# Patient Record
Sex: MALE | Race: WHITE | NOT HISPANIC OR LATINO | Employment: UNEMPLOYED | URBAN - METROPOLITAN AREA
[De-identification: names, ages, dates, MRNs, and addresses within clinical notes are randomized per-mention and may not be internally consistent; named-entity substitution may affect disease eponyms.]

---

## 2017-01-01 ENCOUNTER — APPOINTMENT (OUTPATIENT)
Dept: GENERAL RADIOLOGY | Facility: HOSPITAL | Age: 62
End: 2017-01-01

## 2017-01-01 ENCOUNTER — OFFICE VISIT (OUTPATIENT)
Dept: UROLOGY | Facility: CLINIC | Age: 62
End: 2017-01-01

## 2017-01-01 ENCOUNTER — HOSPITAL ENCOUNTER (OUTPATIENT)
Dept: GENERAL RADIOLOGY | Facility: HOSPITAL | Age: 62
Discharge: HOME | End: 2017-01-04
Attending: INTERNAL MEDICINE | Admitting: INTERNAL MEDICINE

## 2017-01-01 ENCOUNTER — APPOINTMENT (OUTPATIENT)
Dept: NUCLEAR MEDICINE | Facility: HOSPITAL | Age: 62
End: 2017-01-01
Attending: INTERNAL MEDICINE

## 2017-01-01 ENCOUNTER — APPOINTMENT (OUTPATIENT)
Dept: CARDIOLOGY | Facility: HOSPITAL | Age: 62
End: 2017-01-01
Attending: INTERNAL MEDICINE

## 2017-01-01 ENCOUNTER — HOSPITAL ENCOUNTER (EMERGENCY)
Facility: HOSPITAL | Age: 62
Discharge: HOME | End: 2017-02-25
Attending: EMERGENCY MEDICINE | Admitting: EMERGENCY MEDICINE

## 2017-01-01 ENCOUNTER — HOSPITAL ENCOUNTER (INPATIENT)
Facility: HOSPITAL | Age: 62
LOS: 5 days | Discharge: ANOTHER HOSPITAL | End: 2017-04-04
Attending: EMERGENCY MEDICINE | Admitting: INTERNAL MEDICINE

## 2017-01-01 ENCOUNTER — HOSPITAL ENCOUNTER (INPATIENT)
Facility: HOSPITAL | Age: 62
LOS: 2 days | End: 2017-04-29
Attending: FAMILY MEDICINE | Admitting: FAMILY MEDICINE

## 2017-01-01 ENCOUNTER — TELEPHONE (OUTPATIENT)
Dept: CARDIOLOGY | Facility: CLINIC | Age: 62
End: 2017-01-01

## 2017-01-01 ENCOUNTER — HOSPITAL ENCOUNTER (OUTPATIENT)
Dept: ULTRASOUND IMAGING | Facility: HOSPITAL | Age: 62
Discharge: HOME | End: 2017-03-30
Admitting: NURSE PRACTITIONER

## 2017-01-01 ENCOUNTER — HOSPITAL ENCOUNTER (OUTPATIENT)
Dept: CARDIOLOGY | Facility: HOSPITAL | Age: 62
Discharge: HOME | End: 2017-03-08
Attending: INTERNAL MEDICINE | Admitting: INTERNAL MEDICINE

## 2017-01-01 ENCOUNTER — OFFICE VISIT (OUTPATIENT)
Dept: CARDIOLOGY | Facility: CLINIC | Age: 62
End: 2017-01-01

## 2017-01-01 ENCOUNTER — ANESTHESIA (OUTPATIENT)
Dept: PERIOP | Facility: HOSPITAL | Age: 62
End: 2017-01-01

## 2017-01-01 ENCOUNTER — ANESTHESIA EVENT (OUTPATIENT)
Dept: PERIOP | Facility: HOSPITAL | Age: 62
End: 2017-01-01

## 2017-01-01 ENCOUNTER — APPOINTMENT (OUTPATIENT)
Dept: CT IMAGING | Facility: HOSPITAL | Age: 62
End: 2017-01-01

## 2017-01-01 ENCOUNTER — APPOINTMENT (OUTPATIENT)
Dept: CARDIOLOGY | Facility: HOSPITAL | Age: 62
End: 2017-01-01

## 2017-01-01 ENCOUNTER — LAB (OUTPATIENT)
Dept: LAB | Facility: HOSPITAL | Age: 62
End: 2017-01-01
Attending: INTERNAL MEDICINE

## 2017-01-01 ENCOUNTER — HOSPITAL ENCOUNTER (INPATIENT)
Facility: HOSPITAL | Age: 62
LOS: 23 days | End: 2017-04-27
Attending: RADIOLOGY | Admitting: INTERNAL MEDICINE

## 2017-01-01 VITALS
HEIGHT: 69 IN | SYSTOLIC BLOOD PRESSURE: 116 MMHG | BODY MASS INDEX: 38.8 KG/M2 | DIASTOLIC BLOOD PRESSURE: 78 MMHG | OXYGEN SATURATION: 96 % | WEIGHT: 262 LBS | TEMPERATURE: 97.8 F | RESPIRATION RATE: 22 BRPM | HEART RATE: 87 BPM

## 2017-01-01 VITALS
HEIGHT: 69 IN | DIASTOLIC BLOOD PRESSURE: 78 MMHG | BODY MASS INDEX: 35.22 KG/M2 | RESPIRATION RATE: 20 BRPM | OXYGEN SATURATION: 99 % | SYSTOLIC BLOOD PRESSURE: 145 MMHG | HEART RATE: 103 BPM | TEMPERATURE: 98.4 F | WEIGHT: 237.81 LBS

## 2017-01-01 VITALS — HEART RATE: 97 BPM | DIASTOLIC BLOOD PRESSURE: 72 MMHG | SYSTOLIC BLOOD PRESSURE: 101 MMHG | TEMPERATURE: 96.7 F

## 2017-01-01 VITALS
HEIGHT: 69 IN | SYSTOLIC BLOOD PRESSURE: 131 MMHG | RESPIRATION RATE: 18 BRPM | TEMPERATURE: 98 F | WEIGHT: 230 LBS | DIASTOLIC BLOOD PRESSURE: 83 MMHG | OXYGEN SATURATION: 96 % | BODY MASS INDEX: 34.07 KG/M2 | HEART RATE: 98 BPM

## 2017-01-01 VITALS
DIASTOLIC BLOOD PRESSURE: 73 MMHG | HEIGHT: 69 IN | WEIGHT: 216 LBS | SYSTOLIC BLOOD PRESSURE: 111 MMHG | RESPIRATION RATE: 16 BRPM | BODY MASS INDEX: 31.99 KG/M2 | HEART RATE: 105 BPM

## 2017-01-01 VITALS
WEIGHT: 237.4 LBS | DIASTOLIC BLOOD PRESSURE: 82 MMHG | HEIGHT: 69 IN | OXYGEN SATURATION: 98 % | SYSTOLIC BLOOD PRESSURE: 129 MMHG | HEART RATE: 90 BPM | BODY MASS INDEX: 35.16 KG/M2

## 2017-01-01 VITALS
OXYGEN SATURATION: 83 % | TEMPERATURE: 97.9 F | SYSTOLIC BLOOD PRESSURE: 124 MMHG | HEART RATE: 101 BPM | DIASTOLIC BLOOD PRESSURE: 79 MMHG

## 2017-01-01 DIAGNOSIS — I73.9 PERIPHERAL VASCULAR DISEASE (HCC): ICD-10-CM

## 2017-01-01 DIAGNOSIS — I50.22 CHRONIC SYSTOLIC CONGESTIVE HEART FAILURE (HCC): ICD-10-CM

## 2017-01-01 DIAGNOSIS — R07.2 PRECORDIAL CHEST PAIN: ICD-10-CM

## 2017-01-01 DIAGNOSIS — E11.65 TYPE 2 DIABETES MELLITUS WITH HYPERGLYCEMIA, WITH LONG-TERM CURRENT USE OF INSULIN (HCC): ICD-10-CM

## 2017-01-01 DIAGNOSIS — A41.9 SEPSIS, DUE TO UNSPECIFIED ORGANISM: ICD-10-CM

## 2017-01-01 DIAGNOSIS — N39.0 URINARY TRACT INFECTION WITHOUT HEMATURIA, SITE UNSPECIFIED: ICD-10-CM

## 2017-01-01 DIAGNOSIS — E11.51 TYPE 2 DIABETES MELLITUS WITH DIABETIC PERIPHERAL ANGIOPATHY WITHOUT GANGRENE, WITH LONG-TERM CURRENT USE OF INSULIN (HCC): ICD-10-CM

## 2017-01-01 DIAGNOSIS — Z95.810 ICD (IMPLANTABLE CARDIOVERTER-DEFIBRILLATOR) IN PLACE: ICD-10-CM

## 2017-01-01 DIAGNOSIS — R41.841 COGNITIVE COMMUNICATION DEFICIT: ICD-10-CM

## 2017-01-01 DIAGNOSIS — N50.9 SCROTAL SKIN LESION: ICD-10-CM

## 2017-01-01 DIAGNOSIS — R06.02 SOB (SHORTNESS OF BREATH): ICD-10-CM

## 2017-01-01 DIAGNOSIS — Z74.09 IMPAIRED FUNCTIONAL MOBILITY, BALANCE, GAIT, AND ENDURANCE: ICD-10-CM

## 2017-01-01 DIAGNOSIS — Z96.0 INDWELLING CATHETER PRESENT ON ADMISSION: ICD-10-CM

## 2017-01-01 DIAGNOSIS — Z78.9 IMPAIRED MOBILITY AND ADLS: ICD-10-CM

## 2017-01-01 DIAGNOSIS — I25.5 ISCHEMIC CARDIOMYOPATHY: ICD-10-CM

## 2017-01-01 DIAGNOSIS — N39.0 UTI (URINARY TRACT INFECTION), BACTERIAL: Primary | ICD-10-CM

## 2017-01-01 DIAGNOSIS — I50.9 CONGESTIVE HEART FAILURE, UNSPECIFIED CONGESTIVE HEART FAILURE CHRONICITY, UNSPECIFIED CONGESTIVE HEART FAILURE TYPE: Primary | ICD-10-CM

## 2017-01-01 DIAGNOSIS — E78.5 DYSLIPIDEMIA: ICD-10-CM

## 2017-01-01 DIAGNOSIS — N50.89 SCROTAL ULCER: Primary | ICD-10-CM

## 2017-01-01 DIAGNOSIS — Z79.899 DRUG THERAPY: ICD-10-CM

## 2017-01-01 DIAGNOSIS — I25.10 ARTERIOSCLEROTIC CARDIOVASCULAR DISEASE (ASCVD): Primary | ICD-10-CM

## 2017-01-01 DIAGNOSIS — R13.10 DYSPHAGIA, UNSPECIFIED TYPE: Primary | ICD-10-CM

## 2017-01-01 DIAGNOSIS — I10 ESSENTIAL HYPERTENSION: ICD-10-CM

## 2017-01-01 DIAGNOSIS — Z79.4 TYPE 2 DIABETES MELLITUS WITH HYPERGLYCEMIA, WITH LONG-TERM CURRENT USE OF INSULIN (HCC): ICD-10-CM

## 2017-01-01 DIAGNOSIS — N50.89 TESTICULAR SWELLING: ICD-10-CM

## 2017-01-01 DIAGNOSIS — I51.9 HEART DISEASE: ICD-10-CM

## 2017-01-01 DIAGNOSIS — N45.3 EPIDIDYMOORCHITIS: ICD-10-CM

## 2017-01-01 DIAGNOSIS — Z01.810 PRE-OPERATIVE CARDIOVASCULAR EXAMINATION: Primary | ICD-10-CM

## 2017-01-01 DIAGNOSIS — I25.10 ARTERIOSCLEROTIC CARDIOVASCULAR DISEASE (ASCVD): ICD-10-CM

## 2017-01-01 DIAGNOSIS — Z79.899 DRUG THERAPY: Primary | ICD-10-CM

## 2017-01-01 DIAGNOSIS — IMO0002 INSULIN DEPENDENT TYPE 2 DIABETES MELLITUS, UNCONTROLLED: ICD-10-CM

## 2017-01-01 DIAGNOSIS — Z74.09 IMPAIRED MOBILITY AND ADLS: ICD-10-CM

## 2017-01-01 DIAGNOSIS — Z79.4 TYPE 2 DIABETES MELLITUS WITH DIABETIC PERIPHERAL ANGIOPATHY WITHOUT GANGRENE, WITH LONG-TERM CURRENT USE OF INSULIN (HCC): ICD-10-CM

## 2017-01-01 DIAGNOSIS — N39.0 URINARY TRACT INFECTION WITHOUT HEMATURIA, SITE UNSPECIFIED: Primary | ICD-10-CM

## 2017-01-01 DIAGNOSIS — A49.9 UTI (URINARY TRACT INFECTION), BACTERIAL: Primary | ICD-10-CM

## 2017-01-01 LAB
A-A DO2: 41.2 MMHG (ref 0–300)
ABO GROUP BLD: NORMAL
ALBUMIN SERPL-MCNC: 2.5 G/DL (ref 3.4–4.8)
ALBUMIN SERPL-MCNC: 2.5 G/DL (ref 3.4–4.8)
ALBUMIN SERPL-MCNC: 2.9 G/DL (ref 3.2–4.8)
ALBUMIN SERPL-MCNC: 2.9 G/DL (ref 3.4–4.8)
ALBUMIN SERPL-MCNC: 3 G/DL (ref 3.2–4.8)
ALBUMIN SERPL-MCNC: 3 G/DL (ref 3.2–4.8)
ALBUMIN SERPL-MCNC: 3 G/DL (ref 3.4–4.8)
ALBUMIN SERPL-MCNC: 3.1 G/DL (ref 3.2–4.8)
ALBUMIN SERPL-MCNC: 3.2 G/DL (ref 3.2–4.8)
ALBUMIN SERPL-MCNC: 3.2 G/DL (ref 3.4–4.8)
ALBUMIN SERPL-MCNC: 3.3 G/DL (ref 3.2–4.8)
ALBUMIN SERPL-MCNC: 3.4 G/DL (ref 3.2–4.8)
ALBUMIN SERPL-MCNC: 3.4 G/DL (ref 3.2–4.8)
ALBUMIN SERPL-MCNC: 3.5 G/DL (ref 3.4–4.8)
ALBUMIN SERPL-MCNC: 3.8 G/DL (ref 3.4–4.8)
ALBUMIN/GLOB SERPL: 0.6 G/DL (ref 1.5–2.5)
ALBUMIN/GLOB SERPL: 0.7 G/DL (ref 1.5–2.5)
ALBUMIN/GLOB SERPL: 0.7 G/DL (ref 1.5–2.5)
ALBUMIN/GLOB SERPL: 0.8 G/DL (ref 1.5–2.5)
ALBUMIN/GLOB SERPL: 0.9 G/DL (ref 1.5–2.5)
ALP SERPL-CCNC: 102 U/L (ref 25–100)
ALP SERPL-CCNC: 124 U/L (ref 25–100)
ALP SERPL-CCNC: 134 U/L (ref 25–100)
ALP SERPL-CCNC: 135 U/L (ref 25–100)
ALP SERPL-CCNC: 137 U/L (ref 25–100)
ALP SERPL-CCNC: 56 U/L (ref 40–129)
ALP SERPL-CCNC: 57 U/L (ref 40–129)
ALP SERPL-CCNC: 58 U/L (ref 40–129)
ALP SERPL-CCNC: 61 U/L (ref 25–100)
ALP SERPL-CCNC: 65 U/L (ref 25–100)
ALP SERPL-CCNC: 66 U/L (ref 25–100)
ALP SERPL-CCNC: 69 U/L (ref 40–129)
ALP SERPL-CCNC: 70 U/L (ref 40–129)
ALP SERPL-CCNC: 71 U/L (ref 25–100)
ALP SERPL-CCNC: 74 U/L (ref 25–100)
ALP SERPL-CCNC: 75 U/L (ref 40–129)
ALP SERPL-CCNC: 77 U/L (ref 25–100)
ALP SERPL-CCNC: 77 U/L (ref 40–129)
ALP SERPL-CCNC: 91 U/L (ref 25–100)
ALT SERPL W P-5'-P-CCNC: 11 U/L (ref 7–40)
ALT SERPL W P-5'-P-CCNC: 12 U/L (ref 10–44)
ALT SERPL W P-5'-P-CCNC: 12 U/L (ref 7–40)
ALT SERPL W P-5'-P-CCNC: 12 U/L (ref 7–40)
ALT SERPL W P-5'-P-CCNC: 13 U/L (ref 10–44)
ALT SERPL W P-5'-P-CCNC: 13 U/L (ref 7–40)
ALT SERPL W P-5'-P-CCNC: 14 U/L (ref 10–44)
ALT SERPL W P-5'-P-CCNC: 14 U/L (ref 7–40)
ALT SERPL W P-5'-P-CCNC: 16 U/L (ref 7–40)
ALT SERPL W P-5'-P-CCNC: 18 U/L (ref 7–40)
ALT SERPL W P-5'-P-CCNC: 25 U/L (ref 7–40)
ALT SERPL W P-5'-P-CCNC: 4 U/L (ref 7–40)
ALT SERPL W P-5'-P-CCNC: 5 U/L (ref 7–40)
ALT SERPL W P-5'-P-CCNC: 5 U/L (ref 7–40)
ALT SERPL W P-5'-P-CCNC: 6 U/L (ref 7–40)
ALT SERPL W P-5'-P-CCNC: 8 U/L (ref 10–44)
ALT SERPL W P-5'-P-CCNC: 9 U/L (ref 10–44)
AMMONIA BLD-SCNC: 27 UMOL/L (ref 16–60)
ANION GAP SERPL CALCULATED.3IONS-SCNC: 10.9 MMOL/L (ref 3.6–11.2)
ANION GAP SERPL CALCULATED.3IONS-SCNC: 12.9 MMOL/L (ref 3.6–11.2)
ANION GAP SERPL CALCULATED.3IONS-SCNC: 13 MMOL/L (ref 3–11)
ANION GAP SERPL CALCULATED.3IONS-SCNC: 13.1 MMOL/L (ref 3.6–11.2)
ANION GAP SERPL CALCULATED.3IONS-SCNC: 2 MMOL/L (ref 3–11)
ANION GAP SERPL CALCULATED.3IONS-SCNC: 2 MMOL/L (ref 3–11)
ANION GAP SERPL CALCULATED.3IONS-SCNC: 3 MMOL/L (ref 3–11)
ANION GAP SERPL CALCULATED.3IONS-SCNC: 4 MMOL/L (ref 3–11)
ANION GAP SERPL CALCULATED.3IONS-SCNC: 4.6 MMOL/L (ref 3.6–11.2)
ANION GAP SERPL CALCULATED.3IONS-SCNC: 5 MMOL/L (ref 3–11)
ANION GAP SERPL CALCULATED.3IONS-SCNC: 6 MMOL/L (ref 3–11)
ANION GAP SERPL CALCULATED.3IONS-SCNC: 6.2 MMOL/L (ref 3.6–11.2)
ANION GAP SERPL CALCULATED.3IONS-SCNC: 6.6 MMOL/L (ref 3.6–11.2)
ANION GAP SERPL CALCULATED.3IONS-SCNC: 6.6 MMOL/L (ref 3.6–11.2)
ANION GAP SERPL CALCULATED.3IONS-SCNC: 7 MMOL/L (ref 3–11)
ANION GAP SERPL CALCULATED.3IONS-SCNC: 7.7 MMOL/L (ref 3.6–11.2)
ANION GAP SERPL CALCULATED.3IONS-SCNC: 8.9 MMOL/L (ref 3.6–11.2)
ANION GAP SERPL CALCULATED.3IONS-SCNC: 9 MMOL/L (ref 3–11)
ANION GAP SERPL CALCULATED.3IONS-SCNC: 9 MMOL/L (ref 3–11)
APTT PPP: 24.6 SECONDS (ref 24–31)
APTT PPP: 29.9 SECONDS (ref 24–31)
APTT PPP: 31.5 SECONDS (ref 24–31)
APTT PPP: 50.4 SECONDS (ref 24–31)
APTT PPP: 55.8 SECONDS (ref 24–31)
APTT PPP: 60.2 SECONDS (ref 24–31)
ARTERIAL PATENCY WRIST A: ABNORMAL
ARTERIAL PATENCY WRIST A: POSITIVE
ARTICHOKE IGE QN: 62 MG/DL (ref 0–130)
AST SERPL-CCNC: 16 U/L (ref 10–34)
AST SERPL-CCNC: 17 U/L (ref 10–34)
AST SERPL-CCNC: 19 U/L (ref 0–33)
AST SERPL-CCNC: 19 U/L (ref 10–34)
AST SERPL-CCNC: 19 U/L (ref 10–34)
AST SERPL-CCNC: 22 U/L (ref 0–33)
AST SERPL-CCNC: 22 U/L (ref 0–33)
AST SERPL-CCNC: 24 U/L (ref 10–34)
AST SERPL-CCNC: 25 U/L (ref 0–33)
AST SERPL-CCNC: 27 U/L (ref 0–33)
AST SERPL-CCNC: 28 U/L (ref 0–33)
AST SERPL-CCNC: 28 U/L (ref 0–33)
AST SERPL-CCNC: 30 U/L (ref 0–33)
AST SERPL-CCNC: 30 U/L (ref 10–34)
AST SERPL-CCNC: 33 U/L (ref 0–33)
AST SERPL-CCNC: 34 U/L (ref 0–33)
AST SERPL-CCNC: 43 U/L (ref 10–34)
AST SERPL-CCNC: 67 U/L (ref 0–33)
AST SERPL-CCNC: 70 U/L (ref 0–33)
ATMOSPHERIC PRESS: 720 MMHG
ATMOSPHERIC PRESS: ABNORMAL MMHG
BACTERIA SPEC AEROBE CULT: ABNORMAL
BACTERIA SPEC AEROBE CULT: NO GROWTH
BACTERIA SPEC AEROBE CULT: NORMAL
BACTERIA UR QL AUTO: ABNORMAL /HPF
BASE EXCESS BLDA CALC-SCNC: -0.3 MMOL/L (ref 0–2)
BASE EXCESS BLDA CALC-SCNC: 10.2 MMOL/L (ref 0–2)
BASE EXCESS BLDA CALC-SCNC: 2.2 MMOL/L
BASE EXCESS BLDA CALC-SCNC: 9.9 MMOL/L (ref 0–2)
BASOPHILS # BLD AUTO: 0.01 10*3/MM3 (ref 0–0.2)
BASOPHILS # BLD AUTO: 0.01 10*3/MM3 (ref 0–0.3)
BASOPHILS # BLD AUTO: 0.02 10*3/MM3 (ref 0–0.2)
BASOPHILS # BLD AUTO: 0.02 10*3/MM3 (ref 0–0.3)
BASOPHILS # BLD AUTO: 0.02 10*3/MM3 (ref 0–0.3)
BASOPHILS # BLD AUTO: 0.03 10*3/MM3 (ref 0–0.2)
BASOPHILS # BLD AUTO: 0.03 10*3/MM3 (ref 0–0.2)
BASOPHILS # BLD AUTO: 0.03 10*3/MM3 (ref 0–0.3)
BASOPHILS # BLD AUTO: 0.04 10*3/MM3 (ref 0–0.2)
BASOPHILS # BLD AUTO: 0.04 10*3/MM3 (ref 0–0.2)
BASOPHILS # BLD AUTO: 0.05 10*3/MM3 (ref 0–0.3)
BASOPHILS # BLD AUTO: 0.06 10*3/MM3 (ref 0–0.2)
BASOPHILS # BLD MANUAL: 0 10*3/MM3 (ref 0–0.2)
BASOPHILS NFR BLD AUTO: 0 % (ref 0–1)
BASOPHILS NFR BLD AUTO: 0.1 % (ref 0–1)
BASOPHILS NFR BLD AUTO: 0.1 % (ref 0–2)
BASOPHILS NFR BLD AUTO: 0.1 % (ref 0–2)
BASOPHILS NFR BLD AUTO: 0.2 % (ref 0–1)
BASOPHILS NFR BLD AUTO: 0.2 % (ref 0–2)
BASOPHILS NFR BLD AUTO: 0.3 % (ref 0–1)
BASOPHILS NFR BLD AUTO: 0.3 % (ref 0–1)
BASOPHILS NFR BLD AUTO: 0.4 % (ref 0–1)
BASOPHILS NFR BLD AUTO: 0.4 % (ref 0–1)
BASOPHILS NFR BLD AUTO: 0.5 % (ref 0–1)
BASOPHILS NFR BLD AUTO: 0.5 % (ref 0–2)
BDY SITE: ABNORMAL
BH CV ECHO MEAS - ACS: 2.1 CM
BH CV ECHO MEAS - AO MAX PG (FULL): 2.5 MMHG
BH CV ECHO MEAS - AO MAX PG (FULL): 2.8 MMHG
BH CV ECHO MEAS - AO MAX PG: 4.4 MMHG
BH CV ECHO MEAS - AO MAX PG: 6 MMHG
BH CV ECHO MEAS - AO MEAN PG (FULL): 1 MMHG
BH CV ECHO MEAS - AO MEAN PG (FULL): 1.1 MMHG
BH CV ECHO MEAS - AO MEAN PG: 2 MMHG
BH CV ECHO MEAS - AO MEAN PG: 2.8 MMHG
BH CV ECHO MEAS - AO ROOT AREA (BSA CORRECTED): 1.4
BH CV ECHO MEAS - AO ROOT AREA (BSA CORRECTED): 1.4
BH CV ECHO MEAS - AO ROOT AREA: 7.1 CM^2
BH CV ECHO MEAS - AO ROOT AREA: 7.7 CM^2
BH CV ECHO MEAS - AO ROOT DIAM: 3 CM
BH CV ECHO MEAS - AO ROOT DIAM: 3.1 CM
BH CV ECHO MEAS - AO V2 MAX: 105 CM/SEC
BH CV ECHO MEAS - AO V2 MAX: 122.2 CM/SEC
BH CV ECHO MEAS - AO V2 MEAN: 53 CM/SEC
BH CV ECHO MEAS - AO V2 MEAN: 75.4 CM/SEC
BH CV ECHO MEAS - AO V2 VTI: 14.1 CM
BH CV ECHO MEAS - AO V2 VTI: 17 CM
BH CV ECHO MEAS - AVA(I,A): 1.6 CM^2
BH CV ECHO MEAS - AVA(I,A): 3.6 CM^2
BH CV ECHO MEAS - AVA(I,D): 1.6 CM^2
BH CV ECHO MEAS - AVA(I,D): 3.6 CM^2
BH CV ECHO MEAS - AVA(V,A): 1.7 CM^2
BH CV ECHO MEAS - AVA(V,A): 3.4 CM^2
BH CV ECHO MEAS - AVA(V,D): 1.7 CM^2
BH CV ECHO MEAS - AVA(V,D): 3.4 CM^2
BH CV ECHO MEAS - BSA(HAYCOCK): 2.3 M^2
BH CV ECHO MEAS - BSA(HAYCOCK): 2.3 M^2
BH CV ECHO MEAS - BSA: 2.2 M^2
BH CV ECHO MEAS - BSA: 2.2 M^2
BH CV ECHO MEAS - BZI_BMI: 34 KILOGRAMS/M^2
BH CV ECHO MEAS - BZI_BMI: 35 KILOGRAMS/M^2
BH CV ECHO MEAS - BZI_METRIC_HEIGHT: 175.3 CM
BH CV ECHO MEAS - BZI_METRIC_HEIGHT: 175.3 CM
BH CV ECHO MEAS - BZI_METRIC_WEIGHT: 104.3 KG
BH CV ECHO MEAS - BZI_METRIC_WEIGHT: 107.5 KG
BH CV ECHO MEAS - EDV(CUBED): 408.5 ML
BH CV ECHO MEAS - EDV(TEICH): 291.2 ML
BH CV ECHO MEAS - EF(CUBED): 31.2 %
BH CV ECHO MEAS - EF(TEICH): 24.5 %
BH CV ECHO MEAS - ESV(CUBED): 281 ML
BH CV ECHO MEAS - ESV(TEICH): 219.8 ML
BH CV ECHO MEAS - FS: 11.7 %
BH CV ECHO MEAS - IVS/LVPW: 1.1
BH CV ECHO MEAS - IVSD: 1.1 CM
BH CV ECHO MEAS - LA DIMENSION: 3.9 CM
BH CV ECHO MEAS - LA DIMENSION: 4.7 CM
BH CV ECHO MEAS - LA/AO: 1.3
BH CV ECHO MEAS - LA/AO: 1.6
BH CV ECHO MEAS - LAT PEAK E' VEL: 6.1 CM/SEC
BH CV ECHO MEAS - LV MASS(C)D: 380.6 GRAMS
BH CV ECHO MEAS - LV MASS(C)DI: 171.4 GRAMS/M^2
BH CV ECHO MEAS - LV MAX PG: 1.6 MMHG
BH CV ECHO MEAS - LV MAX PG: 3.5 MMHG
BH CV ECHO MEAS - LV MEAN PG: 1 MMHG
BH CV ECHO MEAS - LV MEAN PG: 1.7 MMHG
BH CV ECHO MEAS - LV V1 MAX: 64 CM/SEC
BH CV ECHO MEAS - LV V1 MAX: 93.3 CM/SEC
BH CV ECHO MEAS - LV V1 MEAN: 32.2 CM/SEC
BH CV ECHO MEAS - LV V1 MEAN: 58 CM/SEC
BH CV ECHO MEAS - LV V1 VTI: 13.7 CM
BH CV ECHO MEAS - LV V1 VTI: 7.8 CM
BH CV ECHO MEAS - LVIDD: 7.4 CM
BH CV ECHO MEAS - LVIDS: 6.6 CM
BH CV ECHO MEAS - LVOT AREA (M): 2.8 CM^2
BH CV ECHO MEAS - LVOT AREA (M): 4.5 CM^2
BH CV ECHO MEAS - LVOT AREA: 2.8 CM^2
BH CV ECHO MEAS - LVOT AREA: 4.5 CM^2
BH CV ECHO MEAS - LVOT DIAM: 1.9 CM
BH CV ECHO MEAS - LVOT DIAM: 2.4 CM
BH CV ECHO MEAS - LVPWD: 1 CM
BH CV ECHO MEAS - MED PEAK E' VEL: 7.37 CM/SEC
BH CV ECHO MEAS - MV DEC SLOPE: 346.5 CM/SEC^2
BH CV ECHO MEAS - MV DEC TIME: 0.17 SEC
BH CV ECHO MEAS - MV E MAX VEL: 85.9 CM/SEC
BH CV ECHO MEAS - MV E MAX VEL: 91.3 CM/SEC
BH CV ECHO MEAS - MV P1/2T MAX VEL: 85.4 CM/SEC
BH CV ECHO MEAS - MV P1/2T: 72.2 MSEC
BH CV ECHO MEAS - MVA P1/2T LCG: 2.6 CM^2
BH CV ECHO MEAS - MVA(P1/2T): 3 CM^2
BH CV ECHO MEAS - PA ACC SLOPE: 939 CM/SEC^2
BH CV ECHO MEAS - PA ACC TIME: 0.07 SEC
BH CV ECHO MEAS - PA MAX PG: 2.6 MMHG
BH CV ECHO MEAS - PA PR(ACCEL): 47.5 MMHG
BH CV ECHO MEAS - PA V2 MAX: 80.5 CM/SEC
BH CV ECHO MEAS - RVDD: 2 CM
BH CV ECHO MEAS - SI(AO): 44.9 ML/M^2
BH CV ECHO MEAS - SI(AO): 59.9 ML/M^2
BH CV ECHO MEAS - SI(CUBED): 57.4 ML/M^2
BH CV ECHO MEAS - SI(LVOT): 27.9 ML/M^2
BH CV ECHO MEAS - SI(LVOT): 9.9 ML/M^2
BH CV ECHO MEAS - SI(TEICH): 32.2 ML/M^2
BH CV ECHO MEAS - SV(AO): 131.3 ML
BH CV ECHO MEAS - SV(AO): 99.7 ML
BH CV ECHO MEAS - SV(CUBED): 127.5 ML
BH CV ECHO MEAS - SV(LVOT): 22.1 ML
BH CV ECHO MEAS - SV(LVOT): 61.1 ML
BH CV ECHO MEAS - SV(TEICH): 71.4 ML
BH CV ECHO MEAS - TAPSE (>1.6): 1.3 CM2
BH CV XLRA - RV BASE: 3.6 CM
BH CV XLRA - RV LENGTH: 6.3 CM
BH CV XLRA - RV MID: 2.4 CM
BH CV XLRA MEAS LEFT DIST CCA EDV: -16.4 CM/SEC
BH CV XLRA MEAS LEFT DIST CCA EDV: 16.4 CM/SEC
BH CV XLRA MEAS LEFT DIST CCA PSV: -44 CM/SEC
BH CV XLRA MEAS LEFT DIST CCA PSV: 44 CM/SEC
BH CV XLRA MEAS LEFT PROX CCA EDV: 13.5 CM/SEC
BH CV XLRA MEAS LEFT PROX CCA EDV: 13.5 CM/SEC
BH CV XLRA MEAS LEFT PROX CCA PSV: 51 CM/SEC
BH CV XLRA MEAS LEFT PROX CCA PSV: 51 CM/SEC
BH CV XLRA MEAS LEFT PROX SCLA PSV: 77.4 CM/SEC
BH CV XLRA MEAS LEFT PROX SCLA PSV: 77.4 CM/SEC
BH CV XLRA MEAS RIGHT DIST CCA EDV: -8.8 CM/SEC
BH CV XLRA MEAS RIGHT DIST CCA EDV: 8.8 CM/SEC
BH CV XLRA MEAS RIGHT DIST CCA PSV: -38.7 CM/SEC
BH CV XLRA MEAS RIGHT DIST CCA PSV: 38.7 CM/SEC
BH CV XLRA MEAS RIGHT DIST ICA EDV: -12.3 CM/SEC
BH CV XLRA MEAS RIGHT DIST ICA EDV: 12.3 CM/SEC
BH CV XLRA MEAS RIGHT DIST ICA PSV: -35.2 CM/SEC
BH CV XLRA MEAS RIGHT DIST ICA PSV: 35.2 CM/SEC
BH CV XLRA MEAS RIGHT ICA/CCA RATIO: 1.15
BH CV XLRA MEAS RIGHT MID ICA EDV: -19.3 CM/SEC
BH CV XLRA MEAS RIGHT MID ICA EDV: 19.3 CM/SEC
BH CV XLRA MEAS RIGHT MID ICA PSV: -44.6 CM/SEC
BH CV XLRA MEAS RIGHT MID ICA PSV: 44.6 CM/SEC
BH CV XLRA MEAS RIGHT PROX CCA EDV: 14.7 CM/SEC
BH CV XLRA MEAS RIGHT PROX CCA EDV: 14.7 CM/SEC
BH CV XLRA MEAS RIGHT PROX CCA PSV: 55.1 CM/SEC
BH CV XLRA MEAS RIGHT PROX CCA PSV: 55.1 CM/SEC
BH CV XLRA MEAS RIGHT PROX ECA PSV: -60.4 CM/SEC
BH CV XLRA MEAS RIGHT PROX ECA PSV: 60.4 CM/SEC
BH CV XLRA MEAS RIGHT PROX ICA EDV: -15.8 CM/SEC
BH CV XLRA MEAS RIGHT PROX ICA EDV: 15.8 CM/SEC
BH CV XLRA MEAS RIGHT PROX ICA PSV: -40.5 CM/SEC
BH CV XLRA MEAS RIGHT PROX ICA PSV: 40.5 CM/SEC
BH CV XLRA MEAS RIGHT PROX SCLA PSV: 43.4 CM/SEC
BH CV XLRA MEAS RIGHT PROX SCLA PSV: 43.4 CM/SEC
BILIRUB SERPL-MCNC: 0.1 MG/DL (ref 0.2–1.8)
BILIRUB SERPL-MCNC: 0.2 MG/DL (ref 0.2–1.8)
BILIRUB SERPL-MCNC: 0.2 MG/DL (ref 0.2–1.8)
BILIRUB SERPL-MCNC: 0.2 MG/DL (ref 0.3–1.2)
BILIRUB SERPL-MCNC: 0.3 MG/DL (ref 0.2–1.8)
BILIRUB SERPL-MCNC: 0.3 MG/DL (ref 0.3–1.2)
BILIRUB SERPL-MCNC: 0.4 MG/DL (ref 0.2–1.8)
BILIRUB SERPL-MCNC: 0.4 MG/DL (ref 0.3–1.2)
BILIRUB SERPL-MCNC: 0.5 MG/DL (ref 0.2–1.8)
BILIRUB SERPL-MCNC: 0.6 MG/DL (ref 0.2–1.8)
BILIRUB UR QL STRIP: ABNORMAL
BILIRUB UR QL STRIP: NEGATIVE
BLD GP AB SCN SERPL QL: NEGATIVE
BNP SERPL-MCNC: 1188 PG/ML (ref 0–100)
BNP SERPL-MCNC: 1246 PG/ML (ref 0–100)
BNP SERPL-MCNC: 1288 PG/ML (ref 0–100)
BNP SERPL-MCNC: 301 PG/ML (ref 0–100)
BNP SERPL-MCNC: 3279 PG/ML (ref 0–100)
BNP SERPL-MCNC: 492 PG/ML (ref 0–100)
BNP SERPL-MCNC: 509 PG/ML (ref 0–100)
BNP SERPL-MCNC: 754 PG/ML (ref 0–100)
BNP SERPL-MCNC: 945 PG/ML (ref 0–100)
BODY TEMPERATURE: 98.6 C
BUN BLD-MCNC: 12 MG/DL (ref 9–23)
BUN BLD-MCNC: 14 MG/DL (ref 7–21)
BUN BLD-MCNC: 16 MG/DL (ref 9–23)
BUN BLD-MCNC: 17 MG/DL (ref 9–23)
BUN BLD-MCNC: 17 MG/DL (ref 9–23)
BUN BLD-MCNC: 18 MG/DL (ref 9–23)
BUN BLD-MCNC: 18 MG/DL (ref 9–23)
BUN BLD-MCNC: 19 MG/DL (ref 9–23)
BUN BLD-MCNC: 19 MG/DL (ref 9–23)
BUN BLD-MCNC: 20 MG/DL (ref 9–23)
BUN BLD-MCNC: 21 MG/DL (ref 9–23)
BUN BLD-MCNC: 28 MG/DL (ref 9–23)
BUN BLD-MCNC: 32 MG/DL (ref 9–23)
BUN BLD-MCNC: 36 MG/DL (ref 9–23)
BUN BLD-MCNC: 40 MG/DL (ref 9–23)
BUN BLD-MCNC: 41 MG/DL (ref 9–23)
BUN BLD-MCNC: 45 MG/DL (ref 9–23)
BUN BLD-MCNC: 47 MG/DL (ref 9–23)
BUN BLD-MCNC: 48 MG/DL (ref 9–23)
BUN BLD-MCNC: 51 MG/DL (ref 9–23)
BUN BLD-MCNC: 52 MG/DL (ref 9–23)
BUN BLD-MCNC: 53 MG/DL (ref 9–23)
BUN BLD-MCNC: 54 MG/DL (ref 9–23)
BUN BLD-MCNC: 6 MG/DL (ref 7–21)
BUN BLD-MCNC: 6 MG/DL (ref 9–23)
BUN BLD-MCNC: 7 MG/DL (ref 7–21)
BUN BLD-MCNC: 8 MG/DL (ref 7–21)
BUN BLD-MCNC: 9 MG/DL (ref 7–21)
BUN BLD-MCNC: <5 MG/DL (ref 7–21)
BUN/CREAT SERPL: 10 (ref 7–25)
BUN/CREAT SERPL: 10 (ref 7–25)
BUN/CREAT SERPL: 10.4 (ref 7–25)
BUN/CREAT SERPL: 11.1 (ref 7–25)
BUN/CREAT SERPL: 11.3 (ref 7–25)
BUN/CREAT SERPL: 112.5 (ref 7–25)
BUN/CREAT SERPL: 14.3 (ref 7–25)
BUN/CREAT SERPL: 14.4 (ref 7–25)
BUN/CREAT SERPL: 15 (ref 7–25)
BUN/CREAT SERPL: 24.3 (ref 7–25)
BUN/CREAT SERPL: 24.3 (ref 7–25)
BUN/CREAT SERPL: 25 (ref 7–25)
BUN/CREAT SERPL: 26.7 (ref 7–25)
BUN/CREAT SERPL: 30 (ref 7–25)
BUN/CREAT SERPL: 31.7 (ref 7–25)
BUN/CREAT SERPL: 31.7 (ref 7–25)
BUN/CREAT SERPL: 35 (ref 7–25)
BUN/CREAT SERPL: 45.7 (ref 7–25)
BUN/CREAT SERPL: 64.3 (ref 7–25)
BUN/CREAT SERPL: 68.3 (ref 7–25)
BUN/CREAT SERPL: 72 (ref 7–25)
BUN/CREAT SERPL: 72.9 (ref 7–25)
BUN/CREAT SERPL: 80 (ref 7–25)
BUN/CREAT SERPL: 86.7 (ref 7–25)
BUN/CREAT SERPL: 88.3 (ref 7–25)
BUN/CREAT SERPL: 9.7 (ref 7–25)
BUN/CREAT SERPL: 9.7 (ref 7–25)
BUN/CREAT SERPL: 90 (ref 7–25)
BUN/CREAT SERPL: 90 (ref 7–25)
BUN/CREAT SERPL: 94 (ref 7–25)
BUN/CREAT SERPL: ABNORMAL (ref 7–25)
CA-I SERPL ISE-MCNC: 1.09 MMOL/L (ref 1.12–1.32)
CA-I SERPL ISE-MCNC: 1.12 MMOL/L (ref 1.12–1.32)
CA-I SERPL ISE-MCNC: 1.13 MMOL/L (ref 1.12–1.32)
CA-I SERPL ISE-MCNC: 1.15 MMOL/L (ref 1.12–1.32)
CA-I SERPL ISE-MCNC: 1.18 MMOL/L (ref 1.12–1.32)
CALCIUM SPEC-SCNC: 10 MG/DL (ref 7.7–10)
CALCIUM SPEC-SCNC: 10 MG/DL (ref 8.7–10.4)
CALCIUM SPEC-SCNC: 7.8 MG/DL (ref 7.7–10)
CALCIUM SPEC-SCNC: 8.3 MG/DL (ref 7.7–10)
CALCIUM SPEC-SCNC: 8.3 MG/DL (ref 7.7–10)
CALCIUM SPEC-SCNC: 8.4 MG/DL (ref 7.7–10)
CALCIUM SPEC-SCNC: 8.4 MG/DL (ref 7.7–10)
CALCIUM SPEC-SCNC: 8.8 MG/DL (ref 8.7–10.4)
CALCIUM SPEC-SCNC: 8.8 MG/DL (ref 8.7–10.4)
CALCIUM SPEC-SCNC: 8.9 MG/DL (ref 8.7–10.4)
CALCIUM SPEC-SCNC: 9 MG/DL (ref 7.7–10)
CALCIUM SPEC-SCNC: 9 MG/DL (ref 8.7–10.4)
CALCIUM SPEC-SCNC: 9.1 MG/DL (ref 8.7–10.4)
CALCIUM SPEC-SCNC: 9.2 MG/DL (ref 7.7–10)
CALCIUM SPEC-SCNC: 9.3 MG/DL (ref 8.7–10.4)
CALCIUM SPEC-SCNC: 9.4 MG/DL (ref 7.7–10)
CALCIUM SPEC-SCNC: 9.4 MG/DL (ref 8.7–10.4)
CALCIUM SPEC-SCNC: 9.4 MG/DL (ref 8.7–10.4)
CALCIUM SPEC-SCNC: 9.5 MG/DL (ref 8.7–10.4)
CHLORIDE SERPL-SCNC: 100 MMOL/L (ref 99–109)
CHLORIDE SERPL-SCNC: 101 MMOL/L (ref 99–109)
CHLORIDE SERPL-SCNC: 101 MMOL/L (ref 99–109)
CHLORIDE SERPL-SCNC: 102 MMOL/L (ref 99–109)
CHLORIDE SERPL-SCNC: 104 MMOL/L (ref 99–109)
CHLORIDE SERPL-SCNC: 104 MMOL/L (ref 99–112)
CHLORIDE SERPL-SCNC: 105 MMOL/L (ref 99–109)
CHLORIDE SERPL-SCNC: 105 MMOL/L (ref 99–112)
CHLORIDE SERPL-SCNC: 106 MMOL/L (ref 99–109)
CHLORIDE SERPL-SCNC: 106 MMOL/L (ref 99–109)
CHLORIDE SERPL-SCNC: 106 MMOL/L (ref 99–112)
CHLORIDE SERPL-SCNC: 108 MMOL/L (ref 99–109)
CHLORIDE SERPL-SCNC: 94 MMOL/L (ref 99–112)
CHLORIDE SERPL-SCNC: 94 MMOL/L (ref 99–112)
CHLORIDE SERPL-SCNC: 96 MMOL/L (ref 99–109)
CHLORIDE SERPL-SCNC: 97 MMOL/L (ref 99–112)
CHLORIDE SERPL-SCNC: 99 MMOL/L (ref 99–112)
CHOLEST SERPL-MCNC: 108 MG/DL (ref 0–200)
CHOLEST SERPL-MCNC: 66 MG/DL (ref 0–200)
CHOLEST SERPL-MCNC: 96 MG/DL (ref 0–200)
CK MB SERPL-CCNC: 1.22 NG/ML (ref 0–5)
CK MB SERPL-CCNC: 2.48 NG/ML (ref 0–5)
CK MB SERPL-RTO: 2.7 % (ref 0–3)
CK MB SERPL-RTO: 3.8 % (ref 0–3)
CK SERPL-CCNC: 32 U/L (ref 24–204)
CK SERPL-CCNC: 54 U/L (ref 24–204)
CK SERPL-CCNC: 92 U/L (ref 24–204)
CLARITY UR: ABNORMAL
CLARITY UR: CLEAR
CO2 BLDA-SCNC: 25.5 MMOL/L (ref 22–33)
CO2 BLDA-SCNC: 35.7 MMOL/L (ref 22–33)
CO2 BLDA-SCNC: 36.6 MMOL/L (ref 22–33)
CO2 SERPL-SCNC: 18 MMOL/L (ref 20–31)
CO2 SERPL-SCNC: 21.3 MMOL/L (ref 24.3–31.9)
CO2 SERPL-SCNC: 21.4 MMOL/L (ref 24.3–31.9)
CO2 SERPL-SCNC: 23.9 MMOL/L (ref 24.3–31.9)
CO2 SERPL-SCNC: 24.1 MMOL/L (ref 24.3–31.9)
CO2 SERPL-SCNC: 24.8 MMOL/L (ref 24.3–31.9)
CO2 SERPL-SCNC: 25 MMOL/L (ref 20–31)
CO2 SERPL-SCNC: 26 MMOL/L (ref 20–31)
CO2 SERPL-SCNC: 26.4 MMOL/L (ref 24.3–31.9)
CO2 SERPL-SCNC: 27 MMOL/L (ref 20–31)
CO2 SERPL-SCNC: 27.1 MMOL/L (ref 24.3–31.9)
CO2 SERPL-SCNC: 27.4 MMOL/L (ref 24.3–31.9)
CO2 SERPL-SCNC: 28 MMOL/L (ref 20–31)
CO2 SERPL-SCNC: 28 MMOL/L (ref 20–31)
CO2 SERPL-SCNC: 28.1 MMOL/L (ref 24.3–31.9)
CO2 SERPL-SCNC: 29 MMOL/L (ref 20–31)
CO2 SERPL-SCNC: 30 MMOL/L (ref 20–31)
CO2 SERPL-SCNC: 31 MMOL/L (ref 20–31)
CO2 SERPL-SCNC: 32 MMOL/L (ref 20–31)
CO2 SERPL-SCNC: 32 MMOL/L (ref 20–31)
CO2 SERPL-SCNC: 33 MMOL/L (ref 20–31)
CO2 SERPL-SCNC: 35 MMOL/L (ref 20–31)
CO2 SERPL-SCNC: 36 MMOL/L (ref 20–31)
CO2 SERPL-SCNC: 36 MMOL/L (ref 20–31)
CO2 SERPL-SCNC: 37 MMOL/L (ref 20–31)
CO2 SERPL-SCNC: 37 MMOL/L (ref 20–31)
CO2 SERPL-SCNC: 38 MMOL/L (ref 20–31)
CO2 SERPL-SCNC: 39 MMOL/L (ref 20–31)
COHGB MFR BLD: 0.4 % (ref 0–5)
COHGB MFR BLD: 1.5 % (ref 0–2)
COHGB MFR BLD: 1.5 % (ref 0–2)
COHGB MFR BLD: 1.9 % (ref 0–2)
COLOR UR: ABNORMAL
COLOR UR: ABNORMAL
COLOR UR: YELLOW
CREAT BLD-MCNC: 0.4 MG/DL (ref 0.6–1.3)
CREAT BLD-MCNC: 0.5 MG/DL (ref 0.6–1.3)
CREAT BLD-MCNC: 0.52 MG/DL (ref 0.43–1.29)
CREAT BLD-MCNC: 0.53 MG/DL (ref 0.43–1.29)
CREAT BLD-MCNC: 0.54 MG/DL (ref 0.43–1.29)
CREAT BLD-MCNC: 0.56 MG/DL (ref 0.43–1.29)
CREAT BLD-MCNC: 0.6 MG/DL (ref 0.43–1.29)
CREAT BLD-MCNC: 0.6 MG/DL (ref 0.6–1.3)
CREAT BLD-MCNC: 0.62 MG/DL (ref 0.43–1.29)
CREAT BLD-MCNC: 0.67 MG/DL (ref 0.43–1.29)
CREAT BLD-MCNC: 0.7 MG/DL (ref 0.6–1.3)
CREAT BLD-MCNC: 0.8 MG/DL (ref 0.6–1.3)
CREAT BLD-MCNC: 0.93 MG/DL (ref 0.43–1.29)
CREAT BLD-MCNC: 0.97 MG/DL (ref 0.43–1.29)
CRP SERPL-MCNC: 2.82 MG/DL (ref 0–1)
CRP SERPL-MCNC: 2.98 MG/DL (ref 0–1)
CRP SERPL-MCNC: 3.42 MG/DL (ref 0–0.99)
CRP SERPL-MCNC: 4.33 MG/DL (ref 0–1)
CRP SERPL-MCNC: 5.18 MG/DL (ref 0–0.99)
CRP SERPL-MCNC: 7.31 MG/DL (ref 0–0.99)
CRP SERPL-MCNC: 7.38 MG/DL (ref 0–1)
CRP SERPL-MCNC: 7.72 MG/DL (ref 0–0.99)
CRP SERPL-MCNC: 7.96 MG/DL (ref 0–0.99)
CRP SERPL-MCNC: 9.54 MG/DL (ref 0–0.99)
D-LACTATE SERPL-SCNC: 0.8 MMOL/L (ref 0.5–2)
D-LACTATE SERPL-SCNC: 1.6 MMOL/L (ref 0.5–2)
DEPRECATED RDW RBC AUTO: 41.3 FL (ref 37–54)
DEPRECATED RDW RBC AUTO: 41.9 FL (ref 37–54)
DEPRECATED RDW RBC AUTO: 42.5 FL (ref 37–54)
DEPRECATED RDW RBC AUTO: 43.1 FL (ref 37–54)
DEPRECATED RDW RBC AUTO: 43.4 FL (ref 37–54)
DEPRECATED RDW RBC AUTO: 43.8 FL (ref 37–54)
DEPRECATED RDW RBC AUTO: 44.1 FL (ref 37–54)
DEPRECATED RDW RBC AUTO: 44.3 FL (ref 37–54)
DEPRECATED RDW RBC AUTO: 45.9 FL (ref 37–54)
DEPRECATED RDW RBC AUTO: 46.2 FL (ref 37–54)
DEPRECATED RDW RBC AUTO: 46.4 FL (ref 37–54)
DEPRECATED RDW RBC AUTO: 47.8 FL (ref 37–54)
DEPRECATED RDW RBC AUTO: 48 FL (ref 37–54)
DEPRECATED RDW RBC AUTO: 48 FL (ref 37–54)
DEPRECATED RDW RBC AUTO: 48.5 FL (ref 37–54)
DEPRECATED RDW RBC AUTO: 51.5 FL (ref 37–54)
DEPRECATED RDW RBC AUTO: 54.9 FL (ref 37–54)
DEPRECATED RDW RBC AUTO: 56.8 FL (ref 37–54)
DEPRECATED RDW RBC AUTO: 57.9 FL (ref 37–54)
DEPRECATED RDW RBC AUTO: 59.3 FL (ref 37–54)
DEPRECATED RDW RBC AUTO: 64.3 FL (ref 37–54)
DEPRECATED RDW RBC AUTO: 65.2 FL (ref 37–54)
DEPRECATED RDW RBC AUTO: 66.1 FL (ref 37–54)
DEPRECATED RDW RBC AUTO: 68.1 FL (ref 37–54)
DEPRECATED RDW RBC AUTO: 69 FL (ref 37–54)
DEPRECATED RDW RBC AUTO: 69.8 FL (ref 37–54)
EOSINOPHIL # BLD AUTO: 0.01 10*3/MM3 (ref 0.1–0.3)
EOSINOPHIL # BLD AUTO: 0.01 10*3/MM3 (ref 0.1–0.3)
EOSINOPHIL # BLD AUTO: 0.06 10*3/MM3 (ref 0.1–0.3)
EOSINOPHIL # BLD AUTO: 0.07 10*3/MM3 (ref 0.1–0.3)
EOSINOPHIL # BLD AUTO: 0.07 10*3/MM3 (ref 0–0.7)
EOSINOPHIL # BLD AUTO: 0.14 10*3/MM3 (ref 0.1–0.3)
EOSINOPHIL # BLD AUTO: 0.19 10*3/MM3 (ref 0.1–0.3)
EOSINOPHIL # BLD AUTO: 0.3 10*3/MM3 (ref 0.1–0.3)
EOSINOPHIL # BLD AUTO: 0.33 10*3/MM3 (ref 0.1–0.3)
EOSINOPHIL # BLD AUTO: 0.34 10*3/MM3 (ref 0.1–0.3)
EOSINOPHIL # BLD AUTO: 0.34 10*3/MM3 (ref 0–0.7)
EOSINOPHIL # BLD AUTO: 0.35 10*3/MM3 (ref 0.1–0.3)
EOSINOPHIL # BLD AUTO: 0.37 10*3/MM3 (ref 0–0.7)
EOSINOPHIL # BLD AUTO: 0.37 10*3/MM3 (ref 0–0.7)
EOSINOPHIL # BLD AUTO: 0.39 10*3/MM3 (ref 0.1–0.3)
EOSINOPHIL # BLD AUTO: 0.47 10*3/MM3 (ref 0–0.7)
EOSINOPHIL # BLD AUTO: 0.53 10*3/MM3 (ref 0–0.7)
EOSINOPHIL # BLD AUTO: 0.57 10*3/MM3 (ref 0.1–0.3)
EOSINOPHIL # BLD AUTO: 0.62 10*3/MM3 (ref 0–0.7)
EOSINOPHIL # BLD AUTO: 0.63 10*3/MM3 (ref 0.1–0.3)
EOSINOPHIL # BLD MANUAL: 0.81 10*3/MM3 (ref 0.1–0.3)
EOSINOPHIL NFR BLD AUTO: 0.1 % (ref 0–3)
EOSINOPHIL NFR BLD AUTO: 0.1 % (ref 0–3)
EOSINOPHIL NFR BLD AUTO: 0.4 % (ref 0–5)
EOSINOPHIL NFR BLD AUTO: 0.5 % (ref 0–3)
EOSINOPHIL NFR BLD AUTO: 0.6 % (ref 0–3)
EOSINOPHIL NFR BLD AUTO: 1.6 % (ref 0–3)
EOSINOPHIL NFR BLD AUTO: 1.7 % (ref 0–3)
EOSINOPHIL NFR BLD AUTO: 2 % (ref 0–3)
EOSINOPHIL NFR BLD AUTO: 2.2 % (ref 0–5)
EOSINOPHIL NFR BLD AUTO: 2.7 % (ref 0–5)
EOSINOPHIL NFR BLD AUTO: 2.8 % (ref 0–5)
EOSINOPHIL NFR BLD AUTO: 3.2 % (ref 0–3)
EOSINOPHIL NFR BLD AUTO: 3.4 % (ref 0–3)
EOSINOPHIL NFR BLD AUTO: 3.5 % (ref 0–3)
EOSINOPHIL NFR BLD AUTO: 4.7 % (ref 0–5)
EOSINOPHIL NFR BLD AUTO: 4.8 % (ref 0–3)
EOSINOPHIL NFR BLD AUTO: 5.3 % (ref 0–5)
EOSINOPHIL NFR BLD AUTO: 5.6 % (ref 0–3)
EOSINOPHIL NFR BLD AUTO: 6.3 % (ref 0–3)
EOSINOPHIL NFR BLD AUTO: 6.4 % (ref 0–5)
EOSINOPHIL NFR BLD MANUAL: 7 % (ref 0–3)
ERYTHROCYTE [DISTWIDTH] IN BLOOD BY AUTOMATED COUNT: 13.3 % (ref 11.5–14.5)
ERYTHROCYTE [DISTWIDTH] IN BLOOD BY AUTOMATED COUNT: 13.3 % (ref 11.5–14.5)
ERYTHROCYTE [DISTWIDTH] IN BLOOD BY AUTOMATED COUNT: 13.4 % (ref 11.5–14.5)
ERYTHROCYTE [DISTWIDTH] IN BLOOD BY AUTOMATED COUNT: 13.4 % (ref 11.5–14.5)
ERYTHROCYTE [DISTWIDTH] IN BLOOD BY AUTOMATED COUNT: 13.5 % (ref 11.5–14.5)
ERYTHROCYTE [DISTWIDTH] IN BLOOD BY AUTOMATED COUNT: 13.6 % (ref 11.5–14.5)
ERYTHROCYTE [DISTWIDTH] IN BLOOD BY AUTOMATED COUNT: 13.7 % (ref 11.5–14.5)
ERYTHROCYTE [DISTWIDTH] IN BLOOD BY AUTOMATED COUNT: 13.7 % (ref 11.5–14.5)
ERYTHROCYTE [DISTWIDTH] IN BLOOD BY AUTOMATED COUNT: 13.9 % (ref 11.3–14.5)
ERYTHROCYTE [DISTWIDTH] IN BLOOD BY AUTOMATED COUNT: 14.1 % (ref 11.3–14.5)
ERYTHROCYTE [DISTWIDTH] IN BLOOD BY AUTOMATED COUNT: 14.1 % (ref 11.3–14.5)
ERYTHROCYTE [DISTWIDTH] IN BLOOD BY AUTOMATED COUNT: 14.2 % (ref 11.3–14.5)
ERYTHROCYTE [DISTWIDTH] IN BLOOD BY AUTOMATED COUNT: 14.4 % (ref 11.3–14.5)
ERYTHROCYTE [DISTWIDTH] IN BLOOD BY AUTOMATED COUNT: 14.9 % (ref 11.3–14.5)
ERYTHROCYTE [DISTWIDTH] IN BLOOD BY AUTOMATED COUNT: 15.1 % (ref 11.3–14.5)
ERYTHROCYTE [DISTWIDTH] IN BLOOD BY AUTOMATED COUNT: 15.6 % (ref 11.3–14.5)
ERYTHROCYTE [DISTWIDTH] IN BLOOD BY AUTOMATED COUNT: 16.7 % (ref 11.3–14.5)
ERYTHROCYTE [DISTWIDTH] IN BLOOD BY AUTOMATED COUNT: 17.8 % (ref 11.3–14.5)
ERYTHROCYTE [DISTWIDTH] IN BLOOD BY AUTOMATED COUNT: 17.8 % (ref 11.3–14.5)
ERYTHROCYTE [DISTWIDTH] IN BLOOD BY AUTOMATED COUNT: 18.2 % (ref 11.3–14.5)
ERYTHROCYTE [DISTWIDTH] IN BLOOD BY AUTOMATED COUNT: 18.9 % (ref 11.3–14.5)
ERYTHROCYTE [DISTWIDTH] IN BLOOD BY AUTOMATED COUNT: 19 % (ref 11.3–14.5)
ERYTHROCYTE [DISTWIDTH] IN BLOOD BY AUTOMATED COUNT: 19.2 % (ref 11.3–14.5)
ERYTHROCYTE [DISTWIDTH] IN BLOOD BY AUTOMATED COUNT: 19.3 % (ref 11.3–14.5)
ERYTHROCYTE [DISTWIDTH] IN BLOOD BY AUTOMATED COUNT: 19.4 % (ref 11.3–14.5)
ERYTHROCYTE [DISTWIDTH] IN BLOOD BY AUTOMATED COUNT: 19.5 % (ref 11.3–14.5)
ERYTHROCYTE [SEDIMENTATION RATE] IN BLOOD: 92 MM/HR (ref 0–20)
ERYTHROCYTE [SEDIMENTATION RATE] IN BLOOD: 95 MM/HR (ref 0–20)
GFR SERPL CREATININE-BSD FRML MDRD: 114 ML/MIN/1.73
GFR SERPL CREATININE-BSD FRML MDRD: 120 ML/MIN/1.73
GFR SERPL CREATININE-BSD FRML MDRD: 131 ML/MIN/1.73
GFR SERPL CREATININE-BSD FRML MDRD: 137 ML/MIN/1.73
GFR SERPL CREATININE-BSD FRML MDRD: 148 ML/MIN/1.73
GFR SERPL CREATININE-BSD FRML MDRD: 78 ML/MIN/1.73
GFR SERPL CREATININE-BSD FRML MDRD: 82 ML/MIN/1.73
GFR SERPL CREATININE-BSD FRML MDRD: 98 ML/MIN/1.73
GFR SERPL CREATININE-BSD FRML MDRD: >150 ML/MIN/1.73
GLOBULIN UR ELPH-MCNC: 3.4 GM/DL
GLOBULIN UR ELPH-MCNC: 3.4 GM/DL
GLOBULIN UR ELPH-MCNC: 3.5 GM/DL
GLOBULIN UR ELPH-MCNC: 3.6 GM/DL
GLOBULIN UR ELPH-MCNC: 3.6 GM/DL
GLOBULIN UR ELPH-MCNC: 3.7 GM/DL
GLOBULIN UR ELPH-MCNC: 3.9 GM/DL
GLOBULIN UR ELPH-MCNC: 3.9 GM/DL
GLOBULIN UR ELPH-MCNC: 4 GM/DL
GLOBULIN UR ELPH-MCNC: 4.1 GM/DL
GLOBULIN UR ELPH-MCNC: 4.1 GM/DL
GLOBULIN UR ELPH-MCNC: 4.2 GM/DL
GLOBULIN UR ELPH-MCNC: 4.2 GM/DL
GLOBULIN UR ELPH-MCNC: 4.3 GM/DL
GLUCOSE BLD-MCNC: 116 MG/DL (ref 70–110)
GLUCOSE BLD-MCNC: 118 MG/DL (ref 70–110)
GLUCOSE BLD-MCNC: 123 MG/DL (ref 70–100)
GLUCOSE BLD-MCNC: 130 MG/DL (ref 70–110)
GLUCOSE BLD-MCNC: 130 MG/DL (ref 70–110)
GLUCOSE BLD-MCNC: 140 MG/DL (ref 70–100)
GLUCOSE BLD-MCNC: 149 MG/DL (ref 70–100)
GLUCOSE BLD-MCNC: 164 MG/DL (ref 70–100)
GLUCOSE BLD-MCNC: 170 MG/DL (ref 70–100)
GLUCOSE BLD-MCNC: 171 MG/DL (ref 70–100)
GLUCOSE BLD-MCNC: 175 MG/DL (ref 70–100)
GLUCOSE BLD-MCNC: 176 MG/DL (ref 70–100)
GLUCOSE BLD-MCNC: 178 MG/DL (ref 70–110)
GLUCOSE BLD-MCNC: 179 MG/DL (ref 70–100)
GLUCOSE BLD-MCNC: 186 MG/DL (ref 70–100)
GLUCOSE BLD-MCNC: 192 MG/DL (ref 70–100)
GLUCOSE BLD-MCNC: 194 MG/DL (ref 70–100)
GLUCOSE BLD-MCNC: 196 MG/DL (ref 70–100)
GLUCOSE BLD-MCNC: 200 MG/DL (ref 70–100)
GLUCOSE BLD-MCNC: 211 MG/DL (ref 70–100)
GLUCOSE BLD-MCNC: 231 MG/DL (ref 70–100)
GLUCOSE BLD-MCNC: 231 MG/DL (ref 70–110)
GLUCOSE BLD-MCNC: 234 MG/DL (ref 70–100)
GLUCOSE BLD-MCNC: 238 MG/DL (ref 70–100)
GLUCOSE BLD-MCNC: 246 MG/DL (ref 70–110)
GLUCOSE BLD-MCNC: 346 MG/DL (ref 70–100)
GLUCOSE BLD-MCNC: 366 MG/DL (ref 70–110)
GLUCOSE BLD-MCNC: 414 MG/DL (ref 70–110)
GLUCOSE BLD-MCNC: 420 MG/DL (ref 70–100)
GLUCOSE BLD-MCNC: 51 MG/DL (ref 70–100)
GLUCOSE BLD-MCNC: 60 MG/DL (ref 70–100)
GLUCOSE BLD-MCNC: 90 MG/DL (ref 70–100)
GLUCOSE BLD-MCNC: 91 MG/DL (ref 70–100)
GLUCOSE BLD-MCNC: 97 MG/DL (ref 70–100)
GLUCOSE BLDC GLUCOMTR-MCNC: 100 MG/DL (ref 70–130)
GLUCOSE BLDC GLUCOMTR-MCNC: 103 MG/DL (ref 70–130)
GLUCOSE BLDC GLUCOMTR-MCNC: 105 MG/DL (ref 70–130)
GLUCOSE BLDC GLUCOMTR-MCNC: 106 MG/DL (ref 70–130)
GLUCOSE BLDC GLUCOMTR-MCNC: 109 MG/DL (ref 70–130)
GLUCOSE BLDC GLUCOMTR-MCNC: 111 MG/DL (ref 70–130)
GLUCOSE BLDC GLUCOMTR-MCNC: 112 MG/DL (ref 70–130)
GLUCOSE BLDC GLUCOMTR-MCNC: 118 MG/DL (ref 70–130)
GLUCOSE BLDC GLUCOMTR-MCNC: 121 MG/DL (ref 70–130)
GLUCOSE BLDC GLUCOMTR-MCNC: 123 MG/DL (ref 70–130)
GLUCOSE BLDC GLUCOMTR-MCNC: 124 MG/DL (ref 70–130)
GLUCOSE BLDC GLUCOMTR-MCNC: 125 MG/DL (ref 70–130)
GLUCOSE BLDC GLUCOMTR-MCNC: 127 MG/DL (ref 70–130)
GLUCOSE BLDC GLUCOMTR-MCNC: 128 MG/DL (ref 70–130)
GLUCOSE BLDC GLUCOMTR-MCNC: 132 MG/DL (ref 70–130)
GLUCOSE BLDC GLUCOMTR-MCNC: 134 MG/DL (ref 70–130)
GLUCOSE BLDC GLUCOMTR-MCNC: 136 MG/DL (ref 70–130)
GLUCOSE BLDC GLUCOMTR-MCNC: 137 MG/DL (ref 70–130)
GLUCOSE BLDC GLUCOMTR-MCNC: 144 MG/DL (ref 70–130)
GLUCOSE BLDC GLUCOMTR-MCNC: 144 MG/DL (ref 70–130)
GLUCOSE BLDC GLUCOMTR-MCNC: 147 MG/DL (ref 70–130)
GLUCOSE BLDC GLUCOMTR-MCNC: 148 MG/DL (ref 70–130)
GLUCOSE BLDC GLUCOMTR-MCNC: 150 MG/DL (ref 70–130)
GLUCOSE BLDC GLUCOMTR-MCNC: 150 MG/DL (ref 70–130)
GLUCOSE BLDC GLUCOMTR-MCNC: 160 MG/DL (ref 70–130)
GLUCOSE BLDC GLUCOMTR-MCNC: 161 MG/DL (ref 70–130)
GLUCOSE BLDC GLUCOMTR-MCNC: 166 MG/DL (ref 70–130)
GLUCOSE BLDC GLUCOMTR-MCNC: 167 MG/DL (ref 70–130)
GLUCOSE BLDC GLUCOMTR-MCNC: 168 MG/DL (ref 70–130)
GLUCOSE BLDC GLUCOMTR-MCNC: 171 MG/DL (ref 70–130)
GLUCOSE BLDC GLUCOMTR-MCNC: 173 MG/DL (ref 70–130)
GLUCOSE BLDC GLUCOMTR-MCNC: 178 MG/DL (ref 70–130)
GLUCOSE BLDC GLUCOMTR-MCNC: 180 MG/DL (ref 70–130)
GLUCOSE BLDC GLUCOMTR-MCNC: 182 MG/DL (ref 70–130)
GLUCOSE BLDC GLUCOMTR-MCNC: 182 MG/DL (ref 70–130)
GLUCOSE BLDC GLUCOMTR-MCNC: 185 MG/DL (ref 70–130)
GLUCOSE BLDC GLUCOMTR-MCNC: 186 MG/DL (ref 70–130)
GLUCOSE BLDC GLUCOMTR-MCNC: 188 MG/DL (ref 70–130)
GLUCOSE BLDC GLUCOMTR-MCNC: 189 MG/DL (ref 70–130)
GLUCOSE BLDC GLUCOMTR-MCNC: 189 MG/DL (ref 70–130)
GLUCOSE BLDC GLUCOMTR-MCNC: 191 MG/DL (ref 70–130)
GLUCOSE BLDC GLUCOMTR-MCNC: 193 MG/DL (ref 70–130)
GLUCOSE BLDC GLUCOMTR-MCNC: 194 MG/DL (ref 70–130)
GLUCOSE BLDC GLUCOMTR-MCNC: 199 MG/DL (ref 70–130)
GLUCOSE BLDC GLUCOMTR-MCNC: 200 MG/DL (ref 70–130)
GLUCOSE BLDC GLUCOMTR-MCNC: 206 MG/DL (ref 70–130)
GLUCOSE BLDC GLUCOMTR-MCNC: 207 MG/DL (ref 70–130)
GLUCOSE BLDC GLUCOMTR-MCNC: 208 MG/DL (ref 70–130)
GLUCOSE BLDC GLUCOMTR-MCNC: 209 MG/DL (ref 70–130)
GLUCOSE BLDC GLUCOMTR-MCNC: 21 MG/DL (ref 70–130)
GLUCOSE BLDC GLUCOMTR-MCNC: 210 MG/DL (ref 70–130)
GLUCOSE BLDC GLUCOMTR-MCNC: 211 MG/DL (ref 70–130)
GLUCOSE BLDC GLUCOMTR-MCNC: 212 MG/DL (ref 70–130)
GLUCOSE BLDC GLUCOMTR-MCNC: 214 MG/DL (ref 70–130)
GLUCOSE BLDC GLUCOMTR-MCNC: 216 MG/DL (ref 70–130)
GLUCOSE BLDC GLUCOMTR-MCNC: 216 MG/DL (ref 70–130)
GLUCOSE BLDC GLUCOMTR-MCNC: 220 MG/DL (ref 70–130)
GLUCOSE BLDC GLUCOMTR-MCNC: 220 MG/DL (ref 70–130)
GLUCOSE BLDC GLUCOMTR-MCNC: 221 MG/DL (ref 70–130)
GLUCOSE BLDC GLUCOMTR-MCNC: 222 MG/DL (ref 70–130)
GLUCOSE BLDC GLUCOMTR-MCNC: 223 MG/DL (ref 70–130)
GLUCOSE BLDC GLUCOMTR-MCNC: 224 MG/DL (ref 70–130)
GLUCOSE BLDC GLUCOMTR-MCNC: 225 MG/DL (ref 70–130)
GLUCOSE BLDC GLUCOMTR-MCNC: 226 MG/DL (ref 70–130)
GLUCOSE BLDC GLUCOMTR-MCNC: 229 MG/DL (ref 70–130)
GLUCOSE BLDC GLUCOMTR-MCNC: 23 MG/DL (ref 70–130)
GLUCOSE BLDC GLUCOMTR-MCNC: 233 MG/DL (ref 70–130)
GLUCOSE BLDC GLUCOMTR-MCNC: 233 MG/DL (ref 70–130)
GLUCOSE BLDC GLUCOMTR-MCNC: 237 MG/DL (ref 70–130)
GLUCOSE BLDC GLUCOMTR-MCNC: 238 MG/DL (ref 70–130)
GLUCOSE BLDC GLUCOMTR-MCNC: 239 MG/DL (ref 70–130)
GLUCOSE BLDC GLUCOMTR-MCNC: 244 MG/DL (ref 70–130)
GLUCOSE BLDC GLUCOMTR-MCNC: 244 MG/DL (ref 70–130)
GLUCOSE BLDC GLUCOMTR-MCNC: 247 MG/DL (ref 70–130)
GLUCOSE BLDC GLUCOMTR-MCNC: 249 MG/DL (ref 70–130)
GLUCOSE BLDC GLUCOMTR-MCNC: 250 MG/DL (ref 70–130)
GLUCOSE BLDC GLUCOMTR-MCNC: 254 MG/DL (ref 70–130)
GLUCOSE BLDC GLUCOMTR-MCNC: 255 MG/DL (ref 70–130)
GLUCOSE BLDC GLUCOMTR-MCNC: 257 MG/DL (ref 70–130)
GLUCOSE BLDC GLUCOMTR-MCNC: 261 MG/DL (ref 70–130)
GLUCOSE BLDC GLUCOMTR-MCNC: 265 MG/DL (ref 70–130)
GLUCOSE BLDC GLUCOMTR-MCNC: 269 MG/DL (ref 70–130)
GLUCOSE BLDC GLUCOMTR-MCNC: 272 MG/DL (ref 70–130)
GLUCOSE BLDC GLUCOMTR-MCNC: 273 MG/DL (ref 70–130)
GLUCOSE BLDC GLUCOMTR-MCNC: 284 MG/DL (ref 70–130)
GLUCOSE BLDC GLUCOMTR-MCNC: 287 MG/DL (ref 70–130)
GLUCOSE BLDC GLUCOMTR-MCNC: 293 MG/DL (ref 70–130)
GLUCOSE BLDC GLUCOMTR-MCNC: 303 MG/DL (ref 70–130)
GLUCOSE BLDC GLUCOMTR-MCNC: 308 MG/DL (ref 70–130)
GLUCOSE BLDC GLUCOMTR-MCNC: 308 MG/DL (ref 70–130)
GLUCOSE BLDC GLUCOMTR-MCNC: 331 MG/DL (ref 70–130)
GLUCOSE BLDC GLUCOMTR-MCNC: 362 MG/DL (ref 70–130)
GLUCOSE BLDC GLUCOMTR-MCNC: 366 MG/DL (ref 70–130)
GLUCOSE BLDC GLUCOMTR-MCNC: 370 MG/DL (ref 70–130)
GLUCOSE BLDC GLUCOMTR-MCNC: 377 MG/DL (ref 70–130)
GLUCOSE BLDC GLUCOMTR-MCNC: 384 MG/DL (ref 70–130)
GLUCOSE BLDC GLUCOMTR-MCNC: 386 MG/DL (ref 70–130)
GLUCOSE BLDC GLUCOMTR-MCNC: 395 MG/DL (ref 70–130)
GLUCOSE BLDC GLUCOMTR-MCNC: 436 MG/DL (ref 70–130)
GLUCOSE BLDC GLUCOMTR-MCNC: 462 MG/DL (ref 70–130)
GLUCOSE BLDC GLUCOMTR-MCNC: 54 MG/DL (ref 70–130)
GLUCOSE BLDC GLUCOMTR-MCNC: 55 MG/DL (ref 70–130)
GLUCOSE BLDC GLUCOMTR-MCNC: 61 MG/DL (ref 70–130)
GLUCOSE BLDC GLUCOMTR-MCNC: 69 MG/DL (ref 70–130)
GLUCOSE BLDC GLUCOMTR-MCNC: 76 MG/DL (ref 70–130)
GLUCOSE BLDC GLUCOMTR-MCNC: 76 MG/DL (ref 70–130)
GLUCOSE BLDC GLUCOMTR-MCNC: 82 MG/DL (ref 70–130)
GLUCOSE BLDC GLUCOMTR-MCNC: 85 MG/DL (ref 70–130)
GLUCOSE BLDC GLUCOMTR-MCNC: 89 MG/DL (ref 70–130)
GLUCOSE BLDC GLUCOMTR-MCNC: 89 MG/DL (ref 70–130)
GLUCOSE BLDC GLUCOMTR-MCNC: 91 MG/DL (ref 70–130)
GLUCOSE BLDC GLUCOMTR-MCNC: 97 MG/DL (ref 70–130)
GLUCOSE BLDC GLUCOMTR-MCNC: 98 MG/DL (ref 70–130)
GLUCOSE BLDC GLUCOMTR-MCNC: 99 MG/DL (ref 70–130)
GLUCOSE UR STRIP-MCNC: ABNORMAL MG/DL
GLUCOSE UR STRIP-MCNC: NEGATIVE MG/DL
GLUCOSE UR STRIP-MCNC: NEGATIVE MG/DL
GRAM STN SPEC: ABNORMAL
HBA1C MFR BLD: 10.4 % (ref 4.5–5.7)
HCO3 BLDA-SCNC: 24.3 MMOL/L (ref 20–26)
HCO3 BLDA-SCNC: 24.9 MMOL/L (ref 22–26)
HCO3 BLDA-SCNC: 34.4 MMOL/L (ref 20–26)
HCO3 BLDA-SCNC: 35 MMOL/L (ref 20–26)
HCT VFR BLD AUTO: 31.6 % (ref 38.9–50.9)
HCT VFR BLD AUTO: 32 % (ref 38.9–50.9)
HCT VFR BLD AUTO: 32.3 % (ref 38.9–50.9)
HCT VFR BLD AUTO: 32.4 % (ref 38.9–50.9)
HCT VFR BLD AUTO: 32.6 % (ref 38.9–50.9)
HCT VFR BLD AUTO: 32.7 % (ref 38.9–50.9)
HCT VFR BLD AUTO: 33.5 % (ref 42–52)
HCT VFR BLD AUTO: 33.9 % (ref 38.9–50.9)
HCT VFR BLD AUTO: 33.9 % (ref 42–52)
HCT VFR BLD AUTO: 34.1 % (ref 38.9–50.9)
HCT VFR BLD AUTO: 34.2 % (ref 38.9–50.9)
HCT VFR BLD AUTO: 34.7 % (ref 42–52)
HCT VFR BLD AUTO: 35.4 % (ref 42–52)
HCT VFR BLD AUTO: 35.9 % (ref 38.9–50.9)
HCT VFR BLD AUTO: 36 % (ref 38.9–50.9)
HCT VFR BLD AUTO: 36.5 % (ref 38.9–50.9)
HCT VFR BLD AUTO: 36.6 % (ref 38.9–50.9)
HCT VFR BLD AUTO: 36.8 % (ref 38.9–50.9)
HCT VFR BLD AUTO: 37.1 % (ref 38.9–50.9)
HCT VFR BLD AUTO: 37.5 % (ref 42–52)
HCT VFR BLD AUTO: 38 % (ref 38.9–50.9)
HCT VFR BLD AUTO: 39.8 % (ref 42–52)
HCT VFR BLD AUTO: 40.8 % (ref 42–52)
HCT VFR BLD AUTO: 41.4 % (ref 42–52)
HCT VFR BLD CALC: 30 % (ref 42–52)
HCT VFR BLD CALC: 30.4 %
HCT VFR BLD CALC: 31.1 %
HCT VFR BLD CALC: 32.6 %
HDLC SERPL-MCNC: 30 MG/DL (ref 40–60)
HGB BLD-MCNC: 10 G/DL (ref 13.1–17.5)
HGB BLD-MCNC: 10 G/DL (ref 13.1–17.5)
HGB BLD-MCNC: 10.1 G/DL (ref 13.1–17.5)
HGB BLD-MCNC: 10.1 G/DL (ref 13.1–17.5)
HGB BLD-MCNC: 10.3 G/DL (ref 13.1–17.5)
HGB BLD-MCNC: 10.4 G/DL (ref 13.1–17.5)
HGB BLD-MCNC: 10.4 G/DL (ref 13.1–17.5)
HGB BLD-MCNC: 10.6 G/DL (ref 13.1–17.5)
HGB BLD-MCNC: 10.6 G/DL (ref 13.1–17.5)
HGB BLD-MCNC: 10.6 G/DL (ref 14–18)
HGB BLD-MCNC: 10.6 G/DL (ref 14–18)
HGB BLD-MCNC: 10.7 G/DL (ref 14–18)
HGB BLD-MCNC: 10.8 G/DL (ref 13.1–17.5)
HGB BLD-MCNC: 11 G/DL (ref 13.1–17.5)
HGB BLD-MCNC: 11 G/DL (ref 13.1–17.5)
HGB BLD-MCNC: 11 G/DL (ref 14–18)
HGB BLD-MCNC: 11.4 G/DL (ref 13.1–17.5)
HGB BLD-MCNC: 11.5 G/DL (ref 13.1–17.5)
HGB BLD-MCNC: 11.7 G/DL (ref 14–18)
HGB BLD-MCNC: 12.2 G/DL (ref 13.1–17.5)
HGB BLD-MCNC: 13.2 G/DL (ref 14–18)
HGB BLD-MCNC: 13.3 G/DL (ref 14–18)
HGB BLD-MCNC: 13.5 G/DL (ref 14–18)
HGB BLD-MCNC: 9.7 G/DL (ref 13.1–17.5)
HGB BLD-MCNC: 9.8 G/DL (ref 13.1–17.5)
HGB BLD-MCNC: 9.9 G/DL (ref 13.1–17.5)
HGB BLDA-MCNC: 10.2 G/DL (ref 13.5–17.5)
HGB BLDA-MCNC: 10.3 G/DL (ref 12–16)
HGB BLDA-MCNC: 10.6 G/DL (ref 13.5–17.5)
HGB BLDA-MCNC: 9.9 G/DL (ref 13.5–17.5)
HGB UR QL STRIP.AUTO: ABNORMAL
HOROWITZ INDEX BLD+IHG-RTO: 28 %
HOROWITZ INDEX BLD+IHG-RTO: 30 %
HYALINE CASTS UR QL AUTO: ABNORMAL /LPF
IMM GRANULOCYTES # BLD: 0.01 10*3/MM3 (ref 0–0.03)
IMM GRANULOCYTES # BLD: 0.02 10*3/MM3 (ref 0–0.03)
IMM GRANULOCYTES # BLD: 0.03 10*3/MM3 (ref 0–0.03)
IMM GRANULOCYTES # BLD: 0.04 10*3/MM3 (ref 0–0.03)
IMM GRANULOCYTES # BLD: 0.05 10*3/MM3 (ref 0–0.03)
IMM GRANULOCYTES # BLD: 0.1 10*3/MM3 (ref 0–0.03)
IMM GRANULOCYTES NFR BLD: 0.1 % (ref 0–0.6)
IMM GRANULOCYTES NFR BLD: 0.2 % (ref 0–0.5)
IMM GRANULOCYTES NFR BLD: 0.2 % (ref 0–0.5)
IMM GRANULOCYTES NFR BLD: 0.2 % (ref 0–0.6)
IMM GRANULOCYTES NFR BLD: 0.3 % (ref 0–0.5)
IMM GRANULOCYTES NFR BLD: 0.3 % (ref 0–0.5)
IMM GRANULOCYTES NFR BLD: 0.3 % (ref 0–0.6)
IMM GRANULOCYTES NFR BLD: 0.4 % (ref 0–0.5)
IMM GRANULOCYTES NFR BLD: 0.4 % (ref 0–0.5)
IMM GRANULOCYTES NFR BLD: 0.4 % (ref 0–0.6)
IMM GRANULOCYTES NFR BLD: 0.6 % (ref 0–0.5)
INR PPP: 1.34
INR PPP: 1.42
KETONES UR QL STRIP: ABNORMAL
KETONES UR QL STRIP: ABNORMAL
KETONES UR QL STRIP: NEGATIVE
LAB AP CASE REPORT: NORMAL
LDH SERPL-CCNC: 381 U/L (ref 120–246)
LEFT ATRIUM VOLUME INDEX: 29 ML/M2
LEUKOCYTE ESTERASE UR QL STRIP.AUTO: ABNORMAL
LYMPHOCYTES # BLD AUTO: 1.02 10*3/MM3 (ref 0.6–4.8)
LYMPHOCYTES # BLD AUTO: 1.28 10*3/MM3 (ref 1–3)
LYMPHOCYTES # BLD AUTO: 1.32 10*3/MM3 (ref 0.6–4.8)
LYMPHOCYTES # BLD AUTO: 1.4 10*3/MM3 (ref 0.6–4.8)
LYMPHOCYTES # BLD AUTO: 1.44 10*3/MM3 (ref 0.6–4.8)
LYMPHOCYTES # BLD AUTO: 1.48 10*3/MM3 (ref 1–3)
LYMPHOCYTES # BLD AUTO: 1.49 10*3/MM3 (ref 1–3)
LYMPHOCYTES # BLD AUTO: 1.6 10*3/MM3 (ref 0.6–4.8)
LYMPHOCYTES # BLD AUTO: 1.62 10*3/MM3 (ref 0.6–4.8)
LYMPHOCYTES # BLD AUTO: 1.62 10*3/MM3 (ref 0.6–4.8)
LYMPHOCYTES # BLD AUTO: 1.71 10*3/MM3 (ref 1–3)
LYMPHOCYTES # BLD AUTO: 1.74 10*3/MM3 (ref 0.6–4.8)
LYMPHOCYTES # BLD AUTO: 1.75 10*3/MM3 (ref 0.6–4.8)
LYMPHOCYTES # BLD AUTO: 1.85 10*3/MM3 (ref 0.6–4.8)
LYMPHOCYTES # BLD AUTO: 1.86 10*3/MM3 (ref 1–3)
LYMPHOCYTES # BLD AUTO: 1.91 10*3/MM3 (ref 0.6–4.8)
LYMPHOCYTES # BLD AUTO: 1.98 10*3/MM3 (ref 1–3)
LYMPHOCYTES # BLD AUTO: 2.01 10*3/MM3 (ref 1–3)
LYMPHOCYTES # BLD AUTO: 2.04 10*3/MM3 (ref 0.6–4.8)
LYMPHOCYTES # BLD AUTO: 2.93 10*3/MM3 (ref 0.6–4.8)
LYMPHOCYTES # BLD MANUAL: 1.74 10*3/MM3 (ref 0.6–4.8)
LYMPHOCYTES NFR BLD AUTO: 10.1 % (ref 24–44)
LYMPHOCYTES NFR BLD AUTO: 10.7 % (ref 21–51)
LYMPHOCYTES NFR BLD AUTO: 11.1 % (ref 21–51)
LYMPHOCYTES NFR BLD AUTO: 11.2 % (ref 21–51)
LYMPHOCYTES NFR BLD AUTO: 11.9 % (ref 24–44)
LYMPHOCYTES NFR BLD AUTO: 12.5 % (ref 24–44)
LYMPHOCYTES NFR BLD AUTO: 14.2 % (ref 24–44)
LYMPHOCYTES NFR BLD AUTO: 14.5 % (ref 24–44)
LYMPHOCYTES NFR BLD AUTO: 14.5 % (ref 24–44)
LYMPHOCYTES NFR BLD AUTO: 15.1 % (ref 24–44)
LYMPHOCYTES NFR BLD AUTO: 15.7 % (ref 24–44)
LYMPHOCYTES NFR BLD AUTO: 17.3 % (ref 24–44)
LYMPHOCYTES NFR BLD AUTO: 17.9 % (ref 24–44)
LYMPHOCYTES NFR BLD AUTO: 18.8 % (ref 21–51)
LYMPHOCYTES NFR BLD AUTO: 20.2 % (ref 21–51)
LYMPHOCYTES NFR BLD AUTO: 20.5 % (ref 21–51)
LYMPHOCYTES NFR BLD AUTO: 20.5 % (ref 24–44)
LYMPHOCYTES NFR BLD AUTO: 20.6 % (ref 24–44)
LYMPHOCYTES NFR BLD AUTO: 21.3 % (ref 24–44)
LYMPHOCYTES NFR BLD AUTO: 7.1 % (ref 21–51)
LYMPHOCYTES NFR BLD MANUAL: 15 % (ref 24–44)
LYMPHOCYTES NFR BLD MANUAL: 8 % (ref 0–12)
Lab: NORMAL
MAGNESIUM SERPL-MCNC: 1.6 MG/DL (ref 1.7–2.6)
MAGNESIUM SERPL-MCNC: 1.7 MG/DL (ref 1.7–2.6)
MAGNESIUM SERPL-MCNC: 1.8 MG/DL (ref 1.7–2.6)
MAGNESIUM SERPL-MCNC: 1.9 MG/DL (ref 1.3–2.7)
MAGNESIUM SERPL-MCNC: 1.9 MG/DL (ref 1.7–2.6)
MAGNESIUM SERPL-MCNC: 2 MG/DL (ref 1.3–2.7)
MAGNESIUM SERPL-MCNC: 2.1 MG/DL (ref 1.3–2.7)
MAGNESIUM SERPL-MCNC: 2.2 MG/DL (ref 1.3–2.7)
MAGNESIUM SERPL-MCNC: 2.2 MG/DL (ref 1.3–2.7)
MAGNESIUM SERPL-MCNC: 2.3 MG/DL (ref 1.3–2.7)
MCH RBC QN AUTO: 27.3 PG (ref 27–33)
MCH RBC QN AUTO: 27.4 PG (ref 27–33)
MCH RBC QN AUTO: 27.7 PG (ref 27–33)
MCH RBC QN AUTO: 28.1 PG (ref 27–31)
MCH RBC QN AUTO: 28.5 PG (ref 27–31)
MCH RBC QN AUTO: 28.5 PG (ref 27–33)
MCH RBC QN AUTO: 28.6 PG (ref 27–31)
MCH RBC QN AUTO: 28.6 PG (ref 27–33)
MCH RBC QN AUTO: 28.6 PG (ref 27–33)
MCH RBC QN AUTO: 28.7 PG (ref 27–31)
MCH RBC QN AUTO: 28.7 PG (ref 27–31)
MCH RBC QN AUTO: 28.8 PG (ref 27–31)
MCH RBC QN AUTO: 28.8 PG (ref 27–33)
MCH RBC QN AUTO: 28.9 PG (ref 27–31)
MCH RBC QN AUTO: 29 PG (ref 27–31)
MCH RBC QN AUTO: 29 PG (ref 27–31)
MCH RBC QN AUTO: 29 PG (ref 27–33)
MCH RBC QN AUTO: 29.3 PG (ref 27–31)
MCH RBC QN AUTO: 29.3 PG (ref 27–31)
MCH RBC QN AUTO: 29.4 PG (ref 27–31)
MCH RBC QN AUTO: 29.5 PG (ref 27–31)
MCH RBC QN AUTO: 29.6 PG (ref 27–31)
MCH RBC QN AUTO: 29.9 PG (ref 27–31)
MCH RBC QN AUTO: 30 PG (ref 27–31)
MCHC RBC AUTO-ENTMCNC: 29.4 G/DL (ref 32–36)
MCHC RBC AUTO-ENTMCNC: 29.6 G/DL (ref 32–36)
MCHC RBC AUTO-ENTMCNC: 29.6 G/DL (ref 32–36)
MCHC RBC AUTO-ENTMCNC: 29.9 G/DL (ref 33–37)
MCHC RBC AUTO-ENTMCNC: 30.1 G/DL (ref 32–36)
MCHC RBC AUTO-ENTMCNC: 30.1 G/DL (ref 32–36)
MCHC RBC AUTO-ENTMCNC: 30.2 G/DL (ref 32–36)
MCHC RBC AUTO-ENTMCNC: 30.2 G/DL (ref 32–36)
MCHC RBC AUTO-ENTMCNC: 30.3 G/DL (ref 32–36)
MCHC RBC AUTO-ENTMCNC: 30.5 G/DL (ref 32–36)
MCHC RBC AUTO-ENTMCNC: 30.6 G/DL (ref 32–36)
MCHC RBC AUTO-ENTMCNC: 30.7 G/DL (ref 32–36)
MCHC RBC AUTO-ENTMCNC: 31 G/DL (ref 32–36)
MCHC RBC AUTO-ENTMCNC: 31.1 G/DL (ref 32–36)
MCHC RBC AUTO-ENTMCNC: 31.2 G/DL (ref 33–37)
MCHC RBC AUTO-ENTMCNC: 31.3 G/DL (ref 32–36)
MCHC RBC AUTO-ENTMCNC: 31.3 G/DL (ref 32–36)
MCHC RBC AUTO-ENTMCNC: 31.6 G/DL (ref 33–37)
MCHC RBC AUTO-ENTMCNC: 31.6 G/DL (ref 33–37)
MCHC RBC AUTO-ENTMCNC: 31.7 G/DL (ref 33–37)
MCHC RBC AUTO-ENTMCNC: 31.9 G/DL (ref 33–37)
MCHC RBC AUTO-ENTMCNC: 32.1 G/DL (ref 32–36)
MCHC RBC AUTO-ENTMCNC: 33.1 G/DL (ref 33–37)
MCHC RBC AUTO-ENTMCNC: 33.4 G/DL (ref 33–37)
MCV RBC AUTO: 101.2 FL (ref 80–99)
MCV RBC AUTO: 86.4 FL (ref 80–94)
MCV RBC AUTO: 86.9 FL (ref 80–94)
MCV RBC AUTO: 87.7 FL (ref 80–94)
MCV RBC AUTO: 87.8 FL (ref 80–94)
MCV RBC AUTO: 89.8 FL (ref 80–99)
MCV RBC AUTO: 89.8 FL (ref 80–99)
MCV RBC AUTO: 89.9 FL (ref 80–94)
MCV RBC AUTO: 90.2 FL (ref 80–94)
MCV RBC AUTO: 90.6 FL (ref 80–94)
MCV RBC AUTO: 91.2 FL (ref 80–94)
MCV RBC AUTO: 92 FL (ref 80–99)
MCV RBC AUTO: 92.9 FL (ref 80–99)
MCV RBC AUTO: 92.9 FL (ref 80–99)
MCV RBC AUTO: 93.1 FL (ref 80–99)
MCV RBC AUTO: 93.2 FL (ref 80–99)
MCV RBC AUTO: 93.9 FL (ref 80–99)
MCV RBC AUTO: 94.5 FL (ref 80–99)
MCV RBC AUTO: 95 FL (ref 80–99)
MCV RBC AUTO: 95.3 FL (ref 80–99)
MCV RBC AUTO: 97 FL (ref 80–99)
MCV RBC AUTO: 97.3 FL (ref 80–99)
MCV RBC AUTO: 97.6 FL (ref 80–99)
MCV RBC AUTO: 98.1 FL (ref 80–99)
MCV RBC AUTO: 98.6 FL (ref 80–99)
MCV RBC AUTO: 99.5 FL (ref 80–99)
METHGB BLD QL: 0.2 % (ref 0–3)
METHGB BLD QL: 0.6 % (ref 0–1.5)
METHGB BLD QL: 0.7 % (ref 0–1.5)
METHGB BLD QL: 0.8 % (ref 0–1.5)
MODALITY: ABNORMAL
MONOCYTES # BLD AUTO: 0.63 10*3/MM3 (ref 0–1)
MONOCYTES # BLD AUTO: 0.68 10*3/MM3 (ref 0–1)
MONOCYTES # BLD AUTO: 0.78 10*3/MM3 (ref 0–1)
MONOCYTES # BLD AUTO: 0.89 10*3/MM3 (ref 0–1)
MONOCYTES # BLD AUTO: 0.92 10*3/MM3 (ref 0–1)
MONOCYTES # BLD AUTO: 0.93 10*3/MM3 (ref 0–1)
MONOCYTES # BLD AUTO: 0.96 10*3/MM3 (ref 0–1)
MONOCYTES # BLD AUTO: 1.07 10*3/MM3 (ref 0–1)
MONOCYTES # BLD AUTO: 1.08 10*3/MM3 (ref 0–1)
MONOCYTES # BLD AUTO: 1.11 10*3/MM3 (ref 0.1–0.9)
MONOCYTES # BLD AUTO: 1.14 10*3/MM3 (ref 0.1–0.9)
MONOCYTES # BLD AUTO: 1.14 10*3/MM3 (ref 0.1–0.9)
MONOCYTES # BLD AUTO: 1.15 10*3/MM3 (ref 0–1)
MONOCYTES # BLD AUTO: 1.17 10*3/MM3 (ref 0–1)
MONOCYTES # BLD AUTO: 1.18 10*3/MM3 (ref 0–1)
MONOCYTES # BLD AUTO: 1.19 10*3/MM3 (ref 0.1–0.9)
MONOCYTES # BLD AUTO: 1.19 10*3/MM3 (ref 0–1)
MONOCYTES # BLD AUTO: 1.28 10*3/MM3 (ref 0.1–0.9)
MONOCYTES # BLD AUTO: 1.41 10*3/MM3 (ref 0.1–0.9)
MONOCYTES # BLD AUTO: 1.57 10*3/MM3 (ref 0.1–0.9)
MONOCYTES # BLD AUTO: 2.82 10*3/MM3 (ref 0–1)
MONOCYTES NFR BLD AUTO: 10.5 % (ref 0–12)
MONOCYTES NFR BLD AUTO: 10.5 % (ref 0–12)
MONOCYTES NFR BLD AUTO: 10.7 % (ref 0–10)
MONOCYTES NFR BLD AUTO: 10.7 % (ref 0–12)
MONOCYTES NFR BLD AUTO: 11.2 % (ref 0–10)
MONOCYTES NFR BLD AUTO: 11.3 % (ref 0–12)
MONOCYTES NFR BLD AUTO: 11.4 % (ref 0–10)
MONOCYTES NFR BLD AUTO: 11.5 % (ref 0–12)
MONOCYTES NFR BLD AUTO: 11.8 % (ref 0–10)
MONOCYTES NFR BLD AUTO: 17.2 % (ref 0–12)
MONOCYTES NFR BLD AUTO: 6.2 % (ref 0–12)
MONOCYTES NFR BLD AUTO: 6.8 % (ref 0–12)
MONOCYTES NFR BLD AUTO: 7.7 % (ref 0–10)
MONOCYTES NFR BLD AUTO: 7.7 % (ref 0–12)
MONOCYTES NFR BLD AUTO: 8.7 % (ref 0–10)
MONOCYTES NFR BLD AUTO: 8.8 % (ref 0–12)
MONOCYTES NFR BLD AUTO: 9.1 % (ref 0–12)
MONOCYTES NFR BLD AUTO: 9.2 % (ref 0–10)
MONOCYTES NFR BLD AUTO: 9.6 % (ref 0–12)
MONOCYTES NFR BLD AUTO: 9.9 % (ref 0–12)
MYOGLOBIN SERPL-MCNC: 104 NG/ML (ref 0–109)
MYOGLOBIN SERPL-MCNC: 46 NG/ML (ref 0–109)
NEUTROPHILS # BLD AUTO: 10.18 10*3/MM3 (ref 1.5–8.3)
NEUTROPHILS # BLD AUTO: 10.72 10*3/MM3 (ref 1.4–6.5)
NEUTROPHILS # BLD AUTO: 12.13 10*3/MM3 (ref 1.4–6.5)
NEUTROPHILS # BLD AUTO: 14.92 10*3/MM3 (ref 1.4–6.5)
NEUTROPHILS # BLD AUTO: 5.07 10*3/MM3 (ref 1.5–8.3)
NEUTROPHILS # BLD AUTO: 5.87 10*3/MM3 (ref 1.4–6.5)
NEUTROPHILS # BLD AUTO: 6.05 10*3/MM3 (ref 1.5–8.3)
NEUTROPHILS # BLD AUTO: 6.22 10*3/MM3 (ref 1.4–6.5)
NEUTROPHILS # BLD AUTO: 6.32 10*3/MM3 (ref 1.5–8.3)
NEUTROPHILS # BLD AUTO: 6.43 10*3/MM3 (ref 1.4–6.5)
NEUTROPHILS # BLD AUTO: 6.57 10*3/MM3 (ref 1.5–8.3)
NEUTROPHILS # BLD AUTO: 7.16 10*3/MM3 (ref 1.5–8.3)
NEUTROPHILS # BLD AUTO: 7.17 10*3/MM3 (ref 1.5–8.3)
NEUTROPHILS # BLD AUTO: 8.05 10*3/MM3 (ref 1.5–8.3)
NEUTROPHILS # BLD AUTO: 8.11 10*3/MM3 (ref 1.5–8.3)
NEUTROPHILS # BLD AUTO: 8.12 10*3/MM3 (ref 1.5–8.3)
NEUTROPHILS # BLD AUTO: 8.55 10*3/MM3 (ref 1.5–8.3)
NEUTROPHILS # BLD AUTO: 8.56 10*3/MM3 (ref 1.5–8.3)
NEUTROPHILS # BLD AUTO: 9.35 10*3/MM3 (ref 1.5–8.3)
NEUTROPHILS # BLD AUTO: 9.44 10*3/MM3 (ref 1.5–8.3)
NEUTROPHILS # BLD AUTO: 9.87 10*3/MM3 (ref 1.4–6.5)
NEUTROPHILS NFR BLD AUTO: 60.6 % (ref 30–70)
NEUTROPHILS NFR BLD AUTO: 62 % (ref 41–71)
NEUTROPHILS NFR BLD AUTO: 62.2 % (ref 41–71)
NEUTROPHILS NFR BLD AUTO: 62.5 % (ref 30–70)
NEUTROPHILS NFR BLD AUTO: 64.9 % (ref 30–70)
NEUTROPHILS NFR BLD AUTO: 65.9 % (ref 41–71)
NEUTROPHILS NFR BLD AUTO: 67.5 % (ref 41–71)
NEUTROPHILS NFR BLD AUTO: 67.7 % (ref 41–71)
NEUTROPHILS NFR BLD AUTO: 70.4 % (ref 41–71)
NEUTROPHILS NFR BLD AUTO: 72.1 % (ref 41–71)
NEUTROPHILS NFR BLD AUTO: 72.5 % (ref 41–71)
NEUTROPHILS NFR BLD AUTO: 74.7 % (ref 30–70)
NEUTROPHILS NFR BLD AUTO: 74.7 % (ref 41–71)
NEUTROPHILS NFR BLD AUTO: 76 % (ref 41–71)
NEUTROPHILS NFR BLD AUTO: 76.6 % (ref 41–71)
NEUTROPHILS NFR BLD AUTO: 76.9 % (ref 30–70)
NEUTROPHILS NFR BLD AUTO: 77.1 % (ref 41–71)
NEUTROPHILS NFR BLD AUTO: 78.6 % (ref 30–70)
NEUTROPHILS NFR BLD AUTO: 79.8 % (ref 41–71)
NEUTROPHILS NFR BLD AUTO: 83.1 % (ref 30–70)
NEUTROPHILS NFR BLD MANUAL: 70 % (ref 41–71)
NITRITE UR QL STRIP: NEGATIVE
NITRITE UR QL STRIP: POSITIVE
NRBC BLD MANUAL-RTO: 0 /100 WBC (ref 0–0)
NRBC BLD MANUAL-RTO: 0 /100 WBC (ref 0–0)
OSMOLALITY SERPL CALC.SUM OF ELEC: 269.5 MOSM/KG (ref 273–305)
OSMOLALITY SERPL CALC.SUM OF ELEC: 269.5 MOSM/KG (ref 273–305)
OSMOLALITY SERPL CALC.SUM OF ELEC: 270.3 MOSM/KG (ref 273–305)
OSMOLALITY SERPL CALC.SUM OF ELEC: 271.3 MOSM/KG (ref 273–305)
OSMOLALITY SERPL CALC.SUM OF ELEC: 271.4 MOSM/KG (ref 273–305)
OSMOLALITY SERPL CALC.SUM OF ELEC: 274.3 MOSM/KG (ref 273–305)
OSMOLALITY SERPL CALC.SUM OF ELEC: 284.8 MOSM/KG (ref 273–305)
OSMOLALITY SERPL CALC.SUM OF ELEC: 286.2 MOSM/KG (ref 273–305)
OSMOLALITY SERPL CALC.SUM OF ELEC: NORMAL MOSM/KG (ref 273–305)
OXYHGB MFR BLDV: 93.7 % (ref 94–99)
OXYHGB MFR BLDV: 95.7 % (ref 94–99)
OXYHGB MFR BLDV: 96.5 % (ref 94–99)
OXYHGB MFR BLDV: 97.7 % (ref 85–100)
PATH REPORT.FINAL DX SPEC: NORMAL
PCO2 BLDA: 31.9 MM HG (ref 35–45)
PCO2 BLDA: 38.5 MM HG (ref 35–48)
PCO2 BLDA: 44.5 MM HG (ref 35–48)
PCO2 BLDA: 51.7 MM HG (ref 35–48)
PH BLDA: 7.41 PH UNITS (ref 7.35–7.45)
PH BLDA: 7.44 PH UNITS (ref 7.35–7.45)
PH BLDA: 7.5 PH UNITS (ref 7.35–7.45)
PH BLDA: 7.51 PH UNITS (ref 7.35–7.45)
PH UR STRIP.AUTO: 5.5 [PH] (ref 5–8)
PH UR STRIP.AUTO: 5.5 [PH] (ref 5–8)
PH UR STRIP.AUTO: 7 [PH] (ref 5–8)
PH UR STRIP.AUTO: <=5 [PH] (ref 5–8)
PH UR STRIP.AUTO: <=5 [PH] (ref 5–8)
PHOSPHATE SERPL-MCNC: 1.6 MG/DL (ref 2.7–4.5)
PHOSPHATE SERPL-MCNC: 2 MG/DL (ref 2.4–5.1)
PHOSPHATE SERPL-MCNC: 2.2 MG/DL (ref 2.7–4.5)
PHOSPHATE SERPL-MCNC: 2.3 MG/DL (ref 2.4–5.1)
PHOSPHATE SERPL-MCNC: 2.4 MG/DL (ref 2.4–5.1)
PHOSPHATE SERPL-MCNC: 2.4 MG/DL (ref 2.4–5.1)
PHOSPHATE SERPL-MCNC: 2.5 MG/DL (ref 2.4–5.1)
PHOSPHATE SERPL-MCNC: 2.6 MG/DL (ref 2.4–5.1)
PHOSPHATE SERPL-MCNC: 2.6 MG/DL (ref 2.4–5.1)
PHOSPHATE SERPL-MCNC: 2.7 MG/DL (ref 2.4–5.1)
PHOSPHATE SERPL-MCNC: 3 MG/DL (ref 2.4–5.1)
PHOSPHATE SERPL-MCNC: 3.1 MG/DL (ref 2.4–5.1)
PHOSPHATE SERPL-MCNC: 3.1 MG/DL (ref 2.7–4.5)
PHOSPHATE SERPL-MCNC: 3.2 MG/DL (ref 2.4–5.1)
PHOSPHATE SERPL-MCNC: 3.2 MG/DL (ref 2.4–5.1)
PHOSPHATE SERPL-MCNC: 3.4 MG/DL (ref 2.4–5.1)
PHOSPHATE SERPL-MCNC: 3.8 MG/DL (ref 2.7–4.5)
PHOSPHATE SERPL-MCNC: 4.1 MG/DL (ref 2.4–5.1)
PHOSPHATE SERPL-MCNC: 4.2 MG/DL (ref 2.4–5.1)
PLAT MORPH BLD: NORMAL
PLATELET # BLD AUTO: 269 10*3/MM3 (ref 150–450)
PLATELET # BLD AUTO: 288 10*3/MM3 (ref 150–450)
PLATELET # BLD AUTO: 291 10*3/MM3 (ref 150–450)
PLATELET # BLD AUTO: 300 10*3/MM3 (ref 150–450)
PLATELET # BLD AUTO: 301 10*3/MM3 (ref 150–450)
PLATELET # BLD AUTO: 305 10*3/MM3 (ref 150–450)
PLATELET # BLD AUTO: 306 10*3/MM3 (ref 150–450)
PLATELET # BLD AUTO: 315 10*3/MM3 (ref 130–400)
PLATELET # BLD AUTO: 328 10*3/MM3 (ref 150–450)
PLATELET # BLD AUTO: 329 10*3/MM3 (ref 150–450)
PLATELET # BLD AUTO: 332 10*3/MM3 (ref 130–400)
PLATELET # BLD AUTO: 332 10*3/MM3 (ref 150–450)
PLATELET # BLD AUTO: 337 10*3/MM3 (ref 150–450)
PLATELET # BLD AUTO: 340 10*3/MM3 (ref 130–400)
PLATELET # BLD AUTO: 343 10*3/MM3 (ref 130–400)
PLATELET # BLD AUTO: 346 10*3/MM3 (ref 150–450)
PLATELET # BLD AUTO: 352 10*3/MM3 (ref 150–450)
PLATELET # BLD AUTO: 357 10*3/MM3 (ref 130–400)
PLATELET # BLD AUTO: 359 10*3/MM3 (ref 150–450)
PLATELET # BLD AUTO: 372 10*3/MM3 (ref 150–450)
PLATELET # BLD AUTO: 377 10*3/MM3 (ref 150–450)
PLATELET # BLD AUTO: 380 10*3/MM3 (ref 130–400)
PLATELET # BLD AUTO: 386 10*3/MM3 (ref 150–450)
PLATELET # BLD AUTO: 405 10*3/MM3 (ref 150–450)
PLATELET # BLD AUTO: 463 10*3/MM3 (ref 130–400)
PLATELET # BLD AUTO: 464 10*3/MM3 (ref 130–400)
PMV BLD AUTO: 10.1 FL (ref 6–12)
PMV BLD AUTO: 10.4 FL (ref 6–10)
PMV BLD AUTO: 10.5 FL (ref 6–10)
PMV BLD AUTO: 10.5 FL (ref 6–10)
PMV BLD AUTO: 10.6 FL (ref 6–12)
PMV BLD AUTO: 10.7 FL (ref 6–10)
PMV BLD AUTO: 10.7 FL (ref 6–10)
PMV BLD AUTO: 10.7 FL (ref 6–12)
PMV BLD AUTO: 10.8 FL (ref 6–12)
PMV BLD AUTO: 10.9 FL (ref 6–10)
PMV BLD AUTO: 10.9 FL (ref 6–10)
PMV BLD AUTO: 11 FL (ref 6–12)
PMV BLD AUTO: 11.1 FL (ref 6–10)
PMV BLD AUTO: 11.2 FL (ref 6–12)
PMV BLD AUTO: 11.2 FL (ref 6–12)
PMV BLD AUTO: 11.3 FL (ref 6–12)
PMV BLD AUTO: 11.4 FL (ref 6–12)
PMV BLD AUTO: 11.5 FL (ref 6–12)
PMV BLD AUTO: 11.8 FL (ref 6–12)
PMV BLD AUTO: 11.9 FL (ref 6–12)
PMV BLD AUTO: 12.1 FL (ref 6–12)
PMV BLD AUTO: 12.3 FL (ref 6–12)
PMV BLD AUTO: 12.3 FL (ref 6–12)
PMV BLD AUTO: 12.4 FL (ref 6–12)
PO2 BLDA: 102 MM HG (ref 83–108)
PO2 BLDA: 109.6 MM HG (ref 80–100)
PO2 BLDA: 78 MM HG (ref 83–108)
PO2 BLDA: 96.7 MM HG (ref 83–108)
POTASSIUM BLD-SCNC: 2.9 MMOL/L (ref 3.5–5.3)
POTASSIUM BLD-SCNC: 3.3 MMOL/L (ref 3.5–5.3)
POTASSIUM BLD-SCNC: 3.5 MMOL/L (ref 3.5–5.5)
POTASSIUM BLD-SCNC: 3.6 MMOL/L (ref 3.5–5.3)
POTASSIUM BLD-SCNC: 3.6 MMOL/L (ref 3.5–5.5)
POTASSIUM BLD-SCNC: 3.6 MMOL/L (ref 3.5–5.5)
POTASSIUM BLD-SCNC: 3.7 MMOL/L (ref 3.5–5.5)
POTASSIUM BLD-SCNC: 3.8 MMOL/L (ref 3.5–5.3)
POTASSIUM BLD-SCNC: 3.8 MMOL/L (ref 3.5–5.5)
POTASSIUM BLD-SCNC: 3.9 MMOL/L (ref 3.5–5.3)
POTASSIUM BLD-SCNC: 3.9 MMOL/L (ref 3.5–5.5)
POTASSIUM BLD-SCNC: 4 MMOL/L (ref 3.5–5.3)
POTASSIUM BLD-SCNC: 4 MMOL/L (ref 3.5–5.5)
POTASSIUM BLD-SCNC: 4.1 MMOL/L (ref 3.5–5.5)
POTASSIUM BLD-SCNC: 4.1 MMOL/L (ref 3.5–5.5)
POTASSIUM BLD-SCNC: 4.2 MMOL/L (ref 3.5–5.3)
POTASSIUM BLD-SCNC: 4.2 MMOL/L (ref 3.5–5.5)
POTASSIUM BLD-SCNC: 4.2 MMOL/L (ref 3.5–5.5)
POTASSIUM BLD-SCNC: 4.3 MMOL/L (ref 3.5–5.5)
POTASSIUM BLD-SCNC: 4.5 MMOL/L (ref 3.5–5.3)
POTASSIUM BLD-SCNC: 4.7 MMOL/L (ref 3.5–5.5)
PREALB SERPL-MCNC: 5.1 MG/DL (ref 10–40)
PREALB SERPL-MCNC: 6.4 MG/DL (ref 10–40)
PREALB SERPL-MCNC: 6.9 MG/DL (ref 10–40)
PREALB SERPL-MCNC: 8.3 MG/DL (ref 10–40)
PROCALCITONIN SERPL-MCNC: 0.14 NG/ML
PROT SERPL-MCNC: 6.2 G/DL (ref 6–8)
PROT SERPL-MCNC: 6.4 G/DL (ref 6–8)
PROT SERPL-MCNC: 6.5 G/DL (ref 5.7–8.2)
PROT SERPL-MCNC: 6.5 G/DL (ref 6–8)
PROT SERPL-MCNC: 6.6 G/DL (ref 5.7–8.2)
PROT SERPL-MCNC: 6.6 G/DL (ref 6–8)
PROT SERPL-MCNC: 6.6 G/DL (ref 6–8)
PROT SERPL-MCNC: 6.9 G/DL (ref 5.7–8.2)
PROT SERPL-MCNC: 6.9 G/DL (ref 5.7–8.2)
PROT SERPL-MCNC: 7 G/DL (ref 5.7–8.2)
PROT SERPL-MCNC: 7 G/DL (ref 5.7–8.2)
PROT SERPL-MCNC: 7.2 G/DL (ref 5.7–8.2)
PROT SERPL-MCNC: 7.3 G/DL (ref 5.7–8.2)
PROT SERPL-MCNC: 7.4 G/DL (ref 5.7–8.2)
PROT SERPL-MCNC: 7.7 G/DL (ref 5.7–8.2)
PROT SERPL-MCNC: 7.7 G/DL (ref 6–8)
PROT SERPL-MCNC: 8.1 G/DL (ref 6–8)
PROT UR QL STRIP: ABNORMAL
PROT UR QL STRIP: NEGATIVE
PROT UR QL STRIP: NEGATIVE
PROTHROMBIN TIME: 14.7 SECONDS (ref 9.6–11.5)
PROTHROMBIN TIME: 15.6 SECONDS (ref 9.6–11.5)
PSA SERPL-MCNC: 0.71 NG/ML (ref 0–4)
RBC # BLD AUTO: 3.3 10*6/MM3 (ref 4.2–5.76)
RBC # BLD AUTO: 3.35 10*6/MM3 (ref 4.2–5.76)
RBC # BLD AUTO: 3.37 10*6/MM3 (ref 4.2–5.76)
RBC # BLD AUTO: 3.4 10*6/MM3 (ref 4.2–5.76)
RBC # BLD AUTO: 3.45 10*6/MM3 (ref 4.2–5.76)
RBC # BLD AUTO: 3.47 10*6/MM3 (ref 4.2–5.76)
RBC # BLD AUTO: 3.52 10*6/MM3 (ref 4.2–5.76)
RBC # BLD AUTO: 3.59 10*6/MM3 (ref 4.2–5.76)
RBC # BLD AUTO: 3.63 10*6/MM3 (ref 4.2–5.76)
RBC # BLD AUTO: 3.65 10*6/MM3 (ref 4.2–5.76)
RBC # BLD AUTO: 3.65 10*6/MM3 (ref 4.2–5.76)
RBC # BLD AUTO: 3.67 10*6/MM3 (ref 4.2–5.76)
RBC # BLD AUTO: 3.69 10*6/MM3 (ref 4.2–5.76)
RBC # BLD AUTO: 3.72 10*6/MM3 (ref 4.2–5.76)
RBC # BLD AUTO: 3.73 10*6/MM3 (ref 4.2–5.76)
RBC # BLD AUTO: 3.74 10*6/MM3 (ref 4.7–6.1)
RBC # BLD AUTO: 3.82 10*6/MM3 (ref 4.7–6.1)
RBC # BLD AUTO: 3.86 10*6/MM3 (ref 4.7–6.1)
RBC # BLD AUTO: 3.88 10*6/MM3 (ref 4.7–6.1)
RBC # BLD AUTO: 3.96 10*6/MM3 (ref 4.2–5.76)
RBC # BLD AUTO: 3.98 10*6/MM3 (ref 4.2–5.76)
RBC # BLD AUTO: 4.23 10*6/MM3 (ref 4.2–5.76)
RBC # BLD AUTO: 4.27 10*6/MM3 (ref 4.7–6.1)
RBC # BLD AUTO: 4.58 10*6/MM3 (ref 4.7–6.1)
RBC # BLD AUTO: 4.59 10*6/MM3 (ref 4.7–6.1)
RBC # BLD AUTO: 4.72 10*6/MM3 (ref 4.7–6.1)
RBC # UR: ABNORMAL /HPF
RBC MORPH BLD: NORMAL
REF LAB TEST METHOD: ABNORMAL
RH BLD: NEGATIVE
SAO2 % BLDCOA: 96.5 %
SAO2 % BLDCOA: 98.3 % (ref 90–100)
SCAN SLIDE: NORMAL
SODIUM BLD-SCNC: 131 MMOL/L (ref 135–153)
SODIUM BLD-SCNC: 132 MMOL/L (ref 135–153)
SODIUM BLD-SCNC: 133 MMOL/L (ref 132–146)
SODIUM BLD-SCNC: 134 MMOL/L (ref 135–153)
SODIUM BLD-SCNC: 136 MMOL/L (ref 132–146)
SODIUM BLD-SCNC: 136 MMOL/L (ref 135–153)
SODIUM BLD-SCNC: 137 MMOL/L (ref 132–146)
SODIUM BLD-SCNC: 137 MMOL/L (ref 132–146)
SODIUM BLD-SCNC: 138 MMOL/L (ref 132–146)
SODIUM BLD-SCNC: 138 MMOL/L (ref 135–153)
SODIUM BLD-SCNC: 139 MMOL/L (ref 132–146)
SODIUM BLD-SCNC: 139 MMOL/L (ref 132–146)
SODIUM BLD-SCNC: 140 MMOL/L (ref 132–146)
SODIUM BLD-SCNC: 142 MMOL/L (ref 132–146)
SODIUM BLD-SCNC: 143 MMOL/L (ref 132–146)
SODIUM BLD-SCNC: 143 MMOL/L (ref 132–146)
SODIUM BLD-SCNC: 144 MMOL/L (ref 132–146)
SODIUM BLD-SCNC: 145 MMOL/L (ref 132–146)
SODIUM BLD-SCNC: 146 MMOL/L (ref 132–146)
SODIUM BLD-SCNC: 146 MMOL/L (ref 132–146)
SODIUM BLD-SCNC: 148 MMOL/L (ref 132–146)
SP GR UR STRIP: 1.01 (ref 1–1.03)
SP GR UR STRIP: 1.01 (ref 1–1.03)
SP GR UR STRIP: 1.02 (ref 1–1.03)
SP GR UR STRIP: 1.04 (ref 1–1.03)
SP GR UR STRIP: 1.05 (ref 1–1.03)
SQUAMOUS #/AREA URNS HPF: ABNORMAL /HPF
TRIGL SERPL-MCNC: 42 MG/DL (ref 0–150)
TRIGL SERPL-MCNC: 80 MG/DL (ref 0–150)
TRIGL SERPL-MCNC: 84 MG/DL (ref 0–150)
TROPONIN I SERPL-MCNC: 0.02 NG/ML
TROPONIN I SERPL-MCNC: 0.02 NG/ML
TROPONIN I SERPL-MCNC: <0.006 NG/ML
TROPONIN I SERPL-MCNC: <0.006 NG/ML
TSH SERPL DL<=0.05 MIU/L-ACNC: 0.66 MIU/ML (ref 0.35–5.35)
UROBILINOGEN UR QL STRIP: ABNORMAL
VANCOMYCIN SERPL-MCNC: 17.6 MCG/ML
WBC MORPH BLD: NORMAL
WBC NRBC COR # BLD: 10.09 10*3/MM3 (ref 3.5–10.8)
WBC NRBC COR # BLD: 10.18 10*3/MM3 (ref 3.5–10.8)
WBC NRBC COR # BLD: 10.91 10*3/MM3 (ref 3.5–10.8)
WBC NRBC COR # BLD: 11.1 10*3/MM3 (ref 3.5–10.8)
WBC NRBC COR # BLD: 11.16 10*3/MM3 (ref 3.5–10.8)
WBC NRBC COR # BLD: 11.19 10*3/MM3 (ref 3.5–10.8)
WBC NRBC COR # BLD: 11.6 10*3/MM3 (ref 3.5–10.8)
WBC NRBC COR # BLD: 12.3 10*3/MM3 (ref 3.5–10.8)
WBC NRBC COR # BLD: 12.63 10*3/MM3 (ref 3.5–10.8)
WBC NRBC COR # BLD: 12.63 10*3/MM3 (ref 3.5–10.8)
WBC NRBC COR # BLD: 13.21 10*3/MM3 (ref 4.5–12.5)
WBC NRBC COR # BLD: 13.42 10*3/MM3 (ref 3.5–10.8)
WBC NRBC COR # BLD: 13.93 10*3/MM3 (ref 4.5–12.5)
WBC NRBC COR # BLD: 15.43 10*3/MM3 (ref 4.5–12.5)
WBC NRBC COR # BLD: 15.63 10*3/MM3 (ref 4.5–12.5)
WBC NRBC COR # BLD: 16.37 10*3/MM3 (ref 3.5–10.8)
WBC NRBC COR # BLD: 17.96 10*3/MM3 (ref 4.5–12.5)
WBC NRBC COR # BLD: 7.81 10*3/MM3 (ref 3.5–10.8)
WBC NRBC COR # BLD: 8.17 10*3/MM3 (ref 3.5–10.8)
WBC NRBC COR # BLD: 8.97 10*3/MM3 (ref 3.5–10.8)
WBC NRBC COR # BLD: 9.34 10*3/MM3 (ref 3.5–10.8)
WBC NRBC COR # BLD: 9.68 10*3/MM3 (ref 4.5–12.5)
WBC NRBC COR # BLD: 9.91 10*3/MM3 (ref 4.5–12.5)
WBC NRBC COR # BLD: 9.94 10*3/MM3 (ref 3.5–10.8)
WBC NRBC COR # BLD: 9.96 10*3/MM3 (ref 4.5–12.5)
WBC NRBC COR # BLD: 9.97 10*3/MM3 (ref 3.5–10.8)
WBC UR QL AUTO: ABNORMAL /HPF
YEAST URNS QL MICRO: ABNORMAL /HPF
YEAST URNS QL MICRO: ABNORMAL /HPF

## 2017-01-01 PROCEDURE — 25010000002 HEPARIN FLUSH (PORCINE) 100 UNIT/ML SOLUTION: Performed by: FAMILY MEDICINE

## 2017-01-01 PROCEDURE — 87077 CULTURE AEROBIC IDENTIFY: CPT | Performed by: EMERGENCY MEDICINE

## 2017-01-01 PROCEDURE — 83605 ASSAY OF LACTIC ACID: CPT | Performed by: INTERNAL MEDICINE

## 2017-01-01 PROCEDURE — 80053 COMPREHEN METABOLIC PANEL: CPT | Performed by: NURSE PRACTITIONER

## 2017-01-01 PROCEDURE — 25010000002 AMPHOTERICIN PER 50 MG: Performed by: INTERNAL MEDICINE

## 2017-01-01 PROCEDURE — 63710000001 INSULIN DETEMIR PER 5 UNITS: Performed by: INTERNAL MEDICINE

## 2017-01-01 PROCEDURE — 71010 HC CHEST PA OR AP: CPT

## 2017-01-01 PROCEDURE — 25010000002 HEPARIN FLUSH (PORCINE) 100 UNIT/ML SOLUTION: Performed by: INTERNAL MEDICINE

## 2017-01-01 PROCEDURE — 25010000002 VANCOMYCIN PER 500 MG: Performed by: INTERNAL MEDICINE

## 2017-01-01 PROCEDURE — 80053 COMPREHEN METABOLIC PANEL: CPT | Performed by: INTERNAL MEDICINE

## 2017-01-01 PROCEDURE — 93880 EXTRACRANIAL BILAT STUDY: CPT

## 2017-01-01 PROCEDURE — 94799 UNLISTED PULMONARY SVC/PX: CPT

## 2017-01-01 PROCEDURE — 97602 WOUND(S) CARE NON-SELECTIVE: CPT | Performed by: PHYSICAL THERAPIST

## 2017-01-01 PROCEDURE — 97597 DBRDMT OPN WND 1ST 20 CM/<: CPT

## 2017-01-01 PROCEDURE — 25010000002 HALOPERIDOL LACTATE PER 5 MG: Performed by: FAMILY MEDICINE

## 2017-01-01 PROCEDURE — 99232 SBSQ HOSP IP/OBS MODERATE 35: CPT | Performed by: INTERNAL MEDICINE

## 2017-01-01 PROCEDURE — 99214 OFFICE O/P EST MOD 30 MIN: CPT | Performed by: INTERNAL MEDICINE

## 2017-01-01 PROCEDURE — 94760 N-INVAS EAR/PLS OXIMETRY 1: CPT

## 2017-01-01 PROCEDURE — 85730 THROMBOPLASTIN TIME PARTIAL: CPT | Performed by: SURGERY

## 2017-01-01 PROCEDURE — 99254 IP/OBS CNSLTJ NEW/EST MOD 60: CPT | Performed by: INTERNAL MEDICINE

## 2017-01-01 PROCEDURE — 83880 ASSAY OF NATRIURETIC PEPTIDE: CPT | Performed by: INTERNAL MEDICINE

## 2017-01-01 PROCEDURE — 99232 SBSQ HOSP IP/OBS MODERATE 35: CPT | Performed by: PSYCHIATRY & NEUROLOGY

## 2017-01-01 PROCEDURE — 85025 COMPLETE CBC W/AUTO DIFF WBC: CPT | Performed by: INTERNAL MEDICINE

## 2017-01-01 PROCEDURE — 82375 ASSAY CARBOXYHB QUANT: CPT | Performed by: INTERNAL MEDICINE

## 2017-01-01 PROCEDURE — 80048 BASIC METABOLIC PNL TOTAL CA: CPT | Performed by: INTERNAL MEDICINE

## 2017-01-01 PROCEDURE — 36600 WITHDRAWAL OF ARTERIAL BLOOD: CPT | Performed by: INTERNAL MEDICINE

## 2017-01-01 PROCEDURE — G0108 DIAB MANAGE TRN  PER INDIV: HCPCS

## 2017-01-01 PROCEDURE — 94660 CPAP INITIATION&MGMT: CPT

## 2017-01-01 PROCEDURE — 25010000003 CEFAZOLIN IN DEXTROSE 2-4 GM/100ML-% SOLUTION: Performed by: NURSE PRACTITIONER

## 2017-01-01 PROCEDURE — 25010000002 AMIODARONE IN DEXTROSE 5% 360 MG/200ML SOLUTION: Performed by: INTERNAL MEDICINE

## 2017-01-01 PROCEDURE — 82550 ASSAY OF CK (CPK): CPT | Performed by: INTERNAL MEDICINE

## 2017-01-01 PROCEDURE — 94640 AIRWAY INHALATION TREATMENT: CPT

## 2017-01-01 PROCEDURE — 82962 GLUCOSE BLOOD TEST: CPT

## 2017-01-01 PROCEDURE — 71010 XR CHEST 1 VW: CPT | Performed by: RADIOLOGY

## 2017-01-01 PROCEDURE — 80202 ASSAY OF VANCOMYCIN: CPT | Performed by: INTERNAL MEDICINE

## 2017-01-01 PROCEDURE — 87186 SC STD MICRODIL/AGAR DIL: CPT | Performed by: UROLOGY

## 2017-01-01 PROCEDURE — 80061 LIPID PANEL: CPT | Performed by: RADIOLOGY

## 2017-01-01 PROCEDURE — 82553 CREATINE MB FRACTION: CPT | Performed by: INTERNAL MEDICINE

## 2017-01-01 PROCEDURE — 25010000002 FENTANYL CITRATE (PF) 100 MCG/2ML SOLUTION

## 2017-01-01 PROCEDURE — 80069 RENAL FUNCTION PANEL: CPT | Performed by: INTERNAL MEDICINE

## 2017-01-01 PROCEDURE — 87088 URINE BACTERIA CULTURE: CPT | Performed by: EMERGENCY MEDICINE

## 2017-01-01 PROCEDURE — 25010000002 MAGNESIUM SULFATE PER 500 MG OF MAGNESIUM: Performed by: ANESTHESIOLOGY

## 2017-01-01 PROCEDURE — 25010000002 FUROSEMIDE PER 20 MG: Performed by: INTERNAL MEDICINE

## 2017-01-01 PROCEDURE — C1894 INTRO/SHEATH, NON-LASER: HCPCS

## 2017-01-01 PROCEDURE — 92526 ORAL FUNCTION THERAPY: CPT

## 2017-01-01 PROCEDURE — 84100 ASSAY OF PHOSPHORUS: CPT | Performed by: INTERNAL MEDICINE

## 2017-01-01 PROCEDURE — 99213 OFFICE O/P EST LOW 20 MIN: CPT | Performed by: INTERNAL MEDICINE

## 2017-01-01 PROCEDURE — 99291 CRITICAL CARE FIRST HOUR: CPT | Performed by: INTERNAL MEDICINE

## 2017-01-01 PROCEDURE — 25010000002 MORPHINE PER 10 MG: Performed by: INTERNAL MEDICINE

## 2017-01-01 PROCEDURE — 84484 ASSAY OF TROPONIN QUANT: CPT | Performed by: INTERNAL MEDICINE

## 2017-01-01 PROCEDURE — 25010000003 CEFAZOLIN IN DEXTROSE 2-4 GM/100ML-% SOLUTION: Performed by: SURGERY

## 2017-01-01 PROCEDURE — 25010000002 PROMETHAZINE PER 50 MG: Performed by: INTERNAL MEDICINE

## 2017-01-01 PROCEDURE — 92507 TX SP LANG VOICE COMM INDIV: CPT

## 2017-01-01 PROCEDURE — 92612 ENDOSCOPY SWALLOW (FEES) VID: CPT

## 2017-01-01 PROCEDURE — 63710000001 INSULIN LISPRO (HUMAN) PER 5 UNITS: Performed by: INTERNAL MEDICINE

## 2017-01-01 PROCEDURE — 99231 SBSQ HOSP IP/OBS SF/LOW 25: CPT | Performed by: PSYCHIATRY & NEUROLOGY

## 2017-01-01 PROCEDURE — 94668 MNPJ CHEST WALL SBSQ: CPT

## 2017-01-01 PROCEDURE — 97110 THERAPEUTIC EXERCISES: CPT

## 2017-01-01 PROCEDURE — 85027 COMPLETE CBC AUTOMATED: CPT | Performed by: SURGERY

## 2017-01-01 PROCEDURE — 99233 SBSQ HOSP IP/OBS HIGH 50: CPT | Performed by: INTERNAL MEDICINE

## 2017-01-01 PROCEDURE — 0DJ08ZZ INSPECTION OF UPPER INTESTINAL TRACT, VIA NATURAL OR ARTIFICIAL OPENING ENDOSCOPIC: ICD-10-PCS | Performed by: SURGERY

## 2017-01-01 PROCEDURE — 99283 EMERGENCY DEPT VISIT LOW MDM: CPT

## 2017-01-01 PROCEDURE — 84100 ASSAY OF PHOSPHORUS: CPT

## 2017-01-01 PROCEDURE — 82805 BLOOD GASES W/O2 SATURATION: CPT | Performed by: INTERNAL MEDICINE

## 2017-01-01 PROCEDURE — 25010000002 MAGNESIUM SULFATE PER 500 MG OF MAGNESIUM

## 2017-01-01 PROCEDURE — 25010000002 ACETAZOLAMIDE PER 500 MG: Performed by: INTERNAL MEDICINE

## 2017-01-01 PROCEDURE — 97163 PT EVAL HIGH COMPLEX 45 MIN: CPT

## 2017-01-01 PROCEDURE — 25010000002 PIPERACILLIN-TAZOBACTAM: Performed by: EMERGENCY MEDICINE

## 2017-01-01 PROCEDURE — 92610 EVALUATE SWALLOWING FUNCTION: CPT

## 2017-01-01 PROCEDURE — 83735 ASSAY OF MAGNESIUM: CPT | Performed by: INTERNAL MEDICINE

## 2017-01-01 PROCEDURE — 83036 HEMOGLOBIN GLYCOSYLATED A1C: CPT | Performed by: INTERNAL MEDICINE

## 2017-01-01 PROCEDURE — 85027 COMPLETE CBC AUTOMATED: CPT | Performed by: INTERNAL MEDICINE

## 2017-01-01 PROCEDURE — 83735 ASSAY OF MAGNESIUM: CPT | Performed by: NURSE PRACTITIONER

## 2017-01-01 PROCEDURE — 25010000002 HEPARIN (PORCINE) PER 1000 UNITS: Performed by: INTERNAL MEDICINE

## 2017-01-01 PROCEDURE — 80053 COMPREHEN METABOLIC PANEL: CPT

## 2017-01-01 PROCEDURE — 82465 ASSAY BLD/SERUM CHOLESTEROL: CPT

## 2017-01-01 PROCEDURE — 25010000002 CHLOROTHIAZIDE PER 500 MG: Performed by: INTERNAL MEDICINE

## 2017-01-01 PROCEDURE — 51702 INSERT TEMP BLADDER CATH: CPT

## 2017-01-01 PROCEDURE — 86901 BLOOD TYPING SEROLOGIC RH(D): CPT | Performed by: ANESTHESIOLOGY

## 2017-01-01 PROCEDURE — 11004 DBRDMT SKIN XTRNL GENT&PER: CPT | Performed by: UROLOGY

## 2017-01-01 PROCEDURE — 70450 CT HEAD/BRAIN W/O DYE: CPT

## 2017-01-01 PROCEDURE — 0DH63UZ INSERTION OF FEEDING DEVICE INTO STOMACH, PERCUTANEOUS APPROACH: ICD-10-PCS | Performed by: SURGERY

## 2017-01-01 PROCEDURE — 25010000002 LORAZEPAM PER 2 MG: Performed by: INTERNAL MEDICINE

## 2017-01-01 PROCEDURE — 93306 TTE W/DOPPLER COMPLETE: CPT

## 2017-01-01 PROCEDURE — 83735 ASSAY OF MAGNESIUM: CPT

## 2017-01-01 PROCEDURE — 93010 ELECTROCARDIOGRAM REPORT: CPT | Performed by: INTERNAL MEDICINE

## 2017-01-01 PROCEDURE — 25010000002 AMIODARONE IN DEXTROSE 5% 150 MG/100ML SOLUTION: Performed by: NURSE PRACTITIONER

## 2017-01-01 PROCEDURE — 63710000001 INSULIN REGULAR HUMAN PER 5 UNITS: Performed by: INTERNAL MEDICINE

## 2017-01-01 PROCEDURE — 25010000002 MIDAZOLAM PER 1 MG

## 2017-01-01 PROCEDURE — 81001 URINALYSIS AUTO W/SCOPE: CPT | Performed by: EMERGENCY MEDICINE

## 2017-01-01 PROCEDURE — 83605 ASSAY OF LACTIC ACID: CPT | Performed by: EMERGENCY MEDICINE

## 2017-01-01 PROCEDURE — 84484 ASSAY OF TROPONIN QUANT: CPT | Performed by: NURSE PRACTITIONER

## 2017-01-01 PROCEDURE — 25010000002 DOPAMINE PER 40 MG: Performed by: NURSE PRACTITIONER

## 2017-01-01 PROCEDURE — 97598 DBRDMT OPN WND ADDL 20CM/<: CPT

## 2017-01-01 PROCEDURE — 86140 C-REACTIVE PROTEIN: CPT

## 2017-01-01 PROCEDURE — 84132 ASSAY OF SERUM POTASSIUM: CPT | Performed by: INTERNAL MEDICINE

## 2017-01-01 PROCEDURE — 84145 PROCALCITONIN (PCT): CPT | Performed by: INTERNAL MEDICINE

## 2017-01-01 PROCEDURE — 80048 BASIC METABOLIC PNL TOTAL CA: CPT | Performed by: EMERGENCY MEDICINE

## 2017-01-01 PROCEDURE — 85007 BL SMEAR W/DIFF WBC COUNT: CPT | Performed by: INTERNAL MEDICINE

## 2017-01-01 PROCEDURE — 86140 C-REACTIVE PROTEIN: CPT | Performed by: NURSE PRACTITIONER

## 2017-01-01 PROCEDURE — 25010000002 CALCIUM GLUCONATE PER 10 ML: Performed by: INTERNAL MEDICINE

## 2017-01-01 PROCEDURE — 87086 URINE CULTURE/COLONY COUNT: CPT | Performed by: RADIOLOGY

## 2017-01-01 PROCEDURE — 76870 US EXAM SCROTUM: CPT | Performed by: RADIOLOGY

## 2017-01-01 PROCEDURE — 99203 OFFICE O/P NEW LOW 30 MIN: CPT | Performed by: NURSE PRACTITIONER

## 2017-01-01 PROCEDURE — 84100 ASSAY OF PHOSPHORUS: CPT | Performed by: NURSE PRACTITIONER

## 2017-01-01 PROCEDURE — 25010000002 PHENYLEPHRINE PER 1 ML: Performed by: NURSE ANESTHETIST, CERTIFIED REGISTERED

## 2017-01-01 PROCEDURE — 74000 HC ABDOMEN KUB: CPT

## 2017-01-01 PROCEDURE — 82330 ASSAY OF CALCIUM: CPT

## 2017-01-01 PROCEDURE — 87077 CULTURE AEROBIC IDENTIFY: CPT | Performed by: UROLOGY

## 2017-01-01 PROCEDURE — 92523 SPEECH SOUND LANG COMPREHEN: CPT

## 2017-01-01 PROCEDURE — 70496 CT ANGIOGRAPHY HEAD: CPT

## 2017-01-01 PROCEDURE — 25010000002 HEPARIN (PORCINE) PER 1000 UNITS: Performed by: PSYCHIATRY & NEUROLOGY

## 2017-01-01 PROCEDURE — 81001 URINALYSIS AUTO W/SCOPE: CPT | Performed by: RADIOLOGY

## 2017-01-01 PROCEDURE — 99253 IP/OBS CNSLTJ NEW/EST LOW 45: CPT | Performed by: UROLOGY

## 2017-01-01 PROCEDURE — 80048 BASIC METABOLIC PNL TOTAL CA: CPT | Performed by: SURGERY

## 2017-01-01 PROCEDURE — 25010000003 POTASSIUM CHLORIDE PER 2 MEQ: Performed by: NURSE PRACTITIONER

## 2017-01-01 PROCEDURE — 97164 PT RE-EVAL EST PLAN CARE: CPT

## 2017-01-01 PROCEDURE — 25010000002 MEROPENEM PER 100 MG: Performed by: INTERNAL MEDICINE

## 2017-01-01 PROCEDURE — 63710000001 INSULIN ASPART PER 5 UNITS: Performed by: INTERNAL MEDICINE

## 2017-01-01 PROCEDURE — 25010000003 POTASSIUM CHLORIDE PER 2 MEQ: Performed by: ANESTHESIOLOGY

## 2017-01-01 PROCEDURE — 87040 BLOOD CULTURE FOR BACTERIA: CPT | Performed by: EMERGENCY MEDICINE

## 2017-01-01 PROCEDURE — 96372 THER/PROPH/DIAG INJ SC/IM: CPT

## 2017-01-01 PROCEDURE — 25010000002 FENTANYL CITRATE (PF) 100 MCG/2ML SOLUTION: Performed by: NURSE ANESTHETIST, CERTIFIED REGISTERED

## 2017-01-01 PROCEDURE — 87070 CULTURE OTHR SPECIMN AEROBIC: CPT | Performed by: UROLOGY

## 2017-01-01 PROCEDURE — 93306 TTE W/DOPPLER COMPLETE: CPT | Performed by: INTERNAL MEDICINE

## 2017-01-01 PROCEDURE — 99231 SBSQ HOSP IP/OBS SF/LOW 25: CPT | Performed by: UROLOGY

## 2017-01-01 PROCEDURE — 63710000001 INSULIN REGULAR HUMAN PER 5 UNITS: Performed by: NURSE PRACTITIONER

## 2017-01-01 PROCEDURE — 99239 HOSP IP/OBS DSCHRG MGMT >30: CPT | Performed by: INTERNAL MEDICINE

## 2017-01-01 PROCEDURE — 36415 COLL VENOUS BLD VENIPUNCTURE: CPT

## 2017-01-01 PROCEDURE — 25010000002 HEPARIN (PORCINE) PER 1000 UNITS

## 2017-01-01 PROCEDURE — 71020 XR CHEST PA AND LATERAL: CPT | Performed by: RADIOLOGY

## 2017-01-01 PROCEDURE — 02HV33Z INSERTION OF INFUSION DEVICE INTO SUPERIOR VENA CAVA, PERCUTANEOUS APPROACH: ICD-10-PCS | Performed by: INTERNAL MEDICINE

## 2017-01-01 PROCEDURE — 87086 URINE CULTURE/COLONY COUNT: CPT | Performed by: UROLOGY

## 2017-01-01 PROCEDURE — 87106 FUNGI IDENTIFICATION YEAST: CPT | Performed by: NURSE PRACTITIONER

## 2017-01-01 PROCEDURE — 84443 ASSAY THYROID STIM HORMONE: CPT | Performed by: NURSE PRACTITIONER

## 2017-01-01 PROCEDURE — C8929 TTE W OR WO FOL WCON,DOPPLER: HCPCS

## 2017-01-01 PROCEDURE — 93005 ELECTROCARDIOGRAM TRACING: CPT | Performed by: NURSE PRACTITIONER

## 2017-01-01 PROCEDURE — 87205 SMEAR GRAM STAIN: CPT | Performed by: UROLOGY

## 2017-01-01 PROCEDURE — 97530 THERAPEUTIC ACTIVITIES: CPT

## 2017-01-01 PROCEDURE — 81003 URINALYSIS AUTO W/O SCOPE: CPT | Performed by: INTERNAL MEDICINE

## 2017-01-01 PROCEDURE — 93005 ELECTROCARDIOGRAM TRACING: CPT | Performed by: INTERNAL MEDICINE

## 2017-01-01 PROCEDURE — 25010000002 CEFTRIAXONE PER 250 MG: Performed by: EMERGENCY MEDICINE

## 2017-01-01 PROCEDURE — 82330 ASSAY OF CALCIUM: CPT | Performed by: INTERNAL MEDICINE

## 2017-01-01 PROCEDURE — 97168 OT RE-EVAL EST PLAN CARE: CPT

## 2017-01-01 PROCEDURE — 80053 COMPREHEN METABOLIC PANEL: CPT | Performed by: EMERGENCY MEDICINE

## 2017-01-01 PROCEDURE — 85652 RBC SED RATE AUTOMATED: CPT | Performed by: INTERNAL MEDICINE

## 2017-01-01 PROCEDURE — 25010000002 HYDROMORPHONE 1 MG/ML SOLUTION

## 2017-01-01 PROCEDURE — 86140 C-REACTIVE PROTEIN: CPT | Performed by: INTERNAL MEDICINE

## 2017-01-01 PROCEDURE — P9046 ALBUMIN (HUMAN), 25%, 20 ML: HCPCS | Performed by: INTERNAL MEDICINE

## 2017-01-01 PROCEDURE — 87186 SC STD MICRODIL/AGAR DIL: CPT | Performed by: EMERGENCY MEDICINE

## 2017-01-01 PROCEDURE — 25010000002 MAGNESIUM SULFATE PER 500 MG OF MAGNESIUM: Performed by: INTERNAL MEDICINE

## 2017-01-01 PROCEDURE — 85025 COMPLETE CBC W/AUTO DIFF WBC: CPT | Performed by: PSYCHIATRY & NEUROLOGY

## 2017-01-01 PROCEDURE — 85730 THROMBOPLASTIN TIME PARTIAL: CPT

## 2017-01-01 PROCEDURE — 70450 CT HEAD/BRAIN W/O DYE: CPT | Performed by: RADIOLOGY

## 2017-01-01 PROCEDURE — 86900 BLOOD TYPING SEROLOGIC ABO: CPT | Performed by: ANESTHESIOLOGY

## 2017-01-01 PROCEDURE — 85027 COMPLETE CBC AUTOMATED: CPT | Performed by: NURSE PRACTITIONER

## 2017-01-01 PROCEDURE — 0JBB0ZZ EXCISION OF PERINEUM SUBCUTANEOUS TISSUE AND FASCIA, OPEN APPROACH: ICD-10-PCS | Performed by: UROLOGY

## 2017-01-01 PROCEDURE — 83874 ASSAY OF MYOGLOBIN: CPT | Performed by: INTERNAL MEDICINE

## 2017-01-01 PROCEDURE — 85730 THROMBOPLASTIN TIME PARTIAL: CPT | Performed by: PSYCHIATRY & NEUROLOGY

## 2017-01-01 PROCEDURE — 63710000001 INSULIN LISPRO (HUMAN) PER 5 UNITS: Performed by: NURSE PRACTITIONER

## 2017-01-01 PROCEDURE — 81001 URINALYSIS AUTO W/SCOPE: CPT | Performed by: NURSE PRACTITIONER

## 2017-01-01 PROCEDURE — 0 IOPAMIDOL PER 1 ML: Performed by: RADIOLOGY

## 2017-01-01 PROCEDURE — 36415 COLL VENOUS BLD VENIPUNCTURE: CPT | Performed by: NURSE PRACTITIONER

## 2017-01-01 PROCEDURE — 25010000002 HYDROMORPHONE PER 4 MG: Performed by: NURSE PRACTITIONER

## 2017-01-01 PROCEDURE — 83050 HGB METHEMOGLOBIN QUAN: CPT | Performed by: INTERNAL MEDICINE

## 2017-01-01 PROCEDURE — C1751 CATH, INF, PER/CENT/MIDLINE: HCPCS

## 2017-01-01 PROCEDURE — 25010000002 AMIODARONE IN DEXTROSE 5% 360 MG/200ML SOLUTION: Performed by: NURSE PRACTITIONER

## 2017-01-01 PROCEDURE — 25010000002 LORAZEPAM PER 2 MG: Performed by: FAMILY MEDICINE

## 2017-01-01 PROCEDURE — 25010000002 SULFUR HEXAFLUORIDE MICROSPH 60.7-25 MG RECONSTITUTED SUSPENSION: Performed by: INTERNAL MEDICINE

## 2017-01-01 PROCEDURE — 76870 US EXAM SCROTUM: CPT

## 2017-01-01 PROCEDURE — 85610 PROTHROMBIN TIME: CPT | Performed by: SURGERY

## 2017-01-01 PROCEDURE — 94669 MECHANICAL CHEST WALL OSCILL: CPT

## 2017-01-01 PROCEDURE — 84478 ASSAY OF TRIGLYCERIDES: CPT

## 2017-01-01 PROCEDURE — 87086 URINE CULTURE/COLONY COUNT: CPT | Performed by: EMERGENCY MEDICINE

## 2017-01-01 PROCEDURE — 25010000002 PROPOFOL 10 MG/ML EMULSION: Performed by: NURSE ANESTHETIST, CERTIFIED REGISTERED

## 2017-01-01 PROCEDURE — 25010000002 MIDAZOLAM PER 1 MG: Performed by: NURSE ANESTHETIST, CERTIFIED REGISTERED

## 2017-01-01 PROCEDURE — 25010000002 POTASSIUM CHLORIDE PER 2 MEQ OF POTASSIUM

## 2017-01-01 PROCEDURE — 25010000003 POTASSIUM CHLORIDE PER 2 MEQ: Performed by: INTERNAL MEDICINE

## 2017-01-01 PROCEDURE — 63710000001 INSULIN DETEMIR PER 5 UNITS: Performed by: EMERGENCY MEDICINE

## 2017-01-01 PROCEDURE — 99231 SBSQ HOSP IP/OBS SF/LOW 25: CPT | Performed by: INTERNAL MEDICINE

## 2017-01-01 PROCEDURE — 99232 SBSQ HOSP IP/OBS MODERATE 35: CPT | Performed by: NURSE PRACTITIONER

## 2017-01-01 PROCEDURE — 85025 COMPLETE CBC W/AUTO DIFF WBC: CPT | Performed by: EMERGENCY MEDICINE

## 2017-01-01 PROCEDURE — 63710000001 INSULIN REGULAR HUMAN PER 5 UNITS

## 2017-01-01 PROCEDURE — 84134 ASSAY OF PREALBUMIN: CPT | Performed by: INTERNAL MEDICINE

## 2017-01-01 PROCEDURE — 86850 RBC ANTIBODY SCREEN: CPT | Performed by: ANESTHESIOLOGY

## 2017-01-01 PROCEDURE — 25010000002 HYDROMORPHONE PER 4 MG: Performed by: INTERNAL MEDICINE

## 2017-01-01 PROCEDURE — 87106 FUNGI IDENTIFICATION YEAST: CPT | Performed by: INTERNAL MEDICINE

## 2017-01-01 PROCEDURE — 0 DIATRIZOATE MEGLUMINE & SODIUM PER 1 ML: Performed by: INTERNAL MEDICINE

## 2017-01-01 PROCEDURE — 25010000002 METOCLOPRAMIDE PER 10 MG: Performed by: INTERNAL MEDICINE

## 2017-01-01 PROCEDURE — 25010000002 ALBUMIN HUMAN 25% PER 50 ML: Performed by: INTERNAL MEDICINE

## 2017-01-01 PROCEDURE — 25010000002 CALCIUM GLUCONATE PER 10 ML

## 2017-01-01 PROCEDURE — 87086 URINE CULTURE/COLONY COUNT: CPT | Performed by: INTERNAL MEDICINE

## 2017-01-01 PROCEDURE — 84134 ASSAY OF PREALBUMIN: CPT

## 2017-01-01 PROCEDURE — 25010000002 VANCOMYCIN PER 500 MG: Performed by: EMERGENCY MEDICINE

## 2017-01-01 PROCEDURE — 87086 URINE CULTURE/COLONY COUNT: CPT | Performed by: NURSE PRACTITIONER

## 2017-01-01 PROCEDURE — 99284 EMERGENCY DEPT VISIT MOD MDM: CPT

## 2017-01-01 PROCEDURE — 99255 IP/OBS CONSLTJ NEW/EST HI 80: CPT | Performed by: PSYCHIATRY & NEUROLOGY

## 2017-01-01 PROCEDURE — 99223 1ST HOSP IP/OBS HIGH 75: CPT | Performed by: INTERNAL MEDICINE

## 2017-01-01 PROCEDURE — 97166 OT EVAL MOD COMPLEX 45 MIN: CPT

## 2017-01-01 PROCEDURE — 4A02X4A MEASUREMENT OF CARDIAC ELECTRICAL ACTIVITY, GUIDANCE, EXTERNAL APPROACH: ICD-10-PCS | Performed by: INTERNAL MEDICINE

## 2017-01-01 PROCEDURE — 99024 POSTOP FOLLOW-UP VISIT: CPT | Performed by: UROLOGY

## 2017-01-01 PROCEDURE — 94667 MNPJ CHEST WALL 1ST: CPT

## 2017-01-01 PROCEDURE — 63710000001 INSULIN ASPART PER 5 UNITS: Performed by: EMERGENCY MEDICINE

## 2017-01-01 PROCEDURE — 93000 ELECTROCARDIOGRAM COMPLETE: CPT | Performed by: INTERNAL MEDICINE

## 2017-01-01 PROCEDURE — 25010000002 POTASSIUM CHLORIDE PER 2 MEQ OF POTASSIUM: Performed by: INTERNAL MEDICINE

## 2017-01-01 PROCEDURE — 25010000002 HYDROMORPHONE PER 4 MG: Performed by: FAMILY MEDICINE

## 2017-01-01 PROCEDURE — 84153 ASSAY OF PSA TOTAL: CPT | Performed by: EMERGENCY MEDICINE

## 2017-01-01 PROCEDURE — 97116 GAIT TRAINING THERAPY: CPT

## 2017-01-01 PROCEDURE — 71020 HC CHEST PA AND LATERAL: CPT

## 2017-01-01 PROCEDURE — 85610 PROTHROMBIN TIME: CPT | Performed by: PSYCHIATRY & NEUROLOGY

## 2017-01-01 PROCEDURE — 83615 LACTATE (LD) (LDH) ENZYME: CPT | Performed by: INTERNAL MEDICINE

## 2017-01-01 PROCEDURE — 93880 EXTRACRANIAL BILAT STUDY: CPT | Performed by: INTERNAL MEDICINE

## 2017-01-01 PROCEDURE — 3E0G76Z INTRODUCTION OF NUTRITIONAL SUBSTANCE INTO UPPER GI, VIA NATURAL OR ARTIFICIAL OPENING: ICD-10-PCS | Performed by: SURGERY

## 2017-01-01 PROCEDURE — 82140 ASSAY OF AMMONIA: CPT | Performed by: INTERNAL MEDICINE

## 2017-01-01 RX ORDER — NICOTINE POLACRILEX 4 MG
15 LOZENGE BUCCAL
Status: DISCONTINUED | OUTPATIENT
Start: 2017-01-01 | End: 2017-01-01

## 2017-01-01 RX ORDER — OXYCODONE AND ACETAMINOPHEN 10; 325 MG/1; MG/1
1 TABLET ORAL EVERY 8 HOURS SCHEDULED
Status: DISCONTINUED | OUTPATIENT
Start: 2017-01-01 | End: 2017-01-01 | Stop reason: HOSPADM

## 2017-01-01 RX ORDER — POTASSIUM CHLORIDE 750 MG/1
40 CAPSULE, EXTENDED RELEASE ORAL 2 TIMES DAILY
Status: COMPLETED | OUTPATIENT
Start: 2017-01-01 | End: 2017-01-01

## 2017-01-01 RX ORDER — BUMETANIDE 0.25 MG/ML
1 INJECTION INTRAMUSCULAR; INTRAVENOUS EVERY 8 HOURS
Status: CANCELLED | OUTPATIENT
Start: 2017-01-01 | End: 2017-01-01

## 2017-01-01 RX ORDER — CARVEDILOL 6.25 MG/1
6.25 TABLET ORAL 2 TIMES DAILY WITH MEALS
COMMUNITY

## 2017-01-01 RX ORDER — AMIODARONE HYDROCHLORIDE 200 MG/1
200 TABLET ORAL EVERY 12 HOURS
Status: DISCONTINUED | OUTPATIENT
Start: 2017-01-01 | End: 2017-01-01

## 2017-01-01 RX ORDER — POTASSIUM CHLORIDE 1.5 G/1.77G
20 POWDER, FOR SOLUTION ORAL ONCE
Status: DISCONTINUED | OUTPATIENT
Start: 2017-01-01 | End: 2017-01-01

## 2017-01-01 RX ORDER — METOCLOPRAMIDE HYDROCHLORIDE 5 MG/ML
10 INJECTION INTRAMUSCULAR; INTRAVENOUS ONCE
Status: COMPLETED | OUTPATIENT
Start: 2017-01-01 | End: 2017-01-01

## 2017-01-01 RX ORDER — LIDOCAINE HYDROCHLORIDE 20 MG/ML
INJECTION, SOLUTION INFILTRATION; PERINEURAL AS NEEDED
Status: DISCONTINUED | OUTPATIENT
Start: 2017-01-01 | End: 2017-01-01 | Stop reason: SURG

## 2017-01-01 RX ORDER — ASPIRIN 81 MG/1
81 TABLET, CHEWABLE ORAL DAILY
Status: CANCELLED | OUTPATIENT
Start: 2017-01-01

## 2017-01-01 RX ORDER — MAGNESIUM SULFATE HEPTAHYDRATE 40 MG/ML
4 INJECTION, SOLUTION INTRAVENOUS AS NEEDED
Status: DISCONTINUED | OUTPATIENT
Start: 2017-01-01 | End: 2017-01-01 | Stop reason: HOSPADM

## 2017-01-01 RX ORDER — POTASSIUM CHLORIDE 1.5 G/1.77G
40 POWDER, FOR SOLUTION ORAL AS NEEDED
Status: DISCONTINUED | OUTPATIENT
Start: 2017-01-01 | End: 2017-01-01

## 2017-01-01 RX ORDER — IPRATROPIUM BROMIDE AND ALBUTEROL SULFATE 2.5; .5 MG/3ML; MG/3ML
3 SOLUTION RESPIRATORY (INHALATION)
Status: CANCELLED | OUTPATIENT
Start: 2017-01-01

## 2017-01-01 RX ORDER — ALBUMIN (HUMAN) 12.5 G/50ML
25 SOLUTION INTRAVENOUS ONCE
Status: COMPLETED | OUTPATIENT
Start: 2017-01-01 | End: 2017-01-01

## 2017-01-01 RX ORDER — SODIUM CHLORIDE 0.9 % (FLUSH) 0.9 %
1-10 SYRINGE (ML) INJECTION AS NEEDED
Status: DISCONTINUED | OUTPATIENT
Start: 2017-01-01 | End: 2017-01-01 | Stop reason: HOSPADM

## 2017-01-01 RX ORDER — HALOPERIDOL 5 MG/ML
1 INJECTION INTRAMUSCULAR EVERY 4 HOURS PRN
Status: DISCONTINUED | OUTPATIENT
Start: 2017-01-01 | End: 2017-01-01

## 2017-01-01 RX ORDER — POTASSIUM CHLORIDE 20 MEQ/1
40 TABLET, EXTENDED RELEASE ORAL AS NEEDED
Status: DISCONTINUED | OUTPATIENT
Start: 2017-01-01 | End: 2017-01-01 | Stop reason: HOSPADM

## 2017-01-01 RX ORDER — ASPIRIN 81 MG/1
81 TABLET ORAL DAILY
Status: DISCONTINUED | OUTPATIENT
Start: 2017-01-01 | End: 2017-01-01 | Stop reason: HOSPADM

## 2017-01-01 RX ORDER — POTASSIUM CHLORIDE 750 MG/1
40 CAPSULE, EXTENDED RELEASE ORAL AS NEEDED
Status: DISCONTINUED | OUTPATIENT
Start: 2017-01-01 | End: 2017-01-01

## 2017-01-01 RX ORDER — NYSTATIN 100000 U/G
OINTMENT TOPICAL 2 TIMES DAILY PRN
Refills: 0
Start: 2017-01-01

## 2017-01-01 RX ORDER — L.ACID,CASEI/B.ANIMAL/S.THERMO 16B CELL
1 CAPSULE ORAL DAILY
Status: DISCONTINUED | OUTPATIENT
Start: 2017-01-01 | End: 2017-01-01 | Stop reason: HOSPADM

## 2017-01-01 RX ORDER — HYDROMORPHONE HYDROCHLORIDE 1 MG/ML
0.5 INJECTION, SOLUTION INTRAMUSCULAR; INTRAVENOUS; SUBCUTANEOUS ONCE
Status: COMPLETED | OUTPATIENT
Start: 2017-01-01 | End: 2017-01-01

## 2017-01-01 RX ORDER — HALOPERIDOL 5 MG/ML
1 INJECTION INTRAMUSCULAR EVERY 4 HOURS PRN
Status: DISCONTINUED | OUTPATIENT
Start: 2017-01-01 | End: 2017-01-01 | Stop reason: HOSPADM

## 2017-01-01 RX ORDER — POTASSIUM CHLORIDE 20 MEQ/1
40 TABLET, EXTENDED RELEASE ORAL EVERY 4 HOURS
Status: DISCONTINUED | OUTPATIENT
Start: 2017-01-01 | End: 2017-01-01

## 2017-01-01 RX ORDER — FUROSEMIDE 10 MG/ML
40 INJECTION INTRAMUSCULAR; INTRAVENOUS ONCE
Status: COMPLETED | OUTPATIENT
Start: 2017-01-01 | End: 2017-01-01

## 2017-01-01 RX ORDER — HYDRALAZINE HYDROCHLORIDE 25 MG/1
25 TABLET, FILM COATED ORAL EVERY 8 HOURS SCHEDULED
Status: DISCONTINUED | OUTPATIENT
Start: 2017-01-01 | End: 2017-01-01 | Stop reason: HOSPADM

## 2017-01-01 RX ORDER — HEPARIN SODIUM 5000 [USP'U]/ML
5000 INJECTION, SOLUTION INTRAVENOUS; SUBCUTANEOUS EVERY 12 HOURS SCHEDULED
Status: ON HOLD
Start: 2017-01-01 | End: 2017-01-01

## 2017-01-01 RX ORDER — CLINDAMYCIN PHOSPHATE 600 MG/50ML
600 INJECTION INTRAVENOUS EVERY 8 HOURS
Refills: 0 | Status: ON HOLD
Start: 2017-01-01 | End: 2017-01-01

## 2017-01-01 RX ORDER — ATORVASTATIN CALCIUM 40 MG/1
80 TABLET, FILM COATED ORAL NIGHTLY
Status: DISCONTINUED | OUTPATIENT
Start: 2017-01-01 | End: 2017-01-01

## 2017-01-01 RX ORDER — CASTOR OIL AND BALSAM, PERU 788; 87 MG/G; MG/G
OINTMENT TOPICAL EVERY 12 HOURS SCHEDULED
Status: DISCONTINUED | OUTPATIENT
Start: 2017-01-01 | End: 2017-01-01 | Stop reason: HOSPADM

## 2017-01-01 RX ORDER — ETOMIDATE 2 MG/ML
INJECTION INTRAVENOUS AS NEEDED
Status: DISCONTINUED | OUTPATIENT
Start: 2017-01-01 | End: 2017-01-01 | Stop reason: SURG

## 2017-01-01 RX ORDER — CEFAZOLIN SODIUM 2 G/100ML
2 INJECTION, SOLUTION INTRAVENOUS EVERY 8 HOURS
Status: DISCONTINUED | OUTPATIENT
Start: 2017-01-01 | End: 2017-01-01

## 2017-01-01 RX ORDER — LORAZEPAM 0.5 MG/1
0.5 TABLET ORAL ONCE
Status: DISCONTINUED | OUTPATIENT
Start: 2017-01-01 | End: 2017-01-01

## 2017-01-01 RX ORDER — ATORVASTATIN CALCIUM 80 MG/1
80 TABLET, FILM COATED ORAL DAILY
Qty: 30 TABLET | Refills: 3 | Status: ON HOLD | OUTPATIENT
Start: 2017-01-01 | End: 2017-01-01

## 2017-01-01 RX ORDER — FENTANYL CITRATE 50 UG/ML
INJECTION, SOLUTION INTRAMUSCULAR; INTRAVENOUS AS NEEDED
Status: DISCONTINUED | OUTPATIENT
Start: 2017-01-01 | End: 2017-01-01 | Stop reason: SURG

## 2017-01-01 RX ORDER — ACETAMINOPHEN 325 MG/1
650 TABLET ORAL EVERY 6 HOURS PRN
Status: DISCONTINUED | OUTPATIENT
Start: 2017-01-01 | End: 2017-01-01 | Stop reason: HOSPADM

## 2017-01-01 RX ORDER — QUETIAPINE FUMARATE 25 MG/1
50 TABLET, FILM COATED ORAL NIGHTLY
Status: DISCONTINUED | OUTPATIENT
Start: 2017-01-01 | End: 2017-01-01 | Stop reason: HOSPADM

## 2017-01-01 RX ORDER — FUROSEMIDE 10 MG/ML
60 INJECTION INTRAMUSCULAR; INTRAVENOUS EVERY 12 HOURS
Status: DISCONTINUED | OUTPATIENT
Start: 2017-01-01 | End: 2017-01-01

## 2017-01-01 RX ORDER — LORAZEPAM 2 MG/ML
1 INJECTION INTRAMUSCULAR NIGHTLY
Status: DISCONTINUED | OUTPATIENT
Start: 2017-01-01 | End: 2017-01-01 | Stop reason: SDUPTHER

## 2017-01-01 RX ORDER — DIGOXIN 250 MCG
250 TABLET ORAL
Status: DISCONTINUED | OUTPATIENT
Start: 2017-01-01 | End: 2017-01-01 | Stop reason: HOSPADM

## 2017-01-01 RX ORDER — FUROSEMIDE 10 MG/ML
80 INJECTION INTRAMUSCULAR; INTRAVENOUS ONCE
Status: COMPLETED | OUTPATIENT
Start: 2017-01-01 | End: 2017-01-01

## 2017-01-01 RX ORDER — IPRATROPIUM BROMIDE AND ALBUTEROL SULFATE 2.5; .5 MG/3ML; MG/3ML
3 SOLUTION RESPIRATORY (INHALATION)
Status: DISCONTINUED | OUTPATIENT
Start: 2017-01-01 | End: 2017-01-01

## 2017-01-01 RX ORDER — CLOPIDOGREL BISULFATE 75 MG/1
75 TABLET ORAL DAILY
Qty: 30 TABLET | Refills: 3 | Status: ON HOLD | OUTPATIENT
Start: 2017-01-01 | End: 2017-01-01

## 2017-01-01 RX ORDER — POTASSIUM CHLORIDE 1.5 G/1.77G
40 POWDER, FOR SOLUTION ORAL EVERY 12 HOURS SCHEDULED
Status: DISPENSED | OUTPATIENT
Start: 2017-01-01 | End: 2017-01-01

## 2017-01-01 RX ORDER — CEFUROXIME AXETIL 500 MG/1
500 TABLET ORAL 2 TIMES DAILY
Qty: 20 TABLET | Refills: 0 | Status: ON HOLD | OUTPATIENT
Start: 2017-01-01 | End: 2017-01-01

## 2017-01-01 RX ORDER — ISOSORBIDE DINITRATE 20 MG/1
10 TABLET ORAL 3 TIMES DAILY
Status: CANCELLED | OUTPATIENT
Start: 2017-01-01

## 2017-01-01 RX ORDER — SACCHAROMYCES BOULARDII 250 MG
250 CAPSULE ORAL 2 TIMES DAILY
Status: CANCELLED | OUTPATIENT
Start: 2017-01-01

## 2017-01-01 RX ORDER — QUETIAPINE FUMARATE 25 MG/1
25 TABLET, FILM COATED ORAL EVERY 12 HOURS SCHEDULED
Status: DISCONTINUED | OUTPATIENT
Start: 2017-01-01 | End: 2017-01-01

## 2017-01-01 RX ORDER — HYDRALAZINE HYDROCHLORIDE 50 MG/1
50 TABLET, FILM COATED ORAL EVERY 8 HOURS SCHEDULED
Status: DISCONTINUED | OUTPATIENT
Start: 2017-01-01 | End: 2017-01-01

## 2017-01-01 RX ORDER — MORPHINE SULFATE 2 MG/ML
2 INJECTION, SOLUTION INTRAMUSCULAR; INTRAVENOUS EVERY 4 HOURS PRN
Status: DISCONTINUED | OUTPATIENT
Start: 2017-01-01 | End: 2017-01-01 | Stop reason: HOSPADM

## 2017-01-01 RX ORDER — DIGOXIN 250 MCG
250 TABLET ORAL
Status: CANCELLED | OUTPATIENT
Start: 2017-01-01

## 2017-01-01 RX ORDER — POTASSIUM CHLORIDE 1.5 G/1.77G
40 POWDER, FOR SOLUTION ORAL AS NEEDED
Status: DISCONTINUED | OUTPATIENT
Start: 2017-01-01 | End: 2017-01-01 | Stop reason: HOSPADM

## 2017-01-01 RX ORDER — MIDAZOLAM HYDROCHLORIDE 1 MG/ML
INJECTION INTRAMUSCULAR; INTRAVENOUS
Status: COMPLETED
Start: 2017-01-01 | End: 2017-01-01

## 2017-01-01 RX ORDER — POTASSIUM CHLORIDE 29.8 MG/ML
20 INJECTION INTRAVENOUS
Status: DISCONTINUED | OUTPATIENT
Start: 2017-01-01 | End: 2017-01-01

## 2017-01-01 RX ORDER — BUMETANIDE 1 MG/1
1 TABLET ORAL DAILY
Qty: 30 TABLET | Refills: 3 | Status: SHIPPED | OUTPATIENT
Start: 2017-01-01 | End: 2017-01-01 | Stop reason: HOSPADM

## 2017-01-01 RX ORDER — BUPIVACAINE HYDROCHLORIDE 5 MG/ML
INJECTION, SOLUTION PERINEURAL AS NEEDED
Status: DISCONTINUED | OUTPATIENT
Start: 2017-01-01 | End: 2017-01-01 | Stop reason: HOSPADM

## 2017-01-01 RX ORDER — POTASSIUM CHLORIDE 7.45 MG/ML
10 INJECTION INTRAVENOUS
Status: DISCONTINUED | OUTPATIENT
Start: 2017-01-01 | End: 2017-01-01

## 2017-01-01 RX ORDER — HYDROCODONE BITARTRATE AND ACETAMINOPHEN 7.5; 325 MG/1; MG/1
1 TABLET ORAL EVERY 4 HOURS PRN
Status: DISCONTINUED | OUTPATIENT
Start: 2017-01-01 | End: 2017-01-01

## 2017-01-01 RX ORDER — ISOSORBIDE DINITRATE 20 MG/1
10 TABLET ORAL 3 TIMES DAILY
Status: DISCONTINUED | OUTPATIENT
Start: 2017-01-01 | End: 2017-01-01 | Stop reason: HOSPADM

## 2017-01-01 RX ORDER — SODIUM CHLORIDE 0.9 % (FLUSH) 0.9 %
10 SYRINGE (ML) INJECTION AS NEEDED
Status: DISCONTINUED | OUTPATIENT
Start: 2017-01-01 | End: 2017-01-01 | Stop reason: HOSPADM

## 2017-01-01 RX ORDER — MAGNESIUM SULFATE HEPTAHYDRATE 40 MG/ML
2 INJECTION, SOLUTION INTRAVENOUS AS NEEDED
Status: DISCONTINUED | OUTPATIENT
Start: 2017-01-01 | End: 2017-01-01 | Stop reason: HOSPADM

## 2017-01-01 RX ORDER — DOPAMINE HYDROCHLORIDE 160 MG/100ML
2-20 INJECTION, SOLUTION INTRAVENOUS
Status: DISCONTINUED | OUTPATIENT
Start: 2017-01-01 | End: 2017-01-01

## 2017-01-01 RX ORDER — NALOXONE HCL 0.4 MG/ML
0.1 VIAL (ML) INJECTION
Status: DISCONTINUED | OUTPATIENT
Start: 2017-01-01 | End: 2017-01-01

## 2017-01-01 RX ORDER — PROMETHAZINE HYDROCHLORIDE 25 MG/ML
12.5 INJECTION, SOLUTION INTRAMUSCULAR; INTRAVENOUS EVERY 6 HOURS PRN
Status: DISCONTINUED | OUTPATIENT
Start: 2017-01-01 | End: 2017-01-01

## 2017-01-01 RX ORDER — NITROGLYCERIN 400 UG/1
1 SPRAY ORAL
Status: CANCELLED | OUTPATIENT
Start: 2017-01-01

## 2017-01-01 RX ORDER — IPRATROPIUM BROMIDE AND ALBUTEROL SULFATE 2.5; .5 MG/3ML; MG/3ML
3 SOLUTION RESPIRATORY (INHALATION)
Status: DISCONTINUED | OUTPATIENT
Start: 2017-01-01 | End: 2017-01-01 | Stop reason: HOSPADM

## 2017-01-01 RX ORDER — ATROPINE SULFATE 10 MG/ML
2 SOLUTION/ DROPS OPHTHALMIC
Status: DISCONTINUED | OUTPATIENT
Start: 2017-01-01 | End: 2017-01-01 | Stop reason: HOSPADM

## 2017-01-01 RX ORDER — CHLOROTHIAZIDE SODIUM 500 MG/1
500 INJECTION INTRAVENOUS EVERY 12 HOURS SCHEDULED
Status: COMPLETED | OUTPATIENT
Start: 2017-01-01 | End: 2017-01-01

## 2017-01-01 RX ORDER — DEXTROSE MONOHYDRATE 25 G/50ML
INJECTION, SOLUTION INTRAVENOUS
Status: COMPLETED
Start: 2017-01-01 | End: 2017-01-01

## 2017-01-01 RX ORDER — AMIODARONE HYDROCHLORIDE 200 MG/1
200 TABLET ORAL
Status: DISCONTINUED | OUTPATIENT
Start: 2017-01-01 | End: 2017-01-01

## 2017-01-01 RX ORDER — MAGNESIUM SULFATE HEPTAHYDRATE 500 MG/ML
INJECTION, SOLUTION INTRAMUSCULAR; INTRAVENOUS AS NEEDED
Status: DISCONTINUED | OUTPATIENT
Start: 2017-01-01 | End: 2017-01-01 | Stop reason: SURG

## 2017-01-01 RX ORDER — LORAZEPAM 2 MG/ML
0.5 INJECTION INTRAMUSCULAR EVERY 6 HOURS
Status: DISCONTINUED | OUTPATIENT
Start: 2017-01-01 | End: 2017-01-01

## 2017-01-01 RX ORDER — CLOPIDOGREL BISULFATE 75 MG/1
75 TABLET ORAL DAILY
Status: DISCONTINUED | OUTPATIENT
Start: 2017-01-01 | End: 2017-01-01

## 2017-01-01 RX ORDER — CLOPIDOGREL BISULFATE 75 MG/1
75 TABLET ORAL DAILY
Status: CANCELLED | OUTPATIENT
Start: 2017-01-01

## 2017-01-01 RX ORDER — ACETAZOLAMIDE SODIUM 500 MG/5ML
500 INJECTION, POWDER, LYOPHILIZED, FOR SOLUTION INTRAVENOUS EVERY 12 HOURS SCHEDULED
Status: COMPLETED | OUTPATIENT
Start: 2017-01-01 | End: 2017-01-01

## 2017-01-01 RX ORDER — INSULIN GLARGINE 100 [IU]/ML
20 INJECTION, SOLUTION SUBCUTANEOUS DAILY
COMMUNITY
Start: 2017-01-01

## 2017-01-01 RX ORDER — CARVEDILOL 6.25 MG/1
6.25 TABLET ORAL EVERY 12 HOURS SCHEDULED
Status: DISCONTINUED | OUTPATIENT
Start: 2017-01-01 | End: 2017-01-01

## 2017-01-01 RX ORDER — SODIUM CHLORIDE 0.9 % (FLUSH) 0.9 %
10 SYRINGE (ML) INJECTION EVERY 12 HOURS SCHEDULED
Status: DISCONTINUED | OUTPATIENT
Start: 2017-01-01 | End: 2017-01-01 | Stop reason: HOSPADM

## 2017-01-01 RX ORDER — ASPIRIN 300 MG/1
300 SUPPOSITORY RECTAL DAILY
Status: DISCONTINUED | OUTPATIENT
Start: 2017-01-01 | End: 2017-01-01 | Stop reason: DRUGHIGH

## 2017-01-01 RX ORDER — HALOPERIDOL 5 MG/ML
1 INJECTION INTRAMUSCULAR EVERY 4 HOURS PRN
Status: CANCELLED | OUTPATIENT
Start: 2017-01-01

## 2017-01-01 RX ORDER — LORAZEPAM 2 MG/ML
0.5 INJECTION INTRAMUSCULAR EVERY 8 HOURS PRN
Status: DISCONTINUED | OUTPATIENT
Start: 2017-01-01 | End: 2017-01-01

## 2017-01-01 RX ORDER — SODIUM CHLORIDE 9 MG/ML
9 INJECTION, SOLUTION INTRAVENOUS CONTINUOUS PRN
Status: DISCONTINUED | OUTPATIENT
Start: 2017-01-01 | End: 2017-01-01 | Stop reason: HOSPADM

## 2017-01-01 RX ORDER — FUROSEMIDE 10 MG/ML
40 INJECTION INTRAMUSCULAR; INTRAVENOUS DAILY
Status: DISCONTINUED | OUTPATIENT
Start: 2017-01-01 | End: 2017-01-01

## 2017-01-01 RX ORDER — IBUPROFEN 800 MG/1
800 TABLET ORAL EVERY 6 HOURS PRN
Status: CANCELLED | OUTPATIENT
Start: 2017-01-01

## 2017-01-01 RX ORDER — SPIRONOLACTONE 25 MG/1
25 TABLET ORAL DAILY
COMMUNITY

## 2017-01-01 RX ORDER — ASPIRIN 300 MG/1
300 SUPPOSITORY RECTAL DAILY
Status: DISCONTINUED | OUTPATIENT
Start: 2017-01-01 | End: 2017-01-01 | Stop reason: SDUPTHER

## 2017-01-01 RX ORDER — PROPOFOL 10 MG/ML
VIAL (ML) INTRAVENOUS AS NEEDED
Status: DISCONTINUED | OUTPATIENT
Start: 2017-01-01 | End: 2017-01-01 | Stop reason: SURG

## 2017-01-01 RX ORDER — ATORVASTATIN CALCIUM 40 MG/1
80 TABLET, FILM COATED ORAL NIGHTLY
Status: CANCELLED | OUTPATIENT
Start: 2017-01-01

## 2017-01-01 RX ORDER — POTASSIUM CHLORIDE 29.8 MG/ML
20 INJECTION INTRAVENOUS
Status: CANCELLED | OUTPATIENT
Start: 2017-01-01

## 2017-01-01 RX ORDER — LIDOCAINE HYDROCHLORIDE 20 MG/ML
INJECTION, SOLUTION INFILTRATION; PERINEURAL AS NEEDED
Status: DISCONTINUED | OUTPATIENT
Start: 2017-01-01 | End: 2017-01-01 | Stop reason: HOSPADM

## 2017-01-01 RX ORDER — BISACODYL 10 MG
10 SUPPOSITORY, RECTAL RECTAL DAILY PRN
Status: CANCELLED | OUTPATIENT
Start: 2017-01-01

## 2017-01-01 RX ORDER — POTASSIUM CHLORIDE 1.5 G/1.77G
40 POWDER, FOR SOLUTION ORAL EVERY 12 HOURS SCHEDULED
Status: COMPLETED | OUTPATIENT
Start: 2017-01-01 | End: 2017-01-01

## 2017-01-01 RX ORDER — IPRATROPIUM BROMIDE AND ALBUTEROL SULFATE 2.5; .5 MG/3ML; MG/3ML
3 SOLUTION RESPIRATORY (INHALATION) EVERY 4 HOURS PRN
Status: DISCONTINUED | OUTPATIENT
Start: 2017-01-01 | End: 2017-01-01 | Stop reason: HOSPADM

## 2017-01-01 RX ORDER — POTASSIUM CHLORIDE 29.8 MG/ML
20 INJECTION INTRAVENOUS
Status: DISCONTINUED | OUTPATIENT
Start: 2017-01-01 | End: 2017-01-01 | Stop reason: HOSPADM

## 2017-01-01 RX ORDER — NITROGLYCERIN 0.4 MG/1
0.4 TABLET SUBLINGUAL
Status: DISCONTINUED | OUTPATIENT
Start: 2017-01-01 | End: 2017-01-01 | Stop reason: HOSPADM

## 2017-01-01 RX ORDER — ROPINIROLE 0.25 MG/1
0.25 TABLET, FILM COATED ORAL NIGHTLY
Status: DISCONTINUED | OUTPATIENT
Start: 2017-01-01 | End: 2017-01-01 | Stop reason: HOSPADM

## 2017-01-01 RX ORDER — MIDAZOLAM HYDROCHLORIDE 1 MG/ML
INJECTION INTRAMUSCULAR; INTRAVENOUS AS NEEDED
Status: DISCONTINUED | OUTPATIENT
Start: 2017-01-01 | End: 2017-01-01 | Stop reason: SURG

## 2017-01-01 RX ORDER — BUMETANIDE 1 MG/1
1 TABLET ORAL DAILY
COMMUNITY

## 2017-01-01 RX ORDER — SPIRONOLACTONE 25 MG/1
25 TABLET ORAL DAILY
Status: CANCELLED | OUTPATIENT
Start: 2017-01-01

## 2017-01-01 RX ORDER — ASPIRIN 81 MG/1
81 TABLET, CHEWABLE ORAL DAILY
Status: DISCONTINUED | OUTPATIENT
Start: 2017-01-01 | End: 2017-01-01

## 2017-01-01 RX ORDER — CASTOR OIL AND BALSAM, PERU 788; 87 MG/G; MG/G
OINTMENT TOPICAL EVERY 12 HOURS SCHEDULED
Status: CANCELLED | OUTPATIENT
Start: 2017-01-01

## 2017-01-01 RX ORDER — AMIODARONE HYDROCHLORIDE 200 MG/1
200 TABLET ORAL DAILY
Status: DISCONTINUED | OUTPATIENT
Start: 2017-01-01 | End: 2017-01-01

## 2017-01-01 RX ORDER — MAGNESIUM SULFATE HEPTAHYDRATE 40 MG/ML
2 INJECTION, SOLUTION INTRAVENOUS AS NEEDED
Status: CANCELLED | OUTPATIENT
Start: 2017-01-01

## 2017-01-01 RX ORDER — HYDRALAZINE HYDROCHLORIDE 25 MG/1
25 TABLET, FILM COATED ORAL EVERY 8 HOURS SCHEDULED
Status: DISCONTINUED | OUTPATIENT
Start: 2017-01-01 | End: 2017-01-01

## 2017-01-01 RX ORDER — PANTOPRAZOLE SODIUM 40 MG/1
40 TABLET, DELAYED RELEASE ORAL DAILY PRN
Status: DISCONTINUED | OUTPATIENT
Start: 2017-01-01 | End: 2017-01-01 | Stop reason: HOSPADM

## 2017-01-01 RX ORDER — POTASSIUM CHLORIDE 1.5 G/1.77G
40 POWDER, FOR SOLUTION ORAL AS NEEDED
Status: CANCELLED | OUTPATIENT
Start: 2017-01-01

## 2017-01-01 RX ORDER — LORAZEPAM 2 MG/ML
1 INJECTION INTRAMUSCULAR ONCE
Status: COMPLETED | OUTPATIENT
Start: 2017-01-01 | End: 2017-01-01

## 2017-01-01 RX ORDER — ATORVASTATIN CALCIUM 40 MG/1
80 TABLET, FILM COATED ORAL DAILY
Status: DISCONTINUED | OUTPATIENT
Start: 2017-01-01 | End: 2017-01-01 | Stop reason: HOSPADM

## 2017-01-01 RX ORDER — POTASSIUM CHLORIDE 1.5 G/1.77G
40 POWDER, FOR SOLUTION ORAL EVERY 8 HOURS
Status: DISCONTINUED | OUTPATIENT
Start: 2017-01-01 | End: 2017-01-01 | Stop reason: HOSPADM

## 2017-01-01 RX ORDER — POTASSIUM CHLORIDE 750 MG/1
40 CAPSULE, EXTENDED RELEASE ORAL AS NEEDED
Status: DISCONTINUED | OUTPATIENT
Start: 2017-01-01 | End: 2017-01-01 | Stop reason: HOSPADM

## 2017-01-01 RX ORDER — AMIODARONE HYDROCHLORIDE 200 MG/1
200 TABLET ORAL EVERY 8 HOURS
Status: DISCONTINUED | OUTPATIENT
Start: 2017-01-01 | End: 2017-01-01

## 2017-01-01 RX ORDER — LORAZEPAM 2 MG/ML
0.5 INJECTION INTRAMUSCULAR EVERY 4 HOURS PRN
Status: DISCONTINUED | OUTPATIENT
Start: 2017-01-01 | End: 2017-01-01

## 2017-01-01 RX ORDER — POTASSIUM CHLORIDE 7.45 MG/ML
10 INJECTION INTRAVENOUS
Status: DISCONTINUED | OUTPATIENT
Start: 2017-01-01 | End: 2017-01-01 | Stop reason: SDUPTHER

## 2017-01-01 RX ORDER — SODIUM CHLORIDE 0.9 % (FLUSH) 0.9 %
1-10 SYRINGE (ML) INJECTION AS NEEDED
Status: CANCELLED | OUTPATIENT
Start: 2017-01-01

## 2017-01-01 RX ORDER — METOCLOPRAMIDE 5 MG/1
5 TABLET ORAL ONCE
Status: COMPLETED | OUTPATIENT
Start: 2017-01-01 | End: 2017-01-01

## 2017-01-01 RX ORDER — LORAZEPAM 2 MG/ML
0.5 INJECTION INTRAMUSCULAR EVERY 4 HOURS
Status: DISCONTINUED | OUTPATIENT
Start: 2017-01-01 | End: 2017-01-01 | Stop reason: HOSPADM

## 2017-01-01 RX ORDER — ATORVASTATIN CALCIUM 80 MG/1
80 TABLET, FILM COATED ORAL DAILY
COMMUNITY

## 2017-01-01 RX ORDER — POTASSIUM CHLORIDE 7.45 MG/ML
10 INJECTION INTRAVENOUS
Status: DISCONTINUED | OUTPATIENT
Start: 2017-01-01 | End: 2017-01-01 | Stop reason: HOSPADM

## 2017-01-01 RX ORDER — ROPINIROLE 0.25 MG/1
0.25 TABLET, FILM COATED ORAL NIGHTLY
COMMUNITY

## 2017-01-01 RX ORDER — QUETIAPINE FUMARATE 25 MG/1
25 TABLET, FILM COATED ORAL EVERY 8 HOURS PRN
Status: DISCONTINUED | OUTPATIENT
Start: 2017-01-01 | End: 2017-01-01

## 2017-01-01 RX ORDER — SACCHAROMYCES BOULARDII 250 MG
250 CAPSULE ORAL 2 TIMES DAILY
Status: DISCONTINUED | OUTPATIENT
Start: 2017-01-01 | End: 2017-01-01 | Stop reason: HOSPADM

## 2017-01-01 RX ORDER — BUMETANIDE 0.25 MG/ML
1 INJECTION INTRAMUSCULAR; INTRAVENOUS EVERY 8 HOURS
Status: DISCONTINUED | OUTPATIENT
Start: 2017-01-01 | End: 2017-01-01 | Stop reason: HOSPADM

## 2017-01-01 RX ORDER — QUETIAPINE FUMARATE 25 MG/1
25 TABLET, FILM COATED ORAL NIGHTLY
Status: DISCONTINUED | OUTPATIENT
Start: 2017-01-01 | End: 2017-01-01

## 2017-01-01 RX ORDER — LIDOCAINE HYDROCHLORIDE 10 MG/ML
2.1 INJECTION, SOLUTION EPIDURAL; INFILTRATION; INTRACAUDAL; PERINEURAL ONCE
Status: COMPLETED | OUTPATIENT
Start: 2017-01-01 | End: 2017-01-01

## 2017-01-01 RX ORDER — ASPIRIN 81 MG/1
81 TABLET, CHEWABLE ORAL DAILY
Status: DISCONTINUED | OUTPATIENT
Start: 2017-01-01 | End: 2017-01-01 | Stop reason: HOSPADM

## 2017-01-01 RX ORDER — IBUPROFEN 800 MG/1
800 TABLET ORAL EVERY 6 HOURS PRN
COMMUNITY
End: 2017-01-01 | Stop reason: HOSPADM

## 2017-01-01 RX ORDER — DEXMEDETOMIDINE HYDROCHLORIDE 4 UG/ML
.2-1.5 INJECTION, SOLUTION INTRAVENOUS
Status: DISCONTINUED | OUTPATIENT
Start: 2017-01-01 | End: 2017-01-01

## 2017-01-01 RX ORDER — HALOPERIDOL 5 MG/ML
2 INJECTION INTRAMUSCULAR EVERY 4 HOURS PRN
Status: DISCONTINUED | OUTPATIENT
Start: 2017-01-01 | End: 2017-01-01

## 2017-01-01 RX ORDER — BUMETANIDE 0.25 MG/ML
1 INJECTION INTRAMUSCULAR; INTRAVENOUS 2 TIMES DAILY
Status: COMPLETED | OUTPATIENT
Start: 2017-01-01 | End: 2017-01-01

## 2017-01-01 RX ORDER — ISOSORBIDE DINITRATE 20 MG/1
10 TABLET ORAL EVERY 8 HOURS SCHEDULED
Status: DISCONTINUED | OUTPATIENT
Start: 2017-01-01 | End: 2017-01-01

## 2017-01-01 RX ORDER — ACETYLCYSTEINE 200 MG/ML
4 SOLUTION ORAL; RESPIRATORY (INHALATION)
Status: DISCONTINUED | OUTPATIENT
Start: 2017-01-01 | End: 2017-01-01

## 2017-01-01 RX ORDER — GLYCOPYRROLATE 0.2 MG/ML
0.4 INJECTION INTRAMUSCULAR; INTRAVENOUS
Status: DISCONTINUED | OUTPATIENT
Start: 2017-01-01 | End: 2017-01-01 | Stop reason: HOSPADM

## 2017-01-01 RX ORDER — ISOSORBIDE DINITRATE 20 MG/1
20 TABLET ORAL 3 TIMES DAILY
Status: DISCONTINUED | OUTPATIENT
Start: 2017-01-01 | End: 2017-01-01

## 2017-01-01 RX ORDER — CLOPIDOGREL BISULFATE 75 MG/1
75 TABLET ORAL DAILY
Status: DISCONTINUED | OUTPATIENT
Start: 2017-01-01 | End: 2017-01-01 | Stop reason: HOSPADM

## 2017-01-01 RX ORDER — HYDROMORPHONE HYDROCHLORIDE 1 MG/ML
0.5 INJECTION, SOLUTION INTRAMUSCULAR; INTRAVENOUS; SUBCUTANEOUS
Status: DISCONTINUED | OUTPATIENT
Start: 2017-01-01 | End: 2017-01-01

## 2017-01-01 RX ORDER — PANTOPRAZOLE SODIUM 40 MG/10ML
40 INJECTION, POWDER, LYOPHILIZED, FOR SOLUTION INTRAVENOUS
Status: DISCONTINUED | OUTPATIENT
Start: 2017-01-01 | End: 2017-01-01

## 2017-01-01 RX ORDER — BUMETANIDE 1 MG/1
1 TABLET ORAL NIGHTLY
Status: CANCELLED | OUTPATIENT
Start: 2017-01-01

## 2017-01-01 RX ORDER — GLYCOPYRROLATE 0.2 MG/ML
0.4 INJECTION INTRAMUSCULAR; INTRAVENOUS EVERY 6 HOURS
Status: DISCONTINUED | OUTPATIENT
Start: 2017-01-01 | End: 2017-01-01

## 2017-01-01 RX ORDER — CARVEDILOL 6.25 MG/1
6.25 TABLET ORAL 2 TIMES DAILY WITH MEALS
Status: DISCONTINUED | OUTPATIENT
Start: 2017-01-01 | End: 2017-01-01 | Stop reason: HOSPADM

## 2017-01-01 RX ORDER — LANSOPRAZOLE
30 KIT EVERY MORNING
Status: CANCELLED | OUTPATIENT
Start: 2017-01-01

## 2017-01-01 RX ORDER — SPIRONOLACTONE 25 MG/1
25 TABLET ORAL DAILY
Qty: 30 TABLET | Refills: 3 | Status: SHIPPED | OUTPATIENT
Start: 2017-01-01 | End: 2017-01-01 | Stop reason: HOSPADM

## 2017-01-01 RX ORDER — FUROSEMIDE 10 MG/ML
40 INJECTION INTRAMUSCULAR; INTRAVENOUS EVERY 12 HOURS
Status: COMPLETED | OUTPATIENT
Start: 2017-01-01 | End: 2017-01-01

## 2017-01-01 RX ORDER — INSULIN GLARGINE 100 [IU]/ML
45 INJECTION, SOLUTION SUBCUTANEOUS NIGHTLY
Qty: 500 UNITS | Refills: 0 | Status: SHIPPED | OUTPATIENT
Start: 2017-01-01 | End: 2017-01-01 | Stop reason: HOSPADM

## 2017-01-01 RX ORDER — CARVEDILOL 12.5 MG/1
12.5 TABLET ORAL EVERY 12 HOURS SCHEDULED
Status: DISCONTINUED | OUTPATIENT
Start: 2017-01-01 | End: 2017-01-01

## 2017-01-01 RX ORDER — LANSOPRAZOLE
30 KIT EVERY MORNING
Status: DISCONTINUED | OUTPATIENT
Start: 2017-01-01 | End: 2017-01-01 | Stop reason: HOSPADM

## 2017-01-01 RX ORDER — HALOPERIDOL 5 MG/ML
4 INJECTION INTRAMUSCULAR EVERY 4 HOURS PRN
Status: DISCONTINUED | OUTPATIENT
Start: 2017-01-01 | End: 2017-01-01 | Stop reason: HOSPADM

## 2017-01-01 RX ORDER — PANTOPRAZOLE SODIUM 40 MG/1
40 TABLET, DELAYED RELEASE ORAL
Status: DISCONTINUED | OUTPATIENT
Start: 2017-01-01 | End: 2017-01-01

## 2017-01-01 RX ORDER — HALOPERIDOL 5 MG/ML
2 INJECTION INTRAMUSCULAR EVERY 6 HOURS
Status: DISCONTINUED | OUTPATIENT
Start: 2017-01-01 | End: 2017-01-01

## 2017-01-01 RX ORDER — FENTANYL CITRATE 50 UG/ML
INJECTION, SOLUTION INTRAMUSCULAR; INTRAVENOUS
Status: COMPLETED
Start: 2017-01-01 | End: 2017-01-01

## 2017-01-01 RX ORDER — POTASSIUM CHLORIDE 29.8 MG/ML
INJECTION INTRAVENOUS CONTINUOUS PRN
Status: DISCONTINUED | OUTPATIENT
Start: 2017-01-01 | End: 2017-01-01 | Stop reason: SURG

## 2017-01-01 RX ORDER — POTASSIUM CHLORIDE 750 MG/1
40 CAPSULE, EXTENDED RELEASE ORAL EVERY 8 HOURS
Status: DISCONTINUED | OUTPATIENT
Start: 2017-01-01 | End: 2017-01-01

## 2017-01-01 RX ORDER — SODIUM CHLORIDE 9 MG/ML
50 INJECTION, SOLUTION INTRAVENOUS CONTINUOUS
Status: DISCONTINUED | OUTPATIENT
Start: 2017-01-01 | End: 2017-01-01

## 2017-01-01 RX ORDER — HYDRALAZINE HYDROCHLORIDE 25 MG/1
25 TABLET, FILM COATED ORAL EVERY 8 HOURS SCHEDULED
Status: CANCELLED | OUTPATIENT
Start: 2017-01-01

## 2017-01-01 RX ORDER — HALOPERIDOL 5 MG/ML
2 INJECTION INTRAMUSCULAR EVERY 4 HOURS
Status: DISCONTINUED | OUTPATIENT
Start: 2017-01-01 | End: 2017-01-01 | Stop reason: HOSPADM

## 2017-01-01 RX ORDER — DEXTROSE MONOHYDRATE 25 G/50ML
25 INJECTION, SOLUTION INTRAVENOUS
Status: DISCONTINUED | OUTPATIENT
Start: 2017-01-01 | End: 2017-01-01 | Stop reason: HOSPADM

## 2017-01-01 RX ORDER — NYSTATIN 100000 [USP'U]/G
POWDER TOPICAL 2 TIMES DAILY PRN
Status: DISCONTINUED | OUTPATIENT
Start: 2017-01-01 | End: 2017-01-01 | Stop reason: HOSPADM

## 2017-01-01 RX ORDER — ASPIRIN 81 MG/1
324 TABLET, CHEWABLE ORAL DAILY
Status: DISCONTINUED | OUTPATIENT
Start: 2017-01-01 | End: 2017-01-01

## 2017-01-01 RX ORDER — MORPHINE SULFATE 2 MG/ML
2 INJECTION, SOLUTION INTRAMUSCULAR; INTRAVENOUS EVERY 4 HOURS PRN
Refills: 0 | Status: ON HOLD
Start: 2017-01-01 | End: 2017-01-01

## 2017-01-01 RX ORDER — BISACODYL 10 MG
10 SUPPOSITORY, RECTAL RECTAL DAILY PRN
Status: DISCONTINUED | OUTPATIENT
Start: 2017-01-01 | End: 2017-01-01 | Stop reason: HOSPADM

## 2017-01-01 RX ORDER — ISOSORBIDE MONONITRATE 60 MG/1
60 TABLET, EXTENDED RELEASE ORAL DAILY
COMMUNITY

## 2017-01-01 RX ORDER — POTASSIUM CHLORIDE 1.5 G/1.77G
40 POWDER, FOR SOLUTION ORAL EVERY 8 HOURS
Status: CANCELLED | OUTPATIENT
Start: 2017-01-01 | End: 2017-01-01

## 2017-01-01 RX ORDER — CLOPIDOGREL BISULFATE 75 MG/1
75 TABLET ORAL DAILY
COMMUNITY

## 2017-01-01 RX ORDER — GLYCOPYRROLATE 0.2 MG/ML
0.4 INJECTION INTRAMUSCULAR; INTRAVENOUS EVERY 4 HOURS PRN
Status: DISCONTINUED | OUTPATIENT
Start: 2017-01-01 | End: 2017-01-01

## 2017-01-01 RX ORDER — ATORVASTATIN CALCIUM 40 MG/1
80 TABLET, FILM COATED ORAL NIGHTLY
Status: DISCONTINUED | OUTPATIENT
Start: 2017-01-01 | End: 2017-01-01 | Stop reason: HOSPADM

## 2017-01-01 RX ORDER — FENTANYL 25 UG/H
1 PATCH TRANSDERMAL
Status: COMPLETED | OUTPATIENT
Start: 2017-01-01 | End: 2017-01-01

## 2017-01-01 RX ORDER — LORAZEPAM 2 MG/ML
1 INJECTION INTRAMUSCULAR EVERY 4 HOURS PRN
Status: DISCONTINUED | OUTPATIENT
Start: 2017-01-01 | End: 2017-01-01 | Stop reason: HOSPADM

## 2017-01-01 RX ORDER — QUETIAPINE FUMARATE 25 MG/1
50 TABLET, FILM COATED ORAL NIGHTLY
Status: CANCELLED | OUTPATIENT
Start: 2017-01-01

## 2017-01-01 RX ORDER — IPRATROPIUM BROMIDE AND ALBUTEROL SULFATE 2.5; .5 MG/3ML; MG/3ML
3 SOLUTION RESPIRATORY (INHALATION) EVERY 4 HOURS PRN
Status: CANCELLED | OUTPATIENT
Start: 2017-01-01

## 2017-01-01 RX ORDER — SODIUM CHLORIDE, SODIUM LACTATE, POTASSIUM CHLORIDE, CALCIUM CHLORIDE 600; 310; 30; 20 MG/100ML; MG/100ML; MG/100ML; MG/100ML
125 INJECTION, SOLUTION INTRAVENOUS CONTINUOUS
Status: DISCONTINUED | OUTPATIENT
Start: 2017-01-01 | End: 2017-01-01

## 2017-01-01 RX ORDER — PHENYLEPHRINE HCL IN 0.9% NACL 0.5 MG/5ML
.5-3 SYRINGE (ML) INTRAVENOUS
Status: DISCONTINUED | OUTPATIENT
Start: 2017-01-01 | End: 2017-01-01

## 2017-01-01 RX ORDER — HEPARIN SODIUM 5000 [USP'U]/ML
5000 INJECTION, SOLUTION INTRAVENOUS; SUBCUTANEOUS EVERY 12 HOURS SCHEDULED
Status: DISCONTINUED | OUTPATIENT
Start: 2017-01-01 | End: 2017-01-01 | Stop reason: HOSPADM

## 2017-01-01 RX ORDER — ASPIRIN 325 MG
325 TABLET ORAL DAILY
Status: DISCONTINUED | OUTPATIENT
Start: 2017-01-01 | End: 2017-01-01

## 2017-01-01 RX ORDER — MAGNESIUM HYDROXIDE 1200 MG/15ML
LIQUID ORAL AS NEEDED
Status: DISCONTINUED | OUTPATIENT
Start: 2017-01-01 | End: 2017-01-01 | Stop reason: HOSPADM

## 2017-01-01 RX ORDER — DOPAMINE HYDROCHLORIDE 160 MG/100ML
INJECTION, SOLUTION INTRAVENOUS
Status: DISPENSED
Start: 2017-01-01 | End: 2017-01-01

## 2017-01-01 RX ORDER — SODIUM CHLORIDE 9 MG/ML
INJECTION, SOLUTION INTRAVENOUS CONTINUOUS PRN
Status: DISCONTINUED | OUTPATIENT
Start: 2017-01-01 | End: 2017-01-01

## 2017-01-01 RX ORDER — CEFAZOLIN SODIUM 2 G/100ML
2 INJECTION, SOLUTION INTRAVENOUS ONCE
Status: COMPLETED | OUTPATIENT
Start: 2017-01-01 | End: 2017-01-01

## 2017-01-01 RX ORDER — NITROGLYCERIN 0.4 MG/1
0.4 TABLET SUBLINGUAL
Status: CANCELLED | OUTPATIENT
Start: 2017-01-01

## 2017-01-01 RX ORDER — HALOPERIDOL 5 MG/ML
0.5 INJECTION INTRAMUSCULAR EVERY 4 HOURS PRN
Status: DISCONTINUED | OUTPATIENT
Start: 2017-01-01 | End: 2017-01-01

## 2017-01-01 RX ORDER — CEFTRIAXONE 1 G/1
1000 INJECTION, POWDER, FOR SOLUTION INTRAMUSCULAR; INTRAVENOUS ONCE
Status: COMPLETED | OUTPATIENT
Start: 2017-01-01 | End: 2017-01-01

## 2017-01-01 RX ORDER — NYSTATIN 100000 U/G
OINTMENT TOPICAL 2 TIMES DAILY PRN
Status: DISCONTINUED | OUTPATIENT
Start: 2017-01-01 | End: 2017-01-01 | Stop reason: HOSPADM

## 2017-01-01 RX ORDER — MAGNESIUM SULFATE HEPTAHYDRATE 40 MG/ML
4 INJECTION, SOLUTION INTRAVENOUS AS NEEDED
Status: CANCELLED | OUTPATIENT
Start: 2017-01-01

## 2017-01-01 RX ORDER — CLINDAMYCIN PHOSPHATE 600 MG/50ML
600 INJECTION INTRAVENOUS EVERY 8 HOURS
Status: DISCONTINUED | OUTPATIENT
Start: 2017-01-01 | End: 2017-01-01 | Stop reason: HOSPADM

## 2017-01-01 RX ORDER — HEPARIN SODIUM 1000 [USP'U]/ML
3000 INJECTION, SOLUTION INTRAVENOUS; SUBCUTANEOUS ONCE
Status: COMPLETED | OUTPATIENT
Start: 2017-01-01 | End: 2017-01-01

## 2017-01-01 RX ORDER — GABAPENTIN 300 MG/1
300 CAPSULE ORAL 2 TIMES DAILY
Status: DISCONTINUED | OUTPATIENT
Start: 2017-01-01 | End: 2017-01-01 | Stop reason: HOSPADM

## 2017-01-01 RX ORDER — DEXTROSE MONOHYDRATE 25 G/50ML
25 INJECTION, SOLUTION INTRAVENOUS
Status: CANCELLED | OUTPATIENT
Start: 2017-01-01

## 2017-01-01 RX ORDER — DOCUSATE SODIUM 100 MG/1
100 CAPSULE, LIQUID FILLED ORAL 2 TIMES DAILY
Status: DISCONTINUED | OUTPATIENT
Start: 2017-01-01 | End: 2017-01-01

## 2017-01-01 RX ORDER — POTASSIUM CHLORIDE 1.5 G/1.77G
40 POWDER, FOR SOLUTION ORAL EVERY 4 HOURS
Status: DISCONTINUED | OUTPATIENT
Start: 2017-01-01 | End: 2017-01-01

## 2017-01-01 RX ORDER — NICOTINE POLACRILEX 4 MG
15 LOZENGE BUCCAL
Status: DISCONTINUED | OUTPATIENT
Start: 2017-01-01 | End: 2017-01-01 | Stop reason: HOSPADM

## 2017-01-01 RX ORDER — CARVEDILOL 6.25 MG/1
6.25 TABLET ORAL 2 TIMES DAILY WITH MEALS
Qty: 60 TABLET | Refills: 3 | Status: ON HOLD | OUTPATIENT
Start: 2017-01-01 | End: 2017-01-01

## 2017-01-01 RX ORDER — BUMETANIDE 0.25 MG/ML
1 INJECTION INTRAMUSCULAR; INTRAVENOUS EVERY 8 HOURS
Status: ACTIVE | OUTPATIENT
Start: 2017-01-01 | End: 2017-01-01

## 2017-01-01 RX ORDER — BUMETANIDE 0.25 MG/ML
1 INJECTION INTRAMUSCULAR; INTRAVENOUS ONCE
Status: COMPLETED | OUTPATIENT
Start: 2017-01-01 | End: 2017-01-01

## 2017-01-01 RX ORDER — MAGNESIUM HYDROXIDE 1200 MG/15ML
LIQUID ORAL
Status: COMPLETED
Start: 2017-01-01 | End: 2017-01-01

## 2017-01-01 RX ORDER — FUROSEMIDE 20 MG/1
20 TABLET ORAL DAILY
Qty: 30 TABLET | Refills: 3 | Status: CANCELLED | OUTPATIENT
Start: 2017-01-01

## 2017-01-01 RX ADMIN — ATORVASTATIN CALCIUM 80 MG: 40 TABLET, FILM COATED ORAL at 20:45

## 2017-01-01 RX ADMIN — METRONIDAZOLE 500 MG: 500 INJECTION, SOLUTION INTRAVENOUS at 11:09

## 2017-01-01 RX ADMIN — ASPIRIN 324 MG: 81 TABLET, CHEWABLE ORAL at 08:08

## 2017-01-01 RX ADMIN — TAZOBACTAM SODIUM AND PIPERACILLIN SODIUM 4.5 G: .5; 4 INJECTION, POWDER, LYOPHILIZED, FOR SOLUTION INTRAVENOUS at 15:28

## 2017-01-01 RX ADMIN — WATER 42 ML/HR: 1 INJECTION INTRAMUSCULAR; INTRAVENOUS; SUBCUTANEOUS at 14:37

## 2017-01-01 RX ADMIN — SODIUM CHLORIDE 2940 ML: 9 INJECTION, SOLUTION INTRAVENOUS at 15:27

## 2017-01-01 RX ADMIN — IPRATROPIUM BROMIDE AND ALBUTEROL SULFATE 3 ML: .5; 3 SOLUTION RESPIRATORY (INHALATION) at 20:17

## 2017-01-01 RX ADMIN — METRONIDAZOLE 500 MG: 500 INJECTION, SOLUTION INTRAVENOUS at 06:21

## 2017-01-01 RX ADMIN — PANTOPRAZOLE SODIUM 40 MG: 40 INJECTION, POWDER, FOR SOLUTION INTRAVENOUS at 05:56

## 2017-01-01 RX ADMIN — SODIUM CHLORIDE: 9 INJECTION, SOLUTION INTRAVENOUS at 09:43

## 2017-01-01 RX ADMIN — CASTOR OIL AND BALSAM, PERU: 788; 87 OINTMENT TOPICAL at 10:32

## 2017-01-01 RX ADMIN — Medication 250 MG: at 09:09

## 2017-01-01 RX ADMIN — HYDROMORPHONE HYDROCHLORIDE 0.5 MG: 1 INJECTION, SOLUTION INTRAMUSCULAR; INTRAVENOUS; SUBCUTANEOUS at 18:41

## 2017-01-01 RX ADMIN — ASPIRIN 324 MG: 81 TABLET, CHEWABLE ORAL at 08:16

## 2017-01-01 RX ADMIN — INSULIN ASPART 2 UNITS: 100 INJECTION, SOLUTION INTRAVENOUS; SUBCUTANEOUS at 12:14

## 2017-01-01 RX ADMIN — POTASSIUM CHLORIDE 40 MEQ: 1.5 POWDER, FOR SOLUTION ORAL at 12:13

## 2017-01-01 RX ADMIN — Medication 0.5 MCG/KG/MIN: at 12:44

## 2017-01-01 RX ADMIN — IPRATROPIUM BROMIDE AND ALBUTEROL SULFATE 3 ML: .5; 3 SOLUTION RESPIRATORY (INHALATION) at 12:25

## 2017-01-01 RX ADMIN — DEXMEDETOMIDINE HYDROCHLORIDE 0.9 MCG/KG/HR: 4 INJECTION, SOLUTION INTRAVENOUS at 10:24

## 2017-01-01 RX ADMIN — ASPIRIN 324 MG: 81 TABLET, CHEWABLE ORAL at 10:20

## 2017-01-01 RX ADMIN — IPRATROPIUM BROMIDE AND ALBUTEROL SULFATE 3 ML: .5; 3 SOLUTION RESPIRATORY (INHALATION) at 13:57

## 2017-01-01 RX ADMIN — INSULIN LISPRO 8 UNITS: 100 INJECTION, SOLUTION INTRAVENOUS; SUBCUTANEOUS at 20:13

## 2017-01-01 RX ADMIN — APIXABAN 5 MG: 5 TABLET, FILM COATED ORAL at 20:20

## 2017-01-01 RX ADMIN — IOPAMIDOL 75 ML: 755 INJECTION, SOLUTION INTRAVENOUS at 14:00

## 2017-01-01 RX ADMIN — Medication 250 MG: at 17:14

## 2017-01-01 RX ADMIN — DOCUSATE SODIUM 100 MG: 100 CAPSULE, LIQUID FILLED ORAL at 08:08

## 2017-01-01 RX ADMIN — CEFAZOLIN SODIUM 2 G: 2 INJECTION, SOLUTION INTRAVENOUS at 16:01

## 2017-01-01 RX ADMIN — METRONIDAZOLE 500 MG: 500 INJECTION, SOLUTION INTRAVENOUS at 17:53

## 2017-01-01 RX ADMIN — METOCLOPRAMIDE 5 MG: 5 TABLET ORAL at 12:44

## 2017-01-01 RX ADMIN — PANTOPRAZOLE SODIUM 40 MG: 40 TABLET, DELAYED RELEASE ORAL at 05:25

## 2017-01-01 RX ADMIN — CEFAZOLIN SODIUM 2 G: 2 INJECTION, SOLUTION INTRAVENOUS at 16:15

## 2017-01-01 RX ADMIN — FENTANYL CITRATE 50 MCG: 50 INJECTION INTRAMUSCULAR; INTRAVENOUS at 10:13

## 2017-01-01 RX ADMIN — DEXMEDETOMIDINE HYDROCHLORIDE 1.4 MCG/KG/HR: 4 INJECTION, SOLUTION INTRAVENOUS at 05:16

## 2017-01-01 RX ADMIN — ACETYLCYSTEINE 4 ML: 200 SOLUTION ORAL; RESPIRATORY (INHALATION) at 20:12

## 2017-01-01 RX ADMIN — Medication 250 MG: at 17:50

## 2017-01-01 RX ADMIN — INSULIN LISPRO 5 UNITS: 100 INJECTION, SOLUTION INTRAVENOUS; SUBCUTANEOUS at 08:08

## 2017-01-01 RX ADMIN — Medication 1 CAPSULE: at 08:03

## 2017-01-01 RX ADMIN — POTASSIUM CHLORIDE 20 MEQ: 400 INJECTION, SOLUTION INTRAVENOUS at 08:10

## 2017-01-01 RX ADMIN — CEFAZOLIN SODIUM 2 G: 2 INJECTION, SOLUTION INTRAVENOUS at 07:45

## 2017-01-01 RX ADMIN — SACUBITRIL AND VALSARTAN 1 TABLET: 24; 26 TABLET, FILM COATED ORAL at 09:19

## 2017-01-01 RX ADMIN — DEXMEDETOMIDINE HYDROCHLORIDE 0.6 MCG/KG/HR: 4 INJECTION, SOLUTION INTRAVENOUS at 05:53

## 2017-01-01 RX ADMIN — ISOSORBIDE DINITRATE 10 MG: 20 TABLET ORAL at 14:23

## 2017-01-01 RX ADMIN — INSULIN HUMAN 12 UNITS: 100 INJECTION, SOLUTION PARENTERAL at 23:57

## 2017-01-01 RX ADMIN — ACETYLCYSTEINE 4 ML: 200 SOLUTION ORAL; RESPIRATORY (INHALATION) at 15:37

## 2017-01-01 RX ADMIN — CEFAZOLIN SODIUM 2 G: 2 INJECTION, SOLUTION INTRAVENOUS at 00:05

## 2017-01-01 RX ADMIN — SODIUM CHLORIDE, PRESERVATIVE FREE 500 UNITS: 5 INJECTION INTRAVENOUS at 08:16

## 2017-01-01 RX ADMIN — CARVEDILOL 6.25 MG: 6.25 TABLET, FILM COATED ORAL at 17:17

## 2017-01-01 RX ADMIN — METRONIDAZOLE 500 MG: 500 INJECTION, SOLUTION INTRAVENOUS at 12:51

## 2017-01-01 RX ADMIN — APIXABAN 5 MG: 5 TABLET, FILM COATED ORAL at 20:24

## 2017-01-01 RX ADMIN — INSULIN LISPRO 2 UNITS: 100 INJECTION, SOLUTION INTRAVENOUS; SUBCUTANEOUS at 23:56

## 2017-01-01 RX ADMIN — FUROSEMIDE 40 MG: 10 INJECTION, SOLUTION INTRAMUSCULAR; INTRAVENOUS at 08:43

## 2017-01-01 RX ADMIN — FUROSEMIDE 40 MG: 10 INJECTION, SOLUTION INTRAMUSCULAR; INTRAVENOUS at 15:55

## 2017-01-01 RX ADMIN — HYDROCODONE BITARTRATE AND ACETAMINOPHEN 1 TABLET: 7.5; 325 TABLET ORAL at 11:06

## 2017-01-01 RX ADMIN — DEXTROSE MONOHYDRATE 25 G: 25 INJECTION, SOLUTION INTRAVENOUS at 18:12

## 2017-01-01 RX ADMIN — DOCUSATE SODIUM 100 MG: 100 CAPSULE, LIQUID FILLED ORAL at 17:06

## 2017-01-01 RX ADMIN — ATORVASTATIN CALCIUM 80 MG: 40 TABLET, FILM COATED ORAL at 08:28

## 2017-01-01 RX ADMIN — ACETAMINOPHEN 650 MG: 325 TABLET, FILM COATED ORAL at 05:59

## 2017-01-01 RX ADMIN — CASTOR OIL AND BALSAM, PERU: 788; 87 OINTMENT TOPICAL at 08:48

## 2017-01-01 RX ADMIN — POTASSIUM CHLORIDE 40 MEQ: 1.5 POWDER, FOR SOLUTION ORAL at 12:14

## 2017-01-01 RX ADMIN — INSULIN ASPART 6 UNITS: 100 INJECTION, SOLUTION INTRAVENOUS; SUBCUTANEOUS at 01:41

## 2017-01-01 RX ADMIN — POTASSIUM CHLORIDE: 2 INJECTION, SOLUTION, CONCENTRATE INTRAVENOUS at 19:42

## 2017-01-01 RX ADMIN — HYDROCODONE BITARTRATE AND ACETAMINOPHEN 1 TABLET: 7.5; 325 TABLET ORAL at 15:22

## 2017-01-01 RX ADMIN — ASPIRIN 324 MG: 81 TABLET, CHEWABLE ORAL at 08:54

## 2017-01-01 RX ADMIN — ACETAZOLAMIDE 500 MG: 500 INJECTION, POWDER, LYOPHILIZED, FOR SOLUTION INTRAVENOUS at 11:08

## 2017-01-01 RX ADMIN — ROPINIROLE 0.25 MG: 0.25 TABLET ORAL at 21:13

## 2017-01-01 RX ADMIN — CARVEDILOL 12.5 MG: 12.5 TABLET, FILM COATED ORAL at 09:09

## 2017-01-01 RX ADMIN — CEFAZOLIN SODIUM 2 G: 2 INJECTION, SOLUTION INTRAVENOUS at 08:54

## 2017-01-01 RX ADMIN — Medication 10 ML: at 21:38

## 2017-01-01 RX ADMIN — DOPAMINE HYDROCHLORIDE 2 MCG/KG/MIN: 160 INJECTION, SOLUTION INTRAVENOUS at 04:03

## 2017-01-01 RX ADMIN — CEFAZOLIN SODIUM 2 G: 2 INJECTION, SOLUTION INTRAVENOUS at 16:54

## 2017-01-01 RX ADMIN — METRONIDAZOLE 500 MG: 500 INJECTION, SOLUTION INTRAVENOUS at 00:08

## 2017-01-01 RX ADMIN — SODIUM CHLORIDE, PRESERVATIVE FREE 500 UNITS: 5 INJECTION INTRAVENOUS at 21:39

## 2017-01-01 RX ADMIN — PANTOPRAZOLE SODIUM 40 MG: 40 INJECTION, POWDER, FOR SOLUTION INTRAVENOUS at 06:02

## 2017-01-01 RX ADMIN — HYDROCODONE BITARTRATE AND ACETAMINOPHEN 1 TABLET: 7.5; 325 TABLET ORAL at 14:11

## 2017-01-01 RX ADMIN — CEFAZOLIN SODIUM 2 G: 2 INJECTION, SOLUTION INTRAVENOUS at 15:40

## 2017-01-01 RX ADMIN — CEFAZOLIN SODIUM 2 G: 2 INJECTION, SOLUTION INTRAVENOUS at 23:42

## 2017-01-01 RX ADMIN — Medication 10 ML: at 21:35

## 2017-01-01 RX ADMIN — CARVEDILOL 6.25 MG: 6.25 TABLET, FILM COATED ORAL at 21:50

## 2017-01-01 RX ADMIN — ROPINIROLE 0.25 MG: 0.25 TABLET ORAL at 21:36

## 2017-01-01 RX ADMIN — MEROPENEM 2 G: 1 INJECTION, POWDER, FOR SOLUTION INTRAVENOUS at 05:41

## 2017-01-01 RX ADMIN — HYDROCODONE BITARTRATE AND ACETAMINOPHEN 1 TABLET: 7.5; 325 TABLET ORAL at 20:12

## 2017-01-01 RX ADMIN — INSULIN DETEMIR 25 UNITS: 100 INJECTION, SOLUTION SUBCUTANEOUS at 21:59

## 2017-01-01 RX ADMIN — Medication 10 ML: at 08:30

## 2017-01-01 RX ADMIN — SODIUM CHLORIDE, PRESERVATIVE FREE 500 UNITS: 5 INJECTION INTRAVENOUS at 20:20

## 2017-01-01 RX ADMIN — APIXABAN 5 MG: 5 TABLET, FILM COATED ORAL at 08:56

## 2017-01-01 RX ADMIN — SODIUM CHLORIDE, PRESERVATIVE FREE 500 UNITS: 5 INJECTION INTRAVENOUS at 08:54

## 2017-01-01 RX ADMIN — CASTOR OIL AND BALSAM, PERU: 788; 87 OINTMENT TOPICAL at 08:56

## 2017-01-01 RX ADMIN — FUROSEMIDE 60 MG: 10 INJECTION, SOLUTION INTRAMUSCULAR; INTRAVENOUS at 19:27

## 2017-01-01 RX ADMIN — CEFAZOLIN SODIUM 2 G: 2 INJECTION, SOLUTION INTRAVENOUS at 09:19

## 2017-01-01 RX ADMIN — PANTOPRAZOLE SODIUM 40 MG: 40 INJECTION, POWDER, FOR SOLUTION INTRAVENOUS at 05:53

## 2017-01-01 RX ADMIN — IPRATROPIUM BROMIDE AND ALBUTEROL SULFATE 3 ML: .5; 3 SOLUTION RESPIRATORY (INHALATION) at 12:21

## 2017-01-01 RX ADMIN — POTASSIUM CHLORIDE 40 MEQ: 1.5 POWDER, FOR SOLUTION ORAL at 08:57

## 2017-01-01 RX ADMIN — GLYCOPYRROLATE 0.4 MG: 0.2 INJECTION, SOLUTION INTRAMUSCULAR; INTRAVENOUS at 14:54

## 2017-01-01 RX ADMIN — HYDROMORPHONE HYDROCHLORIDE 0.5 MG: 1 INJECTION, SOLUTION INTRAMUSCULAR; INTRAVENOUS; SUBCUTANEOUS at 06:08

## 2017-01-01 RX ADMIN — ACETYLCYSTEINE 4 ML: 200 SOLUTION ORAL; RESPIRATORY (INHALATION) at 08:09

## 2017-01-01 RX ADMIN — FENTANYL 1 PATCH: 25 PATCH, EXTENDED RELEASE TRANSDERMAL at 10:46

## 2017-01-01 RX ADMIN — LORAZEPAM 1 MG: 2 INJECTION, SOLUTION INTRAMUSCULAR; INTRAVENOUS at 08:41

## 2017-01-01 RX ADMIN — CASTOR OIL AND BALSAM, PERU: 788; 87 OINTMENT TOPICAL at 08:21

## 2017-01-01 RX ADMIN — IPRATROPIUM BROMIDE AND ALBUTEROL SULFATE 3 ML: .5; 3 SOLUTION RESPIRATORY (INHALATION) at 19:33

## 2017-01-01 RX ADMIN — CARVEDILOL 6.25 MG: 6.25 TABLET, FILM COATED ORAL at 09:19

## 2017-01-01 RX ADMIN — METRONIDAZOLE 500 MG: 500 INJECTION, SOLUTION INTRAVENOUS at 23:04

## 2017-01-01 RX ADMIN — METRONIDAZOLE 500 MG: 500 INJECTION, SOLUTION INTRAVENOUS at 11:49

## 2017-01-01 RX ADMIN — INSULIN ASPART 2 UNITS: 100 INJECTION, SOLUTION INTRAVENOUS; SUBCUTANEOUS at 17:48

## 2017-01-01 RX ADMIN — ISOSORBIDE DINITRATE 10 MG: 20 TABLET ORAL at 05:34

## 2017-01-01 RX ADMIN — HYDROMORPHONE HYDROCHLORIDE 0.5 MG: 1 INJECTION, SOLUTION INTRAMUSCULAR; INTRAVENOUS; SUBCUTANEOUS at 03:04

## 2017-01-01 RX ADMIN — ETOMIDATE 4 MG: 2 INJECTION, SOLUTION INTRAVENOUS at 10:43

## 2017-01-01 RX ADMIN — ACETAZOLAMIDE 500 MG: 500 INJECTION, POWDER, LYOPHILIZED, FOR SOLUTION INTRAVENOUS at 14:23

## 2017-01-01 RX ADMIN — ASPIRIN 81 MG: 81 TABLET, CHEWABLE ORAL at 08:54

## 2017-01-01 RX ADMIN — FUROSEMIDE 40 MG: 10 INJECTION, SOLUTION INTRAMUSCULAR; INTRAVENOUS at 09:16

## 2017-01-01 RX ADMIN — QUETIAPINE FUMARATE 25 MG: 25 TABLET, FILM COATED ORAL at 08:08

## 2017-01-01 RX ADMIN — METRONIDAZOLE 500 MG: 500 INJECTION, SOLUTION INTRAVENOUS at 18:46

## 2017-01-01 RX ADMIN — SODIUM CHLORIDE, PRESERVATIVE FREE 500 UNITS: 5 INJECTION INTRAVENOUS at 20:46

## 2017-01-01 RX ADMIN — Medication 250 MG: at 08:54

## 2017-01-01 RX ADMIN — HYDROMORPHONE HYDROCHLORIDE 0.5 MG: 1 INJECTION, SOLUTION INTRAMUSCULAR; INTRAVENOUS; SUBCUTANEOUS at 20:18

## 2017-01-01 RX ADMIN — METRONIDAZOLE 500 MG: 500 INJECTION, SOLUTION INTRAVENOUS at 17:28

## 2017-01-01 RX ADMIN — METRONIDAZOLE 500 MG: 500 INJECTION, SOLUTION INTRAVENOUS at 17:45

## 2017-01-01 RX ADMIN — VANCOMYCIN HYDROCHLORIDE 1500 MG: 5 INJECTION, POWDER, LYOPHILIZED, FOR SOLUTION INTRAVENOUS at 01:58

## 2017-01-01 RX ADMIN — APIXABAN 5 MG: 5 TABLET, FILM COATED ORAL at 08:54

## 2017-01-01 RX ADMIN — INSULIN LISPRO 8 UNITS: 100 INJECTION, SOLUTION INTRAVENOUS; SUBCUTANEOUS at 11:59

## 2017-01-01 RX ADMIN — WATER 42 ML/HR: 1 INJECTION INTRAMUSCULAR; INTRAVENOUS; SUBCUTANEOUS at 14:30

## 2017-01-01 RX ADMIN — Medication 250 MG: at 08:50

## 2017-01-01 RX ADMIN — VANCOMYCIN HYDROCHLORIDE 1500 MG: 5 INJECTION, POWDER, LYOPHILIZED, FOR SOLUTION INTRAVENOUS at 02:22

## 2017-01-01 RX ADMIN — Medication 250 MG: at 08:16

## 2017-01-01 RX ADMIN — ATORVASTATIN CALCIUM 80 MG: 40 TABLET, FILM COATED ORAL at 20:07

## 2017-01-01 RX ADMIN — LANSOPRAZOLE 30 MG: KIT at 06:15

## 2017-01-01 RX ADMIN — LORAZEPAM 0.5 MG: 2 INJECTION INTRAMUSCULAR; INTRAVENOUS at 17:09

## 2017-01-01 RX ADMIN — WATER 42 ML/HR: 1 INJECTION INTRAMUSCULAR; INTRAVENOUS; SUBCUTANEOUS at 11:46

## 2017-01-01 RX ADMIN — CEFAZOLIN SODIUM 2 G: 2 INJECTION, SOLUTION INTRAVENOUS at 00:28

## 2017-01-01 RX ADMIN — CEFAZOLIN SODIUM 2 G: 2 INJECTION, SOLUTION INTRAVENOUS at 00:07

## 2017-01-01 RX ADMIN — MIDAZOLAM HYDROCHLORIDE 3 MG: 1 INJECTION, SOLUTION INTRAMUSCULAR; INTRAVENOUS at 10:13

## 2017-01-01 RX ADMIN — ASPIRIN 81 MG: 81 TABLET ORAL at 12:45

## 2017-01-01 RX ADMIN — Medication 250 MG: at 18:05

## 2017-01-01 RX ADMIN — CHLOROTHIAZIDE SODIUM 500 MG: 500 INJECTION, POWDER, LYOPHILIZED, FOR SOLUTION INTRAVENOUS at 20:20

## 2017-01-01 RX ADMIN — INSULIN LISPRO 8 UNITS: 100 INJECTION, SOLUTION INTRAVENOUS; SUBCUTANEOUS at 00:16

## 2017-01-01 RX ADMIN — INSULIN HUMAN 5 UNITS: 100 INJECTION, SOLUTION PARENTERAL at 23:11

## 2017-01-01 RX ADMIN — IPRATROPIUM BROMIDE AND ALBUTEROL SULFATE 3 ML: .5; 3 SOLUTION RESPIRATORY (INHALATION) at 15:37

## 2017-01-01 RX ADMIN — CARVEDILOL 12.5 MG: 12.5 TABLET, FILM COATED ORAL at 08:17

## 2017-01-01 RX ADMIN — INSULIN HUMAN 8 UNITS: 100 INJECTION, SOLUTION PARENTERAL at 00:08

## 2017-01-01 RX ADMIN — INSULIN LISPRO 8 UNITS: 100 INJECTION, SOLUTION INTRAVENOUS; SUBCUTANEOUS at 12:05

## 2017-01-01 RX ADMIN — DEXTROSE MONOHYDRATE 25 G: 25 INJECTION, SOLUTION INTRAVENOUS at 13:08

## 2017-01-01 RX ADMIN — CLINDAMYCIN PHOSPHATE 600 MG: 12 INJECTION, SOLUTION INTRAVENOUS at 04:53

## 2017-01-01 RX ADMIN — CARVEDILOL 6.25 MG: 6.25 TABLET, FILM COATED ORAL at 08:28

## 2017-01-01 RX ADMIN — CEFAZOLIN SODIUM 2 G: 2 INJECTION, SOLUTION INTRAVENOUS at 15:01

## 2017-01-01 RX ADMIN — INSULIN DETEMIR 40 UNITS: 100 INJECTION, SOLUTION SUBCUTANEOUS at 06:15

## 2017-01-01 RX ADMIN — FENTANYL 1 PATCH: 25 PATCH, EXTENDED RELEASE TRANSDERMAL at 11:19

## 2017-01-01 RX ADMIN — CLINDAMYCIN PHOSPHATE 600 MG: 12 INJECTION, SOLUTION INTRAVENOUS at 21:49

## 2017-01-01 RX ADMIN — INSULIN HUMAN 3 UNITS: 100 INJECTION, SOLUTION PARENTERAL at 17:32

## 2017-01-01 RX ADMIN — INSULIN HUMAN 5 UNITS: 100 INJECTION, SOLUTION PARENTERAL at 12:06

## 2017-01-01 RX ADMIN — METRONIDAZOLE 500 MG: 500 INJECTION, SOLUTION INTRAVENOUS at 06:03

## 2017-01-01 RX ADMIN — ASPIRIN 300 MG: 300 SUPPOSITORY RECTAL at 09:17

## 2017-01-01 RX ADMIN — SENNOSIDES A AND B 10 ML: 415.36 LIQUID ORAL at 08:54

## 2017-01-01 RX ADMIN — ATORVASTATIN CALCIUM 80 MG: 40 TABLET, FILM COATED ORAL at 20:52

## 2017-01-01 RX ADMIN — SODIUM CHLORIDE, PRESERVATIVE FREE 500 UNITS: 5 INJECTION INTRAVENOUS at 20:21

## 2017-01-01 RX ADMIN — ACETYLCYSTEINE 4 ML: 200 SOLUTION ORAL; RESPIRATORY (INHALATION) at 07:22

## 2017-01-01 RX ADMIN — CARVEDILOL 6.25 MG: 6.25 TABLET, FILM COATED ORAL at 20:12

## 2017-01-01 RX ADMIN — INSULIN LISPRO 8 UNITS: 100 INJECTION, SOLUTION INTRAVENOUS; SUBCUTANEOUS at 11:54

## 2017-01-01 RX ADMIN — VANCOMYCIN HYDROCHLORIDE 1500 MG: 5 INJECTION, POWDER, LYOPHILIZED, FOR SOLUTION INTRAVENOUS at 15:03

## 2017-01-01 RX ADMIN — IPRATROPIUM BROMIDE AND ALBUTEROL SULFATE 3 ML: .5; 3 SOLUTION RESPIRATORY (INHALATION) at 15:59

## 2017-01-01 RX ADMIN — INSULIN HUMAN 7 UNITS: 100 INJECTION, SOLUTION PARENTERAL at 12:08

## 2017-01-01 RX ADMIN — LORAZEPAM 1 MG: 2 INJECTION INTRAMUSCULAR; INTRAVENOUS at 20:57

## 2017-01-01 RX ADMIN — CASTOR OIL AND BALSAM, PERU: 788; 87 OINTMENT TOPICAL at 11:23

## 2017-01-01 RX ADMIN — FUROSEMIDE 40 MG: 10 INJECTION, SOLUTION INTRAMUSCULAR; INTRAVENOUS at 08:14

## 2017-01-01 RX ADMIN — QUETIAPINE FUMARATE 25 MG: 25 TABLET, FILM COATED ORAL at 08:18

## 2017-01-01 RX ADMIN — IPRATROPIUM BROMIDE AND ALBUTEROL SULFATE 3 ML: .5; 3 SOLUTION RESPIRATORY (INHALATION) at 07:24

## 2017-01-01 RX ADMIN — POTASSIUM & SODIUM PHOSPHATES POWDER PACK 280-160-250 MG 2 PACKET: 280-160-250 PACK at 11:22

## 2017-01-01 RX ADMIN — SENNOSIDES A AND B 10 ML: 415.36 LIQUID ORAL at 17:20

## 2017-01-01 RX ADMIN — FUROSEMIDE 40 MG: 10 INJECTION, SOLUTION INTRAMUSCULAR; INTRAVENOUS at 09:19

## 2017-01-01 RX ADMIN — ASPIRIN 81 MG: 81 TABLET, CHEWABLE ORAL at 08:48

## 2017-01-01 RX ADMIN — CARVEDILOL 6.25 MG: 6.25 TABLET, FILM COATED ORAL at 20:18

## 2017-01-01 RX ADMIN — HYDROMORPHONE HYDROCHLORIDE 0.5 MG: 1 INJECTION, SOLUTION INTRAMUSCULAR; INTRAVENOUS; SUBCUTANEOUS at 13:50

## 2017-01-01 RX ADMIN — INSULIN LISPRO 8 UNITS: 100 INJECTION, SOLUTION INTRAVENOUS; SUBCUTANEOUS at 18:05

## 2017-01-01 RX ADMIN — APIXABAN 5 MG: 5 TABLET, FILM COATED ORAL at 08:17

## 2017-01-01 RX ADMIN — INSULIN LISPRO 2 UNITS: 100 INJECTION, SOLUTION INTRAVENOUS; SUBCUTANEOUS at 05:58

## 2017-01-01 RX ADMIN — LIDOCAINE HYDROCHLORIDE 60 ML: 20 INJECTION, SOLUTION INFILTRATION; PERINEURAL at 10:13

## 2017-01-01 RX ADMIN — METRONIDAZOLE 500 MG: 500 INJECTION, SOLUTION INTRAVENOUS at 05:53

## 2017-01-01 RX ADMIN — IPRATROPIUM BROMIDE AND ALBUTEROL SULFATE 3 ML: .5; 3 SOLUTION RESPIRATORY (INHALATION) at 19:25

## 2017-01-01 RX ADMIN — INSULIN DETEMIR 10 UNITS: 100 INJECTION, SOLUTION SUBCUTANEOUS at 12:02

## 2017-01-01 RX ADMIN — CASTOR OIL AND BALSAM, PERU: 788; 87 OINTMENT TOPICAL at 08:31

## 2017-01-01 RX ADMIN — HEPARIN SODIUM 5000 UNITS: 5000 INJECTION, SOLUTION INTRAVENOUS; SUBCUTANEOUS at 08:23

## 2017-01-01 RX ADMIN — IPRATROPIUM BROMIDE AND ALBUTEROL SULFATE 3 ML: .5; 3 SOLUTION RESPIRATORY (INHALATION) at 08:09

## 2017-01-01 RX ADMIN — HEPARIN SODIUM 5000 UNITS: 5000 INJECTION, SOLUTION INTRAVENOUS; SUBCUTANEOUS at 21:13

## 2017-01-01 RX ADMIN — LORAZEPAM 0.5 MG: 2 INJECTION INTRAMUSCULAR; INTRAVENOUS at 22:04

## 2017-01-01 RX ADMIN — ASPIRIN 300 MG: 300 SUPPOSITORY RECTAL at 08:33

## 2017-01-01 RX ADMIN — CASTOR OIL AND BALSAM, PERU: 788; 87 OINTMENT TOPICAL at 20:11

## 2017-01-01 RX ADMIN — ASPIRIN 81 MG: 81 TABLET, CHEWABLE ORAL at 08:56

## 2017-01-01 RX ADMIN — CLINDAMYCIN PHOSPHATE 600 MG: 12 INJECTION, SOLUTION INTRAVENOUS at 04:45

## 2017-01-01 RX ADMIN — IPRATROPIUM BROMIDE AND ALBUTEROL SULFATE 3 ML: .5; 3 SOLUTION RESPIRATORY (INHALATION) at 19:19

## 2017-01-01 RX ADMIN — FUROSEMIDE 60 MG: 10 INJECTION, SOLUTION INTRAMUSCULAR; INTRAVENOUS at 08:16

## 2017-01-01 RX ADMIN — PROMETHAZINE HYDROCHLORIDE 12.5 MG: 25 INJECTION, SOLUTION INTRAMUSCULAR; INTRAVENOUS at 22:06

## 2017-01-01 RX ADMIN — IPRATROPIUM BROMIDE AND ALBUTEROL SULFATE 3 ML: .5; 3 SOLUTION RESPIRATORY (INHALATION) at 20:44

## 2017-01-01 RX ADMIN — METRONIDAZOLE 500 MG: 500 INJECTION, SOLUTION INTRAVENOUS at 05:45

## 2017-01-01 RX ADMIN — AMIODARONE HYDROCHLORIDE 200 MG: 200 TABLET ORAL at 11:06

## 2017-01-01 RX ADMIN — ISOSORBIDE DINITRATE 10 MG: 20 TABLET ORAL at 14:47

## 2017-01-01 RX ADMIN — MEROPENEM 2 G: 1 INJECTION, POWDER, FOR SOLUTION INTRAVENOUS at 05:59

## 2017-01-01 RX ADMIN — INSULIN DETEMIR 10 UNITS: 100 INJECTION, SOLUTION SUBCUTANEOUS at 21:52

## 2017-01-01 RX ADMIN — ATORVASTATIN CALCIUM 80 MG: 40 TABLET, FILM COATED ORAL at 20:12

## 2017-01-01 RX ADMIN — APIXABAN 5 MG: 5 TABLET, FILM COATED ORAL at 20:45

## 2017-01-01 RX ADMIN — Medication 250 MG: at 20:13

## 2017-01-01 RX ADMIN — CASTOR OIL AND BALSAM, PERU: 788; 87 OINTMENT TOPICAL at 20:05

## 2017-01-01 RX ADMIN — HYDROCODONE BITARTRATE AND ACETAMINOPHEN 1 TABLET: 7.5; 325 TABLET ORAL at 08:12

## 2017-01-01 RX ADMIN — INSULIN LISPRO 4 UNITS: 100 INJECTION, SOLUTION INTRAVENOUS; SUBCUTANEOUS at 23:59

## 2017-01-01 RX ADMIN — CLOPIDOGREL BISULFATE 75 MG: 75 TABLET, FILM COATED ORAL at 12:45

## 2017-01-01 RX ADMIN — IPRATROPIUM BROMIDE AND ALBUTEROL SULFATE 3 ML: .5; 3 SOLUTION RESPIRATORY (INHALATION) at 15:41

## 2017-01-01 RX ADMIN — ACETYLCYSTEINE 4 ML: 200 SOLUTION ORAL; RESPIRATORY (INHALATION) at 19:34

## 2017-01-01 RX ADMIN — ATORVASTATIN CALCIUM 80 MG: 40 TABLET, FILM COATED ORAL at 21:47

## 2017-01-01 RX ADMIN — CARVEDILOL 12.5 MG: 12.5 TABLET, FILM COATED ORAL at 20:07

## 2017-01-01 RX ADMIN — Medication 250 MG: at 10:43

## 2017-01-01 RX ADMIN — GLYCOPYRROLATE 0.4 MG: 0.2 INJECTION, SOLUTION INTRAMUSCULAR; INTRAVENOUS at 20:18

## 2017-01-01 RX ADMIN — HEPARIN SODIUM 5000 UNITS: 5000 INJECTION, SOLUTION INTRAVENOUS; SUBCUTANEOUS at 21:49

## 2017-01-01 RX ADMIN — FUROSEMIDE 40 MG: 10 INJECTION, SOLUTION INTRAMUSCULAR; INTRAVENOUS at 09:09

## 2017-01-01 RX ADMIN — CEFAZOLIN SODIUM 2 G: 2 INJECTION, SOLUTION INTRAVENOUS at 08:00

## 2017-01-01 RX ADMIN — IPRATROPIUM BROMIDE AND ALBUTEROL SULFATE 3 ML: .5; 3 SOLUTION RESPIRATORY (INHALATION) at 08:22

## 2017-01-01 RX ADMIN — QUETIAPINE FUMARATE 25 MG: 25 TABLET, FILM COATED ORAL at 20:03

## 2017-01-01 RX ADMIN — APIXABAN 5 MG: 5 TABLET, FILM COATED ORAL at 08:50

## 2017-01-01 RX ADMIN — ATORVASTATIN CALCIUM 80 MG: 40 TABLET, FILM COATED ORAL at 08:02

## 2017-01-01 RX ADMIN — INSULIN HUMAN 2 UNITS: 100 INJECTION, SOLUTION PARENTERAL at 18:08

## 2017-01-01 RX ADMIN — DEXMEDETOMIDINE HYDROCHLORIDE 0.8 MCG/KG/HR: 4 INJECTION, SOLUTION INTRAVENOUS at 16:57

## 2017-01-01 RX ADMIN — HYDRALAZINE HYDROCHLORIDE 25 MG: 25 TABLET ORAL at 14:27

## 2017-01-01 RX ADMIN — MORPHINE SULFATE 4 MG: 4 INJECTION, SOLUTION INTRAMUSCULAR; INTRAVENOUS at 10:36

## 2017-01-01 RX ADMIN — IPRATROPIUM BROMIDE AND ALBUTEROL SULFATE 3 ML: .5; 3 SOLUTION RESPIRATORY (INHALATION) at 19:47

## 2017-01-01 RX ADMIN — SODIUM CHLORIDE 9 ML/HR: 9 INJECTION, SOLUTION INTRAVENOUS at 09:49

## 2017-01-01 RX ADMIN — ISOSORBIDE DINITRATE 20 MG: 20 TABLET ORAL at 12:13

## 2017-01-01 RX ADMIN — CEFAZOLIN SODIUM 2 G: 2 INJECTION, SOLUTION INTRAVENOUS at 23:47

## 2017-01-01 RX ADMIN — ACETYLCYSTEINE 4 ML: 200 SOLUTION ORAL; RESPIRATORY (INHALATION) at 12:51

## 2017-01-01 RX ADMIN — GABAPENTIN 300 MG: 300 CAPSULE ORAL at 18:19

## 2017-01-01 RX ADMIN — Medication 25 ML: at 14:30

## 2017-01-01 RX ADMIN — SODIUM CHLORIDE, PRESERVATIVE FREE 500 UNITS: 5 INJECTION INTRAVENOUS at 08:18

## 2017-01-01 RX ADMIN — METRONIDAZOLE 500 MG: 500 INJECTION, SOLUTION INTRAVENOUS at 18:53

## 2017-01-01 RX ADMIN — HEPARIN 500 UNITS: 100 SYRINGE at 20:24

## 2017-01-01 RX ADMIN — METRONIDAZOLE 500 MG: 500 INJECTION, SOLUTION INTRAVENOUS at 06:05

## 2017-01-01 RX ADMIN — METRONIDAZOLE 500 MG: 500 INJECTION, SOLUTION INTRAVENOUS at 12:04

## 2017-01-01 RX ADMIN — Medication 10 ML: at 01:29

## 2017-01-01 RX ADMIN — IPRATROPIUM BROMIDE AND ALBUTEROL SULFATE 3 ML: .5; 3 SOLUTION RESPIRATORY (INHALATION) at 07:57

## 2017-01-01 RX ADMIN — GABAPENTIN 300 MG: 300 CAPSULE ORAL at 08:02

## 2017-01-01 RX ADMIN — CARVEDILOL 6.25 MG: 6.25 TABLET, FILM COATED ORAL at 08:03

## 2017-01-01 RX ADMIN — CARVEDILOL 12.5 MG: 12.5 TABLET, FILM COATED ORAL at 08:31

## 2017-01-01 RX ADMIN — INSULIN LISPRO 8 UNITS: 100 INJECTION, SOLUTION INTRAVENOUS; SUBCUTANEOUS at 06:23

## 2017-01-01 RX ADMIN — HALOPERIDOL LACTATE 2 MG: 5 INJECTION, SOLUTION INTRAMUSCULAR at 09:44

## 2017-01-01 RX ADMIN — Medication 0.5 MCG/KG/MIN: at 20:43

## 2017-01-01 RX ADMIN — Medication 1 CAPSULE: at 08:25

## 2017-01-01 RX ADMIN — INSULIN LISPRO 4 UNITS: 100 INJECTION, SOLUTION INTRAVENOUS; SUBCUTANEOUS at 12:51

## 2017-01-01 RX ADMIN — IPRATROPIUM BROMIDE AND ALBUTEROL SULFATE 3 ML: .5; 3 SOLUTION RESPIRATORY (INHALATION) at 07:18

## 2017-01-01 RX ADMIN — INSULIN LISPRO 12 UNITS: 100 INJECTION, SOLUTION INTRAVENOUS; SUBCUTANEOUS at 18:46

## 2017-01-01 RX ADMIN — LORAZEPAM 0.5 MG: 2 INJECTION INTRAMUSCULAR; INTRAVENOUS at 22:16

## 2017-01-01 RX ADMIN — Medication 250 MG: at 09:16

## 2017-01-01 RX ADMIN — METRONIDAZOLE 500 MG: 500 INJECTION, SOLUTION INTRAVENOUS at 18:22

## 2017-01-01 RX ADMIN — IPRATROPIUM BROMIDE AND ALBUTEROL SULFATE 3 ML: .5; 3 SOLUTION RESPIRATORY (INHALATION) at 22:54

## 2017-01-01 RX ADMIN — DOCUSATE SODIUM 100 MG: 100 CAPSULE, LIQUID FILLED ORAL at 12:44

## 2017-01-01 RX ADMIN — PROPOFOL 10 MG: 10 INJECTION, EMULSION INTRAVENOUS at 11:02

## 2017-01-01 RX ADMIN — METOCLOPRAMIDE 10 MG: 5 INJECTION, SOLUTION INTRAMUSCULAR; INTRAVENOUS at 10:47

## 2017-01-01 RX ADMIN — INSULIN LISPRO 8 UNITS: 100 INJECTION, SOLUTION INTRAVENOUS; SUBCUTANEOUS at 12:50

## 2017-01-01 RX ADMIN — VANCOMYCIN HYDROCHLORIDE 1500 MG: 5 INJECTION, POWDER, LYOPHILIZED, FOR SOLUTION INTRAVENOUS at 13:58

## 2017-01-01 RX ADMIN — ROPINIROLE 0.25 MG: 0.25 TABLET ORAL at 21:51

## 2017-01-01 RX ADMIN — BUMETANIDE 1 MG: 0.25 INJECTION INTRAMUSCULAR; INTRAVENOUS at 11:11

## 2017-01-01 RX ADMIN — METRONIDAZOLE 500 MG: 500 INJECTION, SOLUTION INTRAVENOUS at 12:21

## 2017-01-01 RX ADMIN — LORAZEPAM 1 MG: 2 INJECTION, SOLUTION INTRAMUSCULAR; INTRAVENOUS at 13:09

## 2017-01-01 RX ADMIN — METRONIDAZOLE 500 MG: 500 INJECTION, SOLUTION INTRAVENOUS at 11:54

## 2017-01-01 RX ADMIN — ATORVASTATIN CALCIUM 80 MG: 40 TABLET, FILM COATED ORAL at 20:19

## 2017-01-01 RX ADMIN — ASPIRIN 325 MG ORAL TABLET 325 MG: 325 PILL ORAL at 13:15

## 2017-01-01 RX ADMIN — METRONIDAZOLE 500 MG: 500 INJECTION, SOLUTION INTRAVENOUS at 11:25

## 2017-01-01 RX ADMIN — FENTANYL CITRATE 50 MCG: 50 INJECTION INTRAMUSCULAR; INTRAVENOUS at 10:45

## 2017-01-01 RX ADMIN — INSULIN LISPRO 5 UNITS: 100 INJECTION, SOLUTION INTRAVENOUS; SUBCUTANEOUS at 12:27

## 2017-01-01 RX ADMIN — FUROSEMIDE 80 MG: 10 INJECTION, SOLUTION INTRAMUSCULAR; INTRAVENOUS at 15:35

## 2017-01-01 RX ADMIN — SODIUM CHLORIDE, PRESERVATIVE FREE 500 UNITS: 5 INJECTION INTRAVENOUS at 22:09

## 2017-01-01 RX ADMIN — CEFAZOLIN SODIUM 2 G: 2 INJECTION, SOLUTION INTRAVENOUS at 08:14

## 2017-01-01 RX ADMIN — HYDROCODONE BITARTRATE AND ACETAMINOPHEN 1 TABLET: 7.5; 325 TABLET ORAL at 17:42

## 2017-01-01 RX ADMIN — Medication 250 MG: at 17:06

## 2017-01-01 RX ADMIN — SACUBITRIL AND VALSARTAN 1 TABLET: 24; 26 TABLET, FILM COATED ORAL at 20:13

## 2017-01-01 RX ADMIN — INSULIN LISPRO 8 UNITS: 100 INJECTION, SOLUTION INTRAVENOUS; SUBCUTANEOUS at 05:24

## 2017-01-01 RX ADMIN — DEXMEDETOMIDINE HYDROCHLORIDE 0.6 MCG/KG/HR: 4 INJECTION, SOLUTION INTRAVENOUS at 22:11

## 2017-01-01 RX ADMIN — APIXABAN 5 MG: 5 TABLET, FILM COATED ORAL at 15:35

## 2017-01-01 RX ADMIN — APIXABAN 5 MG: 5 TABLET, FILM COATED ORAL at 20:19

## 2017-01-01 RX ADMIN — IPRATROPIUM BROMIDE AND ALBUTEROL SULFATE 3 ML: .5; 3 SOLUTION RESPIRATORY (INHALATION) at 12:46

## 2017-01-01 RX ADMIN — INSULIN DETEMIR 25 UNITS: 100 INJECTION, SOLUTION SUBCUTANEOUS at 22:04

## 2017-01-01 RX ADMIN — METRONIDAZOLE 500 MG: 500 INJECTION, SOLUTION INTRAVENOUS at 18:32

## 2017-01-01 RX ADMIN — CEFAZOLIN SODIUM 2 G: 2 INJECTION, SOLUTION INTRAVENOUS at 08:13

## 2017-01-01 RX ADMIN — AMIODARONE HYDROCHLORIDE 200 MG: 200 TABLET ORAL at 17:42

## 2017-01-01 RX ADMIN — PANTOPRAZOLE SODIUM 40 MG: 40 INJECTION, POWDER, FOR SOLUTION INTRAVENOUS at 05:24

## 2017-01-01 RX ADMIN — AMIODARONE HYDROCHLORIDE 0.5 MG/MIN: 1.8 INJECTION, SOLUTION INTRAVENOUS at 23:21

## 2017-01-01 RX ADMIN — INSULIN ASPART 4 UNITS: 100 INJECTION, SOLUTION INTRAVENOUS; SUBCUTANEOUS at 11:54

## 2017-01-01 RX ADMIN — POTASSIUM CHLORIDE 40 MEQ: 1.5 POWDER, FOR SOLUTION ORAL at 17:21

## 2017-01-01 RX ADMIN — IPRATROPIUM BROMIDE AND ALBUTEROL SULFATE 3 ML: .5; 3 SOLUTION RESPIRATORY (INHALATION) at 07:28

## 2017-01-01 RX ADMIN — HYDROMORPHONE HYDROCHLORIDE 0.5 MG: 1 INJECTION, SOLUTION INTRAMUSCULAR; INTRAVENOUS; SUBCUTANEOUS at 15:27

## 2017-01-01 RX ADMIN — CEFAZOLIN SODIUM 2 G: 2 INJECTION, SOLUTION INTRAVENOUS at 23:04

## 2017-01-01 RX ADMIN — INSULIN ASPART 2 UNITS: 100 INJECTION, SOLUTION INTRAVENOUS; SUBCUTANEOUS at 08:03

## 2017-01-01 RX ADMIN — MEROPENEM 2 G: 1 INJECTION, POWDER, FOR SOLUTION INTRAVENOUS at 13:58

## 2017-01-01 RX ADMIN — POTASSIUM CHLORIDE 40 MEQ: 750 CAPSULE, EXTENDED RELEASE ORAL at 17:22

## 2017-01-01 RX ADMIN — METRONIDAZOLE 500 MG: 500 INJECTION, SOLUTION INTRAVENOUS at 00:01

## 2017-01-01 RX ADMIN — IPRATROPIUM BROMIDE AND ALBUTEROL SULFATE 3 ML: .5; 3 SOLUTION RESPIRATORY (INHALATION) at 08:06

## 2017-01-01 RX ADMIN — CEFAZOLIN SODIUM 2 G: 2 INJECTION, SOLUTION INTRAVENOUS at 08:15

## 2017-01-01 RX ADMIN — ASPIRIN 300 MG: 300 SUPPOSITORY RECTAL at 09:32

## 2017-01-01 RX ADMIN — Medication 250 MG: at 10:20

## 2017-01-01 RX ADMIN — SENNOSIDES A AND B 10 ML: 415.36 LIQUID ORAL at 17:43

## 2017-01-01 RX ADMIN — IPRATROPIUM BROMIDE AND ALBUTEROL SULFATE 3 ML: .5; 3 SOLUTION RESPIRATORY (INHALATION) at 07:53

## 2017-01-01 RX ADMIN — PROPOFOL 20 MG: 10 INJECTION, EMULSION INTRAVENOUS at 10:55

## 2017-01-01 RX ADMIN — ACETAMINOPHEN 650 MG: 325 TABLET, FILM COATED ORAL at 23:11

## 2017-01-01 RX ADMIN — APIXABAN 5 MG: 5 TABLET, FILM COATED ORAL at 20:18

## 2017-01-01 RX ADMIN — INSULIN DETEMIR 25 UNITS: 100 INJECTION, SOLUTION SUBCUTANEOUS at 08:11

## 2017-01-01 RX ADMIN — APIXABAN 5 MG: 5 TABLET, FILM COATED ORAL at 09:19

## 2017-01-01 RX ADMIN — HYDROMORPHONE HYDROCHLORIDE 0.5 MG: 1 INJECTION, SOLUTION INTRAMUSCULAR; INTRAVENOUS; SUBCUTANEOUS at 22:15

## 2017-01-01 RX ADMIN — DEXMEDETOMIDINE HYDROCHLORIDE 1.4 MCG/KG/HR: 4 INJECTION, SOLUTION INTRAVENOUS at 22:54

## 2017-01-01 RX ADMIN — METRONIDAZOLE 500 MG: 500 INJECTION, SOLUTION INTRAVENOUS at 00:28

## 2017-01-01 RX ADMIN — CEFAZOLIN SODIUM 2 G: 2 INJECTION, SOLUTION INTRAVENOUS at 23:56

## 2017-01-01 RX ADMIN — Medication 10 ML: at 08:04

## 2017-01-01 RX ADMIN — IPRATROPIUM BROMIDE AND ALBUTEROL SULFATE 3 ML: .5; 3 SOLUTION RESPIRATORY (INHALATION) at 08:33

## 2017-01-01 RX ADMIN — PANTOPRAZOLE SODIUM 40 MG: 40 TABLET, DELAYED RELEASE ORAL at 06:21

## 2017-01-01 RX ADMIN — MEROPENEM 2 G: 1 INJECTION, POWDER, FOR SOLUTION INTRAVENOUS at 15:11

## 2017-01-01 RX ADMIN — Medication 1 CAPSULE: at 08:28

## 2017-01-01 RX ADMIN — ASPIRIN 300 MG: 300 SUPPOSITORY RECTAL at 08:50

## 2017-01-01 RX ADMIN — CLOPIDOGREL BISULFATE 75 MG: 75 TABLET, FILM COATED ORAL at 08:25

## 2017-01-01 RX ADMIN — DEXMEDETOMIDINE HYDROCHLORIDE 1.4 MCG/KG/HR: 100 INJECTION, SOLUTION INTRAVENOUS at 15:33

## 2017-01-01 RX ADMIN — APIXABAN 5 MG: 5 TABLET, FILM COATED ORAL at 08:16

## 2017-01-01 RX ADMIN — IPRATROPIUM BROMIDE AND ALBUTEROL SULFATE 3 ML: .5; 3 SOLUTION RESPIRATORY (INHALATION) at 18:42

## 2017-01-01 RX ADMIN — HYDROCODONE BITARTRATE AND ACETAMINOPHEN 10 ML: 7.5; 325 SOLUTION ORAL at 11:36

## 2017-01-01 RX ADMIN — SACUBITRIL AND VALSARTAN 1 TABLET: 24; 26 TABLET, FILM COATED ORAL at 10:43

## 2017-01-01 RX ADMIN — CEFAZOLIN SODIUM 2 G: 2 INJECTION, SOLUTION INTRAVENOUS at 23:51

## 2017-01-01 RX ADMIN — DEXMEDETOMIDINE HYDROCHLORIDE 1.4 MCG/KG/HR: 100 INJECTION, SOLUTION INTRAVENOUS at 23:00

## 2017-01-01 RX ADMIN — CLINDAMYCIN PHOSPHATE 600 MG: 12 INJECTION, SOLUTION INTRAVENOUS at 12:38

## 2017-01-01 RX ADMIN — METRONIDAZOLE 500 MG: 500 INJECTION, SOLUTION INTRAVENOUS at 11:15

## 2017-01-01 RX ADMIN — CARVEDILOL 6.25 MG: 6.25 TABLET, FILM COATED ORAL at 08:14

## 2017-01-01 RX ADMIN — Medication 250 MG: at 08:15

## 2017-01-01 RX ADMIN — METRONIDAZOLE 500 MG: 500 INJECTION, SOLUTION INTRAVENOUS at 17:29

## 2017-01-01 RX ADMIN — LORAZEPAM 0.5 MG: 2 INJECTION INTRAMUSCULAR; INTRAVENOUS at 18:37

## 2017-01-01 RX ADMIN — INSULIN DETEMIR 25 UNITS: 100 INJECTION, SOLUTION SUBCUTANEOUS at 20:24

## 2017-01-01 RX ADMIN — BUMETANIDE 1 MG: 0.25 INJECTION INTRAMUSCULAR; INTRAVENOUS at 18:22

## 2017-01-01 RX ADMIN — ATORVASTATIN CALCIUM 80 MG: 40 TABLET, FILM COATED ORAL at 08:03

## 2017-01-01 RX ADMIN — IPRATROPIUM BROMIDE AND ALBUTEROL SULFATE 3 ML: .5; 3 SOLUTION RESPIRATORY (INHALATION) at 07:54

## 2017-01-01 RX ADMIN — SODIUM CHLORIDE, PRESERVATIVE FREE 500 UNITS: 5 INJECTION INTRAVENOUS at 20:18

## 2017-01-01 RX ADMIN — SODIUM CHLORIDE, PRESERVATIVE FREE 500 UNITS: 5 INJECTION INTRAVENOUS at 20:08

## 2017-01-01 RX ADMIN — DEXMEDETOMIDINE HYDROCHLORIDE 1.2 MCG/KG/HR: 4 INJECTION, SOLUTION INTRAVENOUS at 02:29

## 2017-01-01 RX ADMIN — LORAZEPAM 0.5 MG: 2 INJECTION INTRAMUSCULAR; INTRAVENOUS at 04:24

## 2017-01-01 RX ADMIN — METRONIDAZOLE 500 MG: 500 INJECTION, SOLUTION INTRAVENOUS at 12:08

## 2017-01-01 RX ADMIN — QUETIAPINE FUMARATE 25 MG: 25 TABLET, FILM COATED ORAL at 08:54

## 2017-01-01 RX ADMIN — INSULIN DETEMIR 25 UNITS: 100 INJECTION, SOLUTION SUBCUTANEOUS at 21:54

## 2017-01-01 RX ADMIN — METRONIDAZOLE 500 MG: 500 INJECTION, SOLUTION INTRAVENOUS at 00:04

## 2017-01-01 RX ADMIN — HYDROMORPHONE HYDROCHLORIDE 0.5 MG: 1 INJECTION, SOLUTION INTRAMUSCULAR; INTRAVENOUS; SUBCUTANEOUS at 09:29

## 2017-01-01 RX ADMIN — HEPARIN 500 UNITS: 100 SYRINGE at 20:17

## 2017-01-01 RX ADMIN — QUETIAPINE FUMARATE 25 MG: 25 TABLET, FILM COATED ORAL at 20:46

## 2017-01-01 RX ADMIN — SACUBITRIL AND VALSARTAN 1 TABLET: 24; 26 TABLET, FILM COATED ORAL at 20:19

## 2017-01-01 RX ADMIN — GABAPENTIN 300 MG: 300 CAPSULE ORAL at 21:36

## 2017-01-01 RX ADMIN — CHLOROTHIAZIDE SODIUM 500 MG: 500 INJECTION, POWDER, LYOPHILIZED, FOR SOLUTION INTRAVENOUS at 20:04

## 2017-01-01 RX ADMIN — HYDROCODONE BITARTRATE AND ACETAMINOPHEN 1 TABLET: 7.5; 325 TABLET ORAL at 14:57

## 2017-01-01 RX ADMIN — INSULIN HUMAN 5 UNITS: 100 INJECTION, SOLUTION PARENTERAL at 06:16

## 2017-01-01 RX ADMIN — LIDOCAINE HYDROCHLORIDE 2.1 ML: 10 INJECTION, SOLUTION EPIDURAL; INFILTRATION; INTRACAUDAL; PERINEURAL at 01:41

## 2017-01-01 RX ADMIN — SACUBITRIL AND VALSARTAN 1 TABLET: 24; 26 TABLET, FILM COATED ORAL at 08:02

## 2017-01-01 RX ADMIN — POTASSIUM CHLORIDE 40 MEQ: 750 CAPSULE, EXTENDED RELEASE ORAL at 11:37

## 2017-01-01 RX ADMIN — DEXMEDETOMIDINE HYDROCHLORIDE 1.4 MCG/KG/HR: 4 INJECTION, SOLUTION INTRAVENOUS at 07:40

## 2017-01-01 RX ADMIN — ACETYLCYSTEINE 4 ML: 200 SOLUTION ORAL; RESPIRATORY (INHALATION) at 07:40

## 2017-01-01 RX ADMIN — Medication 250 MG: at 09:31

## 2017-01-01 RX ADMIN — DEXTROSE MONOHYDRATE 25 G: 25 INJECTION, SOLUTION INTRAVENOUS at 06:12

## 2017-01-01 RX ADMIN — METRONIDAZOLE 500 MG: 500 INJECTION, SOLUTION INTRAVENOUS at 05:58

## 2017-01-01 RX ADMIN — CEFAZOLIN SODIUM 2 G: 2 INJECTION, SOLUTION INTRAVENOUS at 15:35

## 2017-01-01 RX ADMIN — SODIUM CHLORIDE, PRESERVATIVE FREE 500 UNITS: 5 INJECTION INTRAVENOUS at 09:10

## 2017-01-01 RX ADMIN — CEFAZOLIN SODIUM 2 G: 2 INJECTION, SOLUTION INTRAVENOUS at 16:55

## 2017-01-01 RX ADMIN — SODIUM CHLORIDE, PRESERVATIVE FREE 500 UNITS: 5 INJECTION INTRAVENOUS at 20:25

## 2017-01-01 RX ADMIN — Medication 10 ML: at 21:23

## 2017-01-01 RX ADMIN — CEFAZOLIN SODIUM 2 G: 2 INJECTION, SOLUTION INTRAVENOUS at 08:09

## 2017-01-01 RX ADMIN — HEPARIN SODIUM 5000 UNITS: 5000 INJECTION, SOLUTION INTRAVENOUS; SUBCUTANEOUS at 21:52

## 2017-01-01 RX ADMIN — HYDROCODONE BITARTRATE AND ACETAMINOPHEN 1 TABLET: 7.5; 325 TABLET ORAL at 19:13

## 2017-01-01 RX ADMIN — METRONIDAZOLE 500 MG: 500 INJECTION, SOLUTION INTRAVENOUS at 17:56

## 2017-01-01 RX ADMIN — CARVEDILOL 6.25 MG: 6.25 TABLET, FILM COATED ORAL at 08:24

## 2017-01-01 RX ADMIN — ISOSORBIDE DINITRATE 20 MG: 20 TABLET ORAL at 08:56

## 2017-01-01 RX ADMIN — APIXABAN 5 MG: 5 TABLET, FILM COATED ORAL at 20:07

## 2017-01-01 RX ADMIN — SODIUM CHLORIDE, PRESERVATIVE FREE 500 UNITS: 5 INJECTION INTRAVENOUS at 08:13

## 2017-01-01 RX ADMIN — INSULIN DETEMIR 25 UNITS: 100 INJECTION, SOLUTION SUBCUTANEOUS at 20:18

## 2017-01-01 RX ADMIN — Medication 250 MG: at 09:19

## 2017-01-01 RX ADMIN — HYDROCODONE BITARTRATE AND ACETAMINOPHEN 10 ML: 7.5; 325 SOLUTION ORAL at 12:06

## 2017-01-01 RX ADMIN — INSULIN LISPRO 4 UNITS: 100 INJECTION, SOLUTION INTRAVENOUS; SUBCUTANEOUS at 00:05

## 2017-01-01 RX ADMIN — IPRATROPIUM BROMIDE AND ALBUTEROL SULFATE 3 ML: .5; 3 SOLUTION RESPIRATORY (INHALATION) at 13:51

## 2017-01-01 RX ADMIN — Medication 250 MG: at 17:28

## 2017-01-01 RX ADMIN — Medication 10 ML: at 08:27

## 2017-01-01 RX ADMIN — CARVEDILOL 6.25 MG: 6.25 TABLET, FILM COATED ORAL at 21:36

## 2017-01-01 RX ADMIN — POTASSIUM CHLORIDE 20 MEQ: 29.8 INJECTION, SOLUTION INTRAVENOUS at 10:23

## 2017-01-01 RX ADMIN — ROPINIROLE 0.25 MG: 0.25 TABLET ORAL at 21:34

## 2017-01-01 RX ADMIN — INSULIN ASPART 4 UNITS: 100 INJECTION, SOLUTION INTRAVENOUS; SUBCUTANEOUS at 17:16

## 2017-01-01 RX ADMIN — LORAZEPAM 0.5 MG: 2 INJECTION INTRAMUSCULAR; INTRAVENOUS at 01:03

## 2017-01-01 RX ADMIN — DEXMEDETOMIDINE HYDROCHLORIDE 1.2 MCG/KG/HR: 100 INJECTION, SOLUTION INTRAVENOUS at 06:47

## 2017-01-01 RX ADMIN — HEPARIN SODIUM 5000 UNITS: 5000 INJECTION, SOLUTION INTRAVENOUS; SUBCUTANEOUS at 21:34

## 2017-01-01 RX ADMIN — PROMETHAZINE HYDROCHLORIDE 12.5 MG: 25 INJECTION, SOLUTION INTRAMUSCULAR; INTRAVENOUS at 22:46

## 2017-01-01 RX ADMIN — BUMETANIDE 1 MG: 0.25 INJECTION, SOLUTION INTRAMUSCULAR; INTRAVENOUS at 01:53

## 2017-01-01 RX ADMIN — APIXABAN 5 MG: 5 TABLET, FILM COATED ORAL at 10:34

## 2017-01-01 RX ADMIN — AMIODARONE HYDROCHLORIDE 200 MG: 200 TABLET ORAL at 19:51

## 2017-01-01 RX ADMIN — AMIODARONE HYDROCHLORIDE 0.5 MG/MIN: 1.8 INJECTION, SOLUTION INTRAVENOUS at 09:29

## 2017-01-01 RX ADMIN — IPRATROPIUM BROMIDE AND ALBUTEROL SULFATE 3 ML: .5; 3 SOLUTION RESPIRATORY (INHALATION) at 12:41

## 2017-01-01 RX ADMIN — IPRATROPIUM BROMIDE AND ALBUTEROL SULFATE 3 ML: .5; 3 SOLUTION RESPIRATORY (INHALATION) at 16:26

## 2017-01-01 RX ADMIN — INSULIN DETEMIR 25 UNITS: 100 INJECTION, SOLUTION SUBCUTANEOUS at 09:08

## 2017-01-01 RX ADMIN — INSULIN LISPRO 4 UNITS: 100 INJECTION, SOLUTION INTRAVENOUS; SUBCUTANEOUS at 00:28

## 2017-01-01 RX ADMIN — IPRATROPIUM BROMIDE AND ALBUTEROL SULFATE 3 ML: .5; 3 SOLUTION RESPIRATORY (INHALATION) at 08:28

## 2017-01-01 RX ADMIN — METRONIDAZOLE 500 MG: 500 INJECTION, SOLUTION INTRAVENOUS at 17:39

## 2017-01-01 RX ADMIN — CEFTRIAXONE SODIUM 1000 MG: 1 INJECTION, POWDER, FOR SOLUTION INTRAMUSCULAR; INTRAVENOUS at 01:41

## 2017-01-01 RX ADMIN — ASPIRIN 324 MG: 81 TABLET, CHEWABLE ORAL at 10:13

## 2017-01-01 RX ADMIN — CEFAZOLIN SODIUM 2 G: 2 INJECTION, SOLUTION INTRAVENOUS at 00:01

## 2017-01-01 RX ADMIN — IPRATROPIUM BROMIDE AND ALBUTEROL SULFATE 3 ML: .5; 3 SOLUTION RESPIRATORY (INHALATION) at 08:19

## 2017-01-01 RX ADMIN — NITROGLYCERIN 0.4 MG: 0.4 TABLET SUBLINGUAL at 18:47

## 2017-01-01 RX ADMIN — BISACODYL 10 MG: 10 SUPPOSITORY RECTAL at 12:43

## 2017-01-01 RX ADMIN — CARVEDILOL 12.5 MG: 12.5 TABLET, FILM COATED ORAL at 20:20

## 2017-01-01 RX ADMIN — Medication 250 MG: at 17:43

## 2017-01-01 RX ADMIN — ATORVASTATIN CALCIUM 80 MG: 40 TABLET, FILM COATED ORAL at 08:24

## 2017-01-01 RX ADMIN — INSULIN HUMAN 5 UNITS: 100 INJECTION, SOLUTION PARENTERAL at 00:01

## 2017-01-01 RX ADMIN — METRONIDAZOLE 500 MG: 500 INJECTION, SOLUTION INTRAVENOUS at 18:15

## 2017-01-01 RX ADMIN — ACETYLCYSTEINE 4 ML: 200 SOLUTION ORAL; RESPIRATORY (INHALATION) at 18:43

## 2017-01-01 RX ADMIN — POTASSIUM CHLORIDE: 400 INJECTION, SOLUTION INTRAVENOUS at 09:45

## 2017-01-01 RX ADMIN — GLYCOPYRROLATE 0.4 MG: 0.2 INJECTION, SOLUTION INTRAMUSCULAR; INTRAVENOUS at 16:45

## 2017-01-01 RX ADMIN — IPRATROPIUM BROMIDE AND ALBUTEROL SULFATE 3 ML: .5; 3 SOLUTION RESPIRATORY (INHALATION) at 11:07

## 2017-01-01 RX ADMIN — CARVEDILOL 6.25 MG: 6.25 TABLET, FILM COATED ORAL at 08:02

## 2017-01-01 RX ADMIN — WATER 42 ML/HR: 1 INJECTION INTRAMUSCULAR; INTRAVENOUS; SUBCUTANEOUS at 15:17

## 2017-01-01 RX ADMIN — CARVEDILOL 12.5 MG: 12.5 TABLET, FILM COATED ORAL at 20:46

## 2017-01-01 RX ADMIN — HEPARIN SODIUM 5000 UNITS: 5000 INJECTION, SOLUTION INTRAVENOUS; SUBCUTANEOUS at 08:02

## 2017-01-01 RX ADMIN — DEXMEDETOMIDINE HYDROCHLORIDE 1.4 MCG/KG/HR: 4 INJECTION, SOLUTION INTRAVENOUS at 12:12

## 2017-01-01 RX ADMIN — AMIODARONE HYDROCHLORIDE 1 MG/MIN: 1.8 INJECTION, SOLUTION INTRAVENOUS at 09:35

## 2017-01-01 RX ADMIN — OXYCODONE HYDROCHLORIDE AND ACETAMINOPHEN 1 TABLET: 10; 325 TABLET ORAL at 05:44

## 2017-01-01 RX ADMIN — MEROPENEM 2 G: 1 INJECTION, POWDER, FOR SOLUTION INTRAVENOUS at 14:07

## 2017-01-01 RX ADMIN — METRONIDAZOLE 500 MG: 500 INJECTION, SOLUTION INTRAVENOUS at 01:05

## 2017-01-01 RX ADMIN — APIXABAN 5 MG: 5 TABLET, FILM COATED ORAL at 09:16

## 2017-01-01 RX ADMIN — PANTOPRAZOLE SODIUM 40 MG: 40 INJECTION, POWDER, FOR SOLUTION INTRAVENOUS at 06:07

## 2017-01-01 RX ADMIN — APIXABAN 5 MG: 5 TABLET, FILM COATED ORAL at 21:39

## 2017-01-01 RX ADMIN — SODIUM CHLORIDE 1000 ML: 900 IRRIGANT IRRIGATION at 23:59

## 2017-01-01 RX ADMIN — HYDROCODONE BITARTRATE AND ACETAMINOPHEN 10 ML: 7.5; 325 SOLUTION ORAL at 06:32

## 2017-01-01 RX ADMIN — IPRATROPIUM BROMIDE AND ALBUTEROL SULFATE 3 ML: .5; 3 SOLUTION RESPIRATORY (INHALATION) at 11:19

## 2017-01-01 RX ADMIN — CHLOROTHIAZIDE SODIUM 500 MG: 500 INJECTION, POWDER, LYOPHILIZED, FOR SOLUTION INTRAVENOUS at 16:37

## 2017-01-01 RX ADMIN — CLINDAMYCIN PHOSPHATE 600 MG: 12 INJECTION, SOLUTION INTRAVENOUS at 04:57

## 2017-01-01 RX ADMIN — MIDAZOLAM HYDROCHLORIDE 4 MG: 1 INJECTION, SOLUTION INTRAMUSCULAR; INTRAVENOUS at 07:58

## 2017-01-01 RX ADMIN — PANTOPRAZOLE SODIUM 40 MG: 40 INJECTION, POWDER, FOR SOLUTION INTRAVENOUS at 06:05

## 2017-01-01 RX ADMIN — PANTOPRAZOLE SODIUM 40 MG: 40 INJECTION, POWDER, FOR SOLUTION INTRAVENOUS at 05:58

## 2017-01-01 RX ADMIN — CEFAZOLIN SODIUM 2 G: 2 INJECTION, SOLUTION INTRAVENOUS at 15:33

## 2017-01-01 RX ADMIN — FENTANYL CITRATE 100 MCG/ML: 50 INJECTION, SOLUTION INTRAMUSCULAR; INTRAVENOUS at 07:57

## 2017-01-01 RX ADMIN — CARVEDILOL 6.25 MG: 6.25 TABLET, FILM COATED ORAL at 17:16

## 2017-01-01 RX ADMIN — INSULIN HUMAN 8 UNITS: 100 INJECTION, SOLUTION PARENTERAL at 05:33

## 2017-01-01 RX ADMIN — Medication 250 MG: at 18:15

## 2017-01-01 RX ADMIN — HYDROCODONE BITARTRATE AND ACETAMINOPHEN 1 TABLET: 7.5; 325 TABLET ORAL at 15:40

## 2017-01-01 RX ADMIN — PANTOPRAZOLE SODIUM 40 MG: 40 INJECTION, POWDER, FOR SOLUTION INTRAVENOUS at 06:03

## 2017-01-01 RX ADMIN — INSULIN DETEMIR 25 UNITS: 100 INJECTION, SOLUTION SUBCUTANEOUS at 10:22

## 2017-01-01 RX ADMIN — ISOSORBIDE DINITRATE 20 MG: 20 TABLET ORAL at 17:42

## 2017-01-01 RX ADMIN — IPRATROPIUM BROMIDE AND ALBUTEROL SULFATE 3 ML: .5; 3 SOLUTION RESPIRATORY (INHALATION) at 15:38

## 2017-01-01 RX ADMIN — CEFAZOLIN SODIUM 2 G: 2 INJECTION, SOLUTION INTRAVENOUS at 10:40

## 2017-01-01 RX ADMIN — HYDROCODONE BITARTRATE AND ACETAMINOPHEN 1 TABLET: 7.5; 325 TABLET ORAL at 09:37

## 2017-01-01 RX ADMIN — Medication 250 MG: at 10:34

## 2017-01-01 RX ADMIN — CARVEDILOL 6.25 MG: 6.25 TABLET, FILM COATED ORAL at 08:10

## 2017-01-01 RX ADMIN — HALOPERIDOL LACTATE 2 MG: 5 INJECTION, SOLUTION INTRAMUSCULAR at 11:23

## 2017-01-01 RX ADMIN — DOPAMINE HYDROCHLORIDE 4 MCG/KG/MIN: 160 INJECTION, SOLUTION INTRAVENOUS at 10:37

## 2017-01-01 RX ADMIN — IPRATROPIUM BROMIDE AND ALBUTEROL SULFATE 3 ML: .5; 3 SOLUTION RESPIRATORY (INHALATION) at 07:32

## 2017-01-01 RX ADMIN — CEFAZOLIN SODIUM 2 G: 2 INJECTION, SOLUTION INTRAVENOUS at 23:20

## 2017-01-01 RX ADMIN — IPRATROPIUM BROMIDE AND ALBUTEROL SULFATE 3 ML: .5; 3 SOLUTION RESPIRATORY (INHALATION) at 16:42

## 2017-01-01 RX ADMIN — HYDROMORPHONE HYDROCHLORIDE 0.5 MG: 1 INJECTION, SOLUTION INTRAMUSCULAR; INTRAVENOUS; SUBCUTANEOUS at 16:45

## 2017-01-01 RX ADMIN — HYDROCODONE BITARTRATE AND ACETAMINOPHEN 1 TABLET: 7.5; 325 TABLET ORAL at 10:33

## 2017-01-01 RX ADMIN — Medication 250 MG: at 08:55

## 2017-01-01 RX ADMIN — IPRATROPIUM BROMIDE AND ALBUTEROL SULFATE 3 ML: .5; 3 SOLUTION RESPIRATORY (INHALATION) at 07:40

## 2017-01-01 RX ADMIN — METRONIDAZOLE 500 MG: 500 INJECTION, SOLUTION INTRAVENOUS at 23:42

## 2017-01-01 RX ADMIN — CEFAZOLIN SODIUM 2 G: 2 INJECTION, SOLUTION INTRAVENOUS at 16:08

## 2017-01-01 RX ADMIN — AMIODARONE HYDROCHLORIDE 150 MG: 1.5 INJECTION, SOLUTION INTRAVENOUS at 09:32

## 2017-01-01 RX ADMIN — APIXABAN 5 MG: 5 TABLET, FILM COATED ORAL at 21:52

## 2017-01-01 RX ADMIN — SODIUM CHLORIDE, PRESERVATIVE FREE 500 UNITS: 5 INJECTION INTRAVENOUS at 08:34

## 2017-01-01 RX ADMIN — QUETIAPINE FUMARATE 25 MG: 25 TABLET, FILM COATED ORAL at 20:07

## 2017-01-01 RX ADMIN — APIXABAN 5 MG: 5 TABLET, FILM COATED ORAL at 09:31

## 2017-01-01 RX ADMIN — MEROPENEM 2 G: 1 INJECTION, POWDER, FOR SOLUTION INTRAVENOUS at 13:34

## 2017-01-01 RX ADMIN — Medication 1 CAPSULE: at 08:02

## 2017-01-01 RX ADMIN — CARVEDILOL 6.25 MG: 6.25 TABLET, FILM COATED ORAL at 20:19

## 2017-01-01 RX ADMIN — IPRATROPIUM BROMIDE AND ALBUTEROL SULFATE 3 ML: .5; 3 SOLUTION RESPIRATORY (INHALATION) at 21:04

## 2017-01-01 RX ADMIN — INSULIN DETEMIR 25 UNITS: 100 INJECTION, SOLUTION SUBCUTANEOUS at 20:20

## 2017-01-01 RX ADMIN — MEROPENEM 2 G: 1 INJECTION, POWDER, FOR SOLUTION INTRAVENOUS at 22:44

## 2017-01-01 RX ADMIN — INSULIN LISPRO 20 UNITS: 100 INJECTION, SOLUTION INTRAVENOUS; SUBCUTANEOUS at 11:49

## 2017-01-01 RX ADMIN — APIXABAN 5 MG: 5 TABLET, FILM COATED ORAL at 20:25

## 2017-01-01 RX ADMIN — APIXABAN 5 MG: 5 TABLET, FILM COATED ORAL at 08:02

## 2017-01-01 RX ADMIN — ASPIRIN 300 MG: 300 SUPPOSITORY RECTAL at 17:53

## 2017-01-01 RX ADMIN — SODIUM CHLORIDE, PRESERVATIVE FREE 500 UNITS: 5 INJECTION INTRAVENOUS at 23:04

## 2017-01-01 RX ADMIN — FENTANYL CITRATE 100 MCG: 50 INJECTION INTRAMUSCULAR; INTRAVENOUS at 10:33

## 2017-01-01 RX ADMIN — INSULIN LISPRO 4 UNITS: 100 INJECTION, SOLUTION INTRAVENOUS; SUBCUTANEOUS at 18:30

## 2017-01-01 RX ADMIN — INSULIN LISPRO 8 UNITS: 100 INJECTION, SOLUTION INTRAVENOUS; SUBCUTANEOUS at 00:01

## 2017-01-01 RX ADMIN — CLINDAMYCIN PHOSPHATE 600 MG: 12 INJECTION, SOLUTION INTRAVENOUS at 21:38

## 2017-01-01 RX ADMIN — LANSOPRAZOLE 30 MG: KIT at 06:03

## 2017-01-01 RX ADMIN — SODIUM CHLORIDE, PRESERVATIVE FREE 500 UNITS: 5 INJECTION INTRAVENOUS at 21:47

## 2017-01-01 RX ADMIN — FUROSEMIDE 80 MG: 10 INJECTION, SOLUTION INTRAMUSCULAR; INTRAVENOUS at 18:50

## 2017-01-01 RX ADMIN — INSULIN DETEMIR 25 UNITS: 100 INJECTION, SOLUTION SUBCUTANEOUS at 20:58

## 2017-01-01 RX ADMIN — SENNOSIDES A AND B 10 ML: 415.36 LIQUID ORAL at 08:59

## 2017-01-01 RX ADMIN — SACUBITRIL AND VALSARTAN 1 TABLET: 24; 26 TABLET, FILM COATED ORAL at 08:17

## 2017-01-01 RX ADMIN — MEROPENEM 2 G: 1 INJECTION, POWDER, FOR SOLUTION INTRAVENOUS at 05:42

## 2017-01-01 RX ADMIN — ISOSORBIDE DINITRATE 10 MG: 20 TABLET ORAL at 17:14

## 2017-01-01 RX ADMIN — QUETIAPINE FUMARATE 25 MG: 25 TABLET, FILM COATED ORAL at 09:09

## 2017-01-01 RX ADMIN — INSULIN HUMAN 2 UNITS: 100 INJECTION, SOLUTION PARENTERAL at 23:47

## 2017-01-01 RX ADMIN — GLYCOPYRROLATE 0.4 MG: 0.2 INJECTION, SOLUTION INTRAMUSCULAR; INTRAVENOUS at 22:14

## 2017-01-01 RX ADMIN — INSULIN LISPRO 8 UNITS: 100 INJECTION, SOLUTION INTRAVENOUS; SUBCUTANEOUS at 11:43

## 2017-01-01 RX ADMIN — HALOPERIDOL LACTATE 2 MG: 5 INJECTION, SOLUTION INTRAMUSCULAR at 04:23

## 2017-01-01 RX ADMIN — IPRATROPIUM BROMIDE AND ALBUTEROL SULFATE 3 ML: .5; 3 SOLUTION RESPIRATORY (INHALATION) at 15:17

## 2017-01-01 RX ADMIN — CARVEDILOL 6.25 MG: 6.25 TABLET, FILM COATED ORAL at 17:22

## 2017-01-01 RX ADMIN — CLOPIDOGREL BISULFATE 75 MG: 75 TABLET, FILM COATED ORAL at 08:28

## 2017-01-01 RX ADMIN — ATORVASTATIN CALCIUM 80 MG: 40 TABLET, FILM COATED ORAL at 21:58

## 2017-01-01 RX ADMIN — CEFAZOLIN SODIUM 2 G: 2 INJECTION, SOLUTION INTRAVENOUS at 08:04

## 2017-01-01 RX ADMIN — METRONIDAZOLE 500 MG: 500 INJECTION, SOLUTION INTRAVENOUS at 05:36

## 2017-01-01 RX ADMIN — ASPIRIN 81 MG: 81 TABLET ORAL at 08:03

## 2017-01-01 RX ADMIN — SODIUM CHLORIDE, PRESERVATIVE FREE 500 UNITS: 5 INJECTION INTRAVENOUS at 21:59

## 2017-01-01 RX ADMIN — IPRATROPIUM BROMIDE AND ALBUTEROL SULFATE 3 ML: .5; 3 SOLUTION RESPIRATORY (INHALATION) at 08:30

## 2017-01-01 RX ADMIN — LORAZEPAM 0.5 MG: 2 INJECTION INTRAMUSCULAR; INTRAVENOUS at 16:44

## 2017-01-01 RX ADMIN — Medication 0.4 MCG/KG/MIN: at 21:54

## 2017-01-01 RX ADMIN — CLINDAMYCIN PHOSPHATE 600 MG: 12 INJECTION, SOLUTION INTRAVENOUS at 05:10

## 2017-01-01 RX ADMIN — FUROSEMIDE 80 MG: 10 INJECTION, SOLUTION INTRAMUSCULAR; INTRAVENOUS at 18:30

## 2017-01-01 RX ADMIN — MEROPENEM 2 G: 1 INJECTION, POWDER, FOR SOLUTION INTRAVENOUS at 23:59

## 2017-01-01 RX ADMIN — DEXTROSE MONOHYDRATE 50 ML: 25 INJECTION, SOLUTION INTRAVENOUS at 05:05

## 2017-01-01 RX ADMIN — CASTOR OIL AND BALSAM, PERU: 788; 87 OINTMENT TOPICAL at 20:21

## 2017-01-01 RX ADMIN — APIXABAN 5 MG: 5 TABLET, FILM COATED ORAL at 21:13

## 2017-01-01 RX ADMIN — Medication 10 ML: at 21:56

## 2017-01-01 RX ADMIN — IPRATROPIUM BROMIDE AND ALBUTEROL SULFATE 3 ML: .5; 3 SOLUTION RESPIRATORY (INHALATION) at 12:10

## 2017-01-01 RX ADMIN — SODIUM CHLORIDE, PRESERVATIVE FREE 500 UNITS: 5 INJECTION INTRAVENOUS at 08:48

## 2017-01-01 RX ADMIN — HYDROCODONE BITARTRATE AND ACETAMINOPHEN 1 TABLET: 7.5; 325 TABLET ORAL at 09:31

## 2017-01-01 RX ADMIN — SODIUM CHLORIDE, PRESERVATIVE FREE 500 UNITS: 5 INJECTION INTRAVENOUS at 08:55

## 2017-01-01 RX ADMIN — BISACODYL 10 MG: 10 SUPPOSITORY RECTAL at 22:26

## 2017-01-01 RX ADMIN — Medication 250 MG: at 17:45

## 2017-01-01 RX ADMIN — AMIODARONE HYDROCHLORIDE 200 MG: 200 TABLET ORAL at 03:00

## 2017-01-01 RX ADMIN — WATER 42 ML/HR: 1 INJECTION INTRAMUSCULAR; INTRAVENOUS; SUBCUTANEOUS at 14:24

## 2017-01-01 RX ADMIN — HYDROMORPHONE HYDROCHLORIDE 0.5 MG: 1 INJECTION, SOLUTION INTRAMUSCULAR; INTRAVENOUS; SUBCUTANEOUS at 16:30

## 2017-01-01 RX ADMIN — SODIUM CHLORIDE, PRESERVATIVE FREE 500 UNITS: 5 INJECTION INTRAVENOUS at 20:03

## 2017-01-01 RX ADMIN — INSULIN ASPART 4 UNITS: 100 INJECTION, SOLUTION INTRAVENOUS; SUBCUTANEOUS at 21:49

## 2017-01-01 RX ADMIN — CLINDAMYCIN PHOSPHATE 600 MG: 12 INJECTION, SOLUTION INTRAVENOUS at 21:51

## 2017-01-01 RX ADMIN — HEPARIN SODIUM 5000 UNITS: 5000 INJECTION, SOLUTION INTRAVENOUS; SUBCUTANEOUS at 08:03

## 2017-01-01 RX ADMIN — CLINDAMYCIN PHOSPHATE 600 MG: 12 INJECTION, SOLUTION INTRAVENOUS at 05:11

## 2017-01-01 RX ADMIN — GABAPENTIN 300 MG: 300 CAPSULE ORAL at 17:22

## 2017-01-01 RX ADMIN — INSULIN DETEMIR 10 UNITS: 100 INJECTION, SOLUTION SUBCUTANEOUS at 21:39

## 2017-01-01 RX ADMIN — DEXMEDETOMIDINE HYDROCHLORIDE 1.2 MCG/KG/HR: 4 INJECTION, SOLUTION INTRAVENOUS at 17:19

## 2017-01-01 RX ADMIN — MEROPENEM 2 G: 1 INJECTION, POWDER, FOR SOLUTION INTRAVENOUS at 06:17

## 2017-01-01 RX ADMIN — CEFAZOLIN SODIUM 2 G: 2 INJECTION, SOLUTION INTRAVENOUS at 16:38

## 2017-01-01 RX ADMIN — METRONIDAZOLE 500 MG: 500 INJECTION, SOLUTION INTRAVENOUS at 00:05

## 2017-01-01 RX ADMIN — IPRATROPIUM BROMIDE AND ALBUTEROL SULFATE 3 ML: .5; 3 SOLUTION RESPIRATORY (INHALATION) at 15:13

## 2017-01-01 RX ADMIN — ACETYLCYSTEINE 4 ML: 200 SOLUTION ORAL; RESPIRATORY (INHALATION) at 16:11

## 2017-01-01 RX ADMIN — INSULIN HUMAN 14 UNITS: 100 INJECTION, SOLUTION PARENTERAL at 12:06

## 2017-01-01 RX ADMIN — METRONIDAZOLE 500 MG: 500 INJECTION, SOLUTION INTRAVENOUS at 06:07

## 2017-01-01 RX ADMIN — INSULIN LISPRO 4 UNITS: 100 INJECTION, SOLUTION INTRAVENOUS; SUBCUTANEOUS at 17:56

## 2017-01-01 RX ADMIN — IPRATROPIUM BROMIDE AND ALBUTEROL SULFATE 3 ML: .5; 3 SOLUTION RESPIRATORY (INHALATION) at 21:02

## 2017-01-01 RX ADMIN — BISACODYL 10 MG: 10 SUPPOSITORY RECTAL at 21:13

## 2017-01-01 RX ADMIN — ASPIRIN 324 MG: 81 TABLET, CHEWABLE ORAL at 08:18

## 2017-01-01 RX ADMIN — INSULIN ASPART 2 UNITS: 100 INJECTION, SOLUTION INTRAVENOUS; SUBCUTANEOUS at 21:51

## 2017-01-01 RX ADMIN — APIXABAN 5 MG: 5 TABLET, FILM COATED ORAL at 08:31

## 2017-01-01 RX ADMIN — Medication 250 MG: at 18:08

## 2017-01-01 RX ADMIN — SODIUM CHLORIDE, PRESERVATIVE FREE 500 UNITS: 5 INJECTION INTRAVENOUS at 20:19

## 2017-01-01 RX ADMIN — CEFAZOLIN SODIUM 2 G: 2 INJECTION, SOLUTION INTRAVENOUS at 16:11

## 2017-01-01 RX ADMIN — CHLOROTHIAZIDE SODIUM 500 MG: 500 INJECTION, POWDER, LYOPHILIZED, FOR SOLUTION INTRAVENOUS at 11:08

## 2017-01-01 RX ADMIN — GABAPENTIN 300 MG: 300 CAPSULE ORAL at 08:03

## 2017-01-01 RX ADMIN — ACETAZOLAMIDE 500 MG: 500 INJECTION, POWDER, LYOPHILIZED, FOR SOLUTION INTRAVENOUS at 20:00

## 2017-01-01 RX ADMIN — LORAZEPAM 0.5 MG: 2 INJECTION INTRAMUSCULAR; INTRAVENOUS at 17:16

## 2017-01-01 RX ADMIN — DOPAMINE HYDROCHLORIDE 4 MCG/KG/MIN: 160 INJECTION, SOLUTION INTRAVENOUS at 08:12

## 2017-01-01 RX ADMIN — ISOSORBIDE DINITRATE 10 MG: 20 TABLET ORAL at 12:37

## 2017-01-01 RX ADMIN — IPRATROPIUM BROMIDE AND ALBUTEROL SULFATE 3 ML: .5; 3 SOLUTION RESPIRATORY (INHALATION) at 15:36

## 2017-01-01 RX ADMIN — CEFAZOLIN SODIUM 2 G: 2 INJECTION, SOLUTION INTRAVENOUS at 08:02

## 2017-01-01 RX ADMIN — ASPIRIN 324 MG: 81 TABLET, CHEWABLE ORAL at 09:09

## 2017-01-01 RX ADMIN — CEFAZOLIN SODIUM 2 G: 2 INJECTION, SOLUTION INTRAVENOUS at 15:54

## 2017-01-01 RX ADMIN — HEPARIN SODIUM 5000 UNITS: 5000 INJECTION, SOLUTION INTRAVENOUS; SUBCUTANEOUS at 08:29

## 2017-01-01 RX ADMIN — HEPARIN SODIUM 13 UNITS/KG/HR: 10000 INJECTION, SOLUTION INTRAVENOUS at 09:29

## 2017-01-01 RX ADMIN — ATORVASTATIN CALCIUM 80 MG: 40 TABLET, FILM COATED ORAL at 20:20

## 2017-01-01 RX ADMIN — Medication 250 MG: at 17:21

## 2017-01-01 RX ADMIN — APIXABAN 5 MG: 5 TABLET, FILM COATED ORAL at 20:12

## 2017-01-01 RX ADMIN — IPRATROPIUM BROMIDE AND ALBUTEROL SULFATE 3 ML: .5; 3 SOLUTION RESPIRATORY (INHALATION) at 12:24

## 2017-01-01 RX ADMIN — CASTOR OIL AND BALSAM, PERU: 788; 87 OINTMENT TOPICAL at 08:27

## 2017-01-01 RX ADMIN — SULFUR HEXAFLUORIDE 2 ML: KIT at 13:16

## 2017-01-01 RX ADMIN — DIGOXIN 250 MCG: 250 TABLET ORAL at 12:12

## 2017-01-01 RX ADMIN — INSULIN DETEMIR 25 UNITS: 100 INJECTION, SOLUTION SUBCUTANEOUS at 21:37

## 2017-01-01 RX ADMIN — IPRATROPIUM BROMIDE AND ALBUTEROL SULFATE 3 ML: .5; 3 SOLUTION RESPIRATORY (INHALATION) at 19:37

## 2017-01-01 RX ADMIN — LORAZEPAM 0.5 MG: 2 INJECTION INTRAMUSCULAR; INTRAVENOUS at 22:29

## 2017-01-01 RX ADMIN — HALOPERIDOL LACTATE 2 MG: 5 INJECTION, SOLUTION INTRAMUSCULAR at 17:11

## 2017-01-01 RX ADMIN — Medication 10 ML: at 08:03

## 2017-01-01 RX ADMIN — PANTOPRAZOLE SODIUM 40 MG: 40 INJECTION, POWDER, FOR SOLUTION INTRAVENOUS at 05:46

## 2017-01-01 RX ADMIN — INSULIN DETEMIR 25 UNITS: 100 INJECTION, SOLUTION SUBCUTANEOUS at 08:54

## 2017-01-01 RX ADMIN — PANTOPRAZOLE SODIUM 40 MG: 40 TABLET, DELAYED RELEASE ORAL at 05:58

## 2017-01-01 RX ADMIN — FUROSEMIDE 40 MG: 10 INJECTION, SOLUTION INTRAMUSCULAR; INTRAVENOUS at 20:10

## 2017-01-01 RX ADMIN — PHENYLEPHRINE HYDROCHLORIDE 100 MCG: 10 INJECTION, SOLUTION INTRAMUSCULAR; INTRAVENOUS; SUBCUTANEOUS at 10:18

## 2017-01-01 RX ADMIN — IPRATROPIUM BROMIDE AND ALBUTEROL SULFATE 3 ML: .5; 3 SOLUTION RESPIRATORY (INHALATION) at 13:04

## 2017-01-01 RX ADMIN — FUROSEMIDE 40 MG: 10 INJECTION, SOLUTION INTRAMUSCULAR; INTRAVENOUS at 10:20

## 2017-01-01 RX ADMIN — SODIUM CHLORIDE, PRESERVATIVE FREE 500 UNITS: 5 INJECTION INTRAVENOUS at 08:09

## 2017-01-01 RX ADMIN — ETOMIDATE 2 MG: 2 INJECTION, SOLUTION INTRAVENOUS at 10:32

## 2017-01-01 RX ADMIN — FENTANYL 1 PATCH: 25 PATCH, EXTENDED RELEASE TRANSDERMAL at 10:36

## 2017-01-01 RX ADMIN — Medication 250 MG: at 18:47

## 2017-01-01 RX ADMIN — GABAPENTIN 300 MG: 300 CAPSULE ORAL at 17:17

## 2017-01-01 RX ADMIN — INSULIN LISPRO 6 UNITS: 100 INJECTION, SOLUTION INTRAVENOUS; SUBCUTANEOUS at 13:16

## 2017-01-01 RX ADMIN — METRONIDAZOLE 500 MG: 500 INJECTION, SOLUTION INTRAVENOUS at 23:51

## 2017-01-01 RX ADMIN — CARVEDILOL 6.25 MG: 6.25 TABLET, FILM COATED ORAL at 08:52

## 2017-01-01 RX ADMIN — INSULIN LISPRO 5 UNITS: 100 INJECTION, SOLUTION INTRAVENOUS; SUBCUTANEOUS at 18:17

## 2017-01-01 RX ADMIN — QUETIAPINE FUMARATE 25 MG: 25 TABLET, FILM COATED ORAL at 20:52

## 2017-01-01 RX ADMIN — Medication 250 MG: at 08:19

## 2017-01-01 RX ADMIN — CLINDAMYCIN PHOSPHATE 600 MG: 12 INJECTION, SOLUTION INTRAVENOUS at 12:14

## 2017-01-01 RX ADMIN — POTASSIUM CHLORIDE 40 MEQ: 1.5 POWDER, FOR SOLUTION ORAL at 20:24

## 2017-01-01 RX ADMIN — PANTOPRAZOLE SODIUM 40 MG: 40 INJECTION, POWDER, FOR SOLUTION INTRAVENOUS at 05:42

## 2017-01-01 RX ADMIN — DEXMEDETOMIDINE HYDROCHLORIDE 1.4 MCG/KG/HR: 4 INJECTION, SOLUTION INTRAVENOUS at 01:56

## 2017-01-01 RX ADMIN — INSULIN DETEMIR 20 UNITS: 100 INJECTION, SOLUTION SUBCUTANEOUS at 06:28

## 2017-01-01 RX ADMIN — INSULIN LISPRO 12 UNITS: 100 INJECTION, SOLUTION INTRAVENOUS; SUBCUTANEOUS at 05:22

## 2017-01-01 RX ADMIN — FUROSEMIDE 40 MG: 10 INJECTION, SOLUTION INTRAMUSCULAR; INTRAVENOUS at 08:08

## 2017-01-01 RX ADMIN — METRONIDAZOLE 500 MG: 500 INJECTION, SOLUTION INTRAVENOUS at 23:20

## 2017-01-01 RX ADMIN — ASPIRIN 81 MG: 81 TABLET, CHEWABLE ORAL at 08:14

## 2017-01-01 RX ADMIN — GABAPENTIN 300 MG: 300 CAPSULE ORAL at 17:16

## 2017-01-01 RX ADMIN — CLOPIDOGREL BISULFATE 75 MG: 75 TABLET, FILM COATED ORAL at 08:03

## 2017-01-01 RX ADMIN — PANTOPRAZOLE SODIUM 40 MG: 40 TABLET, DELAYED RELEASE ORAL at 10:43

## 2017-01-01 RX ADMIN — WATER 42 ML/HR: 1 INJECTION INTRAMUSCULAR; INTRAVENOUS; SUBCUTANEOUS at 13:45

## 2017-01-01 RX ADMIN — CLINDAMYCIN PHOSPHATE 600 MG: 12 INJECTION, SOLUTION INTRAVENOUS at 11:55

## 2017-01-01 RX ADMIN — HEPARIN SODIUM 9.2 UNITS/KG/HR: 10000 INJECTION, SOLUTION INTRAVENOUS at 13:50

## 2017-01-01 RX ADMIN — METRONIDAZOLE 500 MG: 500 INJECTION, SOLUTION INTRAVENOUS at 11:50

## 2017-01-01 RX ADMIN — MEROPENEM 2 G: 1 INJECTION, POWDER, FOR SOLUTION INTRAVENOUS at 05:44

## 2017-01-01 RX ADMIN — PHENYLEPHRINE HYDROCHLORIDE 1 SPRAY: 1 SPRAY NASAL at 10:32

## 2017-01-01 RX ADMIN — IPRATROPIUM BROMIDE AND ALBUTEROL SULFATE 3 ML: .5; 3 SOLUTION RESPIRATORY (INHALATION) at 16:54

## 2017-01-01 RX ADMIN — FENTANYL 1 PATCH: 25 PATCH, EXTENDED RELEASE TRANSDERMAL at 10:10

## 2017-01-01 RX ADMIN — IPRATROPIUM BROMIDE AND ALBUTEROL SULFATE 3 ML: .5; 3 SOLUTION RESPIRATORY (INHALATION) at 20:00

## 2017-01-01 RX ADMIN — HEPARIN SODIUM 3000 UNITS: 1000 INJECTION, SOLUTION INTRAVENOUS; SUBCUTANEOUS at 20:20

## 2017-01-01 RX ADMIN — DEXMEDETOMIDINE HYDROCHLORIDE 0.4 MCG/KG/HR: 4 INJECTION, SOLUTION INTRAVENOUS at 15:09

## 2017-01-01 RX ADMIN — POTASSIUM CHLORIDE: 2 INJECTION, SOLUTION, CONCENTRATE INTRAVENOUS at 17:33

## 2017-01-01 RX ADMIN — Medication 250 MG: at 17:56

## 2017-01-01 RX ADMIN — SODIUM CHLORIDE, PRESERVATIVE FREE 500 UNITS: 5 INJECTION INTRAVENOUS at 09:32

## 2017-01-01 RX ADMIN — INSULIN HUMAN 4 UNITS: 100 INJECTION, SOLUTION PARENTERAL at 06:03

## 2017-01-01 RX ADMIN — HALOPERIDOL LACTATE 2 MG: 5 INJECTION, SOLUTION INTRAMUSCULAR at 20:23

## 2017-01-01 RX ADMIN — IPRATROPIUM BROMIDE AND ALBUTEROL SULFATE 3 ML: .5; 3 SOLUTION RESPIRATORY (INHALATION) at 12:05

## 2017-01-01 RX ADMIN — BUMETANIDE 1 MG: 0.25 INJECTION INTRAMUSCULAR; INTRAVENOUS at 11:37

## 2017-01-01 RX ADMIN — GLYCOPYRROLATE 0.4 MG: 0.2 INJECTION, SOLUTION INTRAMUSCULAR; INTRAVENOUS at 10:45

## 2017-01-01 RX ADMIN — MAGNESIUM SULFATE HEPTAHYDRATE 1 G: 500 INJECTION, SOLUTION INTRAMUSCULAR; INTRAVENOUS at 09:43

## 2017-01-01 RX ADMIN — INSULIN LISPRO 8 UNITS: 100 INJECTION, SOLUTION INTRAVENOUS; SUBCUTANEOUS at 17:28

## 2017-01-01 RX ADMIN — INSULIN ASPART 4 UNITS: 100 INJECTION, SOLUTION INTRAVENOUS; SUBCUTANEOUS at 21:14

## 2017-01-01 RX ADMIN — DEXTROSE MONOHYDRATE 25 G: 25 INJECTION, SOLUTION INTRAVENOUS at 00:10

## 2017-01-01 RX ADMIN — FENTANYL CITRATE 50 MCG: 50 INJECTION INTRAMUSCULAR; INTRAVENOUS at 10:44

## 2017-01-01 RX ADMIN — CARVEDILOL 12.5 MG: 12.5 TABLET, FILM COATED ORAL at 08:19

## 2017-01-01 RX ADMIN — METRONIDAZOLE 500 MG: 500 INJECTION, SOLUTION INTRAVENOUS at 00:10

## 2017-01-01 RX ADMIN — CASTOR OIL AND BALSAM, PERU: 788; 87 OINTMENT TOPICAL at 21:06

## 2017-01-01 RX ADMIN — Medication 250 MG: at 08:31

## 2017-01-01 RX ADMIN — POTASSIUM CHLORIDE 40 MEQ: 1.5 POWDER, FOR SOLUTION ORAL at 11:16

## 2017-01-01 RX ADMIN — ISOSORBIDE DINITRATE 10 MG: 20 TABLET ORAL at 22:11

## 2017-01-01 RX ADMIN — FUROSEMIDE 40 MG: 10 INJECTION, SOLUTION INTRAMUSCULAR; INTRAVENOUS at 20:12

## 2017-01-01 RX ADMIN — SODIUM CHLORIDE, PRESERVATIVE FREE 500 UNITS: 5 INJECTION INTRAVENOUS at 08:31

## 2017-01-01 RX ADMIN — INSULIN DETEMIR 20 UNITS: 100 INJECTION, SOLUTION SUBCUTANEOUS at 11:20

## 2017-01-01 RX ADMIN — DIGOXIN 250 MCG: 250 TABLET ORAL at 12:38

## 2017-01-01 RX ADMIN — WATER 42 ML/HR: 1 INJECTION INTRAMUSCULAR; INTRAVENOUS; SUBCUTANEOUS at 13:30

## 2017-01-01 RX ADMIN — APIXABAN 5 MG: 5 TABLET, FILM COATED ORAL at 21:47

## 2017-01-01 RX ADMIN — INSULIN LISPRO 4 UNITS: 100 INJECTION, SOLUTION INTRAVENOUS; SUBCUTANEOUS at 05:58

## 2017-01-01 RX ADMIN — Medication 250 MG: at 17:38

## 2017-01-01 RX ADMIN — METRONIDAZOLE 500 MG: 500 INJECTION, SOLUTION INTRAVENOUS at 12:14

## 2017-01-01 RX ADMIN — Medication 250 MG: at 18:53

## 2017-01-01 RX ADMIN — METRONIDAZOLE 500 MG: 500 INJECTION, SOLUTION INTRAVENOUS at 18:30

## 2017-01-01 RX ADMIN — CEFAZOLIN SODIUM 2 G: 2 INJECTION, SOLUTION INTRAVENOUS at 00:11

## 2017-01-01 RX ADMIN — DEXMEDETOMIDINE HYDROCHLORIDE 1.4 MCG/KG/HR: 4 INJECTION, SOLUTION INTRAVENOUS at 20:28

## 2017-01-01 RX ADMIN — INSULIN HUMAN 8 UNITS: 100 INJECTION, SOLUTION PARENTERAL at 18:17

## 2017-01-01 RX ADMIN — CLINDAMYCIN PHOSPHATE 600 MG: 12 INJECTION, SOLUTION INTRAVENOUS at 21:13

## 2017-01-01 RX ADMIN — APIXABAN 5 MG: 5 TABLET, FILM COATED ORAL at 20:03

## 2017-01-01 RX ADMIN — IPRATROPIUM BROMIDE AND ALBUTEROL SULFATE 3 ML: .5; 3 SOLUTION RESPIRATORY (INHALATION) at 07:41

## 2017-01-01 RX ADMIN — AMIODARONE HYDROCHLORIDE 200 MG: 200 TABLET ORAL at 18:14

## 2017-01-01 RX ADMIN — APIXABAN 5 MG: 5 TABLET, FILM COATED ORAL at 08:48

## 2017-01-01 RX ADMIN — HYDROCODONE BITARTRATE AND ACETAMINOPHEN 15 ML: 7.5; 325 SOLUTION ORAL at 15:07

## 2017-01-01 RX ADMIN — METRONIDAZOLE 500 MG: 500 INJECTION, SOLUTION INTRAVENOUS at 23:56

## 2017-01-01 RX ADMIN — APIXABAN 5 MG: 5 TABLET, FILM COATED ORAL at 20:57

## 2017-01-01 RX ADMIN — AMIODARONE HYDROCHLORIDE 200 MG: 200 TABLET ORAL at 12:20

## 2017-01-01 RX ADMIN — POTASSIUM CHLORIDE 40 MEQ: 1.5 POWDER, FOR SOLUTION ORAL at 20:02

## 2017-01-01 RX ADMIN — OXYCODONE HYDROCHLORIDE AND ACETAMINOPHEN 1 TABLET: 10; 325 TABLET ORAL at 13:57

## 2017-01-01 RX ADMIN — POTASSIUM CHLORIDE 20 MEQ: 29.8 INJECTION, SOLUTION INTRAVENOUS at 09:21

## 2017-01-01 RX ADMIN — APIXABAN 5 MG: 5 TABLET, FILM COATED ORAL at 09:09

## 2017-01-01 RX ADMIN — WATER 42 ML/HR: 1 INJECTION INTRAMUSCULAR; INTRAVENOUS; SUBCUTANEOUS at 12:02

## 2017-01-01 RX ADMIN — SENNOSIDES A AND B 10 ML: 415.36 LIQUID ORAL at 08:48

## 2017-01-01 RX ADMIN — Medication 250 MG: at 18:09

## 2017-01-01 RX ADMIN — PANTOPRAZOLE SODIUM 40 MG: 40 INJECTION, POWDER, FOR SOLUTION INTRAVENOUS at 06:23

## 2017-01-01 RX ADMIN — HYDROCODONE BITARTRATE AND ACETAMINOPHEN 10 ML: 7.5; 325 SOLUTION ORAL at 22:12

## 2017-01-01 RX ADMIN — MEROPENEM 2 G: 1 INJECTION, POWDER, FOR SOLUTION INTRAVENOUS at 21:53

## 2017-01-01 RX ADMIN — AMIODARONE HYDROCHLORIDE 200 MG: 200 TABLET ORAL at 11:14

## 2017-01-01 RX ADMIN — CASTOR OIL AND BALSAM, PERU: 788; 87 OINTMENT TOPICAL at 20:55

## 2017-01-01 RX ADMIN — Medication 250 MG: at 20:18

## 2017-01-01 RX ADMIN — ATORVASTATIN CALCIUM 80 MG: 40 TABLET, FILM COATED ORAL at 21:05

## 2017-01-01 RX ADMIN — PANTOPRAZOLE SODIUM 40 MG: 40 INJECTION, POWDER, FOR SOLUTION INTRAVENOUS at 05:43

## 2017-01-01 RX ADMIN — GABAPENTIN 300 MG: 300 CAPSULE ORAL at 08:24

## 2017-01-01 RX ADMIN — QUETIAPINE FUMARATE 25 MG: 25 TABLET, FILM COATED ORAL at 20:20

## 2017-01-01 RX ADMIN — INSULIN HUMAN 5 UNITS: 100 INJECTION, SOLUTION PARENTERAL at 17:54

## 2017-01-01 RX ADMIN — HYDROCODONE BITARTRATE AND ACETAMINOPHEN 1 TABLET: 7.5; 325 TABLET ORAL at 09:45

## 2017-01-01 RX ADMIN — IPRATROPIUM BROMIDE AND ALBUTEROL SULFATE 3 ML: .5; 3 SOLUTION RESPIRATORY (INHALATION) at 19:40

## 2017-01-01 RX ADMIN — FUROSEMIDE 40 MG: 10 INJECTION, SOLUTION INTRAMUSCULAR; INTRAVENOUS at 10:33

## 2017-01-01 RX ADMIN — GLYCOPYRROLATE 0.4 MG: 0.2 INJECTION, SOLUTION INTRAMUSCULAR; INTRAVENOUS at 04:24

## 2017-01-01 RX ADMIN — FUROSEMIDE 60 MG: 10 INJECTION, SOLUTION INTRAMUSCULAR; INTRAVENOUS at 08:30

## 2017-01-01 RX ADMIN — APIXABAN 5 MG: 5 TABLET, FILM COATED ORAL at 08:10

## 2017-01-01 RX ADMIN — INSULIN ASPART 2 UNITS: 100 INJECTION, SOLUTION INTRAVENOUS; SUBCUTANEOUS at 21:52

## 2017-01-01 RX ADMIN — IPRATROPIUM BROMIDE AND ALBUTEROL SULFATE 3 ML: .5; 3 SOLUTION RESPIRATORY (INHALATION) at 20:56

## 2017-01-01 RX ADMIN — HYDROMORPHONE HYDROCHLORIDE 0.5 MG: 1 INJECTION, SOLUTION INTRAMUSCULAR; INTRAVENOUS; SUBCUTANEOUS at 00:01

## 2017-01-01 RX ADMIN — FENTANYL 1 PATCH: 25 PATCH, EXTENDED RELEASE TRANSDERMAL at 10:40

## 2017-01-01 RX ADMIN — MEROPENEM 2 G: 1 INJECTION, POWDER, FOR SOLUTION INTRAVENOUS at 21:56

## 2017-01-01 RX ADMIN — ATORVASTATIN CALCIUM 80 MG: 40 TABLET, FILM COATED ORAL at 20:03

## 2017-01-01 RX ADMIN — FENTANYL CITRATE 50 MCG: 50 INJECTION INTRAMUSCULAR; INTRAVENOUS at 10:24

## 2017-01-01 RX ADMIN — IPRATROPIUM BROMIDE AND ALBUTEROL SULFATE 3 ML: .5; 3 SOLUTION RESPIRATORY (INHALATION) at 07:39

## 2017-01-01 RX ADMIN — MEROPENEM 2 G: 1 INJECTION, POWDER, FOR SOLUTION INTRAVENOUS at 23:56

## 2017-01-01 RX ADMIN — CEFAZOLIN SODIUM 2 G: 2 INJECTION, SOLUTION INTRAVENOUS at 18:45

## 2017-01-01 RX ADMIN — FUROSEMIDE 40 MG: 10 INJECTION, SOLUTION INTRAMUSCULAR; INTRAVENOUS at 08:54

## 2017-01-01 RX ADMIN — QUETIAPINE FUMARATE 25 MG: 25 TABLET, FILM COATED ORAL at 08:31

## 2017-01-01 RX ADMIN — SODIUM CHLORIDE, PRESERVATIVE FREE 500 UNITS: 5 INJECTION INTRAVENOUS at 09:24

## 2017-01-01 RX ADMIN — QUETIAPINE FUMARATE 25 MG: 25 TABLET, FILM COATED ORAL at 21:59

## 2017-01-01 RX ADMIN — HYDRALAZINE HYDROCHLORIDE 25 MG: 25 TABLET ORAL at 05:34

## 2017-01-01 RX ADMIN — CEFAZOLIN SODIUM 2 G: 2 INJECTION, SOLUTION INTRAVENOUS at 16:05

## 2017-01-01 RX ADMIN — QUETIAPINE FUMARATE 25 MG: 25 TABLET, FILM COATED ORAL at 11:18

## 2017-01-01 RX ADMIN — SENNOSIDES A AND B 10 ML: 415.36 LIQUID ORAL at 17:59

## 2017-01-01 RX ADMIN — VANCOMYCIN HYDROCHLORIDE 1500 MG: 5 INJECTION, POWDER, LYOPHILIZED, FOR SOLUTION INTRAVENOUS at 02:24

## 2017-01-01 RX ADMIN — AMIODARONE HYDROCHLORIDE 200 MG: 200 TABLET ORAL at 18:08

## 2017-01-01 RX ADMIN — HALOPERIDOL LACTATE 1 MG: 5 INJECTION, SOLUTION INTRAMUSCULAR at 14:32

## 2017-01-01 RX ADMIN — CLINDAMYCIN PHOSPHATE 600 MG: 12 INJECTION, SOLUTION INTRAVENOUS at 21:34

## 2017-01-01 RX ADMIN — INSULIN LISPRO 4 UNITS: 100 INJECTION, SOLUTION INTRAVENOUS; SUBCUTANEOUS at 23:19

## 2017-01-01 RX ADMIN — CARVEDILOL 12.5 MG: 12.5 TABLET, FILM COATED ORAL at 21:05

## 2017-01-01 RX ADMIN — INSULIN HUMAN 5 UNITS: 100 INJECTION, SOLUTION PARENTERAL at 11:27

## 2017-01-01 RX ADMIN — METRONIDAZOLE 500 MG: 500 INJECTION, SOLUTION INTRAVENOUS at 05:25

## 2017-01-01 RX ADMIN — FUROSEMIDE 40 MG: 10 INJECTION, SOLUTION INTRAMUSCULAR; INTRAVENOUS at 08:16

## 2017-01-01 RX ADMIN — AMIODARONE HYDROCHLORIDE 1 MG/MIN: 1.8 INJECTION, SOLUTION INTRAVENOUS at 15:01

## 2017-01-01 RX ADMIN — MIDAZOLAM HYDROCHLORIDE 1 MG: 1 INJECTION, SOLUTION INTRAMUSCULAR; INTRAVENOUS at 10:25

## 2017-01-01 RX ADMIN — DEXMEDETOMIDINE HYDROCHLORIDE 0.8 MCG/KG/HR: 4 INJECTION, SOLUTION INTRAVENOUS at 13:46

## 2017-01-01 RX ADMIN — CARVEDILOL 12.5 MG: 12.5 TABLET, FILM COATED ORAL at 09:08

## 2017-01-01 RX ADMIN — LANSOPRAZOLE 30 MG: KIT at 06:28

## 2017-01-01 RX ADMIN — INSULIN LISPRO 5 UNITS: 100 INJECTION, SOLUTION INTRAVENOUS; SUBCUTANEOUS at 20:48

## 2017-01-01 RX ADMIN — IPRATROPIUM BROMIDE AND ALBUTEROL SULFATE 3 ML: .5; 3 SOLUTION RESPIRATORY (INHALATION) at 16:11

## 2017-01-01 RX ADMIN — PROMETHAZINE HYDROCHLORIDE 12.5 MG: 25 INJECTION, SOLUTION INTRAMUSCULAR; INTRAVENOUS at 16:07

## 2017-01-01 RX ADMIN — HYDROMORPHONE HYDROCHLORIDE 0.5 MG: 1 INJECTION, SOLUTION INTRAMUSCULAR; INTRAVENOUS; SUBCUTANEOUS at 09:41

## 2017-01-01 RX ADMIN — METRONIDAZOLE 500 MG: 500 INJECTION, SOLUTION INTRAVENOUS at 12:22

## 2017-01-01 RX ADMIN — CEFAZOLIN SODIUM 2 G: 2 INJECTION, SOLUTION INTRAVENOUS at 23:57

## 2017-01-01 RX ADMIN — DOPAMINE HYDROCHLORIDE 6 MCG/KG/MIN: 160 INJECTION, SOLUTION INTRAVENOUS at 00:00

## 2017-01-01 RX ADMIN — GLYCOPYRROLATE 0.4 MG: 0.2 INJECTION, SOLUTION INTRAMUSCULAR; INTRAVENOUS at 00:02

## 2017-01-01 RX ADMIN — ACETYLCYSTEINE 4 ML: 200 SOLUTION ORAL; RESPIRATORY (INHALATION) at 12:25

## 2017-01-01 RX ADMIN — Medication: at 09:41

## 2017-01-01 RX ADMIN — INSULIN LISPRO 8 UNITS: 100 INJECTION, SOLUTION INTRAVENOUS; SUBCUTANEOUS at 00:11

## 2017-01-01 RX ADMIN — ASPIRIN 325 MG ORAL TABLET 325 MG: 325 PILL ORAL at 08:03

## 2017-01-01 RX ADMIN — HALOPERIDOL LACTATE 2 MG: 5 INJECTION, SOLUTION INTRAMUSCULAR at 00:22

## 2017-01-01 RX ADMIN — OXYCODONE HYDROCHLORIDE AND ACETAMINOPHEN 1 TABLET: 10; 325 TABLET ORAL at 05:42

## 2017-01-01 RX ADMIN — CARVEDILOL 12.5 MG: 12.5 TABLET, FILM COATED ORAL at 20:25

## 2017-01-01 RX ADMIN — ISOSORBIDE DINITRATE 10 MG: 20 TABLET ORAL at 09:01

## 2017-01-01 RX ADMIN — SODIUM CHLORIDE, PRESERVATIVE FREE 500 UNITS: 5 INJECTION INTRAVENOUS at 09:19

## 2017-01-01 RX ADMIN — HALOPERIDOL LACTATE 2 MG: 5 INJECTION, SOLUTION INTRAMUSCULAR at 03:04

## 2017-01-01 RX ADMIN — Medication 250 MG: at 17:59

## 2017-01-01 RX ADMIN — ASPIRIN 81 MG: 81 TABLET ORAL at 08:28

## 2017-01-01 RX ADMIN — ASPIRIN 324 MG: 81 TABLET, CHEWABLE ORAL at 08:31

## 2017-01-01 RX ADMIN — HYDRALAZINE HYDROCHLORIDE 25 MG: 25 TABLET ORAL at 22:11

## 2017-01-01 RX ADMIN — DEXMEDETOMIDINE HYDROCHLORIDE 0.6 MCG/KG/HR: 4 INJECTION, SOLUTION INTRAVENOUS at 06:38

## 2017-01-01 RX ADMIN — INSULIN LISPRO 4 UNITS: 100 INJECTION, SOLUTION INTRAVENOUS; SUBCUTANEOUS at 05:53

## 2017-01-01 RX ADMIN — CEFAZOLIN SODIUM 2 G: 2 INJECTION, SOLUTION INTRAVENOUS at 23:59

## 2017-01-01 RX ADMIN — ALBUMIN HUMAN 25 G: 0.25 SOLUTION INTRAVENOUS at 20:09

## 2017-01-01 RX ADMIN — BUMETANIDE 1 MG: 0.25 INJECTION, SOLUTION INTRAMUSCULAR; INTRAVENOUS at 13:25

## 2017-01-01 RX ADMIN — IPRATROPIUM BROMIDE AND ALBUTEROL SULFATE 3 ML: .5; 3 SOLUTION RESPIRATORY (INHALATION) at 16:48

## 2017-01-01 RX ADMIN — IPRATROPIUM BROMIDE AND ALBUTEROL SULFATE 3 ML: .5; 3 SOLUTION RESPIRATORY (INHALATION) at 11:51

## 2017-01-01 RX ADMIN — MAGNESIUM SULFATE HEPTAHYDRATE 4 G: 40 INJECTION, SOLUTION INTRAVENOUS at 07:42

## 2017-01-01 RX ADMIN — POTASSIUM CHLORIDE 20 MEQ: 400 INJECTION, SOLUTION INTRAVENOUS at 05:56

## 2017-01-01 RX ADMIN — Medication 250 MG: at 08:14

## 2017-01-01 RX ADMIN — HYDROMORPHONE HYDROCHLORIDE 0.5 MG: 1 INJECTION, SOLUTION INTRAMUSCULAR; INTRAVENOUS; SUBCUTANEOUS at 04:23

## 2017-01-01 RX ADMIN — QUETIAPINE FUMARATE 25 MG: 25 TABLET, FILM COATED ORAL at 20:04

## 2017-01-01 RX ADMIN — VANCOMYCIN HYDROCHLORIDE 1500 MG: 5 INJECTION, POWDER, LYOPHILIZED, FOR SOLUTION INTRAVENOUS at 13:55

## 2017-01-01 RX ADMIN — INSULIN DETEMIR 40 UNITS: 100 INJECTION, SOLUTION SUBCUTANEOUS at 01:41

## 2017-01-01 RX ADMIN — Medication 250 MG: at 08:02

## 2017-01-01 RX ADMIN — DOPAMINE HYDROCHLORIDE 4 MCG/KG/MIN: 160 INJECTION, SOLUTION INTRAVENOUS at 15:49

## 2017-01-01 RX ADMIN — IPRATROPIUM BROMIDE AND ALBUTEROL SULFATE 3 ML: .5; 3 SOLUTION RESPIRATORY (INHALATION) at 12:52

## 2017-01-01 RX ADMIN — CASTOR OIL AND BALSAM, PERU: 788; 87 OINTMENT TOPICAL at 21:59

## 2017-01-01 RX ADMIN — PANTOPRAZOLE SODIUM 40 MG: 40 INJECTION, POWDER, FOR SOLUTION INTRAVENOUS at 06:00

## 2017-01-01 RX ADMIN — HALOPERIDOL LACTATE 2 MG: 5 INJECTION, SOLUTION INTRAMUSCULAR at 16:32

## 2017-01-01 RX ADMIN — NITROGLYCERIN 0.4 MG: 0.4 TABLET SUBLINGUAL at 18:39

## 2017-01-01 RX ADMIN — METRONIDAZOLE 500 MG: 500 INJECTION, SOLUTION INTRAVENOUS at 06:38

## 2017-01-01 RX ADMIN — FUROSEMIDE 40 MG: 10 INJECTION, SOLUTION INTRAMUSCULAR; INTRAVENOUS at 20:20

## 2017-01-01 RX ADMIN — POTASSIUM CHLORIDE: 2 INJECTION, SOLUTION, CONCENTRATE INTRAVENOUS at 18:43

## 2017-01-01 RX ADMIN — INSULIN HUMAN 3 UNITS: 100 INJECTION, SOLUTION PARENTERAL at 06:02

## 2017-01-01 RX ADMIN — AMIODARONE HYDROCHLORIDE 200 MG: 200 TABLET ORAL at 03:54

## 2017-01-01 RX ADMIN — ASPIRIN 81 MG: 81 TABLET, CHEWABLE ORAL at 09:19

## 2017-01-01 RX ADMIN — DEXMEDETOMIDINE HYDROCHLORIDE 0.6 MCG/KG/HR: 4 INJECTION, SOLUTION INTRAVENOUS at 21:52

## 2017-01-01 RX ADMIN — INSULIN DETEMIR 15 UNITS: 100 INJECTION, SOLUTION SUBCUTANEOUS at 21:33

## 2017-01-01 RX ADMIN — ACETYLCYSTEINE 4 ML: 200 SOLUTION ORAL; RESPIRATORY (INHALATION) at 16:31

## 2017-01-01 RX ADMIN — QUETIAPINE FUMARATE 25 MG: 25 TABLET, FILM COATED ORAL at 10:32

## 2017-01-01 RX ADMIN — INSULIN LISPRO 8 UNITS: 100 INJECTION, SOLUTION INTRAVENOUS; SUBCUTANEOUS at 06:09

## 2017-01-01 RX ADMIN — APIXABAN 5 MG: 5 TABLET, FILM COATED ORAL at 20:04

## 2017-01-01 RX ADMIN — ATORVASTATIN CALCIUM 80 MG: 40 TABLET, FILM COATED ORAL at 20:41

## 2017-01-01 RX ADMIN — AMIODARONE HYDROCHLORIDE 200 MG: 200 TABLET ORAL at 03:55

## 2017-01-01 RX ADMIN — ACETAZOLAMIDE 500 MG: 500 INJECTION, POWDER, LYOPHILIZED, FOR SOLUTION INTRAVENOUS at 20:19

## 2017-01-01 RX ADMIN — LORAZEPAM 0.5 MG: 2 INJECTION INTRAMUSCULAR; INTRAVENOUS at 14:55

## 2017-01-01 RX ADMIN — CASTOR OIL AND BALSAM, PERU: 788; 87 OINTMENT TOPICAL at 08:54

## 2017-01-01 RX ADMIN — FUROSEMIDE 40 MG: 10 INJECTION, SOLUTION INTRAMUSCULAR; INTRAVENOUS at 08:19

## 2017-01-01 RX ADMIN — IPRATROPIUM BROMIDE AND ALBUTEROL SULFATE 3 ML: .5; 3 SOLUTION RESPIRATORY (INHALATION) at 20:21

## 2017-01-01 RX ADMIN — IPRATROPIUM BROMIDE AND ALBUTEROL SULFATE 3 ML: .5; 3 SOLUTION RESPIRATORY (INHALATION) at 16:30

## 2017-01-01 RX ADMIN — IPRATROPIUM BROMIDE AND ALBUTEROL SULFATE 3 ML: .5; 3 SOLUTION RESPIRATORY (INHALATION) at 07:22

## 2017-01-01 RX ADMIN — INSULIN HUMAN 8 UNITS: 100 INJECTION, SOLUTION PARENTERAL at 12:02

## 2017-01-01 RX ADMIN — ETOMIDATE 2 MG: 2 INJECTION, SOLUTION INTRAVENOUS at 10:20

## 2017-01-01 RX ADMIN — FENTANYL 1 PATCH: 25 PATCH, EXTENDED RELEASE TRANSDERMAL at 11:15

## 2017-01-01 RX ADMIN — SODIUM CHLORIDE, PRESERVATIVE FREE 500 UNITS: 5 INJECTION INTRAVENOUS at 08:02

## 2017-01-01 RX ADMIN — METRONIDAZOLE 500 MG: 500 INJECTION, SOLUTION INTRAVENOUS at 17:21

## 2017-01-01 RX ADMIN — MAGNESIUM SULFATE HEPTAHYDRATE 4 G: 40 INJECTION, SOLUTION INTRAVENOUS at 10:44

## 2017-01-01 RX ADMIN — DEXMEDETOMIDINE HYDROCHLORIDE 1.4 MCG/KG/HR: 4 INJECTION, SOLUTION INTRAVENOUS at 13:55

## 2017-01-01 RX ADMIN — LORAZEPAM 1 MG: 2 INJECTION INTRAMUSCULAR; INTRAVENOUS at 20:25

## 2017-01-01 RX ADMIN — POTASSIUM PHOSPHATE, MONOBASIC AND POTASSIUM PHOSPHATE, DIBASIC 15 MMOL: 224; 236 INJECTION, SOLUTION, CONCENTRATE INTRAVENOUS at 11:58

## 2017-01-01 RX ADMIN — LORAZEPAM 1 MG: 2 INJECTION INTRAMUSCULAR; INTRAVENOUS at 20:24

## 2017-01-01 RX ADMIN — IPRATROPIUM BROMIDE AND ALBUTEROL SULFATE 3 ML: .5; 3 SOLUTION RESPIRATORY (INHALATION) at 16:07

## 2017-01-01 RX ADMIN — CARVEDILOL 12.5 MG: 12.5 TABLET, FILM COATED ORAL at 20:24

## 2017-01-01 RX ADMIN — CARVEDILOL 6.25 MG: 6.25 TABLET, FILM COATED ORAL at 09:16

## 2017-01-01 RX ADMIN — GLYCOPYRROLATE 0.4 MG: 0.2 INJECTION, SOLUTION INTRAMUSCULAR; INTRAVENOUS at 18:37

## 2017-01-01 RX ADMIN — CEFAZOLIN SODIUM 2 G: 2 INJECTION, SOLUTION INTRAVENOUS at 07:47

## 2017-01-01 RX ADMIN — INSULIN HUMAN 5 UNITS: 100 INJECTION, SOLUTION PARENTERAL at 12:37

## 2017-01-01 RX ADMIN — QUETIAPINE FUMARATE 25 MG: 25 TABLET, FILM COATED ORAL at 20:19

## 2017-01-01 RX ADMIN — IPRATROPIUM BROMIDE AND ALBUTEROL SULFATE 3 ML: .5; 3 SOLUTION RESPIRATORY (INHALATION) at 12:31

## 2017-01-01 RX ADMIN — SODIUM CHLORIDE, PRESERVATIVE FREE 500 UNITS: 5 INJECTION INTRAVENOUS at 08:50

## 2017-01-01 RX ADMIN — SODIUM CHLORIDE 50 ML/HR: 9 INJECTION, SOLUTION INTRAVENOUS at 11:45

## 2017-01-01 RX ADMIN — DIGOXIN 250 MCG: 250 TABLET ORAL at 12:09

## 2017-01-01 RX ADMIN — INSULIN HUMAN 10 UNITS: 100 INJECTION, SOLUTION PARENTERAL at 06:25

## 2017-01-01 RX ADMIN — CEFAZOLIN SODIUM 2 G: 2 INJECTION, SOLUTION INTRAVENOUS at 00:04

## 2017-01-01 RX ADMIN — ATORVASTATIN CALCIUM 80 MG: 40 TABLET, FILM COATED ORAL at 20:18

## 2017-01-01 RX ADMIN — METRONIDAZOLE 500 MG: 500 INJECTION, SOLUTION INTRAVENOUS at 11:14

## 2017-01-01 RX ADMIN — ASPIRIN 81 MG: 81 TABLET ORAL at 08:25

## 2017-01-01 RX ADMIN — SODIUM CHLORIDE, PRESERVATIVE FREE 500 UNITS: 5 INJECTION INTRAVENOUS at 20:52

## 2017-01-01 RX ADMIN — METRONIDAZOLE 500 MG: 500 INJECTION, SOLUTION INTRAVENOUS at 11:52

## 2017-01-01 RX ADMIN — CARVEDILOL 6.25 MG: 6.25 TABLET, FILM COATED ORAL at 20:20

## 2017-01-01 RX ADMIN — AMIODARONE HYDROCHLORIDE 200 MG: 200 TABLET ORAL at 03:53

## 2017-01-01 RX ADMIN — QUETIAPINE FUMARATE 25 MG: 25 TABLET, FILM COATED ORAL at 08:56

## 2017-01-01 RX ADMIN — IPRATROPIUM BROMIDE AND ALBUTEROL SULFATE 3 ML: .5; 3 SOLUTION RESPIRATORY (INHALATION) at 12:39

## 2017-01-01 RX ADMIN — Medication 250 MG: at 08:08

## 2017-01-01 RX ADMIN — IPRATROPIUM BROMIDE AND ALBUTEROL SULFATE 3 ML: .5; 3 SOLUTION RESPIRATORY (INHALATION) at 20:01

## 2017-01-01 RX ADMIN — IPRATROPIUM BROMIDE AND ALBUTEROL SULFATE 3 ML: .5; 3 SOLUTION RESPIRATORY (INHALATION) at 15:30

## 2017-01-01 RX ADMIN — HEPARIN SODIUM 5000 UNITS: 5000 INJECTION, SOLUTION INTRAVENOUS; SUBCUTANEOUS at 21:51

## 2017-01-01 RX ADMIN — DEXMEDETOMIDINE HYDROCHLORIDE 0.2 MCG/KG/HR: 4 INJECTION, SOLUTION INTRAVENOUS at 20:55

## 2017-01-01 RX ADMIN — ASPIRIN 81 MG: 81 TABLET, CHEWABLE ORAL at 08:10

## 2017-01-01 RX ADMIN — HYDROCODONE BITARTRATE AND ACETAMINOPHEN 15 ML: 7.5; 325 SOLUTION ORAL at 21:04

## 2017-01-01 RX ADMIN — INSULIN LISPRO 4 UNITS: 100 INJECTION, SOLUTION INTRAVENOUS; SUBCUTANEOUS at 06:00

## 2017-01-01 RX ADMIN — VANCOMYCIN HYDROCHLORIDE 1500 MG: 5 INJECTION, POWDER, LYOPHILIZED, FOR SOLUTION INTRAVENOUS at 01:59

## 2017-01-01 RX ADMIN — HYDROCODONE BITARTRATE AND ACETAMINOPHEN 1 TABLET: 7.5; 325 TABLET ORAL at 20:24

## 2017-01-01 RX ADMIN — QUETIAPINE FUMARATE 25 MG: 25 TABLET, FILM COATED ORAL at 21:05

## 2017-01-01 RX ADMIN — LORAZEPAM 0.5 MG: 2 INJECTION INTRAMUSCULAR; INTRAVENOUS at 04:18

## 2017-01-01 RX ADMIN — METRONIDAZOLE 500 MG: 500 INJECTION, SOLUTION INTRAVENOUS at 23:57

## 2017-01-01 RX ADMIN — CEFAZOLIN SODIUM 2 G: 2 INJECTION, SOLUTION INTRAVENOUS at 09:16

## 2017-01-01 RX ADMIN — VANCOMYCIN HYDROCHLORIDE 1500 MG: 5 INJECTION, POWDER, LYOPHILIZED, FOR SOLUTION INTRAVENOUS at 01:50

## 2017-01-01 RX ADMIN — POTASSIUM CHLORIDE 40 MEQ: 1.5 POWDER, FOR SOLUTION ORAL at 14:23

## 2017-01-01 RX ADMIN — ETOMIDATE 4 MG: 2 INJECTION, SOLUTION INTRAVENOUS at 10:27

## 2017-01-01 RX ADMIN — CLOPIDOGREL BISULFATE 75 MG: 75 TABLET ORAL at 08:03

## 2017-01-01 RX ADMIN — INSULIN HUMAN 9 UNITS: 100 INJECTION, SOLUTION PARENTERAL at 06:24

## 2017-01-01 RX ADMIN — SACUBITRIL AND VALSARTAN 1 TABLET: 24; 26 TABLET, FILM COATED ORAL at 21:35

## 2017-01-01 RX ADMIN — SODIUM CHLORIDE, PRESERVATIVE FREE 500 UNITS: 5 INJECTION INTRAVENOUS at 20:57

## 2017-01-01 RX ADMIN — METRONIDAZOLE 500 MG: 500 INJECTION, SOLUTION INTRAVENOUS at 23:47

## 2017-01-01 RX ADMIN — METRONIDAZOLE 500 MG: 500 INJECTION, SOLUTION INTRAVENOUS at 20:16

## 2017-01-01 RX ADMIN — METRONIDAZOLE 500 MG: 500 INJECTION, SOLUTION INTRAVENOUS at 18:37

## 2017-01-01 RX ADMIN — INSULIN HUMAN 8 UNITS: 100 INJECTION, SOLUTION PARENTERAL at 18:00

## 2017-01-01 RX ADMIN — CEFAZOLIN SODIUM 2 G: 2 INJECTION, SOLUTION INTRAVENOUS at 07:42

## 2017-01-01 RX ADMIN — ETOMIDATE 4 MG: 2 INJECTION, SOLUTION INTRAVENOUS at 10:36

## 2017-01-01 RX ADMIN — ASPIRIN 81 MG: 81 TABLET, CHEWABLE ORAL at 08:02

## 2017-01-01 RX ADMIN — GLYCOPYRROLATE 0.4 MG: 0.2 INJECTION, SOLUTION INTRAMUSCULAR; INTRAVENOUS at 22:28

## 2017-01-01 RX ADMIN — INSULIN DETEMIR 25 UNITS: 100 INJECTION, SOLUTION SUBCUTANEOUS at 08:28

## 2017-01-01 RX ADMIN — IPRATROPIUM BROMIDE AND ALBUTEROL SULFATE 3 ML: .5; 3 SOLUTION RESPIRATORY (INHALATION) at 19:57

## 2017-01-01 RX ADMIN — CASTOR OIL AND BALSAM, PERU: 788; 87 OINTMENT TOPICAL at 20:02

## 2017-01-01 RX ADMIN — INSULIN HUMAN 12 UNITS: 100 INJECTION, SOLUTION PARENTERAL at 05:45

## 2017-01-01 RX ADMIN — QUETIAPINE FUMARATE 25 MG: 25 TABLET, FILM COATED ORAL at 08:17

## 2017-01-01 RX ADMIN — INSULIN HUMAN 12 UNITS: 100 INJECTION, SOLUTION PARENTERAL at 18:48

## 2017-01-01 RX ADMIN — IPRATROPIUM BROMIDE AND ALBUTEROL SULFATE 3 ML: .5; 3 SOLUTION RESPIRATORY (INHALATION) at 20:39

## 2017-01-01 RX ADMIN — ASPIRIN 81 MG: 81 TABLET, CHEWABLE ORAL at 10:35

## 2017-01-01 RX ADMIN — IPRATROPIUM BROMIDE AND ALBUTEROL SULFATE 3 ML: .5; 3 SOLUTION RESPIRATORY (INHALATION) at 20:13

## 2017-01-01 RX ADMIN — FUROSEMIDE 60 MG: 10 INJECTION, SOLUTION INTRAMUSCULAR; INTRAVENOUS at 20:12

## 2017-01-01 RX ADMIN — CLINDAMYCIN PHOSPHATE 600 MG: 12 INJECTION, SOLUTION INTRAVENOUS at 13:33

## 2017-01-01 RX ADMIN — LORAZEPAM 0.5 MG: 2 INJECTION INTRAMUSCULAR; INTRAVENOUS at 09:44

## 2017-01-01 RX ADMIN — VANCOMYCIN HYDROCHLORIDE 1500 MG: 5 INJECTION, POWDER, LYOPHILIZED, FOR SOLUTION INTRAVENOUS at 13:34

## 2017-01-01 RX ADMIN — ROPINIROLE 0.25 MG: 0.25 TABLET ORAL at 21:49

## 2017-01-01 RX ADMIN — QUETIAPINE FUMARATE 25 MG: 25 TABLET, FILM COATED ORAL at 10:43

## 2017-01-01 RX ADMIN — VANCOMYCIN HYDROCHLORIDE 2000 MG: 5 INJECTION, POWDER, LYOPHILIZED, FOR SOLUTION INTRAVENOUS at 14:44

## 2017-01-01 RX ADMIN — SODIUM CHLORIDE, PRESERVATIVE FREE 500 UNITS: 5 INJECTION INTRAVENOUS at 10:30

## 2017-01-01 RX ADMIN — IPRATROPIUM BROMIDE AND ALBUTEROL SULFATE 3 ML: .5; 3 SOLUTION RESPIRATORY (INHALATION) at 12:36

## 2017-01-01 RX ADMIN — INSULIN DETEMIR 10 UNITS: 100 INJECTION, SOLUTION SUBCUTANEOUS at 21:13

## 2017-01-01 RX ADMIN — Medication 0.5 MCG/KG/MIN: at 04:03

## 2017-01-01 RX ADMIN — WATER 42 ML/HR: 1 INJECTION INTRAMUSCULAR; INTRAVENOUS; SUBCUTANEOUS at 12:40

## 2017-01-01 RX ADMIN — HYDROMORPHONE HYDROCHLORIDE: 1 INJECTION, SOLUTION INTRAMUSCULAR; INTRAVENOUS; SUBCUTANEOUS at 13:08

## 2017-01-01 RX ADMIN — CARVEDILOL 6.25 MG: 6.25 TABLET, FILM COATED ORAL at 15:01

## 2017-01-01 RX ADMIN — GABAPENTIN 300 MG: 300 CAPSULE ORAL at 08:28

## 2017-01-01 RX ADMIN — CLOPIDOGREL BISULFATE 75 MG: 75 TABLET ORAL at 13:15

## 2017-01-01 RX ADMIN — Medication 250 MG: at 08:48

## 2017-01-01 RX ADMIN — CEFAZOLIN SODIUM 2 G: 2 INJECTION, SOLUTION INTRAVENOUS at 17:11

## 2017-01-01 RX ADMIN — INSULIN HUMAN 3 UNITS: 100 INJECTION, SOLUTION PARENTERAL at 23:50

## 2017-01-01 RX ADMIN — INSULIN DETEMIR 30 UNITS: 100 INJECTION, SOLUTION SUBCUTANEOUS at 06:04

## 2017-01-01 RX ADMIN — DIGOXIN 250 MCG: 250 TABLET ORAL at 14:27

## 2017-01-01 RX ADMIN — INSULIN DETEMIR 15 UNITS: 100 INJECTION, SOLUTION SUBCUTANEOUS at 09:00

## 2017-01-01 RX ADMIN — HALOPERIDOL LACTATE 2 MG: 5 INJECTION, SOLUTION INTRAMUSCULAR at 22:15

## 2017-01-01 RX ADMIN — POTASSIUM PHOSPHATE, MONOBASIC AND POTASSIUM PHOSPHATE, DIBASIC 15 MMOL: 224; 236 INJECTION, SOLUTION, CONCENTRATE INTRAVENOUS at 15:01

## 2017-01-01 RX ADMIN — METRONIDAZOLE 500 MG: 500 INJECTION, SOLUTION INTRAVENOUS at 05:56

## 2017-01-01 RX ADMIN — HEPARIN SODIUM 5000 UNITS: 5000 INJECTION, SOLUTION INTRAVENOUS; SUBCUTANEOUS at 13:33

## 2017-01-01 RX ADMIN — SODIUM CHLORIDE, PRESERVATIVE FREE 500 UNITS: 5 INJECTION INTRAVENOUS at 09:16

## 2017-01-01 RX ADMIN — HYDROCODONE BITARTRATE AND ACETAMINOPHEN 1 TABLET: 7.5; 325 TABLET ORAL at 17:10

## 2017-01-01 RX ADMIN — INSULIN DETEMIR 15 UNITS: 100 INJECTION, SOLUTION SUBCUTANEOUS at 20:21

## 2017-01-04 PROBLEM — R07.2 PRECORDIAL CHEST PAIN: Status: ACTIVE | Noted: 2017-01-01

## 2017-01-04 NOTE — MR AVS SNAPSHOT
Tomas Salvador   1/4/2017 12:20 PM   Office Visit    Dept Phone:  [phone]   Encounter #:  [id number]    Provider:  Juan Pablo Aguirre MD   Department:  [facility] CARDIOLOGY                Your Full Care Plan              Today's Medication Changes          These changes are accurate as of: 1/4/17  1:41 PM.  If you have any questions, ask your nurse or doctor.               New Medication(s)Ordered:     bumetanide 1 MG tablet   Commonly known as:  BUMEX   Take 1 tablet by mouth Daily. Take 2 mg po x5 days, then 1 mg po qd thereafter.   Started by:  Juan Pablo Aguirre MD         Stop taking medication(s)listed here:     furosemide 20 MG tablet   Commonly known as:  LASIX   Stopped by:  Juan Pablo Aguirre MD           losartan 25 MG tablet   Commonly known as:  COZAAR   Stopped by:  Juan Pablo Aguirre MD                Where to Get Your Medications      These medications were sent to [address] [place] [address] - [phone] [place] [id number] [facility]  [address] [address]     Phone:  [phone]     atorvastatin 80 MG tablet    bumetanide 1 MG tablet    carvedilol 6.25 MG tablet    clopidogrel 75 MG tablet    spironolactone 25 MG tablet                  Your Updated Medication List          This list is accurate as of: 1/4/17  1:41 PM.  Always use your most recent med list.                aspirin 81 MG EC tablet       atorvastatin 80 MG tablet   Commonly known as:  LIPITOR   Take 1 tablet by mouth Daily.       bumetanide 1 MG tablet   Commonly known as:  BUMEX   Take 1 tablet by mouth Daily. Take 2 mg po x5 days, then 1 mg po qd thereafter.       carvedilol 6.25 MG tablet   Commonly known as:  COREG   Take 1 tablet by mouth 2 (Two) Times a Day With Meals.       clopidogrel 75 MG tablet   Commonly known as:  PLAVIX   Take 1 tablet by mouth Daily.       gabapentin 300 MG capsule   Commonly known as:  NEURONTIN   Take 1 capsule by mouth 2 (two) times a  day.       ibuprofen 800 MG tablet   Commonly known as:  ADVIL,MOTRIN       insulin glargine 100 UNIT/ML injection   Commonly known as:  LANTUS       insulin lispro 100 UNIT/ML injection   Commonly known as:  humaLOG       metFORMIN 500 MG tablet   Commonly known as:  GLUCOPHAGE       nitroglycerin 0.4 MG/SPRAY spray   Commonly known as:  NITROLINGUAL       omeprazole 20 MG capsule   Commonly known as:  priLOSEC       PROAIR  (90 BASE) MCG/ACT inhaler   Generic drug:  albuterol       rOPINIRole 0.25 MG tablet   Commonly known as:  REQUIP       spironolactone 25 MG tablet   Commonly known as:  ALDACTONE   Take 1 tablet by mouth Daily.               We Performed the Following     ECG 12 Lead       You Were Diagnosed With        Codes Comments    Arteriosclerotic cardiovascular disease (ASCVD)    -  Primary ICD-10-CM: I25.10  ICD-9-CM: 429.2, 440.9     Essential hypertension     ICD-10-CM: I10  ICD-9-CM: 401.9     Dyslipidemia     ICD-10-CM: E78.5  ICD-9-CM: 272.4     ICD (implantable cardioverter-defibrillator) in place     ICD-10-CM: Z95.810  ICD-9-CM: V45.02     Peripheral vascular disease     ICD-10-CM: I73.9  ICD-9-CM: 443.9     Chronic systolic congestive heart failure     ICD-10-CM: I50.22  ICD-9-CM: 428.22, 428.0     Ischemic cardiomyopathy     ICD-10-CM: I25.5  ICD-9-CM: 414.8     Heart disease     ICD-10-CM: I51.9  ICD-9-CM: 429.9     Precordial chest pain     ICD-10-CM: R07.2  ICD-9-CM: 786.51     SOB (shortness of breath)     ICD-10-CM: R06.02  ICD-9-CM: 786.05     Drug therapy     ICD-10-CM: Z79.899  ICD-9-CM: V58.69       Instructions     None    Patient Instructions History      Upcoming Appointments     Visit Type Date Time Department    FOLLOW UP 1/4/2017 12:20 PM [facility]    FOLLOW UP 7/7/2017  1:15 PM [facility] allows you to send messages to your doctor, view your test results, renew your prescriptions, schedule  "appointments, and more. To sign up, go to [employer] and click on the Sign Up Now link in the New User? box. Enter your [employer] Activation Code exactly as it appears below along with the last four digits of your Social Security Number and your Date of Birth () to complete the sign-up process. If you do not sign up before the expiration date, you must request a new code.    [employer] Activation Code: [email or web address]  Expires: 2017  1:41 PM    If you have questions, you can email [email or web address] or call [phone] to talk to our [employer] staff. Remember, [employer] is NOT to be used for urgent needs. For medical emergencies, dial 911.               Other Info from Your Visit           Your Appointments     2017  1:15 PM EDT   Follow Up with Jayson Quezada MD   [facility] MEDICAL [facility] CARDIOLOGY (--)    [address] [place] [address] [place] [id number] [phone]           Arrive 15 minutes prior to appointment.              Allergies     No Known Allergies      Reason for Visit     Follow-up 4 months follow up.     Chest Pain     Shortness of Breath     Edema           Vital Signs     Blood Pressure Pulse Height Weight Oxygen Saturation Body Mass Index    129/82 (BP Location: Left arm, Patient Position: Sitting, Cuff Size: Adult) 90 69\" (175.3 cm) 237 lb 6.4 oz (108 kg) 98% 35.06 kg/m2    Smoking Status                   Former Smoker           Problems and Diagnoses Noted     Arteriosclerotic cardiovascular disease (ASCVD)    Heart failure    Dyslipidemia    Heart disease    High blood pressure    ICD (implantable cardioverter-defibrillator) in place    Ischemic cardiomyopathy    Peripheral vascular disease    Precordial chest pain    SOB (shortness of breath)    Pharmacologic therapy            "

## 2017-01-04 NOTE — PROGRESS NOTES
No Known Provider  Tomas Salvador  1955  01/04/2017    Patient Active Problem List   Diagnosis   • Coronary disease   • Ischemic cardiomyopathy   • Hypertension   • Peripheral vascular disease   • Diabetes   • ICD (implantable cardioverter-defibrillator) in place, implantation 2015.   • Chronic systolic congestive heart failure   • Heart disease   • SOB (shortness of breath)   • Arteriosclerotic cardiovascular disease (ASCVD), s/p coronary artery bypass grafting in 2005.    • Dyslipidemia, on atorvastain.    • Precordial chest pain       Dear No Known Provider:    Chief Complaint   Patient presents with   • Follow-up     4 months follow up.    • Chest Pain   • Shortness of Breath   • Edema         Subjective     Tomas Salvador is a 61 y.o. male with the problems as listed above, presents for follow up of hx of CHF.      History of Present Illness: Mr. Salvador has a history of ischemic cardiomyopathy, CHF, ASCVD.  He has complaints of DUNCAN with mild exertion, such as walking around his house.  Sharp, occasional  chest pains are noted. 2 pillow orthopnea and coughing at home are noted.  He mentions cutting back on sodium in his diet.        No Known Allergies:      Current Outpatient Prescriptions:   •  albuterol (PROAIR HFA) 108 (90 BASE) MCG/ACT inhaler, Inhale 2 puffs every 4 (four) hours as needed for wheezing., Disp: , Rfl:   •  aspirin 81 MG EC tablet, Take 81 mg by mouth daily., Disp: , Rfl:   •  atorvastatin (LIPITOR) 80 MG tablet, Take 1 tablet by mouth Daily., Disp: 30 tablet, Rfl: 3  •  carvedilol (COREG) 6.25 MG tablet, Take 1 tablet by mouth 2 (Two) Times a Day With Meals., Disp: 60 tablet, Rfl: 3  •  clopidogrel (PLAVIX) 75 MG tablet, Take 1 tablet by mouth Daily., Disp: 30 tablet, Rfl: 3  •  ibuprofen (ADVIL,MOTRIN) 800 MG tablet, Take 800 mg by mouth Every 6 (Six) Hours As Needed for mild pain (1-3)., Disp: , Rfl:   •  insulin glargine (LANTUS) 100 UNIT/ML injection, Inject 45 Units under the  skin every night., Disp: , Rfl:   •  insulin lispro (HumaLOG) 100 UNIT/ML injection, Inject 5 Units under the skin 3 (three) times a day before meals., Disp: , Rfl:   •  metFORMIN (GLUCOPHAGE) 500 MG tablet, Take 500 mg by mouth 2 (two) times a day with meals., Disp: , Rfl:   •  nitroglycerin (NITROLINGUAL) 0.4 MG/SPRAY spray, Place 1 spray under the tongue every 5 (five) minutes as needed for chest pain., Disp: , Rfl:   •  omeprazole (PriLOSEC) 20 MG capsule, Take 20 mg by mouth daily., Disp: , Rfl:   •  rOPINIRole (REQUIP) 0.25 MG tablet, Take 0.25 mg by mouth Every Night. Take 1 hour before bedtime., Disp: , Rfl:   •  spironolactone (ALDACTONE) 25 MG tablet, Take 1 tablet by mouth Daily., Disp: 30 tablet, Rfl: 3  •  bumetanide (BUMEX) 1 MG tablet, Take 1 tablet by mouth Daily. Take 2 mg po x5 days, then 1 mg po qd thereafter., Disp: 30 tablet, Rfl: 3  •  gabapentin (NEURONTIN) 300 MG capsule, Take 1 capsule by mouth 2 (two) times a day., Disp: 60 capsule, Rfl: 3      The following portions of the patient's history were reviewed and updated as appropriate: allergies, current medications, past family history, past medical history, past social history, past surgical history and problem list.    Social History   Substance Use Topics   • Smoking status: Former Smoker     Quit date: 2002   • Smokeless tobacco: Not on file   • Alcohol use No       Review of Systems   Constitution: Negative for chills and fever.   HENT: Negative for headaches, nosebleeds and sore throat.    Cardiovascular: Positive for chest pain and leg swelling. Negative for palpitations and syncope.   Respiratory: Positive for shortness of breath. Negative for cough, hemoptysis and wheezing.    Gastrointestinal: Negative for abdominal pain, hematemesis, hematochezia, melena, nausea and vomiting.   Genitourinary: Negative for dysuria and hematuria.   Neurological: Positive for dizziness. Negative for light-headedness.       Objective   Vitals:     "01/04/17 1244   BP: 129/82   BP Location: Left arm   Patient Position: Sitting   Cuff Size: Adult   Pulse: 90   SpO2: 98%   Weight: 237 lb 6.4 oz (108 kg)   Height: 69\" (175.3 cm)     Body mass index is 35.06 kg/(m^2).        Physical Exam   Constitutional: He is oriented to person, place, and time. He appears well-developed and well-nourished.   HENT:   Mouth/Throat: Oropharynx is clear and moist.   Eyes: EOM are normal. Pupils are equal, round, and reactive to light.   Neck: Neck supple. No JVD present. No tracheal deviation present. No thyromegaly present.   Cardiovascular: Normal rate, regular rhythm, S1 normal and S2 normal.  Exam reveals no gallop and no friction rub.    No murmur heard.  Pulmonary/Chest: Effort normal. He has rales (at bilateral lung bases).   Abdominal: Soft. Bowel sounds are normal. He exhibits no mass. There is no tenderness.   Musculoskeletal: Normal range of motion. He exhibits edema (1+ edema present LE).   Lymphadenopathy:     He has no cervical adenopathy.   Neurological: He is alert and oriented to person, place, and time.   Skin: Skin is warm and dry. No rash noted.   Psychiatric: He has a normal mood and affect.       Lab Results   Component Value Date     12/16/2015    K 3.8 12/16/2015     12/16/2015    CO2 25 12/16/2015    BUN 17 12/16/2015    CREATININE 0.8 12/16/2015    GLUCOSE 207 (H) 12/16/2015    CALCIUM 9.0 12/16/2015    AST 17 08/20/2015    ALT 18 08/20/2015    ALKPHOS 47 08/20/2015    LABIL2 1.3 (L) 08/20/2015     Lab Results   Component Value Date    CKTOTAL 173 05/07/2015     Lab Results   Component Value Date    WBC 9.22 12/16/2015    HGB 13.2 12/16/2015    HCT 39.0 12/16/2015     12/16/2015     Lab Results   Component Value Date    INR 1.01 12/16/2015    INR 0.96 05/06/2015     No results found for: MG  Lab Results   Component Value Date    CHLPL 142 05/08/2015    TRIG 127 05/08/2015    HDL 28 (L) 05/08/2015    LDL 89 05/08/2015      Lab Results "   Component Value Date     (H) 06/15/2015     Echo   No results found for: ECHOEFEST    ECG 12 Lead  Date/Time: 1/4/2017 12:51 PM  Performed by: JUAN PABLO JAMES  Authorized by: JUAN PABLO JAMES   Rhythm: sinus rhythm  BPM: 84  Conduction: LAFB            Assessment/Plan    Diagnosis Plan   1. Arteriosclerotic cardiovascular disease (ASCVD), s/p coronary artery bypass grafting in 2005.   ECG 12 Lead   2. Essential hypertension  carvedilol (COREG) 6.25 MG tablet   3. Dyslipidemia, on atorvastain.   atorvastatin (LIPITOR) 80 MG tablet   4. ICD (implantable cardioverter-defibrillator) in place, implantation 2015.     5. Peripheral vascular disease     6. Chronic systolic congestive heart failure  carvedilol (COREG) 6.25 MG tablet   7. Ischemic cardiomyopathy     8. Heart disease     9. Precordial chest pain  Adult Transthoracic Echo Complete    Stress Test With Myocardial Perfusion   10. SOB (shortness of breath)  XR chest pa and lateral    bumetanide (BUMEX) 1 MG tablet    BNP   11. Drug therapy  Basic Metabolic Panel    Basic Metabolic Panel            Recommendations:     Change Lasix to Bumex 2mg x5 days, then 1mg po qd there after.  Check BMP in 1 week.  Check chest xray, BNP and BMP now.  Will evaluate with echo doppler study and nuclear stress test, as patient is c/o chest pain.  Stop Losartan. Wait 36 hours then begin Entresto 24/26mg po bid.  Patient instructed about this and expressed understanding.  Samples were given.  Monitor weight at home.    Return in about 4 weeks (around 2/1/2017).    As always, I appreciate very much the opportunity to participate in the cardiovascular care of your patients.      With Best Regards,    Juan Pablo James MD, FACC

## 2017-01-19 NOTE — TELEPHONE ENCOUNTER
Called pharmacy and let them know pt is supposed to be taking entresto 24/26mg BID. And gave him 2 refills.

## 2017-03-02 PROBLEM — Z01.810 PRE-OPERATIVE CARDIOVASCULAR EXAMINATION: Status: ACTIVE | Noted: 2017-01-01

## 2017-03-02 NOTE — PROGRESS NOTES
Juan Pablo Aguirre MD  Tomas Salvador  1955  01/04/2017    Patient Active Problem List   Diagnosis   • Arteriosclerotic cardiovascular disease (ASCVD), s/p coronary artery bypass grafting in 2005.    • Ischemic cardiomyopathy,s/p  ICD (implantable cardioverter-defibrillator) in place, implantation 2015.   • Hypertension   • Peripheral vascular disease   • Type II Diabetes   • Chronic systolic congestive heart failure   • Dyslipidemia, on atorvastain.    • Pre-operative cardiovascular examination         Chief Complaint: Follow-up of ASCVD and ischemic cardiomyopathy and clearance for hemorrhoid surgery.         Subjective     Tomas Salvador is a 61 y.o. male with the problems as listed above, presents for follow up of ASCVD and hx of CHF.      History of Present Illness: Mr. Salvador is a 59-year-old  male with history of myocardial infarction, status post CABG in 2005 with underlying advanced ischemic cardiomyopathy, with history of congestive heart failure is here for regular cardiology follow-up.  He denies chest pain.  He has DUNCAN with mild exertion.  He is seeking cardiac clearance for hemorrhoid surgery.         No Known Allergies:      Current Outpatient Prescriptions:   •  albuterol (PROAIR HFA) 108 (90 BASE) MCG/ACT inhaler, Inhale 2 puffs every 4 (four) hours as needed for wheezing., Disp: , Rfl:   •  aspirin 81 MG EC tablet, Take 81 mg by mouth daily., Disp: , Rfl:   •  atorvastatin (LIPITOR) 80 MG tablet, Take 1 tablet by mouth Daily., Disp: 30 tablet, Rfl: 3  •  bumetanide (BUMEX) 1 MG tablet, Take 1 tablet by mouth Daily. Take 2 mg po x5 days, then 1 mg po qd thereafter., Disp: 30 tablet, Rfl: 3  •  carvedilol (COREG) 6.25 MG tablet, Take 1 tablet by mouth 2 (Two) Times a Day With Meals., Disp: 60 tablet, Rfl: 3  •  cefuroxime (CEFTIN) 500 MG tablet, Take 1 tablet by mouth 2 (Two) Times a Day., Disp: 20 tablet, Rfl: 0  •  clopidogrel (PLAVIX) 75 MG tablet, Take 1 tablet by mouth Daily.,  Disp: 30 tablet, Rfl: 3  •  gabapentin (NEURONTIN) 300 MG capsule, Take 1 capsule by mouth 2 (two) times a day., Disp: 60 capsule, Rfl: 3  •  ibuprofen (ADVIL,MOTRIN) 800 MG tablet, Take 800 mg by mouth Every 6 (Six) Hours As Needed for mild pain (1-3)., Disp: , Rfl:   •  insulin glargine (LANTUS) 100 UNIT/ML injection, Inject 45 Units under the skin Every Night., Disp: 500 Units, Rfl: 0  •  insulin lispro (humaLOG) 100 UNIT/ML injection, Inject 5 Units under the skin 3 (Three) Times a Day Before Meals., Disp: 200 Units, Rfl: 0  •  lidocaine (XYLOCAINE) 2 % jelly, Apply  topically 2 (Two) Times a Day., Disp: 30 mL, Rfl: 0  •  metFORMIN (GLUCOPHAGE) 500 MG tablet, Take 500 mg by mouth 2 (two) times a day with meals., Disp: , Rfl:   •  nitroglycerin (NITROLINGUAL) 0.4 MG/SPRAY spray, Place 1 spray under the tongue every 5 (five) minutes as needed for chest pain., Disp: , Rfl:   •  omeprazole (PriLOSEC) 20 MG capsule, Take 20 mg by mouth daily., Disp: , Rfl:   •  rOPINIRole (REQUIP) 0.25 MG tablet, Take 0.25 mg by mouth Every Night. Take 1 hour before bedtime., Disp: , Rfl:   •  spironolactone (ALDACTONE) 25 MG tablet, Take 1 tablet by mouth Daily., Disp: 30 tablet, Rfl: 3      The following portions of the patient's history were reviewed and updated as appropriate: allergies, current medications, past family history, past medical history, past social history, past surgical history and problem list.    Social History   Substance Use Topics   • Smoking status: Former Smoker     Quit date: 2002   • Smokeless tobacco: Not on file   • Alcohol use No       Review of Systems   Constitution: Negative for chills and fever.   HENT: Negative for headaches, nosebleeds and sore throat.    Cardiovascular: Positive for chest pain and leg swelling. Negative for palpitations and syncope.   Respiratory: Positive for shortness of breath. Negative for cough, hemoptysis and wheezing.    Gastrointestinal: Negative for abdominal pain,  "hematemesis, hematochezia, melena, nausea and vomiting.   Genitourinary: Negative for dysuria and hematuria.   Neurological: Positive for dizziness. Negative for light-headedness.       Objective   Vitals:    03/02/17 1215   BP: 111/73   BP Location: Left arm   Pulse: 105   Resp: 16   Weight: 216 lb (98 kg)   Height: 69\" (175.3 cm)     Body mass index is 31.9 kg/(m^2).        Physical Exam   Constitutional: He is oriented to person, place, and time. He appears well-developed and well-nourished.   HENT:   Mouth/Throat: Oropharynx is clear and moist.   Eyes: EOM are normal. Pupils are equal, round, and reactive to light.   Neck: Neck supple. No JVD present. No tracheal deviation present. No thyromegaly present.   Cardiovascular: Normal rate, regular rhythm, S1 normal and S2 normal.  Exam reveals no gallop and no friction rub.    No murmur heard.  Pulmonary/Chest: Effort normal.   Abdominal: Soft. Bowel sounds are normal. He exhibits no mass. There is no tenderness.   Musculoskeletal: Normal range of motion. He exhibits edema (1+ edema present LE).   Lymphadenopathy:     He has no cervical adenopathy.   Neurological: He is alert and oriented to person, place, and time.   Skin: Skin is warm and dry. No rash noted.   Psychiatric: He has a normal mood and affect.       Lab Results   Component Value Date     (L) 02/24/2017    K 3.8 02/24/2017    CL 94 (L) 02/24/2017    CO2 27.1 02/24/2017    BUN 14 02/24/2017    CREATININE 0.97 02/24/2017    GLUCOSE 414 (H) 02/24/2017    CALCIUM 9.4 02/24/2017    AST 17 08/20/2015    ALT 18 08/20/2015    ALKPHOS 47 08/20/2015    LABIL2 1.3 (L) 08/20/2015     Lab Results   Component Value Date    CKTOTAL 173 05/07/2015     Lab Results   Component Value Date    WBC 15.43 (H) 02/24/2017    HGB 13.2 (L) 02/24/2017    HCT 41.4 (L) 02/24/2017     02/24/2017     Lab Results   Component Value Date    INR 1.01 12/16/2015    INR 0.96 05/06/2015     No results found for: MG  Lab Results "   Component Value Date    PSA 0.710 02/25/2017    CHLPL 142 05/08/2015    TRIG 127 05/08/2015    HDL 28 (L) 05/08/2015    LDL 89 05/08/2015      Lab Results   Component Value Date    .0 (H) 01/04/2017     Echo   No results found for: ECHOEFEST  Procedures    Assessment/Plan      1. Pre-operative cardiovascular examination     2. Arteriosclerotic cardiovascular disease (ASCVD), s/p coronary artery bypass grafting in 2005.      3. Chronic systolic congestive heart failure     4. ICD (implantable cardioverter-defibrillator) in place, implantation 2015.     5. Peripheral vascular disease     6. Essential hypertension     7. Ischemic cardiomyopathy     8. Type 2 diabetes mellitus with diabetic peripheral angiopathy without gangrene, with long-term current use of insulin     9. Dyslipidemia, on atorvastatn          Recommendations:     Will reorder stress test and echo doppler study as pt is requesting cardiac clearance prior to hemorrhoid surgery.  Continue current medications.  If his nuclear stress tests reveals no significant myocardial ischemia, he'll be cleared for the surgery.    Return in about 4 weeks (around 3/30/2017).    As always, I appreciate very much the opportunity to participate in the cardiovascular care of your patients.      With Best Regards,    Juan Pablo Aguirre MD, [facility]

## 2017-03-30 PROBLEM — A41.9 SEPSIS (HCC): Status: ACTIVE | Noted: 2017-01-01

## 2017-03-30 NOTE — PROGRESS NOTES
Chief Complaint:          Chief Complaint   Patient presents with   • Urinary Retention       HPI:   61 y.o. male being seen in the office for history of urinary retention with catheter placement at [facility] ED on 2/24/2017.  Patient was also diagnosed with UTI with urine culture revealing Escherichia coli for which he was started on Ceftin x 10 days with no improvement of symptoms.  Wife is present.  Patient still has the catheter and is requesting it be removed. His wife states he is having some swelling and firmness in his scrotum and discoloration at the bottom of the scrotum that is black in color.  This area measure 3 -4 inches in length and 2 inches in width with a necrotic edges and whitish discoloration within the middle of the lesion.  Cool to touch. Has foul odor and mild weeping. Scrotum above the lesion is firm to touch given impression of epididymo-orchitis. Denies any pain in scrotum except with movement/elevation of the scrotum.  Wife states the patient is a Type 2 diabetic with insulin use.  Reports he has lost close to 100 pounds over the past several months.  HPI        Past Medical History:        Past Medical History:   Diagnosis Date   • Chest pain    • Heart disease    • Hyperlipidemia    • SOB (shortness of breath)          Current Meds:     Current Outpatient Prescriptions   Medication Sig Dispense Refill   • albuterol (PROAIR HFA) 108 (90 BASE) MCG/ACT inhaler Inhale 2 puffs every 4 (four) hours as needed for wheezing.     • aspirin 81 MG EC tablet Take 81 mg by mouth daily.     • atorvastatin (LIPITOR) 80 MG tablet Take 1 tablet by mouth Daily. 30 tablet 3   • bumetanide (BUMEX) 1 MG tablet Take 1 tablet by mouth Daily. Take 2 mg po x5 days, then 1 mg po qd thereafter. 30 tablet 3   • carvedilol (COREG) 6.25 MG tablet Take 1 tablet by mouth 2 (Two) Times a Day With Meals. 60 tablet 3   • cefuroxime (CEFTIN) 500 MG tablet Take 1 tablet by mouth 2 (Two) Times a Day. 20 tablet 0   •  clopidogrel (PLAVIX) 75 MG tablet Take 1 tablet by mouth Daily. 30 tablet 3   • gabapentin (NEURONTIN) 300 MG capsule Take 1 capsule by mouth 2 (two) times a day. 60 capsule 3   • ibuprofen (ADVIL,MOTRIN) 800 MG tablet Take 800 mg by mouth Every 6 (Six) Hours As Needed for mild pain (1-3).     • insulin glargine (LANTUS) 100 UNIT/ML injection Inject 45 Units under the skin Every Night. 500 Units 0   • insulin lispro (humaLOG) 100 UNIT/ML injection Inject 5 Units under the skin 3 (Three) Times a Day Before Meals. 200 Units 0   • lidocaine (XYLOCAINE) 2 % jelly Apply  topically 2 (Two) Times a Day. 30 mL 0   • metFORMIN (GLUCOPHAGE) 500 MG tablet Take 500 mg by mouth 2 (two) times a day with meals.     • nitroglycerin (NITROLINGUAL) 0.4 MG/SPRAY spray Place 1 spray under the tongue every 5 (five) minutes as needed for chest pain.     • omeprazole (PriLOSEC) 20 MG capsule Take 20 mg by mouth daily.     • rOPINIRole (REQUIP) 0.25 MG tablet Take 0.25 mg by mouth Every Night. Take 1 hour before bedtime.     • spironolactone (ALDACTONE) 25 MG tablet Take 1 tablet by mouth Daily. 30 tablet 3     No current facility-administered medications for this visit.         Allergies:      No Known Allergies     Past Surgical History:     Past Surgical History:   Procedure Laterality Date   • CARDIAC DEFIBRILLATOR PLACEMENT     • CORONARY ARTERY BYPASS GRAFT           Social History:     Social History     Social History   • Marital status:      Spouse name: N/A   • Number of children: N/A   • Years of education: N/A     Occupational History   • Not on file.     Social History Main Topics   • Smoking status: Former Smoker     Quit date: 2002   • Smokeless tobacco: Not on file   • Alcohol use No   • Drug use: No   • Sexual activity: Defer     Other Topics Concern   • Not on file     Social History Narrative       Family History:     Family History   Problem Relation Age of Onset   • Coronary artery disease Other    •  Hypertension Other    • Diabetes Other        Review of Systems:     Review of Systems    Physical Exam:     Physical Exam   Constitutional: He is oriented to person, place, and time. He appears well-developed and well-nourished. No distress.   Abdominal: Soft.   Genitourinary: Penis normal.   Genitourinary Comments: Indwelling catheter.  Scrotal edema and firmness   Scrotal lesion   Musculoskeletal: Normal range of motion.   Neurological: He is alert and oriented to person, place, and time.   Skin: Skin is warm and dry. He is not diaphoretic.   Psychiatric: His behavior is normal. Judgment and thought content normal.   Flat affect   Nursing note and vitals reviewed.      Procedure:     No notes on file      Assessment:     Encounter Diagnoses   Name Primary?   • Urinary tract infection without hematuria, site unspecified Yes   • Testicular swelling    • Scrotal skin lesion    • Indwelling catheter present on admission      Orders Placed This Encounter   Procedures   • US Scrotum & Testicles     Standing Status:   Future     Number of Occurrences:   1     Standing Expiration Date:   3/30/2018     Order Specific Question:   Reason for Exam:     Answer:   epididymitis, Delisa's gangrene   • CBC (No Diff)   • C-reactive Protein   • Comprehensive Metabolic Panel   • Osmolality, Calculated     Standing Status:   Future     Number of Occurrences:   1     Standing Expiration Date:   3/30/2018       Plan:   Discussed findings with Dr. Johnson.  Per his recommendation will obtain a STAT CBC, CMP, and CRP.  Patient will be sent for a scrotal ultrasound.  He is instructed to return to the office today at 1:00 PM to be evaluated by Dr. Johnson.  Patient and wife are agreeable.    Counseling was given to patient and family for the following topics patient and family education and impressions. and the interim medical history and current results were reviewed.  A treatment plan with follow-up was made. Total time of the  encounter was 30 minutes and 30 minutes were spent discussing Urinary tract infection without hematuria, site unspecified [N39.0] face-to-face.       This document has been electronically signed by TRESA Boateng March 30, 2017 11:46 AM

## 2017-03-30 NOTE — PROGRESS NOTES
Chief Complaint:          Chief Complaint   Patient presents with   • Urinary Tract Infection       HPI:   61 y.o. male.    HPI  One month hx pf catheter.  Pt seen by Sharon Rivera who asked for us to examine him.  Pt's crp is elevated at nine and his wbc is 15,000      Past Medical History:        Past Medical History:   Diagnosis Date   • Chest pain    • Heart disease    • Hyperlipidemia    • SOB (shortness of breath)          Current Meds:     Current Outpatient Prescriptions   Medication Sig Dispense Refill   • albuterol (PROAIR HFA) 108 (90 BASE) MCG/ACT inhaler Inhale 2 puffs every 4 (four) hours as needed for wheezing.     • aspirin 81 MG EC tablet Take 81 mg by mouth daily.     • atorvastatin (LIPITOR) 80 MG tablet Take 1 tablet by mouth Daily. 30 tablet 3   • bumetanide (BUMEX) 1 MG tablet Take 1 tablet by mouth Daily. Take 2 mg po x5 days, then 1 mg po qd thereafter. 30 tablet 3   • carvedilol (COREG) 6.25 MG tablet Take 1 tablet by mouth 2 (Two) Times a Day With Meals. 60 tablet 3   • cefuroxime (CEFTIN) 500 MG tablet Take 1 tablet by mouth 2 (Two) Times a Day. 20 tablet 0   • clopidogrel (PLAVIX) 75 MG tablet Take 1 tablet by mouth Daily. 30 tablet 3   • gabapentin (NEURONTIN) 300 MG capsule Take 1 capsule by mouth 2 (two) times a day. 60 capsule 3   • ibuprofen (ADVIL,MOTRIN) 800 MG tablet Take 800 mg by mouth Every 6 (Six) Hours As Needed for mild pain (1-3).     • insulin glargine (LANTUS) 100 UNIT/ML injection Inject 45 Units under the skin Every Night. 500 Units 0   • insulin lispro (humaLOG) 100 UNIT/ML injection Inject 5 Units under the skin 3 (Three) Times a Day Before Meals. 200 Units 0   • lidocaine (XYLOCAINE) 2 % jelly Apply  topically 2 (Two) Times a Day. 30 mL 0   • metFORMIN (GLUCOPHAGE) 500 MG tablet Take 500 mg by mouth 2 (two) times a day with meals.     • nitroglycerin (NITROLINGUAL) 0.4 MG/SPRAY spray Place 1 spray under the tongue every 5 (five) minutes as needed for chest pain.      • omeprazole (PriLOSEC) 20 MG capsule Take 20 mg by mouth daily.     • rOPINIRole (REQUIP) 0.25 MG tablet Take 0.25 mg by mouth Every Night. Take 1 hour before bedtime.     • spironolactone (ALDACTONE) 25 MG tablet Take 1 tablet by mouth Daily. 30 tablet 3     No current facility-administered medications for this visit.         Allergies:      No Known Allergies     Past Surgical History:     Past Surgical History:   Procedure Laterality Date   • CARDIAC DEFIBRILLATOR PLACEMENT     • CORONARY ARTERY BYPASS GRAFT           Social History:     Social History     Social History   • Marital status:      Spouse name: N/A   • Number of children: N/A   • Years of education: N/A     Occupational History   • Not on file.     Social History Main Topics   • Smoking status: Former Smoker     Quit date: 2002   • Smokeless tobacco: Not on file   • Alcohol use No   • Drug use: No   • Sexual activity: Defer     Other Topics Concern   • Not on file     Social History Narrative       Family History:     Family History   Problem Relation Age of Onset   • Coronary artery disease Other    • Hypertension Other    • Diabetes Other        Review of Systems:     Review of Systems   Constitutional: Positive for fatigue. Negative for chills and fever.   Respiratory: Negative for cough, shortness of breath and wheezing.    Cardiovascular: Negative for leg swelling.   Gastrointestinal: Negative for abdominal pain, nausea and vomiting.   Genitourinary: Positive for scrotal swelling.   Musculoskeletal: Negative for back pain and joint swelling.   Neurological: Negative for dizziness and headaches.   Psychiatric/Behavioral: Negative for confusion.       Physical Exam:     Physical Exam  Pt has right epididymoorchitis and 4 degree scrotal pressure sore with a rim of cellulitis 1 cm.  Procedure:     No notes on file      Assessment:     Encounter Diagnoses   Name Primary?   • Scrotal ulcer Yes   • Epididymoorchitis      No orders of the  defined types were placed in this encounter.      Plan:   Pt needs debridement and antibiotics but he has multiple medical problem will send him to ER for evaluation and admission.    Counseling was given to patient and family for the following topics instructions for management. and the interim medical history and current results were reviewed.  A treatment plan with follow-up was made. Total time of the encounter was 27 minutes and 27 minutes were spent discussing Scrotal ulcer [N50.89] face-to-face.       This document has been electronically signed by Mele Johnson MD March 30, 2017 1:38 PM

## 2017-03-31 PROBLEM — N49.3 FOURNIER'S GANGRENE OF SCROTUM: Status: ACTIVE | Noted: 2017-01-01

## 2017-03-31 NOTE — ANESTHESIA POSTPROCEDURE EVALUATION
Patient: Tomas Salvador    Procedure Summary     Date Anesthesia Start Anesthesia Stop Room / Location    03/31/17 1005 1137 [facility] OR 08 / [facility]       Procedure Diagnosis Surgeon Provider    CYSTOSCOPY and scrotal exploration with debridement of necrotizing fasciitis (N/A Urethra); SCROTAL EXPLORATION (N/A Scrotum) Scrotal ulcer; Sepsis, due to unspecified organism  (Scrotal ulcer [N50.89]; Sepsis, due to unspecified organism [A41.9]) MD Tayo Arthur MD          Anesthesia Type: general  Last vitals  /84 (03/31/17 1150)    Temp 97.3 °F (36.3 °C) (03/31/17 1155)    Pulse 91 (03/31/17 1155)   Resp 16 (03/31/17 1155)    SpO2 99 % (03/31/17 1155)      Post Anesthesia Care and Evaluation    Patient location during evaluation: PACU  Patient participation: complete - patient participated  Level of consciousness: awake and alert  Pain score: 1  Pain management: adequate  Airway patency: patent  Anesthetic complications: No anesthetic complications  PONV Status: controlled  Cardiovascular status: acceptable  Respiratory status: acceptable  Hydration status: acceptable

## 2017-03-31 NOTE — ANESTHESIA PREPROCEDURE EVALUATION
Anesthesia Evaluation     NPO Status: > 8 hours   Airway   Mallampati: II  TM distance: <3 FB  Neck ROM: full  possible difficult intubation  Dental    (+) poor dentition    Pulmonary    (+) a smoker Former, shortness of breath, decreased breath sounds,   (-) asthma  Cardiovascular - normal exam  Exercise tolerance: poor (<4 METS)    NYHA Classification: II  PT is on anticoagulation therapy  Patient on routine beta blocker and Beta blocker given within 24 hours of surgery    (+) hypertension, past MI (2015)  >12 months, CAD, cardiac stents (x3 14 yrs ago) more than 12 months ago dysrhythmias, angina with exertion, CHF, PVD, hyperlipidemia  CABG: x3   2005.      Neuro/Psych  (+) weakness,    (-) seizures, CVA  GI/Hepatic/Renal/Endo    (+) obesity,  GERD, diabetes mellitus using insulin,   (-) morbid obesity, hypothyroidism    Musculoskeletal     (-) back pain  Abdominal  - normal exam    Bowel sounds: normal.   Substance History      OB/GYN          Other   (+) arthritis                                 Anesthesia Plan    ASA 4     general     intravenous induction   Anesthetic plan and risks discussed with patient.  Use of blood products discussed with patient  Consented to blood products.

## 2017-04-01 NOTE — PLAN OF CARE
Problem: Infection, Risk/Actual (Adult)  Goal: Identify Related Risk Factors and Signs and Symptoms  Outcome: Ongoing (interventions implemented as appropriate)  Goal: Infection Prevention/Resolution  Outcome: Ongoing (interventions implemented as appropriate)    Problem: Patient Care Overview (Adult)  Goal: Plan of Care Review  Outcome: Ongoing (interventions implemented as appropriate)  Goal: Adult Individualization and Mutuality  Outcome: Ongoing (interventions implemented as appropriate)  Goal: Discharge Needs Assessment  Outcome: Ongoing (interventions implemented as appropriate)    Problem: Fall Risk (Adult)  Goal: Identify Related Risk Factors and Signs and Symptoms  Outcome: Ongoing (interventions implemented as appropriate)  Goal: Absence of Falls  Outcome: Ongoing (interventions implemented as appropriate)    Problem: Respiratory Insufficiency (Adult)  Goal: Identify Related Risk Factors and Signs and Symptoms  Outcome: Ongoing (interventions implemented as appropriate)  Goal: Acid/Base Balance  Outcome: Ongoing (interventions implemented as appropriate)  Goal: Effective Ventilation  Outcome: Ongoing (interventions implemented as appropriate)    Problem: Skin Integrity Impairment, Risk/Actual (Adult)  Goal: Identify Related Risk Factors and Signs and Symptoms  Outcome: Ongoing (interventions implemented as appropriate)  Goal: Skin Integrity/Wound Healing  Outcome: Ongoing (interventions implemented as appropriate)    Problem: Perioperative Period (Adult)  Goal: Signs and Symptoms of Listed Potential Problems Will be Absent or Manageable (Perioperative Period)  Outcome: Ongoing (interventions implemented as appropriate)

## 2017-04-01 NOTE — PROGRESS NOTES
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  Wound edges are looking vascularized and some drainage but not bad and he is not bleeding from wound much.  Tmax 98.3 VSS. hgb 11.7 and creat 0.54.  If Dr. Cason agrees I would start BID sitz bathes prior to dressing changes.

## 2017-04-01 NOTE — PLAN OF CARE
Problem: Infection, Risk/Actual (Adult)  Goal: Identify Related Risk Factors and Signs and Symptoms  Outcome: Ongoing (interventions implemented as appropriate)  Goal: Infection Prevention/Resolution  Outcome: Ongoing (interventions implemented as appropriate)    Problem: Patient Care Overview (Adult)  Goal: Plan of Care Review  Outcome: Ongoing (interventions implemented as appropriate)  Goal: Adult Individualization and Mutuality  Outcome: Ongoing (interventions implemented as appropriate)  Goal: Discharge Needs Assessment  Outcome: Ongoing (interventions implemented as appropriate)    Problem: Fall Risk (Adult)  Goal: Identify Related Risk Factors and Signs and Symptoms  Outcome: Ongoing (interventions implemented as appropriate)  Goal: Absence of Falls  Outcome: Ongoing (interventions implemented as appropriate)    Problem: Skin Integrity Impairment, Risk/Actual (Adult)  Goal: Identify Related Risk Factors and Signs and Symptoms  Outcome: Ongoing (interventions implemented as appropriate)  Goal: Skin Integrity/Wound Healing  Outcome: Ongoing (interventions implemented as appropriate)

## 2017-04-01 NOTE — PROGRESS NOTES
Psychiatric HOSPITALIST PROGRESS NOTE     Patient Identification:  Name:  Tomas Salvador  Age:  61 y.o.  Sex:  male  :  1955  MRN:  [id number]  Visit Number:  [id number]  Primary Care Provider:  TRESA Bailon    Length of stay:  2    Chief complaint:  Pain in the scrotal area since 3/26/2017    Subjective:  62 yo WM who was admitted on 3/30/2017 with a scrotal ulceration; he was suspected of having a necrotic ulcer. He had debridement 3/31/2017 and it is determined by the urologist that he has neck fasciitis.  The patient was not intubated in the OR and has done well with nasal cannula oxygen.  Today, he is in pain and requesting pain meds.  He denies chest pain, denies trouble breathing, denies coughing, denies wheezing, denies edema.  He is eating okay with no nausea, no emesis, no diarrhea.  ----------------------------------------------------------------------------------------------------------------------  Current Hospital Meds:  aspirin 81 mg Oral Daily   atorvastatin 80 mg Oral Daily   carvedilol 6.25 mg Oral BID With Meals   clindamycin 600 mg Intravenous Q8H   clopidogrel 75 mg Oral Daily   gabapentin 300 mg Oral BID   heparin (porcine) 5,000 Units Subcutaneous Q12H   insulin aspart 0-9 Units Subcutaneous 4x Daily AC & at Bedtime   meropenem 2 g Intravenous Q8H   Risaquad-2 1 capsule Oral Daily   rOPINIRole 0.25 mg Oral Nightly   sodium chloride 10 mL Intracatheter Q12H   vancomycin 1,500 mg Intravenous Q12H   ----------------------------------------------------------------------------------------------------------------------  Vital Signs:  Temp:  [98.3 °F (36.8 °C)-98.5 °F (36.9 °C)] 98.4 °F (36.9 °C)  Heart Rate:  [79-97] 84  Resp:  [12-20] 16  BP: ()/(52-93) 109/54  Last 3 weights    17  1351 17  1840   Weight: 216 lb (98 kg) 219 lb (99.3 kg)     Body mass index is 32.34 kg/(m^2).        Diet  Regular  ----------------------------------------------------------------------------------------------------------------------  Physical exam:  Constitutional: Well-developed and well-nourished. No respiratory distress. He is overweight.    HENT:  Head: Normocephalic and atraumatic.  Mouth: Moist mucous membranes.    Eyes: Conjunctivae and EOM are normal. Pupils are equal, round, and reactive to light. No scleral icterus.  Neck: Neck supple. No JVD present.    Cardiovascular: Normal rate, regular rhythm and normal heart sounds with no murmur.  Pulmonary/Chest: No respiratory distress, no wheezes, no crackles, with normal breath sounds and good air movement.  Abdominal: Soft. Bowel sounds are normal. No distension and no tenderness.   Musculoskeletal: No edema, no tenderness, and no deformity. No red or swollen joints anywhere.   Neurological: Alert and oriented to person, place, and time. No cranial nerve deficit. No tongue deviation. No facial droop. No slurred speech The previous exam is prior to surgery; after surgery he was asleep but arousable.   Skin: Skin is warm and dry. No rash noted. No pallor.   Psychiatric: Normal mood and affect. Behavior is normal. Judgment and thought content normal.   Peripheral vascular: No edema and strong pulses on all 4 extremities.  Genitourinary: Cueto catheter in place; Dr. Johnson did the skin exam today and states that there is no cellulitis or much drainage today and no necrotic tissue.  ----------------------------------------------------------------------------------------------------------------------  Tele:  NS in the 80's; I have personally reviewed/looked at the telemetry strips.  ----------------------------------------------------------------------------------------------------------------------  Results from last 7 days  Lab Units 03/31/17  0542 03/30/17  1515   CK TOTAL U/L 32 92   CKMB ng/mL 1.22 2.48   CK MB INDEX % 3.8* 2.7   TROPONIN I ng/mL 0.018 0.024    MYOGLOBIN ng/mL 46.0 104.0     Results from last 7 days  Lab Units 04/01/17  0058 04/01/17  0057 03/31/17  0542 03/30/17  1515 03/30/17  1050   CRP mg/dL  --  7.72* 7.96*  --  9.54*   LACTATE mmol/L 0.8  --  0.8 1.6  --    WBC 10*3/mm3 13.93*  --  13.21* 17.96* 15.63*   HEMOGLOBIN g/dL 10.7*  --  11.7* 13.3* 13.5*   HEMATOCRIT % 33.9*  --  37.5* 39.8* 40.8*   MCV fL 90.6  --  87.8 86.9 86.4   MCHC g/dL 31.6*  --  31.2* 33.4 33.1   PLATELETS 10*3/mm3 315  --  357 463* 464*     Results from last 7 days  Lab Units 04/01/17  0337 04/01/17 0057 03/31/17  1523 03/31/17  0542 03/30/17  1515   SODIUM mmol/L 134*  --   --  138 131*   POTASSIUM mmol/L 4.2  --  3.8 2.9* 4.5   MAGNESIUM mg/dL  --  1.9  --  1.6*  1.7  --    CHLORIDE mmol/L 106  --   --  105 94*   TOTAL CO2 mmol/L 21.4*  --   --  26.4 28.1   BUN mg/dL <5*  --   --  6* 9   CREATININE mg/dL 0.52  --   --  0.54 0.93   EGFR IF NONAFRICN AM mL/min/1.73 >150  --   --  >150 82   CALCIUM mg/dL 7.8  --   --  8.4 9.2   GLUCOSE mg/dL 130*  --   --  116* 246*   ALBUMIN g/dL 2.50*  --   --  3.20* 3.80   BILIRUBIN mg/dL 0.2  --   --  0.4 0.6   ALK PHOS U/L 56  --   --  58 75   AST (SGOT) U/L 30  --   --  19 43*   ALT (SGPT) U/L 13  --   --  12 14   Estimated Creatinine Clearance: 171 mL/min (by C-G formula based on Cr of 0.52).      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  -E coli UTI  -Ischemic cardiomyopathy/systolic congestive heart failure with known EF  less than 20% with no acute exacerbation  -Acute hypokalemia  -Hypotension as result of the sepsis that has improved  -Acute hyponatremia  -Past tobacco smoking addiction with possible undiagnosed COPD  -Type 2 diabetes mellitus with hyperglycemia at this time  -Hyponatremia of unknown etiology  -History of coronary artery disease status post three-vessel CABG with no current chest pain  -History of essential hypertension  -History of peripheral vascular disease  -Hyperlipidemia    The urine culture showing Escherichia coli but the tissue cultures from the OR shows some gram-positive cocci as well as gram-negative bacilli; as result, I will continue the vancomycin, clindamycin, and meropenem until the tissue cultures are back.  If the tissue cultures in the urine culture all show Escherichia coli then I will de-escalate the antibiotics to Rocephin.  Dr. Johnson has written for sitz baths twice a day.  Since the patient is not on vasopressors, not intubated, and is doing better, I will move him to the medical surgical floor with telemetry for further treatment.  I will repeat his blood work in the morning.  He does not appear fluid overloaded at this time; because of his ischemic cardiomyopathy we will monitor his fluid status closely.    The patient is high risk due to the following diagnoses/reasons:  septic shock and Delisa's gangrene    Christoph Cason MD  04/01/17  4:17 PM

## 2017-04-01 NOTE — PROGRESS NOTES
Kinetics :  Vancomycin  Day 3    The pre-dose vancomycin level was reported as therapeutic at 17.6 mcg/ml.  Plan to continue the same for now and follow with you.

## 2017-04-02 NOTE — PLAN OF CARE
Problem: Infection, Risk/Actual (Adult)  Goal: Infection Prevention/Resolution  Outcome: Ongoing (interventions implemented as appropriate)    Problem: Patient Care Overview (Adult)  Goal: Plan of Care Review  Outcome: Ongoing (interventions implemented as appropriate)    Problem: Fall Risk (Adult)  Goal: Absence of Falls  Outcome: Ongoing (interventions implemented as appropriate)    Problem: Respiratory Insufficiency (Adult)  Goal: Acid/Base Balance  Outcome: Ongoing (interventions implemented as appropriate)  Goal: Effective Ventilation  Outcome: Ongoing (interventions implemented as appropriate)    Problem: Skin Integrity Impairment, Risk/Actual (Adult)  Goal: Skin Integrity/Wound Healing  Outcome: Ongoing (interventions implemented as appropriate)    Problem: Perioperative Period (Adult)  Goal: Signs and Symptoms of Listed Potential Problems Will be Absent or Manageable (Perioperative Period)  Outcome: Ongoing (interventions implemented as appropriate)

## 2017-04-02 NOTE — PLAN OF CARE
Problem: Infection, Risk/Actual (Adult)  Goal: Identify Related Risk Factors and Signs and Symptoms  Outcome: Outcome(s) achieved Date Met:  04/02/17 04/01/17 0136 04/01/17 0745   Infection, Risk/Actual   Infection, Risk/Actual: Related Risk Factors --  surgery/procedure;skin integrity impairment   Signs and Symptoms (Infection, Risk/Actual) edema --        Goal: Infection Prevention/Resolution  Outcome: Ongoing (interventions implemented as appropriate)    Problem: Patient Care Overview (Adult)  Goal: Plan of Care Review  Outcome: Ongoing (interventions implemented as appropriate)  Goal: Adult Individualization and Mutuality  Outcome: Ongoing (interventions implemented as appropriate)    Problem: Fall Risk (Adult)  Goal: Identify Related Risk Factors and Signs and Symptoms  Outcome: Outcome(s) achieved Date Met:  04/02/17  Goal: Absence of Falls  Outcome: Ongoing (interventions implemented as appropriate)    Problem: Respiratory Insufficiency (Adult)  Goal: Identify Related Risk Factors and Signs and Symptoms  Outcome: Outcome(s) achieved Date Met:  04/02/17  Goal: Acid/Base Balance  Outcome: Ongoing (interventions implemented as appropriate)  Goal: Effective Ventilation  Outcome: Ongoing (interventions implemented as appropriate)    Problem: Skin Integrity Impairment, Risk/Actual (Adult)  Goal: Identify Related Risk Factors and Signs and Symptoms  Outcome: Outcome(s) achieved Date Met:  04/02/17  Goal: Skin Integrity/Wound Healing  Outcome: Ongoing (interventions implemented as appropriate)

## 2017-04-02 NOTE — PROGRESS NOTES
[facility] HOSPITALIST PROGRESS NOTE     Patient Identification:  Name:  Tomas Salvador  Age:  61 y.o.  Sex:  male  :  1955  MRN:  [id number]  Visit Number:  [id number]  Primary Care Provider:  TRESA Bailon    Length of stay:  3    Chief complaint:  Pain in the scrotal area since 3/26/2017    Subjective:  60 yo WM who was admitted on 3/30/2017 with a scrotal ulceration; he was suspected of having a necrotic ulcer. He had debridement 3/31/2017 and it is determined by the urologist that he has neck fasciitis. The patient was not intubated in the OR and has done well with nasal cannula oxygen.  He was moved to the med-surg floor on 2017.  Today, he is doing well with his pain controlled with the morphine.  He denies chest pain and denies trouble breathing.  He denies nausea, denies emesis, denies diarrhea, denies abdominal pain.  He states that his surgical area has a dull pain with sharp exacerbation at times that does not radiate.  ----------------------------------------------------------------------------------------------------------------------  Current Hospital Meds:  aspirin 81 mg Oral Daily   atorvastatin 80 mg Oral Daily   carvedilol 6.25 mg Oral BID With Meals   clindamycin 600 mg Intravenous Q8H   clopidogrel 75 mg Oral Daily   gabapentin 300 mg Oral BID   heparin (porcine) 5,000 Units Subcutaneous Q12H   insulin aspart 0-9 Units Subcutaneous 4x Daily AC & at Bedtime   meropenem 2 g Intravenous Q8H   Risaquad-2 1 capsule Oral Daily   rOPINIRole 0.25 mg Oral Nightly   sodium chloride 10 mL Intracatheter Q12H   vancomycin 1,500 mg Intravenous Q12H   ----------------------------------------------------------------------------------------------------------------------  Vital Signs:  Temp:  [97.8 °F (36.6 °C)-98.8 °F (37.1 °C)] 98.3 °F (36.8 °C)  Heart Rate:  [64-90] 64  Resp:  [15-20] 18  BP: ()/(54-63) 97/61  Last 3 weights    17  1351 17  6243  04/02/17  0632   Weight: 216 lb (98 kg) 219 lb (99.3 kg) 230 lb 3.2 oz (104 kg)     Body mass index is 33.99 kg/(m^2).    Intake/Output Summary (Last 24 hours) at 04/02/17 1031  Last data filed at 04/02/17 0504   Gross per 24 hour   Intake              770 ml   Output              950 ml   Net             -180 ml     Diet Regular  ----------------------------------------------------------------------------------------------------------------------  Physical exam:  Constitutional: Well-developed and well-nourished. No respiratory distress. He is overweight.    HENT:  Head: Normocephalic and atraumatic.  Mouth: Moist mucous membranes.    Eyes: Conjunctivae and EOM are normal. Pupils are equal, round, and reactive to light. No scleral icterus.  Neck: Neck supple. No JVD present.    Cardiovascular: Normal rate, regular rhythm and normal heart sounds with no murmur.  Pulmonary/Chest: No respiratory distress, no wheezes, no crackles, with normal breath sounds and good air movement.  Abdominal: Soft. Bowel sounds are normal. No distension and no tenderness.   Musculoskeletal: No edema, no tenderness, and no deformity. No red or swollen joints anywhere.   Neurological: Alert and oriented to person, place, and time. No cranial nerve deficit. No tongue deviation. No facial droop. No slurred speech The previous exam is prior to surgery; after surgery he was asleep but arousable.   Skin: Skin is warm and dry. No rash noted. No pallor.   Psychiatric: Normal mood and affect. Behavior is normal. Judgment and thought content normal.   Peripheral vascular: No edema and strong pulses on all 4 extremities.  Genitourinary: Cueto catheter in place; Dr. Johnson to do the skin exam today of his scrotum.  ----------------------------------------------------------------------------------------------------------------------  Tele:  NS with heart rates 80-90; I have personally reviewed/looked at the telemetry  strips.  ----------------------------------------------------------------------------------------------------------------------  Results from last 7 days  Lab Units 03/31/17  0542 03/30/17  1515   CK TOTAL U/L 32 92   CKMB ng/mL 1.22 2.48   CK MB INDEX % 3.8* 2.7   TROPONIN I ng/mL 0.018 0.024   MYOGLOBIN ng/mL 46.0 104.0     Results from last 7 days  Lab Units 04/02/17 0409 04/01/17  0058 04/01/17 0057 03/31/17  0542   CRP mg/dL 7.31*  --  7.72* 7.96*   LACTATE mmol/L 0.8 0.8  --  0.8   WBC 10*3/mm3 9.91 13.93*  --  13.21*   HEMOGLOBIN g/dL 11.0* 10.7*  --  11.7*   HEMATOCRIT % 34.7* 33.9*  --  37.5*   MCV fL 89.9 90.6  --  87.8   MCHC g/dL 31.7* 31.6*  --  31.2*   PLATELETS 10*3/mm3 340 315  --  357     Results from last 7 days  Lab Units 04/02/17  0409 04/01/17  0337 04/01/17 0057 03/31/17  1523 03/31/17  0542   SODIUM mmol/L 136 134*  --   --  138   POTASSIUM mmol/L 3.6 4.2  --  3.8 2.9*   MAGNESIUM mg/dL 1.8  --  1.9  --  1.6*  1.7   CHLORIDE mmol/L 104 106  --   --  105   TOTAL CO2 mmol/L 27.4 21.4*  --   --  26.4   BUN mg/dL 8 <5*  --   --  6*   CREATININE mg/dL 0.56 0.52  --   --  0.54   EGFR IF NONAFRICN AM mL/min/1.73 148 >150  --   --  >150   CALCIUM mg/dL 8.3 7.8  --   --  8.4   GLUCOSE mg/dL 118* 130*  --   --  116*   ALBUMIN g/dL 3.00* 2.50*  --   --  3.20*   BILIRUBIN mg/dL 0.3 0.2  --   --  0.4   ALK PHOS U/L 57 56  --   --  58   AST (SGOT) U/L 16 30  --   --  19   ALT (SGPT) U/L 8* 13  --   --  12   Estimated Creatinine Clearance: 162.5 mL/min (by C-G formula based on Cr of 0.56).           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  -E coli UTI  -Morbid obesity  -Ischemic cardiomyopathy/systolic congestive heart failure with known EF less than 20% with no acute exacerbation  -Acute hypokalemia that has improved with supplementation  -Acute hypophosphatemia  -Hypotension as result of the sepsis that has improved  -Acute hyponatremia that has improved  -Past tobacco smoking addiction with possible undiagnosed COPD  -Type 2 diabetes mellitus with hyperglycemia at this time  -Hyponatremia of unknown etiology  -History of coronary artery disease status post three-vessel CABG with no current chest pain  -History of essential hypertension  -History of peripheral vascular disease  -Hyperlipidemia    Will continue the vancomycin, clindamycin, and meropenem as the cultures from the samples collected in the operating room are still pending.  To improve his pain control, we will decrease morphine to 2 mg IV every 4 hours as needed and start him on scheduled Percocet 10/325 one pill mouth every 8 hours.  I will repeat his blood work in the morning.  I will place him on the phosphorus replacement protocol.    The patient is high risk due to the following diagnoses/reasons:  septic shock and Delias's gangrene    Christoph Cason MD  04/02/17  10:31 AM

## 2017-04-03 NOTE — PLAN OF CARE
Problem: Infection, Risk/Actual (Adult)  Goal: Infection Prevention/Resolution  Outcome: Ongoing (interventions implemented as appropriate)    Problem: Patient Care Overview (Adult)  Goal: Plan of Care Review  Outcome: Ongoing (interventions implemented as appropriate)    04/02/17 1709 04/03/17 0805   Coping/Psychosocial Response Interventions   Plan Of Care Reviewed With --  patient   Patient Care Overview   Progress progress toward functional goals as expected --          Problem: Fall Risk (Adult)  Goal: Absence of Falls  Outcome: Ongoing (interventions implemented as appropriate)    Problem: Respiratory Insufficiency (Adult)  Goal: Acid/Base Balance  Outcome: Ongoing (interventions implemented as appropriate)    Problem: Skin Integrity Impairment, Risk/Actual (Adult)  Goal: Skin Integrity/Wound Healing  Outcome: Ongoing (interventions implemented as appropriate)    Problem: Perioperative Period (Adult)  Goal: Signs and Symptoms of Listed Potential Problems Will be Absent or Manageable (Perioperative Period)  Outcome: Ongoing (interventions implemented as appropriate)

## 2017-04-03 NOTE — PLAN OF CARE
Problem: Infection, Risk/Actual (Adult)  Goal: Infection Prevention/Resolution  Outcome: Ongoing (interventions implemented as appropriate)    04/02/17 1709   Infection, Risk/Actual (Adult)   Infection Prevention/Resolution making progress toward outcome         Problem: Patient Care Overview (Adult)  Goal: Plan of Care Review  Outcome: Ongoing (interventions implemented as appropriate)  Goal: Adult Individualization and Mutuality  Outcome: Ongoing (interventions implemented as appropriate)    Problem: Fall Risk (Adult)  Goal: Absence of Falls  Outcome: Ongoing (interventions implemented as appropriate)    Problem: Respiratory Insufficiency (Adult)  Goal: Acid/Base Balance  Outcome: Ongoing (interventions implemented as appropriate)  Goal: Effective Ventilation  Outcome: Ongoing (interventions implemented as appropriate)    Problem: Skin Integrity Impairment, Risk/Actual (Adult)  Goal: Skin Integrity/Wound Healing  Outcome: Ongoing (interventions implemented as appropriate)

## 2017-04-03 NOTE — PROGRESS NOTES
Subjective     History:   Tomas Salvador is a 61 y.o. male admitted on 3/30/2017 secondary to Delisa's gangrene of scrotum     Procedures:   3/31/17: Cystoscopy and scrotal exploration with debridement of necrotizing fasciitis     Patient seen and examined with [place]. Awake and alert. Currently resting comfortably. Reports scrotal pain but states it is improving and is controlled with Rx. Denies CP, dyspnea or palpitations. Denies nausea or vomiting. No acute events overnight per RN.     History taken from: patient, chart, and RN.      Objective     Vital Signs  Temp:  [98.1 °F (36.7 °C)-98.7 °F (37.1 °C)] 98.4 °F (36.9 °C)  Heart Rate:  [80-88] 84  Resp:  [18-20] 20  BP: ()/(60-70) 121/68    Intake/Output Summary (Last 24 hours) at 04/03/17 1512  Last data filed at 04/03/17 1358   Gross per 24 hour   Intake             1280 ml   Output             1150 ml   Net              130 ml         Physical Exam:  General:    Awake, alert, in no acute distress   Heart:      Normal S1 and S2. Regular rate and rhythm. No significant murmur, rubs or gallops appreciated.   Lungs:     Respirations regular, even and unlabored. Lungs clear to auscultation B/L. No wheezes, rales or rhonchi.   Abdomen:   Soft and nontender. No guarding, rebound tenderness or  organomegaly noted. Bowel sounds present x 4.   Extremities:  No clubbing, cyanosis or edema noted. Moves UE and LE equally B/L.     Results Review:      Results from last 7 days  Lab Units 04/03/17  0235 04/02/17  0409 04/01/17  0058 03/31/17  0542 03/30/17  1515 03/30/17  1050   WBC 10*3/mm3 9.96 9.91 13.93* 13.21* 17.96* 15.63*   HEMOGLOBIN g/dL 10.6* 11.0* 10.7* 11.7* 13.3* 13.5*   PLATELETS 10*3/mm3 343 340 315 357 463* 464*       Results from last 7 days  Lab Units 04/03/17  0235 04/02/17  0409 04/01/17  0337 03/31/17  1523 03/31/17  0542 03/30/17  1515 03/30/17  1050   SODIUM mmol/L 134* 136 134*  --  138 131* 132*   POTASSIUM mmol/L 3.8 3.6 4.2 3.8 2.9*  4.5 3.3*   CHLORIDE mmol/L 105 104 106  --  105 94* 97*   TOTAL CO2 mmol/L 21.3* 27.4 21.4*  --  26.4 28.1 24.1*   BUN mg/dL 6* 8 <5*  --  6* 9 6*   CREATININE mg/dL 0.60 0.56 0.52  --  0.54 0.93 0.62   CALCIUM mg/dL 8.4 8.3 7.8  --  8.4 9.2 9.0   GLUCOSE mg/dL 178* 118* 130*  --  116* 246* 231*       Results from last 7 days  Lab Units 04/03/17  0235 04/02/17  0409 04/01/17  0337 03/31/17  0542 03/30/17  1515 03/30/17  1050   BILIRUBIN mg/dL 0.1* 0.3 0.2 0.4 0.6 0.5   ALK PHOS U/L 70 57 56 58 75 77   AST (SGOT) U/L 19 16 30 19 43* 17   ALT (SGPT) U/L 9* 8* 13 12 14 12       Results from last 7 days  Lab Units 04/02/17  0409 04/01/17  0057 03/31/17  0542   MAGNESIUM mg/dL 1.8 1.9 1.6*  1.7           Results from last 7 days  Lab Units 03/31/17  0542 03/30/17  1515   CK TOTAL U/L 32 92   TROPONIN I ng/mL 0.018 0.024   CK MB INDEX % 3.8* 2.7   MYOGLOBIN ng/mL 46.0 104.0       Imaging Results (last 24 hours)     ** No results found for the last 24 hours. **            Medications:    aspirin 81 mg Oral Daily   atorvastatin 80 mg Oral Daily   carvedilol 6.25 mg Oral BID With Meals   clindamycin 600 mg Intravenous Q8H   clopidogrel 75 mg Oral Daily   gabapentin 300 mg Oral BID   heparin (porcine) 5,000 Units Subcutaneous Q12H   insulin aspart 0-9 Units Subcutaneous 4x Daily AC & at Bedtime   insulin detemir 10 Units Subcutaneous Nightly   meropenem 2 g Intravenous Q8H   oxyCODONE-acetaminophen 1 tablet Oral Q8H   Risaquad-2 1 capsule Oral Daily   rOPINIRole 0.25 mg Oral Nightly   sodium chloride 10 mL Intracatheter Q12H   vancomycin 1,500 mg Intravenous Q12H              Assessment/Plan   Sepsis: Likely 2/2 Delisa's gangrene and E coli UTI. Currently hemodynamically stable. WBC has normalized with improving CRP. Currently on broad spectrum IV abx per ID. Cultures currently revealing E coli. Will defer deescalation of antibiotic regimen to ID. Repeat labs in the AM. ID input appreciated.     Delisa's gangrene: Cont  broad spectrum IV abx per ID and wound care per urology. ID and urology input appreciated.     Electrolyte abnormalities: K+ improved this AM. Recheck PO4 in the AM. Cont electrolyte replacement protocols.     DM II, insulin-dependent with hyperglycemia: Restart basal insulin at a reduced dose. Cont SSI with Accuchecks.     Essential HTN: Hypotension has improved following surgery and is currently stable. Cont current management and monitor.     Ischemic cardiomyopathy: Appears compensated at present. Cont to monitor I/O's.     CAD: Appears stable at present. Cont current medical management.     DVT PPX: SQ heparin         Tony Trevino DO  04/03/17  3:12 PM

## 2017-04-03 NOTE — PROGRESS NOTES
"  I have personally seen and examined the patient today and discussed overnight interval progress and pertinent issues with nursing staff.    Review of Systems    Constitutional: no fever, chills and night sweats. No appetite change or unexpected weight change. No fatigue.  Eyes: no eye drainage, itching or redness.  HEENT: no mouth sores, dysphagia or nose bleed.  Respiratory: no for shortness of breath, cough or production of sputum.  Cardiovascular: no chest pain, no palpitations, no orthopnea.  Gastrointestinal: no nausea, vomiting or diarrhea. No abdominal pain, hematemesis or rectal bleeding.  Genitourinary: no dysuria or polyuria.  Hematologic/lymphatic: no lymph node abnormalities, no easy bruising or easy bleeding.  Musculoskeletal: no muscle or joint pain.  Skin: Status post debridement  Neurological: no loss of consciousness, no seizure, no headache.  Behavioral/Psych: no depression or suicidal ideation.  Endocrine: no hot flashes.  Immunologic: negative.      History taken from: patient chart family RN      Vital Signs    /70 (BP Location: Left arm, Patient Position: Lying)  Pulse 80  Temp 98.7 °F (37.1 °C) (Oral)   Resp 18  Ht 69\" (175.3 cm)  Wt 236 lb 14.4 oz (107 kg)  SpO2 95%  BMI 34.98 kg/m2    Temp:  [98.1 °F (36.7 °C)-98.7 °F (37.1 °C)] 98.7 °F (37.1 °C)      Intake/Output Summary (Last 24 hours) at 04/03/17 1010  Last data filed at 04/03/17 0500   Gross per 24 hour   Intake              680 ml   Output             1150 ml   Net             -470 ml     Intake & Output (last 3 days)       03/31 0701 - 04/01 0700 04/01 0701 - 04/02 0700 04/02 0701 - 04/03 0700 04/03 0701 - 04/04 0700    P.O. 120 120 680     IV Piggyback 1450 650      Total Intake(mL/kg) 1570 (15.8) 770 (7.4) 680 (6.3)     Urine (mL/kg/hr) 725 (0.3) 950 (0.4) 1150 (0.4)     Stool 0 (0)       Total Output       Net +845 -180 -470              Unmeasured Stool Occurrence 1 x             Physical " Exam:      General Appearance:   fairly sedated, cooperative, in no acute distress, wife at bedside    Head:  Normocephalic, without obvious abnormality, atraumatic   Eyes:      Lids and lashes normal, conjunctivae and sclerae normal, no icterus, no pallor, corneas clear, PERRLA   Ears:  Ears appear intact with no abnormalities noted   Throat: No oral lesions, no thrush, oral mucosa moist   Neck: No adenopathy, supple, trachea midline, no thyromegaly, no carotid bruit, no JVD   Back:  No tenderness to percussion or palpation, range of motion normal   Lungs:  Clear to auscultation,respirations regular, even and unlabored. No wheezing, no ronchi and no crackles.   Heart:  Regular rhythm and normal rate, normal S1 and S2, no murmur, no gallop, no rub, no click   Chest Wall:  No abnormalities observed   Abdomen:  Normal bowel sounds, no masses, no organomegaly, soft non-tender, non-distended, no guarding, no rebound Tenderness  : Status post debridement of the scrotum    Rectal:  Deferred   Extremities: Moves all extremities well, no edema, no cyanosis, no redness   Pulses: Pulses palpable and equal bilaterally   Skin: No bleeding, bruising or rash   Lymph nodes: No palpable adenopathy   Neurologic: Awake, alert and oriented x 3. Following commands.         Results:      Results from last 7 days  Lab Units 04/03/17  0235 04/02/17  0409 04/01/17  0058 03/31/17  0542 03/30/17  1515 03/30/17  1050   WBC 10*3/mm3 9.96 9.91 13.93* 13.21* 17.96* 15.63*     Lab Results   Component Value Date    NEUTROABS 6.22 04/03/2017         Results from last 7 days  Lab Units 04/03/17  0235   CREATININE mg/dL 0.60         Results from last 7 days  Lab Units 04/03/17  0235 04/02/17  0409 04/01/17  0057   CRP mg/dL 5.18* 7.31* 7.72*       Results Review:    I have personally reviewed laboratory data, culture results, radiology studies and antimicrobial therapy.    Hospital Medications (active)       Dose Frequency Start End     acetaminophen (TYLENOL) tablet 650 mg 650 mg Every 6 Hours PRN 4/1/2017     Sig - Route: Take 2 tablets by mouth Every 6 (Six) Hours As Needed for Mild Pain (1-3). - Oral    aspirin EC tablet 81 mg 81 mg Daily 4/1/2017     Sig - Route: Take 1 tablet by mouth Daily. - Oral    atorvastatin (LIPITOR) tablet 80 mg 80 mg Daily 3/31/2017     Sig - Route: Take 2 tablets by mouth Daily. - Oral    carvedilol (COREG) tablet 6.25 mg 6.25 mg 2 Times Daily With Meals 3/30/2017     Sig - Route: Take 1 tablet by mouth 2 (Two) Times a Day With Meals. - Oral    clindamycin (CLEOCIN) 600 mg in dextrose 5% 50 mL IVPB (premix) 600 mg Every 8 Hours 3/30/2017     Sig - Route: Infuse 50 mL into a venous catheter Every 8 (Eight) Hours. - Intravenous    clopidogrel (PLAVIX) tablet 75 mg 75 mg Daily 4/1/2017     Sig - Route: Take 1 tablet by mouth Daily. - Oral    dextrose (D50W) solution 25 g 25 g Every 15 Minutes PRN 3/30/2017     Sig - Route: Infuse 50 mL into a venous catheter Every 15 (Fifteen) Minutes As Needed for Low Blood Sugar (Blood Sugar Less Than 70, Patient Has IV Access - Unresponsive, NPO or Unable To Safely Swallow). - Intravenous    dextrose (GLUTOSE) oral gel 15 g 15 g Every 15 Minutes PRN 3/30/2017     Sig - Route: Take 15 g by mouth Every 15 (Fifteen) Minutes As Needed for Low Blood Sugar (Blood Sugar Less Than 70, Patient Alert, Is Not NPO & Can Safely Swallow). - Oral    gabapentin (NEURONTIN) capsule 300 mg 300 mg 2 Times Daily 3/30/2017     Sig - Route: Take 1 capsule by mouth 2 (Two) Times a Day. - Oral    glucagon (human recombinant) (GLUCAGEN DIAGNOSTIC) injection 1 mg 1 mg Every 15 Minutes PRN 3/30/2017     Sig - Route: Inject 1 mg under the skin Every 15 (Fifteen) Minutes As Needed (Blood Glucose Less Than 70 - Patient Without IV Access - Unresponsive, NPO or Unable To Safely Swallow). - Subcutaneous    heparin (porcine) 5000 UNIT/ML injection 5,000 Units 5,000 Units Every 12 Hours Scheduled 3/30/2017     Sig -  "Route: Inject 1 mL under the skin Every 12 (Twelve) Hours. - Subcutaneous    insulin aspart (novoLOG) injection 0-9 Units 0-9 Units 4 Times Daily Before Meals & Nightly 3/30/2017     Sig - Route: Inject 0-9 Units under the skin 4 (Four) Times a Day Before Meals & at Bedtime. - Subcutaneous    meropenem (MERREM) 2 g in sodium chloride 0.9 % 100 mL IVPB 2 g Every 8 Hours 3/30/2017     Sig - Route: Infuse 2 g into a venous catheter Every 8 (Eight) Hours. - Intravenous    morphine injection 2 mg 2 mg Every 4 Hours PRN 4/2/2017     Sig - Route: Infuse 1 mL into a venous catheter Every 4 (Four) Hours As Needed for Severe Pain (7-10). - Intravenous    nystatin (MYCOSTATIN) ointment  2 Times Daily PRN 3/31/2017     Sig - Route: Apply  topically 2 (Two) Times a Day As Needed (yeast on skin). - Topical    nystatin (MYCOSTATIN) powder  2 Times Daily PRN 3/31/2017     Sig - Route: Apply  topically 2 (Two) Times a Day As Needed (yeast on skin). - Topical    oxyCODONE-acetaminophen (PERCOCET)  MG per tablet 1 tablet 1 tablet Every 8 Hours Scheduled 4/2/2017 4/12/2017    Sig - Route: Take 1 tablet by mouth Every 8 (Eight) Hours. - Oral    pantoprazole (PROTONIX) EC tablet 40 mg 40 mg Daily PRN 3/30/2017     Sig - Route: Take 1 tablet by mouth Daily As Needed (acid reflux). - Oral    Pharmacy Consult  Continuous PRN 3/30/2017     Sig - Route: Continuous As Needed for Consult. - Does not apply    Pharmacy to dose vancomycin  Continuous PRN 3/30/2017     Sig - Route: Continuous As Needed for Consult. - Does not apply    potassium & sodium phosphates (PHOS-NAK) 280-160-250 MG packet 2 packet 2 packet Every 6 Hours PRN 4/2/2017     Sig - Route: Take 2 packets by mouth Every 6 (Six) Hours As Needed (Phosphorus less than 1.25 mg/dL). - Oral    Linked Group 1:  \"Or\" Linked Group Details        potassium & sodium phosphates (PHOS-NAK) 280-160-250 MG packet 2 packet 2 packet Once As Needed 4/2/2017     Sig - Route: Take 2 packets by " "mouth 1 (One) Time As Needed (Phosphorus 1.25 - 2.1 mg/dL). - Oral    Linked Group 1:  \"Or\" Linked Group Details        potassium chloride (K-DUR,KLOR-CON) CR tablet 40 mEq 40 mEq As Needed 3/31/2017     Sig - Route: Take 2 tablets by mouth As Needed (potassium replacement.  see admin instructions). - Oral    Cosign for Ordering: Accepted by Christoph Cason MD on 3/31/2017  8:31 PM    Linked Group 2:  \"Or\" Linked Group Details        potassium chloride (KLOR-CON) packet 40 mEq 40 mEq As Needed 3/31/2017     Sig - Route: Take 40 mEq by mouth As Needed (potassium replacement, see admin instructions). - Oral    Linked Group 3:  \"Or\" Linked Group Details        potassium chloride (KLOR-CON) packet 40 mEq 40 mEq As Needed 3/31/2017     Sig - Route: Take 40 mEq by mouth As Needed (potassium replacement, see admin instructions). - Oral    Cosign for Ordering: Accepted by Christoph Cason MD on 3/31/2017  8:31 PM    Linked Group 2:  \"Or\" Linked Group Details        potassium chloride (MICRO-K) CR capsule 40 mEq 40 mEq As Needed 3/31/2017     Sig - Route: Take 4 capsules by mouth As Needed (potassium replacement.  see admin instructions). - Oral    Linked Group 3:  \"Or\" Linked Group Details        potassium chloride 10 mEq in 100 mL IVPB 10 mEq Every 1 Hour PRN 3/31/2017     Sig - Route: Infuse 100 mL into a venous catheter Every 1 (One) Hour As Needed (potassium protocol PERIPHERAL - see admin instructions). - Intravenous    Linked Group 3:  \"Or\" Linked Group Details        potassium chloride 10 mEq in 100 mL IVPB 10 mEq Every 1 Hour PRN 3/31/2017     Sig - Route: Infuse 100 mL into a venous catheter Every 1 (One) Hour As Needed (potassium protocol PERIPHERAL - see admin instructions). - Intravenous    Cosign for Ordering: Accepted by Christoph Cason MD on 3/31/2017  8:31 PM    Linked Group 2:  \"Or\" Linked Group Details        Risaquad-2 capsule 1 capsule 1 capsule Daily 3/31/2017     Sig - Route: Take 1 capsule by " "mouth Daily. - Oral    rOPINIRole (REQUIP) tablet 0.25 mg 0.25 mg Nightly 3/30/2017     Sig - Route: Take 1 tablet by mouth Every Night. - Oral    sodium chloride 0.9 % flush 1-10 mL 1-10 mL As Needed 3/30/2017     Sig - Route: Infuse 1-10 mL into a venous catheter As Needed for Line Care. - Intravenous    sodium chloride 0.9 % flush 10 mL 10 mL As Needed 3/30/2017     Sig - Route: Infuse 10 mL into a venous catheter As Needed for Line Care. - Intravenous    Linked Group 4:  \"And\" Linked Group Details        sodium chloride 0.9 % flush 10 mL 10 mL Every 12 Hours Scheduled 3/30/2017     Sig - Route: 10 mL by Intracatheter route Every 12 (Twelve) Hours. - Intracatheter    Cosign for Ordering: Accepted by Lui Priest MD on 3/30/2017  7:40 PM    sodium chloride 0.9 % flush 10 mL 10 mL As Needed 3/30/2017     Sig - Route: 10 mL by Intracatheter route As Needed for Line Care (After Medication Administration). - Intracatheter    Cosign for Ordering: Accepted by Lui Priest MD on 3/30/2017  7:40 PM    vancomycin (VANCOCIN) 1,500 mg in sodium chloride 0.9 % 500 mL IVPB 1,500 mg Every 12 Hours 3/31/2017     Sig - Route: Infuse 1,500 mg into a venous catheter Every 12 (Twelve) Hours. - Intravenous    morphine injection 4 mg (Discontinued) 4 mg Every 2 Hours PRN 4/1/2017 4/2/2017    Sig - Route: Infuse 1 mL into a venous catheter Every 2 (Two) Hours As Needed for Severe Pain (7-10). - Intravenous            Cultures:    Blood Culture   Date Value Ref Range Status   03/30/2017 No growth at 3 days  Preliminary   03/30/2017 No growth at 3 days  Preliminary     Urine Culture   Date Value Ref Range Status   03/31/2017 No growth  Final   03/30/2017 >100,000 CFU/mL Escherichia coli (A)  Final       PROBLEM LIST:    Patient Active Problem List   Diagnosis   • Coronary disease   • Ischemic cardiomyopathy   • Hypertension   • Peripheral vascular disease   • Diabetes   • ICD (implantable cardioverter-defibrillator) in place, " implantation 2015.   • Chronic systolic congestive heart failure   • Heart disease   • SOB (shortness of breath)   • Arteriosclerotic cardiovascular disease (ASCVD), s/p coronary artery bypass grafting in 2005.    • Dyslipidemia, on atorvastain.    • Precordial chest pain   • Pre-operative cardiovascular examination   • Sepsis   • Delisa's gangrene of scrotum       Assessment/Plan     ASSESSMENT:    1. Sepsis  2. Delisa's gangrene     PLAN:    In the setting of necrotizing fasciitis status post debridement would recommend to continue with his current broad-spectrum antibiotic coverage with vancomycin, meropenem, and clindamycin.  Culture showing growth of Escherichia coli and adequately covered with meropenem.  Hope to de-escalate soon.  We'll recheck a CBC and CRP in a.m.     The patient's Delisa's gangrene is most likely related to indwelling chronic Cueto catheter.     Vancomycin, meropenem 1 g IV every 8 hours, and clindamycin 900 mg IV every 8 hours as well as a probiotic that was added with Risaquad 1 capsule daily as the patient will be on clindamycin for several days with hope to de-escalate when cultures are available both urine and intraoperative. Unfortunately Delisa's gangrene carries a very high risk for morbidity and mortality and the patient is going to require multiple surgeries.      Patients findings and recommendations were discussed with patient, family and nursing staff    Code Status: Full Code    Arlette Worthington PA-C  04/03/17  10:10 AM

## 2017-04-03 NOTE — PROGRESS NOTES
Discharge Planning Assessment   Les     Patient Name: Tomas Salvador  MRN: [id number]  Today's Date: 4/3/2017    Admit Date: 3/30/2017             Discharge Plan       04/03/17 1409    Case Management/Social Work Plan    Plan Pt lives at home with his spouse, Lisa and plans to return home at dicharge. Pt does not have home health but may needed at discharge. Pt's spouse states the pt needs at hospital bed at discharge.  SS will continue to follow.     Patient/Family In Agreement With Plan yes        Lidia Acevedo

## 2017-04-04 PROBLEM — I63.9 CVA (CEREBRAL VASCULAR ACCIDENT) (HCC): Status: ACTIVE | Noted: 2017-01-01

## 2017-04-04 PROBLEM — E78.5 HYPERLIPIDEMIA: Status: ACTIVE | Noted: 2017-01-01

## 2017-04-04 PROBLEM — I73.9 PVD (PERIPHERAL VASCULAR DISEASE) (HCC): Status: ACTIVE | Noted: 2017-01-01

## 2017-04-04 PROBLEM — N49.3 FOURNIER'S GANGRENE: Status: ACTIVE | Noted: 2017-01-01

## 2017-04-04 PROBLEM — A41.9 SEPSIS (HCC): Status: RESOLVED | Noted: 2017-01-01 | Resolved: 2017-01-01

## 2017-04-04 PROBLEM — J96.11 CHRONIC RESPIRATORY FAILURE WITH HYPOXIA (HCC): Status: ACTIVE | Noted: 2017-01-01

## 2017-04-04 PROBLEM — I25.5 ISCHEMIC CARDIOMYOPATHY: Status: ACTIVE | Noted: 2017-01-01

## 2017-04-04 PROBLEM — E11.9 DIABETES MELLITUS, TYPE 2 (HCC): Status: ACTIVE | Noted: 2017-01-01

## 2017-04-04 PROBLEM — N49.3 FOURNIER'S GANGRENE OF SCROTUM: Status: RESOLVED | Noted: 2017-01-01 | Resolved: 2017-01-01

## 2017-04-04 PROBLEM — Z01.810 PRE-OPERATIVE CARDIOVASCULAR EXAMINATION: Status: RESOLVED | Noted: 2017-01-01 | Resolved: 2017-01-01

## 2017-04-04 PROBLEM — R33.9 URINARY RETENTION: Status: ACTIVE | Noted: 2017-01-01

## 2017-04-04 PROBLEM — R07.2 PRECORDIAL CHEST PAIN: Status: RESOLVED | Noted: 2017-01-01 | Resolved: 2017-01-01

## 2017-04-04 PROBLEM — A41.9 SEPSIS (HCC): Status: ACTIVE | Noted: 2017-01-01

## 2017-04-04 PROBLEM — I73.9 PVD (PERIPHERAL VASCULAR DISEASE) (HCC): Status: RESOLVED | Noted: 2017-01-01 | Resolved: 2017-01-01

## 2017-04-04 PROBLEM — N45.3 EPIDIDYMO-ORCHITIS: Status: ACTIVE | Noted: 2017-01-01

## 2017-04-04 PROBLEM — B96.20 E. COLI UTI (URINARY TRACT INFECTION): Status: ACTIVE | Noted: 2017-01-01

## 2017-04-04 PROBLEM — I25.10 CAD (CORONARY ARTERY DISEASE): Status: RESOLVED | Noted: 2017-01-01 | Resolved: 2017-01-01

## 2017-04-04 PROBLEM — N39.0 E. COLI UTI (URINARY TRACT INFECTION): Status: ACTIVE | Noted: 2017-01-01

## 2017-04-04 PROBLEM — I25.5 ISCHEMIC CARDIOMYOPATHY: Status: RESOLVED | Noted: 2017-01-01 | Resolved: 2017-01-01

## 2017-04-04 PROBLEM — I10 ESSENTIAL HYPERTENSION: Status: ACTIVE | Noted: 2017-01-01

## 2017-04-04 PROBLEM — I25.10 CAD (CORONARY ARTERY DISEASE): Status: ACTIVE | Noted: 2017-01-01

## 2017-04-04 NOTE — PROGRESS NOTES
Acute Care - Speech Language Pathology   Swallow Initial Evaluation [facility]   Clinical Swallow Evaluation       Patient Name: Tomas Salvador  : 1955  MRN: [id number]  Today's Date: 2017        Referring Physician: Neuro per CVA pathway      Admit Date: 2017    Visit Dx:     ICD-10-CM ICD-9-CM   1. Dysphagia, unspecified type R13.10 787.20     Patient Active Problem List   Diagnosis   • Coronary disease   • Ischemic cardiomyopathy   • Hypertension   • Peripheral vascular disease   • Diabetes   • ICD (implantable cardioverter-defibrillator) in place, implantation .   • Chronic systolic congestive heart failure   • Heart disease   • SOB (shortness of breath)   • Arteriosclerotic cardiovascular disease (ASCVD), s/p coronary artery bypass grafting in .    • Dyslipidemia, on atorvastain.    • Precordial chest pain   • Pre-operative cardiovascular examination   • Sepsis   • Delisa's gangrene of scrotum   • CVA (cerebral vascular accident)   • Diabetes mellitus, type 2   • Chronic respiratory failure with hypoxia   • CAD (coronary artery disease)   • Urinary retention   • Hyperlipidemia   • Essential hypertension   • PVD (peripheral vascular disease)   • Delisa's gangrene   • E. coli UTI (urinary tract infection)   • s/p Sepsis   • Epididymo-orchitis     Past Medical History:   Diagnosis Date   • CAD (coronary artery disease)    • Chronic back pain    • Chronic respiratory failure with hypoxia     Night time only at 2 liters/min   • Diabetes mellitus, type 2    • Essential hypertension    • Hyperlipidemia    • NSTEMI (non-ST elevated myocardial infarction)    • Obesity    • PVD (peripheral vascular disease)    • Systolic CHF     EF <20%  3/8/17   • Urinary retention      Past Surgical History:   Procedure Laterality Date   • CARDIAC CATHETERIZATION  2015    Severe multivessel coronary artery disease involving a totally occluded LAD, 99% proximal left circumflex artery; totally  occluded saphenous vein graft to the diagonal; patent LIMA to the LAD, and a patent saphenous vein graft to the first obtuse marginal.      • CARDIAC DEFIBRILLATOR PLACEMENT  2015   • CORONARY ARTERY BYPASS GRAFT  2005    3 vessel   • CYSTOSCOPY N/A 3/31/2017    Procedure: CYSTOSCOPY and scrotal exploration with debridement of necrotizing fasciitis;  Surgeon: Mele Johnson MD;  Location: [facility] OR;  Service:    • SCROTAL EXPLORATION N/A 3/31/2017    Procedure: SCROTAL EXPLORATION;  Surgeon: Mele Johnson MD;  Location: [facility] OR;  Service:           SWALLOW EVALUATION (last 72 hours)      Swallow Evaluation       04/04/17 1500                Rehab Evaluation    Document Type evaluation  -SG        Subjective Information no complaints;agree to therapy  -SG        Patient Effort, Rehab Treatment adequate  -SG        Symptoms Noted During/After Treatment none  -SG        General Information    Patient Profile Review yes  -SG        Onset of Illness/Injury 04/03/17  -SG        Referring Physician Neuro per CVA pathway  -SG        Subjective Patient Observations alert and aphasic  -SG        Pertinent History Of Current Problem Sepsit 2' necrotic scrotal tissue, CVA symptoms adm under pathway to ICU, full work-up pending  -SG        Current Diet Limitations NPO  -SG        Precautions/Limitations, Vision other (see comments)   CNA  -SG        Precautions/Limitations, Hearing WFL   for eval  -SG        Prior Level of Function- Communication other (comment)   unknown baseline  -SG        Prior Level of Function- Swallowing other (comment)   unknown baseline  -SG        Plans/Goals Discussed With patient  -SG        Barriers to Rehab medically complex  -SG        Clinical Impression    Patient's Goals For Discharge patient could not state  -SG        SLP Swallowing Diagnosis pharyngeal dysfunction  -SG        Rehab Potential/Prognosis, Swallowing good, to achieve stated therapy goals  -SG        Criteria  for Skilled Therapeutic Interventions Met skilled criteria for dysphagia intervention met  -SG        FCM, Swallowing 1-->Level 1  -SG        Therapy Frequency PRN  -SG        SLP Diet Recommendation NPO: unsafe for food/liquid intake  -SG        Recommended Diagnostics FEES   tomorrow am  -SG        SLP Rec. for Method of Medication Administration meds via alternate route  -SG        Anticipated Discharge Disposition other (see comments)   unknown at this time  -SG        Pain Assessment    Pain Assessment No/denies pain  -SG        Oral Motor Structure and Function    Oral Motor Anatomy and Physiology patient demonstrates anatomy and physiology that is WNL  -SG        Dentition Assessment poor oral hygiene  -SG        Secretion Management wet vocal quality  -SG        Mucosal Quality dry  -SG        Volitional Swallow mild to moderate difficulties initiating volitional swallow  -SG        Volitional Cough weak volitional cough  -SG        Oral Musculature General Assessment other (see comments)   grossly functional but weak  -SG        General Feeding/Swallowing Observations    Current Feeding Method NPO  -SG        Respiratory Support Currently in Use nasal cannula in use  -SG        Observations of Self Feeding Skills self feeding was very difficult  -SG        Observations of Posture During Feeding upright at 90 in bed for evaluation  -SG        Clinical Swallow Exam    Mode of Presentation fed by clinician;spoon;straw  -SG        Respiratory Concerns increased respiratory rate during eating  -SG        Oral Phase Results intact oral phase without signs of dysfunction   DNT past pureed texture 2' severity of s/s of dys  -SG        Pharyngeal Phase Results sign/symptoms of pharyngeal impairment  -SG        Summary of Clinical Exam Clinical Dys Evaluation completed. Pt presented with generally weak but grossly functional OMEs. DNT past pureed texture 2' severity of s/s. Initially very wet vocal quality, but  cleared when pt raised upright, cued to swallow and given PT trials. Heavy coughing with thins but no s/s w/ NT or puree other than delayed onset of swallow initiation. Rec: NPO until FEES in the am. S/L eval will be completed per pathway. Pt is aphasic.  -          User Key  (r) = Recorded By, (t) = Taken By, (c) = Cosigned By    Initials Name Effective Dates    SG Lyndsey Farmer-Jose David, MS [facility]-SLP 06/22/15 -         EDUCATION  The patient has been educated in the following areas:   Dysphagia (Swallowing Impairment) Oral Care/Hydration NPO rationale.    SLP Recommendation and Plan  SLP Swallowing Diagnosis: pharyngeal dysfunction  SLP Diet Recommendation: NPO: unsafe for food/liquid intake     SLP Rec. for Method of Medication Administration: meds via alternate route     Recommended Diagnostics: FEES (tomorrow am)  Criteria for Skilled Therapeutic Interventions Met: skilled criteria for dysphagia intervention met  Anticipated Discharge Disposition: other (see comments) (unknown at this time)  Rehab Potential/Prognosis, Swallowing: good, to achieve stated therapy goals  Therapy Frequency: PRN             Plan of Care Review  Plan Of Care Reviewed With: patient  Progress:  (Evaluation)  Outcome Summary/Follow up Plan: Clinical Dys Evaluation completed. Pt presented with generally weak but grossly functional OMEs. DNT past pureed texture 2' severity of s/s. Initially very wet vocal quality, but cleared when pt raised upright, cued to swallow and given PT trials. Heavy coughing with thins but no s/s w/ NT or puree other than delayed onset of swallow initiation. Rec: NPO until FEES in the am. S/L eval will be completed per pathway. Pt is aphasic.             Time Calculation:         Time Calculation- SLP       04/04/17 1547          Time Calculation- SLP    SLP Start Time 1500  -      SLP Received On 04/04/17  -        User Key  (r) = Recorded By, (t) = Taken By, (c) = Cosigned By    Initials Name Provider  Type    SG Lyndsey Sultana, MS CCC-SLP Speech and Language Pathologist          Therapy Charges for Today     Code Description Service Date Service Provider Modifiers Qty    [id number] HC ST EVAL ORAL PHARYNG SWALLOW 3 4/4/2017 Lyndsey Sultana, MS CCC-SLP GN 1               Lyndsey Sultana MS CCC-SLP  4/4/2017

## 2017-04-04 NOTE — H&P
"                                                         Critical Care History & Physical    Patient name: Tomas Salvador  CSN: [id number]  MRN: [id number]  : 1955  Today's date: 2017    Primary Care Physician: Jose Navarro MD    Chief complaint: Left sided facial droop, left sided paralysis and aphasia    HPI: Mr. Salvador is a 62 y/o WM who was transferred to [facility] from [facility] for a stroke.  He is confused and the history is from his chart. There is no family at bedside.     He was admitted to [facility] 3/30/17 from the Urologist's office with a scrotal lesion.  Apparently, he went to the ED one month PTA (17) with urinary retention.  He had a colbert placed and discharged home.  He also had an E. Coli UTI and treated with Ceftin x 10 days.  He saw his PCP who referred him to Urology.  He saw Dr. Johnson on 3/30/17.  His wife stated he had some scrotal swelling and firmness with black discoloration at the bottom of the scrotum.  Per note, it measured 3-4\" x 2\" with necrotic edges and whitish discoloration within the lesion. It was malodorous with mild weeping. The scrotum above the lesion was felt to have Epididymo-orchitis.  Reportedly, he has had a 100 lb weight loss in a few months. However wt 11/15 was 240lbs, and 3/30/17 was 237lbs.    In the ED, the Urologist debrided the necrotic scrotal tissue and drained an abscess.  The wound culture + E. Coli 3/31/17.  He had a + E. Coli urine 3/30/17. He was being treated with Merrem, Clindamycin and Vancomycin. Yesterday, he had an episode of CP, but EKG and cardiac enzymes were negative.  Today, he became aphasic with left sided facial droop and left sided paralysis.  His NIH score was 22. CT head revealed subacute left sided frontal infarct. He was not a tpa candidate because of recent surgery. .  He was transferred to [facility] for possible intervention.  On arrival to [facility], he was alert but confused and occasionally follows commands.  He moves all extremities " spontaneously. His CTA of the head revealed no large vessel occlusion.  He was deemed not a candidate for intervention.   He will be admitted into the ICU per Intensivist service.     PMH:   Past Medical History:   Diagnosis Date   • CAD (coronary artery disease)    • Chronic back pain    • Chronic respiratory failure with hypoxia     Night time only at 2 liters/min   • Diabetes mellitus, type 2    • Essential hypertension    • Hyperlipidemia    • Ischemic cardiomyopathy    • NSTEMI (non-ST elevated myocardial infarction) 2015   • Obesity    • PVD (peripheral vascular disease)    • Systolic CHF     EF <20%  3/8/17   • Urinary retention      PSH:   Past Surgical History:   Procedure Laterality Date   • CARDIAC CATHETERIZATION  05/2015    Severe multivessel coronary artery disease involving a totally occluded LAD, 99% proximal left circumflex artery; totally occluded saphenous vein graft to the diagonal; patent LIMA to the LAD, and a patent saphenous vein graft to the first obtuse marginal.      • CARDIAC DEFIBRILLATOR PLACEMENT  2015   • CORONARY ARTERY BYPASS GRAFT  2005    3 vessel   • CYSTOSCOPY N/A 3/31/2017    Procedure: CYSTOSCOPY and scrotal exploration with debridement of necrotizing fasciitis;  Surgeon: Mele Johnson MD;  Location: [facility];  Service:    • LEG SURGERY Right     unknown, pos vascular bypass   • SCROTAL EXPLORATION N/A 3/31/2017    Procedure: SCROTAL EXPLORATION;  Surgeon: eMle Johnson MD;  Location: [facility];  Service:      PFH:   Family History   Problem Relation Age of Onset   • Diabetes Mother    • Hypertension Mother    • Coronary artery disease Mother      MI in her 40's   • Cancer Father      Throat   • Hypertension Father    • Hypertension Sister    • Hypertension Brother    • Hypertension Maternal Grandmother    • Hypertension Maternal Grandfather      SH:   Social History     Social History   • Marital status:      Spouse name: N/A   • Number of children:  N/A   • Years of education: N/A     Social History Main Topics   • Smoking status: Former Smoker     Packs/day: 1.50     Years: 43.00     Quit date: 2002   • Smokeless tobacco: Current User   • Alcohol use No   • Drug use: No   • Sexual activity: Defer     Other Topics Concern   • None     Social History Narrative     Allergies:   No Known Allergies    Code Status:  Full Code    Home Medications:  Prescriptions Prior to Admission   Medication Sig Dispense Refill Last Dose   • aspirin 81 MG EC tablet Take 81 mg by mouth Daily. On hold for surgery   Unknown at Unknown time   • atorvastatin (LIPITOR) 80 MG tablet Take 1 tablet by mouth Daily. 30 tablet 3 3/30/2017 at 1000   • carvedilol (COREG) 6.25 MG tablet Take 1 tablet by mouth 2 (Two) Times a Day With Meals. 60 tablet 3 3/30/2017 at 1000   • clindamycin (CLEOCIN) 600 MG/50ML IVPB Infuse 50 mL into a venous catheter Every 8 (Eight) Hours. Indications: Infection of Blood or Tissues affecting the Whole Body  0    • clopidogrel (PLAVIX) 75 MG tablet Take 1 tablet by mouth Daily. 30 tablet 3 3/30/2017 at 1000   • gabapentin (NEURONTIN) 300 MG capsule Take 1 capsule by mouth 2 (two) times a day. 60 capsule 3 3/30/2017 at 1000   • Heparin Sodium, Porcine, (HEPARIN, PORCINE,) 5000 UNIT/ML injection Inject 1 mL under the skin Every 12 (Twelve) Hours.      • insulin aspart (novoLOG) 100 UNIT/ML injection Inject 0-9 Units under the skin 4 (Four) Times a Day Before Meals & at Bedtime.      • insulin detemir (LEVEMIR) 100 UNIT/ML injection Inject 10 Units under the skin Every Night.      • meropenem 2 g in sodium chloride 0.9 % 100 mL IVPB Infuse 2 g into a venous catheter Every 8 (Eight) Hours. Indications: Infection of Blood or Tissues affecting the Whole Body  0    • morphine 2 MG/ML injection Infuse 1 mL into a venous catheter Every 4 (Four) Hours As Needed for Severe Pain (7-10).  0    • nitroglycerin (NITROLINGUAL) 0.4 MG/SPRAY spray Place 1 spray under the tongue every  5 (five) minutes as needed for chest pain.   Unknown at Unknown time   • nystatin (MYCOSTATIN) 171846 UNIT/GM ointment Apply  topically 2 (Two) Times a Day As Needed (yeast on skin).  0    • omeprazole (PriLOSEC) 20 MG capsule Take 20 mg by mouth Daily As Needed (acid reflux).   Unknown at Unknown time   • rOPINIRole (REQUIP) 0.25 MG tablet Take 0.25 mg by mouth Every Night.   3/29/2017 at Unknown time   • vancomycin 1,500 mg in sodium chloride 0.9 % 500 mL IVPB Infuse 1,500 mg into a venous catheter Every 12 (Twelve) Hours. Indications: Infection of Blood or Tissues affecting the Whole Body  0      Review of Systems  Negative systems except for what is noted in HPI     Vital Signs:  Blood pressure 142/87, pulse 108, temperature 98.6 °F (37 °C), temperature source Axillary, resp. rate 20, SpO2 100 %.    Physical Exam:  Constitutional: Old appearing 61-year-old gentleman, obese, no distress  HEENT:  Left facial droop. Pupils are equal and reactive to light. Oral pharynx without exudate.  Neck:   Trachea midline, no JVD, no adenopathy. Left subclavian pacemaker  CV: Distant heart sounds, regular, no gallop, PMI displaced laterally  Pulmonary/Chest:   symmetric chest excursion. Healed sternotomy scar. Upper left sternal rubbery mass.  Breath sounds are bilateral, equal, no rails  Abdominal:  bowel sounds are present, soft, nontender    ST or scrotal and perineal open incision with several surgical drains. Scrotal attacks are indurated, swollen, red and uncircumcised  male genitalia with Cueto catheter in place   Musculoskeletal: generally poor musculature.  Weak pulses left foot, no palpable pulses right foot  Neurological: Oriented to name and hospital but not date or city. Able to follow commands. No motor drift for me. Some dysarthria but speech is understandable. Answers simple questions.  Skin: warm and dry  Extremities:  No edema or clubbing Weak pulses left foot, no palpable pulses right foot  Psychiatric:  confused     Labs:    Results from last 7 days  Lab Units 04/04/17  0115   WBC 10*3/mm3 9.68   HEMOGLOBIN g/dL 10.6*   HEMATOCRIT % 35.4*   PLATELETS 10*3/mm3 380       Results from last 7 days  Lab Units 04/04/17  0115   SODIUM mmol/L 136   POTASSIUM mmol/L 4.0   CHLORIDE mmol/L 105   TOTAL CO2 mmol/L 24.8   BUN mg/dL 6*   CREATININE mg/dL 0.53   CALCIUM mg/dL 8.3   BILIRUBIN mg/dL 0.2   ALK PHOS U/L 69   ALT (SGPT) U/L 12   AST (SGOT) U/L 24   GLUCOSE mg/dL 130*     Magnesium   Date Value Ref Range Status   04/02/2017 1.8 1.7 - 2.6 mg/dL Final     Phosphorus   Date Value Ref Range Status   04/04/2017 1.6 (L) 2.7 - 4.5 mg/dL Final   04/02/2017 2.2 (L) 2.7 - 4.5 mg/dL Final     Interval:  (arrival to ICU)  1a. Level Of Consciousness: 0-->Alert: keenly responsive  1b. LOC Questions: 1-->Answers one question correctly  1c. LOC Commands: 0-->Performs both tasks correctly  2. Best Gaze: 1-->Partial gaze palsy: gaze is abnormal in one or both eyes, but forced deviation or total gaze paresis is not present  3. Visual: 1-->Partial hemianopia  4. Facial Palsy: 2-->Partial paralysis (total or near-total paralysis of lower face)  5a. Motor Arm, Left: 1-->Drift: limb holds 90 (or 45) degrees, but drifts down before full 10 seconds: does not hit bed or other support  5b. Motor Arm, Right: 2-->Some effort against gravity: limb cannot get to or maintain (if cued) 90 (or 45) degrees, drifts down to bed, but has some effort against gravity  6a. Motor Leg, Left: 2-->Some effort against gravity: leg falls to bed by 5 secs, but has some effort against gravity  6b. Motor Leg, Right: 2-->Some effort against gravity: leg falls to bed by 5 secs, but has some effort against gravity  7. Limb Ataxia: 0-->Absent  8. Sensory: 1-->Mild-to-moderate sensory loss: patient feels pinprick is less sharp or is dull on the affected side: or there is a loss of superficial pain with pinprick, but patient is aware of being touched  9. Best Language:  1-->Mild-to-moderate aphasia: some obvious loss of fluency or facility of comprehension, without significant limitation on ideas expressed or form of expression. Reduction of speech and/or comprehension, however, makes conversation. . . (see row details)  10. Dysarthria: 1-->Mild-to-moderate dysarthria: patient slurs at least some words and, at worst, can be understood with some difficulty  11. Extinction and Inattention (formerly Neglect): 1-->Visual, tactile, auditory, spatial, or personal inattention or extinction to bilateral simultaneous stimulation in one of the sensory modalities    Total (NIH Stroke Scale): 16    Imaging:    ABG:     Results from last 7 days  Lab Units 04/04/17  1122   PH, ARTERIAL pH units 7.510*   PO2 ART mm Hg 109.6*   PCO2, ARTERIAL mm Hg 31.9*   HCO3 ART mmol/L 24.9     Assessment:  Hospital Problem List     * (Principal)CVA (cerebral vascular accident)    Delisa's gangrene    Overview Signed 4/4/2017  3:55 PM by TRESA Baca     + E. Coli 3/31/17         Diabetes mellitus, type 2    Overview Signed 4/4/2017  2:43 PM by TRESA Baca     3/30/17 Hgb A1c 10.4         Chronic respiratory failure with hypoxia    Overview Signed 4/4/2017  2:40 PM by TRESA Baca     Night time only at 2 liters/min         Urinary retention    Hyperlipidemia    Essential hypertension    E. coli UTI (urinary tract infection)    Epididymo-orchitis        61-year-old gentleman chronically ill with ischemic cardiomyopathy, AICD, diabetes mellitus, hypertension, peripheral artery disease who was hospitalized in [place] on March 30 with a scrotal infection requiring incision and drainage. He had a preceding urinary tract infection from urinary retention and had an indwelling Cueto. Operative cultures were positive for Escherichia coli.  He was covered broadly with Vanco, Merrem, Clinda.  He is now transferred to [facility] for acute neurologic changes. CT scan  and CT perfusion did not reveal any acute stroke. Clinically his symptoms have resolved except for some confusion. It does not appear clinically that he has had a significant ischemic stroke. He certainly has a significant paranasal and scrotal wound that will need aggressive surgical and antibiotics treatment. He has terrible cardiac function with an EF of less than 20%. Happily he has no overt signs of florid failure. He has a remote history of smoking and used just as needed inhalers and oxygen at night at baseline. We will have urology, cardiology, infectious disease way in. If he needs further surgical debridement I suspect I will have to transfer him to [facility]    Plan:   ICU admission  Will start Ancef (E. Coli was susceptible)  Ischemic CVA pathway   IVF only KVO  Consult Cardiology, ID and Urology  BG control  BP control  neurochecks  ASA, statin, plavix  WOC   PPI  florastor  Restart coreg  Consider entresto  CXR  ECHO  Carotid duplex  May require a PICC line    TRESA Noel, AGACNP-BC, FNP-BC  Pulmonary & Critical Care Medicine  04/04/17 4:47 PM    I reviewed the above note and [place] records, interviewed the patient, did an independent physical examination, reviewed laboratory data, cultures, x-rays and formulated the assessment and plan.   Ella Pope MD      Time: 65min                                                   *Please note that portions of this note were completed with a voice recognition program. Efforts were made to edit the dictations, but occasionally words are mistranscribed.

## 2017-04-04 NOTE — PLAN OF CARE
Problem: Patient Care Overview (Adult)  Goal: Plan of Care Review  Outcome: Ongoing (interventions implemented as appropriate)    04/04/17 7080   Coping/Psychosocial Response Interventions   Plan Of Care Reviewed With patient   Patient Care Overview   Progress (Evaluation)   Outcome Evaluation   Outcome Summary/Follow up Plan Clinical Dys Evaluation completed. Pt presented with generally weak but grossly functional OMEs. DNT past pureed texture 2' severity of s/s. Initially very wet vocal quality, but cleared when pt raised upright, cued to swallow and given PO trials. Heavy coughing with thins but no s/s w/ NT or puree other than delayed onset of swallow initiation. Rec: NPO until FEES in the am. S/L eval will be completed per pathway. Pt is aphasic.

## 2017-04-04 NOTE — PROGRESS NOTES
"  I have personally seen and examined the patient today and discussed overnight interval progress and pertinent issues with nursing staff.    Review of Systems    Constitutional: no fever, chills and night sweats. No appetite change or unexpected weight change. No fatigue.  Eyes: no eye drainage, itching or redness.  HEENT: no mouth sores, dysphagia or nose bleed.  Respiratory: no for shortness of breath, cough or production of sputum.  Cardiovascular: no chest pain, no palpitations, no orthopnea.  Gastrointestinal: no nausea, vomiting or diarrhea. No abdominal pain, hematemesis or rectal bleeding.  Genitourinary: no dysuria or polyuria.  Hematologic/lymphatic: no lymph node abnormalities, no easy bruising or easy bleeding.  Musculoskeletal: no muscle or joint pain.  Skin: Status post debridement  Neurological: no loss of consciousness, no seizure, no headache.  Behavioral/Psych: no depression or suicidal ideation.  Endocrine: no hot flashes.  Immunologic: negative.      History taken from: patient chart RN      Vital Signs    /70  Pulse 98  Temp 98.5 °F (36.9 °C) (Oral)   Resp 18  Ht 69\" (175.3 cm)  Wt 237 lb 13 oz (108 kg)  SpO2 98%  BMI 35.12 kg/m2    Temp:  [97.8 °F (36.6 °C)-98.5 °F (36.9 °C)] 98.5 °F (36.9 °C)      Intake/Output Summary (Last 24 hours) at 04/04/17 1151  Last data filed at 04/04/17 0650   Gross per 24 hour   Intake             1980 ml   Output             4350 ml   Net            -2370 ml     Intake & Output (last 3 days)       04/01 0701 - 04/02 0700 04/02 0701 - 04/03 0700 04/03 0701 - 04/04 0700 04/04 0701 - 04/05 0700    P.O.      IV Piggyback 650  600     Total Intake(mL/kg) 770 (7.4) 680 (6.3) 2100 (19.5)     Urine (mL/kg/hr) 950 (0.4) 1150 (0.4) 4350 (1.7)     Stool        Total Output 950 1150 4350      Net -096 [phone]                    Physical Exam:      General Appearance:   fairly sedated, cooperative, in no acute distress   Head:  Normocephalic, " without obvious abnormality, atraumatic   Eyes:      Lids and lashes normal, conjunctivae and sclerae normal, no icterus, no pallor, corneas clear, PERRLA   Ears:  Ears appear intact with no abnormalities noted   Throat: No oral lesions, no thrush, oral mucosa moist   Neck: No adenopathy, supple, trachea midline, no thyromegaly, no carotid bruit, no JVD   Back:  No tenderness to percussion or palpation, range of motion normal   Lungs:  Clear to auscultation,respirations regular, even and unlabored. No wheezing, no ronchi and no crackles.   Heart:  Regular rhythm and normal rate, normal S1 and S2, no murmur, no gallop, no rub, no click   Chest Wall:  No abnormalities observed   Abdomen:  Normal bowel sounds, no masses, no organomegaly, soft non-tender, non-distended, no guarding, no rebound Tenderness  : Status post debridement of the scrotum    Rectal:  Deferred   Extremities: Moves all extremities well, no edema, no cyanosis, no redness   Pulses: Pulses palpable and equal bilaterally   Skin: No bleeding, bruising or rash   Lymph nodes: No palpable adenopathy   Neurologic: Awake, alert and oriented x 3. Following commands.         Results:      Results from last 7 days  Lab Units 04/04/17  0115 04/03/17  0235 04/02/17  0409 04/01/17  0058 03/31/17  0542 03/30/17  1515 03/30/17  1050   WBC 10*3/mm3 9.68 9.96 9.91 13.93* 13.21* 17.96* 15.63*     Lab Results   Component Value Date    NEUTROABS 5.87 04/04/2017         Results from last 7 days  Lab Units 04/04/17  0115   CREATININE mg/dL 0.53         Results from last 7 days  Lab Units 04/04/17  0115 04/03/17  0235 04/02/17  0409   CRP mg/dL 3.42* 5.18* 7.31*       Results Review:    I have personally reviewed laboratory data, culture results, radiology studies and antimicrobial therapy.    Hospital Medications (active)       Dose Frequency Start End    acetaminophen (TYLENOL) tablet 650 mg 650 mg Every 6 Hours PRN 4/1/2017     Sig - Route: Take 2 tablets by mouth  Every 6 (Six) Hours As Needed for Mild Pain (1-3). - Oral    aspirin EC tablet 81 mg 81 mg Daily 4/1/2017     Sig - Route: Take 1 tablet by mouth Daily. - Oral    atorvastatin (LIPITOR) tablet 80 mg 80 mg Daily 3/31/2017     Sig - Route: Take 2 tablets by mouth Daily. - Oral    carvedilol (COREG) tablet 6.25 mg 6.25 mg 2 Times Daily With Meals 3/30/2017     Sig - Route: Take 1 tablet by mouth 2 (Two) Times a Day With Meals. - Oral    clindamycin (CLEOCIN) 600 mg in dextrose 5% 50 mL IVPB (premix) 600 mg Every 8 Hours 3/30/2017     Sig - Route: Infuse 50 mL into a venous catheter Every 8 (Eight) Hours. - Intravenous    clopidogrel (PLAVIX) tablet 75 mg 75 mg Daily 4/1/2017     Sig - Route: Take 1 tablet by mouth Daily. - Oral    dextrose (D50W) solution 25 g 25 g Every 15 Minutes PRN 3/30/2017     Sig - Route: Infuse 50 mL into a venous catheter Every 15 (Fifteen) Minutes As Needed for Low Blood Sugar (Blood Sugar Less Than 70, Patient Has IV Access - Unresponsive, NPO or Unable To Safely Swallow). - Intravenous    dextrose (GLUTOSE) oral gel 15 g 15 g Every 15 Minutes PRN 3/30/2017     Sig - Route: Take 15 g by mouth Every 15 (Fifteen) Minutes As Needed for Low Blood Sugar (Blood Sugar Less Than 70, Patient Alert, Is Not NPO & Can Safely Swallow). - Oral    gabapentin (NEURONTIN) capsule 300 mg 300 mg 2 Times Daily 3/30/2017     Sig - Route: Take 1 capsule by mouth 2 (Two) Times a Day. - Oral    glucagon (human recombinant) (GLUCAGEN DIAGNOSTIC) injection 1 mg 1 mg Every 15 Minutes PRN 3/30/2017     Sig - Route: Inject 1 mg under the skin Every 15 (Fifteen) Minutes As Needed (Blood Glucose Less Than 70 - Patient Without IV Access - Unresponsive, NPO or Unable To Safely Swallow). - Subcutaneous    heparin (porcine) 5000 UNIT/ML injection 5,000 Units 5,000 Units Every 12 Hours Scheduled 3/30/2017     Sig - Route: Inject 1 mL under the skin Every 12 (Twelve) Hours. - Subcutaneous    insulin aspart (novoLOG) injection  "0-9 Units 0-9 Units 4 Times Daily Before Meals & Nightly 3/30/2017     Sig - Route: Inject 0-9 Units under the skin 4 (Four) Times a Day Before Meals & at Bedtime. - Subcutaneous    meropenem (MERREM) 2 g in sodium chloride 0.9 % 100 mL IVPB 2 g Every 8 Hours 3/30/2017     Sig - Route: Infuse 2 g into a venous catheter Every 8 (Eight) Hours. - Intravenous    morphine injection 2 mg 2 mg Every 4 Hours PRN 4/2/2017     Sig - Route: Infuse 1 mL into a venous catheter Every 4 (Four) Hours As Needed for Severe Pain (7-10). - Intravenous    nystatin (MYCOSTATIN) ointment  2 Times Daily PRN 3/31/2017     Sig - Route: Apply  topically 2 (Two) Times a Day As Needed (yeast on skin). - Topical    nystatin (MYCOSTATIN) powder  2 Times Daily PRN 3/31/2017     Sig - Route: Apply  topically 2 (Two) Times a Day As Needed (yeast on skin). - Topical    oxyCODONE-acetaminophen (PERCOCET)  MG per tablet 1 tablet 1 tablet Every 8 Hours Scheduled 4/2/2017 4/12/2017    Sig - Route: Take 1 tablet by mouth Every 8 (Eight) Hours. - Oral    pantoprazole (PROTONIX) EC tablet 40 mg 40 mg Daily PRN 3/30/2017     Sig - Route: Take 1 tablet by mouth Daily As Needed (acid reflux). - Oral    Pharmacy Consult  Continuous PRN 3/30/2017     Sig - Route: Continuous As Needed for Consult. - Does not apply    Pharmacy to dose vancomycin  Continuous PRN 3/30/2017     Sig - Route: Continuous As Needed for Consult. - Does not apply    potassium & sodium phosphates (PHOS-NAK) 280-160-250 MG packet 2 packet 2 packet Every 6 Hours PRN 4/2/2017     Sig - Route: Take 2 packets by mouth Every 6 (Six) Hours As Needed (Phosphorus less than 1.25 mg/dL). - Oral    Linked Group 1:  \"Or\" Linked Group Details        potassium & sodium phosphates (PHOS-NAK) 280-160-250 MG packet 2 packet 2 packet Once As Needed 4/2/2017     Sig - Route: Take 2 packets by mouth 1 (One) Time As Needed (Phosphorus 1.25 - 2.1 mg/dL). - Oral    Linked Group 1:  \"Or\" Linked Group Details  " "      potassium chloride (K-DUR,KLOR-CON) CR tablet 40 mEq 40 mEq As Needed 3/31/2017     Sig - Route: Take 2 tablets by mouth As Needed (potassium replacement.  see admin instructions). - Oral    Cosign for Ordering: Accepted by Christoph Cason MD on 3/31/2017  8:31 PM    Linked Group 2:  \"Or\" Linked Group Details        potassium chloride (KLOR-CON) packet 40 mEq 40 mEq As Needed 3/31/2017     Sig - Route: Take 40 mEq by mouth As Needed (potassium replacement, see admin instructions). - Oral    Linked Group 3:  \"Or\" Linked Group Details        potassium chloride (KLOR-CON) packet 40 mEq 40 mEq As Needed 3/31/2017     Sig - Route: Take 40 mEq by mouth As Needed (potassium replacement, see admin instructions). - Oral    Cosign for Ordering: Accepted by Christoph Cason MD on 3/31/2017  8:31 PM    Linked Group 2:  \"Or\" Linked Group Details        potassium chloride (MICRO-K) CR capsule 40 mEq 40 mEq As Needed 3/31/2017     Sig - Route: Take 4 capsules by mouth As Needed (potassium replacement.  see admin instructions). - Oral    Linked Group 3:  \"Or\" Linked Group Details        potassium chloride 10 mEq in 100 mL IVPB 10 mEq Every 1 Hour PRN 3/31/2017     Sig - Route: Infuse 100 mL into a venous catheter Every 1 (One) Hour As Needed (potassium protocol PERIPHERAL - see admin instructions). - Intravenous    Linked Group 3:  \"Or\" Linked Group Details        potassium chloride 10 mEq in 100 mL IVPB 10 mEq Every 1 Hour PRN 3/31/2017     Sig - Route: Infuse 100 mL into a venous catheter Every 1 (One) Hour As Needed (potassium protocol PERIPHERAL - see admin instructions). - Intravenous    Cosign for Ordering: Accepted by Christoph Cason MD on 3/31/2017  8:31 PM    Linked Group 2:  \"Or\" Linked Group Details        Risaquad-2 capsule 1 capsule 1 capsule Daily 3/31/2017     Sig - Route: Take 1 capsule by mouth Daily. - Oral    rOPINIRole (REQUIP) tablet 0.25 mg 0.25 mg Nightly 3/30/2017     Sig - Route: Take 1 tablet by " "mouth Every Night. - Oral    sodium chloride 0.9 % flush 1-10 mL 1-10 mL As Needed 3/30/2017     Sig - Route: Infuse 1-10 mL into a venous catheter As Needed for Line Care. - Intravenous    sodium chloride 0.9 % flush 10 mL 10 mL As Needed 3/30/2017     Sig - Route: Infuse 10 mL into a venous catheter As Needed for Line Care. - Intravenous    Linked Group 4:  \"And\" Linked Group Details        sodium chloride 0.9 % flush 10 mL 10 mL Every 12 Hours Scheduled 3/30/2017     Sig - Route: 10 mL by Intracatheter route Every 12 (Twelve) Hours. - Intracatheter    Cosign for Ordering: Accepted by Lui Priest MD on 3/30/2017  7:40 PM    sodium chloride 0.9 % flush 10 mL 10 mL As Needed 3/30/2017     Sig - Route: 10 mL by Intracatheter route As Needed for Line Care (After Medication Administration). - Intracatheter    Cosign for Ordering: Accepted by Lui Priest MD on 3/30/2017  7:40 PM    vancomycin (VANCOCIN) 1,500 mg in sodium chloride 0.9 % 500 mL IVPB 1,500 mg Every 12 Hours 3/31/2017     Sig - Route: Infuse 1,500 mg into a venous catheter Every 12 (Twelve) Hours. - Intravenous    morphine injection 4 mg (Discontinued) 4 mg Every 2 Hours PRN 4/1/2017 4/2/2017    Sig - Route: Infuse 1 mL into a venous catheter Every 2 (Two) Hours As Needed for Severe Pain (7-10). - Intravenous            Cultures:    Blood Culture   Date Value Ref Range Status   03/30/2017 No growth at 3 days  Preliminary   03/30/2017 No growth at 3 days  Preliminary     Urine Culture   Date Value Ref Range Status   03/31/2017 No growth  Final   03/30/2017 >100,000 CFU/mL Escherichia coli (A)  Final       PROBLEM LIST:    Patient Active Problem List   Diagnosis   • Coronary disease   • Ischemic cardiomyopathy   • Hypertension   • Peripheral vascular disease   • Diabetes   • ICD (implantable cardioverter-defibrillator) in place, implantation 2015.   • Chronic systolic congestive heart failure   • Heart disease   • SOB (shortness of breath)   • " Arteriosclerotic cardiovascular disease (ASCVD), s/p coronary artery bypass grafting in 2005.    • Dyslipidemia, on atorvastain.    • Precordial chest pain   • Pre-operative cardiovascular examination   • Sepsis   • Delisa's gangrene of scrotum       Assessment/Plan     ASSESSMENT:    1. Sepsis  2. Delisa's gangrene     PLAN:    The patient has shown significant improvement from surgical and infectious disease standpoint.  Would recommend to continue current coverage and de-escalate to Omnicef 300 mg by mouth twice a day and clindamycin 3 mg by mouth 4 times a day with a probiotic upon discharge through 4/14/2017.     The patient's Delisa's gangrene is most likely related to indwelling chronic Cueto catheter.     Vancomycin, meropenem 1 g IV every 8 hours, and clindamycin 900 mg IV every 8 hours as well as a probiotic that was added with Risaquad 1 capsule daily as the patient will be on clindamycin for several days with hope to de-escalate when cultures are available both urine and intraoperative. Unfortunately Delisa's gangrene carries a very high risk for morbidity and mortality and the patient is going to require multiple surgeries.      Patients findings and recommendations were discussed with patient, family and nursing staff    Code Status: Full Code    Arlette Worthington PA-C  04/04/17  11:51 AM

## 2017-04-04 NOTE — PROGRESS NOTES
Discharge Planning Assessment   [place]     Patient Name: Tomas Salvador  MRN: [id number]  Today's Date: 4/4/2017    Admit Date: 3/30/2017             Discharge Plan       04/04/17 1218    Final Note    Final Note Pt being transferred to  [facility] on this date.  No other needs at this time.         Expected Discharge Date and Time     Expected Discharge Date Expected Discharge Time    Apr 4, 2017                Lidia Acevedo

## 2017-04-04 NOTE — DISCHARGE SUMMARY
Date of Admission: 3/30/2017    Date of Discharge: 4/4/2017     PCP: TRESA Bailon    Admission Diagnosis:   Severe sepsis  Necrotic ulcer on scrotum vs early Delisa's gangrene  Ischemic cardiomyopathy/systolic CHF (EF<20%) with no acute exacerbation  Hypotension  Possible undiagnosed COPD with hx of tobacco abuse  DM II with hyperglycemia  Hyponatremia  Hypokalemia  CAD with hx of 3-vessel CABG  Essential HTN  PVD  HLD    Discharge Diagnosis:   Sepsis  Delisa's gangrene  E coli UTI  Electrolyte abnormalities including hypokalemia, hypomagnesemia and hypophosphatemia  Hyponatremia-resolved  DM II, insulin-dependent with hyperglycemia  Essential HTN-hypotension upon admission has resolved  Ischemic cardiomyopathy (EF<20%)-compensated  CAD with hx of 3-vessel CABG        Procedures Performed:  3/31/17: Cystoscopy and scrotal exploration with debridement of necrotizing fasciitis     Consults:   Consults     Date and Time Order Name Status Description    3/30/2017 2000 Inpatient Consult to Infectious Diseases Completed     3/30/2017 1946 Inpatient Consult to Urology              History of Present Illness:  Tomas Salvador is a 61 y.o. male who presented to [facility] ED as a transfer from the urologist office with a scrotal lesion. He reported dysuria x 1 month prior to presentation and was evaluated in the ED on 2/24/17. A colbert catheter was placed and pt was discharged home. He saw his PCP in the office who then referred him to urology. Physical exam revealed a scrotal lesion and urology contacted the hospitalist service for a direct admission. However, pt met sepsis criteria and was referred to the ED. Please see H&P for complete details.     In the ED, pt was hypotensive upon arrival with SBP in the 80's. He was given IVF's and broad spectrum IV abx were initiated after cultures were obtained.        Hospital Course  Tomas Salvador was admitted to the CCU for further evaluation and treatment. He was  continued on broad spectrum IV abx and aggressive IVF's. Urology and ID were consulted for further input.    US of the scrotum was obtained revealing marked edema throughout the scrotum with findings consistent with possible abscess. Pt was evaluated by urology and taken to the OR on 3/31 where he underwent cystoscopy and scrotal exploration with debridement of necrotizing fasciitis. Intraoperative cultures were obtained and pt tolerated the procedure well.     Pt's sepsis began resolving with resolution of his hypotension. He was then moved out of the CCU to the med/surg floor with telemetry. Cultures revealed E coli and ID continued broad spectrum IV abx in the setting of his clinical findings and sepsis. Wound care was continued per urology recommendations. On the evening of 4/3, pt reported an episode of CP. EKG was obtained with no acute ischemic changes. CE's were negative.     On the morning of 4/4, RN and staff noticed an acute change in pt's clinical status. He became aphasic with left-sided facial asymmetry and left-sided paralysis. I was notified and quickly evaluated the patient and noted similar findings with a NIH score in the high 20's. Pt remained hemodynamically stable with a BG level of 150. EKG was obtained which did not reveal any acute changes. Initial troponin was negative. ABG did not reveal any evidence of hypercapnia or hypoxia. CT head was obtained and per verbal report revealed a subacute left-sided frontal infarct. The stroke team at [facility] was then contacted. I discussed pt's care with neurology, Dr. Phelan who recommended interventional evaluation as the patient is not a tPA candidate in the setting of his recent surgery. Interventional was then contacted (Dr. Hines) who graciously agreed to accept the patient in transfer for further evaluation. Pt's family was then updated and informed of the need for transfer for further neurological evaluation. They agreed to transfer and arrangements  were made for transfer to [facility] via helicopter.       Pertinent Test Results:   CT head without contrast: Official results pending    US scrotum and testicles  Marked edema throughout the scrotum more severe on the left  where there is diffuse abnormal echogenicity and shadowing consistent  with infection possible abscess. The right testicle was unremarkable.  There were cysts in the epididymis above the testicle and a small amount  of fluid.    Urine culture: >100,000 E coli    Wound culture: E coli     Condition on Discharge:  Stable but guarded     Vital Signs  Vitals:    04/04/17 0821   BP:    Pulse:    Resp:    Temp:    SpO2: 98%       Physical Exam:  General:    Awake, aphasic, will squeeze right hand but does not follow any other commands    Heart:      Normal S1 and S2. Regular rate and rhythm. No significant murmur, rubs or gallops appreciated.   Lungs:     Respirations regular, even and unlabored. Lungs clear to auscultation B/L. No wheezes, rales or rhonchi.   Abdomen:   Soft and nontender. No guarding, rebound tenderness or  organomegaly noted. Bowel sounds present x 4.   Extremities:  No clubbing, cyanosis or edema noted.   Neuro: Aphasic, will briefly squeeze right hand but does not follow any other commands. Moves RLE but not to resistance.  Left side appears flaccid with left-sided facial asymmetry      Discharge Disposition:   [facility]       Discharge Medications:   Madeleine Tomas   Home Medication Instructions ROEL:[id number]    Printed on:04/04/17 1751   Medication Information                      aspirin 81 MG EC tablet  Take 81 mg by mouth Daily. On hold for surgery             atorvastatin (LIPITOR) 80 MG tablet  Take 1 tablet by mouth Daily.             carvedilol (COREG) 6.25 MG tablet  Take 1 tablet by mouth 2 (Two) Times a Day With Meals.             clindamycin (CLEOCIN) 600 MG/50ML IVPB  Infuse 50 mL into a venous catheter Every 8 (Eight) Hours. Indications: Infection of  Blood or Tissues affecting the Whole Body             clopidogrel (PLAVIX) 75 MG tablet  Take 1 tablet by mouth Daily.             gabapentin (NEURONTIN) 300 MG capsule  Take 1 capsule by mouth 2 (two) times a day.             Heparin Sodium, Porcine, (HEPARIN, PORCINE,) 5000 UNIT/ML injection  Inject 1 mL under the skin Every 12 (Twelve) Hours.             insulin aspart (novoLOG) 100 UNIT/ML injection  Inject 0-9 Units under the skin 4 (Four) Times a Day Before Meals & at Bedtime.             insulin detemir (LEVEMIR) 100 UNIT/ML injection  Inject 10 Units under the skin Every Night.             meropenem 2 g in sodium chloride 0.9 % 100 mL IVPB  Infuse 2 g into a venous catheter Every 8 (Eight) Hours. Indications: Infection of Blood or Tissues affecting the Whole Body             morphine 2 MG/ML injection  Infuse 1 mL into a venous catheter Every 4 (Four) Hours As Needed for Severe Pain (7-10).             nitroglycerin (NITROLINGUAL) 0.4 MG/SPRAY spray  Place 1 spray under the tongue every 5 (five) minutes as needed for chest pain.             nystatin (MYCOSTATIN) 240548 UNIT/GM ointment  Apply  topically 2 (Two) Times a Day As Needed (yeast on skin).             omeprazole (PriLOSEC) 20 MG capsule  Take 20 mg by mouth Daily As Needed (acid reflux).             rOPINIRole (REQUIP) 0.25 MG tablet  Take 0.25 mg by mouth Every Night.             vancomycin 1,500 mg in sodium chloride 0.9 % 500 mL IVPB  Infuse 1,500 mg into a venous catheter Every 12 (Twelve) Hours. Indications: Infection of Blood or Tissues affecting the Whole Body                   Discharge Diet:        Dietary Orders            Start     Ordered    04/04/17 1156  NPO Diet  Diet Effective Now      04/04/17 1155    04/03/17 1247  DIET MESSAGE Please send guest tray with all meals  Once     Comments:  Please send guest tray with all meals    04/03/17 1247          Activity at Discharge:  bedrest    Follow-up Appointments:    Your Scheduled  Appointments     Apr 07, 2017  7:00 AM EDT    [facility] INJ VT with COR NM ADMIN RM [facility] ([place])    [address]  [place] [address]   [phone]            Apr 07, 2017  8:45 AM EDT    [facility] NM SCAN VT with COR NM [facility] ([place])    [address]  [place] [address]   [phone]            Apr 07, 2017  9:15 AM EDT    [facility] NM SCAN VT with COR STRESS LAB [facility] ([place])    [address]  [place] [address]   [phone]            Apr 07, 2017 11:15 AM EDT    [facility] NM SCAN VT with COR NM [facility] ([place])    [address]  [place] [address]   [phone]            Jul 07, 2017  1:15 PM EDT   Follow Up with Jayson Quezada MD   [facility] [place] [facility] (--)    [address]  [facility] [address] [phone]           Arrive 15 minutes prior to appointment.                    Test Results Pending at Discharge:  None      Tony Trevino DO  04/04/17  11:56 AM      Time: Greater than 30 minutes spent on this discharge.

## 2017-04-04 NOTE — PROGRESS NOTES
Discharge Planning Assessment  [facility]     Patient Name: Tomas Salvador  MRN: [id number]  Today's Date: 4/4/2017    Admit Date: 4/4/2017          Discharge Needs Assessment       04/04/17 1428    Living Environment    Lives With spouse    Living Arrangements mobile home    Provides Primary Care For no one, unable/limited ability to care for self    Quality Of Family Relationships supportive    Able to Return to Prior Living Arrangements yes    Discharge Needs Assessment    Concerns To Be Addressed basic needs concerns;adjustment to diagnosis/illness concerns;discharge planning concerns    Readmission Within The Last 30 Days previous discharge plan unsuccessful    Outpatient/Agency/Support Group Needs --   He states he has never used HH.    Equipment Currently Used at Home oxygen;walker, rolling;cane, straight    Transportation Available car;family or friend will provide            Discharge Plan       04/04/17 1432    Case Management/Social Work Plan    Plan Rehab    Patient/Family In Agreement With Plan yes    Additional Comments Talked with Mr. Salvador @ [facility].  He lives in [facility] his wife in 1 level home.  He uses a RW, cane and home O2.  He does not know name of [facility] that provides this.  He states he has never used HH.  Prior note states his wife requests a hospital bed at discharge.  CM will follow for upcoming d/c needs.        04/04/17 1218    Final Note    Final Note Pt being transferred to [facility] on this date.  No other needs at this time.         Discharge Placement     No information found                Demographic Summary       04/04/17 1426    Referral Information    Admission Type inpatient    Arrived From admitted as an inpatient    Referral Source admission list    Reason For Consult discharge planning    Contact Information    Permission Granted to Share Information With             Functional Status       04/04/17 1427    Functional Status Current    Ambulation  2-->assistive person    Transferring 2-->assistive person    Toileting 2-->assistive person    Bathing 2-->assistive person    Dressing 2-->assistive person    Eating 0-->independent    Communication 0-->understands/communicates without difficulty    Swallowing (if score 2 or more for any item, consult Rehab Services) 0-->swallows foods/liquids without difficulty    Functional Status Prior    Ambulation 1-->assistive equipment    Transferring 1-->assistive equipment    Toileting 1-->assistive equipment    Bathing 2-->assistive person    Dressing 0-->independent    Eating 0-->independent    Communication 0-->understands/communicates without difficulty    IADL    Medications assistive person    Meal Preparation assistive person    Housekeeping assistive person    Laundry assistive person    Shopping assistive person    Oral Care independent            Psychosocial     None            Abuse/Neglect     None            Legal     None            Substance Abuse     None            Patient Forms     None          Bernadette Pina RN

## 2017-04-04 NOTE — CONSULTS
Neurology    Referring provider:   Emeka Morrison MD  [address]  [address]  [place] [address]    Reason for Consultation: Stroke    Chief complaint: None offered    History of present illness:  This 61-year-old man is seen at the request of Dr. Paola Saldana for evaluation of stroke.    Spoke to Dr. Green is his internist in [place] where he was admitted to the the end of March for total gangrene.  This was biopsied and treated with antibiotics and apparently was doing reasonably well.    This morning he awoke and was normal in the about 10:00 and became aphasic and hemiparetic side.  No change in his vital signs.    NIH score was reported as 22.    CT scan at that time in [place] was read as negative for acute stroke or hemorrhage.    Has known coronary artery disease and ejection fraction of 20%.  Central artery bypass surgery and has an implantable defibrillator.    His mental status is not completely clear but according to the records there was no indication that it was anything of normal.  No family is here to verify that.        Review of Systems: Is unable to participate in her review of systems    Home meds:   Prescriptions Prior to Admission   Medication Sig Dispense Refill Last Dose   • aspirin 81 MG EC tablet Take 81 mg by mouth Daily. On hold for surgery   Unknown at Unknown time   • atorvastatin (LIPITOR) 80 MG tablet Take 1 tablet by mouth Daily. 30 tablet 3 3/30/2017 at 1000   • carvedilol (COREG) 6.25 MG tablet Take 1 tablet by mouth 2 (Two) Times a Day With Meals. 60 tablet 3 3/30/2017 at 1000   • clindamycin (CLEOCIN) 600 MG/50ML IVPB Infuse 50 mL into a venous catheter Every 8 (Eight) Hours. Indications: Infection of Blood or Tissues affecting the Whole Body  0    • clopidogrel (PLAVIX) 75 MG tablet Take 1 tablet by mouth Daily. 30 tablet 3 3/30/2017 at 1000   • gabapentin (NEURONTIN) 300 MG capsule Take 1 capsule by mouth 2 (two) times a day. 60 capsule 3 3/30/2017 at 1000   •  Heparin Sodium, Porcine, (HEPARIN, PORCINE,) 5000 UNIT/ML injection Inject 1 mL under the skin Every 12 (Twelve) Hours.      • insulin aspart (novoLOG) 100 UNIT/ML injection Inject 0-9 Units under the skin 4 (Four) Times a Day Before Meals & at Bedtime.      • insulin detemir (LEVEMIR) 100 UNIT/ML injection Inject 10 Units under the skin Every Night.      • meropenem 2 g in sodium chloride 0.9 % 100 mL IVPB Infuse 2 g into a venous catheter Every 8 (Eight) Hours. Indications: Infection of Blood or Tissues affecting the Whole Body  0    • morphine 2 MG/ML injection Infuse 1 mL into a venous catheter Every 4 (Four) Hours As Needed for Severe Pain (7-10).  0    • nitroglycerin (NITROLINGUAL) 0.4 MG/SPRAY spray Place 1 spray under the tongue every 5 (five) minutes as needed for chest pain.   Unknown at Unknown time   • nystatin (MYCOSTATIN) 981130 UNIT/GM ointment Apply  topically 2 (Two) Times a Day As Needed (yeast on skin).  0    • omeprazole (PriLOSEC) 20 MG capsule Take 20 mg by mouth Daily As Needed (acid reflux).   Unknown at Unknown time   • rOPINIRole (REQUIP) 0.25 MG tablet Take 0.25 mg by mouth Every Night.   3/29/2017 at Unknown time   • vancomycin 1,500 mg in sodium chloride 0.9 % 500 mL IVPB Infuse 1,500 mg into a venous catheter Every 12 (Twelve) Hours. Indications: Infection of Blood or Tissues affecting the Whole Body  0        History  Past Medical History:   Diagnosis Date   • CAD (coronary artery disease)    • Chronic back pain    • Chronic respiratory failure with hypoxia     Night time only at 2 liters/min   • Diabetes mellitus, type 2    • Essential hypertension    • Hyperlipidemia    • NSTEMI (non-ST elevated myocardial infarction) 2015   • Obesity    • PVD (peripheral vascular disease)    • Systolic CHF     EF <20%  3/8/17   • Urinary retention    ,   Past Surgical History:   Procedure Laterality Date   • CARDIAC CATHETERIZATION  05/2015    Severe multivessel coronary artery disease involving a  totally occluded LAD, 99% proximal left circumflex artery; totally occluded saphenous vein graft to the diagonal; patent LIMA to the LAD, and a patent saphenous vein graft to the first obtuse marginal.      • CARDIAC DEFIBRILLATOR PLACEMENT  2015   • CORONARY ARTERY BYPASS GRAFT  2005    3 vessel   • CYSTOSCOPY N/A 3/31/2017    Procedure: CYSTOSCOPY and scrotal exploration with debridement of necrotizing fasciitis;  Surgeon: Mele Johnson MD;  Location: [facility] OR;  Service:    • SCROTAL EXPLORATION N/A 3/31/2017    Procedure: SCROTAL EXPLORATION;  Surgeon: Mele Johnson MD;  Location: [facility] OR;  Service:    ,   Family History   Problem Relation Age of Onset   • Diabetes Mother    • Hypertension Mother    • Coronary artery disease Mother      MI in her 40's   • Cancer Father      Throat   • Hypertension Father    • Hypertension Sister    • Hypertension Brother    • Hypertension Maternal Grandmother    • Hypertension Maternal Grandfather    ,   Social History   Substance Use Topics   • Smoking status: Former Smoker     Packs/day: 1.50     Years: 43.00     Quit date: 2002   • Smokeless tobacco: Current User   • Alcohol use No    and Allergies:  Review of patient's allergies indicates no known allergies.,    Vital Signs   There were no vitals taken for this visit.  There is no height or weight on file to calculate BMI.    Physical Exam:   General: Confused white male              Neck: No bruits              Cor: Regular paced rhythm              Extr: No edema              Skin: Dry               Neuro: Patient is confused.  He will follow occasional commands but there is seems to have difficulty comprehending what is said.    His speech output is limited and will tell me that he 61 years old but does not answer other questions.    Cranial nerves show his eyes tended have a gaze preference to the right which he can overcome.  They are conjugate.  Fundi are normal.    Pupils are equal.    Face is  symmetrical.    On six had a sinus is deviated to the right.  Total visits voluntary or involuntary.    Motor testing shows him to move all extremities.  Mild decreased  on the right side but is antigravity with both had at his leg.    Deep tendon reflexes are plus minus throughout.        Results Review: The brain was personally reviewed from this morning.  It shows a low-attenuation lesion in the left frontal area and no bleed is seen.    Perfusion was apparently done and interpreted by Dr. garcia is normal.    CT angiogram reviewed by Dr. garcia was reportedly normal.    Transthoracic echocardiogram from 10 March this year shows moderately dilated left ventricle with a 20% ejection fraction decrease in left ventricular function.  No mention is made of the left atrium    Labs:     Lab Results (last 72 hours)     Procedure Component Value Units Date/Time    Urinalysis With / Microscopic If Indicated [[id number]] Collected:  04/04/17 1413    Specimen:  Urine from Urine, Clean Catch Updated:  04/04/17 1419    Urine Culture [[id number]] Collected:  04/04/17 1413    Specimen:  Urine from Urine, Clean Catch Updated:  04/04/17 1419    Urinalysis, Microscopic Only [[id number]] Collected:  04/04/17 1413    Specimen:  Urine from Urine, Clean Catch Updated:  04/04/17 1423          Rads:  Imaging Results (last 72 hours)     Procedure Component Value Units Date/Time    CT Head Without Contrast [[id number]] Updated:  04/04/17 1415    CT Angiogram Head With & Without Contrast [[id number]] Updated:  04/04/17 1415            Assessment:  Acute stroke with rapid improvement     Plan:    Patient is on aspirin and Plavix and Lipitor prior to the event.    With his the cardiac care function this limited with have to wonder whether this was cardioembolic.    An MRI would be useful but not possible with his defibrillator.    Discussed with Dr. Pope the possibility of anticoagulation pending a cardiology consult regarding whether he  would be a candidate for anticoagulation    Comment:   Complex patient.  His mental status is troubling given that there is no mention made of him having any cognitive problems.    He did receive 10 mg of oxycodone earlier in    Satisfactory explanation.    Royal has been on antibiotics making meningitis highly unlikely    I discussed the patients findings and my recommendations with nursing staff      Luis Alberto Phelan MD  04/04/17  2:45 PM

## 2017-04-04 NOTE — PLAN OF CARE
Problem: Infection, Risk/Actual (Adult)  Goal: Infection Prevention/Resolution  Outcome: Ongoing (interventions implemented as appropriate)    04/04/17 0034   Infection, Risk/Actual (Adult)   Infection Prevention/Resolution making progress toward outcome         Problem: Patient Care Overview (Adult)  Goal: Plan of Care Review  Outcome: Ongoing (interventions implemented as appropriate)  Goal: Adult Individualization and Mutuality  Outcome: Ongoing (interventions implemented as appropriate)  Goal: Discharge Needs Assessment  Outcome: Ongoing (interventions implemented as appropriate)    Problem: Fall Risk (Adult)  Goal: Absence of Falls  Outcome: Ongoing (interventions implemented as appropriate)    Problem: Respiratory Insufficiency (Adult)  Goal: Acid/Base Balance  Outcome: Ongoing (interventions implemented as appropriate)    Problem: Skin Integrity Impairment, Risk/Actual (Adult)  Goal: Skin Integrity/Wound Healing  Outcome: Ongoing (interventions implemented as appropriate)

## 2017-04-04 NOTE — NURSING NOTE
Initial Stroke Assessment (transfer/Code 19/ER)        Tomas Salvador is a 61 y.o. male who was transferred from [facility] this afternoon.  Report was received from [facility] and the flight crew.    The patient was admitted to [facility] on 3/30/17 for debridement of necrotic scrotal tissue and sepsis.  This morning at 1000 the nurse reports that she was assisting him on the bedside commode when he suddenly became lethargic.  He was transferred back to the bed and upon further assessment was noted to have a facial droop, slurred speech, aphasia, and left sided flaccidity.  NIHSS was 22.  He went for a STAT CT head.  He was not a candidate for IV tPA due to recent surgery.  He was transferred to [facility] for higher level of care and further stroke evaluation.  He arrived at our facility via [facility] Flight Crew at approximately 1335.  He was taken straight to the CT scanner for a STAT CTA head (unable to obtain CT perfusion due to patient movement).  CTA head was negative for LVO (large vessel occlusion) and therefore not a candidate for intervention; discussed with Dr. Morrison.  He will be admitted to the ICU under the Intensivist service for further evaluation.  The ischemic stroke order set was initiated.  Dr. Phelan notified.  Cinthia GTZ for the Intensivist Service notified. He received ASA 81mg and Plavix 75mg this morning at [facility].    Last known well: 1000.    Time of symptom discovery: 1000    Stroke Risk Factors: diabetes mellitus, hyperlipidemia, hypertension, smoking and MI.   Modified Linh Scale: 0-1    GCS upon arrival: 14  Pre-arrival NIHSS: 22  Current NIHSS:     1a. Level Of Consciousness: 0-->Alert: keenly responsive  1b. LOC Questions: 1-->Answers one question correctly  1c. LOC Commands: 0-->Performs both tasks correctly  2. Best Gaze: 1-->Partial gaze palsy: gaze is abnormal in one or both eyes, but forced deviation or total gaze paresis is not present  3. Visual: 1-->Partial  hemianopia  4. Facial Palsy: 2-->Partial paralysis (total or near-total paralysis of lower face)  5a. Motor Arm, Left: 2-->Some effort against gravity: limb cannot get to or maintain (if cued) 90 (or 45) degrees, drifts down to bed, but has some effort against gravity  5b. Motor Arm, Right: 2-->Some effort against gravity: limb cannot get to or maintain (if cued) 90 (or 45) degrees, drifts down to bed, but has some effort against gravity  6a. Motor Leg, Left: 2-->Some effort against gravity: leg falls to bed by 5 secs, but has some effort against gravity  6b. Motor Leg, Right: 2-->Some effort against gravity: leg falls to bed by 5 secs, but has some effort against gravity  7. Limb Ataxia: 0-->Absent  8. Sensory: 1-->Mild-to-moderate sensory loss: patient feels pinprick is less sharp or is dull on the affected side: or there is a loss of superficial pain with pinprick, but patient is aware of being touched  9. Best Language: 1-->Mild-to-moderate aphasia: some obvious loss of fluency or facility of comprehension, without significant limitation on ideas expressed or form of expression. Reduction of speech and/or comprehension, however, makes conversation. . . (see row details)  10. Dysarthria: 1-->Mild-to-moderate dysarthria: patient slurs at least some words and, at worst, can be understood with some difficulty  11. Extinction and Inattention (formerly Neglect): 1-->Visual, tactile, auditory, spatial, or personal inattention or extinction to bilateral simultaneous stimulation in one of the sensory modalities    Total (NIH Stroke Scale): 17    Discussed plan of care with RN.  We will continue to follow.    Karin Caldera RN, Stroke Navigator/Nurse Extender

## 2017-04-04 NOTE — PLAN OF CARE
"Mr. Salvador is a 61-year-old male who was transferred from [facility] with acute onset of left-sided neurologic deficits and speech abnormalities.  Upon arrival from [facility], he still had persistent deficits, but these had fairly significantly improved.  I was seen him for evaluation for possible emergent/salvage neuro intervention, as he has had recent surgery and was not a candidate for IV TPA therapy.  He was moving all extremities in the CAT scanner, and unfortunately would not hold still sufficiently to undergo a CT perfusion scan.  However, he was able to undergo a CT angiogram of the head, and while this does have motion artifacts, there is clearly no intracranial internal carotid, M1 segment middle cerebral artery, or vertebrobasilar occlusion (no LVO) that would be amenable to mechanical thrombectomy.  There is slightly diminished \"opacification\" of the inferior right temporal lobe on the CT angiogram, and it certainly possible that he has a distal right MCA thromboembolism, but again this would not be amenable to neuro intervention and I suspect that this would be a \"recoverable\" infarct (if truly present).  We will be able to further delineate this on subsequent follow-up CT of the head.  "

## 2017-04-04 NOTE — CONSULTS
Consult    Referring Provider:Emeka Morrison MD  Reason for Consultation: cellulitis      Patient Care Team:  Jose Navarro MD as PCP - General (Family Medicine)    Chief complaint No chief complaint on file.      Subjective .     History of present illness:   I was asked to evaluate the patient at the behest of Dr. 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 The patient was seen in [place], [place], and operated on by Dr. Mele Johnson, the urologist in regard to a possible perineal or scrotal cellulitis/Fourniers. The patient subsequently was transferred here for further management. I was asked to evaluate. History was obtained from the wife and nursing staff as well as notes. The wife says since his surgery and antibiotics he has improved dramatically, and the swelling and pain have decreased. The patient has had no prior issues with perineal cellulitis. He has a Cueto catheter indwelling presently and she is unclear as to whether he was struggling with voiding prior to admission.         Past Medical History:   Diagnosis Date   • CAD (coronary artery disease)    • Chronic back pain    • Chronic respiratory failure with hypoxia     Night time only at 2 liters/min   • Diabetes mellitus, type 2    • Essential hypertension    • Hyperlipidemia    • Ischemic cardiomyopathy    • NSTEMI (non-ST elevated myocardial infarction) 2015   • Obesity    • PVD (peripheral vascular disease)    • Systolic CHF     EF <20%  3/8/17   • Urinary retention      Past Surgical History:   Procedure Laterality Date   • CARDIAC CATHETERIZATION  05/2015    Severe multivessel coronary artery disease involving a totally occluded LAD, 99% proximal left circumflex artery; totally occluded saphenous vein graft to the diagonal; patent LIMA to the LAD, and a patent saphenous vein graft to the first obtuse marginal.      • CARDIAC DEFIBRILLATOR PLACEMENT  2015   • CORONARY ARTERY BYPASS GRAFT  2005    3 vessel   •  CYSTOSCOPY N/A 3/31/2017    Procedure: CYSTOSCOPY and scrotal exploration with debridement of necrotizing fasciitis;  Surgeon: Mele Johnson MD;  Location:  [facility] OR;  Service:    • LEG SURGERY Right     unknown, pos vascular bypass   • SCROTAL EXPLORATION N/A 3/31/2017    Procedure: SCROTAL EXPLORATION;  Surgeon: Mele Johnson MD;  Location: [facility] OR;  Service:          Current Facility-Administered Medications:   •  [START ON 4/5/2017] aspirin tablet 325 mg, 325 mg, Oral, Daily **OR** [START ON 4/5/2017] aspirin suppository 300 mg, 300 mg, Rectal, Daily, Emeka Morrison MD  •  [START ON 4/5/2017] atorvastatin (LIPITOR) tablet 80 mg, 80 mg, Oral, Nightly, Emeka Morrison MD  •  ceFAZolin in dextrose (ANCEF) IVPB solution 2 g, 2 g, Intravenous, Q8H, TRESA Baca, 2 g at 04/04/17 1611  •  [START ON 4/5/2017] clopidogrel (PLAVIX) tablet 75 mg, 75 mg, Oral, Daily, Emeka Morrison MD  •  dextrose (D50W) solution 25 g, 25 g, Intravenous, Q15 Min PRN, TRESA Baca  •  dextrose (GLUTOSE) oral gel 15 g, 15 g, Oral, Q15 Min PRN, TRESA Baca  •  glucagon (GLUCAGEN) injection 1 mg, 1 mg, Subcutaneous, Q15 Min PRN, TRESA Baca  •  insulin lispro (humaLOG) injection 0-9 Units, 0-9 Units, Subcutaneous, Q6H, TRESA Baca  •  metroNIDAZOLE (FLAGYL) IVPB 500 mg, 500 mg, Intravenous, Q6H, Kike Leon MD  •  niCARdipine (CARDENE) 25 mg/250 mL 0.9% NS VTB (mbp), 5-15 mg/hr, Intravenous, Titrated, Emeka Morrison MD  •  sodium chloride 0.9 % flush 1-10 mL, 1-10 mL, Intravenous, PRN, Emeka Morrison MD  Review of patient's allergies indicates no known allergies.    Review of Systems  Pertinent items are noted in HPI    Objective     Vital Signs   /90  Pulse 109  Temp 98.6 °F (37 °C) (Axillary)   Resp 20  SpO2 97%    Physical Exam:    Lungs: Respirations regular, even and  unlabored    Chest Wall: No abnormalities  observed    Genital FC present   Normal phallus . Testes/ scrotum non tender.Moderate diffuse cellulitis inferiorly. No necrosis seen.  Loop penrose drains present.   Extremities: Moves all extremities well, no edemam no cyanosis, no redness  Pulses: Pulses palpable  Skin: No bleeding, bruising or rash    Neurologic: Cranial Nerves 1-12 grossly intact    Results Review:  Lab Results (last 24 hours)     Procedure Component Value Units Date/Time    Urine Culture [[id number]] Collected:  04/04/17 1413    Specimen:  Urine from Urine, Clean Catch Updated:  04/04/17 1419    Urinalysis With / Microscopic If Indicated [[id number]]  (Abnormal) Collected:  04/04/17 1413    Specimen:  Urine from Urine, Clean Catch Updated:  04/04/17 1530     Color, UA Yellow     Appearance, UA Cloudy (A)     pH, UA 7.0     Specific Gravity, UA 1.049 (H)     Glucose,  mg/dL (Trace) (A)     Ketones, UA 15 mg/dL (1+) (A)     Bilirubin, UA Negative     Blood, UA Trace (A)     Protein, UA Negative     Leuk Esterase, UA Moderate (2+) (A)     Nitrite, UA Negative     Urobilinogen, UA 1.0 E.U./dL    Urinalysis, Microscopic Only [[id number]]  (Abnormal) Collected:  04/04/17 1413    Specimen:  Urine from Urine, Clean Catch Updated:  04/04/17 1530     RBC, UA 0-2 /HPF      WBC, UA 21-30 (A) /HPF      Bacteria, UA None Seen /HPF      Squamous Epithelial Cells, UA 0-2 /HPF      Yeast, UA Large/3+ Budding Yeast /HPF      Hyaline Casts, UA 0-6 /LPF      Methodology Manual Light Microscopy    POC Glucose Fingerstick [[id number]]  (Abnormal) Collected:  04/04/17 1757    Specimen:  Blood Updated:  04/04/17 1759     Glucose 191 (H) mg/dL     Narrative:       Meter: [id number] : [id number] Everardo SAMUEL        Imaging Results (last 72 hours)     Procedure Component Value Units Date/Time    CT Head Without Contrast [[id number]] Updated:  04/04/17 1415    CT Angiogram Head With & Without Contrast [[id number]] Updated:  04/04/17 1415           I reviewed the  patient's new clinical results.      Assessment/Plan     Principal Problem:    CVA (cerebral vascular accident)  Active Problems:    Diabetes mellitus, type 2    Chronic respiratory failure with hypoxia    Urinary retention    Hyperlipidemia    Essential hypertension    Delisa's gangrene    E. coli UTI (urinary tract infection)    Epididymo-orchitis      I discussed the patients findings and my recommendations with patient and family   The patient, on exam, clearly does not have evidence of Fourniers gangrene, but rather moderate cellulitis. He has clinically clearly improved dramatically. He has numerous other ongoing medical issues that make his care more complicated. At this point I recommended to the wife and patient they continue with wound care and IV antibiotics. No indications for further surgical debridement. The loop Penrose drains can be removed at the discretion of Dr. Kike Leon who helped managing the patients care from an infectious disease standpoint. I will convey the patients present status to my associate Dr. Lázaro Wan. I will be leaving. The patient at discharge can follow up with Dr. Mele Johnson in [place], [place], who did his surgery. Please call if you require further information or assistance.       Kike Small MD  04/04/17  6:17 PM    Time:

## 2017-04-04 NOTE — NURSING NOTE
1000 Pt on bsc, while assisting back to bed pt became drowsy and difficult to arouse.  Placed  back in bed.  Pt difficult to arouse, facial drooping noted, left side flaccid.  Dr Trevino notifed and at bedside.   1035 pt transported to ct for stat ct scan.   1040  Stat labs drawn.  1115  ABGs drawn.   1145 Dr Trevino has accepting MD at [facility].  1150 Contacted Shweta at [facility] states it will take 45 minutes to 1 hour for transport.  Dr Trevino states the wait is too long attempting to contact another flight crew.   1157  Call received that [facility] flight crew can transport pt.   1155 Report called to [facility] to Denise Bautista RN.  Pt continues to remain flaccid on left side.  Has movement in right arm and leg but nonpurposeful.  No response to pain noted.  Family members at bedside, Dr Trevino has explained pt status and plan of care in length, family verbalized understanding and is in agreement.  Nursing staff has remained at bedside.  Vital signs remain stable.   1240 [facility] flight crew present. Bedside report given to flight crew, assisted in transferring pt to [facility].  Dr Trevino present.  Wife at bedside.   1245 Pt transferred via helicopter to [facility].

## 2017-04-04 NOTE — PROGRESS NOTES
Pt is doing better.  WBC is normal at 9000.   His culture is growing E. Coli from tissue and urine.  I think we should be moving toward po antibiotics.   Will start removing penrose drains Thursday.  He will need home health for wound care.  BID bathtub or sitz baths then fluff's.  Pt seen for necrotizing fascitis.

## 2017-04-04 NOTE — CONSULTS
"INFECTIOUS DISEASE CONSULT/INITIAL HOSPITAL VISIT    Tomas Salvador  1955  [id number]    Date of Consult: 4/4/2017    Admission Date: 4/4/2017      Requesting Provider: Emeka Morrison MD  Evaluating Physician: Kike Leon MD    Reason for Consultation: Delisa's gangrene     History of present illness:    Patient is a 61 y.o. 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  He has a history of underlying diabetes mellitus and severe systolic congestive heart failure.  He presented to [facility] on 3/30 with scrotal cellulitis/gangrene.  He underwent debridement by urology at [facility] on 3/31 and was treated with Merrem/clindamycin/vancomycin.  He developed left facial droop and left sided paresis, prompting a transfer to [facility] for evaluation and therapy of acute stroke.  His head CT angiogram was degraded by motion artifact but per Dr. Morrison there was suggestion of right inferior temporal lobe\" opacification\" consistent with a possible stroke.  His left facial droop and left-sided paresis has improved today.  He is encephalopathic and is unable to provide any reliable history.  His wife thinks that he may have had some fevers prior to his admission on 3/30.    Past Medical History:   Diagnosis Date   • CAD (coronary artery disease)    • Chronic back pain    • Chronic respiratory failure with hypoxia     Night time only at 2 liters/min   • Diabetes mellitus, type 2    • Essential hypertension    • Hyperlipidemia    • Ischemic cardiomyopathy    • NSTEMI (non-ST elevated myocardial infarction) 2015   • Obesity    • PVD (peripheral vascular disease)    • Systolic CHF     EF <20%  3/8/17   • Urinary retention        Past Surgical History:   Procedure Laterality Date   • CARDIAC CATHETERIZATION  05/2015    Severe multivessel coronary artery disease involving a totally occluded LAD, 99% proximal left circumflex artery; totally occluded saphenous " vein graft to the diagonal; patent LIMA to the LAD, and a patent saphenous vein graft to the first obtuse marginal.      • CARDIAC DEFIBRILLATOR PLACEMENT  2015   • CORONARY ARTERY BYPASS GRAFT  2005    3 vessel   • CYSTOSCOPY N/A 3/31/2017    Procedure: CYSTOSCOPY and scrotal exploration with debridement of necrotizing fasciitis;  Surgeon: Mele Johnson MD;  Location: [facility] OR;  Service:    • LEG SURGERY Right     unknown, pos vascular bypass   • SCROTAL EXPLORATION N/A 3/31/2017    Procedure: SCROTAL EXPLORATION;  Surgeon: Mele Johnson MD;  Location: [facility] OR;  Service:        Family History   Problem Relation Age of Onset   • Diabetes Mother    • Hypertension Mother    • Coronary artery disease Mother      MI in her 40's   • Cancer Father      Throat   • Hypertension Father    • Hypertension Sister    • Hypertension Brother    • Hypertension Maternal Grandmother    • Hypertension Maternal Grandfather        Social History     Social History   • Marital status:      Spouse name: N/A   • Number of children: N/A   • Years of education: N/A     Occupational History   • Not on file.     Social History Main Topics   • Smoking status: Former Smoker     Packs/day: 1.50     Years: 43.00     Quit date: 2002   • Smokeless tobacco: Current User   • Alcohol use No   • Drug use: No   • Sexual activity: Defer     Other Topics Concern   • Not on file     Social History Narrative       No Known Allergies      Medication:    Current Facility-Administered Medications:   •  [START ON 4/5/2017] aspirin tablet 325 mg, 325 mg, Oral, Daily **OR** [START ON 4/5/2017] aspirin suppository 300 mg, 300 mg, Rectal, Daily, Emeka Morrison MD  •  [START ON 4/5/2017] atorvastatin (LIPITOR) tablet 80 mg, 80 mg, Oral, Nightly, Emeka Morrison MD  •  ceFAZolin in dextrose (ANCEF) IVPB solution 2 g, 2 g, Intravenous, Q8H, Cinthia Weaver, APRN, 2 g at 04/04/17 1611  •  [START ON 4/5/2017] clopidogrel (PLAVIX) tablet  75 mg, 75 mg, Oral, Daily, Emeka Morrison MD  •  dextrose (D50W) solution 25 g, 25 g, Intravenous, Q15 Min PRN, TRESA Baca  •  dextrose (GLUTOSE) oral gel 15 g, 15 g, Oral, Q15 Min PRN, TRESA Baca  •  glucagon (GLUCAGEN) injection 1 mg, 1 mg, Subcutaneous, Q15 Min PRN, TRESA Baca  •  insulin lispro (humaLOG) injection 0-9 Units, 0-9 Units, Subcutaneous, Q6H, TRESA Baca  •  metroNIDAZOLE (FLAGYL) IVPB 500 mg, 500 mg, Intravenous, Q6H, Kike Leon MD  •  niCARdipine (CARDENE) 25 mg/250 mL 0.9% NS VTB (mbp), 5-15 mg/hr, Intravenous, Titrated, Emeka Morrison MD  •  sodium chloride 0.9 % flush 1-10 mL, 1-10 mL, Intravenous, PRN, Emeka Morrison MD    Antibiotics:  IV Anti-Infectives     Ordered     Dose/Rate Route Frequency Start Stop    04/04/17 1723  metroNIDAZOLE (FLAGYL) IVPB 500 mg     Ordering Provider:  Kike Leon MD    500 mg  100 mL/hr over 60 Minutes Intravenous Every 6 Hours 04/04/17 1800      04/04/17 1552  ceFAZolin in dextrose (ANCEF) IVPB solution 2 g     Ordering Provider:  TRESA Baca    2 g  over 30 Minutes Intravenous Every 8 Hours 04/04/17 1600              Review of Systems: Is not reliably obtainable from the patient due to his confusion.  Constitutional-- he reportedly had unquantified fevers prior to his admission on 3/30.  HEENT-- he complains of a diffuse headache for the last several days.  CV-- he has a history of coronary artery disease and severe systolic congestive heart failure.  Resp-- he denies increased dyspnea.  GI- he denies abdominal pain, diarrhea, and vomiting.  --he complains of moderately severe scrotal pain and has a history of Escherichia coli UTI and prior urinary retention.  He currently has a Cueto catheter in place..  .  Neuro-- he has recent left facial paresis and left sided weakness.  Skin--No rashes or lesions  The remainder of his review of systems is not obtainable due  to his severe confusion and speech abnormalities.      Physical Exam:   Vital Signs  Temp (24hrs), Av.4 °F (36.9 °C), Min:98.2 °F (36.8 °C), Max:98.6 °F (37 °C)    Temp  Min: 98.2 °F (36.8 °C)  Max: 98.6 °F (37 °C)  BP  Min: 113/69  Max: 152/85  Pulse  Min: 87  Max: 109  Resp  Min: 18  Max: 20  SpO2  Min: 86 %  Max: 100 %    GENERAL: He is mildly drowsy and confused.  He is in no acute distress.  HEENT: Normocephalic, atraumatic.  PERRL. EOMI. No conjunctival injection. No icterus. Oropharynx clear without evidence of thrush or exudate.    NECK: Supple without nuchal rigidity. No mass.  LYMPH: No cervical, axillary or inguinal lymphadenopathy.  HEART: RRR; No murmur, rubs, gallops.   LUNGS: Clear to auscultation bilaterally without wheezing, rales, rhonchi. Normal respiratory effort. Nonlabored. No dullness.  ABDOMEN: Soft, nontender, nondistended. Positive bowel sounds. No rebound or guarding. NO mass or HSM.  EXT:  No cyanosis, clubbing or edema. No cord.  : A Cueto catheter is in place.  His scrotum has multiple Penrose drains present bilaterally.  There is no crepitance or overt gangrene present.  He does have a lower midline scrotal incision with some yellowish residual slough and purulent drainage.  There is some residual lower, posterior scrotal induration..  MSK: FROM without joint effusions noted arms/legs.    SKIN: Warm and dry without cutaneous eruptions on Inspection/palpation.    NEURO: He is confused but can follow some simple commands.  He has mild left facial paresis.  He has some dysphasia with word finding difficulties.  PSYCHIATRIC: He is encephalopathic and cannot provide an accurate history.    Laboratory Data      Results from last 7 days  Lab Units 17  0115 17  0235 17  0409   WBC 10*3/mm3 9.68 9.96 9.91   HEMOGLOBIN g/dL 10.6* 10.6* 11.0*   HEMATOCRIT % 35.4* 33.5* 34.7*   PLATELETS 10*3/mm3 380 343 340       Results from last 7 days  Lab Units 17  0115   SODIUM  mmol/L 136   POTASSIUM mmol/L 4.0   CHLORIDE mmol/L 105   TOTAL CO2 mmol/L 24.8   BUN mg/dL 6*   CREATININE mg/dL 0.53   GLUCOSE mg/dL 130*   CALCIUM mg/dL 8.3       Results from last 7 days  Lab Units 04/04/17  0115   ALK PHOS U/L 69   BILIRUBIN mg/dL 0.2   ALT (SGPT) U/L 12   AST (SGOT) U/L 24           Results from last 7 days  Lab Units 04/04/17  0115   CRP mg/dL 3.42*       Results from last 7 days  Lab Units 04/02/17  0409   LACTATE mmol/L 0.8       Results from last 7 days  Lab Units 04/04/17  1056 03/31/17  0542 03/30/17  1515   CK TOTAL U/L 54 32 92       Results from last 7 days  Lab Units 04/01/17  1311   VANCOMYCIN RM mcg/mL 17.60     Estimated Creatinine Clearance: 175 mL/min (by C-G formula based on Cr of 0.53).      Microbiology:  Blood Culture   Date Value Ref Range Status   03/30/2017 No growth at 5 days  Final                    Urine Culture   Date Value Ref Range Status   03/31/2017 No growth  Final     Culture   Date Value Ref Range Status   03/31/2017 Scant growth (1+) Escherichia coli (A)  Final              Radiology:  Imaging Results (last 72 hours)     Procedure Component Value Units Date/Time    CT Head Without Contrast [[id number]] Updated:  04/04/17 1415    CT Angiogram Head With & Without Contrast [[id number]] Updated:  04/04/17 1415            Impression:   1.  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  He has been receiving aggressive intravenous antibiotic therapy with intravenous Merrem/clindamycin/vancomycin.  His cultures have grown a relatively sensitive Escherichia coli.  This is clearly a polymicrobial infection and certainly involves anaerobes.  I will switch his antibiotic therapy to cefazolin/Flagyl.  He appears to of undergone adequate surgical debridement at this point but will need ongoing wound care.  2.  Acute right hemispheric CVA-with left sided facial and  left-sided.  His head CT angiogram here reveals subtle right temporal abnormalities per Dr. Morrison.  He has clinically improved.  3.  Acute toxic metabolic encephalopathy-secondary to sepsis and CVA  4.  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  Severe systolic congestive heart failure-with an ejection fraction of less than 20% on echocardiogram performed on 3/8/17  6.  Escherichia coli UTI  7.  Sepsis-secondary to Delisa's gangrene  8.  Leukocytosis/neutrophilia-secondary to scrotal gangrene/cellulitis, now improving    PLAN/RECOMMENDATIONS:   Thank you for asking us to see Tomas Salvador, I recommend the followin.  Cefazolin 2 g IV every 8 hours  2.  Flagyl 500 mg IV every 8 hours  3.  PT wound care with debridement and pulse lavage     I discussed his complex situation in detail with Dr. Pope.  I have also contacted Dr. Kike Small in urology.  I discussed his situation in detail with his family this evening.  I spent over 65 minutes on his care today    Kike Leon MD  2017  5:24 PM

## 2017-04-05 PROBLEM — I63.9 CVA (CEREBRAL VASCULAR ACCIDENT) (HCC): Status: RESOLVED | Noted: 2017-01-01 | Resolved: 2017-01-01

## 2017-04-05 PROBLEM — L03.90 CELLULITIS: Status: ACTIVE | Noted: 2017-01-01

## 2017-04-05 PROBLEM — G93.40 ENCEPHALOPATHY ACUTE: Status: ACTIVE | Noted: 2017-01-01

## 2017-04-05 PROBLEM — N49.3 FOURNIER'S GANGRENE: Status: RESOLVED | Noted: 2017-01-01 | Resolved: 2017-01-01

## 2017-04-05 NOTE — PROGRESS NOTES
"  [facility] at [facility]   Inpatient Progress Note       LOS: 1 day   Patient Care Team:  Jose Navarro MD as PCP - General (Family Medicine)    Chief Complaint:  Follow-up for CVA and atrial fibrillation    Subjective     Interval History:   Patient in bed. Just had PICC line placed. No purposeful movement or conversation.  Discussed care with wife.  Patient is noncommunicative    Review of Systems:   Pertinent positives noted in history, exam, and assessment. Otherwise reviewed and negative.      Objective     Vitals:  Blood pressure 115/78, pulse 103, temperature 97.8 °F (36.6 °C), temperature source Axillary, resp. rate 22, height 69\" (175.3 cm), weight 237 lb (108 kg), SpO2 100 %.     Intake/Output Summary (Last 24 hours) at 04/05/17 1412  Last data filed at 04/05/17 0800   Gross per 24 hour   Intake            853.7 ml   Output              950 ml   Net            -96.3 ml     Physical Exam   Constitutional: He appears well-developed and well-nourished. No distress.   Neck: No JVD present. No tracheal deviation present.   Cardiovascular: Normal rate and regular rhythm.    Pulmonary/Chest: Effort normal and breath sounds normal.   Abdominal: Soft. Bowel sounds are normal.   Musculoskeletal: He exhibits no edema or deformity.   Neurological:   Patient with aphasia. Moving all extremities   Skin: Skin is warm and dry.     I've examined the patient and agree with the above findings     Results Review:     I reviewed the patient's new clinical results.      Results from last 7 days  Lab Units 04/05/17  0417   WBC 10*3/mm3 13.42*   HEMOGLOBIN g/dL 11.4*   HEMATOCRIT % 36.6*   PLATELETS 10*3/mm3 405       Results from last 7 days  Lab Units 04/05/17  0417   SODIUM mmol/L 136   POTASSIUM mmol/L 3.9   CHLORIDE mmol/L 102   TOTAL CO2 mmol/L 25.0   BUN mg/dL 6*   CREATININE mg/dL 0.60   CALCIUM mg/dL 9.3   BILIRUBIN mg/dL 0.3   ALK PHOS U/L 65   ALT (SGPT) U/L 12   AST (SGOT) U/L 22   GLUCOSE mg/dL 170* "       Results from last 7 days  Lab Units 04/05/17  0417   SODIUM mmol/L 136   POTASSIUM mmol/L 3.9   CHLORIDE mmol/L 102   TOTAL CO2 mmol/L 25.0   BUN mg/dL 6*   CREATININE mg/dL 0.60   GLUCOSE mg/dL 170*   CALCIUM mg/dL 9.3           0  Lab Value Date/Time   TROPONINI <0.006 04/04/2017 1056   TROPONINI <0.006 04/03/2017 1851   TROPONINI 0.018 03/31/2017 0542   TROPONINI 0.024 03/30/2017 1515   TROPONINI 3.295 (C) 05/07/2015 2125   TROPONINI 3.801 (C) 05/07/2015 1549   TROPONINI 3.825 (C) 05/07/2015 1133       Results from last 7 days  Lab Units 04/05/17  0417   TSH mIU/mL 0.656       Results from last 7 days  Lab Units 04/05/17  0417   CHOLESTEROL mg/dL 108   TRIGLYCERIDES mg/dL 80   HDL CHOL mg/dL 30*             Tele:  Atrial fibrillation with RVR.    Assessment/Plan     Principal Problem:    CVA (cerebral vascular accident)  Active Problems:    Diabetes mellitus, type 2    Chronic respiratory failure with hypoxia    Urinary retention    Hyperlipidemia    Essential hypertension    Delisa's gangrene    E. coli UTI (urinary tract infection)    Epididymo-orchitis      1. Acute CVA  -not a TPA candidate and area of possible ischemia not felt to be amenable to intervention. No documented atrial fibrillation from the outside facility.  2. Ischemic cardiomyopathy.                - EF 20% by outside facility echo. Could possibly have a LV thrombus               -ECHO pending  3. Atrial fibrillation  - new diagnosis. CHADSVASC=6   -current RVR. Amiodarone has been initiated.  4. CAD, stable  5. Dyslipidemia  6. Hypertension    Plan: Echocardiogram pending.  Patient has atrial fibrillation and CVA.  Will need anticoagulation started at some point.  Meantime we'll attempt rate control his atrial fibrillation is much as possible.  Plavix may be discontinued from a cardiac standpoint, once anticoagulation is started.    04/05/17  2:12 PM  Jarocho CARVAJAL have reviewed the note in full and agree with all aspects of the  above including physical exam, assessment, labs and plan with changes made accordingly.     Jarocho Martin MD  04/05/17  2:13 PM        Please note that portions of this note may have been completed with a voice recognition program. Efforts were made to edit the dictations, but occasionally words are mistranscribed.

## 2017-04-05 NOTE — PROGRESS NOTES
"Neurology       Patient Care Team:  Jose Navarro MD as PCP - General (Family Medicine)    Chief complaint: Nonverbal    History:  The patient is minimally responsive but will follow simple commands.    He's been found to be in atrial fibrillation is now on an amiodarone drip.      Past Medical History:   Diagnosis Date   • CAD (coronary artery disease)    • Chronic back pain    • Chronic respiratory failure with hypoxia     Night time only at 2 liters/min   • Diabetes mellitus, type 2    • Essential hypertension    • Hyperlipidemia    • Ischemic cardiomyopathy    • NSTEMI (non-ST elevated myocardial infarction) 2015   • Obesity    • PVD (peripheral vascular disease)    • Systolic CHF     EF <20%  3/8/17   • Urinary retention        Vital Signs   Vitals:    04/05/17 0919 04/05/17 0952 04/05/17 1000 04/05/17 1015   BP:  114/60 109/67 (!) 116/101   BP Location:       Patient Position:       Pulse:  112 115 114   Resp:       Temp:       TempSrc:       SpO2:   93% 95%   Weight: 237 lb (108 kg)      Height: 69\" (175.3 cm)          Physical Exam:   General: Lethargic              Neuro: Rouses to stimulation.    He'll follow simple commands.    He moves both extremities on request but his left side is a bit weaker.    Reflexes are plus minus    Results Review:  Reviewed    Results from last 7 days  Lab Units 04/05/17 0417   WBC 10*3/mm3 13.42*   HEMOGLOBIN g/dL 11.4*   HEMATOCRIT % 36.6*   PLATELETS 10*3/mm3 405       Results from last 7 days  Lab Units 04/05/17  0417 04/04/17  0115 04/03/17  0235   SODIUM mmol/L 136 136 134*   POTASSIUM mmol/L 3.9 4.0 3.8   CHLORIDE mmol/L 102 105 105   TOTAL CO2 mmol/L 25.0 24.8 21.3*   BUN mg/dL 6* 6* 6*   CREATININE mg/dL 0.60 0.53 0.60   CALCIUM mg/dL 9.3 8.3 8.4   BILIRUBIN mg/dL 0.3 0.2 0.1*   ALK PHOS U/L 65 69 70   ALT (SGPT) U/L 12 12 9*   AST (SGOT) U/L 22 24 19   GLUCOSE mg/dL 170* 130* 178*     Imaging Results (last 24 hours)     Procedure Component Value Units Date/Time "    XR Chest 1 View [[id number]] Updated:  04/04/17 1833    CT Head Without Contrast [[id number]] Collected:  04/05/17 1241     Updated:  04/05/17 1241    Narrative:       EXAMINATION: CT HEAD WO CONTRAST-, CT ANGIOGRAM HEAD W WO CONTRAST-  04/04/2017     INDICATION: CVA; altered mental status, bilateral upper extremities  weakness.     TECHNIQUE: Contiguous axial 5 mm sections through the brain parenchyma  without intravenous contrast.  Multislice helical CT angiogram of the head before and after intravenous  contrast.     Two dimensional and three dimensional reformatted images were provided  for this exam.  NASCET criteria are utilized for calculation of stenosis.     COMPARISON: NONE     FINDINGS:   Brain:  The midline structures are central. There is no midline shift. Areas of  decreased attenuation are present in the left frontal periventricular  white matter. An old infarct is present in the left frontal lobe. The  structures of the posterior fossa are grossly normal. The images are  somewhat compromised by motion artifact.     The bony calvarium is intact.     Brain CTA:  The brain CTA images are extremely compromised by patient motion.  Right:  Distal portions of the right internal carotid artery are normal in  course and caliber. The right middle cerebral artery is grossly  unremarkable. The anterior cerebral artery cannot be evaluated secondary  to motion artifact.     Left:  Distal portions of the left internal carotid artery are normal in course  and caliber. The left middle cerebral artery is normal. The anterior  cerebral artery cannot be well evaluated secondary to motion artifact.     Posterior circulation:  The distal vertebral arteries, basilar artery, and posterior cerebral  arteries are normal in course and caliber.       Impression:       1. Both exams compromised by motion artifact.  2. Grossly no acute intracranial process.  3. 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  4. Normal middle cerebral arteries and  posterior circulation.  5. The anterior cerebral arteries are not well evaluated.     D:  04/05/2017  E:  04/05/2017          CT Angiogram Head With & Without Contrast [[id number]] Collected:  04/05/17 1241     Updated:  04/05/17 1241    Narrative:       EXAMINATION: CT HEAD WO CONTRAST-, CT ANGIOGRAM HEAD W WO CONTRAST-  04/04/2017     INDICATION: CVA; altered mental status, bilateral upper extremities  weakness.     TECHNIQUE: Contiguous axial 5 mm sections through the brain parenchyma  without intravenous contrast.  Multislice helical CT angiogram of the head before and after intravenous  contrast.     Two dimensional and three dimensional reformatted images were provided  for this exam.  NASCET criteria are utilized for calculation of stenosis.     COMPARISON: NONE     FINDINGS:   Brain:  The midline structures are central. There is no midline shift. Areas of  decreased attenuation are present in the left frontal periventricular  white matter. An old infarct is present in the left frontal lobe. The  structures of the posterior fossa are grossly normal. The images are  somewhat compromised by motion artifact.     The bony calvarium is intact.     Brain CTA:  The brain CTA images are extremely compromised by patient motion.  Right:  Distal portions of the right internal carotid artery are normal in  course and caliber. The right middle cerebral artery is grossly  unremarkable. The anterior cerebral artery cannot be evaluated secondary  to motion artifact.     Left:  Distal portions of the left internal carotid artery are normal in course  and caliber. The left middle cerebral artery is normal. The anterior  cerebral artery cannot be well evaluated secondary to motion artifact.     Posterior circulation:  The distal vertebral arteries, basilar artery, and posterior cerebral  arteries are normal in course and caliber.       Impression:       1. Both exams compromised by motion artifact.  2. Grossly no acute  intracranial process.  3. 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  4. Normal middle cerebral arteries and posterior circulation.  5. The anterior cerebral arteries are not well evaluated.     D:  04/05/2017  E:  04/05/2017                Assessment:  Likely embolic stroke    New onset atrial fibrillation    Plan:  CT of the head without infusion.    Start heparin to prevent further damage.        Comment:   is extremely guarded.  It was discussed with his wife at some length.         I discussed the patients findings and my recommendations with patient, family and nursing staff    Luis Alberto Phelan MD  04/05/17  12:57 PM

## 2017-04-05 NOTE — PAYOR COMM NOTE
"[facility]  NPI:[id number]    Utilization Review  Contact: Arminda Beth RN  Phone: [phone]  Fax:[phone]    DISCHARGE NOTIFICATION  Discharged to [facility] on 4/4/17  Still awaiting inpatient auth patient admitted on 3/30/17 and discharged on 4/4/17  Tomas Gtz (61 y.o. Male)     Date of Birth Social Security Number Address Home Phone MRN    1955  [address]   [facility] [id number] [phone] [id number]    [employer] Marital Status          None        Admission Date Admission Type Admitting Provider Attending Provider Department, Room/Bed    3/30/17 Emergency Christoph Cason MD  [facility] 2 SOUTH, 208/2S    Discharge Date Discharge Disposition Discharge Destination        4/4/2017 Transfer to Another Facility             Attending Provider: (none)    Allergies:  No Known Allergies    Isolation:  None   Infection:  None   Code Status:  Prior    Ht:  69\" (175.3 cm)   Wt:  237 lb 13 oz (108 kg)    Admission Cmt:  None   Principal Problem:  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]                 Active Insurance as of 3/30/2017     Primary Coverage     Payor Plan Insurance Group Employer/Plan Group    Count includes the [facility] [place] Count includes the [facility] [employer] [facility] PPO [id number]     Payor Plan Address Payor Plan Phone Number Effective From Effective To    [address] [phone] 11/1/2015     [place] [address]       Subscriber Name Subscriber Birth Date Member ID       SANJUANA GTZ 1955 [id number]                 Emergency Contacts      (Rel.) Home Phone Work Phone Mobile Phone    Sanjuana Gtz [phone] -- --               Discharge Summary      Tony Trevino DO at 4/4/2017 11:56 AM          Date of Admission: 3/30/2017    Date of Discharge: 4/4/2017     PCP: TRESA Bailon    Admission Diagnosis:   Severe sepsis  Necrotic ulcer on scrotum vs early Delisa's gangrene  Ischemic " cardiomyopathy/systolic CHF (EF<20%) with no acute exacerbation  Hypotension  Possible undiagnosed COPD with hx of tobacco abuse  DM II with hyperglycemia  Hyponatremia  Hypokalemia  CAD with hx of 3-vessel CABG  Essential HTN  PVD  HLD    Discharge Diagnosis:   Sepsis  Delisa's gangrene  E coli UTI  Electrolyte abnormalities including hypokalemia, hypomagnesemia and hypophosphatemia  Hyponatremia-resolved  DM II, insulin-dependent with hyperglycemia  Essential HTN-hypotension upon admission has resolved  Ischemic cardiomyopathy (EF<20%)-compensated  CAD with hx of 3-vessel CABG        Procedures Performed:  3/31/17: Cystoscopy and scrotal exploration with debridement of necrotizing fasciitis     Consults:   Consults     Date and Time Order Name Status Description    3/30/2017 2000 Inpatient Consult to Infectious Diseases Completed     3/30/2017 1946 Inpatient Consult to Urology              History of Present Illness:  Tomas Salvador is a 61 y.o. male who presented to [facility] ED as a transfer from the urologist office with a scrotal lesion. He reported dysuria x 1 month prior to presentation and was evaluated in the ED on 2/24/17. A colbert catheter was placed and pt was discharged home. He saw his PCP in the office who then referred him to urology. Physical exam revealed a scrotal lesion and urology contacted the hospitalist service for a direct admission. However, pt met sepsis criteria and was referred to the ED. Please see H&P for complete details.     In the ED, pt was hypotensive upon arrival with SBP in the 80's. He was given IVF's and broad spectrum IV abx were initiated after cultures were obtained.        Hospital Course  Tomas Salvador was admitted to the CCU for further evaluation and treatment. He was continued on broad spectrum IV abx and aggressive IVF's. Urology and ID were consulted for further input.    US of the scrotum was obtained revealing marked edema throughout the scrotum with findings  consistent with possible abscess. Pt was evaluated by urology and taken to the OR on 3/31 where he underwent cystoscopy and scrotal exploration with debridement of necrotizing fasciitis. Intraoperative cultures were obtained and pt tolerated the procedure well.     Pt's sepsis began resolving with resolution of his hypotension. He was then moved out of the CCU to the med/surg floor with telemetry. Cultures revealed E coli and ID continued broad spectrum IV abx in the setting of his clinical findings and sepsis. Wound care was continued per urology recommendations. On the evening of 4/3, pt reported an episode of CP. EKG was obtained with no acute ischemic changes. CE's were negative.     On the morning of 4/4, RN and staff noticed an acute change in pt's clinical status. He became aphasic with left-sided facial asymmetry and left-sided paralysis. I was notified and quickly evaluated the patient and noted similar findings with a NIH score in the high 20's. Pt remained hemodynamically stable with a BG level of 150. EKG was obtained which did not reveal any acute changes. Initial troponin was negative. ABG did not reveal any evidence of hypercapnia or hypoxia. CT head was obtained and per verbal report revealed a subacute left-sided frontal infarct. The stroke team at [facility] was then contacted. I discussed pt's care with neurology, Dr. Phelan who recommended interventional evaluation as the patient is not a tPA candidate in the setting of his recent surgery. Interventional was then contacted (Dr. Hines) who graciously agreed to accept the patient in transfer for further evaluation. Pt's family was then updated and informed of the need for transfer for further neurological evaluation. They agreed to transfer and arrangements were made for transfer to [facility] via helicopter.       Pertinent Test Results:   CT head without contrast: Official results pending    US scrotum and testicles  Marked edema throughout the scrotum more  severe on the left  where there is diffuse abnormal echogenicity and shadowing consistent  with infection possible abscess. The right testicle was unremarkable.  There were cysts in the epididymis above the testicle and a small amount  of fluid.    Urine culture: >100,000 E coli    Wound culture: E coli     Condition on Discharge:  Stable but guarded     Vital Signs  Vitals:    04/04/17 0821   BP:    Pulse:    Resp:    Temp:    SpO2: 98%       Physical Exam:  General:    Awake, aphasic, will squeeze right hand but does not follow any other commands    Heart:      Normal S1 and S2. Regular rate and rhythm. No significant murmur, rubs or gallops appreciated.   Lungs:     Respirations regular, even and unlabored. Lungs clear to auscultation B/L. No wheezes, rales or rhonchi.   Abdomen:   Soft and nontender. No guarding, rebound tenderness or  organomegaly noted. Bowel sounds present x 4.   Extremities:  No clubbing, cyanosis or edema noted.   Neuro: Aphasic, will briefly squeeze right hand but does not follow any other commands. Moves RLE but not to resistance.  Left side appears flaccid with left-sided facial asymmetry      Discharge Disposition:   [facility]       Discharge Medications:   Tomas Salvador   Home Medication Instructions ROEL:[id number]    Printed on:04/04/17 1155   Medication Information                      aspirin 81 MG EC tablet  Take 81 mg by mouth Daily. On hold for surgery             atorvastatin (LIPITOR) 80 MG tablet  Take 1 tablet by mouth Daily.             carvedilol (COREG) 6.25 MG tablet  Take 1 tablet by mouth 2 (Two) Times a Day With Meals.             clindamycin (CLEOCIN) 600 MG/50ML IVPB  Infuse 50 mL into a venous catheter Every 8 (Eight) Hours. Indications: Infection of Blood or Tissues affecting the Whole Body             clopidogrel (PLAVIX) 75 MG tablet  Take 1 tablet by mouth Daily.             gabapentin (NEURONTIN) 300 MG capsule  Take 1 capsule by mouth 2  (two) times a day.             Heparin Sodium, Porcine, (HEPARIN, PORCINE,) 5000 UNIT/ML injection  Inject 1 mL under the skin Every 12 (Twelve) Hours.             insulin aspart (novoLOG) 100 UNIT/ML injection  Inject 0-9 Units under the skin 4 (Four) Times a Day Before Meals & at Bedtime.             insulin detemir (LEVEMIR) 100 UNIT/ML injection  Inject 10 Units under the skin Every Night.             meropenem 2 g in sodium chloride 0.9 % 100 mL IVPB  Infuse 2 g into a venous catheter Every 8 (Eight) Hours. Indications: Infection of Blood or Tissues affecting the Whole Body             morphine 2 MG/ML injection  Infuse 1 mL into a venous catheter Every 4 (Four) Hours As Needed for Severe Pain (7-10).             nitroglycerin (NITROLINGUAL) 0.4 MG/SPRAY spray  Place 1 spray under the tongue every 5 (five) minutes as needed for chest pain.             nystatin (MYCOSTATIN) 357920 UNIT/GM ointment  Apply  topically 2 (Two) Times a Day As Needed (yeast on skin).             omeprazole (PriLOSEC) 20 MG capsule  Take 20 mg by mouth Daily As Needed (acid reflux).             rOPINIRole (REQUIP) 0.25 MG tablet  Take 0.25 mg by mouth Every Night.             vancomycin 1,500 mg in sodium chloride 0.9 % 500 mL IVPB  Infuse 1,500 mg into a venous catheter Every 12 (Twelve) Hours. Indications: Infection of Blood or Tissues affecting the Whole Body                   Discharge Diet:        Dietary Orders            Start     Ordered    04/04/17 1156  NPO Diet  Diet Effective Now      04/04/17 1155    04/03/17 1247  DIET MESSAGE Please send guest tray with all meals  Once     Comments:  Please send guest tray with all meals    04/03/17 1247          Activity at Discharge:  bedrest    Follow-up Appointments:    Your Scheduled Appointments     Apr 07, 2017  7:00 AM EDT   [facility] CV LES HOSP NM INJ VT with [facility] NM ADMIN RM 1 [facility] NUCLEAR MEDICINE ([place])    [address] [place], [place] [address] [address]    [phone]            Apr 07, 2017  8:45 AM EDT    CV LES HOSP NM SCAN VT with COR NM 2 CARD   [facility] LES NUCLEAR MEDICINE ([place])    [address]  [place] [address]   [phone]            Apr 07, 2017  9:15 AM EDT    CV LES HOSP NM SCAN VT with COR STRESS LAB 2 CARD   [facility] LES STRESS TESTS ([place])    [address]  [place] [address]   [phone]            Apr 07, 2017 11:15 AM EDT    CV LES HOSP NM SCAN VT with COR NM 2 CARD   [facility] LES NUCLEAR MEDICINE ([place])    [address]  [place] [address]   [phone]            Jul 07, 2017  1:15 PM EDT   Follow Up with Jayson Quezada MD   [facility] [place] [facility]IOLOG[facility] (--)    [address]  [facility] [id number] [phone]           Arrive 15 minutes prior to appointment.                    Test Results Pending at Discharge:  None      Tony Trevino DO  04/04/17  11:56 AM      Time: Greater than 30 minutes spent on this discharge.           Electronically signed by Tony Trevino DO at 4/4/2017 12:34 PM

## 2017-04-05 NOTE — PROGRESS NOTES
Acute Care - Occupational Therapy Initial Evaluation  [facility]     Patient Name: Tomas Salvador  : 1955  MRN: [id number]  Today's Date: 2017  Onset of Illness/Injury or Date of Surgery Date: 17  Date of Referral to OT: 17  Referring Physician: Dr. Morrison    Admit Date: 2017       ICD-10-CM ICD-9-CM   1. Dysphagia, unspecified type R13.10 787.20   2. Impaired functional mobility, balance, gait, and endurance Z74.09 V49.89   3. Impaired mobility and ADLs Z74.09 799.89     Patient Active Problem List   Diagnosis   • Coronary disease   • Ischemic cardiomyopathy   • Peripheral vascular disease   • ICD (implantable cardioverter-defibrillator) in place, implantation .   • Dyslipidemia, on atorvastain.    • Sepsis   • CVA (cerebral vascular accident)   • Diabetes mellitus, type 2   • Chronic respiratory failure with hypoxia   • Urinary retention   • Hyperlipidemia   • Essential hypertension   • Delisa's gangrene   • E. coli UTI (urinary tract infection)   • Epididymo-orchitis     Past Medical History:   Diagnosis Date   • CAD (coronary artery disease)    • Chronic back pain    • Chronic respiratory failure with hypoxia     Night time only at 2 liters/min   • Diabetes mellitus, type 2    • Essential hypertension    • Hyperlipidemia    • Ischemic cardiomyopathy    • NSTEMI (non-ST elevated myocardial infarction)    • Obesity    • PVD (peripheral vascular disease)    • Systolic CHF     EF <20%  3/8/17   • Urinary retention      Past Surgical History:   Procedure Laterality Date   • CARDIAC CATHETERIZATION  2015    Severe multivessel coronary artery disease involving a totally occluded LAD, 99% proximal left circumflex artery; totally occluded saphenous vein graft to the diagonal; patent LIMA to the LAD, and a patent saphenous vein graft to the first obtuse marginal.      • CARDIAC DEFIBRILLATOR PLACEMENT     • CORONARY ARTERY BYPASS GRAFT      3 vessel   • CYSTOSCOPY N/A  3/31/2017    Procedure: CYSTOSCOPY and scrotal exploration with debridement of necrotizing fasciitis;  Surgeon: Mele Johnson MD;  Location:  [facility];  Service:    • LEG SURGERY Right     unknown, pos vascular bypass   • SCROTAL EXPLORATION N/A 3/31/2017    Procedure: SCROTAL EXPLORATION;  Surgeon: Mele Johnson MD;  Location:  [facility];  Service:           OT ASSESSMENT FLOWSHEET (last 72 hours)      OT Evaluation       04/05/17 0803 04/05/17 0801 04/05/17 0800 04/05/17 0600 04/05/17 0400    Rehab Evaluation    Document Type evaluation  -JR evaluation  -LS       Subjective Information no complaints;agree to therapy  -JR agree to therapy;no complaints  -LS       Patient Effort, Rehab Treatment adequate  -JR fair   agitation/confusion   -LS       Symptoms Noted Comment Pt very restless, agitated during initial eval.  -JR Pt very restless, agitation throughout evaluation.   -LS       General Information    Patient Profile Review yes  -JR yes  -LS       Onset of Illness/Injury or Date of Surgery Date 04/04/17  -JR 04/04/17  -LS       Referring Physician Dr. Morrison  -JR Prince MD  -LS       Pertinent History Of Current Problem Pt initially to ED 1 month prior with urinary retention. Pt had colbert placed and treated for UTI and d/c'd. Pt then developed scrotal swelling and firmness with black discoloration at the bottom of the scrotum. In ED urologist debrided the necrotic scrotal tissue and drained an abscess. On day of admit pt developed aphasia with L sided facial droop and L sided paralysis. CT revealed L sided frontal infarct. Pt not a candidate for intervention.  -JR To ED at [facility] with scrotal lesion; found to be hypotensive and septic. Demonstrated sudden onset L-sided weakness, facial droop, and aphasia; transferred to [facility] for further neuro evaluation. CT (-) for acute infarct; demonstrated subactue L-sided frontal infarct.   -LS       Precautions/Limitations fall precautions;oxygen therapy device  and L/min  -JR fall precautions;oxygen therapy device and L/min  -LS       Prior Level of Function independent:;gait;transfer;ADL's;bed mobility;driving  -JR independent:;gait;transfer;wound care;bathing;dressing  -LS       Equipment Currently Used at Home cane, straight;walker, rolling;oxygen  -JR oxygen;walker, standard;cane, straight;power chair, (recliner lift)  -LS       Plans/Goals Discussed With patient and family;agreed upon  -JR patient;spouse/S.O.;agreed upon  -LS       Risks Reviewed patient and family:;increased discomfort  -JR patient:;spouse/S.O.:;LOB;dizziness;increased discomfort;change in vital signs  -LS       Benefits Reviewed patient and family:;improve function;increase independence  -JR patient:;spouse/S.O.:;improve function;increase independence;increase strength;increase balance;increase knowledge  -LS       Barriers to Rehab medically complex  -JR cognitive status  -LS       Living Environment    Lives With spouse  -JR spouse  -LS       Living Arrangements house  -JR mobile home  -LS       Home Accessibility stairs to enter home;ramps present at home;tub/shower is not walk in  -JR stairs to enter home;tub/shower is not walk in  -LS       Number of Stairs to Enter Home 5   Reports family currently building a ramp  -JR 5  -LS       Living Environment Comment  Wife reports that ramp is being built.   -LS       Clinical Impression    Date of Referral to OT 04/04/17  -JR        OT Diagnosis Decreased independence with ADL's and mobility  -JR        Patient/Family Goals Statement Return to PLOF  -JR        Criteria for Skilled Therapeutic Interventions Met yes;treatment indicated  -JR        Rehab Potential good, to achieve stated therapy goals  -JR        Therapy Frequency daily  -JR        Anticipated Equipment Needs At Discharge --   tba further  -JR        Anticipated Discharge Disposition inpatient rehabilitation facility  -JR        Vital Signs    Pre Systolic BP Rehab 145  -  -LS        Pre Treatment Diastolic   -  -LS       Post Systolic BP Rehab 141  -  -LS       Post Treatment Diastolic BP 89  -JR 89  -LS       Pretreatment Heart Rate (beats/min) 113  -  -LS       Posttreatment Heart Rate (beats/min) 131  -  -LS       Pre SpO2 (%) 98  -JR 99  -LS       O2 Delivery Pre Treatment supplemental O2   2L  -JR supplemental O2   2L  -LS       O2 Delivery Post Treatment  supplemental O2  -LS       Pre Patient Position Supine  -JR        Intra Patient Position Standing  -JR        Post Patient Position Supine  -JR        Pain Assessment    Pain Assessment 0-10  -JR 0-10  -LS       Pain Score 0  -JR 0  -LS       Post Pain Score 0  -JR 0  -LS       Vision Assessment/Intervention    Vision Comment Unable to assess due to pt's confusion/agitation  -JR        Cognitive Assessment/Intervention    Current Cognitive/Communication Assessment impaired  -JR impaired  -LS       Orientation Status oriented to;person   Oriented to year  -JR oriented to;person;required verbal cueing (specifiy in comments);disoriented to;place   cues for month  -LS       Follows Commands/Answers Questions 25% of the time;able to follow single-step instructions;needs cueing;needs increased time;needs repetition  -JR able to follow single-step instructions;25% of the time;needs cueing;needs increased time;needs repetition  -LS       Personal Safety severe impairment;decreased awareness, need for assist;decreased awareness, need for safety;decreased insight to deficits  -JR severe impairment;at risk behaviors demonstrated;impulsive;decreased awareness, need for assist;decreased awareness, need for safety;decreased insight to deficits  -LS       Personal Safety Interventions  fall prevention program maintained;gait belt;nonskid shoes/slippers when out of bed  -LS       ROM (Range of Motion)    General ROM no range of motion deficits identified  -JR no range of motion deficits identified   BLEs as demonstrated  functionally  -LS       General ROM Detail Pt not following commands for ROM testing  -JR        MMT (Manual Muscle Testing)    General MMT Assessment no strength deficits identified  -JR lower extremity strength deficits identified  -LS       General MMT Assessment Detail Pt not following commands for MMT this date  -JR        Muscle Tone Assessment    Left-Side Extremities Muscle Tone Assessment   mildly increased tone  -MR moderately decreased tone  -JE moderately decreased tone  -JE    Bed Mobility, Assessment/Treatment    Bed Mobility, Assistive Device head of bed elevated;draw sheet  -JR head of bed elevated;draw sheet  -LS       Bed Mob, Supine to Sit, [place] moderate assist (50% patient effort);2 person assist required  -JR moderate assist (50% patient effort);2 person assist required;verbal cues required  -LS       Bed Mob, Sit to Supine, [place] moderate assist (50% patient effort);2 person assist required  -JR moderate assist (50% patient effort);2 person assist required;verbal cues required  -LS       Bed Mobility, Safety Issues cognitive deficits limit understanding;decreased use of arms for pushing/pulling;decreased use of legs for bridging/pushing;impaired trunk control for bed mobility  -JR cognitive deficits limit understanding  -LS       Bed Mobility, Impairments strength decreased;impaired balance  -JR        Transfer Assessment/Treatment    Transfers, Sit-Stand [place] moderate assist (50% patient effort);2 person assist required  -JR moderate assist (50% patient effort);2 person assist required;verbal cues required  -LS       Transfers, Stand-Sit [place] moderate assist (50% patient effort);2 person assist required  -JR moderate assist (50% patient effort);2 person assist required;verbal cues required  -LS       Transfers, Sit-Stand-Sit, Assist Device rolling walker  -JR rolling walker  -LS       Transfer, Safety Issues balance decreased during turns;sequencing ability  decreased;step length decreased;weight-shifting ability decreased  -JR balance decreased during turns;sequencing ability decreased;impulsivity  -LS       Transfer, Impairments strength decreased;impaired balance  -JR strength decreased;impaired balance  -LS       Transfer, Comment Pt transferred from EOB, pt ambulated with PT. Pt transferred to chair and back to bed. Pt stood from EOB and took a couple steps to HOB. Pt required verbal cues for hand placement with transfer. Pt demo decreased safety awareness with transfers. Pt attempting to stand prematurely this date.  -JR Performed STS from EOB and also from recliner; demonstrated significant decreased safety awareness, requiring vc's for hand placement.   -       Functional Mobility    Functional Mobility- Ind. Level moderate assist (50% patient effort);2 person assist required  -JR        Functional Mobility- Device rolling walker  -        Functional Mobility-Distance (Feet) --   in hallway  -        Functional Mobility- Safety Issues balance decreased during turns;sequencing ability decreased;step length decreased;weight-shifting ability decreased;supplemental O2  -JR        Functional Mobility- Comment Pt pushing walker too far out in front. Pt requiring cues for hold onto  of walker. Pt required assist to guide walker. Pt followed with chair. Pt required cues for safety with walker. Pt reported fatigue with functional mobility.  -JR        Lower Body Dressing Assessment/Training    LB Dressing Assess/Train, Clothing Type donning:;slipper socks  -JR        LB Dressing Assess/Train, Position supine  -        LB Dressing Assess/Train, [place] dependent (less than 25% patient effort)  -JR        LB Dressing Assess/Train, Impairments strength decreased;impaired balance  -JR        Motor Skills/Interventions    Additional Documentation Balance Skills Training (Group)  -JR Balance Skills Training (Group)  -       Balance Skills Training     Sitting-Level of Assistance Contact guard  -JR Contact guard  -LS       Sitting-Balance Support Feet supported  -JR Feet supported  -LS       Standing-Level of Assistance Minimum assistance;x2  -JR Minimum assistance;x2  -LS       Static Standing Balance Support assistive device  -JR assistive device  -LS       Gait Balance-Level of Assistance Moderate assistance;x2  -JR Moderate assistance;x2  -LS       Gait Balance Support assistive device  -JR assistive device  -LS       Sensory Assessment/Intervention    Light Touch  --   TBA further when cognition improves  -LS       General Therapy Interventions    Planned Therapy Interventions ADL retraining;balance training;bed mobility training;strengthening;transfer training   cognitive retraining  -JR        Positioning and Restraints    Pre-Treatment Position in bed  -JR in bed  -LS       Post Treatment Position bed  -JR bed  -LS       In Bed notified nsg;supine;call light within reach;encouraged to call for assist;exit alarm on;with family/caregiver  -JR notified nsg;call light within reach;encouraged to call for assist;exit alarm on;with other staff;with family/caregiver  -LS       Lower Extremity    Lower Ext Manual Muscle Testing Detail  Unable to perform formal MMT due to cognitive status; functionally BLEs grossly 4-/5. TBA further.  -LS         04/05/17 0200 04/05/17 0000 04/04/17 2200 04/04/17 2000 04/04/17 1800    Muscle Tone Assessment    Left-Side Extremities Muscle Tone Assessment moderately decreased tone  -JE moderately decreased tone  -JE moderately decreased tone  -JE moderately decreased tone  -JE moderately decreased tone  -MR      04/04/17 1600 04/04/17 1500 04/04/17 1428 04/04/17 1427 04/04/17 1410    Rehab Evaluation    Document Type  evaluation  -SG       Subjective Information  no complaints;agree to therapy  -SG       Patient Effort, Rehab Treatment  adequate  -SG       Symptoms Noted During/After Treatment  none  -SG       General Information     Equipment Currently Used at Home   oxygen;walker, rolling;cane, straight  -HH      Living Environment    Lives With spouse  -MR  spouse  -HH      Living Arrangements mobile home  -MR  mobile home  -      Home Accessibility stairs to enter home  -MR        Number of Stairs to Enter Home 5  -MR        Stair Railings at Home none  -MR        Type of Financial/Environmental Concern none  -MR        Transportation Available car;family or friend will provide  -MR  car;family or friend will provide  -      Functional Level Prior    Ambulation 1-->assistive equipment  -MR   1-->assistive equipment  -HH     Transferring 0-->independent  -MR   1-->assistive equipment  -HH     Toileting 0-->independent  -MR   1-->assistive equipment  -HH     Bathing 0-->independent  -MR   2-->assistive person  -HH     Dressing 0-->independent  -MR   0-->independent  -HH     Eating 0-->independent  -MR   0-->independent  -     Communication 0-->understands/communicates without difficulty  -MR   0-->understands/communicates without difficulty  -HH     Swallowing 0-->swallows foods/liquids without difficulty  -MR        Pain Assessment    Pain Assessment  No/denies pain  -SG       Muscle Tone Assessment    Left-Side Extremities Muscle Tone Assessment moderately decreased tone  -MR    moderately decreased tone  -MR      04/04/17 1000 04/04/17 0800 04/03/17 2000 04/03/17 0805 04/02/17 2010    Muscle Tone Assessment    Muscle Tone Assessment Left-Side Extremities  -CB Bilateral Upper Extremities;Bilateral Lower Extremities  -SD  Bilateral Upper Extremities;Bilateral Lower Extremities  -TR Bilateral Upper Extremities;Bilateral Lower Extremities  -AC    Left-Side Extremities Muscle Tone Assessment flaccid  -CB        Bilateral Upper Extremities Muscle Tone Assessment  mildly decreased tone  -SD mildly decreased tone  -MS mildly decreased tone  -TR mildly decreased tone  -AC    Bilateral Lower Extremities Muscle Tone Assessment  mildly decreased  tone  -SD mildly decreased tone  -MS mildly decreased tone  -TR mildly decreased tone  -AC      04/02/17 1218                Muscle Tone Assessment    Bilateral Upper Extremities Muscle Tone Assessment mildly decreased tone  -EA        Bilateral Lower Extremities Muscle Tone Assessment mildly decreased tone  -EA          User Key  (r) = Recorded By, (t) = Taken By, (c) = Cosigned By    Initials Name Effective Dates    SG Lyndsey Farmer-Jose David, MS CCC-SLP 06/22/15 -     JR Judy Leach, OT 06/22/15 -     LS Lily Juárez, PT 06/19/15 -     CB Sanaz Haas, RN 06/16/16 -     EA Tamara Tobar, RN 06/16/16 -     AC Lina Tran, RN 06/16/16 -     SD Roxi Patel, RN 06/16/16 -     TR Katherine Serna, RN 06/16/16 -     MS Eugenie Alba, RN 06/16/16 -     HH Bernadette Pina, [place] 03/14/16 -     MR Jada Murillo, RN 06/16/16 -     FELY Pineda, RN 06/16/16 -            Occupational Therapy Education     Title: PT OT SLP Therapies (Active)     Topic: Occupational Therapy (Active)     Point: ADL training (Active)    Description: Instruct learner(s) on proper safety adaptation and remediation techniques during self care or transfers.   Instruct in proper use of assistive devices.    Learning Progress Summary    Learner Readiness Method Response Comment Documented by Status   Patient Acceptance E NR Educated pt on safe bed mobility and transfer techniques.  04/05/17 0903 Active                      User Key     Initials Effective Dates Name Provider Type Discipline     06/22/15 -  Judy MCCORMICK Hany, OT Occupational Therapist OT                  OT Recommendation and Plan  Anticipated Equipment Needs At Discharge:  (tba further)  Anticipated Discharge Disposition: inpatient rehabilitation facility  Planned Therapy Interventions: ADL retraining, balance training, bed mobility training, strengthening, transfer training (cognitive retraining)  Therapy Frequency: daily  Plan of Care  Review  Plan Of Care Reviewed With: patient  Outcome Summary/Follow up Plan: Initial eval completed. Pt very restless and agitated during initial eval. Pt would benefit from continued skilled OT services to assist in returning pt to his PLOF. Recommend inpatient rehab at d/c.          OT Goals       04/05/17 0904          Bed Mobility OT LTG    Bed Mobility OT LTG, Date Established 04/05/17  -JR      Bed Mobility OT LTG, Time to Achieve 1 wk  -JR      Bed Mobility OT LTG, Activity Type all bed mobility  -JR      Bed Mobility OT LTG, [place] Level minimum assist (75% patient effort)  -JR      Transfer Training OT LTG    Transfer Training OT LTG, Date Established 04/05/17  -JR      Transfer Training OT LTG, Time to Achieve 1 wk  -JR      Transfer Training OT LTG, Activity Type all transfers  -JR      Transfer Training OT LTG, [place] Level minimum assist (75% patient effort)  -JR      Orientation OT LTG    Orientation OT LTG, Date Established 04/05/17  -JR      Orientation OT LTG, Time to Achieve 1 wk  -JR      Orientation OT LTG, Activity Type person;place;50% treatment session  -JR      Follow Directions OT LTG    Follow Directions OT LTG, Date Established 04/05/17  -JR      Follow Directions OT LTG, Time to Achieve 1 wk  -JR      Follow Directions OT LTG, Activity Type 1-Step;50% treatment session  -JR      Follow Directions OT LTG, [place] Level with verbal cues  -JR        User Key  (r) = Recorded By, (t) = Taken By, (c) = Cosigned By    Initials Name Provider Type    JR Judy Leach, OT Occupational Therapist                Outcome Measures       04/05/17 0803          How much help from another is currently needed...    Putting on and taking off regular lower body clothing? 1  -JR      Bathing (including washing, rinsing, and drying) 2  -JR      Toileting (which includes using toilet bed pan or urinal) 1  -JR      Putting on and taking off regular upper body clothing 2  -JR      Taking care  of personal grooming (such as brushing teeth) 2  -JR      Eating meals 2  -JR      Score 10  -JR      Modified [place] Scale    Modified [place] Scale 4 - Moderately severe disability.  Unable to walk without assistance, and unable to attend to own bodily needs without assistance.  -JR      Functional Assessment    Outcome Measure Options AM-PAC 6 Clicks Daily Activity (OT);Modified [place]  -JR        User Key  (r) = Recorded By, (t) = Taken By, (c) = Cosigned By    Initials Name Provider Type    JR Judy Leach OT Occupational Therapist          Time Calculation:   OT Start Time: 0803    Therapy Charges for Today     Code Description Service Date Service Provider Modifiers Qty    [id number]  OT EVAL MOD COMPLEXITY 4 4/5/2017 Judy Leach OT GO 1               Judy Leach OT  4/5/2017

## 2017-04-05 NOTE — PROGRESS NOTES
"Critical Care Note     LOS: 1 day   Patient Care Team:  Jose Navarro MD as PCP - General (Family Medicine)    Chief Complaint/Reason for visit: Sepsis, severe ICM      Subjective   61-year-old gentleman hospitalized at [facility] for one week with a scrotal cellulitis, status post debridement. He was brought to [facility] for a possible stroke. Stroke workup was negative. He does have encephalopathy.  Interval History:   Restless and agitated this morning. Difficult time getting echocardiogram. Ativan 1 mg given and helped for only 5 or 10 minutes. Dropped his oxygen saturation was some moderate respiratory distress. Nasotracheal suction secretions and now improved. Continues to be restless and agitated. He will not answer questions for me. He does look toward my voice but will not follow any commands. He developed atrial fibrillation with a rapid ventricular response and was placed on amiodarone    Review of Systems:    All systems were reviewed and negative except as noted in subjective.unable, patient not following commands    Medical history, surgical history, social history, family history reviewed    Objective     Intake/Output:    Intake/Output Summary (Last 24 hours) at 04/05/17 1501  Last data filed at 04/05/17 0800   Gross per 24 hour   Intake            853.7 ml   Output              950 ml   Net            -96.3 ml       Nutrition:NPO    Infusions:    amiodarone 1 mg/min Last Rate: 1 mg/min (04/05/17 0935)   Followed by     amiodarone 0.5 mg/min    heparin (porcine) 9.2 Units/kg/hr Last Rate: 9.2 Units/kg/hr (04/05/17 1350)   Pharmacy to Dose Heparin         Respiratory: 2 liters       Telemetry:SR with PACs    Vital Signs  Blood pressure 115/78, pulse 103, temperature 97.8 °F (36.6 °C), temperature source Axillary, resp. rate 22, height 69\" (175.3 cm), weight 237 lb (108 kg), SpO2 100 %.    Physical Exam:  General Appearance:   ill-appearing older gentleman in mild respiratory distress, " increased respiratory rate    Head:  NC/AT   Eyes:          ROMI EOMI no jaundice   Ears:     Throat:  mucous membranes moist. Several lower teeth teeth missing anteriorly.    Neck:  supple, trachea midline    Back:      Lungs:    bilateral expiratory rhonchi anteriorly, mild respiratory distress, mouth breathing with audible secretions     Heart:   distant heart sounds, regular, PMI displaced laterally    Abdomen:    hypoactive bowel sounds, soft, nontender, no organomegaly Cueto catheter is in place. Scrotum with erythema and induration.    Villa and posterior scrotal wound is packed with some drains.    Rectal:   Deferred   Extremities:  no pitting edema or cyanosis    Pulses:  weak pulses left foot, no palpable pulses right foot but extremity is warm to touch    Skin:  cool and dry    Lymph nodes:    Neurologic:  less alert today. Not answering questions or following commands. Moving all extremities spontaneously.       Results Review:     I reviewed the patient's new clinical results.     Results from last 7 days  Lab Units 04/05/17 0417 04/04/17  0115 04/03/17  0235   SODIUM mmol/L 136 136 134*   POTASSIUM mmol/L 3.9 4.0 3.8   CHLORIDE mmol/L 102 105 105   TOTAL CO2 mmol/L 25.0 24.8 21.3*   BUN mg/dL 6* 6* 6*   CREATININE mg/dL 0.60 0.53 0.60   CALCIUM mg/dL 9.3 8.3 8.4   BILIRUBIN mg/dL 0.3 0.2 0.1*   ALK PHOS U/L 65 69 70   ALT (SGPT) U/L 12 12 9*   AST (SGOT) U/L 22 24 19   GLUCOSE mg/dL 170* 130* 178*       Results from last 7 days  Lab Units 04/05/17 0417 04/04/17  0115 04/03/17  0235   WBC 10*3/mm3 13.42* 9.68 9.96   HEMOGLOBIN g/dL 11.4* 10.6* 10.6*   HEMATOCRIT % 36.6* 35.4* 33.5*   PLATELETS 10*3/mm3 405 380 343       Results from last 7 days  Lab Units 04/05/17  1349   PH, ARTERIAL pH units 7.408   PO2 ART mm Hg 78.0*   PCO2, ARTERIAL mm Hg 38.5   HCO3 ART mmol/L 24.3     Lab Results   Component Value Date    BLOODCX No growth at 5 days 03/30/2017     Lab Results   Component Value Date    URINECX No  growth 04/04/2017       I reviewed the patient's new imaging including images and reports.     Echocardiogram Findings      Left Ventricle  Left ventricular function is severely decreased. Normal left ventricular wall thickness noted. Global left ventricular wall motion appears abnormal. The left ventricular cavity is severely dilated.     Right Ventricle  Moderately reduced systolic function noted.     Left Atrium  Normal left atrial size and volume noted.     Right Atrium  Normal right atrial size noted.     Aortic Valve  The aortic valve is abnormal in structure. The valve exhibits sclerosis. There is calcification present within the aortic valve.     Mitral Valve  The mitral valve is abnormal in structure. Mild mitral valve regurgitation is present.     Tricuspid Valve  The tricuspid valve is normal. No tricuspid valve stenosis is present. Mild tricuspid valve regurgitation is present.     Pulmonic Valve  The pulmonic valve is structurally normal. There is no significant pulmonic valve stenosis present. There is no pulmonic valve regurgitation present.     Greater Vessels  No dilation of the aortic root is present. No dilation of the sinuses of Valsalva is present.     Pericardium  The pericardium is normal. There is no evidence of pericardial effusion.         IMPRESSION:CT head  1. Both exams compromised by motion artifact.  2. Grossly no acute intracranial process.  3. 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  4. Normal middle cerebral arteries and posterior circulation.  5. The anterior cerebral arteries are not well evaluated.      D: 04/05/2017      IMPRESSION:  1. Both exams compromised by motion artifact.  2. Grossly no acute intracranial process.  3. 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  4. Normal middle cerebral arteries and posterior circulation.  5. The anterior cerebral arteries are not well evaluated.      D: 04/05/2017  IMPRESSION:  1. Cardiomegaly is noted. There is airspace opacity left base with  consolidation. There is  congestive hilar and mid lung vascular  abnormalities.  2. Compared to previous studies of 12/17/2015, it appears that the  central congestive vascular edema is new. The density in the left lower  lobe, retrocardiac area and the left base effusion are new. Cardiomegaly  is relatively stable.      D: 04/05/2017  E: 04/05/2017  All medications reviewed.     aspirin 325 mg Oral Daily   Or      aspirin 300 mg Rectal Daily   atorvastatin 80 mg Oral Nightly   carvedilol 6.25 mg Oral Q12H   ceFAZolin 2 g Intravenous Q8H   clopidogrel 75 mg Oral Daily   fentaNYL 1 patch Transdermal Q72H   furosemide 80 mg Intravenous Once   heparin flush (porcine) 500 Units Intracatheter Q12H   insulin lispro 0-9 Units Subcutaneous Q6H   ipratropium-albuterol 3 mL Nebulization 4x Daily - RT   metroNIDAZOLE 500 mg Intravenous Q6H         Assessment/Plan     Principal Problem:    Sepsis  Active Problems:    E. coli UTI (urinary tract infection)    Encephalopathy acute    Cellulitis, scrotum, perineum    Diabetes mellitus, type 2    ICM EF 20%       NSTEMI 5/15 occl LAD, 99% circ, occl SVG to Dz, patent SVG toOM1, patent LIMA to LAD.   Single chamber AICD 12/15, CABG 3/2005    Chronic respiratory failure with hypoxia    Urinary retention    Epididymo-orchitis    61-year-old gentleman with a severe ischemic cardiomyopathy, ejection fraction of 20%, diabetes mellitus type II, who developed a scrotal abscess requiring I&D. Cultures were positive for Escherichia coli. He was in the [facility] for approximately one week and developed some left-sided weakness and altered mentation. He was transported to [place] for stroke. No stroke was found on CT of the head or CT perfusion. No intervention was done. Weakness resolved but he remained confused and encephalopathic. He developed atrial fibrillation with a rapid ventricular response and was placed on amiodarone drip.  Echocardiogram confirms a dilated left ventricular cavity that with severe  dysfunction. No comment as to whether thrombus is seen.  Neurology has also started a heparin drip. Chest x-ray has some congestive changes. Blood gas shows pH 7.40, PCO2 38, PO2 78, indicating no acidosis and adequate oxygenation.  ID has adjusted antibiotics and arranged for wound care.  His wife is very concerned of his restlessness. I clinically suspect that he is in pain. He has this large open wound in his posterior scrotal and perineal area. If alertness does not improve and he needs a Corpak, he will likely need a diverting loop ileostomy.  Blood sugars have ranged from 172-80.    PLAN:   Cefazolin, Flagyl  Aspirin, Plavix  Restart Coreg  1 dose of furosemide  Nebulized broncho-dilators  Fentanyl patch 25 mcg/h  Amiodarone drip  Heparin drip  too lethargic unstable for PT, OT, speech  Nasogastric tube for medication   Sliding scale insulin    VTE Prophylaxis:heparin drip    Stress Ulcer Prophylaxis:none    Ella Pope MD  04/05/17  3:01 PM      Time: 50min  I personally provided care to this critically ill patient as documented above.  Critical care time does not include time spent on separately billed procedures.  Non of my critical care time was concurrent with other critical care providers.

## 2017-04-05 NOTE — PLAN OF CARE
Problem: Patient Care Overview (Adult)  Goal: Plan of Care Review  Outcome: Ongoing (interventions implemented as appropriate)    04/05/17 0909   Coping/Psychosocial Response Interventions   Plan Of Care Reviewed With patient;spouse   Outcome Evaluation   Outcome Summary/Follow up Plan PT evaluation completed. Pt demonstrates decreased indep and safety re: functional mobility; limited today by significant restlessness and agitation throughout session. Would benefit from further skilled PT services to edu, strengthening, promoting PLOF. Will plan to perform formal MMT when cognition improves. Recommend IP rehab placement based upon current level of function.          Problem: Stroke (Ischemic) (Adult)  Goal: Signs and Symptoms of Listed Potential Problems Will be Absent or Manageable (Stroke)  Outcome: Ongoing (interventions implemented as appropriate)    04/05/17 0909   Stroke (Ischemic)   Problems Assessed (Stroke (Ischemic)/TIA) motor/sensory impairment   Problems Present (Stroke (Ischemic)/TIA) motor/sensory impairment         Problem: Inpatient Physical Therapy  Goal: Bed Mobility Goal LTG- PT  Outcome: Ongoing (interventions implemented as appropriate)    04/05/17 0909   Bed Mobility PT LTG   Bed Mobility PT LTG, Date Established 04/05/17   Bed Mobility PT LTG, Time to Achieve 2 wks   Bed Mobility PT LTG, Activity Type supine to sit/sit to supine   Bed Mobility PT LTG, [place] Level supervision required       Goal: Transfer Training Goal 1 LTG- PT  Outcome: Ongoing (interventions implemented as appropriate)    04/05/17 0909   Transfer Training PT LTG   Transfer Training PT LTG, Date Established 04/05/17   Transfer Training PT LTG, Time to Achieve 2 wks   Transfer Training PT LTG, Activity Type sit to stand/stand to sit   Transfer Training PT LTG, [place] Level contact guard assist   Transfer Training PT LTG, Assist Device walker, rolling       Goal: Gait Training Goal LTG- PT  Outcome: Ongoing  (interventions implemented as appropriate)    04/05/17 0909   Gait Training PT LTG   Gait Training Goal PT LTG, Date Established 04/05/17   Gait Training Goal PT LTG, Time to Achieve 2 wks   Gait Training Goal PT LTG, [place] Level contact guard assist   Gait Training Goal PT LTG, Assist Device walker, rolling   Gait Training Goal PT LTG, Distance to Achieve 200

## 2017-04-05 NOTE — PROGRESS NOTES
"Adult Nutrition  Assessment/PES    Patient Name:  Tomas Salvador  YOB: 1955  MRN: [id number]  Admit Date:  4/4/2017    Assessment Date:  4/5/2017     Comments: RD initial nutrition assessment complete. Wife reports pt has had recent unintentional wt loss, not able to quantify true weight changes as wife reports fluid loss as well. Wife states pt has had a poor appetite/intake for the past 2 weeks. SLP FEES eval pending at this time. If pt does not pass FEES eval rec initiating nutrition support- Peptamen Intense VHP to goal rate of 85 ml/hr (goal volume= 1700 ml/day) and free water at 10 ml/hr. Will continue to follow.           Reason for Assessment       04/05/17 0915    Reason for Assessment    Reason For Assessment/Visit multidisciplinary rounds;identified at risk by screening criteria    Identified At Risk By Screening Criteria MST SCORE 2+    Time Spent (min) 45    Diagnosis Diagnosis    Cardiac CAD;HTN;Dyslipidemia;Cardiomyopathy;CHF    Endocrine DM Type 2    Infectious Disease Sepsis;UTI   e. 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 coli UTI (urinary tract infection)    Epididymo-orchitis             Nutrition/Diet History       04/05/17 0917    Nutrition/Diet History    Reported/Observed By Family;RN   pt's wife    Appetite Poor    Other wife reports that pt has had a poor appetite for the past 2 weeks, states that he has only been taking 2 bites of each meal. per RN- pt restless            Anthropometrics       04/05/17 0919    Anthropometrics    Height 175.3 cm (69\")    Weight 108 kg (237 lb) " "   Ideal Body Weight (IBW)    Ideal Body Weight (IBW), Male (kg) 73.69    % Ideal Body Weight 146.2    Usual Body Weight (UBW)    Weight Loss Time Frame wife reports that pt weighed 270-280# in Dec 2016. Reports that pt weighed 218# in Jan 2017. When RD questioned further, wife reports pt  lost ~18# from Dec 2016 to Jan 2017- reports fluid loss and some true wt loss. Note that weight from (7/1/16) was 238#.  Wife reports wt changes since Dec 2016 attributed to fluid loss and some true wt loss, not able to quantify amount of true wt lost    Body Mass Index (BMI)    BMI (kg/m2) 35.07            Labs/Tests/Procedures/Meds       04/05/17 0924    Labs/Tests/Procedures/Meds    Labs/Tests Review Reviewed    Procedure Review SLP   per SLP (4/4)- rec npo until FEES in the AM     Swallow eval status Done    Type of SLP Evaluation Bedside    Medication Review Reviewed, pertinent     Results from last 7 days  Lab Units 04/05/17  0417   SODIUM mmol/L 136   POTASSIUM mmol/L 3.9   CHLORIDE mmol/L 102   TOTAL CO2 mmol/L 25.0   BUN mg/dL 6*   CREATININE mg/dL 0.60   CALCIUM mg/dL 9.3   BILIRUBIN mg/dL 0.3   ALK PHOS U/L 65   ALT (SGPT) U/L 12   AST (SGOT) U/L 22   GLUCOSE mg/dL 170*              Estimated/Assessed Needs       04/05/17 0951    Calculation Measurements    Weight Used For Calculations 108 kg (237 lb)    Height Used for Calculations 1.753 m (5' 9\")    Estimated/Assessed Energy Needs    Energy Need Method Kcal/kg;[place]-St Jeor    kcal/kg 14    14 Kcal/Kg (kcal) 1505.03    Age 61    RMR ([place]-St. Jeor Equation) 1865.4    Estimated/Assessed Protein Needs    Weight Used for Protein Calculation 72.6 kg (160 lb)   IBW    Protein (gm/kg) 2.0    2.0 Gm Protein (gm) 145.15            Nutrition Prescription Ordered       04/05/17 0925    Nutrition Prescription PO    Current PO Diet NPO                Problem/Interventions:        Problem 1       04/05/17 0925    Nutrition Diagnoses Problem 1    Problem 1 Inadequate " Intake/Infusion    Etiology (related to) --   clinical condition/poor appetite    Signs/Symptoms (evidenced by) Report of Mnimal PO Intake;Unintended Weight Change                    Intervention Goal       04/05/17 0926    Intervention Goal    General Nutrition support treatment    PO Initiate feeding   if/when pt passes SLP eval    TF/PN Inititiate TF/PN   if pt does not pass SLP eval            Nutrition Intervention       04/05/17 0926    Nutrition Intervention    RD/Tech Action Care plan reviewd;Follow Tx progress            Nutrition Prescription       04/05/17 0926    Other Orders    Other will continue to follow and provide nutrition support recs as appropriate/if pt does not pass FEES eval- Peptamen Intense VHP to goal rate of 85 ml/hr with free water at 10 ml/hr            Education/Evaluation       04/05/17 0926    Monitor/Evaluation    Monitor Per protocol          Electronically signed by:  Celeste Sterling MS RD/JENSEN CNSC  04/05/17 9:54 AM

## 2017-04-05 NOTE — PROGRESS NOTES
Acute Care - Physical Therapy Initial Evaluation  [facility]     Patient Name: Tomas Salvador  : 1955  MRN: [id number]  Today's Date: 2017   Onset of Illness/Injury or Date of Surgery Date: 17  Date of Referral to PT: 17  Referring Physician: Dr. Morrison      Admit Date: 2017     Visit Dx:    ICD-10-CM ICD-9-CM   1. Dysphagia, unspecified type R13.10 787.20   2. Impaired functional mobility, balance, gait, and endurance Z74.09 V49.89   3. Impaired mobility and ADLs Z74.09 799.89     Patient Active Problem List   Diagnosis   • Coronary disease   • Ischemic cardiomyopathy   • Peripheral vascular disease   • ICD (implantable cardioverter-defibrillator) in place, implantation .   • Dyslipidemia, on atorvastain.    • Sepsis   • CVA (cerebral vascular accident)   • Diabetes mellitus, type 2   • Chronic respiratory failure with hypoxia   • Urinary retention   • Hyperlipidemia   • Essential hypertension   • Delisa's gangrene   • E. coli UTI (urinary tract infection)   • Epididymo-orchitis     Past Medical History:   Diagnosis Date   • CAD (coronary artery disease)    • Chronic back pain    • Chronic respiratory failure with hypoxia     Night time only at 2 liters/min   • Diabetes mellitus, type 2    • Essential hypertension    • Hyperlipidemia    • Ischemic cardiomyopathy    • NSTEMI (non-ST elevated myocardial infarction)    • Obesity    • PVD (peripheral vascular disease)    • Systolic CHF     EF <20%  3/8/17   • Urinary retention      Past Surgical History:   Procedure Laterality Date   • CARDIAC CATHETERIZATION  2015    Severe multivessel coronary artery disease involving a totally occluded LAD, 99% proximal left circumflex artery; totally occluded saphenous vein graft to the diagonal; patent LIMA to the LAD, and a patent saphenous vein graft to the first obtuse marginal.      • CARDIAC DEFIBRILLATOR PLACEMENT     • CORONARY ARTERY BYPASS GRAFT      3 vessel   •  CYSTOSCOPY N/A 3/31/2017    Procedure: CYSTOSCOPY and scrotal exploration with debridement of necrotizing fasciitis;  Surgeon: Mele Johnson MD;  [facility];  Service:    • LEG SURGERY Right     unknown, pos vascular bypass   • SCROTAL EXPLORATION N/A 3/31/2017    Procedure: SCROTAL EXPLORATION;  Surgeon: Mele Johnson MD;  [facility];  Service:           PT ASSESSMENT (last 72 hours)      PT Evaluation       04/05/17 0803 04/05/17 0801    Rehab Evaluation    Document Type evaluation  -JR evaluation  -LS    Subjective Information no complaints;agree to therapy  -JR agree to therapy;no complaints  -LS    Patient Effort, Rehab Treatment adequate  -JR fair   agitation/confusion   -LS    Symptoms Noted Comment Pt very restless, agitated during initial eval.  -JR Pt very restless, agitation throughout evaluation.   -LS    General Information    Patient Profile Review yes  -JR yes  -LS    Onset of Illness/Injury or Date of Surgery Date 04/04/17  -JR 04/04/17  -LS    Referring Physician Dr. Morrison  -JR Prince MD  -LS    Pertinent History Of Current Problem Pt initially to ED 1 month prior with urinary retention. Pt had colbert placed and treated for UTI and d/c'd. Pt then developed scrotal swelling and firmness with black discoloration at the bottom of the scrotum. In ED urologist debrided the necrotic scrotal tissue and drained an abscess. On day of admit pt developed aphasia with L sided facial droop and L sided paralysis. CT revealed L sided frontal infarct. Pt not a candidate for intervention.  -JR To ED at [facility] with scrotal lesion; found to be hypotensive and septic. Demonstrated sudden onset L-sided weakness, facial droop, and aphasia; transferred to [facility] for further neuro evaluation. CT (-) for acute infarct; demonstrated subactue L-sided frontal infarct.   -LS    Precautions/Limitations fall precautions;oxygen therapy device and L/min  -JR fall precautions;oxygen therapy device and  L/min  -LS    Prior Level of Function independent:;gait;transfer;ADL's;bed mobility;driving  -JR independent:;gait;transfer;wound care;bathing;dressing  -LS    Equipment Currently Used at Home cane, straight;walker, rolling;oxygen  -JR oxygen;walker, standard;cane, straight;power chair, (recliner lift)  -LS    Plans/Goals Discussed With patient and family;agreed upon  -JR patient;spouse/S.O.;agreed upon  -LS    Risks Reviewed patient and family:;increased discomfort  -JR patient:;spouse/S.O.:;LOB;dizziness;increased discomfort;change in vital signs  -LS    Benefits Reviewed patient and family:;improve function;increase independence  -JR patient:;spouse/S.O.:;improve function;increase independence;increase strength;increase balance;increase knowledge  -LS    Barriers to Rehab medically complex  -JR cognitive status  -LS    Living Environment    Lives With spouse  -JR spouse  -LS    Living Arrangements house  -JR mobile home  -LS    Home Accessibility stairs to enter home;ramps present at home;tub/shower is not walk in  -JR stairs to enter home;tub/shower is not walk in  -LS    Number of Stairs to Enter Home 5   Reports family currently building a ramp  -JR 5  -LS    Living Environment Comment  Wife reports that ramp is being built.   -LS    Clinical Impression    Date of Referral to PT  04/04/17  -LS    PT Diagnosis  impaired functional mobility, balance, gait  -LS    Patient/Family Goals Statement  return to PLOF  -LS    Criteria for Skilled Therapeutic Interventions Met  yes;treatment indicated  -LS    Rehab Potential  good, to achieve stated therapy goals  -LS    Vital Signs    Pre Systolic BP Rehab 145  -  -LS    Pre Treatment Diastolic   -  -LS    Post Systolic BP Rehab 141  -  -LS    Post Treatment Diastolic BP 89  -JR 89  -LS    Pretreatment Heart Rate (beats/min) 113  -  -LS    Posttreatment Heart Rate (beats/min) 131  -  -LS    Pre SpO2 (%) 98  -JR 99  -LS    O2 Delivery  Pre Treatment supplemental O2   2L  -JR supplemental O2   2L  -LS    O2 Delivery Post Treatment  supplemental O2  -LS    Pre Patient Position Supine  -JR     Intra Patient Position Standing  -JR     Post Patient Position Supine  -JR     Pain Assessment    Pain Assessment 0-10  -JR 0-10  -LS    Pain Score 0  -JR 0  -LS    Post Pain Score 0  -JR 0  -LS    Vision Assessment/Intervention    Vision Comment Unable to assess due to pt's confusion/agitation  -JR     Cognitive Assessment/Intervention    Current Cognitive/Communication Assessment impaired  -JR impaired  -LS    Orientation Status oriented to;person   Oriented to year  -JR oriented to;person;required verbal cueing (specifiy in comments);disoriented to;place   cues for month  -LS    Follows Commands/Answers Questions 25% of the time;able to follow single-step instructions;needs cueing;needs increased time;needs repetition  -JR able to follow single-step instructions;25% of the time;needs cueing;needs increased time;needs repetition  -LS    Personal Safety severe impairment;decreased awareness, need for assist;decreased awareness, need for safety;decreased insight to deficits  -JR severe impairment;at risk behaviors demonstrated;impulsive;decreased awareness, need for assist;decreased awareness, need for safety;decreased insight to deficits  -LS    Personal Safety Interventions  fall prevention program maintained;gait belt;nonskid shoes/slippers when out of bed  -LS    ROM (Range of Motion)    General ROM no range of motion deficits identified  -JR no range of motion deficits identified   BLEs as demonstrated functionally  -LS    General ROM Detail Pt not following commands for ROM testing  -JR     MMT (Manual Muscle Testing)    General MMT Assessment no strength deficits identified  -JR lower extremity strength deficits identified  -LS    General MMT Assessment Detail Pt not following commands for MMT this date  -JR     Lower Extremity    Lower Ext Manual Muscle  Testing Detail  Unable to perform formal MMT due to cognitive status; functionally BLEs grossly 4-/5. TBA further.  -LS    Bed Mobility, Assessment/Treatment    Bed Mobility, Assistive Device head of bed elevated;draw sheet  -JR head of bed elevated;draw sheet  -LS    Bed Mob, Supine to Sit, [place] moderate assist (50% patient effort);2 person assist required  -JR moderate assist (50% patient effort);2 person assist required;verbal cues required  -LS    Bed Mob, Sit to Supine, [place] moderate assist (50% patient effort);2 person assist required  -JR moderate assist (50% patient effort);2 person assist required;verbal cues required  -LS    Bed Mobility, Safety Issues cognitive deficits limit understanding;decreased use of arms for pushing/pulling;decreased use of legs for bridging/pushing;impaired trunk control for bed mobility  -JR cognitive deficits limit understanding  -LS    Bed Mobility, Impairments strength decreased;impaired balance  -JR     Transfer Assessment/Treatment    Transfers, Sit-Stand [place] moderate assist (50% patient effort);2 person assist required  -JR moderate assist (50% patient effort);2 person assist required;verbal cues required  -LS    Transfers, Stand-Sit [place] moderate assist (50% patient effort);2 person assist required  -JR moderate assist (50% patient effort);2 person assist required;verbal cues required  -LS    Transfers, Sit-Stand-Sit, Assist Device rolling walker  -JR rolling walker  -LS    Transfer, Safety Issues balance decreased during turns;sequencing ability decreased;step length decreased;weight-shifting ability decreased  -JR balance decreased during turns;sequencing ability decreased;impulsivity  -LS    Transfer, Impairments strength decreased;impaired balance  -JR strength decreased;impaired balance  -LS    Transfer, Comment Pt transferred from EOB, pt ambulated with PT. Pt transferred to chair and back to bed. Pt stood from EOB and took a couple  steps to HOB. Pt required verbal cues for hand placement with transfer. Pt demo decreased safety awareness with transfers. Pt attempting to stand prematurely this date.  -JR Performed STS from EOB and also from recliner; demonstrated significant decreased safety awareness, requiring vc's for hand placement.   -LS    Gait Assessment/Treatment    Gait, [place] Level  moderate assist (50% patient effort);2 person assist required;verbal cues required  -LS    Gait, Assistive Device  rolling walker  -    Gait, Distance (Feet)  50  -LS    Gait, Gait Deviations  valente decreased;forward flexed posture;step length decreased  -LS    Gait, Safety Issues  supplemental O2;sequencing ability decreased  -LS    Gait, Impairments  strength decreased;impaired balance  -    Gait, Comment  Constant vc's for posture, maintaining  on RWx; recliner followed closely for safety. Distance limited by fatigue and decreased safety awareness.   -LS    Motor Skills/Interventions    Additional Documentation Balance Skills Training (Group)  - Balance Skills Training (Group)  -    Balance Skills Training    Sitting-Level of Assistance Contact guard  - Contact guard  -    Sitting-Balance Support Feet supported  -JR Feet supported  -    Standing-Level of Assistance Minimum assistance;x2  -JR Minimum assistance;x2  -LS    Static Standing Balance Support assistive device  - assistive device  -    Gait Balance-Level of Assistance Moderate assistance;x2  -JR Moderate assistance;x2  -LS    Gait Balance Support assistive device  - assistive device  -    Sensory Assessment/Intervention    Light Touch  --   TBA further when cognition improves  -LS    Positioning and Restraints    Pre-Treatment Position in bed  -JR in bed  -    Post Treatment Position bed  - bed  -LS    In Bed notified nsg;supine;call light within reach;encouraged to call for assist;exit alarm on;with family/caregiver  -JR notified nsg;call light within  reach;encouraged to call for assist;exit alarm on;with other staff;with family/caregiver  -LS      04/05/17 0800 04/05/17 0600    Muscle Tone Assessment    Left-Side Extremities Muscle Tone Assessment mildly increased tone  -MR moderately decreased tone  -JE      04/05/17 0400 04/05/17 0200    Muscle Tone Assessment    Left-Side Extremities Muscle Tone Assessment moderately decreased tone  -JE moderately decreased tone  -JE      04/05/17 0000 04/04/17 2200    Muscle Tone Assessment    Left-Side Extremities Muscle Tone Assessment moderately decreased tone  -JE moderately decreased tone  -JE      04/04/17 2000 04/04/17 1800    Muscle Tone Assessment    Left-Side Extremities Muscle Tone Assessment moderately decreased tone  -JE moderately decreased tone  -MR      04/04/17 1600 04/04/17 1500    Rehab Evaluation    Document Type  evaluation  -SG    Subjective Information  no complaints;agree to therapy  -SG    Patient Effort, Rehab Treatment  adequate  -SG    Symptoms Noted During/After Treatment  none  -SG    Living Environment    Lives With spouse  -MR     Living Arrangements mobile home  -     Home Accessibility stairs to enter home  -MR     Number of Stairs to Enter Home 5  -MR     Stair Railings at Home none  -MR     Type of Financial/Environmental Concern none  -MR     Transportation Available car;family or friend will provide  -     Pain Assessment    Pain Assessment  No/denies pain  -    Muscle Tone Assessment    Left-Side Extremities Muscle Tone Assessment moderately decreased tone  -MR       04/04/17 1428 04/04/17 1410    General Information    Equipment Currently Used at Home oxygen;walker, rolling;cane, straight  -HH     Living Environment    Lives With spouse  -     Living Arrangements mobile [place]  -     Transportation Available car;family or friend will provide  -     Muscle Tone Assessment    Left-Side Extremities Muscle Tone Assessment  moderately decreased tone  -MR      04/04/17 1000  04/04/17 0800    Muscle Tone Assessment    Muscle Tone Assessment Left-Side Extremities  -CB Bilateral Upper Extremities;Bilateral Lower Extremities  -SD    Left-Side Extremities Muscle Tone Assessment flaccid  -CB     Bilateral Upper Extremities Muscle Tone Assessment  mildly decreased tone  -SD    Bilateral Lower Extremities Muscle Tone Assessment  mildly decreased tone  -SD      04/03/17 2000 04/03/17 0805    Muscle Tone Assessment    Muscle Tone Assessment  Bilateral Upper Extremities;Bilateral Lower Extremities  -TR    Bilateral Upper Extremities Muscle Tone Assessment mildly decreased tone  -MS mildly decreased tone  -TR    Bilateral Lower Extremities Muscle Tone Assessment mildly decreased tone  -MS mildly decreased tone  -TR      04/02/17 2010 04/02/17 1218    Muscle Tone Assessment    Muscle Tone Assessment Bilateral Upper Extremities;Bilateral Lower Extremities  -AC     Bilateral Upper Extremities Muscle Tone Assessment mildly decreased tone  -AC mildly decreased tone  -EA    Bilateral Lower Extremities Muscle Tone Assessment mildly decreased tone  -AC mildly decreased tone  -EA      User Key  (r) = Recorded By, (t) = Taken By, (c) = Cosigned By    Initials Name Provider Type     Lyndsey Puckettstu Sultana, MS CCC-SLP Speech and Language Pathologist    JR Judy Leach, OT Occupational Therapist    LS Lily Juárez, PT Physical Therapist    CB Sanaz Haas, RN Registered Nurse    EA Tamara Tobar, RN Registered Nurse    AC Lina Tran, RN Registered Nurse    SD Roxi Patel, RN Registered Nurse    TR Katherine Serna, RN Registered Nurse    MS Eugenie Alba, RN Registered Nurse     Bernadette Pina, RN Case Manager    MR Jada Murillo, RN Registered Nurse    FELY Pineda, RN Registered Nurse          Physical Therapy Education     Title: PT OT SLP Therapies (Active)     Topic: Physical Therapy (Active)     Point: Mobility training (Active)    Learning Progress  Summary    Learner Readiness Method Response Comment Documented by Status   Patient Acceptance E,D NR  LS 04/05/17 0908 Active   Significant Other Acceptance E,D NR  LS 04/05/17 0908 Active               Point: Body mechanics (Active)    Learning Progress Summary    Learner Readiness Method Response Comment Documented by Status   Patient Acceptance E,D NR  LS 04/05/17 0908 Active   Significant Other Acceptance E,D NR  LS 04/05/17 0908 Active               Point: Precautions (Active)    Learning Progress Summary    Learner Readiness Method Response Comment Documented by Status   Patient Acceptance E,D NR  LS 04/05/17 0908 Active   Significant Other Acceptance E,D NR  LS 04/05/17 0908 Active                      User Key     Initials Effective Dates Name Provider Type Discipline     06/19/15 -  Lily Juárez, PT Physical Therapist PT                PT Recommendation and Plan  Anticipated Discharge Disposition: inpatient rehabilitation facility  PT Frequency: daily  Plan of Care Review  Plan Of Care Reviewed With: patient, spouse  Outcome Summary/Follow up Plan: PT evaluation completed. Pt demonstrates decreased indep and safety re: functional mobility; limited today by significant restlessness and agitation throughout session. Would benefit from further skilled PT services to edu, strengthening, promoting PLOF. Will plan to perform formal MMT when cognition improves. Recommend IP rehab placement based upon current level of function.           IP PT Goals       04/05/17 0909          Bed Mobility PT LTG    Bed Mobility PT LTG, Date Established 04/05/17  -LS      Bed Mobility PT LTG, Time to Achieve 2 wks  -LS      Bed Mobility PT LTG, Activity Type supine to sit/sit to supine  -LS      Bed Mobility PT LTG, [place] Level supervision required  -LS      Transfer Training PT LTG    Transfer Training PT LTG, Date Established 04/05/17  -LS      Transfer Training PT LTG, Time to Achieve 2 wks  -LS      Transfer  Training PT LTG, Activity Type sit to stand/stand to sit  -LS      Transfer Training PT LTG, [place] Level contact guard assist  -LS      Transfer Training PT LTG, Assist Device walker, rolling  -LS      Gait Training PT LTG    Gait Training Goal PT LTG, Date Established 04/05/17  -LS      Gait Training Goal PT LTG, Time to Achieve 2 wks  -LS      Gait Training Goal PT LTG, [place] Level contact guard assist  -LS      Gait Training Goal PT LTG, Assist Device walker, rolling  -LS      Gait Training Goal PT LTG, Distance to Achieve 200  -LS        User Key  (r) = Recorded By, (t) = Taken By, (c) = Cosigned By    Initials Name Provider Type    LS Lily Juárez, PT Physical Therapist                Outcome Measures       04/05/17 0803 04/05/17 0801       How much help from another person do you currently need...    Turning from your back to your side while in flat bed without using bedrails?  2  -LS     Moving from lying on back to sitting on the side of a flat bed without bedrails?  2  -LS     Moving to and from a bed to a chair (including a wheelchair)?  2  -LS     Standing up from a chair using your arms (e.g., wheelchair, bedside chair)?  2  -LS     Climbing 3-5 steps with a railing?  1  -LS     To walk in hospital room?  2  -LS     AM-PAC 6 Clicks Score  11  -LS     How much help from another is currently needed...    Putting on and taking off regular lower body clothing? 1  -JR      Bathing (including washing, rinsing, and drying) 2  -JR      Toileting (which includes using toilet bed pan or urinal) 1  -JR      Putting on and taking off regular upper body clothing 2  -JR      Taking care of personal grooming (such as brushing teeth) 2  -JR      Eating meals 2  -JR      Score 10  -JR      Modified McNairy Scale    Modified McNairy Scale 4 - Moderately severe disability.  Unable to walk without assistance, and unable to attend to own bodily needs without assistance.  -JR 4 - Moderately severe disability.   Unable to walk without assistance, and unable to attend to own bodily needs without assistance.  -LS     Functional Assessment    Outcome Measure Options AM-PAC 6 Clicks Daily Activity (OT);Modified Linh  - AM-PAC 6 Clicks Basic Mobility (PT);Modified Linh  -ANDREWS       User Key  (r) = Recorded By, (t) = Taken By, (c) = Cosigned By    Initials Name Provider Type    JR Judy Leach, OT Occupational Therapist    LS Lily Juárez, PT Physical Therapist           Time Calculation:         PT Charges       04/05/17 0915          Time Calculation    Start Time 0801  -      PT Received On 04/05/17  -      PT Goal Re-Cert Due Date 04/15/17  -        User Key  (r) = Recorded By, (t) = Taken By, (c) = Cosigned By    Initials Name Provider Type    ANDREWS Juárez, PT Physical Therapist          Therapy Charges for Today     Code Description Service Date Service Provider Modifiers Qty    [id number] HC PT EVAL HIGH COMPLEXITY 4 4/5/2017 Lily Juárez, PT GP 1          PT G-Codes  Outcome Measure Options: AM-PAC 6 Clicks Daily Activity (OT), Zaki Juárez, PT  4/5/2017

## 2017-04-05 NOTE — PAYOR COMM NOTE
"Tomas Gtz (61 y.o. Male)     Date of Birth Social Security Number Address Home Phone MRN    1955  [address]   [place] [address] [phone] [id number]    Hinduism Marital Status          None        Admission Date Admission Type Admitting Provider Attending Provider Department, Room/Bed    17 Elective Ella Pope MD Gerhardstein, Donna C, MD [facility] 2B ICU, N226/1    Discharge Date Discharge Disposition Discharge Destination                      Attending Provider: Ella Pope MD     Allergies:  No Known Allergies    Isolation:  None   Infection:  None   Code Status:  FULL    Ht:  69\" (175.3 cm)   Wt:  237 lb 13 oz (108 kg)    Admission Cmt:  None   Principal Problem:  CVA (cerebral vascular accident) [I63.9]                 Active Insurance as of 2017     Primary Coverage     Payor Plan Insurance Group Employer/Plan Group    [employer] BLUE CROSS [employer]UE [employer] PPO [id number]     Payor Plan Address Payor Plan Phone Number Effective From Effective To    [address] [phone] 2015     [place] [address]       Subscriber Name Subscriber Birth Date Member ID       SANJUANA GTZ [id number]                 Emergency Contacts      (Rel.) Home Phone Work Phone Mobile Phone    Sanjuana Gtz [phone] -- --               History & Physical      Ella Pope MD at 2017  2:45 PM                                                                   Critical Care History & Physical    Patient name: Tomas Gtz  CSN: [id number]  MRN: [id number]  : 1955  Today's date: 2017    Primary Care Physician: Jose Navarro MD    Chief complaint: Left sided facial droop, left sided paralysis and aphasia    HPI: Mr. Gtz is a 60 y/o WM who was transferred to [facility] from [facility] for a stroke.  He is confused and the history is from his chart. There is no family at bedside.     He was " "admitted to [facility] 3/30/17 from the Urologist's office with a scrotal lesion.  Apparently, he went to the ED one month PTA (2/24/17) with urinary retention.  He had a colbert placed and discharged home.  He also had an E. Coli UTI and treated with Ceftin x 10 days.  He saw his PCP who referred him to Urology.  He saw Dr. Johnson on 3/30/17.  His wife stated he had some scrotal swelling and firmness with black discoloration at the bottom of the scrotum.  Per note, it measured 3-4\" x 2\" with necrotic edges and whitish discoloration within the lesion. It was malodorous with mild weeping. The scrotum above the lesion was felt to have Epididymo-orchitis.  Reportedly, he has had a 100 lb weight loss in a few months. However wt 11/15 was 240lbs, and 3/30/17 was 237lbs.    In the ED, the Urologist debrided the necrotic scrotal tissue and drained an abscess.  The wound culture + E. Coli 3/31/17.  He had a + E. Coli urine 3/30/17. He was being treated with Merrem, Clindamycin and Vancomycin. Yesterday, he had an episode of CP, but EKG and cardiac enzymes were negative.  Today, he became aphasic with left sided facial droop and left sided paralysis.  His NIH score was 22. CT head revealed subacute left sided frontal infarct. He was not a tpa candidate because of recent surgery. .  He was transferred to [facility] for possible intervention.  On arrival to [facility], he was alert but confused and occasionally follows commands.  He moves all extremities spontaneously. His CTA of the head revealed no large vessel occlusion.  He was deemed not a candidate for intervention.   He will be admitted into the ICU per Intensivist service.     PMH:   Past Medical History:   Diagnosis Date   • CAD (coronary artery disease)    • Chronic back pain    • Chronic respiratory failure with hypoxia     Night time only at 2 liters/min   • Diabetes mellitus, type 2    • Essential hypertension    • Hyperlipidemia    • Ischemic cardiomyopathy    • NSTEMI (non-ST " elevated myocardial infarction) 2015   • Obesity    • PVD (peripheral vascular disease)    • Systolic CHF     EF <20%  3/8/17   • Urinary retention      PSH:   Past Surgical History:   Procedure Laterality Date   • CARDIAC CATHETERIZATION  05/2015    Severe multivessel coronary artery disease involving a totally occluded LAD, 99% proximal left circumflex artery; totally occluded saphenous vein graft to the diagonal; patent LIMA to the LAD, and a patent saphenous vein graft to the first obtuse marginal.      • CARDIAC DEFIBRILLATOR PLACEMENT  2015   • CORONARY ARTERY BYPASS GRAFT  2005    3 vessel   • CYSTOSCOPY N/A 3/31/2017    Procedure: CYSTOSCOPY and scrotal exploration with debridement of necrotizing fasciitis;  Surgeon: Mele Johnson MD;  Location: [facility] OR;  Service:    • LEG SURGERY Right     unknown, pos vascular bypass   • SCROTAL EXPLORATION N/A 3/31/2017    Procedure: SCROTAL EXPLORATION;  Surgeon: Mele Johnson MD;  Location: [facility] OR;  Service:      PFH:   Family History   Problem Relation Age of Onset   • Diabetes Mother    • Hypertension Mother    • Coronary artery disease Mother      MI in her 40's   • Cancer Father      Throat   • Hypertension Father    • Hypertension Sister    • Hypertension Brother    • Hypertension Maternal Grandmother    • Hypertension Maternal Grandfather      SH:   Social History     Social History   • Marital status:      Spouse name: N/A   • Number of children: N/A   • Years of education: N/A     Social History Main Topics   • Smoking status: Former Smoker     Packs/day: 1.50     Years: 43.00     Quit date: 2002   • Smokeless tobacco: Current User   • Alcohol use No   • Drug use: No   • Sexual activity: Defer     Other Topics Concern   • None     Social History Narrative     Allergies:   No Known Allergies    Code Status:  Full Code    Home Medications:  Prescriptions Prior to Admission   Medication Sig Dispense Refill Last Dose   • aspirin 81 MG  EC tablet Take 81 mg by mouth Daily. On hold for surgery   Unknown at Unknown time   • atorvastatin (LIPITOR) 80 MG tablet Take 1 tablet by mouth Daily. 30 tablet 3 3/30/2017 at 1000   • carvedilol (COREG) 6.25 MG tablet Take 1 tablet by mouth 2 (Two) Times a Day With Meals. 60 tablet 3 3/30/2017 at 1000   • clindamycin (CLEOCIN) 600 MG/50ML IVPB Infuse 50 mL into a venous catheter Every 8 (Eight) Hours. Indications: Infection of Blood or Tissues affecting the Whole Body  0    • clopidogrel (PLAVIX) 75 MG tablet Take 1 tablet by mouth Daily. 30 tablet 3 3/30/2017 at 1000   • gabapentin (NEURONTIN) 300 MG capsule Take 1 capsule by mouth 2 (two) times a day. 60 capsule 3 3/30/2017 at 1000   • Heparin Sodium, Porcine, (HEPARIN, PORCINE,) 5000 UNIT/ML injection Inject 1 mL under the skin Every 12 (Twelve) Hours.      • insulin aspart (novoLOG) 100 UNIT/ML injection Inject 0-9 Units under the skin 4 (Four) Times a Day Before Meals & at Bedtime.      • insulin detemir (LEVEMIR) 100 UNIT/ML injection Inject 10 Units under the skin Every Night.      • meropenem 2 g in sodium chloride 0.9 % 100 mL IVPB Infuse 2 g into a venous catheter Every 8 (Eight) Hours. Indications: Infection of Blood or Tissues affecting the Whole Body  0    • morphine 2 MG/ML injection Infuse 1 mL into a venous catheter Every 4 (Four) Hours As Needed for Severe Pain (7-10).  0    • nitroglycerin (NITROLINGUAL) 0.4 MG/SPRAY spray Place 1 spray under the tongue every 5 (five) minutes as needed for chest pain.   Unknown at Unknown time   • nystatin (MYCOSTATIN) 078085 UNIT/GM ointment Apply  topically 2 (Two) Times a Day As Needed (yeast on skin).  0    • omeprazole (PriLOSEC) 20 MG capsule Take 20 mg by mouth Daily As Needed (acid reflux).   Unknown at Unknown time   • rOPINIRole (REQUIP) 0.25 MG tablet Take 0.25 mg by mouth Every Night.   3/29/2017 at Unknown time   • vancomycin 1,500 mg in sodium chloride 0.9 % 500 mL IVPB Infuse 1,500 mg into a  venous catheter Every 12 (Twelve) Hours. Indications: Infection of Blood or Tissues affecting the Whole Body  0      Review of Systems  Negative systems except for what is noted in HPI     Vital Signs:  Blood pressure 142/87, pulse 108, temperature 98.6 °F (37 °C), temperature source Axillary, resp. rate 20, SpO2 100 %.    Physical Exam:  Constitutional: Old appearing 61-year-old gentleman, obese, no distress  HEENT:  Left facial droop. Pupils are equal and reactive to light. Oral pharynx without exudate.  Neck:   Trachea midline, no JVD, no adenopathy. Left subclavian pacemaker  CV: Distant heart sounds, regular, no gallop, PMI displaced laterally  Pulmonary/Chest:   symmetric chest excursion. Healed sternotomy scar. Upper left sternal rubbery mass.  Breath sounds are bilateral, equal, no rails  Abdominal:  bowel sounds are present, soft, nontender    ST or scrotal and perineal open incision with several surgical drains. Scrotal attacks are indurated, swollen, red and uncircumcised  male genitalia with Cueto catheter in place   Musculoskeletal: generally poor musculature.  Weak pulses left foot, no palpable pulses right foot  Neurological: Oriented to name and hospital but not date or city. Able to follow commands. No motor drift for me. Some dysarthria but speech is understandable. Answers simple questions.  Skin: warm and dry  Extremities:  No edema or clubbing Weak pulses left foot, no palpable pulses right foot  Psychiatric: confused     Labs:    Results from last 7 days  Lab Units 04/04/17  0115   WBC 10*3/mm3 9.68   HEMOGLOBIN g/dL 10.6*   HEMATOCRIT % 35.4*   PLATELETS 10*3/mm3 380       Results from last 7 days  Lab Units 04/04/17  0115   SODIUM mmol/L 136   POTASSIUM mmol/L 4.0   CHLORIDE mmol/L 105   TOTAL CO2 mmol/L 24.8   BUN mg/dL 6*   CREATININE mg/dL 0.53   CALCIUM mg/dL 8.3   BILIRUBIN mg/dL 0.2   ALK PHOS U/L 69   ALT (SGPT) U/L 12   AST (SGOT) U/L 24   GLUCOSE mg/dL 130*     Magnesium   Date  Value Ref Range Status   04/02/2017 1.8 1.7 - 2.6 mg/dL Final     Phosphorus   Date Value Ref Range Status   04/04/2017 1.6 (L) 2.7 - 4.5 mg/dL Final   04/02/2017 2.2 (L) 2.7 - 4.5 mg/dL Final     Interval:  (arrival to ICU)  1a. Level Of Consciousness: 0-->Alert: keenly responsive  1b. LOC Questions: 1-->Answers one question correctly  1c. LOC Commands: 0-->Performs both tasks correctly  2. Best Gaze: 1-->Partial gaze palsy: gaze is abnormal in one or both eyes, but forced deviation or total gaze paresis is not present  3. Visual: 1-->Partial hemianopia  4. Facial Palsy: 2-->Partial paralysis (total or near-total paralysis of lower face)  5a. Motor Arm, Left: 1-->Drift: limb holds 90 (or 45) degrees, but drifts down before full 10 seconds: does not hit bed or other support  5b. Motor Arm, Right: 2-->Some effort against gravity: limb cannot get to or maintain (if cued) 90 (or 45) degrees, drifts down to bed, but has some effort against gravity  6a. Motor Leg, Left: 2-->Some effort against gravity: leg falls to bed by 5 secs, but has some effort against gravity  6b. Motor Leg, Right: 2-->Some effort against gravity: leg falls to bed by 5 secs, but has some effort against gravity  7. Limb Ataxia: 0-->Absent  8. Sensory: 1-->Mild-to-moderate sensory loss: patient feels pinprick is less sharp or is dull on the affected side: or there is a loss of superficial pain with pinprick, but patient is aware of being touched  9. Best Language: 1-->Mild-to-moderate aphasia: some obvious loss of fluency or facility of comprehension, without significant limitation on ideas expressed or form of expression. Reduction of speech and/or comprehension, however, makes conversation. . . (see row details)  10. Dysarthria: 1-->Mild-to-moderate dysarthria: patient slurs at least some words and, at worst, can be understood with some difficulty  11. Extinction and Inattention (formerly Neglect): 1-->Visual, tactile, auditory, spatial, or  personal inattention or extinction to bilateral simultaneous stimulation in one of the sensory modalities    Total (NIH Stroke Scale): 16    Imaging:    ABG:     Results from last 7 days  Lab Units 04/04/17  1122   PH, ARTERIAL pH units 7.510*   PO2 ART mm Hg 109.6*   PCO2, ARTERIAL mm Hg 31.9*   HCO3 ART mmol/L 24.9     Assessment:  Hospital Problem List     * (Principal)CVA (cerebral vascular accident)    Delisa's gangrene    Overview Signed 4/4/2017  3:55 PM by TRESA Baca     + E. Coli 3/31/17         Diabetes mellitus, type 2    Overview Signed 4/4/2017  2:43 PM by TRESA Baca     3/30/17 Hgb A1c 10.4         Chronic respiratory failure with hypoxia    Overview Signed 4/4/2017  2:40 PM by TRESA Baca     Night time only at 2 liters/min         Urinary retention    Hyperlipidemia    Essential hypertension    E. coli UTI (urinary tract infection)    Epididymo-orchitis        61-year-old gentleman chronically ill with ischemic cardiomyopathy, AICD, diabetes mellitus, hypertension, peripheral artery disease who was hospitalized in [place] on March 30 with a scrotal infection requiring incision and drainage. He had a preceding urinary tract infection from urinary retention and had an indwelling Cueto. Operative cultures were positive for Escherichia coli.  He was covered broadly with Vanco, Merrem, Clinda.  He is now transferred to [facility] for acute neurologic changes. CT scan and CT perfusion did not reveal any acute stroke. Clinically his symptoms have resolved except for some confusion. It does not appear clinically that he has had a significant ischemic stroke. He certainly has a significant paranasal and scrotal wound that will need aggressive surgical and antibiotics treatment. He has terrible cardiac function with an EF of less than 20%. Happily he has no overt signs of florid failure. He has a remote history of smoking and used just as needed  inhalers and oxygen at night at baseline. We will have urology, cardiology, infectious disease way in. If he needs further surgical debridement I suspect I will have to transfer him to [facility]    Plan:   ICU admission  Will start Ancef (E. Coli was susceptible)  Ischemic CVA pathway   IVF only KVO  Consult Cardiology, ID and Urology  BG control  BP control  neurochecks  ASA, statin, plavix  WOC   PPI  florastor  Restart coreg  Consider entresto  CXR  ECHO  Carotid duplex  May require a PICC line    TRESA Noel, AGACNP-BC, FNP-BC  Pulmonary & Critical Care Medicine  04/04/17 4:47 PM    I reviewed the above note and [place] records, interviewed the patient, did an independent physical examination, reviewed laboratory data, cultures, x-rays and formulated the assessment and plan.   Ella Pope MD      Time: 65min                                                   *Please note that portions of this note were completed with a voice recognition program. Efforts were made to edit the dictations, but occasionally words are mistranscribed.     Electronically signed by Ella Pope MD at 4/4/2017  5:37 PM           Consult Notes (all)      Luis Alberto Phelan MD at 4/4/2017  2:44 PM  Version 1 of 1     Consult Orders:    1. Inpatient consult to Neurology [[id number]] ordered by Emeka Morrison MD at 04/04/17 1418                Neurology    Referring provider:   Emeka Morrison MD  [address] [facility] [address] [facility] [address]  [place] [address]    Reason for Consultation: Stroke    Chief complaint: None offered    History of present illness:  This 61-year-old man is seen at the request of Dr. Paola Saldana for evaluation of stroke.    Spoke to Dr. Green is his internist in [place] where he was admitted to the the end of March for total gangrene.  This was biopsied and treated with antibiotics and apparently was doing reasonably well.    This morning he awoke and was normal in the about 10:00  and became aphasic and hemiparetic side.  No change in his vital signs.    NIH score was reported as 22.    CT scan at that time in [place] was read as negative for acute stroke or hemorrhage.    Has known coronary artery disease and ejection fraction of 20%.  Central artery bypass surgery and has an implantable defibrillator.    His mental status is not completely clear but according to the records there was no indication that it was anything of normal.  No family is here to verify that.        Review of Systems: Is unable to participate in her review of systems    Home meds:   Prescriptions Prior to Admission   Medication Sig Dispense Refill Last Dose   • aspirin 81 MG EC tablet Take 81 mg by mouth Daily. On hold for surgery   Unknown at Unknown time   • atorvastatin (LIPITOR) 80 MG tablet Take 1 tablet by mouth Daily. 30 tablet 3 3/30/2017 at 1000   • carvedilol (COREG) 6.25 MG tablet Take 1 tablet by mouth 2 (Two) Times a Day With Meals. 60 tablet 3 3/30/2017 at 1000   • clindamycin (CLEOCIN) 600 MG/50ML IVPB Infuse 50 mL into a venous catheter Every 8 (Eight) Hours. Indications: Infection of Blood or Tissues affecting the Whole Body  0    • clopidogrel (PLAVIX) 75 MG tablet Take 1 tablet by mouth Daily. 30 tablet 3 3/30/2017 at 1000   • gabapentin (NEURONTIN) 300 MG capsule Take 1 capsule by mouth 2 (two) times a day. 60 capsule 3 3/30/2017 at 1000   • Heparin Sodium, Porcine, (HEPARIN, PORCINE,) 5000 UNIT/ML injection Inject 1 mL under the skin Every 12 (Twelve) Hours.      • insulin aspart (novoLOG) 100 UNIT/ML injection Inject 0-9 Units under the skin 4 (Four) Times a Day Before Meals & at Bedtime.      • insulin detemir (LEVEMIR) 100 UNIT/ML injection Inject 10 Units under the skin Every Night.      • meropenem 2 g in sodium chloride 0.9 % 100 mL IVPB Infuse 2 g into a venous catheter Every 8 (Eight) Hours. Indications: Infection of Blood or Tissues affecting the Whole Body  0    • morphine 2 MG/ML injection  Infuse 1 mL into a venous catheter Every 4 (Four) Hours As Needed for Severe Pain (7-10).  0    • nitroglycerin (NITROLINGUAL) 0.4 MG/SPRAY spray Place 1 spray under the tongue every 5 (five) minutes as needed for chest pain.   Unknown at Unknown time   • nystatin (MYCOSTATIN) 475584 UNIT/GM ointment Apply  topically 2 (Two) Times a Day As Needed (yeast on skin).  0    • omeprazole (PriLOSEC) 20 MG capsule Take 20 mg by mouth Daily As Needed (acid reflux).   Unknown at Unknown time   • rOPINIRole (REQUIP) 0.25 MG tablet Take 0.25 mg by mouth Every Night.   3/29/2017 at Unknown time   • vancomycin 1,500 mg in sodium chloride 0.9 % 500 mL IVPB Infuse 1,500 mg into a venous catheter Every 12 (Twelve) Hours. Indications: Infection of Blood or Tissues affecting the Whole Body  0        History  Past Medical History:   Diagnosis Date   • CAD (coronary artery disease)    • Chronic back pain    • Chronic respiratory failure with hypoxia     Night time only at 2 liters/min   • Diabetes mellitus, type 2    • Essential hypertension    • Hyperlipidemia    • NSTEMI (non-ST elevated myocardial infarction) 2015   • Obesity    • PVD (peripheral vascular disease)    • Systolic CHF     EF <20%  3/8/17   • Urinary retention    ,   Past Surgical History:   Procedure Laterality Date   • CARDIAC CATHETERIZATION  05/2015    Severe multivessel coronary artery disease involving a totally occluded LAD, 99% proximal left circumflex artery; totally occluded saphenous vein graft to the diagonal; patent LIMA to the LAD, and a patent saphenous vein graft to the first obtuse marginal.      • CARDIAC DEFIBRILLATOR PLACEMENT  2015   • CORONARY ARTERY BYPASS GRAFT  2005    3 vessel   • CYSTOSCOPY N/A 3/31/2017    Procedure: CYSTOSCOPY and scrotal exploration with debridement of necrotizing fasciitis;  Surgeon: Mele Johnson MD;  Location: [facility];  Service:    • SCROTAL EXPLORATION N/A 3/31/2017    Procedure: SCROTAL EXPLORATION;  Surgeon:  Mele Johnson MD;  [facility];  Service:    ,   Family History   Problem Relation Age of Onset   • Diabetes Mother    • Hypertension Mother    • Coronary artery disease Mother      MI in her 40's   • Cancer Father      Throat   • Hypertension Father    • Hypertension Sister    • Hypertension Brother    • Hypertension Maternal Grandmother    • Hypertension Maternal Grandfather    ,   Social History   Substance Use Topics   • Smoking status: Former Smoker     Packs/day: 1.50     Years: 43.00     Quit date: 2002   • Smokeless tobacco: Current User   • Alcohol use No    and Allergies:  Review of patient's allergies indicates no known allergies.,    Vital Signs   There were no vitals taken for this visit.  There is no height or weight on file to calculate BMI.    Physical Exam:   General: Confused white male              Neck: No bruits              Cor: Regular paced rhythm              Extr: No edema              Skin: Dry               Neuro: Patient is confused.  He will follow occasional commands but there is seems to have difficulty comprehending what is said.    His speech output is limited and will tell me that he 61 years old but does not answer other questions.    Cranial nerves show his eyes tended have a gaze preference to the right which he can overcome.  They are conjugate.  Fundi are normal.    Pupils are equal.    Face is symmetrical.    On six had a sinus is deviated to the right.  Total visits voluntary or involuntary.    Motor testing shows him to move all extremities.  Mild decreased  on the right side but is antigravity with both had at his leg.    Deep tendon reflexes are plus minus throughout.        Results Review: The brain was personally reviewed from this morning.  It shows a low-attenuation lesion in the left frontal area and no bleed is seen.    Perfusion was apparently done and interpreted by Dr. garcia is normal.    CT angiogram reviewed by Dr. garcia was reportedly  normal.    Transthoracic echocardiogram from 10 March this year shows moderately dilated left ventricle with a 20% ejection fraction decrease in left ventricular function.  No mention is made of the left atrium    Labs:     Lab Results (last 72 hours)     Procedure Component Value Units Date/Time    Urinalysis With / Microscopic If Indicated [[id number]] Collected:  04/04/17 1413    Specimen:  Urine from Urine, Clean Catch Updated:  04/04/17 1419    Urine Culture [[id number]] Collected:  04/04/17 1413    Specimen:  Urine from Urine, Clean Catch Updated:  04/04/17 1419    Urinalysis, Microscopic Only [[id number]] Collected:  04/04/17 1413    Specimen:  Urine from Urine, Clean Catch Updated:  04/04/17 1423          Rads:  Imaging Results (last 72 hours)     Procedure Component Value Units Date/Time    CT Head Without Contrast [[id number]] Updated:  04/04/17 1415    CT Angiogram Head With & Without Contrast [[id number]] Updated:  04/04/17 1415            Assessment:  Acute stroke with rapid improvement     Plan:    Patient is on aspirin and Plavix and Lipitor prior to the event.    With his the cardiac care function this limited with have to wonder whether this was cardioembolic.    An MRI would be useful but not possible with his defibrillator.    Discussed with Dr. Pope the possibility of anticoagulation pending a cardiology consult regarding whether he would be a candidate for anticoagulation    Comment:   Complex patient.  His mental status is troubling given that there is no mention made of him having any cognitive problems.    He did receive 10 mg of oxycodone earlier in    Satisfactory explanation.    Royal has been on antibiotics making meningitis highly unlikely    I discussed the patients findings and my recommendations with nursing staff      Luis Alberto Phelan MD  04/04/17  2:45 PM         Electronically signed by Luis Alberto Phelan MD at 4/4/2017  2:54 PM      Jarocho Martin MD at 4/4/2017  4:23 PM   "Version 2 of 2     Consult Orders:    1. Inpatient Consult to Cardiology [[id number]] ordered by TRESA Baca at 04/04/17 1526                [facility]   Consult Note    Referring Provider: Dr. Pope    Reason for Consultation: CVA and input regarding possible anticoagulation    Patient Care Team:  Jose Navarro MD as PCP - General (Family Medicine)     PROBLEM LIST:  1. Coronary disease:   a. Multivessel CABG, March 2005.  b. Non-STEMI, 05/06/2015. Left heart catheterization demonstrating severe multivessel disease, totally occluded LAD, 99% left circumflex, totally occluded SVG to diagonal, patent LIMA to LAD, patent SVG to first OM.  2. Ischemic cardiomyopathy/chronic systolic heart failure:  a. May 2015 echocardiogram: Ejection fraction 25%.  b. 07/23/2015, 2D echocardiogram: Severe LV dilation. Ejection fraction 20% to 25%.  c. 10/05/2015, 2D echocardiogram: Severe LV dilation. EF 20% to 25%.  3. Hypertension.  4. Peripheral vascular disease.  5. Diabetes.  6. Acute CVA 4/4/17  a.  Not a candidate for tpa secondary to recent surgery  b. CTA reviewed by Dr. Morrison and no possible areas for revascularization. Noted motion artifact. Noted slightly diminished \"opacification\" of the inferior right temporal lobe.   7. Chronic back pain  8. Dyslipidemia  9. Obesity  10. Chronic systolic congestive heart failure  11. Surgeries:  a. 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      No Known Allergies        Current Facility-Administered Medications:   •  [START ON 4/5/2017] aspirin tablet 325 mg, 325 mg, Oral, Daily **OR** [START ON 4/5/2017] aspirin suppository 300 mg, 300 mg, Rectal, Daily, Emeka Morrison MD  •  [START ON 4/5/2017] atorvastatin (LIPITOR) tablet 80 mg, 80 mg, Oral, Nightly, Emeka Morrison MD  •  ceFAZolin in dextrose (ANCEF) IVPB solution 2 g, 2 g, Intravenous, Q8H, TRESA Baca, 2 g at 04/04/17 1611  •  " [START ON 4/5/2017] clopidogrel (PLAVIX) tablet 75 mg, 75 mg, Oral, Daily, Emeka Morrison MD  •  dextrose (D50W) solution 25 g, 25 g, Intravenous, Q15 Min PRN, TRESA Baca  •  dextrose (GLUTOSE) oral gel 15 g, 15 g, Oral, Q15 Min PRN, TRESA Baca  •  glucagon (GLUCAGEN) injection 1 mg, 1 mg, Subcutaneous, Q15 Min PRN, TRESA Baca  •  insulin lispro (humaLOG) injection 0-9 Units, 0-9 Units, Subcutaneous, Q6H, TRESA Baca  •  niCARdipine (CARDENE) 25 mg/250 mL 0.9% NS VTB (mbp), 5-15 mg/hr, Intravenous, Titrated, Emeka Morrison MD  •  sodium chloride 0.9 % flush 1-10 mL, 1-10 mL, Intravenous, PRN, Emeka Morrison MD      niCARdipine 5-15 mg/hr       Prescriptions Prior to Admission   Medication Sig Dispense Refill Last Dose   • aspirin 81 MG EC tablet Take 81 mg by mouth Daily. On hold for surgery   Unknown at Unknown time   • atorvastatin (LIPITOR) 80 MG tablet Take 1 tablet by mouth Daily. 30 tablet 3 3/30/2017 at 1000   • carvedilol (COREG) 6.25 MG tablet Take 1 tablet by mouth 2 (Two) Times a Day With Meals. 60 tablet 3 3/30/2017 at 1000   • clindamycin (CLEOCIN) 600 MG/50ML IVPB Infuse 50 mL into a venous catheter Every 8 (Eight) Hours. Indications: Infection of Blood or Tissues affecting the Whole Body  0    • clopidogrel (PLAVIX) 75 MG tablet Take 1 tablet by mouth Daily. 30 tablet 3 3/30/2017 at 1000   • gabapentin (NEURONTIN) 300 MG capsule Take 1 capsule by mouth 2 (two) times a day. 60 capsule 3 3/30/2017 at 1000   • Heparin Sodium, Porcine, (HEPARIN, PORCINE,) 5000 UNIT/ML injection Inject 1 mL under the skin Every 12 (Twelve) Hours.      • insulin aspart (novoLOG) 100 UNIT/ML injection Inject 0-9 Units under the skin 4 (Four) Times a Day Before Meals & at Bedtime.      • insulin detemir (LEVEMIR) 100 UNIT/ML injection Inject 10 Units under the skin Every Night.      • meropenem 2 g in sodium chloride 0.9 % 100 mL IVPB Infuse 2 g into a  venous catheter Every 8 (Eight) Hours. Indications: Infection of Blood or Tissues affecting the Whole Body  0    • morphine 2 MG/ML injection Infuse 1 mL into a venous catheter Every 4 (Four) Hours As Needed for Severe Pain (7-10).  0    • nitroglycerin (NITROLINGUAL) 0.4 MG/SPRAY spray Place 1 spray under the tongue every 5 (five) minutes as needed for chest pain.   Unknown at Unknown time   • nystatin (MYCOSTATIN) 858414 UNIT/GM ointment Apply  topically 2 (Two) Times a Day As Needed (yeast on skin).  0    • omeprazole (PriLOSEC) 20 MG capsule Take 20 mg by mouth Daily As Needed (acid reflux).   Unknown at Unknown time   • rOPINIRole (REQUIP) 0.25 MG tablet Take 0.25 mg by mouth Every Night.   3/29/2017 at Unknown time   • vancomycin 1,500 mg in sodium chloride 0.9 % 500 mL IVPB Infuse 1,500 mg into a venous catheter Every 12 (Twelve) Hours. Indications: Infection of Blood or Tissues affecting the Whole Body  0          Subjective .   History of present illness:    Patient was admitted to [facility] [place] on 3/30/17 secondary to scrotal gangrene. He underwent debridement of this. One month prior he had been diagnosed with a UTI and had a colbert placed. It was felt that the gangrene was related to the colbert. After his surgery he had reportedly done well. Today he had sudden onset of aphasia and left sided facial droop with left sided paralysis. He was not a candidate for tpa secondary to recent surgery, so he was referred here for possible emergent catheter based intervention. Patient has been evaluated by Dr. Morrison and there were no areas noted to be amenable to intervention and thus this was deferred. Patient currently is still apahsic but moving all extremities. There is no family at bedside. After review patient was on plavix and aspirin at the time of the event.       Social History     Social History   • Marital status:      Spouse name: N/A   • Number of children: N/A   • Years of education: N/A     Occupational  History   • Not on file.     Social History Main Topics   • Smoking status: Former Smoker     Packs/day: 1.50     Years: 43.00     Quit date: 2002   • Smokeless tobacco: Current User   • Alcohol use No   • Drug use: No   • Sexual activity: Defer     Other Topics Concern   • Not on file     Social History Narrative     Family History   Problem Relation Age of Onset   • Diabetes Mother    • Hypertension Mother    • Coronary artery disease Mother      MI in her 40's   • Cancer Father      Throat   • Hypertension Father    • Hypertension Sister    • Hypertension Brother    • Hypertension Maternal Grandmother    • Hypertension Maternal Grandfather          Review of Systems:  ROS  ROS unobtainable secondary to mental status and aphasia.         Objective   Vitals:  Blood pressure 142/87, pulse 108, temperature 98.6 °F (37 °C), temperature source Axillary, resp. rate 20, SpO2 100 %.       Physical Exam   Constitutional: He appears well-developed and well-nourished.   HENT:   Head: Normocephalic and atraumatic.   Mouth/Throat: Oropharynx is clear and moist.   Eyes: Right eye exhibits no discharge. Left eye exhibits no discharge.   Neck: No JVD present. Carotid bruit is not present. No tracheal deviation present.   Cardiovascular: Regular rhythm and normal heart sounds.  Tachycardia present.    Pulmonary/Chest: Effort normal and breath sounds normal. No respiratory distress.   Abdominal: Soft. Bowel sounds are normal. There is no tenderness.   Musculoskeletal: He exhibits edema (trace ble edema). He exhibits no deformity.   Neurological:   Patient aphasic. Moves all extremities.   Skin: Skin is warm and dry.            Results Review:  I reviewed the patient's new clinical results.    Results from last 7 days  Lab Units 04/04/17  0115   WBC 10*3/mm3 9.68   HEMOGLOBIN g/dL 10.6*   HEMATOCRIT % 35.4*   PLATELETS 10*3/mm3 380       Results from last 7 days  Lab Units 04/04/17  0115   SODIUM mmol/L 136   POTASSIUM mmol/L 4.0    CHLORIDE mmol/L 105   TOTAL CO2 mmol/L 24.8   BUN mg/dL 6*   CREATININE mg/dL 0.53   CALCIUM mg/dL 8.3   BILIRUBIN mg/dL 0.2   ALK PHOS U/L 69   ALT (SGPT) U/L 12   AST (SGOT) U/L 24   GLUCOSE mg/dL 130*       Results from last 7 days  Lab Units 04/04/17  0115   SODIUM mmol/L 136   POTASSIUM mmol/L 4.0   CHLORIDE mmol/L 105   TOTAL CO2 mmol/L 24.8   BUN mg/dL 6*   CREATININE mg/dL 0.53   GLUCOSE mg/dL 130*   CALCIUM mg/dL 8.3           0  Lab Value Date/Time   TROPONINI <0.006 04/04/2017 1056   TROPONINI <0.006 04/03/2017 1851   TROPONINI 0.018 03/31/2017 0542   TROPONINI 0.024 03/30/2017 1515   TROPONINI 3.295 (C) 05/07/2015 2125   TROPONINI 3.801 (C) 05/07/2015 1549   TROPONINI 3.825 (C) 05/07/2015 1133                       Tele:  ST    EKG:       Assessment/Plan     1. Acute CVA, not a TPA candidate and area of possible ischemia not felt to be amenable to intervention.  No documented atrial fibrillation from the outside facility.  2. Ischemic cardiomyopathy. EF 20% by outside facility echo. Could possibly have a LV thrombus  3. CAD, stable  4. Dyslipidemia  5. Hypertension        Plan:    1. Will check an ECHO with definity to evaluate for possible LV thrombus. Will also obtain carotid duplex. Continue current regimen for now. Further recommendations to follow further testing.  Will also have ICD interrogated to rule out any atrial fibrillation.  Recommendations regarding anticoagulation forthcoming after the above.      TRESA Mojica obtained past medical, family history, social history, review of systems and functioned as a scribe for the remainder of the dictation for Dr. Martin.      TRESA Mojica  04/04/17  4:23 PM  I, Jarocho Martin MD, personally performed the services described in this documentation as scribed by the above individual in my presence, and it is both accurate and complete    Please note that portions of this note may have been completed with a voice recognition program.  "Efforts were made to edit the dictations, but occasionally words are mistranscribed.       Electronically signed by Jarocho Martin MD at 4/4/2017  5:24 PM      TRESA Mojica at 4/4/2017  4:23 PM  Version 1 of 2         [facility]   Consult Note    Referring Provider: Dr. Pope    Reason for Consultation: CVA and input regarding possible anticoagulation    Patient Care Team:  Jose Navarro MD as PCP - General (Family Medicine)     PROBLEM LIST:  12. Coronary disease:   a. Multivessel CABG, March 2005.  b. Non-STEMI, 05/06/2015. Left heart catheterization demonstrating severe multivessel disease, totally occluded LAD, 99% left circumflex, totally occluded SVG to diagonal, patent LIMA to LAD, patent SVG to first OM.  13. Ischemic cardiomyopathy/chronic systolic heart failure:  a. May 2015 echocardiogram: Ejection fraction 25%.  b. 07/23/2015, 2D echocardiogram: Severe LV dilation. Ejection fraction 20% to 25%.  c. 10/05/2015, 2D echocardiogram: Severe LV dilation. EF 20% to 25%.  14. Hypertension.  15. Peripheral vascular disease.  16. Diabetes.  17. Acute CVA 4/4/17  a.  Not a candidate for tpa secondary to recent surgery  b. CTA reviewed by Dr. Morrison and no possible areas for revascularization. Noted motion artifact. Noted slightly diminished \"opacification\" of the inferior right temporal lobe.   18. Chronic back pain  19. Dyslipidemia  20. Obesity  21. Chronic systolic congestive heart failure  22. Surgeries:  a. 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      No Known Allergies        Current Facility-Administered Medications:   •  [START ON 4/5/2017] aspirin tablet 325 mg, 325 mg, Oral, Daily **OR** [START ON 4/5/2017] aspirin suppository 300 mg, 300 mg, Rectal, Daily, Emeka Morrison MD  •  [START ON 4/5/2017] atorvastatin (LIPITOR) tablet 80 mg, 80 mg, Oral, Nightly, Emeka Morrison MD  •  ceFAZolin in dextrose (ANCEF) IVPB solution 2 g, " 2 g, Intravenous, Q8H, TRESA Baca, 2 g at 04/04/17 1611  •  [START ON 4/5/2017] clopidogrel (PLAVIX) tablet 75 mg, 75 mg, Oral, Daily, Emeka Morrison MD  •  dextrose (D50W) solution 25 g, 25 g, Intravenous, Q15 Min PRN, TRESA Baca  •  dextrose (GLUTOSE) oral gel 15 g, 15 g, Oral, Q15 Min PRN, TRESA Baca  •  glucagon (GLUCAGEN) injection 1 mg, 1 mg, Subcutaneous, Q15 Min PRN, TRESA Baca  •  insulin lispro (humaLOG) injection 0-9 Units, 0-9 Units, Subcutaneous, Q6H, TRESA Baca  •  niCARdipine (CARDENE) 25 mg/250 mL 0.9% NS VTB (mbp), 5-15 mg/hr, Intravenous, Titrated, Emeka Morrison MD  •  sodium chloride 0.9 % flush 1-10 mL, 1-10 mL, Intravenous, PRN, Emeka Morrison MD      niCARdipine 5-15 mg/hr       Prescriptions Prior to Admission   Medication Sig Dispense Refill Last Dose   • aspirin 81 MG EC tablet Take 81 mg by mouth Daily. On hold for surgery   Unknown at Unknown time   • atorvastatin (LIPITOR) 80 MG tablet Take 1 tablet by mouth Daily. 30 tablet 3 3/30/2017 at 1000   • carvedilol (COREG) 6.25 MG tablet Take 1 tablet by mouth 2 (Two) Times a Day With Meals. 60 tablet 3 3/30/2017 at 1000   • clindamycin (CLEOCIN) 600 MG/50ML IVPB Infuse 50 mL into a venous catheter Every 8 (Eight) Hours. Indications: Infection of Blood or Tissues affecting the Whole Body  0    • clopidogrel (PLAVIX) 75 MG tablet Take 1 tablet by mouth Daily. 30 tablet 3 3/30/2017 at 1000   • gabapentin (NEURONTIN) 300 MG capsule Take 1 capsule by mouth 2 (two) times a day. 60 capsule 3 3/30/2017 at 1000   • Heparin Sodium, Porcine, (HEPARIN, PORCINE,) 5000 UNIT/ML injection Inject 1 mL under the skin Every 12 (Twelve) Hours.      • insulin aspart (novoLOG) 100 UNIT/ML injection Inject 0-9 Units under the skin 4 (Four) Times a Day Before Meals & at Bedtime.      • insulin detemir (LEVEMIR) 100 UNIT/ML injection Inject 10 Units under the skin Every Night.       • meropenem 2 g in sodium chloride 0.9 % 100 mL IVPB Infuse 2 g into a venous catheter Every 8 (Eight) Hours. Indications: Infection of Blood or Tissues affecting the Whole Body  0    • morphine 2 MG/ML injection Infuse 1 mL into a venous catheter Every 4 (Four) Hours As Needed for Severe Pain (7-10).  0    • nitroglycerin (NITROLINGUAL) 0.4 MG/SPRAY spray Place 1 spray under the tongue every 5 (five) minutes as needed for chest pain.   Unknown at Unknown time   • nystatin (MYCOSTATIN) 523714 UNIT/GM ointment Apply  topically 2 (Two) Times a Day As Needed (yeast on skin).  0    • omeprazole (PriLOSEC) 20 MG capsule Take 20 mg by mouth Daily As Needed (acid reflux).   Unknown at Unknown time   • rOPINIRole (REQUIP) 0.25 MG tablet Take 0.25 mg by mouth Every Night.   3/29/2017 at Unknown time   • vancomycin 1,500 mg in sodium chloride 0.9 % 500 mL IVPB Infuse 1,500 mg into a venous catheter Every 12 (Twelve) Hours. Indications: Infection of Blood or Tissues affecting the Whole Body  0          Subjective .   History of present illness:    Patient was admitted to [facility] on 3/30/17 secondary to scrotal gangrene. He underwent debridement of this. One month prior he had been diagnosed with a UTI and had a colbert placed. It was felt that the gangrene was related to the colbert. After his surgery he had reportedly done well. Today he had sudden onset of aphasia and left sided facial droop with left sided paralysis. He was not a candidate for tpa secondary to recent surgery, so he was referred here for possible emergent catheter based intervention. Patient has been evaluated by Dr. Morrison and there were no areas noted to be amenable to intervention and thus this was deferred. Patient currently is still apahsic but moving all extremities. There is no family at bedside. After review patient was on plavix and aspirin at the time of the event.       Social History     Social History   • Marital status:      Spouse name: N/A    • Number of children: N/A   • Years of education: N/A     Occupational History   • Not on file.     Social History Main Topics   • Smoking status: Former Smoker     Packs/day: 1.50     Years: 43.00     Quit date: 2002   • Smokeless tobacco: Current User   • Alcohol use No   • Drug use: No   • Sexual activity: Defer     Other Topics Concern   • Not on file     Social History Narrative     Family History   Problem Relation Age of Onset   • Diabetes Mother    • Hypertension Mother    • Coronary artery disease Mother      MI in her 40's   • Cancer Father      Throat   • Hypertension Father    • Hypertension Sister    • Hypertension Brother    • Hypertension Maternal Grandmother    • Hypertension Maternal Grandfather          Review of Systems:  ROS  ROS unobtainable secondary to mental status and aphasia.         Objective   Vitals:  Blood pressure 142/87, pulse 108, temperature 98.6 °F (37 °C), temperature source Axillary, resp. rate 20, SpO2 100 %.       Physical Exam   Constitutional: He appears well-developed and well-nourished.   HENT:   Head: Normocephalic and atraumatic.   Mouth/Throat: Oropharynx is clear and moist.   Eyes: Right eye exhibits no discharge. Left eye exhibits no discharge.   Neck: No JVD present. Carotid bruit is not present. No tracheal deviation present.   Cardiovascular: Regular rhythm and normal heart sounds.  Tachycardia present.    Pulmonary/Chest: Effort normal and breath sounds normal. No respiratory distress.   Abdominal: Soft. Bowel sounds are normal. There is no tenderness.   Musculoskeletal: He exhibits edema (trace ble edema). He exhibits no deformity.   Neurological:   Patient aphasic. Moves all extremities.   Skin: Skin is warm and dry.            Results Review:  I reviewed the patient's new clinical results.    Results from last 7 days  Lab Units 04/04/17  0115   WBC 10*3/mm3 9.68   HEMOGLOBIN g/dL 10.6*   HEMATOCRIT % 35.4*   PLATELETS 10*3/mm3 380       Results from last 7  days  Lab Units 04/04/17  0115   SODIUM mmol/L 136   POTASSIUM mmol/L 4.0   CHLORIDE mmol/L 105   TOTAL CO2 mmol/L 24.8   BUN mg/dL 6*   CREATININE mg/dL 0.53   CALCIUM mg/dL 8.3   BILIRUBIN mg/dL 0.2   ALK PHOS U/L 69   ALT (SGPT) U/L 12   AST (SGOT) U/L 24   GLUCOSE mg/dL 130*       Results from last 7 days  Lab Units 04/04/17  0115   SODIUM mmol/L 136   POTASSIUM mmol/L 4.0   CHLORIDE mmol/L 105   TOTAL CO2 mmol/L 24.8   BUN mg/dL 6*   CREATININE mg/dL 0.53   GLUCOSE mg/dL 130*   CALCIUM mg/dL 8.3           0  Lab Value Date/Time   TROPONINI <0.006 04/04/2017 1056   TROPONINI <0.006 04/03/2017 1851   TROPONINI 0.018 03/31/2017 0542   TROPONINI 0.024 03/30/2017 1515   TROPONINI 3.295 (C) 05/07/2015 2125   TROPONINI 3.801 (C) 05/07/2015 1549   TROPONINI 3.825 (C) 05/07/2015 1133                       Tele:  ST    EKG:       Assessment/Plan     6. Acute CVA, not a TPA candidate and area of possible ischemia not felt to be amenable to intervention.  No documented atrial fibrillation from the outside facility.  7. Ischemic cardiomyopathy. EF 20% by outside facility echo. Could possibly have a LV thrombus  8. CAD, stable  9. Dyslipidemia  10. Hypertension        Plan:    2. Will check an ECHO with definity to evaluate for possible LV thrombus. Will also obtain carotid duplex. Continue current regimen for now. Further recommendations to follow further testing.      TRESA Mojica obtained past medical, family history, social history, review of systems and functioned as a scribe for the remainder of the dictation for Dr. Martin.      TRESA Mojica  04/04/17  4:23 PM    Please note that portions of this note may have been completed with a voice recognition program. Efforts were made to edit the dictations, but occasionally words are mistranscribed.       Electronically signed by TRESA Mojica at 4/4/2017  4:57 PM      Kike Leon MD at 4/4/2017  5:23 PM  Version 1 of 1     Consult Orders:  "   1. Inpatient Consult to Infectious Diseases [[id number]] ordered by TRESA Baca at 04/04/17 1525                INFECTIOUS DISEASE CONSULT/INITIAL HOSPITAL VISIT    Tomas Salvador  1955  [id number]    Date of Consult: 4/4/2017    Admission Date: 4/4/2017      Requesting Provider: mEeka Morrison MD  Evaluating Physician: Kike Leon MD    Reason for Consultation: Delisa's gangrene     History of present illness:    Patient is a 61 y.o. 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  He has a history of underlying diabetes mellitus and severe systolic congestive heart failure.  He presented to [facility] on 3/30 with scrotal cellulitis/gangrene.  He underwent debridement by urology at [facility] on 3/31 and was treated with Merrem/clindamycin/vancomycin.  He developed left facial droop and left sided paresis, prompting a transfer to [facility] for evaluation and therapy of acute stroke.  His head CT angiogram was degraded by motion artifact but per Dr. Morrison there was suggestion of right inferior temporal lobe\" opacification\" consistent with a possible stroke.  His left facial droop and left-sided paresis has improved today.  He is encephalopathic and is unable to provide any reliable history.  His wife thinks that he may have had some fevers prior to his admission on 3/30.    Past Medical History:   Diagnosis Date   • CAD (coronary artery disease)    • Chronic back pain    • Chronic respiratory failure with hypoxia     Night time only at 2 liters/min   • Diabetes mellitus, type 2    • Essential hypertension    • Hyperlipidemia    • Ischemic cardiomyopathy    • NSTEMI (non-ST elevated myocardial infarction) 2015   • Obesity    • PVD (peripheral vascular disease)    • Systolic CHF     EF <20%  3/8/17   • Urinary retention        Past Surgical History:   Procedure Laterality Date   • CARDIAC CATHETERIZATION  05/2015    Severe " multivessel coronary artery disease involving a totally occluded LAD, 99% proximal left circumflex artery; totally occluded saphenous vein graft to the diagonal; patent LIMA to the LAD, and a patent saphenous vein graft to the first obtuse marginal.      • CARDIAC DEFIBRILLATOR PLACEMENT  2015   • CORONARY ARTERY BYPASS GRAFT  2005    3 vessel   • CYSTOSCOPY N/A 3/31/2017    Procedure: CYSTOSCOPY and scrotal exploration with debridement of necrotizing fasciitis;  Surgeon: Mele Johnson MD;  Location: [facility] OR;  Service:    • LEG SURGERY Right     unknown, pos vascular bypass   • SCROTAL EXPLORATION N/A 3/31/2017    Procedure: SCROTAL EXPLORATION;  Surgeon: Mele Johnson MD;  Location: [facility] OR;  Service:        Family History   Problem Relation Age of Onset   • Diabetes Mother    • Hypertension Mother    • Coronary artery disease Mother      MI in her 40's   • Cancer Father      Throat   • Hypertension Father    • Hypertension Sister    • Hypertension Brother    • Hypertension Maternal Grandmother    • Hypertension Maternal Grandfather        Social History     Social History   • Marital status:      Spouse name: N/A   • Number of children: N/A   • Years of education: N/A     Occupational History   • Not on file.     Social History Main Topics   • Smoking status: Former Smoker     Packs/day: 1.50     Years: 43.00     Quit date: 2002   • Smokeless tobacco: Current User   • Alcohol use No   • Drug use: No   • Sexual activity: Defer     Other Topics Concern   • Not on file     Social History Narrative       No Known Allergies      Medication:    Current Facility-Administered Medications:   •  [START ON 4/5/2017] aspirin tablet 325 mg, 325 mg, Oral, Daily **OR** [START ON 4/5/2017] aspirin suppository 300 mg, 300 mg, Rectal, Daily, Emeka Morrison MD  •  [START ON 4/5/2017] atorvastatin (LIPITOR) tablet 80 mg, 80 mg, Oral, Nightly, Emeka Morrison MD  •  ceFAZolin in dextrose (ANCEF) IVPB  solution 2 g, 2 g, Intravenous, Q8H, TRESA Baca, 2 g at 04/04/17 1611  •  [START ON 4/5/2017] clopidogrel (PLAVIX) tablet 75 mg, 75 mg, Oral, Daily, Emeka Morrison MD  •  dextrose (D50W) solution 25 g, 25 g, Intravenous, Q15 Min PRN, TRESA Baca  •  dextrose (GLUTOSE) oral gel 15 g, 15 g, Oral, Q15 Min PRN, TRESA Baac  •  glucagon (GLUCAGEN) injection 1 mg, 1 mg, Subcutaneous, Q15 Min PRN, TRESA Baca  •  insulin lispro (humaLOG) injection 0-9 Units, 0-9 Units, Subcutaneous, Q6H, TRESA Baca  •  metroNIDAZOLE (FLAGYL) IVPB 500 mg, 500 mg, Intravenous, Q6H, Kike Leon MD  •  niCARdipine (CARDENE) 25 mg/250 mL 0.9% NS VTB (mbp), 5-15 mg/hr, Intravenous, Titrated, Emeka Morrison MD  •  sodium chloride 0.9 % flush 1-10 mL, 1-10 mL, Intravenous, PRN, Emeka Morrison MD    Antibiotics:  IV Anti-Infectives     Ordered     Dose/Rate Route Frequency Start Stop    04/04/17 1723  metroNIDAZOLE (FLAGYL) IVPB 500 mg     Ordering Provider:  Kike Leon MD    500 mg  100 mL/hr over 60 Minutes Intravenous Every 6 Hours 04/04/17 1800      04/04/17 1552  ceFAZolin in dextrose (ANCEF) IVPB solution 2 g     Ordering Provider:  TRESA Baca    2 g  over 30 Minutes Intravenous Every 8 Hours 04/04/17 1600              Review of Systems: Is not reliably obtainable from the patient due to his confusion.  Constitutional-- he reportedly had unquantified fevers prior to his admission on 3/30.  HEENT-- he complains of a diffuse headache for the last several days.  CV-- he has a history of coronary artery disease and severe systolic congestive heart failure.  Resp-- he denies increased dyspnea.  GI- he denies abdominal pain, diarrhea, and vomiting.  --he complains of moderately severe scrotal pain and has a history of Escherichia coli UTI and prior urinary retention.  He currently has a Cueto catheter in place..  .  Neuro-- he has  recent left facial paresis and left sided weakness.  Skin--No rashes or lesions  The remainder of his review of systems is not obtainable due to his severe confusion and speech abnormalities.      Physical Exam:   Vital Signs  Temp (24hrs), Av.4 °F (36.9 °C), Min:98.2 °F (36.8 °C), Max:98.6 °F (37 °C)    Temp  Min: 98.2 °F (36.8 °C)  Max: 98.6 °F (37 °C)  BP  Min: 113/69  Max: 152/85  Pulse  Min: 87  Max: 109  Resp  Min: 18  Max: 20  SpO2  Min: 86 %  Max: 100 %    GENERAL: He is mildly drowsy and confused.  He is in no acute distress.  HEENT: Normocephalic, atraumatic.  PERRL. EOMI. No conjunctival injection. No icterus. Oropharynx clear without evidence of thrush or exudate.    NECK: Supple without nuchal rigidity. No mass.  LYMPH: No cervical, axillary or inguinal lymphadenopathy.  HEART: RRR; No murmur, rubs, gallops.   LUNGS: Clear to auscultation bilaterally without wheezing, rales, rhonchi. Normal respiratory effort. Nonlabored. No dullness.  ABDOMEN: Soft, nontender, nondistended. Positive bowel sounds. No rebound or guarding. NO mass or HSM.  EXT:  No cyanosis, clubbing or edema. No cord.  : A Cueto catheter is in place.  His scrotum has multiple Penrose drains present bilaterally.  There is no crepitance or overt gangrene present.  He does have a lower midline scrotal incision with some yellowish residual slough and purulent drainage.  There is some residual lower, posterior scrotal induration..  MSK: FROM without joint effusions noted arms/legs.    SKIN: Warm and dry without cutaneous eruptions on Inspection/palpation.    NEURO: He is confused but can follow some simple commands.  He has mild left facial paresis.  He has some dysphasia with word finding difficulties.  PSYCHIATRIC: He is encephalopathic and cannot provide an accurate history.    Laboratory Data      Results from last 7 days  Lab Units 17  0115 17  0235 17  0409   WBC 10*3/mm3 9.68 9.96 9.91   HEMOGLOBIN g/dL 10.6*  10.6* 11.0*   HEMATOCRIT % 35.4* 33.5* 34.7*   PLATELETS 10*3/mm3 380 343 340       Results from last 7 days  Lab Units 04/04/17  0115   SODIUM mmol/L 136   POTASSIUM mmol/L 4.0   CHLORIDE mmol/L 105   TOTAL CO2 mmol/L 24.8   BUN mg/dL 6*   CREATININE mg/dL 0.53   GLUCOSE mg/dL 130*   CALCIUM mg/dL 8.3       Results from last 7 days  Lab Units 04/04/17  0115   ALK PHOS U/L 69   BILIRUBIN mg/dL 0.2   ALT (SGPT) U/L 12   AST (SGOT) U/L 24           Results from last 7 days  Lab Units 04/04/17  0115   CRP mg/dL 3.42*       Results from last 7 days  Lab Units 04/02/17  0409   LACTATE mmol/L 0.8       Results from last 7 days  Lab Units 04/04/17  1056 03/31/17  0542 03/30/17  1515   CK TOTAL U/L 54 32 92       Results from last 7 days  Lab Units 04/01/17  1311   VANCOMYCIN RM mcg/mL 17.60     Estimated Creatinine Clearance: 175 mL/min (by C-G formula based on Cr of 0.53).      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  He has been receiving aggressive intravenous antibiotic therapy with intravenous Merrem/clindamycin/vancomycin.  His cultures have grown a relatively sensitive Escherichia coli.  This is clearly a polymicrobial infection and certainly involves anaerobes.  I will switch his antibiotic therapy to cefazolin/Flagyl.  He appears to of undergone  adequate surgical debridement at this point but will need ongoing wound care.  2.  Acute right hemispheric CVA-with left sided facial and left-sided.  His head CT angiogram here reveals subtle right temporal abnormalities per Dr. Morrison.  He has clinically improved.  3.  Acute toxic metabolic encephalopathy-secondary to sepsis and CVA  4.  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  Severe systolic congestive heart failure-with an ejection fraction of less than 20% on echocardiogram performed on 3/8/17  6.  Escherichia coli UTI  7.  Sepsis-secondary to Delisa's gangrene  8.  Leukocytosis/neutrophilia-secondary to scrotal gangrene/cellulitis, now improving    PLAN/RECOMMENDATIONS:   Thank you for asking us to see Tomas Salvador, I recommend the followin.  Cefazolin 2 g IV every 8 hours  2.  Flagyl 500 mg IV every 8 hours  3.  PT wound care with debridement and pulse lavage     I discussed his complex situation in detail with Dr. Pope.  I have also contacted Dr. Cyril Small in urology.  I discussed his situation in detail with his family this evening.  I spent over 65 minutes on his care today    Cyril Leon MD  2017  5:24 PM                   Electronically signed by Cyril Leon MD at 2017  5:44 PM      Cyril Small MD at 2017  6:17 PM  Version 1 of 1     Consult Orders:    1. Inpatient Consult to Urology [[id number]] ordered by TRESA Baca at 17 1525                 Consult    Referring Provider:Emeka Morrison MD  Reason for Consultation: cellulitis      Patient Care Team:  Jose Navarro MD as PCP - General (Family Medicine)    Chief complaint No chief complaint on file.      Subjective .     History of present illness:   Dictation on: 2017  6:19 PM by: CYRIL SMALL [[id number]]         Past Medical History:   Diagnosis Date   • CAD (coronary artery disease)    • Chronic back pain    • Chronic  respiratory failure with hypoxia     Night time only at 2 liters/min   • Diabetes mellitus, type 2    • Essential hypertension    • Hyperlipidemia    • Ischemic cardiomyopathy    • NSTEMI (non-ST elevated myocardial infarction) 2015   • Obesity    • PVD (peripheral vascular disease)    • Systolic CHF     EF <20%  3/8/17   • Urinary retention      Past Surgical History:   Procedure Laterality Date   • CARDIAC CATHETERIZATION  05/2015    Severe multivessel coronary artery disease involving a totally occluded LAD, 99% proximal left circumflex artery; totally occluded saphenous vein graft to the diagonal; patent LIMA to the LAD, and a patent saphenous vein graft to the first obtuse marginal.      • CARDIAC DEFIBRILLATOR PLACEMENT  2015   • CORONARY ARTERY BYPASS GRAFT  2005    3 vessel   • CYSTOSCOPY N/A 3/31/2017    Procedure: CYSTOSCOPY and scrotal exploration with debridement of necrotizing fasciitis;  Surgeon: Mele Johnson MD;  Location: [facility];  Service:    • LEG SURGERY Right     unknown, pos vascular bypass   • SCROTAL EXPLORATION N/A 3/31/2017    Procedure: SCROTAL EXPLORATION;  Surgeon: Mele Johnson MD;  Location: [facility];  Service:          Current Facility-Administered Medications:   •  [START ON 4/5/2017] aspirin tablet 325 mg, 325 mg, Oral, Daily **OR** [START ON 4/5/2017] aspirin suppository 300 mg, 300 mg, Rectal, Daily, Emeka Morrison MD  •  [START ON 4/5/2017] atorvastatin (LIPITOR) tablet 80 mg, 80 mg, Oral, Nightly, Emeka Morrison MD  •  ceFAZolin in dextrose (ANCEF) IVPB solution 2 g, 2 g, Intravenous, Q8H, TRESA Baca, 2 g at 04/04/17 1611  •  [START ON 4/5/2017] clopidogrel (PLAVIX) tablet 75 mg, 75 mg, Oral, Daily, Emeka Morrison MD  •  dextrose (D50W) solution 25 g, 25 g, Intravenous, Q15 Min PRN, TRESA Baca  •  dextrose (GLUTOSE) oral gel 15 g, 15 g, Oral, Q15 Min PRN, TRESA Baca  •  glucagon (GLUCAGEN) injection 1 mg,  1 mg, Subcutaneous, Q15 Min PRN, TRESA Baca  •  insulin lispro (humaLOG) injection 0-9 Units, 0-9 Units, Subcutaneous, Q6H, TRESA Baca  •  metroNIDAZOLE (FLAGYL) IVPB 500 mg, 500 mg, Intravenous, Q6H, Kike Leon MD  •  niCARdipine (CARDENE) 25 mg/250 mL 0.9% NS VTB (mbp), 5-15 mg/hr, Intravenous, Titrated, Emeka Morrison MD  •  sodium chloride 0.9 % flush 1-10 mL, 1-10 mL, Intravenous, PRN, Emeka Morrison MD  Review of patient's allergies indicates no known allergies.    Review of Systems  Pertinent items are noted in HPI    Objective     Vital Signs   /90  Pulse 109  Temp 98.6 °F (37 °C) (Axillary)   Resp 20  SpO2 97%    Physical Exam:    Lungs: Respirations regular, even and  unlabored    Chest Wall: No abnormalities observed    Genital FC present   Normal phallus . Testes/ scrotum non tender.Moderate diffuse cellulitis inferiorly. No necrosis seen.  Loop penrose drains present.   Extremities: Moves all extremities well, no edemam no cyanosis, no redness  Pulses: Pulses palpable  Skin: No bleeding, bruising or rash    Neurologic: Cranial Nerves 1-12 grossly intact    Results Review:  Lab Results (last 24 hours)     Procedure Component Value Units Date/Time    Urine Culture [[id number]] Collected:  04/04/17 1413    Specimen:  Urine from Urine, Clean Catch Updated:  04/04/17 1419    Urinalysis With / Microscopic If Indicated [[id number]]  (Abnormal) Collected:  04/04/17 1413    Specimen:  Urine from Urine, Clean Catch Updated:  04/04/17 1530     Color, UA Yellow     Appearance, UA Cloudy (A)     pH, UA 7.0     Specific Gravity, UA 1.049 (H)     Glucose,  mg/dL (Trace) (A)     Ketones, UA 15 mg/dL (1+) (A)     Bilirubin, UA Negative     Blood, UA Trace (A)     Protein, UA Negative     Leuk Esterase, UA Moderate (2+) (A)     Nitrite, UA Negative     Urobilinogen, UA 1.0 E.U./dL    Urinalysis, Microscopic Only [[id number]]  (Abnormal) Collected:  04/04/17 141     Specimen:  Urine from Urine, Clean Catch Updated:  04/04/17 1530     RBC, UA 0-2 /HPF      WBC, UA 21-30 (A) /HPF      Bacteria, UA None Seen /HPF      Squamous Epithelial Cells, UA 0-2 /HPF      Yeast, UA Large/3+ Budding Yeast /HPF      Hyaline Casts, UA 0-6 /LPF      Methodology Manual Light Microscopy    POC Glucose Fingerstick [[id number]]  (Abnormal) Collected:  04/04/17 1757    Specimen:  Blood Updated:  04/04/17 1759     Glucose 191 (H) mg/dL     Narrative:       Meter: [id number] : [id number] Everardo SAMUEL        Imaging Results (last 72 hours)     Procedure Component Value Units Date/Time    CT Head Without Contrast [[id number]] Updated:  04/04/17 1415    CT Angiogram Head With & Without Contrast [[id number]] Updated:  04/04/17 1415           I reviewed the patient's new clinical results.      Assessment/Plan     Principal Problem:    CVA (cerebral vascular accident)  Active Problems:    Diabetes mellitus, type 2    Chronic respiratory failure with hypoxia    Urinary retention    Hyperlipidemia    Essential hypertension    Delisa's gangrene    E. coli UTI (urinary tract infection)    Epididymo-orchitis      I discussed the patients findings and my recommendations with patient and family   Dictation on: 04/04/2017  6:23 PM by: CYRIL SMALL [[id number]]       Cyril Small MD  04/04/17  6:17 PM    Time:                                                                   Electronically signed by Cyril Small MD at 4/4/2017  6:23 PM

## 2017-04-05 NOTE — PLAN OF CARE
Problem: Patient Care Overview (Adult)  Goal: Plan of Care Review  Outcome: Ongoing (interventions implemented as appropriate)    04/05/17 0904   Coping/Psychosocial Response Interventions   Plan Of Care Reviewed With patient   Outcome Evaluation   Outcome Summary/Follow up Plan Initial eval completed. Pt very restless and agitated during initial eval. Pt would benefit from continued skilled OT services to assist in returning pt to his PLOF. Recommend inpatient rehab at d/c.         Problem: Stroke (Ischemic) (Adult)  Goal: Signs and Symptoms of Listed Potential Problems Will be Absent or Manageable (Stroke)  Outcome: Ongoing (interventions implemented as appropriate)    04/05/17 0904   Stroke (Ischemic)   Problems Assessed (Stroke (Ischemic)/TIA) cognitive impairment;communication impairment;motor/sensory impairment   Problems Present (Stroke (Ischemic)/TIA) cognitive impairment;communication impairment;motor/sensory impairment         Problem: Inpatient Occupational Therapy  Goal: Bed Mobility Goal LTG- OT  Outcome: Ongoing (interventions implemented as appropriate)    04/05/17 0904   Bed Mobility OT LTG   Bed Mobility OT LTG, Date Established 04/05/17   Bed Mobility OT LTG, Time to Achieve 1 wk   Bed Mobility OT LTG, Activity Type all bed mobility   Bed Mobility OT LTG, [place] Level minimum assist (75% patient effort)       Goal: Transfer Training Goal 1 LTG- OT  Outcome: Ongoing (interventions implemented as appropriate)    04/05/17 0904   Transfer Training OT LTG   Transfer Training OT LTG, Date Established 04/05/17   Transfer Training OT LTG, Time to Achieve 1 wk   Transfer Training OT LTG, Activity Type all transfers   Transfer Training OT LTG, [place] Level minimum assist (75% patient effort)       Goal: Orientation Goal LTG- OT  Outcome: Ongoing (interventions implemented as appropriate)    04/05/17 0904   Orientation OT LTG   Orientation OT LTG, Date Established 04/05/17   Orientation OT LTG,  Time to Achieve 1 wk   Orientation OT LTG, Activity Type person;place;50% treatment session       Goal: Follow Directions Goal LTG- OT  Outcome: Ongoing (interventions implemented as appropriate)    04/05/17 0904   Follow Directions OT LTG   Follow Directions OT LTG, Date Established 04/05/17   Follow Directions OT LTG, Time to Achieve 1 wk   Follow Directions OT LTG, Activity Type 1-Step;50% treatment session   Follow Directions OT LTG, [place] Level with verbal cues

## 2017-04-05 NOTE — PLAN OF CARE
Problem: Patient Care Overview (Adult)  Goal: Plan of Care Review  Outcome: Ongoing (interventions implemented as appropriate)    04/05/17 0302   Coping/Psychosocial Response Interventions   Plan Of Care Reviewed With patient;spouse   Patient Care Overview   Progress progress toward functional goals as expected         Problem: Stroke (Ischemic) (Adult)  Goal: Signs and Symptoms of Listed Potential Problems Will be Absent or Manageable (Stroke)  Outcome: Ongoing (interventions implemented as appropriate)    04/05/17 0302   Stroke (Ischemic)   Problems Assessed (Stroke (Ischemic)/TIA) all   Problems Present (Stroke (Ischemic)/TIA) muscle tone abnormal;acute neurologic deterioration;bladder/bowel dysfunction;cognitive impairment;eating/swallowing impairment;motor/sensory impairment         Problem: Fall Risk (Adult)  Goal: Identify Related Risk Factors and Signs and Symptoms  Outcome: Ongoing (interventions implemented as appropriate)    04/05/17 0302   Fall Risk   Fall Risk: Related Risk Factors bladder function altered;confusion/agitation;gait/mobility problems;history of falls;impaired vision;neuro disease/injury;sensory deficits;environment unfamiliar   Fall Risk: Signs and Symptoms presence of risk factors       Goal: Absence of Falls  Outcome: Ongoing (interventions implemented as appropriate)    04/05/17 0302   Fall Risk (Adult)   Absence of Falls making progress toward outcome         Problem: Skin Integrity Impairment, Risk/Actual (Adult)  Goal: Identify Related Risk Factors and Signs and Symptoms  Outcome: Ongoing (interventions implemented as appropriate)    04/05/17 0302   Skin Integrity Impairment, Risk/Actual   Skin Integrity Impairment, Risk/Actual: Related Risk Factors cognitive impairment;immobility;sensory impairment;surgery/procedure   Signs and Symptoms (Skin Integrity Impairment) necrotic tissue       Goal: Skin Integrity/Wound Healing  Outcome: Ongoing (interventions implemented as appropriate)     04/05/17 0302   Skin Integrity Impairment, Risk/Actual (Adult)   Skin Integrity/Wound Healing making progress toward outcome         Problem: Infection, Risk/Actual (Adult)  Goal: Identify Related Risk Factors and Signs and Symptoms  Outcome: Ongoing (interventions implemented as appropriate)    04/05/17 0302   Infection, Risk/Actual   Infection, Risk/Actual: Related Risk Factors exposure to microbes;blood disorder;prolonged hospitalization;skin integrity impairment;sleep disturbance;surgery/procedure   Signs and Symptoms (Infection, Risk/Actual) drainage;heart rate increase;cultures positive       Goal: Infection Prevention/Resolution  Outcome: Ongoing (interventions implemented as appropriate)    04/05/17 0302   Infection, Risk/Actual (Adult)   Infection Prevention/Resolution making progress toward outcome

## 2017-04-05 NOTE — SIGNIFICANT NOTE
04/05/17 1404   SLP Deferred Reason   SLP Deferred Reason Patient unavailable for evaluation  (Pt not alert and able to participate in FEES. Follow up 4/6)

## 2017-04-05 NOTE — NURSING NOTE
Ordered lavage and debridement. PT wound care notified and consulted. WOCN will s/o, but please call / reconsult for any further needs or concerns.

## 2017-04-05 NOTE — PROGRESS NOTES
Acute Care - Wound/Debridement Initial Evaluation  [facility]     Patient Name: Tomas Salvador  : 1955  MRN: [id number]  Today's Date: 2017  Onset of Illness/Injury or Date of Surgery Date: 17  Date of Referral to PT: 17   Referring Physician: Dr. Morrison      Admit Date: 2017    Visit Dx:    ICD-10-CM ICD-9-CM   1. Dysphagia, unspecified type R13.10 787.20   2. Impaired functional mobility, balance, gait, and endurance Z74.09 V49.89   3. Impaired mobility and ADLs Z74.09 799.89               Patient Active Problem List   Diagnosis   • Coronary disease   • Ischemic cardiomyopathy   • Peripheral vascular disease   • ICD (implantable cardioverter-defibrillator) in place, implantation .   • Dyslipidemia, on atorvastain.    • Sepsis   • Diabetes mellitus, type 2   • Chronic respiratory failure with hypoxia   • Urinary retention   • Hyperlipidemia   • Essential hypertension   • E. coli UTI (urinary tract infection)   • Epididymo-orchitis   • Encephalopathy acute   • Cellulitis        Past Medical History:   Diagnosis Date   • CAD (coronary artery disease)    • Chronic back pain    • Chronic respiratory failure with hypoxia     Night time only at 2 liters/min   • Diabetes mellitus, type 2    • Essential hypertension    • Hyperlipidemia    • Ischemic cardiomyopathy    • NSTEMI (non-ST elevated myocardial infarction)    • Obesity    • PVD (peripheral vascular disease)    • Systolic CHF     EF <20%  3/8/17   • Urinary retention         Past Surgical History:   Procedure Laterality Date   • CARDIAC CATHETERIZATION  2015    Severe multivessel coronary artery disease involving a totally occluded LAD, 99% proximal left circumflex artery; totally occluded saphenous vein graft to the diagonal; patent LIMA to the LAD, and a patent saphenous vein graft to the first obtuse marginal.      • CARDIAC DEFIBRILLATOR PLACEMENT     • CORONARY ARTERY BYPASS GRAFT      3 vessel   • CYSTOSCOPY  N/A 3/31/2017    Procedure: CYSTOSCOPY and scrotal exploration with debridement of necrotizing fasciitis;  Surgeon: Mele Johnson MD;  Location:  [facility];  Service:    • LEG SURGERY Right     unknown, pos vascular bypass   • SCROTAL EXPLORATION N/A 3/31/2017    Procedure: SCROTAL EXPLORATION;  Surgeon: Mele Johnson MD;  Location:  [facility] OR;  Service:                LDA Wound       04/05/17 1500          Incision 03/31/17 1056 other (see comments) scrotum    Incision - Properties Group Placement Date: 03/31/17  -JW Placement Time: 1056 -JW Side: other (see comments)  -JW Location: scrotum  -JW    Incision WDL ex  -MF      Dressing Appearance moist drainage  -      Appearance drainage;moist;open areas;redness;swelling;tenderness;warmth;sutures intact  -MF      Incision Length (cm) 10.5  -MF      Incision Width (cm) 1  -MF      Incision Depth (cm) 3  -MF      Drainage Characteristics/Odor serosanguineous;yellow;malodorous  -      Drainage Amount moderate  -      Wound Cleaning irrigated with;sterile normal saline  -      Wound Interventions pulsatile high pressure lavage   500ml nsaline with tunnel tip.   -MF      Dressing --   interdry Ag to sling scrotum  -MF      Packing other (see comments)   sorbact to pack open areas  -        User Key  (r) = Recorded By, (t) = Taken By, (c) = Cosigned By    Initials Name Provider Type    VALERI Roldan, PT Physical Therapist    JAY JAY Silveira RN Registered Nurse              WOUND DEBRIDEMENT  Debridement Site 1  Location- Site 1: perineal wound  Selective Debridement- Site 1: Wound Surface <20cmsq (~10cm2 total area debrided)  Instruments- Site 1: tweezers, scissors  Excised Tissue Description- Site 1: moderate, slough  Bleeding- Site 1: none                  PT ASSESSMENT (last 72 hours)      PT Evaluation       04/05/17 1500 04/05/17 1400    Rehab Evaluation    Document Type therapy note (daily note);evaluation   wound care eval  -MF      Subjective Information decreased LOC   family gave ok for tx.   -     Pain Assessment    Pain Assessment Jay-Baker FACES  -MF     Jay-Baker FACES Pain Rating 0  -     Pain Intervention(s) Medication (See MAR)  -     Cognitive Assessment/Intervention    Current Cognitive/Communication Assessment impaired  -MF     Orientation Status oriented to;person  -MF     Follows Commands/Answers Questions 25% of the time;able to follow single-step instructions;needs cueing  -     Muscle Tone Assessment    Left-Side Extremities Muscle Tone Assessment  mildly increased tone  -MR    Positioning and Restraints    Pre-Treatment Position in bed  -     Post Treatment Position bed  -     In Bed supine;call light within reach;with family/caregiver;notified nsg  -     Restraints released:;reapplied:;soft limb;notified nsg:  -MF       04/05/17 1200 04/05/17 1000    Muscle Tone Assessment    Left-Side Extremities Muscle Tone Assessment mildly increased tone  -MR mildly increased tone  -MR      04/05/17 0803 04/05/17 0801    Rehab Evaluation    Document Type evaluation  -JR evaluation  -    Subjective Information no complaints;agree to therapy  -JR agree to therapy;no complaints  -LS    Patient Effort, Rehab Treatment adequate  -JR fair   agitation/confusion   -LS    Symptoms Noted Comment Pt very restless, agitated during initial eval.  -JR Pt very restless, agitation throughout evaluation.   -    General Information    Patient Profile Review yes  -JR yes  -LS    Onset of Illness/Injury or Date of Surgery Date 04/04/17  -JR 04/04/17  -    Referring Physician Dr. Morrison  -JR Prince MD  -LS    Pertinent History Of Current Problem Pt initially to ED 1 month prior with urinary retention. Pt had colbert placed and treated for UTI and d/c'd. Pt then developed scrotal swelling and firmness with black discoloration at the bottom of the scrotum. In ED urologist debrided the necrotic scrotal tissue and drained an abscess. On day of  admit pt developed aphasia with L sided facial droop and L sided paralysis. CT revealed L sided frontal infarct. Pt not a candidate for intervention.  -JR To ED at [facility] with scrotal lesion; found to be hypotensive and septic. Demonstrated sudden onset L-sided weakness, facial droop, and aphasia; transferred to [facility] for further neuro evaluation. CT (-) for acute infarct; demonstrated subactue L-sided frontal infarct.   -LS    Precautions/Limitations fall precautions;oxygen therapy device and L/min  -JR fall precautions;oxygen therapy device and L/min  -LS    Prior Level of Function independent:;gait;transfer;ADL's;bed mobility;driving  -JR independent:;gait;transfer;wound care;bathing;dressing  -LS    Equipment Currently Used at Home cane, straight;walker, rolling;oxygen  -JR oxygen;walker, standard;cane, straight;power chair, (recliner lift)  -LS    Plans/Goals Discussed With patient and family;agreed upon  -JR patient;spouse/S.O.;agreed upon  -LS    Risks Reviewed patient and family:;increased discomfort  -JR patient:;spouse/S.O.:;LOB;dizziness;increased discomfort;change in vital signs  -LS    Benefits Reviewed patient and family:;improve function;increase independence  -JR patient:;spouse/S.O.:;improve function;increase independence;increase strength;increase balance;increase knowledge  -LS    Barriers to Rehab medically complex  -JR cognitive status  -LS    Living Environment    Lives With spouse  -JR spouse  -LS    Living Arrangements house  -JR mobile home  -LS    Home Accessibility stairs to enter home;ramps present at home;tub/shower is not walk in  -JR stairs to enter home;tub/shower is not walk in  -LS    Number of Stairs to Enter Home 5   Reports family currently building a ramp  -JR 5  -LS    Living Environment Comment  Wife reports that ramp is being built.   -LS    Clinical Impression    Date of Referral to PT  04/04/17  -LS    PT Diagnosis  impaired functional mobility, balance, gait  -LS     Patient/Family Goals Statement  return to PLOF  -LS    Criteria for Skilled Therapeutic Interventions Met  yes;treatment indicated  -LS    Rehab Potential  good, to achieve stated therapy goals  -LS    Vital Signs    Pre Systolic BP Rehab 145  -  -LS    Pre Treatment Diastolic   -  -LS    Post Systolic BP Rehab 141  -  -LS    Post Treatment Diastolic BP 89  -JR 89  -LS    Pretreatment Heart Rate (beats/min) 113  -  -LS    Posttreatment Heart Rate (beats/min) 131  -  -LS    Pre SpO2 (%) 98  -JR 99  -LS    O2 Delivery Pre Treatment supplemental O2   2L  -JR supplemental O2   2L  -LS    O2 Delivery Post Treatment  supplemental O2  -LS    Pre Patient Position Supine  -JR     Intra Patient Position Standing  -JR     Post Patient Position Supine  -JR     Pain Assessment    Pain Assessment 0-10  -JR 0-10  -LS    Pain Score 0  -JR 0  -LS    Post Pain Score 0  -JR 0  -LS    Vision Assessment/Intervention    Vision Comment Unable to assess due to pt's confusion/agitation  -JR     Cognitive Assessment/Intervention    Current Cognitive/Communication Assessment impaired  -JR impaired  -LS    Orientation Status oriented to;person   Oriented to year  -JR oriented to;person;required verbal cueing (specifiy in comments);disoriented to;place   cues for month  -LS    Follows Commands/Answers Questions 25% of the time;able to follow single-step instructions;needs cueing;needs increased time;needs repetition  -JR able to follow single-step instructions;25% of the time;needs cueing;needs increased time;needs repetition  -LS    Personal Safety severe impairment;decreased awareness, need for assist;decreased awareness, need for safety;decreased insight to deficits  -JR severe impairment;at risk behaviors demonstrated;impulsive;decreased awareness, need for assist;decreased awareness, need for safety;decreased insight to deficits  -    Personal Safety Interventions  fall prevention program  maintained;gait belt;nonskid shoes/slippers when out of bed  -LS    ROM (Range of Motion)    General ROM no range of motion deficits identified  -JR no range of motion deficits identified   BLEs as demonstrated functionally  -LS    General ROM Detail Pt not following commands for ROM testing  -JR     MMT (Manual Muscle Testing)    General MMT Assessment no strength deficits identified  -JR lower extremity strength deficits identified  -LS    General MMT Assessment Detail Pt not following commands for MMT this date  -JR     Lower Extremity    Lower Ext Manual Muscle Testing Detail  Unable to perform formal MMT due to cognitive status; functionally BLEs grossly 4-/5. TBA further.  -LS    Bed Mobility, Assessment/Treatment    Bed Mobility, Assistive Device head of bed elevated;draw sheet  -JR head of bed elevated;draw sheet  -LS    Bed Mob, Supine to Sit, [place] moderate assist (50% patient effort);2 person assist required  -JR moderate assist (50% patient effort);2 person assist required;verbal cues required  -LS    Bed Mob, Sit to Supine, [place] moderate assist (50% patient effort);2 person assist required  -JR moderate assist (50% patient effort);2 person assist required;verbal cues required  -LS    Bed Mobility, Safety Issues cognitive deficits limit understanding;decreased use of arms for pushing/pulling;decreased use of legs for bridging/pushing;impaired trunk control for bed mobility  -JR cognitive deficits limit understanding  -LS    Bed Mobility, Impairments strength decreased;impaired balance  -JR     Transfer Assessment/Treatment    Transfers, Sit-Stand [place] moderate assist (50% patient effort);2 person assist required  -JR moderate assist (50% patient effort);2 person assist required;verbal cues required  -    Transfers, Stand-Sit [place] moderate assist (50% patient effort);2 person assist required  -JR moderate assist (50% patient effort);2 person assist required;verbal cues  required  -LS    Transfers, Sit-Stand-Sit, Assist Device rolling walker  -JR rolling walker  -LS    Transfer, Safety Issues balance decreased during turns;sequencing ability decreased;step length decreased;weight-shifting ability decreased  -JR balance decreased during turns;sequencing ability decreased;impulsivity  -LS    Transfer, Impairments strength decreased;impaired balance  -JR strength decreased;impaired balance  -LS    Transfer, Comment Pt transferred from EOB, pt ambulated with PT. Pt transferred to chair and back to bed. Pt stood from EOB and took a couple steps to HOB. Pt required verbal cues for hand placement with transfer. Pt demo decreased safety awareness with transfers. Pt attempting to stand prematurely this date.  -JR Performed STS from EOB and also from recliner; demonstrated significant decreased safety awareness, requiring vc's for hand placement.   -LS    Gait Assessment/Treatment    Gait, [place] Level  moderate assist (50% patient effort);2 person assist required;verbal cues required  -LS    Gait, Assistive Device  rolling walker  -LS    Gait, Distance (Feet)  50  -LS    Gait, Gait Deviations  valente decreased;forward flexed posture;step length decreased  -LS    Gait, Safety Issues  supplemental O2;sequencing ability decreased  -LS    Gait, Impairments  strength decreased;impaired balance  -LS    Gait, Comment  Constant vc's for posture, maintaining  on RWx; recliner followed closely for safety. Distance limited by fatigue and decreased safety awareness.   -LS    Motor Skills/Interventions    Additional Documentation Balance Skills Training (Group)  -JR Balance Skills Training (Group)  -LS    Balance Skills Training    Sitting-Level of Assistance Contact guard  -JR Contact guard  -LS    Sitting-Balance Support Feet supported  -JR Feet supported  -LS    Standing-Level of Assistance Minimum assistance;x2  -JR Minimum assistance;x2  -LS    Static Standing Balance Support assistive  device  -JR assistive device  -LS    Gait Balance-Level of Assistance Moderate assistance;x2  -JR Moderate assistance;x2  -LS    Gait Balance Support assistive device  -JR assistive device  -LS    Sensory Assessment/Intervention    Light Touch  --   TBA further when cognition improves  -LS    Positioning and Restraints    Pre-Treatment Position in bed  -JR in bed  -LS    Post Treatment Position bed  -JR bed  -LS    In Bed notified nsg;supine;call light within reach;encouraged to call for assist;exit alarm on;with family/caregiver  -JR notified nsg;call light within reach;encouraged to call for assist;exit alarm on;with other staff;with family/caregiver  -LS      04/05/17 0800 04/05/17 0600    Muscle Tone Assessment    Left-Side Extremities Muscle Tone Assessment mildly increased tone  -MR moderately decreased tone  -JE      04/05/17 0400 04/05/17 0200    Muscle Tone Assessment    Left-Side Extremities Muscle Tone Assessment moderately decreased tone  -JE moderately decreased tone  -JE      04/05/17 0000 04/04/17 2200    Muscle Tone Assessment    Left-Side Extremities Muscle Tone Assessment moderately decreased tone  -JE moderately decreased tone  -JE      04/04/17 2000 04/04/17 1800    Muscle Tone Assessment    Left-Side Extremities Muscle Tone Assessment moderately decreased tone  -JE moderately decreased tone  -MR      04/04/17 1600 04/04/17 1500    Rehab Evaluation    Document Type  evaluation  -SG    Subjective Information  no complaints;agree to therapy  -SG    Patient Effort, Rehab Treatment  adequate  -SG    Symptoms Noted During/After Treatment  none  -SG    Living Environment    Lives With spouse  -MR     Living Arrangements mobile home  -MR     Home Accessibility stairs to enter home  -MR     Number of Stairs to Enter Home 5  -MR     Stair Railings at Home none  -MR     Type of Financial/Environmental Concern none  -MR     Transportation Available car;family or friend will provide  -MR     Pain Assessment     Pain Assessment  No/denies pain  -SG    Muscle Tone Assessment    Left-Side Extremities Muscle Tone Assessment moderately decreased tone  -MR       04/04/17 1428 04/04/17 1410    General Information    Equipment Currently Used at Home oxygen;walker, rolling;cane, straight  -     Living Environment    Lives With spouse  -     Living Arrangements mobile home  -     Transportation Available car;family or friend will provide  -     Muscle Tone Assessment    Left-Side Extremities Muscle Tone Assessment  moderately decreased tone  -MR      04/04/17 1000 04/04/17 0800    Muscle Tone Assessment    Muscle Tone Assessment Left-Side Extremities  -CB Bilateral Upper Extremities;Bilateral Lower Extremities  -SD    Left-Side Extremities Muscle Tone Assessment flaccid  -CB     Bilateral Upper Extremities Muscle Tone Assessment  mildly decreased tone  -SD    Bilateral Lower Extremities Muscle Tone Assessment  mildly decreased tone  -SD      04/03/17 2000 04/03/17 0805    Muscle Tone Assessment    Muscle Tone Assessment  Bilateral Upper Extremities;Bilateral Lower Extremities  -TR    Bilateral Upper Extremities Muscle Tone Assessment mildly decreased tone  -MS mildly decreased tone  -TR    Bilateral Lower Extremities Muscle Tone Assessment mildly decreased tone  -MS mildly decreased tone  -TR      04/02/17 2010       Muscle Tone Assessment    Muscle Tone Assessment Bilateral Upper Extremities;Bilateral Lower Extremities  -AC     Bilateral Upper Extremities Muscle Tone Assessment mildly decreased tone  -AC     Bilateral Lower Extremities Muscle Tone Assessment mildly decreased tone  -AC       User Key  (r) = Recorded By, (t) = Taken By, (c) = Cosigned By    Initials Name Provider Type    VALERI Roldan, PT Physical Therapist    SG Lyndsey Sultana, MS CCC-SLP Speech and Language Pathologist    JR Judy Leach, OT Occupational Therapist    LS Lily Juárez, PT Physical Therapist    CB Sanaz Haas,  RN Registered Nurse    LARON Tran, RN Registered Nurse    DORITA Patel, RN Registered Nurse    TR Katherine Serna, RN Registered Nurse    MS Eugenie Alba, RN Registered Nurse    LILLY Pina, RN Case Manager    MR Jadanori Murillo, RN Registered Nurse    FELY Pnieda, RN Registered Nurse        Physical Therapy Education     Title: PT OT SLP Therapies (Active)     Topic: Physical Therapy (Active)     Point: Mobility training (Active)    Learning Progress Summary    Learner Readiness Method Response Comment Documented by Status   Patient Acceptance E,D NR  LS 04/05/17 0908 Active   Significant Other Acceptance E,D NR  LS 04/05/17 0908 Active               Point: Body mechanics (Active)    Learning Progress Summary    Learner Readiness Method Response Comment Documented by Status   Patient Acceptance E,D NR  LS 04/05/17 0908 Active   Significant Other Acceptance E,D NR  LS 04/05/17 0908 Active               Point: Precautions (Active)    Learning Progress Summary    Learner Readiness Method Response Comment Documented by Status   Patient Acceptance E,D NR  LS 04/05/17 0908 Active   Significant Other Acceptance E,D NR  LS 04/05/17 0908 Active                      User Key     Initials Effective Dates Name Provider Type Discipline     06/19/15 -  Lily Juárez, PT Physical Therapist PT                    Recommendation and Plan  Anticipated Discharge Disposition: inpatient rehabilitation facility  PT Frequency: daily                          IP PT Goals       04/05/17 0909          Bed Mobility PT LTG    Bed Mobility PT LTG, Date Established 04/05/17  -LS      Bed Mobility PT LTG, Time to Achieve 2 wks  -LS      Bed Mobility PT LTG, Activity Type supine to sit/sit to supine  -LS      Bed Mobility PT LTG, [place] Level supervision required  -LS      Transfer Training PT LTG    Transfer Training PT LTG, Date Established 04/05/17  -LS      Transfer Training PT LTG, Time to  Achieve 2 wks  -LS      Transfer Training PT LTG, Activity Type sit to stand/stand to sit  -LS      Transfer Training PT LTG, [place] Level contact guard assist  -LS      Transfer Training PT LTG, Assist Device walker, rolling  -LS      Gait Training PT LTG    Gait Training Goal PT LTG, Date Established 04/05/17  -LS      Gait Training Goal PT LTG, Time to Achieve 2 wks  -LS      Gait Training Goal PT LTG, [place] Level contact guard assist  -LS      Gait Training Goal PT LTG, Assist Device walker, rolling  -LS      Gait Training Goal PT LTG, Distance to Achieve 200  -LS        User Key  (r) = Recorded By, (t) = Taken By, (c) = Cosigned By    Initials Name Provider Type    LS Lily Juárez, PT Physical Therapist                Outcome Measures       04/05/17 0803 04/05/17 0801       How much help from another person do you currently need...    Turning from your back to your side while in flat bed without using bedrails?  2  -LS     Moving from lying on back to sitting on the side of a flat bed without bedrails?  2  -LS     Moving to and from a bed to a chair (including a wheelchair)?  2  -LS     Standing up from a chair using your arms (e.g., wheelchair, bedside chair)?  2  -LS     Climbing 3-5 steps with a railing?  1  -LS     To walk in hospital room?  2  -LS     AM-PAC 6 Clicks Score  11  -LS     How much help from another is currently needed...    Putting on and taking off regular lower body clothing? 1  -JR      Bathing (including washing, rinsing, and drying) 2  -JR      Toileting (which includes using toilet bed pan or urinal) 1  -JR      Putting on and taking off regular upper body clothing 2  -JR      Taking care of personal grooming (such as brushing teeth) 2  -JR      Eating meals 2  -JR      Score 10  -JR      Modified Starr Scale    Modified Linh Scale 4 - Moderately severe disability.  Unable to walk without assistance, and unable to attend to own bodily needs without assistance.  -JR 4 -  Moderately severe disability.  Unable to walk without assistance, and unable to attend to own bodily needs without assistance.  -LS     Functional Assessment    Outcome Measure Options AM-PAC 6 Clicks Daily Activity (OT);Zaki DE LEÓN AM-PAC 6 Clicks Basic Mobility (PT);Modified Linh  -ANDREWS       User Key  (r) = Recorded By, (t) = Taken By, (c) = Cosigned By    Initials Name Provider Type     Judy Leach, OT Occupational Therapist    LS Lily Juárez, PT Physical Therapist              Time Calculation        PT Charges       04/05/17 1500 04/05/17 0915       Time Calculation    Start Time 1500  - 0801  -     PT Received On  04/05/17  -     PT Goal Re-Cert Due Date 04/15/17  - 04/15/17  -     Time Calculation- PT    Total Timed Code Minutes- PT 45 minute(s)  -        User Key  (r) = Recorded By, (t) = Taken By, (c) = Cosigned By    Initials Name Provider Type     Pee Roldan, PT Physical Therapist    LS Lily Juárez, PT Physical Therapist            Therapy Charges for Today     Code Description Service Date Service Provider Modifiers Qty    [id number] HC ROSEANNA DEBRIDE OPEN WOUND UP TO 20CM 4/5/2017 Pee Roldan, PT GP 1            PT G-Codes  Outcome Measure Options: AM-PAC 6 Clicks Daily Activity (OT), Zaki Roldan, PT  4/5/2017

## 2017-04-05 NOTE — PROGRESS NOTES
"[facility] Progress Note    Admission Date: 2017    Tomas Salvador  1955  [id number]    Date: 2017    Meds:    IV Anti-Infectives     Ordered     Dose/Rate Route Frequency Start Stop    17 1723  metroNIDAZOLE (FLAGYL) IVPB 500 mg     Ordering Provider:  Kike Leon MD    500 mg  100 mL/hr over 60 Minutes Intravenous Every 6 Hours 17 1800      17 1552  ceFAZolin in dextrose (ANCEF) IVPB solution 2 g     Ordering Provider:  TRESA Baca    2 g  over 30 Minutes Intravenous Every 8 Hours 17 1600            CC:  Delisa's gangrene    SUBJECTIVE:  17:Patient is a 61 y.o. 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 He has a history of underlying diabetes mellitus and severe systolic congestive heart failure. He presented to [facility] on 3/30 with scrotal cellulitis/gangrene. He underwent debridement by urology at [facility] on 3/31 and was treated with Merrem/clindamycin/vancomycin. He developed left facial droop and left sided paresis, prompting a transfer to [facility] for evaluation and therapy of acute stroke. His head CT angiogram was degraded by motion artifact but per Dr. Morrison there was suggestion of right inferior temporal lobe\" opacification\" consistent with a possible stroke. His left facial droop and left-sided paresis has improved today. He is encephalopathic and is unable to provide any reliable history. His wife thinks that he may have had some fevers prior to his admission on 3/30.    17: afebrile. No hx per patient secondary to encephalopathy.  Afebrile, restless per nsg staff.  Oliguric UOP.  No n/v/d.  No rashes.    ROS:  Unable to obtain from patient secondary to encephalopathy.    PE:   Vital Signs  Temp (24hrs), Av °F (36.7 °C), Min:97.5 °F (36.4 °C), Max:98.6 °F (37 °C)    Temp  Min: 97.5 °F (36.4 °C)  Max: 98.6 °F (37 °C)  BP  Min: 98/60  Max: 199/77  Pulse  Min: 103  Max: " 160  Resp  Min: 18  Max: 24  SpO2  Min: 76 %  Max: 100 %    GENERAL: He is awake but non-responsive to questions. He is in no acute distress.  HEENT: Normocephalic, atraumatic. PERRL. EOMI. No conjunctival injection. No icterus. Oropharynx clear without evidence of thrush or exudate.   HEART: RRR; No murmur, rubs, gallops.   LUNGS: Clear to auscultation bilaterally without wheezing, rales, rhonchi. Normal respiratory effort. Nonlabored. No dullness.  ABDOMEN: Soft, nontender, nondistended. Positive bowel sounds. No rebound or guarding. NO mass or HSM.  EXT: No cyanosis, clubbing or edema. No cord.  : A Cueto catheter is in place. His scrotum has multiple Penrose drains present bilaterally. There is no crepitance or overt gangrene present. He does have a lower midline scrotal incision with no drainage as the moment. There is some residual lower, posterior scrotal induration..  Wound is packed.  MSK: FROM without joint effusions noted arms/legs.   SKIN: Warm and dry without cutaneous eruptions on Inspection/palpation.   NEURO: He is confused but is not following simple commands.  PSYCHIATRIC: He is encephalopathic and cannot provide an accurate history.    Laboratory Data      Results from last 7 days  Lab Units 04/05/17 0417 04/04/17 0115 04/03/17  0235   WBC 10*3/mm3 13.42* 9.68 9.96   HEMOGLOBIN g/dL 11.4* 10.6* 10.6*   HEMATOCRIT % 36.6* 35.4* 33.5*   PLATELETS 10*3/mm3 405 380 343       Results from last 7 days  Lab Units 04/05/17 0417   SODIUM mmol/L 136   POTASSIUM mmol/L 3.9   CHLORIDE mmol/L 102   TOTAL CO2 mmol/L 25.0   BUN mg/dL 6*   CREATININE mg/dL 0.60   GLUCOSE mg/dL 170*   CALCIUM mg/dL 9.3       Results from last 7 days  Lab Units 04/05/17 0417   ALK PHOS U/L 65   BILIRUBIN mg/dL 0.3   ALT (SGPT) U/L 12   AST (SGOT) U/L 22           Results from last 7 days  Lab Units 04/04/17 0115   CRP mg/dL 3.42*       Results from last 7 days  Lab Units 04/02/17  0409   LACTATE mmol/L 0.8       Results from  last 7 days  Lab Units 04/04/17  1056 03/31/17  0542 03/30/17  1515   CK TOTAL U/L 54 32 92       Results from last 7 days  Lab Units 04/01/17  1311   VANCOMYCIN RM mcg/mL 17.60     Estimated Creatinine Clearance: 154.6 mL/min (by C-G formula based on Cr of 0.6).    Microbiology:  Blood Culture   Date Value Ref Range Status   03/30/2017 No growth at 5 days  Final   03/30/2017 No growth at 5 days  Final           Urine Culture   Date Value Ref Range Status   04/04/2017 No growth  Preliminary   03/31/2017 No growth  Final   03/30/2017 >100,000 CFU/mL Escherichia coli (A)  Final      scrotal cx x 2 cxs (3/31) E. coli  Cefazolin sens.           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 He has been receiving aggressive intravenous antibiotic therapy with intravenous Merrem/clindamycin/vancomycin. His cultures have grown a relatively sensitive Escherichia coli. This is clearly a polymicrobial infection and certainly involves anaerobes. I will switch his antibiotic therapy to cefazolin/Flagyl. He appears to of have undergone adequate surgical debridement at this point but will need ongoing wound care.  2. Acute right hemispheric cerebral vascular accident-with left sided facial and left-sided. His head CT angiogram here reveals subtle right temporal abnormalities per Dr. Morrison. He has clinically improved.  3. Acute toxic metabolic encephalopathy-secondary to sepsis and cerebral vascular accident  4. Type 2 diabetes mellitus-this clearly contributed to his  Delisa's gangrene  5. Severe systolic congestive heart failure-with an ejection fraction of less than 20% on echocardiogram performed on 3/8/17  6. Escherichia coli urinary tract infection  7. Sepsis-secondary to Delisa's gangrene  8. Leukocytosis/neutrophilia-secondary to scrotal gangrene/cellulitis, worse today.     PLAN/RECOMMENDATIONS:   1. Cefazolin 2 g IV every 8 hours  2. Flagyl 500 mg IV every 8 hours  3. PT wound care with debridement and pulse lavage      Kike Leon MD saw and examined patient, verified hx and PE, read all radiographic studies, reviewed labs and micro data, and formulated dx, plan for treatment and all medical decision making.      Rush Patel PA-C for Kike Leon MD    I discussed his situation with Dr. Pope and with Dr. Small.  I also discussed his wound care/debridement with Pee Mast in PT wound care.    Kike Leon MD  4/5/2017  12:04 PM

## 2017-04-06 NOTE — PROGRESS NOTES
"  [facility]   Inpatient Progress Note       LOS: 2 days   Patient Care Team:  Jose Navarro MD as PCP - General (Family Medicine)    Chief Complaint:  Follow-up for CVA and atrial fibrillation    Subjective     Interval History:   Patient in bed. More alert today. Family notes that he has been trying to remove his FC and patient is currently in soft restraints.  More awake, communicative.  Review of Systems:   Pertinent positives noted in history, exam, and assessment. Otherwise reviewed and negative.      Objective     Vitals:  Blood pressure 105/74, pulse 76, temperature 97.7 °F (36.5 °C), temperature source Oral, resp. rate 18, height 69\" (175.3 cm), weight 237 lb (108 kg), SpO2 100 %.     Intake/Output Summary (Last 24 hours) at 04/06/17 1152  Last data filed at 04/06/17 0800   Gross per 24 hour   Intake           1965.9 ml   Output              850 ml   Net           1115.9 ml     Physical Exam   Constitutional: He appears well-developed and well-nourished.   HENT:   Mouth/Throat: Oropharynx is clear and moist.   Neck: No JVD present. Carotid bruit is not present. No thyromegaly present.   Cardiovascular: Regular rhythm, S1 normal, S2 normal, normal heart sounds and intact distal pulses.  Exam reveals no gallop, no S3 and no S4.    No murmur heard.  Pulses:       Carotid pulses are 2+ on the right side, and 2+ on the left side.       Radial pulses are 2+ on the right side, and 2+ on the left side.   Pulmonary/Chest: Breath sounds normal.   Abdominal: Soft. Bowel sounds are normal. He exhibits no mass. There is no tenderness.   Musculoskeletal: He exhibits no edema.   Neurological: He is alert.   Skin: Skin is warm and dry. No rash noted.          Results Review:     I reviewed the patient's new clinical results.      Results from last 7 days  Lab Units 04/06/17  0316   WBC 10*3/mm3 12.30*   HEMOGLOBIN g/dL 10.4*   HEMATOCRIT % 33.9*   PLATELETS 10*3/mm3 359       Results from last " 7 days  Lab Units 04/06/17  0316 04/05/17  0417   SODIUM mmol/L 137 136   POTASSIUM mmol/L 3.8 3.9   CHLORIDE mmol/L 104 102   TOTAL CO2 mmol/L 26.0 25.0   BUN mg/dL 12 6*   CREATININE mg/dL 0.80 0.60   CALCIUM mg/dL 9.0 9.3   BILIRUBIN mg/dL  --  0.3   ALK PHOS U/L  --  65   ALT (SGPT) U/L  --  12   AST (SGOT) U/L  --  22   GLUCOSE mg/dL 211* 170*       Results from last 7 days  Lab Units 04/06/17  0316   SODIUM mmol/L 137   POTASSIUM mmol/L 3.8   CHLORIDE mmol/L 104   TOTAL CO2 mmol/L 26.0   BUN mg/dL 12   CREATININE mg/dL 0.80   GLUCOSE mg/dL 211*   CALCIUM mg/dL 9.0       Results from last 7 days  Lab Units 04/05/17  1349   INR  1.42       0  Lab Value Date/Time   TROPONINI <0.006 04/04/2017 1056   TROPONINI <0.006 04/03/2017 1851   TROPONINI 0.018 03/31/2017 0542   TROPONINI 0.024 03/30/2017 1515   TROPONINI 3.295 (C) 05/07/2015 2125   TROPONINI 3.801 (C) 05/07/2015 1549   TROPONINI 3.825 (C) 05/07/2015 1133       Results from last 7 days  Lab Units 04/05/17  0417   TSH mIU/mL 0.656       Results from last 7 days  Lab Units 04/05/17  0417   CHOLESTEROL mg/dL 108   TRIGLYCERIDES mg/dL 80   HDL CHOL mg/dL 30*         ECHO:  · Left ventricular function is severely decreased. Estimated EF appears to be in the range of 10%.  · The left ventricular cavity is severely dilated.  · Global left ventricular wall motion appears abnormal. There is severe diffuse hypokinesis.  · Left ventricular diastolic dysfunction.  · There is no evidence of a left ventricular thrombus present.  · Moderately reduced right ventricular systolic function noted.  · Left atrial cavity size is mildly dilated.  · Mild mitral valve regurgitation is present    Tele:  SR with intermittent breakthrough atrial fibrillation    Assessment/Plan     Principal Problem:    Sepsis  Active Problems:    Diabetes mellitus, type 2    Chronic respiratory failure with hypoxia    Urinary retention    E. coli UTI (urinary tract infection)    Epididymo-orchitis     Encephalopathy acute    Cellulitis      1. Acute CVA. not a TPA candidate and area of possible ischemia not felt to be amenable to intervention.  2. Ischemic cardiomyopathy.                - EF 10 %, no evidence of LV thrombus.  3. Atrial fibrillation  - new diagnosis. CHADSVASC=6   -converted back to SR, currently on amiodarone   -will need lifelong anticoagulation, start NOAC once ok with neurology  4. CAD, stable  5. Dyslipidemia  6. Hypertension    Plan:   Continue amiodarone for atrial fibrillation, start Eliquis once okay with neurology.  Entresto on hold until blood pressure improves.    Danyell Gunn, TRESA  04/06/17  11:52 AM    I, Jarocho Martin have reviewed the note in full and agree with all aspects of the above including physical exam, assessment, labs and plan with changes made accordingly.     Jarocho Martin MD  04/06/17  12:06 PM          Please note that portions of this note may have been completed with a voice recognition program. Efforts were made to edit the dictations, but occasionally words are mistranscribed.

## 2017-04-06 NOTE — PROGRESS NOTES
Acute Care - Occupational Therapy Treatment Note  [facility]     Patient Name: Tomas Salvador  : 1955  MRN: [id number]  Today's Date: 2017  Onset of Illness/Injury or Date of Surgery Date: 17  Date of Referral to OT: 17  Referring Physician: Dr. Morrison      Admit Date: 2017    Visit Dx:     ICD-10-CM ICD-9-CM   1. Dysphagia, unspecified type R13.10 787.20   2. Impaired functional mobility, balance, gait, and endurance Z74.09 V49.89   3. Impaired mobility and ADLs Z74.09 799.89     Patient Active Problem List   Diagnosis   • Coronary disease   • Ischemic cardiomyopathy   • Peripheral vascular disease   • ICD (implantable cardioverter-defibrillator) in place, implantation .   • Dyslipidemia, on atorvastain.    • Sepsis   • Diabetes mellitus, type 2   • Chronic respiratory failure with hypoxia   • Urinary retention   • Hyperlipidemia   • Essential hypertension   • E. coli UTI (urinary tract infection)   • Epididymo-orchitis   • Encephalopathy acute   • Cellulitis             Adult Rehabilitation Note       17 1444 17 1443 17 1025    Rehab Assessment/Intervention    Discipline physical therapist  -LS occupational therapist  -JR physical therapist  -MC    Document Type therapy note (daily note)  -LS therapy note (daily note)  -JR therapy note (daily note)  -MC    Subjective Information agree to therapy;no complaints  -LS no complaints;agree to therapy  -JR no complaints;agree to therapy   pt somnolent/confused, wife gave OK for tx  -MC    Patient Effort, Rehab Treatment adequate  -LS good  -JR     Symptoms Noted Comment  Pt restless throughout treatment session.  -JR     Precautions/Limitations fall precautions;oxygen therapy device and L/min  -LS fall precautions;oxygen therapy device and L/min  -JR fall precautions;oxygen therapy device and L/min  -MC    Recorded by [LS] Lily Juárez, PT [JR] Judy Leach, OT [MC] Rekha Mcgarry, PT    Vital Signs    Pre  Systolic BP Rehab 107  -  -JR     Pre Treatment Diastolic BP 76  -LS 75  -JR     Post Systolic BP Rehab 108  -  -JR     Post Treatment Diastolic BP 77  -LS 77  -JR     Pretreatment Heart Rate (beats/min) 82  -LS 80  -JR     Posttreatment Heart Rate (beats/min) 83  -LS 83  -JR     Pre SpO2 (%) 100  -LS 99  -JR     O2 Delivery Pre Treatment supplemental O2   2.5L  -LS supplemental O2   3L  -JR     Post SpO2 (%) 100  -  -JR     O2 Delivery Post Treatment supplemental O2  -LS supplemental O2  -JR     Pre Patient Position Supine  -LS Supine  -JR     Intra Patient Position Standing  -LS Standing  -JR     Post Patient Position Sitting  -LS Sitting  -JR     Recorded by [LS] Lily Juárez, PT [JR] Judy Leach, OT     Pain Assessment    Pain Assessment 0-10  -LS 0-10  -JR No/denies pain  -    Jay-[place]ent FACES Pain Rating 0  -LS 0  -JR     Pain Score 0  -LS 0  -JR     Post Pain Score 0  -LS 0  -JR     Recorded by [LS] Lily Juárez, PT [JR] Judy Leach, OT [MC] Rekha Mcgarry PT    Cognitive Assessment/Intervention    Current Cognitive/Communication Assessment impaired  -LS impaired  -JR     Orientation Status oriented to;person;disoriented to;place;time  -LS oriented to;person  -JR     Follows Commands/Answers Questions able to follow single-step instructions;25% of the time;50% of the time;needs cueing;needs increased time;needs repetition  -LS 25% of the time;50% of the time;able to follow single-step instructions  -JR     Personal Safety severe impairment;at risk behaviors demonstrated;impulsive;decreased awareness, need for assist;decreased awareness, need for safety;decreased insight to deficits  -LS severe impairment;decreased awareness, need for assist;decreased awareness, need for safety;decreased insight to deficits  -JR     Personal Safety Interventions fall prevention program maintained;gait belt;nonskid shoes/slippers when out of bed  -LS      Recorded by [LS] Lily Juárez, PT  [JR] Judy Leach, OT     Bed Mobility, Assessment/Treatment    Bed Mobility, Assistive Device bed rails;draw sheet  -LS head of bed elevated;draw sheet  -JR     Bed Mob, Supine to Sit, [place] moderate assist (50% patient effort);2 person assist required;verbal cues required  -LS moderate assist (50% patient effort);2 person assist required  -JR     Bed Mobility, Safety Issues cognitive deficits limit understanding;decreased use of arms for pushing/pulling;decreased use of legs for bridging/pushing  -LS cognitive deficits limit understanding;decreased use of arms for pushing/pulling;decreased use of legs for bridging/pushing;impaired trunk control for bed mobility  -JR     Bed Mobility, Impairments  strength decreased;impaired balance  -JR     Bed Mobility, Comment VC's for sequencing.   -LS Pt required max verbal cues for sequencing through bed mobility  -JR     Recorded by [LS] Lily Juárez, PT [JR] Judy Leach, OT     Transfer Assessment/Treatment    Transfers, Sit-Stand [place] moderate assist (50% patient effort);2 person assist required;verbal cues required  -LS moderate assist (50% patient effort);2 person assist required  -JR     Transfers, Stand-Sit [place] moderate assist (50% patient effort);2 person assist required;verbal cues required  -LS moderate assist (50% patient effort);2 person assist required  -JR     Transfers, Sit-Stand-Sit, Assist Device rolling walker  -LS rolling walker  -JR     Transfer, Safety Issues impulsivity  -LS balance decreased during turns;sequencing ability decreased;step length decreased;weight-shifting ability decreased  -JR     Transfer, Impairments  strength decreased;impaired balance;coordination impaired;motor control impaired;postural control impaired  -JR     Transfer, Comment VC's for hand placement.   -LS Pt required verbal cues for hand placement with transfers. In hallway pt required max verbal cues for stand to sit.  -JR     Recorded by [LS]  Lily Juárez, PT [JR] Judy Leach, OT     Gait Assessment/Treatment    Gait, [place] Level moderate assist (50% patient effort);2 person assist required;verbal cues required  -      Gait, Assistive Device rolling walker  -      Gait, Distance (Feet) 80  -LS      Gait, Gait Deviations valente decreased;step length decreased  -      Gait, Safety Issues supplemental O2  -LS      Gait, Impairments strength decreased;impaired balance  -      Gait, Comment Primarily min x2 for gait; req'd mod assist for maintaining upright posture when pt demonstrated decreased following of commands (initiating sitting prematurely). Followed closely with recliner for safety.   -LS      Recorded by [LS] Lily Juárez, PT      Functional Mobility    Functional Mobility- Ind. Level  moderate assist (50% patient effort);2 person assist required   progressed to min A at times  -     Functional Mobility- Device  rolling walker  -     Functional Mobility- Safety Issues  balance decreased during turns;sequencing ability decreased;step length decreased;weight-shifting ability decreased  -     Functional Mobility- Comment  Pt pushing walker too far out in front of time. Pt required max verbal cues for upright posture. Pt stopped in hallway and difficult to get pt restarted with mobiliity. Pt took seated rest period. Pt followed with chair. Pt requiring max verbal cues to keep grasp on walker.  -JR     Recorded by  [JR] Judy Leach, OT     Motor Skills/Interventions    Additional Documentation Balance Skills Training (Group)  -      Recorded by [LS] Lily Juárez, PT      Balance Skills Training    Sitting-Level of Assistance Contact guard  -LS Contact guard  -JR     Sitting-Balance Support Feet supported  -LS Feet supported  -JR     Standing-Level of Assistance Minimum assistance;x2  -LS Minimum assistance;x2  -JR     Static Standing Balance Support assistive device  -LS assistive device  -JR     Gait Balance-Level  of Assistance Moderate assistance;x2  -LS Moderate assistance;x2  -JR     Gait Balance Support assistive device  -LS assistive device  -JR     Recorded by [LS] Lily Juárez, PT [JR] Judy Leach, OT     Therapy Exercises    Bilateral Lower Extremities AAROM:;10 reps;ankle pumps/circles;hip abduction/adduction;LAQ  -LS      Bilateral Upper Extremity  AAROM:;10 reps;elbow flexion/extension;shoulder extension/flexion  -JR     Recorded by [LS] Lily Juárez, PT [JR] Judy Leach, OT     Positioning and Restraints    Pre-Treatment Position in bed  -LS in bed  -JR in bed  -    Post Treatment Position chair  -LS chair  -JR bed  -    In Bed   supine;call light within reach;encouraged to call for assist;with family/caregiver  -    In Chair notified nsg;reclined;call light within reach;encouraged to call for assist;exit alarm on;with family/caregiver;on mechanical lift sling;legs elevated  - notified nsg;reclined;call light within reach;encouraged to call for assist;exit alarm on;with family/caregiver;RUE elevated;LUE elevated;on mechanical lift sling  -JR     Restraints released:;reapplied:;notified nsg:;soft limb  -LS released:;reapplied:;notified nsg:;soft limb  -JR     Recorded by [LS] Lily Juárez, PT [JR] Judy Leach, OT [MC] Rekha Mcgarry, PT      04/06/17 0900 04/05/17 1500       Rehab Assessment/Intervention    Discipline  physical therapist  -     Document Type  therapy note (daily note);evaluation   wound care eval  -MF     Subjective Information  decreased LOC   family gave ok for tx.   -MF     Recorded by  [] Pee Roldan, PT     Pain Assessment    Pain Assessment  Jay-[place] FACES  -MF     Jay-[place] FACES Pain Rating  0  -MF     Pain Intervention(s)  Medication (See MAR)  -MF     Recorded by  [MF] Pee Roldan, PT     Cognitive Assessment/Intervention    Current Cognitive/Communication Assessment  impaired  -MF     Orientation Status  oriented to;person  -MF     Follows  Commands/Answers Questions  25% of the time;able to follow single-step instructions;needs cueing  -MF     Recorded by  [MF] Pee Roldan, PT     Dysphagia Treatment Objectives and Progress    Dysphagia Treatment Objectives Improve timing of pharyngeal response;Improve closure at entrance to airway;Improve tongue base & pharyngeal wall squeeze;Improve laryngeal closure  -LSA      Recorded by [LSA] Chacha Pelletier MS CCC-SLP      Improve timing of pharyngeal response    To improve timing of pharyngeal response, patient will:  Swallow in timely way using three second prep;Swallow in timely way using super-supraglottic swallow;80%;with consistent cues  -LSA     Status: Improve timing of pharyngeal response  New  -LSA     Timing of Pharyngeal Response Progress  continue to adress  -LSA     Recorded by  [LSA] Chacha Pelletier MS CCC-SLP     Improve laryngeal closure    To improve laryngeal closure, patient will:  Complete supraglottic swallow;80%;with consistent cues  -LSA     Status: Improve laryngeal closure  New  -LSA     Laryngeal Closure Progress  continue to adress  -LSA     Recorded by  [LSA] Chacha Pelletier MS CCC-SLP     Improve closure at entrance to airway    To improve closure at entrance to airway, patient will:  Complete super-supraglottic swallow;80%;with consistent cues  -LSA     Status: Improve closure at entrance to airway  New  -LSA     Closure at Entrance to Airway Progress  continue to adress  -LSA     Recorded by  [LSA] Chacha Pelletier MS CCC-SLP     Improve tongue base & pharyngeal wall squeeze    To improve tongue base & pharyngeal wall squeeze, patient will:  Complete effortful swallow;80%;with consistent cues  -LSA     Status: Improve tongue base & pharyngeal wall squeeze  New  -LSA     Tongue Base/Pharyngeal Wall Squeeze Progress  continue to adress  -LSA     Recorded by  [LSA] Chacha Pelletier MS CCCFifiSLP     Positioning and Restraints    Pre-Treatment Position  in bed  -     Post  Treatment Position  bed  -MF     In Bed  supine;call light within reach;with family/caregiver;notified nsg  -MF     Restraints  released:;reapplied:;soft limb;notified nsg:  -MF     Recorded by  [MF] Pee Roldan, PT       User Key  (r) = Recorded By, (t) = Taken By, (c) = Cosigned By    Initials Name Effective Dates    MF Pee Roldan, PT 06/19/15 -     JR Judy Leach, OT 06/22/15 -     LSA Chacha Pelletier, MS [facility]-SLP 06/22/15 -     LS Lily Juárez, PT 06/19/15 -      Rekha Mcgarry, PT 03/14/16 -                 OT Goals       04/06/17 1522 04/05/17 0904       Bed Mobility OT LTG    Bed Mobility OT LTG, Date Established  04/05/17  -JR     Bed Mobility OT LTG, Time to Achieve  1 wk  -JR     Bed Mobility OT LTG, Activity Type  all bed mobility  -JR     Bed Mobility OT LTG, [place] Level  minimum assist (75% patient effort)  -JR     Bed Mobility OT LTG, Outcome goal ongoing  -JR      Transfer Training OT LTG    Transfer Training OT LTG, Date Established  04/05/17  -JR     Transfer Training OT LTG, Time to Achieve  1 wk  -JR     Transfer Training OT LTG, Activity Type  all transfers  -JR     Transfer Training OT LTG, [place] Level  minimum assist (75% patient effort)  -JR     Transfer Training OT LTG, Outcome goal ongoing  -JR      Orientation OT LTG    Orientation OT LTG, Date Established  04/05/17  -JR     Orientation OT LTG, Time to Achieve  1 wk  -JR     Orientation OT LTG, Activity Type  person;place;50% treatment session  -JR     Orientation OT LTG, Outcome goal ongoing  -JR      Follow Directions OT LTG    Follow Directions OT LTG, Date Established  04/05/17  -JR     Follow Directions OT LTG, Time to Achieve  1 wk  -JR     Follow Directions OT LTG, Activity Type  1-Step;50% treatment session  -JR     Follow Directions OT LTG, [place] Level  with verbal cues  -JR     Follow Directions OT LTG, Outcome goal ongoing  -JR        User Key  (r) = Recorded By, (t) = Taken By, (c) =  Cosigned By    Initials Name Provider Type     Judy Leach OT Occupational Therapist          Occupational Therapy Education     Title: PT OT SLP Therapies (Active)     Topic: Occupational Therapy (Active)     Point: ADL training (Active)    Description: Instruct learner(s) on proper safety adaptation and remediation techniques during self care or transfers.   Instruct in proper use of assistive devices.    Learning Progress Summary    Learner Readiness Method Response Comment Documented by Status   Patient Acceptance E NR Educated pt on safe bed mobility and transfer techniques.  04/05/17 0903 Active               Point: Home exercise program (Active)    Description: Instruct learner(s) on appropriate technique for monitoring, assisting and/or progressing therapeutic exercises/activities.    Learning Progress Summary    Learner Readiness Method Response Comment Documented by Status   Patient Acceptance E NR Educated pt and family regarding UE HEP  04/06/17 1521 Active   Family Acceptance E NR Educated pt and family regarding UE HEP  04/06/17 1521 Active                      User Key     Initials Effective Dates Name Provider Type Discipline     06/22/15 -  Judy Leach OT Occupational Therapist OT                  OT Recommendation and Plan  Anticipated Equipment Needs At Discharge:  (tba further)  Anticipated Discharge Disposition: inpatient rehabilitation facility  Planned Therapy Interventions: ADL retraining, balance training, bed mobility training, strengthening, transfer training (cognitive retraining)  Therapy Frequency: daily  Plan of Care Review  Plan Of Care Reviewed With: patient, family  Progress: progress toward functional goals as expected  Outcome Summary/Follow up Plan: Pt less restless this date, however remains confused and still pulling at IV's etc. Pt would benefit from continued skilled OT services to assistin returning pt to his PLOF. Recommend inpatient rehab at d/c.         Outcome Measures       04/06/17 1443 04/05/17 0803 04/05/17 0801    How much help from another person do you currently need...    Turning from your back to your side while in flat bed without using bedrails?   2  -LS    Moving from lying on back to sitting on the side of a flat bed without bedrails?   2  -LS    Moving to and from a bed to a chair (including a wheelchair)?   2  -LS    Standing up from a chair using your arms (e.g., wheelchair, bedside chair)?   2  -LS    Climbing 3-5 steps with a railing?   1  -LS    To walk in hospital room?   2  -LS    AM-PAC 6 Clicks Score   11  -LS    How much help from another is currently needed...    Putting on and taking off regular lower body clothing? 1  -JR 1  -JR     Bathing (including washing, rinsing, and drying) 2  -JR 2  -JR     Toileting (which includes using toilet bed pan or urinal) 1  -JR 1  -JR     Putting on and taking off regular upper body clothing 2  -JR 2  -JR     Taking care of personal grooming (such as brushing teeth) 2  -JR 2  -JR     Eating meals 2  -JR 2  -JR     Score 10  -JR 10  -JR     Modified [place] Scale    Modified [place] Scale 4 - Moderately severe disability.  Unable to walk without assistance, and unable to attend to own bodily needs without assistance.  -JR 4 - Moderately severe disability.  Unable to walk without assistance, and unable to attend to own bodily needs without assistance.  -JR 4 - Moderately severe disability.  Unable to walk without assistance, and unable to attend to own bodily needs without assistance.  -LS    Functional Assessment    Outcome Measure Options AM-PAC 6 Clicks Daily Activity (OT);Modified [place]  -JR AM-PAC 6 Clicks Daily Activity (OT);Modified [place]  -JR AM-PAC 6 Clicks Basic Mobility (PT);Modified [place]  -LS      User Key  (r) = Recorded By, (t) = Taken By, (c) = Cosigned By    Initials Name Provider Type    JR Judy Leach, OT Occupational Therapist    ANDREWS Juárez, PT Physical Therapist            Time Calculation:         Time Calculation- OT       04/06/17 1524          Time Calculation- OT    OT Start Time 1443  -      Total Timed Code Minutes- OT 12 minute(s)  -      OT Received On 04/06/17  -        User Key  (r) = Recorded By, (t) = Taken By, (c) = Cosigned By    Initials Name Provider Type    JR Judy Leach, OT Occupational Therapist           Therapy Charges for Today     Code Description Service Date Service Provider Modifiers Qty    51257745813 HC OT EVAL MOD COMPLEXITY 4 4/5/2017 Judy MCCORMICK Hany, OT GO 1    80654912611 HC OT CARE PLAN EA 15 MIN 4/5/2017 Judy MCCORMICK Hany, OT GO 1    29116715502 HC OT CARE PLAN EA 15 MIN 4/6/2017 Judy MCCORMICK Hany, OT GO 1    73503950329 HC OT THERAPEUTIC ACT EA 15 MIN 4/6/2017 Judy Leach, OT GO 1               Judy COTO Hany, OT  4/6/2017

## 2017-04-06 NOTE — PROGRESS NOTES
Acute Care - Physical Therapy Treatment Note  [facility]     Patient Name: Tomas Salvador  : 1955  MRN: [id number]  Today's Date: 2017  Onset of Illness/Injury or Date of Surgery Date: 17  Date of Referral to PT: 17  Referring Physician: Dr. Morrison    Admit Date: 2017    Visit Dx:    ICD-10-CM ICD-9-CM   1. Dysphagia, unspecified type R13.10 787.20   2. Impaired functional mobility, balance, gait, and endurance Z74.09 V49.89   3. Impaired mobility and ADLs Z74.09 799.89     Patient Active Problem List   Diagnosis   • Coronary disease   • Ischemic cardiomyopathy   • Peripheral vascular disease   • ICD (implantable cardioverter-defibrillator) in place, implantation .   • Dyslipidemia, on atorvastain.    • Sepsis   • Diabetes mellitus, type 2   • Chronic respiratory failure with hypoxia   • Urinary retention   • Hyperlipidemia   • Essential hypertension   • E. coli UTI (urinary tract infection)   • Epididymo-orchitis   • Encephalopathy acute   • Cellulitis               Adult Rehabilitation Note       17 1444 17 1443 17 1025    Rehab Assessment/Intervention    Discipline physical therapist  -LS occupational therapist  -JR physical therapist  -MC    Document Type therapy note (daily note)  -LS therapy note (daily note)  -JR therapy note (daily note)  -MC    Subjective Information agree to therapy;no complaints  -LS no complaints;agree to therapy  -JR no complaints;agree to therapy   pt somnolent/confused, wife gave OK for tx  -MC    Patient Effort, Rehab Treatment adequate  -LS good  -JR     Symptoms Noted Comment  Pt restless throughout treatment session.  -JR     Precautions/Limitations fall precautions;oxygen therapy device and L/min  -LS fall precautions;oxygen therapy device and L/min  -JR fall precautions;oxygen therapy device and L/min  -MC    Recorded by [LS] Lily Juárez, PT [JR] Judy Leach OT [] Rekha Mcgarry, PT    Vital Signs    Pre Systolic  BP Rehab 107  -  -JR     Pre Treatment Diastolic BP 76  -LS 75  -JR     Post Systolic BP Rehab 108  -  -JR     Post Treatment Diastolic BP 77  -LS 77  -JR     Pretreatment Heart Rate (beats/min) 82  -LS 80  -JR     Posttreatment Heart Rate (beats/min) 83  -LS 83  -JR     Pre SpO2 (%) 100  -LS 99  -JR     O2 Delivery Pre Treatment supplemental O2   2.5L  -LS supplemental O2   3L  -JR     Post SpO2 (%) 100  -  -JR     O2 Delivery Post Treatment supplemental O2  -LS supplemental O2  -JR     Pre Patient Position Supine  -LS Supine  -JR     Intra Patient Position Standing  -LS Standing  -JR     Post Patient Position Sitting  -LS Sitting  -JR     Recorded by [LS] Lily Juárez, PT [JR] Judy Leach, OT     Pain Assessment    Pain Assessment 0-10  -LS 0-10  -JR No/denies pain  -    Jay-Kent FACES Pain Rating 0  -LS 0  -JR     Pain Score 0  -LS 0  -JR     Post Pain Score 0  -LS 0  -JR     Recorded by [LS] Lily Juárez PT [JR] Judy Leach, OT [MC] Rekha Mcgarry PT    Cognitive Assessment/Intervention    Current Cognitive/Communication Assessment impaired  -LS impaired  -JR     Orientation Status oriented to;person;disoriented to;place;time  -LS oriented to;person  -JR     Follows Commands/Answers Questions able to follow single-step instructions;25% of the time;50% of the time;needs cueing;needs increased time;needs repetition  -LS 25% of the time;50% of the time;able to follow single-step instructions  -JR     Personal Safety severe impairment;at risk behaviors demonstrated;impulsive;decreased awareness, need for assist;decreased awareness, need for safety;decreased insight to deficits  -LS severe impairment;decreased awareness, need for assist;decreased awareness, need for safety;decreased insight to deficits  -JR     Personal Safety Interventions fall prevention program maintained;gait belt;nonskid shoes/slippers when out of bed  -LS      Recorded by [LS] Lily Juárez, PT [JR]  Judy Leach, OT     Bed Mobility, Assessment/Treatment    Bed Mobility, Assistive Device bed rails;draw sheet  -LS head of bed elevated;draw sheet  -JR     Bed Mob, Supine to Sit, [place] moderate assist (50% patient effort);2 person assist required;verbal cues required  -LS moderate assist (50% patient effort);2 person assist required  -JR     Bed Mobility, Safety Issues cognitive deficits limit understanding;decreased use of arms for pushing/pulling;decreased use of legs for bridging/pushing  -LS cognitive deficits limit understanding;decreased use of arms for pushing/pulling;decreased use of legs for bridging/pushing;impaired trunk control for bed mobility  -JR     Bed Mobility, Impairments  strength decreased;impaired balance  -JR     Bed Mobility, Comment VC's for sequencing.   -LS Pt required max verbal cues for sequencing through bed mobility  -JR     Recorded by [LS] Lily Juárez, PT [JR] Judy Leach, OT     Transfer Assessment/Treatment    Transfers, Sit-Stand [place] moderate assist (50% patient effort);2 person assist required;verbal cues required  -LS moderate assist (50% patient effort);2 person assist required  -JR     Transfers, Stand-Sit [place] moderate assist (50% patient effort);2 person assist required;verbal cues required  -LS moderate assist (50% patient effort);2 person assist required  -JR     Transfers, Sit-Stand-Sit, Assist Device rolling walker  -LS rolling walker  -JR     Transfer, Safety Issues impulsivity  -LS balance decreased during turns;sequencing ability decreased;step length decreased;weight-shifting ability decreased  -JR     Transfer, Impairments  strength decreased;impaired balance;coordination impaired;motor control impaired;postural control impaired  -JR     Transfer, Comment VC's for hand placement.   -LS Pt required verbal cues for hand placement with transfers. In hallway pt required max verbal cues for stand to sit.  -JR     Recorded by [LS]  Lily Juárez, PT [JR] Judy Leach, OT     Gait Assessment/Treatment    Gait, [place] Level moderate assist (50% patient effort);2 person assist required;verbal cues required  -      Gait, Assistive Device rolling walker  -      Gait, Distance (Feet) 80  -LS      Gait, Gait Deviations valente decreased;step length decreased  -      Gait, Safety Issues supplemental O2  -LS      Gait, Impairments strength decreased;impaired balance  -      Gait, Comment Primarily min x2 for gait; req'd mod assist for maintaining upright posture when pt demonstrated decreased following of commands (initiating sitting prematurely). Followed closely with recliner for safety.   -LS      Recorded by [LS] Lily Juárez, PT      Functional Mobility    Functional Mobility- Ind. Level  moderate assist (50% patient effort);2 person assist required   progressed to min A at times  -     Functional Mobility- Device  rolling walker  -     Functional Mobility- Safety Issues  balance decreased during turns;sequencing ability decreased;step length decreased;weight-shifting ability decreased  -     Functional Mobility- Comment  Pt pushing walker too far out in front of time. Pt required max verbal cues for upright posture. Pt stopped in hallway and difficult to get pt restarted with mobiliity. Pt took seated rest period. Pt followed with chair. Pt requiring max verbal cues to keep grasp on walker.  -JR     Recorded by  [JR] Judy Leach, OT     Motor Skills/Interventions    Additional Documentation Balance Skills Training (Group)  -      Recorded by [LS] Lily Juárez, PT      Balance Skills Training    Sitting-Level of Assistance Contact guard  -LS Contact guard  -JR     Sitting-Balance Support Feet supported  -LS Feet supported  -JR     Standing-Level of Assistance Minimum assistance;x2  -LS Minimum assistance;x2  -JR     Static Standing Balance Support assistive device  -LS assistive device  -JR     Gait Balance-Level  of Assistance Moderate assistance;x2  -LS Moderate assistance;x2  -JR     Gait Balance Support assistive device  -LS assistive device  -JR     Recorded by [LS] Lily Juárez, PT [JR] Judy Leach, OT     Therapy Exercises    Bilateral Lower Extremities AAROM:;10 reps;ankle pumps/circles;hip abduction/adduction;LAQ  -LS      Bilateral Upper Extremity  AAROM:;10 reps;elbow flexion/extension;shoulder extension/flexion  -JR     Recorded by [LS] Lily Juárez, PT [JR] Judy Leach, OT     Positioning and Restraints    Pre-Treatment Position in bed  -LS in bed  -JR in bed  -    Post Treatment Position chair  -LS chair  -JR bed  -    In Bed   supine;call light within reach;encouraged to call for assist;with family/caregiver  -    In Chair notified nsg;reclined;call light within reach;encouraged to call for assist;exit alarm on;with family/caregiver;on mechanical lift sling;legs elevated  - notified nsg;reclined;call light within reach;encouraged to call for assist;exit alarm on;with family/caregiver;RUE elevated;LUE elevated;on mechanical lift sling  -JR     Restraints released:;reapplied:;notified nsg:;soft limb  -LS released:;reapplied:;notified nsg:;soft limb  -JR     Recorded by [LS] Lily Juárez, PT [JR] Judy Leach, OT [MC] Rekha Mcgarry, PT      04/06/17 0900 04/05/17 1500       Rehab Assessment/Intervention    Discipline  physical therapist  -     Document Type  therapy note (daily note);evaluation   wound care eval  -MF     Subjective Information  decreased LOC   family gave ok for tx.   -MF     Recorded by  [] Pee Roldan, PT     Pain Assessment    Pain Assessment  Jay-Kent FACES  -MF     Jay-[place] FACES Pain Rating  0  -MF     Pain Intervention(s)  Medication (See MAR)  -MF     Recorded by  [MF] Pee Roldan, PT     Cognitive Assessment/Intervention    Current Cognitive/Communication Assessment  impaired  -MF     Orientation Status  oriented to;person  -MF     Follows  Commands/Answers Questions  25% of the time;able to follow single-step instructions;needs cueing  -MF     Recorded by  [MF] Pee Roldan, PT     Dysphagia Treatment Objectives and Progress    Dysphagia Treatment Objectives Improve timing of pharyngeal response;Improve closure at entrance to airway;Improve tongue base & pharyngeal wall squeeze;Improve laryngeal closure  -LSA      Recorded by [LSA] Chacha Pelletier MS CCC-SLP      Improve timing of pharyngeal response    To improve timing of pharyngeal response, patient will:  Swallow in timely way using three second prep;Swallow in timely way using super-supraglottic swallow;80%;with consistent cues  -LSA     Status: Improve timing of pharyngeal response  New  -LSA     Timing of Pharyngeal Response Progress  continue to adress  -LSA     Recorded by  [LSA] Chacha Pelletier MS CCC-SLP     Improve laryngeal closure    To improve laryngeal closure, patient will:  Complete supraglottic swallow;80%;with consistent cues  -LSA     Status: Improve laryngeal closure  New  -LSA     Laryngeal Closure Progress  continue to adress  -LSA     Recorded by  [LSA] Chacha Pelletier MS CCC-SLP     Improve closure at entrance to airway    To improve closure at entrance to airway, patient will:  Complete super-supraglottic swallow;80%;with consistent cues  -LSA     Status: Improve closure at entrance to airway  New  -LSA     Closure at Entrance to Airway Progress  continue to adress  -LSA     Recorded by  [LSA] Chacha Pelletier MS CCC-SLP     Improve tongue base & pharyngeal wall squeeze    To improve tongue base & pharyngeal wall squeeze, patient will:  Complete effortful swallow;80%;with consistent cues  -LSA     Status: Improve tongue base & pharyngeal wall squeeze  New  -LSA     Tongue Base/Pharyngeal Wall Squeeze Progress  continue to adress  -LSA     Recorded by  [LSA] Chacha Pelletier MS CCCFifiSLP     Positioning and Restraints    Pre-Treatment Position  in bed  -     Post  Treatment Position  bed  -MF     In Bed  supine;call light within reach;with family/caregiver;notified nsg  -MF     Restraints  released:;reapplied:;soft limb;notified nsg:  -MF     Recorded by  [MF] Pee Roldan, PT       User Key  (r) = Recorded By, (t) = Taken By, (c) = Cosigned By    Initials Name Effective Dates     Pee Roldan, PT 06/19/15 -     JR Judy Leach, OT 06/22/15 -     LSA Chacha Pelletier, MS [facility]-SLP 06/22/15 -     LS Lily Juárez, PT 06/19/15 -     MC Rekha Mcgarry, PT 03/14/16 -                 IP PT Goals       04/06/17 1524 04/06/17 1025 04/05/17 0909    Bed Mobility PT LTG    Bed Mobility PT LTG, Date Established   04/05/17  -LS    Bed Mobility PT LTG, Time to Achieve   2 wks  -LS    Bed Mobility PT LTG, Activity Type   supine to sit/sit to supine  -LS    Bed Mobility PT LTG, [place] Level   supervision required  -LS    Bed Mobility PT LTG, Outcome goal ongoing  -LS      Transfer Training PT LTG    Transfer Training PT LTG, Date Established   04/05/17  -LS    Transfer Training PT LTG, Time to Achieve   2 wks  -LS    Transfer Training PT LTG, Activity Type   sit to stand/stand to sit  -LS    Transfer Training PT LTG, [place] Level   contact guard assist  -LS    Transfer Training PT LTG, Assist Device   walker, rolling  -LS    Transfer Training PT LTG, Outcome goal ongoing  -LS      Gait Training PT LTG    Gait Training Goal PT LTG, Date Established   04/05/17  -LS    Gait Training Goal PT LTG, Time to Achieve   2 wks  -LS    Gait Training Goal PT LTG, [place] Level   contact guard assist  -LS    Gait Training Goal PT LTG, Assist Device   walker, rolling  -LS    Gait Training Goal PT LTG, Distance to Achieve   200  -LS    Gait Training Goal PT LTG, Outcome goal ongoing  -LS      Wound Care PT LTG    Wound Care PT LTG 1, Date Established  04/06/17  -MC     Wound Care PT LTG 1, Time to Achieve  1 wk  -MC     Wound Care PT LTG 1, Location  Scrotum  -     Wound  Care PT LTG 1, No S&S of Infection  yes  -     Wound Care PT LTG 1, Decrease Wound Size  10%  -     Wound Care PT LTG 1, Decrease Necrotic Tissue  50%  -     Wound Care PT LTG 1, Decrease Exudate  minimum  -     Wound Care PT LTG 1, No New Skin Break Down  yes  -     Wound Care PT LTG 1, Education  dressing changes;wound care  -     Wound Care PT LTG 1, Education Understanding  verbalize understanding  -       User Key  (r) = Recorded By, (t) = Taken By, (c) = Cosigned By    Initials Name Provider Type    LS Lily Juárez, PT Physical Therapist    DUNCAN Mcgarry, PT Physical Therapist          Physical Therapy Education     Title: PT OT SLP Therapies (Active)     Topic: Physical Therapy (Active)     Point: Mobility training (Active)    Learning Progress Summary    Learner Readiness Method Response Comment Documented by Status   Patient Acceptance E,D NR   04/06/17 1523 Active    Acceptance E,D NR   04/05/17 0908 Active   Significant Other Acceptance E,D NR   04/06/17 1523 Active    Acceptance E,D NR   04/05/17 0908 Active               Point: Home exercise program (Active)    Learning Progress Summary    Learner Readiness Method Response Comment Documented by Status   Patient Acceptance E,D NR   04/06/17 1523 Active   Significant Other Acceptance E,D NR   04/06/17 1523 Active               Point: Body mechanics (Active)    Learning Progress Summary    Learner Readiness Method Response Comment Documented by Status   Patient Acceptance E,D NR   04/06/17 1523 Active    Acceptance E,D NR   04/05/17 0908 Active   Significant Other Acceptance E,D NR   04/06/17 1523 Active    Acceptance E,D NR   04/05/17 0908 Active               Point: Precautions (Active)    Learning Progress Summary    Learner Readiness Method Response Comment Documented by Status   Patient Acceptance E,D NR   04/06/17 1523 Active    Acceptance E,D NR   04/05/17 0908 Active   Significant Other Acceptance E,D  NR   04/06/17 1523 Active    Acceptance E,D NR   04/05/17 0908 Active                      User Key     Initials Effective Dates Name Provider Type Discipline     06/19/15 -  Lily Juárez, PT Physical Therapist PT                    PT Recommendation and Plan  Anticipated Discharge Disposition: inpatient rehabilitation facility  PT Frequency: daily  Plan of Care Review  Plan Of Care Reviewed With: patient, spouse  Progress: progress toward functional goals as expected  Outcome Summary/Follow up Plan: Pt demonstrated ability to progress forward ambulation distanve to 80 total ft with 1 seated rest break. Noted improvement re: agitation today but confusion and decreased followed commands persists. Will cont PT POC for promoting PLOF.           Outcome Measures       04/06/17 1444 04/06/17 1443 04/05/17 0803    How much help from another person do you currently need...    Turning from your back to your side while in flat bed without using bedrails? 2  -LS      Moving from lying on back to sitting on the side of a flat bed without bedrails? 2  -LS      Moving to and from a bed to a chair (including a wheelchair)? 2  -LS      Standing up from a chair using your arms (e.g., wheelchair, bedside chair)? 2  -LS      Climbing 3-5 steps with a railing? 1  -LS      To walk in hospital room? 2  -LS      AM-PAC 6 Clicks Score 11  -LS      How much help from another is currently needed...    Putting on and taking off regular lower body clothing?  1  -JR 1  -JR    Bathing (including washing, rinsing, and drying)  2  -JR 2  -JR    Toileting (which includes using toilet bed pan or urinal)  1  -JR 1  -JR    Putting on and taking off regular upper body clothing  2  -JR 2  -JR    Taking care of personal grooming (such as brushing teeth)  2  -JR 2  -JR    Eating meals  2  -JR 2  -JR    Score  10  -JR 10  -JR    Modified [place] Scale    Modified [place] Scale  4 - Moderately severe disability.  Unable to walk without assistance, and  unable to attend to own bodily needs without assistance.  -JR 4 - Moderately severe disability.  Unable to walk without assistance, and unable to attend to own bodily needs without assistance.  -JR    Functional Assessment    Outcome Measure Options  AM-PAC 6 Clicks Daily Activity (OT);Modified [place]  -JR AM-PAC 6 Clicks Daily Activity (OT);Modified [place]  -JR      04/05/17 0801          How much help from another person do you currently need...    Turning from your back to your side while in flat bed without using bedrails? 2  -LS      Moving from lying on back to sitting on the side of a flat bed without bedrails? 2  -LS      Moving to and from a bed to a chair (including a wheelchair)? 2  -LS      Standing up from a chair using your arms (e.g., wheelchair, bedside chair)? 2  -LS      Climbing 3-5 steps with a railing? 1  -LS      To walk in hospital room? 2  -LS      AM-PAC 6 Clicks Score 11  -LS      Modified [place] Scale    Modified Linh Scale 4 - Moderately severe disability.  Unable to walk without assistance, and unable to attend to own bodily needs without assistance.  -      Functional Assessment    Outcome Measure Options AM-PAC 6 Clicks Basic Mobility (PT);Modified [place]  -        User Key  (r) = Recorded By, (t) = Taken By, (c) = Cosigned By    Initials Name Provider Type     Judy Laech, OT Occupational Therapist    ANDREWS Juárez, PT Physical Therapist           Time Calculation:         PT Charges       04/06/17 1527 04/06/17 1025       Time Calculation    Start Time 1444  - 1025  -     PT Received On 04/06/17  -      PT Goal Re-Cert Due Date 04/15/17  - 04/15/17  -     Time Calculation- PT    Total Timed Code Minutes- PT 16 minute(s)  -        User Key  (r) = Recorded By, (t) = Taken By, (c) = Cosigned By    Initials Name Provider Type    ANDREWS Juárez, PT Physical Therapist    DUNCAN Mcgarry, PT Physical Therapist          Therapy Charges for Today     Code  Description Service Date Service Provider Modifiers Qty    46868588266 HC PT EVAL HIGH COMPLEXITY 4 4/5/2017 Lily Juárez, PT GP 1    [id number] HC GAIT TRAINING EA 15 MIN 4/6/2017 Lily Juárez, PT GP 1          PT G-Codes  Outcome Measure Options: AM-PAC 6 Clicks Daily Activity (OT), Modified Linh Juárez, PT  4/6/2017

## 2017-04-06 NOTE — PLAN OF CARE
Problem: Patient Care Overview (Adult)  Goal: Plan of Care Review  Outcome: Ongoing (interventions implemented as appropriate)    04/06/17 1012   Coping/Psychosocial Response Interventions   Plan Of Care Reviewed With patient   Patient Care Overview   Progress progress toward functional goals as expected   Outcome Evaluation   Outcome Summary/Follow up Plan FEES complete. Moderate pharyngeal dysphagia. NG tube in place at start of eval limiting view. Delayed initiation to the pyriforms with thins. Pen before the swallow w/ asp during the swallow with thins and nectar 2' timing and decreased airway closure. No/pen asp with solid and pudding. RN removed NG which resulted in improved swallow with nectar thick. Asp w/ nectar thick via straws and thins via cup, spoon '2 timing. No pen/asp w/ nectar via cup. Moderate diffuse residue w/ thins. REC: dysphagia level 4, nectar thick liquids, no straw, meds whole in pureed.

## 2017-04-06 NOTE — PROGRESS NOTES
Acute Care - Wound/Debridement Treatment Note  [facility]     Patient Name: Tomas Salvador  : 1955  MRN: [id number]  Today's Date: 2017  Onset of Illness/Injury or Date of Surgery Date: 17   Date of Referral to PT: 17   Referring Physician: Dr. Morrison       Admit Date: 2017    Visit Dx:    ICD-10-CM ICD-9-CM   1. Dysphagia, unspecified type R13.10 787.20   2. Impaired functional mobility, balance, gait, and endurance Z74.09 V49.89   3. Impaired mobility and ADLs Z74.09 799.89       Patient Active Problem List   Diagnosis   • Coronary disease   • Ischemic cardiomyopathy   • Peripheral vascular disease   • ICD (implantable cardioverter-defibrillator) in place, implantation .   • Dyslipidemia, on atorvastain.    • Sepsis   • Diabetes mellitus, type 2   • Chronic respiratory failure with hypoxia   • Urinary retention   • Hyperlipidemia   • Essential hypertension   • E. coli UTI (urinary tract infection)   • Epididymo-orchitis   • Encephalopathy acute   • Cellulitis               LDA Wound       17 1025 17 1500       Incision 17 1056 other (see comments) scrotum    Incision - Properties Group Placement Date: 17  -JW Placement Time:   -JW Side: other (see comments)  - Location: scrotum  -JW    Incision WDL WDL  -MC ex  -MF     Dressing Appearance moist drainage  -MC moist drainage  -MF     Appearance open areas;pink;redness;moist;sutures intact;other (see comments)   penroses intact. Yellow slough, white connective tissue  -MC drainage;moist;open areas;redness;swelling;tenderness;warmth;sutures intact  -     Incision Length (cm)  10.5  -MF     Incision Width (cm)  1  -MF     Incision Depth (cm)  3  -MF     Drainage Characteristics/Odor serosanguineous;yellow;creamy  -MC serosanguineous;yellow;malodorous  -     Drainage Amount moderate  -MC moderate  -MF     Wound Cleaning cleansed with;other (see comments)   phase one after debridement  -MC irrigated  with;sterile normal saline  -     Wound Interventions pulsatile high pressure lavage   500mL N.saline, tunnel tip  - pulsatile high pressure lavage   500ml nsaline with tunnel tip.   -     Dressing Dressing applied;antimicrobial textile   interdry Ag sheets/sling  - --   interdry Ag to sling scrotum  -     Packing other (see comments)   cutimed sorbact ribbon and packing to open areas  - other (see comments)   sorbact to pack open areas  -       User Key  (r) = Recorded By, (t) = Taken By, (c) = Cosigned By    Initials Name Provider Type     Pee Roldan, PT Physical Therapist     Rekha Mcgarry, PT Physical Therapist    JAY JAY Silveira RN Registered Nurse            WOUND DEBRIDEMENT                     Adult Rehabilitation Note       04/06/17 1025 04/06/17 0900 04/05/17 1500    Rehab Assessment/Intervention    Discipline physical therapist  -  physical therapist  -    Document Type therapy note (daily note)  -  therapy note (daily note);evaluation   wound care eval  -    Subjective Information no complaints;agree to therapy   pt somnolent/confused, wife gave OK for tx  -  decreased LOC   family gave ok for tx.   -    Precautions/Limitations fall precautions;oxygen therapy device and L/min  -      Recorded by [] Rekha Mcgarry, PT  [MF] Pee oRldan, PT    Pain Assessment    Pain Assessment No/denies pain  -  Jay-Baker FACES  -    Jay-Baker FACES Pain Rating   0  -    Pain Intervention(s)   Medication (See MAR)  -    Recorded by [] Rekha Mcgarry, PT  [] Pee Roldan, PT    Cognitive Assessment/Intervention    Current Cognitive/Communication Assessment   impaired  -    Orientation Status   oriented to;person  -    Follows Commands/Answers Questions   25% of the time;able to follow single-step instructions;needs cueing  -    Recorded by   [] Pee Roldan, PT    Dysphagia Treatment Objectives and Progress    Dysphagia Treatment  Objectives  Improve timing of pharyngeal response;Improve closure at entrance to airway;Improve tongue base & pharyngeal wall squeeze;Improve laryngeal closure  -LS     Recorded by  [LS] MS DAVID Lebron     Improve timing of pharyngeal response    To improve timing of pharyngeal response, patient will:   Swallow in timely way using three second prep;Swallow in timely way using super-supraglottic swallow;80%;with consistent cues  -LS    Status: Improve timing of pharyngeal response   New  -LS    Timing of Pharyngeal Response Progress   continue to adress  -LS    Recorded by   [LS] MS DAVID Lebron    Improve laryngeal closure    To improve laryngeal closure, patient will:   Complete supraglottic swallow;80%;with consistent cues  -LS    Status: Improve laryngeal closure   New  -LS    Laryngeal Closure Progress   continue to adress  -LS    Recorded by   [LS] MS DAVID Lebron    Improve closure at entrance to airway    To improve closure at entrance to airway, patient will:   Complete super-supraglottic swallow;80%;with consistent cues  -LS    Status: Improve closure at entrance to airway   New  -LS    Closure at Entrance to Airway Progress   continue to adress  -LS    Recorded by   [LS] MS DAVID Lebron    Improve tongue base & pharyngeal wall squeeze    To improve tongue base & pharyngeal wall squeeze, patient will:   Complete effortful swallow;80%;with consistent cues  -LS    Status: Improve tongue base & pharyngeal wall squeeze   New  -LS    Tongue Base/Pharyngeal Wall Squeeze Progress   continue to adress  -LS    Recorded by   [LS] MS DAVID Lebron    Positioning and Restraints    Pre-Treatment Position in bed  -  in bed  -    Post Treatment Position bed  -  bed  -    In Bed supine;call light within reach;encouraged to call for assist;with family/caregiver  -  supine;call light within reach;with family/caregiver;notified [facility]  -    Restraints    released:;reapplied:;soft limb;notified nsg:  -MF    Recorded by [MC] Rekha Mcgarry, PT  [MF] Pee Roldan, PT      User Key  (r) = Recorded By, (t) = Taken By, (c) = Cosigned By    Initials Name Effective Dates     Pee Roldan, PT 06/19/15 -     LS Chacha ADRIEL Pelletier, MS CCC-SLP 06/22/15 -      Rekha Mcgarry, PT 03/14/16 -                 IP PT Goals       04/06/17 1025 04/05/17 0909       Bed Mobility PT LTG    Bed Mobility PT LTG, Date Established  04/05/17  -LS     Bed Mobility PT LTG, Time to Achieve  2 wks  -LS     Bed Mobility PT LTG, Activity Type  supine to sit/sit to supine  -LS     Bed Mobility PT LTG, Bacon Level  supervision required  -LS     Transfer Training PT LTG    Transfer Training PT LTG, Date Established  04/05/17  -LS     Transfer Training PT LTG, Time to Achieve  2 wks  -LS     Transfer Training PT LTG, Activity Type  sit to stand/stand to sit  -LS     Transfer Training PT LTG, Bacon Level  contact guard assist  -LS     Transfer Training PT LTG, Assist Device  walker, rolling  -LS     Gait Training PT LTG    Gait Training Goal PT LTG, Date Established  04/05/17  -LS     Gait Training Goal PT LTG, Time to Achieve  2 wks  -LS     Gait Training Goal PT LTG, Bacon Level  contact guard assist  -LS     Gait Training Goal PT LTG, Assist Device  walker, rolling  -LS     Gait Training Goal PT LTG, Distance to Achieve  200  -LS     Wound Care PT LTG    Wound Care PT LTG 1, Date Established 04/06/17  -      Wound Care PT LTG 1, Time to Achieve 1 wk  -MC      Wound Care PT LTG 1, Location Scrotum  -      Wound Care PT LTG 1, No S&S of Infection yes  -      Wound Care PT LTG 1, Decrease Wound Size 10%  -      Wound Care PT LTG 1, Decrease Necrotic Tissue 50%  -      Wound Care PT LTG 1, Decrease Exudate minimum  -      Wound Care PT LTG 1, No New Skin Break Down yes  -      Wound Care PT LTG 1, Education dressing changes;wound care  -      Wound Care  PT LTG 1, Education Understanding verbalize understanding  -        User Key  (r) = Recorded By, (t) = Taken By, (c) = Cosigned By    Initials Name Provider Type    LS Lily Juárez, PT Physical Therapist    DUNCAN Mcgarry, PT Physical Therapist          Physical Therapy Education     Title: PT OT SLP Therapies (Active)     Topic: Physical Therapy (Active)     Point: Mobility training (Active)    Learning Progress Summary    Learner Readiness Method Response Comment Documented by Status   Patient Acceptance E,D NR  LS 04/05/17 0908 Active   Significant Other Acceptance E,D NR  LS 04/05/17 0908 Active               Point: Body mechanics (Active)    Learning Progress Summary    Learner Readiness Method Response Comment Documented by Status   Patient Acceptance E,D NR  LS 04/05/17 0908 Active   Significant Other Acceptance E,D NR  LS 04/05/17 0908 Active               Point: Precautions (Active)    Learning Progress Summary    Learner Readiness Method Response Comment Documented by Status   Patient Acceptance E,D NR  LS 04/05/17 0908 Active   Significant Other Acceptance E,D NR  LS 04/05/17 0908 Active                      User Key     Initials Effective Dates Name Provider Type Discipline     06/19/15 -  Lily Juárez, PT Physical Therapist PT                   PT ASSESSMENT (last 72 hours)      PT Evaluation       04/06/17 1025 04/06/17 0900    Rehab Evaluation    Document Type therapy note (daily note)  - evaluation  -    Subjective Information no complaints;agree to therapy   pt somnolent/confused, wife gave OK for tx  - no complaints;agree to therapy  -    General Information    Precautions/Limitations fall precautions;oxygen therapy device and L/min  -     Pain Assessment    Pain Assessment No/denies pain  - No/denies pain  -    Positioning and Restraints    Pre-Treatment Position in bed  -     Post Treatment Position bed  -     In Bed supine;call light within reach;encouraged to call  for assist;with family/caregiver  -       04/06/17 0600 04/06/17 0400    Muscle Tone Assessment    Left-Side Extremities Muscle Tone Assessment mildly increased tone  -JE mildly increased tone  -JE      04/06/17 0200 04/06/17 0000    Muscle Tone Assessment    Left-Side Extremities Muscle Tone Assessment mildly increased tone  -JE mildly increased tone  -JE      04/05/17 2200 04/05/17 2000    Muscle Tone Assessment    Left-Side Extremities Muscle Tone Assessment mildly increased tone  -JE mildly increased tone  -JE      04/05/17 1800 04/05/17 1600    Muscle Tone Assessment    Left-Side Extremities Muscle Tone Assessment --  -MR mildly decreased tone  -MR    Bilateral Upper Extremities Muscle Tone Assessment mildly decreased tone  -MR       04/05/17 1500 04/05/17 1400    Rehab Evaluation    Document Type therapy note (daily note);evaluation   wound care eval  -     Subjective Information decreased LOC   family gave ok for tx.   -     Pain Assessment    Pain Assessment Jay-Baker FACES  -     Jay-Baker FACES Pain Rating 0  -     Pain Intervention(s) Medication (See MAR)  -     Cognitive Assessment/Intervention    Current Cognitive/Communication Assessment impaired  -     Orientation Status oriented to;person  -     Follows Commands/Answers Questions 25% of the time;able to follow single-step instructions;needs cueing  -     Muscle Tone Assessment    Left-Side Extremities Muscle Tone Assessment  mildly decreased tone  -MR    Positioning and Restraints    Pre-Treatment Position in bed  -     Post Treatment Position bed  -     In Bed supine;call light within reach;with family/caregiver;notified nsg  -     Restraints released:;reapplied:;soft limb;notified nsg:  -       04/05/17 1200 04/05/17 1000    Muscle Tone Assessment    Left-Side Extremities Muscle Tone Assessment mildly decreased tone  -MR mildly decreased tone  -MR      04/05/17 0803 04/05/17 0801    Rehab Evaluation    Document Type  evaluation  -JR evaluation  -LSA    Subjective Information no complaints;agree to therapy  -JR agree to therapy;no complaints  -LSA    Patient Effort, Rehab Treatment adequate  -JR fair   agitation/confusion   -LSA    Symptoms Noted Comment Pt very restless, agitated during initial eval.  -JR Pt very restless, agitation throughout evaluation.   -LSA    General Information    Patient Profile Review yes  -JR yes  -LSA    Onset of Illness/Injury or Date of Surgery Date 04/04/17  -JR 04/04/17  -LSA    Referring Physician Dr. Morrison  -JR Prince MD  -LSA    Pertinent History Of Current Problem Pt initially to ED 1 month prior with urinary retention. Pt had colbert placed and treated for UTI and d/c'd. Pt then developed scrotal swelling and firmness with black discoloration at the bottom of the scrotum. In ED urologist debrided the necrotic scrotal tissue and drained an abscess. On day of admit pt developed aphasia with L sided facial droop and L sided paralysis. CT revealed L sided frontal infarct. Pt not a candidate for intervention.  -JR To ED at [facility] with scrotal lesion; found to be hypotensive and septic. Demonstrated sudden onset L-sided weakness, facial droop, and aphasia; transferred to [facility] for further neuro evaluation. CT (-) for acute infarct; demonstrated subactue L-sided frontal infarct.   -LSA    Precautions/Limitations fall precautions;oxygen therapy device and L/min  -JR fall precautions;oxygen therapy device and L/min  -LSA    Prior Level of Function independent:;gait;transfer;ADL's;bed mobility;driving  -JR independent:;gait;transfer;wound care;bathing;dressing  -LSA    Equipment Currently Used at Home cane, straight;walker, rolling;oxygen  -JR oxygen;walker, standard;cane, straight;power chair, (recliner lift)  -LSA    Plans/Goals Discussed With patient and family;agreed upon  -JR patient;spouse/S.O.;agreed upon  -LSA    Risks Reviewed patient and family:;increased discomfort  -JR  patient:;spouse/S.O.:;LOB;dizziness;increased discomfort;change in vital signs  -LSA    Benefits Reviewed patient and family:;improve function;increase independence  -JR patient:;spouse/S.O.:;improve function;increase independence;increase strength;increase balance;increase knowledge  -LSA    Barriers to Rehab medically complex  -JR cognitive status  -LSA    Living Environment    Lives With spouse  -JR spouse  -LSA    Living Arrangements house  -JR mobile home  -LSA    Home Accessibility stairs to enter home;ramps present at home;tub/shower is not walk in  -JR stairs to enter home;tub/shower is not walk in  -LSA    Number of Stairs to Enter Home 5   Reports family currently building a ramp  - 5  -LSA    Living Environment Comment  Wife reports that ramp is being built.   -LSA    Clinical Impression    Date of Referral to PT  04/04/17  -LSA    PT Diagnosis  impaired functional mobility, balance, gait  -LSA    Patient/Family Goals Statement  return to OF  -LSA    Criteria for Skilled Therapeutic Interventions Met  yes;treatment indicated  -LSA    Rehab Potential  good, to achieve stated therapy goals  -LSA    Vital Signs    Pre Systolic BP Rehab 145  -  -LSA    Pre Treatment Diastolic   -  -LSA    Post Systolic BP Rehab 141  -  -LSA    Post Treatment Diastolic BP 89  -JR 89  -LSA    Pretreatment Heart Rate (beats/min) 113  -  -LSA    Posttreatment Heart Rate (beats/min) 131  -  -LSA    Pre SpO2 (%) 98  -JR 99  -LSA    O2 Delivery Pre Treatment supplemental O2   2L  -JR supplemental O2   2L  -LSA    O2 Delivery Post Treatment  supplemental O2  -LSA    Pre Patient Position Supine  -JR     Intra Patient Position Standing  -JR     Post Patient Position Supine  -JR     Pain Assessment    Pain Assessment 0-10  -JR 0-10  -LSA    Pain Score 0  -JR 0  -LSA    Post Pain Score 0  -JR 0  -LSA    Vision Assessment/Intervention    Vision Comment Unable to assess due to pt's  confusion/agitation  -JR     Cognitive Assessment/Intervention    Current Cognitive/Communication Assessment impaired  -JR impaired  -LSA    Orientation Status oriented to;person   Oriented to year  -JR oriented to;person;required verbal cueing (specifiy in comments);disoriented to;place   cues for month  -LSA    Follows Commands/Answers Questions 25% of the time;able to follow single-step instructions;needs cueing;needs increased time;needs repetition  -JR able to follow single-step instructions;25% of the time;needs cueing;needs increased time;needs repetition  -LSA    Personal Safety severe impairment;decreased awareness, need for assist;decreased awareness, need for safety;decreased insight to deficits  -JR severe impairment;at risk behaviors demonstrated;impulsive;decreased awareness, need for assist;decreased awareness, need for safety;decreased insight to deficits  -LSA    Personal Safety Interventions  fall prevention program maintained;gait belt;nonskid shoes/slippers when out of bed  -LSA    ROM (Range of Motion)    General ROM no range of motion deficits identified  -JR no range of motion deficits identified   BLEs as demonstrated functionally  -LSA    General ROM Detail Pt not following commands for ROM testing  -JR     MMT (Manual Muscle Testing)    General MMT Assessment no strength deficits identified  -JR lower extremity strength deficits identified  -A    General MMT Assessment Detail Pt not following commands for MMT this date  -JR     Lower Extremity    Lower Ext Manual Muscle Testing Detail  Unable to perform formal MMT due to cognitive status; functionally BLEs grossly 4-/5. TBA further.  -LSA    Bed Mobility, Assessment/Treatment    Bed Mobility, Assistive Device head of bed elevated;draw sheet  -JR head of bed elevated;draw sheet  -LSA    Bed Mob, Supine to Sit, Lamb moderate assist (50% patient effort);2 person assist required  -JR moderate assist (50% patient effort);2 person  assist required;verbal cues required  -LSA    Bed Mob, Sit to Supine, [place] moderate assist (50% patient effort);2 person assist required  -JR moderate assist (50% patient effort);2 person assist required;verbal cues required  -LSA    Bed Mobility, Safety Issues cognitive deficits limit understanding;decreased use of arms for pushing/pulling;decreased use of legs for bridging/pushing;impaired trunk control for bed mobility  -JR cognitive deficits limit understanding  -LSA    Bed Mobility, Impairments strength decreased;impaired balance  -JR     Transfer Assessment/Treatment    Transfers, Sit-Stand [place] moderate assist (50% patient effort);2 person assist required  -JR moderate assist (50% patient effort);2 person assist required;verbal cues required  -LSA    Transfers, Stand-Sit [place] moderate assist (50% patient effort);2 person assist required  -JR moderate assist (50% patient effort);2 person assist required;verbal cues required  -LSA    Transfers, Sit-Stand-Sit, Assist Device rolling walker  -JR rolling walker  -LSA    Transfer, Safety Issues balance decreased during turns;sequencing ability decreased;step length decreased;weight-shifting ability decreased  -JR balance decreased during turns;sequencing ability decreased;impulsivity  -LSA    Transfer, Impairments strength decreased;impaired balance  -JR strength decreased;impaired balance  -LSA    Transfer, Comment Pt transferred from EOB, pt ambulated with PT. Pt transferred to chair and back to bed. Pt stood from EOB and took a couple steps to HOB. Pt required verbal cues for hand placement with transfer. Pt demo decreased safety awareness with transfers. Pt attempting to stand prematurely this date.  -JR Performed STS from EOB and also from recliner; demonstrated significant decreased safety awareness, requiring vc's for hand placement.   -LSA    Gait Assessment/Treatment    Gait, [place]rington Level  moderate assist (50% patient effort);2  person assist required;verbal cues required  -LSA    Gait, Assistive Device  rolling walker  -LSA    Gait, Distance (Feet)  50  -LSA    Gait, Gait Deviations  valente decreased;forward flexed posture;step length decreased  -LSA    Gait, Safety Issues  supplemental O2;sequencing ability decreased  -LSA    Gait, Impairments  strength decreased;impaired balance  -LSA    Gait, Comment  Constant vc's for posture, maintaining  on RWx; recliner followed closely for safety. Distance limited by fatigue and decreased safety awareness.   -LSA    Motor Skills/Interventions    Additional Documentation Balance Skills Training (Group)  -JR Balance Skills Training (Group)  -LSA    Balance Skills Training    Sitting-Level of Assistance Contact guard  -JR Contact guard  -LSA    Sitting-Balance Support Feet supported  -JR Feet supported  -LSA    Standing-Level of Assistance Minimum assistance;x2  -JR Minimum assistance;x2  -LSA    Static Standing Balance Support assistive device  - assistive device  -LSA    Gait Balance-Level of Assistance Moderate assistance;x2  -JR Moderate assistance;x2  -LSA    Gait Balance Support assistive device  - assistive device  -LSA    Sensory Assessment/Intervention    Light Touch  --   TBA further when cognition improves  -LSA    Positioning and Restraints    Pre-Treatment Position in bed  - in bed  -LSA    Post Treatment Position bed  - bed  -LSA    In Bed notified nsg;supine;call light within reach;encouraged to call for assist;exit alarm on;with family/caregiver  -JR notified nsg;call light within reach;encouraged to call for assist;exit alarm on;with other staff;with family/caregiver  -LSA      04/05/17 0800 04/05/17 0600    Muscle Tone Assessment    Left-Side Extremities Muscle Tone Assessment mildly decreased tone  -MR moderately decreased tone  -JE      04/05/17 0400 04/05/17 0200    Muscle Tone Assessment    Left-Side Extremities Muscle Tone Assessment moderately decreased tone  -JE  moderately decreased tone  -JE      04/05/17 0000 04/04/17 2200    Muscle Tone Assessment    Left-Side Extremities Muscle Tone Assessment moderately decreased tone  -JE moderately decreased tone  -JE      04/04/17 2000 04/04/17 1800    Muscle Tone Assessment    Left-Side Extremities Muscle Tone Assessment moderately decreased tone  -JE moderately decreased tone  -MR      04/04/17 1600 04/04/17 1500    Rehab Evaluation    Document Type  evaluation  -SG    Subjective Information  no complaints;agree to therapy  -SG    Patient Effort, Rehab Treatment  adequate  -SG    Symptoms Noted During/After Treatment  none  -SG    Living Environment    Lives With spouse  -MR     Living Arrangements mobile home  -     Home Accessibility stairs to enter home  -MR     Number of Stairs to Enter Home 5  -MR     Stair Railings at Home none  -MR     Type of Financial/Environmental Concern none  -MR     Transportation Available car;family or friend will provide  -MR     Pain Assessment    Pain Assessment  No/denies pain  -SG    Muscle Tone Assessment    Left-Side Extremities Muscle Tone Assessment moderately decreased tone  -MR       04/04/17 1428 04/04/17 1410    General Information    Equipment Currently Used at Home oxygen;walker, rolling;cane, straight  -HH     Living Environment    Lives With spouse  -     Living Arrangements mobile [place]  -     Transportation Available car;family or friend will provide  -     Muscle Tone Assessment    Left-Side Extremities Muscle Tone Assessment  moderately decreased tone  -MR      04/04/17 1000 04/04/17 0800    Muscle Tone Assessment    Muscle Tone Assessment Left-Side Extremities  -CB Bilateral Upper Extremities;Bilateral Lower Extremities  -SD    Left-Side Extremities Muscle Tone Assessment flaccid  -CB     Bilateral Upper Extremities Muscle Tone Assessment  mildly decreased tone  -SD    Bilateral Lower Extremities Muscle Tone Assessment  mildly decreased tone  -SD      04/03/17 2000        Muscle Tone Assessment    Bilateral Upper Extremities Muscle Tone Assessment mildly decreased tone  -MS     Bilateral Lower Extremities Muscle Tone Assessment mildly decreased tone  -MS       User Key  (r) = Recorded By, (t) = Taken By, (c) = Cosigned By    Initials Name Provider Type    VALERI Roldan, PT Physical Therapist    SG Lyndsey Garza Kayy, MS CCC-SLP Speech and Language Pathologist    JR Judy Leach, OT Occupational Therapist    LS Chacha Pelletier, MS CCC-SLP Speech and Language Pathologist    LSA Lily Juárez, PT Physical Therapist    CB Sanaz Haas, RN Registered Nurse    DORITA Patel, RN Registered Nurse    MS Eugenie Alba, RN Registered Nurse    DUNCAN Mcgarry, PT Physical Therapist    HH Bernadette Pina, RN Case Manager    MR Jada Murillo, RN Registered Nurse    FELY Pineda RN Registered Nurse            PT Recommendation and Plan  Anticipated Discharge Disposition: inpatient rehabilitation facility  PT Frequency: daily    Plan Of Care Reviewed With: patient   Progress: progress toward functional goals as expected   Outcome Summary/Follow up Plan: Pt demonstrates intact periwound skin with little swelling/erythema. Posterior/lower area continues to have thick yellow/white slough and necrotic connective tissue that requires debridement. Pt will continue to benefit from skilled PT wound care to continue progress.          Outcome Measures       04/05/17 0803 04/05/17 0801       How much help from another person do you currently need...    Turning from your back to your side while in flat bed without using bedrails?  2  -LS     Moving from lying on back to sitting on the side of a flat bed without bedrails?  2  -LS     Moving to and from a bed to a chair (including a wheelchair)?  2  -LS     Standing up from a chair using your arms (e.g., wheelchair, bedside chair)?  2  -LS     Climbing 3-5 steps with a railing?  1  -LS     To walk in hospital  room?  2  -LS     AM-PAC 6 Clicks Score  11  -LS     How much help from another is currently needed...    Putting on and taking off regular lower body clothing? 1  -JR      Bathing (including washing, rinsing, and drying) 2  -JR      Toileting (which includes using toilet bed pan or urinal) 1  -JR      Putting on and taking off regular upper body clothing 2  -JR      Taking care of personal grooming (such as brushing teeth) 2  -JR      Eating meals 2  -JR      Score 10  -JR      Modified [place] Scale    Modified [place] Scale 4 - Moderately severe disability.  Unable to walk without assistance, and unable to attend to own bodily needs without assistance.  -JR 4 - Moderately severe disability.  Unable to walk without assistance, and unable to attend to own bodily needs without assistance.  -LS     Functional Assessment    Outcome Measure Options AM-PAC 6 Clicks Daily Activity (OT);Modified [place]  -JR AM-PAC 6 Clicks Basic Mobility (PT);Modified [place]  -LS       User Key  (r) = Recorded By, (t) = Taken By, (c) = Cosigned By    Initials Name Provider Type     Judy Leach, OT Occupational Therapist    LS Lily Juárez, PT Physical Therapist              Time Calculation        PT Charges       04/06/17 1025          Time Calculation    Start Time 1025  -      PT Goal Re-Cert Due Date 04/15/17  -        User Key  (r) = Recorded By, (t) = Taken By, (c) = Cosigned By    Initials Name Provider Type     Rekha Mcgarry, PT Physical Therapist             Therapy Charges for Today     Code Description Service Date Service Provider Modifiers Qty    [id number] HC ROSEANNA DEBRIDE OPEN WOUND UP TO 20CM 4/6/2017 Rekha Mcgarry, PT GP 1            PT G-Codes  Outcome Measure Options: AM-PAC 6 Clicks Daily Activity (OT), Zaki Mcgarry, PT  4/6/2017

## 2017-04-06 NOTE — PROGRESS NOTES
Acute Care - Speech Language Pathology   Swallow Initial Evaluation [facility]   Fiberoptic Endoscopic Evaluation of Swallowing (FEES)       Patient Name: Tomas Salvador  : 1955  MRN: [id number]  Today's Date: 2017  Onset of Illness/Injury or Date of Surgery Date: 17     Referring Physician: Neuro per CVA pathway      Admit Date: 2017    Visit Dx:     ICD-10-CM ICD-9-CM   1. Dysphagia, unspecified type R13.10 787.20   2. Impaired functional mobility, balance, gait, and endurance Z74.09 V49.89   3. Impaired mobility and ADLs Z74.09 799.89     Patient Active Problem List   Diagnosis   • Coronary disease   • Ischemic cardiomyopathy   • Peripheral vascular disease   • ICD (implantable cardioverter-defibrillator) in place, implantation .   • Dyslipidemia, on atorvastain.    • Sepsis   • Diabetes mellitus, type 2   • Chronic respiratory failure with hypoxia   • Urinary retention   • Hyperlipidemia   • Essential hypertension   • E. coli UTI (urinary tract infection)   • Epididymo-orchitis   • Encephalopathy acute   • Cellulitis     Past Medical History:   Diagnosis Date   • CAD (coronary artery disease)    • Chronic back pain    • Chronic respiratory failure with hypoxia     Night time only at 2 liters/min   • Diabetes mellitus, type 2    • Essential hypertension    • Hyperlipidemia    • Ischemic cardiomyopathy    • NSTEMI (non-ST elevated myocardial infarction)    • Obesity    • PVD (peripheral vascular disease)    • Systolic CHF     EF <20%  3/8/17   • Urinary retention      Past Surgical History:   Procedure Laterality Date   • CARDIAC CATHETERIZATION  2015    Severe multivessel coronary artery disease involving a totally occluded LAD, 99% proximal left circumflex artery; totally occluded saphenous vein graft to the diagonal; patent LIMA to the LAD, and a patent saphenous vein graft to the first obtuse marginal.      • CARDIAC DEFIBRILLATOR PLACEMENT     • CORONARY ARTERY BYPASS  GRAFT  2005    3 vessel   • CYSTOSCOPY N/A 3/31/2017    Procedure: CYSTOSCOPY and scrotal exploration with debridement of necrotizing fasciitis;  Surgeon: Mele Johnson MD;  Location: [facility] OR;  Service:    • LEG SURGERY Right     unknown, pos vascular bypass   • SCROTAL EXPLORATION N/A 3/31/2017    Procedure: SCROTAL EXPLORATION;  Surgeon: Mele Johnson MD;  Location: [facility] OR;  Service:           SWALLOW EVALUATION (last 72 hours)      Swallow Evaluation       04/06/17 0900 04/04/17 1500             Rehab Evaluation    Document Type evaluation  -LS evaluation  -SG       Subjective Information no complaints;agree to therapy  -LS no complaints;agree to therapy  -SG       Patient Effort, Rehab Treatment  adequate  -SG       Symptoms Noted During/After Treatment  none  -SG       General Information    Patient Profile Review yes  -LS yes  -SG       Onset of Illness/Injury 04/03/17  -LS 04/03/17  -SG       Referring Physician Neuro per CVA pathway  -LS Neuro per CVA pathway  -SG       Subjective Patient Observations alert, cooperative  -LS alert and aphasic  -SG       Pertinent History Of Current Problem Sepsit 2' necrotic scrotal tissue, CVA symptoms adm under pathway to ICU, full work-up pending  -LS Sepsit 2' necrotic scrotal tissue, CVA symptoms adm under pathway to ICU, full work-up pending  -SG       Current Diet Limitations NPO  -LS NPO  -SG       Precautions/Limitations, Vision WFL   for this eval  -LS other (see comments)   CNA  -SG       Precautions/Limitations, Hearing WFL   for eval  -LS WFL   for eval  -SG       Prior Level of Function- Communication other (comment)   unknown baseline  -LS other (comment)   unknown baseline  -SG       Prior Level of Function- Swallowing no diet consistency restrictions  -LS other (comment)   unknown baseline  -SG       Plans/Goals Discussed With patient and family;agreed upon  -LS patient  -SG       Barriers to Rehab medically complex  -LS medically  complex  -SG       Clinical Impression    Patient's Goals For Discharge patient could not state  -LS patient could not state  -SG       SLP Swallowing Diagnosis moderate dysphagia;pharyngeal dysfunction  -LS pharyngeal dysfunction  -SG       Rehab Potential/Prognosis, Swallowing good, to achieve stated therapy goals  -LS good, to achieve stated therapy goals  -SG       Criteria for Skilled Therapeutic Interventions Met skilled criteria for dysphagia intervention met  -LS skilled criteria for dysphagia intervention met  -SG       FCM, Swallowing 4-->Level 4  -LS 1-->Level 1  -SG       Therapy Frequency 5 times/wk  -LS PRN  -SG       SLP Diet Recommendation IV - mechanical soft, no mixed consistencies;nectar/syrup-thick liquids  - NPO: unsafe for food/liquid intake  -SG       Recommended Diagnostics FEES   completed 4/6  - FEES   tomorrow am  -SG       Recommended Feeding/Eating Techniques small sips/bites;no straws  -LS        SLP Rec. for Method of Medication Administration meds whole in pudding/applesauce;meds crushed in pudding/applesauce  - meds via alternate route  -SG       Anticipated Discharge Disposition  other (see comments)   unknown at this time  -SG       Pain Assessment    Pain Assessment No/denies pain  -LS No/denies pain  -SG       Oral Motor Structure and Function    Oral Motor Anatomy and Physiology  patient demonstrates anatomy and physiology that is WNL  -SG       Dentition Assessment  poor oral hygiene  -SG       Secretion Management  wet vocal quality  -SG       Mucosal Quality  dry  -SG       Volitional Swallow  mild to moderate difficulties initiating volitional swallow  -SG       Volitional Cough  weak volitional cough  -SG       Oral Musculature General Assessment  other (see comments)   grossly functional but weak  -SG       General Feeding/Swallowing Observations    Current Feeding Method  NPO  -SG       Respiratory Support Currently in Use  nasal cannula in use  -SG        Observations of Self Feeding Skills  self feeding was very difficult  -SG       Observations of Posture During Feeding  upright at 90 in bed for evaluation  -SG       Clinical Swallow Exam    Mode of Presentation  fed by clinician;spoon;straw  -SG       Respiratory Concerns  increased respiratory rate during eating  -SG       Oral Phase Results  intact oral phase without signs of dysfunction   DNT past pureed texture 2' severity of s/s of dys  -SG       Pharyngeal Phase Results  sign/symptoms of pharyngeal impairment  -SG       Summary of Clinical Exam  Clinical Dys Evaluation completed. Pt presented with generally weak but grossly functional OMEs. DNT past pureed texture 2' severity of s/s. Initially very wet vocal quality, but cleared when pt raised upright, cued to swallow and given PT trials. Heavy coughing with thins but no s/s w/ NT or puree other than delayed onset of swallow initiation. Rec: NPO until FEES in the am. S/L eval will be completed per pathway. Pt is aphasic.  -SG       Fiberoptic Endoscopic Swallowing Exam    Risks/Benefits Reviewed risks/benefits explained;agreed to eval;patient;family  -LS        Positioning Needs for Swallow Exam upright at 90 degreews  -LS        Nasal Entry, Topical Anesthetic right:;nostril entered using no topical anesthetic  -LS        Anatomy and Physiology    Velopharyngeal WFL  -LS        Base of Tongue symmetrical  -LS        Epiglottis WFL  -LS        Laryngeal Function Breathing symmetrical  -LS        Laryngeal Function Phonation CNA  -LS        Laryngeal Function Breath Hold CNA  -LS        Secretions 0- Normal, no visible secretions  -LS        Secretion Description thin;clear  -LS        Spontaneous Swallow frequency reduced  -LS        Sensory senses scope  -LS        Oral-Phary Transit increased prep time;increased transit time  -LS        Anatomy Hypopharynx    Observation Anatomic Considerations no anatomic structural deviation via FEES  -LS        FEES     Mode of Presentation thin:;nectar:;pudding:;cohesive solid:  -LS        Pharyngeal Phase Impairment thin:;nectar:;impaired timing with aspiration during;impaired timing with penetration before;reduced closure vestibule;aspiration after from residue  -LS        Post Swallow Residue thin:;residue present pyriform sinuses;residue present in valleculae;pudding:  -LS        Rosenbek's Scale thin:;8-->Level 8;nectar:;5-->Level 5;pudding:;1-->Level 1  -LS        Response to Aspiration absent response, silent aspiration  -LS        Attempted Compensatory Maneuvers bolus size  -LS        Pharyngeal Phase Results impaired pharyngeal phase of swallowing  -LS        FEES Summary Moderate pharyngeal dysphagia. NG tube in place at start of eval limiting view. Delayed initiation to the pyriforms with thins. Pen before the swallow w/ asp during the swallow with thins and nectar 2' timing and decreased airway closure. No/pen asp with solid and pudding. RN removed NG which resulted in improved swallow with nectar thick. Asp w/ nectar thick via straws and thins via cup, spoon2' timing. No pen/asp w/ nectar via cup.  Moderate diffuse residue w/ thins. REC: dysphagia level 4, nectar thick liquids, no straw, meds whole in pureed.  -LS        FEES Swallow Recommendations    Oral Care oral care with toothbrush and dentifrice BID and PRN  -LS          User Key  (r) = Recorded By, (t) = Taken By, (c) = Cosigned By    Initials Name Effective Dates     Lyndsey Sultana, MS [facility]-SLP 06/22/15 -      Chacha Pelletier, MS [facility]-SLP 06/22/15 -         EDUCATION  The patient has been educated in the following areas:   Dysphagia (Swallowing Impairment) Oral Care/Hydration Modified Diet Instruction.    SLP Recommendation and Plan  SLP Swallowing Diagnosis: moderate dysphagia, pharyngeal dysfunction  SLP Diet Recommendation: IV - mechanical soft, no mixed consistencies, nectar/syrup-thick liquids  Recommended Feeding/Eating Techniques: small  sips/bites, no straws  SLP Rec. for Method of Medication Administration: meds whole in pudding/applesauce, meds crushed in pudding/applesauce     Recommended Diagnostics: FEES (completed 4/6)  Criteria for Skilled Therapeutic Interventions Met: skilled criteria for dysphagia intervention met     Rehab Potential/Prognosis, Swallowing: good, to achieve stated therapy goals  Therapy Frequency: 5 times/wk             Plan of Care Review  Plan Of Care Reviewed With: patient  Progress: progress toward functional goals as expected  Outcome Summary/Follow up Plan: FEES complete. Moderate pharyngeal dysphagia. NG tube in place at start of eval limiting view. Delayed initiation to the pyriforms with thins. Pen before the swallow w/ asp during the swallow with thins and nectar 2' timing and decreased airway closure. No/pen asp with solid and pudding. RN removed NG which resulted in improved swallow with nectar thick. Asp w/ nectar thick via straws and thins via cup, spoon2' timing. No pen/asp w/ nectar via cup.  Moderate diffuse residue w/ thins. REC: dysphagia level 4, nectar thick liquids, no straw, meds whole in pureed.          IP SLP Goals       04/06/17 1012          Safely Consume Diet    Safely Consume Diet- SLP, Date Established 04/06/17  -LS      Safely Consume Diet- SLP, Time to Achieve by discharge  -LS      Safely Consume Diet- SLP, Outcome goal ongoing  -LS        User Key  (r) = Recorded By, (t) = Taken By, (c) = Cosigned By    Initials Name Provider Type    ANDREWS Pelletier MS CCC-SLP Speech and Language Pathologist               Time Calculation:         Time Calculation- SLP       04/06/17 1013          Time Calculation- SLP    SLP Start Time 0900  -LS      SLP Received On 04/06/17  -        User Key  (r) = Recorded By, (t) = Taken By, (c) = Cosigned By    Initials Name Provider Type    ANDREWS Pelletier MS CCC-SLP Speech and Language Pathologist          Therapy Charges for Today     Code Description  Service Date Service Provider Modifiers Qty    [id number]  ST FIBEROPTIC ENDO EVAL SWALL 6 4/6/2017 Chacha Pelletier, MS CCC-SLP GN 1               Chacha Pelletier, MS CCC-SLP  4/6/2017

## 2017-04-06 NOTE — PROGRESS NOTES
"[facility] Progress Note    Admission Date: 4/4/2017    Tomas Salvador  1955  [id number]    Date: 4/6/2017    Meds:    IV Anti-Infectives     Ordered     Dose/Rate Route Frequency Start Stop    04/04/17 1723  metroNIDAZOLE (FLAGYL) IVPB 500 mg     Ordering Provider:  Kike Leon MD    500 mg  100 mL/hr over 60 Minutes Intravenous Every 6 Hours 04/04/17 1800      04/04/17 1552  ceFAZolin in dextrose (ANCEF) IVPB solution 2 g     Ordering Provider:  TRESA Baca    2 g  over 30 Minutes Intravenous Every 8 Hours 04/04/17 1600            CC:  Delisa's gangrene    SUBJECTIVE:  4/4/17:Patient is a 61 y.o. 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 He has a history of underlying diabetes mellitus and severe systolic congestive heart failure. He presented to [facility] on 3/30 with scrotal cellulitis/gangrene. He underwent debridement by urology at [facility] on 3/31 and was treated with Merrem/clindamycin/vancomycin. He developed left facial droop and left sided paresis, prompting a transfer to [facility] for evaluation and therapy of acute stroke. His head CT angiogram was degraded by motion artifact but per Dr. Morrison there was suggestion of right inferior temporal lobe\" opacification\" consistent with a possible stroke. His left facial droop and left-sided paresis has improved today. He is encephalopathic and is unable to provide any reliable history. His wife thinks that he may have had some fevers prior to his admission on 3/30.    4/5/17: afebrile. No hx per patient secondary to encephalopathy.  Afebrile, restless per nsg staff.  Oliguric UOP.  No n/v/d.  No rashes.  4/6/17: More alert and less restless today.  Still non-responsive to questions and does not follow simple commands.  Still with left-sided weakness.  Afebrile.  Still with oliguric UOP.  No n/v/d, no rashes, per nsg staff notes.     ROS:  Unable to obtain from patient " secondary to encephalopathy.    PE:   Vital Signs  Temp (24hrs), Av.5 °F (36.4 °C), Min:96.5 °F (35.8 °C), Max:97.8 °F (36.6 °C)    Temp  Min: 96.5 °F (35.8 °C)  Max: 97.8 °F (36.6 °C)  BP  Min: 98/71  Max: 122/80  Pulse  Min: 69  Max: 96  Resp  Min: 14  Max: 25  SpO2  Min: 95 %  Max: 100 %    GENERAL: He is awake but non-responsive to questions, does not follow simple commands. He is in no acute distress.  HEENT: Normocephalic, atraumatic. PERRL. EOMI. No conjunctival injection. No icterus. Oropharynx clear without evidence of thrush or exudate.   HEART: RRR; No murmur, rubs, gallops.   LUNGS: Clear to auscultation bilaterally without wheezing, rales, rhonchi. Normal respiratory effort. Nonlabored. No dullness.  ABDOMEN: Soft, nontender, nondistended. Positive bowel sounds. No rebound or guarding. NO mass or HSM.  EXT: No cyanosis, clubbing or edema. No cord.  : A Cueto catheter is in place.  He has decreased scrotal erythema/induration.  There is marked improvement in the lower scrotal slough.  MSK: FROM without joint effusions noted arms/legs.   SKIN: Warm and dry without cutaneous eruptions on Inspection/palpation.   NEURO: He is more alert, but confused and is not following simple commands.  PSYCHIATRIC: He is encephalopathic and cannot provide an accurate history.    Laboratory Data      Results from last 7 days  Lab Units 17  0316 17  0417 17  0115   WBC 10*3/mm3 12.30* 13.42* 9.68   HEMOGLOBIN g/dL 10.4* 11.4* 10.6*   HEMATOCRIT % 33.9* 36.6* 35.4*   PLATELETS 10*3/mm3 359 405 380       Results from last 7 days  Lab Units 17  0316   SODIUM mmol/L 137   POTASSIUM mmol/L 3.8   CHLORIDE mmol/L 104   TOTAL CO2 mmol/L 26.0   BUN mg/dL 12   CREATININE mg/dL 0.80   GLUCOSE mg/dL 211*   CALCIUM mg/dL 9.0       Results from last 7 days  Lab Units 17  0417   ALK PHOS U/L 65   BILIRUBIN mg/dL 0.3   ALT (SGPT) U/L 12   AST (SGOT) U/L 22           Results from last 7 days  Lab Units  04/04/17  0115   CRP mg/dL 3.42*       Results from last 7 days  Lab Units 04/02/17  0409   LACTATE mmol/L 0.8       Results from last 7 days  Lab Units 04/04/17  1056 03/31/17  0542   CK TOTAL U/L 54 32       Results from last 7 days  Lab Units 04/01/17  1311   VANCOMYCIN RM mcg/mL 17.60     Estimated Creatinine Clearance: 115.9 mL/min (by C-G formula based on Cr of 0.8).    Microbiology:  Blood Culture   Date Value Ref Range Status   03/30/2017 No growth at 5 days  Final   03/30/2017 No growth at 5 days  Final           Urine Culture   Date Value Ref Range Status   04/04/2017 No growth  Preliminary   03/31/2017 No growth  Final   03/30/2017 >100,000 CFU/mL Escherichia coli (A)  Final      scrotal cx x 2 cxs (3/31) E. coli  Cefazolin sens.           Radiology:  Imaging Results (last 24 hours)     Procedure Component Value Units Date/Time    CT Head Without Contrast [[id number]] Collected:  04/05/17 1241     Updated:  04/05/17 1241    Narrative:       EXAMINATION: CT HEAD WO CONTRAST-, CT ANGIOGRAM HEAD W WO CONTRAST-  04/04/2017     INDICATION: CVA; altered mental status, bilateral upper extremities  weakness.     TECHNIQUE: Contiguous axial 5 mm sections through the brain parenchyma  without intravenous contrast.  Multislice helical CT angiogram of the head before and after intravenous  contrast.     Two dimensional and three dimensional reformatted images were provided  for this exam.  NASCET criteria are utilized for calculation of stenosis.     COMPARISON: NONE     FINDINGS:   Brain:  The midline structures are central. There is no midline shift. Areas of  decreased attenuation are present in the left frontal periventricular  white matter. An old infarct is present in the left frontal lobe. The  structures of the posterior fossa are grossly normal. The images are  somewhat compromised by motion artifact.     The bony calvarium is intact.     Brain CTA:  The brain CTA images are extremely compromised by patient  motion.  Right:  Distal portions of the right internal carotid artery are normal in  course and caliber. The right middle cerebral artery is grossly  unremarkable. The anterior cerebral artery cannot be evaluated secondary  to motion artifact.     Left:  Distal portions of the left internal carotid artery are normal in course  and caliber. The left middle cerebral artery is normal. The anterior  cerebral artery cannot be well evaluated secondary to motion artifact.     Posterior circulation:  The distal vertebral arteries, basilar artery, and posterior cerebral  arteries are normal in course and caliber.       Impression:       1. Both exams compromised by motion artifact.  2. Grossly no acute intracranial process.  3. 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  4. Normal middle cerebral arteries and posterior circulation.  5. The anterior cerebral arteries are not well evaluated.     D:  04/05/2017  E:  04/05/2017          CT Angiogram Head With & Without Contrast [[id number]] Collected:  04/05/17 1241     Updated:  04/05/17 1241    Narrative:       EXAMINATION: CT HEAD WO CONTRAST-, CT ANGIOGRAM HEAD W WO CONTRAST-  04/04/2017     INDICATION: CVA; altered mental status, bilateral upper extremities  weakness.     TECHNIQUE: Contiguous axial 5 mm sections through the brain parenchyma  without intravenous contrast.  Multislice helical CT angiogram of the head before and after intravenous  contrast.     Two dimensional and three dimensional reformatted images were provided  for this exam.  NASCET criteria are utilized for calculation of stenosis.     COMPARISON: NONE     FINDINGS:   Brain:  The midline structures are central. There is no midline shift. Areas of  decreased attenuation are present in the left frontal periventricular  white matter. An old infarct is present in the left frontal lobe. The  structures of the posterior fossa are grossly normal. The images are  somewhat compromised by motion artifact.     The bony calvarium is  intact.     Brain CTA:  The brain CTA images are extremely compromised by patient motion.  Right:  Distal portions of the right internal carotid artery are normal in  course and caliber. The right middle cerebral artery is grossly  unremarkable. The anterior cerebral artery cannot be evaluated secondary  to motion artifact.     Left:  Distal portions of the left internal carotid artery are normal in course  and caliber. The left middle cerebral artery is normal. The anterior  cerebral artery cannot be well evaluated secondary to motion artifact.     Posterior circulation:  The distal vertebral arteries, basilar artery, and posterior cerebral  arteries are normal in course and caliber.       Impression:       1. Both exams compromised by motion artifact.  2. Grossly no acute intracranial process.  3. 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  4. Normal middle cerebral arteries and posterior circulation.  5. The anterior cerebral arteries are not well evaluated.     D:  04/05/2017  E:  04/05/2017          XR Chest 1 View [[id number]] Collected:  04/05/17 1406     Updated:  04/05/17 1423    Narrative:       EXAMINATION: XR CHEST 1 VIEW-04/04/2017:      INDICATION: Sepsis, cardiomyopathy; R13.10-Dysphagia, unspecified.      COMPARISON: NONE.     FINDINGS:   1.  There is cardiomegaly with perihilar vascular congestion.  2.  Bibasilar consolidative airspace opacities are noted, worse on the  left than right.  3.  ICD pacemaker defibrillator is in place from the left. The upper  lung zones are clear.           Impression:       1.  Cardiomegaly is noted. There is airspace opacity left base with  consolidation. There is congestive hilar and mid lung vascular  abnormalities.  2.  Compared to previous studies of 12/17/2015, it appears that the  central congestive vascular edema is new. The density in the left lower  lobe, retrocardiac area and the left base effusion are new. Cardiomegaly  is relatively stable.     D:  04/05/2017  E:   04/05/2017     This report was finalized on 4/5/2017 2:21 PM by Dr. Scottie Bowling MD.       CT Head Without Contrast [[id number]] Collected:  04/05/17 1648     Updated:  04/05/17 1648    Narrative:       EXAMINATION: CT HEAD WITHOUT CONTRAST-04/05/2017:      INDICATION: CVA; R13.10-Dysphagia, unspecified; Z74.09-Other reduced  mobility; Z74.09-Other reduced mobility.         TECHNIQUE: CT scan of the head was performed at 5 mm intervals. No  intravenous contrast was utilized.     COMPARISON: 04/04/2017.     FINDINGS: There is a small old left frontal infarct. There is no  intracranial mass, hemorrhage, midline shift or extra-axial fluid  collection.       Impression:       Old left frontal infarct. There are no acute findings.     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 He has been receiving aggressive intravenous antibiotic therapy with intravenous Merrem/clindamycin/vancomycin. His cultures have grown a relatively sensitive Escherichia coli. This is clearly a polymicrobial infection and certainly involves anaerobes. I will switch his antibiotic therapy to cefazolin/Flagyl. He appears to of have undergone adequate surgical debridement at this point but will need ongoing wound care.  He is significantly improved.  2. Acute right hemispheric cerebral vascular accident-with left sided facial and left-sided weakness. His head CT angiogram here reveals subtle right temporal abnormalities per Dr. Morrison. He has clinically improved.  3. Acute toxic metabolic encephalopathy-secondary to sepsis and cerebral vascular accident  4. 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 Severe systolic congestive heart  failure-with an ejection fraction of less than 20% on echocardiogram performed on 3/8/17  6. Escherichia coli urinary tract infection  7. Sepsis-secondary to Delisa's gangrene  8. Leukocytosis/neutrophilia-secondary to scrotal gangrene/cellulitis, worse today.  9.  LLL pulmonary infiltrate--new per chest xray.     PLAN/RECOMMENDATIONS:   1. Cefazolin 2 g IV every 8 hours  2. Flagyl 500 mg IV every 8 hours  3. PT wound care with debridement and pulse lavage      Kike Leon MD saw and examined patient, verified hx and PE, read all radiographic studies, reviewed labs and micro data, and formulated dx, plan for treatment and all medical decision making.      Rush Patel PA-C for MD Kike Ramey MD  4/6/2017  4:53 PM

## 2017-04-06 NOTE — PLAN OF CARE
Problem: Patient Care Overview (Adult)  Goal: Plan of Care Review  Outcome: Ongoing (interventions implemented as appropriate)    04/05/17 0302 04/06/17 0258   Coping/Psychosocial Response Interventions   Plan Of Care Reviewed With --  patient;spouse   Patient Care Overview   Progress progress toward functional goals as expected --          Problem: Stroke (Ischemic) (Adult)  Goal: Signs and Symptoms of Listed Potential Problems Will be Absent or Manageable (Stroke)  Outcome: Ongoing (interventions implemented as appropriate)    04/05/17 0909   Stroke (Ischemic)   Problems Assessed (Stroke (Ischemic)/TIA) motor/sensory impairment   Problems Present (Stroke (Ischemic)/TIA) motor/sensory impairment         Problem: Fall Risk (Adult)  Goal: Identify Related Risk Factors and Signs and Symptoms  Outcome: Ongoing (interventions implemented as appropriate)    04/05/17 0302   Fall Risk   Fall Risk: Related Risk Factors bladder function altered;confusion/agitation;gait/mobility problems;history of falls;impaired vision;neuro disease/injury;sensory deficits;environment unfamiliar   Fall Risk: Signs and Symptoms presence of risk factors       Goal: Absence of Falls  Outcome: Ongoing (interventions implemented as appropriate)    04/06/17 0258   Fall Risk (Adult)   Absence of Falls making progress toward outcome         Problem: Skin Integrity Impairment, Risk/Actual (Adult)  Goal: Identify Related Risk Factors and Signs and Symptoms  Outcome: Ongoing (interventions implemented as appropriate)  Goal: Skin Integrity/Wound Healing  Outcome: Ongoing (interventions implemented as appropriate)    04/06/17 0258   Skin Integrity Impairment, Risk/Actual (Adult)   Skin Integrity/Wound Healing making progress toward outcome         Problem: Infection, Risk/Actual (Adult)  Goal: Identify Related Risk Factors and Signs and Symptoms  Outcome: Ongoing (interventions implemented as appropriate)    04/05/17 0302   Infection, Risk/Actual    Infection, Risk/Actual: Related Risk Factors exposure to microbes;blood disorder;prolonged hospitalization;skin integrity impairment;sleep disturbance;surgery/procedure   Signs and Symptoms (Infection, Risk/Actual) drainage;heart rate increase;cultures positive       Goal: Infection Prevention/Resolution  Outcome: Ongoing (interventions implemented as appropriate)    04/06/17 0258   Infection, Risk/Actual (Adult)   Infection Prevention/Resolution making progress toward outcome

## 2017-04-06 NOTE — PROGRESS NOTES
Neurology       Patient Care Team:  Jose Navarro MD as PCP - General (Family Medicine)    Chief complaint: Stroke    History:  Patient is doing much better today.    He apparently was diuresed with good success.    He is tolerating the heparin drip without difficulty.    He apparently able to walk a bit.    He is having brief conversations which are coherent.      Past Medical History:   Diagnosis Date   • CAD (coronary artery disease)    • Chronic back pain    • Chronic respiratory failure with hypoxia     Night time only at 2 liters/min   • Diabetes mellitus, type 2    • Essential hypertension    • Hyperlipidemia    • Ischemic cardiomyopathy    • NSTEMI (non-ST elevated myocardial infarction) 2015   • Obesity    • PVD (peripheral vascular disease)    • Systolic CHF     EF <20%  3/8/17   • Urinary retention        Vital Signs   Vitals:    04/06/17 1130 04/06/17 1200 04/06/17 1230 04/06/17 1300   BP: 111/81 108/74 107/77 114/73   BP Location:       Patient Position:       Pulse: 75 69 77 82   Resp:  18     Temp:  97.8 °F (36.6 °C)     TempSrc:  Axillary     SpO2: 100% 100% 100% 100%   Weight:       Height:           Physical Exam:   General: Lethargic but easily arousable              Neuro: Oriented to person and place.    Speech is soft but articulate his has no word finding problems.    He'll follow simple commands.     are equal deep tendon reflexes are plus minus.        Results Review:  CT of the brain shows a left frontal infarct.    Ejection fraction was 10% on his OSIRIS with a dilated left atrium and dilated left ventricle.        Results from last 7 days  Lab Units 04/06/17  0316   WBC 10*3/mm3 12.30*   HEMOGLOBIN g/dL 10.4*   HEMATOCRIT % 33.9*   PLATELETS 10*3/mm3 359       Results from last 7 days  Lab Units 04/06/17  0316 04/05/17  0417 04/04/17  0115 04/03/17  0235   SODIUM mmol/L 137 136 136 134*   POTASSIUM mmol/L 3.8 3.9 4.0 3.8   CHLORIDE mmol/L 104 102 105 105   TOTAL CO2 mmol/L 26.0 25.0  24.8 21.3*   BUN mg/dL 12 6* 6* 6*   CREATININE mg/dL 0.80 0.60 0.53 0.60   CALCIUM mg/dL 9.0 9.3 8.3 8.4   BILIRUBIN mg/dL  --  0.3 0.2 0.1*   ALK PHOS U/L  --  65 69 70   ALT (SGPT) U/L  --  12 12 9*   AST (SGOT) U/L  --  22 24 19   GLUCOSE mg/dL 211* 170* 130* 178*     Imaging Results (last 24 hours)     Procedure Component Value Units Date/Time    XR Chest 1 View [[id number]] Collected:  04/05/17 1406     Updated:  04/05/17 1423    Narrative:       EXAMINATION: XR CHEST 1 VIEW-04/04/2017:      INDICATION: Sepsis, cardiomyopathy; R13.10-Dysphagia, unspecified.      COMPARISON: NONE.     FINDINGS:   1.  There is cardiomegaly with perihilar vascular congestion.  2.  Bibasilar consolidative airspace opacities are noted, worse on the  left than right.  3.  ICD pacemaker defibrillator is in place from the left. The upper  lung zones are clear.           Impression:       1.  Cardiomegaly is noted. There is airspace opacity left base with  consolidation. There is congestive hilar and mid lung vascular  abnormalities.  2.  Compared to previous studies of 12/17/2015, it appears that the  central congestive vascular edema is new. The density in the left lower  lobe, retrocardiac area and the left base effusion are new. Cardiomegaly  is relatively stable.     D:  04/05/2017  E:  04/05/2017     This report was finalized on 4/5/2017 2:21 PM by Dr. Scottie Bowling MD.       XR Chest 1 View [[id number]] Collected:  04/06/17 0909     Updated:  04/06/17 0909    Narrative:       EXAMINATION: XR CHEST 1 VIEW-04/06/2017:      INDICATION: Cardiomyopathy; R13.10-Dysphagia, unspecified; Z74.09-Other  reduced mobility; Z74.09-Other reduced mobility.      COMPARISON: 04/04/2017 portable chest.     FINDINGS: An NG tube is now seen in the stomach. The right upper  extremity PICC line is seen with its tip in the [id number] SVC. The  heart is enlarged. The vasculature has decreased in size now approaching  normal. There is persistent left  basilar atelectasis or effusion.           Impression:       1.  PICC line tip and NG tube appear to be in good position.  2.  Resolving pulmonary vascular congestion.  3.  Persistent mild left basilar atelectasis and effusion.     D:  04/06/2017  E:  04/06/2017             CT Head Without Contrast [[id number]] Collected:  04/05/17 1648     Updated:  04/06/17 1054    Narrative:       EXAMINATION: CT HEAD WITHOUT CONTRAST-04/05/2017:      INDICATION: CVA; R13.10-Dysphagia, unspecified; Z74.09-Other reduced  mobility; Z74.09-Other reduced mobility.         TECHNIQUE: CT scan of the head was performed at 5 mm intervals. No  intravenous contrast was utilized.     COMPARISON: 04/04/2017.     FINDINGS: There is a small old left frontal infarct. There is no  intracranial mass, hemorrhage, midline shift or extra-axial fluid  collection.       Impression:       Old left frontal infarct. There are no acute findings.     D:  04/05/2017  E:  04/05/2017           This report was finalized on 4/6/2017 10:52 AM by Dr. David Murillo MD.             Assessment:  Embolic stroke    Plan:  Discontinue heparin and Plavix.    Add Eliquis 5 mg by mouth twice a day.    Comment:  Improved         I discussed the patients findings and my recommendations with patient, family, nursing staff and primary care team    Luis Alberto Phelan MD  04/06/17  1:50 PM

## 2017-04-06 NOTE — PROGRESS NOTES
Adult Nutrition  Assessment/PES    Patient Name:  Tomas Salvador  YOB: 1955  MRN: [id number]  Admit Date:  4/4/2017    Assessment Date:  4/6/2017  Comments:  Recommend multivitamin and mineral supplement. Current po intake will not meet requirements for wound healing, Magic Cup  supplement provided.      Reason for Assessment       04/06/17 1334    Reason for Assessment    Reason For Assessment/Visit multidisciplinary rounds;dysphagia    Identified At Risk By Screening Criteria large or nonhealing wound, burn or pressure ulcer;MST SCORE 2+;unintentional loss of 10 lbs or more in the past 2 mos    Time Spent (min) 30   Patient Active Problem List   Diagnosis   • Coronary disease   • Ischemic cardiomyopathy   • Peripheral vascular disease   • ICD (implantable cardioverter-defibrillator) in place, implantation 2015.   • Dyslipidemia, on atorvastain.    • Sepsis   • Diabetes mellitus, type 2   • Chronic respiratory failure with hypoxia   • Urinary retention   • Hyperlipidemia   • Essential hypertension   • E. coli UTI (urinary tract infection)   • Epididymo-orchitis   • Encephalopathy acute   • Cellulitis                Nutrition/Diet History       04/06/17 1335    Nutrition/Diet History    Reported/Observed By Patient;Family    Appetite Poor              Labs/Tests/Procedures/Meds       04/06/17 1336    Labs/Tests/Procedures/Meds    Labs/Tests Review Reviewed    Swallow eval status Done    Type of SLP Evaluation --   FEES    Medication Review Reviewed, pertinent                Nutrition Prescription Ordered       04/06/17 1344    Nutrition Prescription PO    Current PO Diet Dysphagia    Dysphagia Level 4  Mechanical soft no mixed consistencies    Fluid Consistency Nectar/syrup thick   No straws    Common Modifiers Cardiac;Consistent Carbohydrate                Problem/Interventions:        Problem 1       04/06/17 1346    Nutrition Diagnoses Problem 1    Problem 1 Inadequate Intake/Infusion     Etiology (related to) Factors Affecting Nutrition    Functional Diagnosis Dysphagia    Appetite Poor    Signs/Symptoms (evidenced by) Report of Mnimal PO Intake;Unintended Weight Change                    Intervention Goal       04/06/17 1348    Intervention Goal    General Nutrition support treatment    PO Establish PO;Tolerate PO;Increase intake            Nutrition Intervention       04/06/17 1348    Nutrition Intervention    RD/Tech Action Care plan reviewd;Follow Tx progress;Advise alternate selection;Interview for preference;Recommend/ordered    Recommended/Ordered Supplement            Nutrition Prescription       04/06/17 1348    Nutrition Prescription PO    PO Prescription Begin/change supplement    Supplement Magic Cup    Supplement Frequency Daily    New PO Prescription Ordered? Yes            Education/Evaluation       04/06/17 1349    Education    Education Education not appropriate at this time    Please explain Defer until post ICU    Monitor/Evaluation    Monitor Per protocol;PO intake;Supplement intake;Skin status            Electronically signed by:  Melanie Bah RD  04/06/17 1:51 PM

## 2017-04-06 NOTE — PLAN OF CARE
Problem: Patient Care Overview (Adult)  Goal: Plan of Care Review  Outcome: Ongoing (interventions implemented as appropriate)    04/06/17 1025   Coping/Psychosocial Response Interventions   Plan Of Care Reviewed With patient   Patient Care Overview   Progress progress toward functional goals as expected   Outcome Evaluation   Outcome Summary/Follow up Plan Pt demonstrates intact periwound skin with little swelling/erythema. Posterior/lower area continues to have thick yellow/white slough and necrotic connective tissue that requires debridement. Pt will continue to benefit from skilled PT wound care to continue progress.         Problem: Inpatient Physical Therapy  Goal: Wound Care Goal 1 LTG- PT  Outcome: Ongoing (interventions implemented as appropriate)    04/06/17 1025   Wound Care PT LTG   Wound Care PT LTG 1, Date Established 04/06/17   Wound Care PT LTG 1, Time to Achieve 1 wk   Wound Care PT LTG 1, Location Scrotum   Wound Care PT LTG 1, No S&S of Infection yes   Wound Care PT LTG 1, Decrease Wound Size 10%   Wound Care PT LTG 1, Decrease Necrotic Tissue 50%   Wound Care PT LTG 1, Decrease Exudate minimum   Wound Care PT LTG 1, No New Skin Break Down yes   Wound Care PT LTG 1, Education dressing changes;wound care   Wound Care PT LTG 1, Education Understanding verbalize understanding

## 2017-04-06 NOTE — PLAN OF CARE
Problem: Patient Care Overview (Adult)  Goal: Plan of Care Review  Outcome: Ongoing (interventions implemented as appropriate)    04/06/17 1522   Coping/Psychosocial Response Interventions   Plan Of Care Reviewed With patient;family   Patient Care Overview   Progress progress toward functional goals as expected   Outcome Evaluation   Outcome Summary/Follow up Plan Pt less restless this date, however remains confused and still pulling at IV's etc. Pt would benefit from continued skilled OT services to assistin returning pt to his PLOF. Recommend inpatient rehab at d/c.         Problem: Stroke (Ischemic) (Adult)  Goal: Signs and Symptoms of Listed Potential Problems Will be Absent or Manageable (Stroke)  Outcome: Ongoing (interventions implemented as appropriate)    04/06/17 1522   Stroke (Ischemic)   Problems Assessed (Stroke (Ischemic)/TIA) cognitive impairment;motor/sensory impairment;communication impairment   Problems Present (Stroke (Ischemic)/TIA) cognitive impairment;communication impairment;motor/sensory impairment         Problem: Inpatient Occupational Therapy  Goal: Bed Mobility Goal LTG- OT  Outcome: Ongoing (interventions implemented as appropriate)    04/05/17 0904 04/06/17 1522   Bed Mobility OT LTG   Bed Mobility OT LTG, Date Established 04/05/17 --    Bed Mobility OT LTG, Time to Achieve 1 wk --    Bed Mobility OT LTG, Activity Type all bed mobility --    Bed Mobility OT LTG, [place] Level minimum assist (75% patient effort) --    Bed Mobility OT LTG, Outcome --  goal ongoing       Goal: Transfer Training Goal 1 LTG- OT  Outcome: Ongoing (interventions implemented as appropriate)    04/05/17 0904 04/06/17 1522   Transfer Training OT LTG   Transfer Training OT LTG, Date Established 04/05/17 --    Transfer Training OT LTG, Time to Achieve 1 wk --    Transfer Training OT LTG, Activity Type all transfers --    Transfer Training OT LTG, [place] Level minimum assist (75% patient effort) --     Transfer Training OT LTG, Outcome --  goal ongoing       Goal: Orientation Goal LTG- OT  Outcome: Ongoing (interventions implemented as appropriate)    04/05/17 0904 04/06/17 1522   Orientation OT LTG   Orientation OT LTG, Date Established 04/05/17 --    Orientation OT LTG, Time to Achieve 1 wk --    Orientation OT LTG, Activity Type person;place;50% treatment session --    Orientation OT LTG, Outcome --  goal ongoing       Goal: Follow Directions Goal LTG- OT  Outcome: Ongoing (interventions implemented as appropriate)    04/05/17 0904 04/06/17 1522   Follow Directions OT LTG   Follow Directions OT LTG, Date Established 04/05/17 --    Follow Directions OT LTG, Time to Achieve 1 wk --    Follow Directions OT LTG, Activity Type 1-Step;50% treatment session --    Follow Directions OT LTG, [place] Level with verbal cues --    Follow Directions OT LTG, Outcome --  goal ongoing

## 2017-04-06 NOTE — PLAN OF CARE
Problem: Patient Care Overview (Adult)  Goal: Plan of Care Review  Outcome: Ongoing (interventions implemented as appropriate)    04/06/17 1524   Coping/Psychosocial Response Interventions   Plan Of Care Reviewed With patient;spouse   Patient Care Overview   Progress progress toward functional goals as expected   Outcome Evaluation   Outcome Summary/Follow up Plan Pt demonstrated ability to progress forward ambulation distanve to 80 total ft with 1 seated rest break. Noted improvement re: agitation today but confusion and decreased followed commands persists. Will cont PT POC for promoting PLOF.          Problem: Stroke (Ischemic) (Adult)  Goal: Signs and Symptoms of Listed Potential Problems Will be Absent or Manageable (Stroke)  Outcome: Ongoing (interventions implemented as appropriate)    04/06/17 1524   Stroke (Ischemic)   Problems Assessed (Stroke (Ischemic)/TIA) motor/sensory impairment   Problems Present (Stroke (Ischemic)/TIA) motor/sensory impairment         Problem: Inpatient Physical Therapy  Goal: Bed Mobility Goal LTG- PT  Outcome: Ongoing (interventions implemented as appropriate)    04/05/17 0909 04/06/17 1524   Bed Mobility PT LTG   Bed Mobility PT LTG, Date Established 04/05/17 --    Bed Mobility PT LTG, Time to Achieve 2 wks --    Bed Mobility PT LTG, Activity Type supine to sit/sit to supine --    Bed Mobility PT LTG, [place] Level supervision required --    Bed Mobility PT LTG, Outcome --  goal ongoing       Goal: Transfer Training Goal 1 LTG- PT  Outcome: Ongoing (interventions implemented as appropriate)    04/05/17 0909 04/06/17 1524   Transfer Training PT LTG   Transfer Training PT LTG, Date Established 04/05/17 --    Transfer Training PT LTG, Time to Achieve 2 wks --    Transfer Training PT LTG, Activity Type sit to stand/stand to sit --    Transfer Training PT LTG, [place] Level contact guard assist --    Transfer Training PT LTG, Assist Device walker, rolling --    Transfer  Training PT LTG, Outcome --  goal ongoing       Goal: Gait Training Goal LTG- PT  Outcome: Ongoing (interventions implemented as appropriate)    04/05/17 0909 04/06/17 1524   Gait Training PT LTG   Gait Training Goal PT LTG, Date Established 04/05/17 --    Gait Training Goal PT LTG, Time to Achieve 2 wks --    Gait Training Goal PT LTG, [place] Level contact guard assist --    Gait Training Goal PT LTG, Assist Device walker, rolling --    Gait Training Goal PT LTG, Distance to Achieve 200 --    Gait Training Goal PT LTG, Outcome --  goal ongoing

## 2017-04-06 NOTE — PROGRESS NOTES
"Critical Care Note     LOS: 2 days   Patient Care Team:  Jose Navarro MD as PCP - General (Family Medicine)    Chief Complaint/Reason for visit: Sepsis, severe ICM      Subjective   61-year-old gentleman hospitalized at [place] for one week with a scrotal cellulitis, status post debridement. He was brought to [facility] for a possible stroke. Stroke workup showed old left frontal.    Interval History:   Yesterday he was restless and confused. He was treated for pain, given some diuresis with significant improvement. He also wore BiPAP last night. Today he is lucid and conversant. He passed his swallow evaluation and a diet as ordered.     Review of Systems:    All systems were reviewed and negative except as noted in subjective.unable, patient not following commands    Medical history, surgical history, social history, family history reviewed    Objective     Intake/Output:    Intake/Output Summary (Last 24 hours) at 04/06/17 1450  Last data filed at 04/06/17 1000   Gross per 24 hour   Intake           1965.9 ml   Output              900 ml   Net           1065.9 ml       Nutrition: Mechanical soft, nectar thick, diabetic, cardiac    Infusions:    amiodarone 0.5 mg/min Last Rate: 0.5 mg/min (04/06/17 0929)       Respiratory: 3 liters       Telemetry:SR with PACs    Vital Signs  Blood pressure 114/73, pulse 82, temperature 97.8 °F (36.6 °C), temperature source Axillary, resp. rate 18, height 69\" (175.3 cm), weight 237 lb (108 kg), SpO2 100 %.    Physical Exam:  General Appearance:   ill-appearing older gentleman    Head:  NC/AT   Eyes:          ROMI EOMI no jaundice   Ears:     Throat:  mucous membranes moist. Several lower teeth teeth missing anteriorly.    Neck:  supple, trachea midline    Back:      Lungs:    bilateral breath sounds with resolution of rhonchi. Symmetric chest excursion     Heart:   distant heart sounds, regular, PMI displaced laterally    Abdomen:    hypoactive bowel sounds, soft, " nontender, no organomegaly Cueto catheter is in place. Scrotum with erythema and induration.    Villa and posterior scrotal wound is packed with some drains.    Rectal:   Deferred   Extremities:  no pitting edema or cyanosis    Pulses:  weak pulses left foot, no palpable pulses right foot but extremity is warm to touch    Skin:  cool and dry    Lymph nodes:    Neurologic:  less alert today. Not answering questions or following commands. Moving all extremities spontaneously.       Results Review:     I reviewed the patient's new clinical results.     Results from last 7 days  Lab Units 04/06/17 0316 04/05/17 0417 04/04/17  0115 04/03/17  0235   SODIUM mmol/L 137 136 136 134*   POTASSIUM mmol/L 3.8 3.9 4.0 3.8   CHLORIDE mmol/L 104 102 105 105   TOTAL CO2 mmol/L 26.0 25.0 24.8 21.3*   BUN mg/dL 12 6* 6* 6*   CREATININE mg/dL 0.80 0.60 0.53 0.60   CALCIUM mg/dL 9.0 9.3 8.3 8.4   BILIRUBIN mg/dL  --  0.3 0.2 0.1*   ALK PHOS U/L  --  65 69 70   ALT (SGPT) U/L  --  12 12 9*   AST (SGOT) U/L  --  22 24 19   GLUCOSE mg/dL 211* 170* 130* 178*       Results from last 7 days  Lab Units 04/06/17 0316 04/05/17 0417 04/04/17  0115   WBC 10*3/mm3 12.30* 13.42* 9.68   HEMOGLOBIN g/dL 10.4* 11.4* 10.6*   HEMATOCRIT % 33.9* 36.6* 35.4*   PLATELETS 10*3/mm3 359 405 380       Results from last 7 days  Lab Units 04/05/17  1349   PH, ARTERIAL pH units 7.408   PO2 ART mm Hg 78.0*   PCO2, ARTERIAL mm Hg 38.5   HCO3 ART mmol/L 24.3     Lab Results   Component Value Date    BLOODCX No growth at 5 days 03/30/2017     Lab Results   Component Value Date    URINECX >100,000 CFU/mL Yeast isolated (A) 04/04/2017       I reviewed the patient's new imaging including images and reports.         Interpretation Summary      · Left ventricular function is severely decreased. Estimated EF appears to be in the range of 10%.  · The left ventricular cavity is severely dilated.  · Global left ventricular wall motion appears abnormal. There is severe  diffuse hypokinesis.  · Left ventricular diastolic dysfunction.  · There is no evidence of a left ventricular thrombus present.  · Moderately reduced right ventricular systolic function noted.  · Left atrial cavity size is mildly dilated.  · Mild mitral valve regurgitation is present                                               IMPRESSION: CXR  1. PICC line tip and NG tube appear to be in good position.  2. Resolving pulmonary vascular congestion.  3. Persistent mild left basilar atelectasis and effusion.      D: 04/06/2017    IMPRESSION:CT head  1. Both exams compromised by motion artifact.  2. Grossly no acute intracranial process.  3. 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  4. Normal middle cerebral arteries and posterior circulation.  5. The anterior cerebral arteries are not well evaluated.      D: 04/05/2017      IMPRESSION:  1. Both exams compromised by motion artifact.  2. Grossly no acute intracranial process.  3. 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  4. Normal middle cerebral arteries and posterior circulation.  5. The anterior cerebral arteries are not well evaluated.      D: 04/05/2017  IMPRESSION:  1. Cardiomegaly is noted. There is airspace opacity left base with  consolidation. There is congestive hilar and mid lung vascular  abnormalities.  2. Compared to previous studies of 12/17/2015, it appears that the  central congestive vascular edema is new. The density in the left lower  lobe, retrocardiac area and the left base effusion are new. Cardiomegaly  is relatively stable.      D: 04/05/2017  E: 04/05/2017  All medications reviewed.     amiodarone 200 mg Oral Q8H   Followed by      [START ON 4/13/2017] amiodarone 200 mg Oral Q12H   Followed by      [START ON 4/27/2017] amiodarone 200 mg Oral Daily   apixaban 5 mg Oral Q12H   [START ON 4/7/2017] aspirin 81 mg Oral Daily   atorvastatin 80 mg Oral Nightly   carvedilol 6.25 mg Oral Q12H   ceFAZolin 2 g Intravenous Q8H   fentaNYL 1 patch Transdermal Q72H   heparin flush  (porcine) 500 Units Intracatheter Q12H   insulin lispro 0-9 Units Subcutaneous 4x Daily With Meals & Nightly   ipratropium-albuterol 3 mL Nebulization 4x Daily - RT   metroNIDAZOLE 500 mg Intravenous Q6H         Assessment/Plan     Principal Problem:    Sepsis  Active Problems:    E. coli UTI (urinary tract infection)    Encephalopathy acute    Cellulitis, scrotum, perineum    Diabetes mellitus, type 2    ICM EF 20%       NSTEMI 5/15 occl LAD, 99% circ, occl SVG to Dz, patent SVG toOM1, patent LIMA to LAD.   Single chamber AICD 12/15, CABG 3/2005    Chronic respiratory failure with hypoxia    Urinary retention    Epididymo-orchitis    61-year-old gentleman with a severe ischemic cardiomyopathy, ejection fraction of 10%, diabetes mellitus type II, who developed a scrotal abscess requiring I&D. Cultures were positive for Escherichia coli. He was in the [facility] for approximately one week and developed some left-sided weakness and altered mentation. He was transported to [place] for stroke. No acute stroke was found on CT of the head or CT perfusion. No intervention was done. Confusion has improved and focal weakness resolved.  He developed atrial fibrillation with a rapid ventricular response and was placed on amiodarone drip. He converted to SR.  Echocardiogram confirms a dilated left ventricular cavity that with severe dysfunction. No comment as to whether thrombus is seen.  Neurology has also started a heparin drip. They changed to Eliquis today.  Chest x-ray has some congestive changes. Blood gas shows pH 7.40, PCO2 38, PO2 78, indicating no acidosis and adequate oxygenation. He had a brisk diuresis with lasix and improved edema on CXR.    ID has adjusted antibiotics and arranged for wound care.  Today he passed his dysphagia evaluation and is tolerating a diet.. Blood sugars are 200.    PLAN:   Cefazolin, Flagyl  Aspirin, Plavix, statin  Coreg  Restart Entresto if BP allows  More lasix  Nebulized  broncho-dilators  Fentanyl patch 25 mcg/h  Amiodarone PO  Eliquis  PT for wound care   Sliding scale insulin  Start Pepcid  Replace electrolytes as needed    VTE Prophylaxis:Eliquis    Stress Ulcer Prophylaxis:none    Ella Pope MD  04/06/17  2:50 PM      Time: 40min  I personally provided care to this critically ill patient as documented above.  Critical care time does not include time spent on separately billed procedures.  Non of my critical care time was concurrent with other critical care providers.

## 2017-04-07 PROBLEM — B37.49 YEAST UTI: Status: ACTIVE | Noted: 2017-01-01

## 2017-04-07 PROBLEM — Z71.89 DIABETES EDUCATION, ENCOUNTER FOR: Status: ACTIVE | Noted: 2017-01-01

## 2017-04-07 NOTE — PROGRESS NOTES
Neurology       Patient Care Team:  Jose Navarro MD as PCP - General (Family Medicine)    Chief complaint: Stroke    History:  Shunts CT is not confirm a right hemisphere event.    Proved dramatically since being in [place].  He suffered a chair is walking with therapy and tolerating being up in a chair.    He is eating a lot.      Past Medical History:   Diagnosis Date   • CAD (coronary artery disease)    • Chronic back pain    • Chronic respiratory failure with hypoxia     Night time only at 2 liters/min   • Diabetes mellitus, type 2    • Essential hypertension    • Hyperlipidemia    • Ischemic cardiomyopathy    • NSTEMI (non-ST elevated myocardial infarction) 2015   • Obesity    • PVD (peripheral vascular disease)    • Systolic CHF     EF <20%  3/8/17   • Urinary retention        Vital Signs   Vitals:    04/07/17 0900 04/07/17 1000 04/07/17 1100 04/07/17 1238   BP: 118/73 109/70 108/78    BP Location:       Patient Position:       Pulse: 89 90 89 86   Resp:    16   Temp:       TempSrc:       SpO2: 98% 98% 99% 100%   Weight:       Height:           Physical Exam:   General: Awake and alert.  He is joking with his family and the staff.              Neuro: Oriented to person place.,  And relaxed.    He follows complex commands.        Results Review:  Reviewed    Results from last 7 days  Lab Units 04/07/17  0422   WBC 10*3/mm3 12.63*   HEMOGLOBIN g/dL 9.9*   HEMATOCRIT % 31.6*   PLATELETS 10*3/mm3 328       Results from last 7 days  Lab Units 04/07/17  0422 04/06/17  2103 04/06/17  0316 04/05/17  0417 04/04/17  0115 04/03/17  0235   SODIUM mmol/L 138  --  137 136 136 134*   POTASSIUM mmol/L 3.5 3.5 3.8 3.9 4.0 3.8   CHLORIDE mmol/L 101  --  104 102 105 105   TOTAL CO2 mmol/L 31.0  --  26.0 25.0 24.8 21.3*   BUN mg/dL 17  --  12 6* 6* 6*   CREATININE mg/dL 0.70  --  0.80 0.60 0.53 0.60   CALCIUM mg/dL 9.1  --  9.0 9.3 8.3 8.4   BILIRUBIN mg/dL  --   --   --  0.3 0.2 0.1*   ALK PHOS U/L  --   --   --  65 69 59    ALT (SGPT) U/L  --   --   --  12 12 9*   AST (SGOT) U/L  --   --   --  22 24 19   GLUCOSE mg/dL 194*  --  211* 170* 130* 178*     Imaging Results (last 24 hours)     Procedure Component Value Units Date/Time    XR Chest 1 View [[id number]] Collected:  04/06/17 0909     Updated:  04/06/17 2212    Narrative:       EXAMINATION: XR CHEST 1 VIEW-04/06/2017:      INDICATION: Cardiomyopathy; R13.10-Dysphagia, unspecified; Z74.09-Other  reduced mobility; Z74.09-Other reduced mobility.      COMPARISON: 04/04/2017 portable chest.     FINDINGS: An NG tube is now seen in the stomach. The right upper  extremity PICC line is seen with its tip in the [id number] SVC. The  heart is enlarged. The vasculature has decreased in size now approaching  normal. There is persistent left basilar atelectasis or effusion.           Impression:       1.  PICC line tip and NG tube appear to be in good position.  2.  Resolving pulmonary vascular congestion.  3.  Persistent mild left basilar atelectasis and effusion.     D:  04/06/2017  E:  04/06/2017     This report was finalized on 4/6/2017 10:10 PM by DR. Morgan Macedo MD.             Assessment:  Does not appear these had an acute stroke.    Early his left frontal lesion is in fact old.        Plan:  Continue current therapy and proceed with  aggressive rehabilitation    Comment:  Remarkable improvement         I discussed the patients findings and my recommendations with patient, family, nursing staff and primary care team    Luis Alberto Phelan MD  04/07/17  2:20 PM

## 2017-04-07 NOTE — PROGRESS NOTES
"Critical Care Note     LOS: 3 days   Patient Care Team:  Jose Navarro MD as PCP - General (Family Medicine)    Chief Complaint/Reason for visit: Sepsis, severe ICM      Subjective   61-year-old gentleman hospitalized at [place] for one week with a scrotal cellulitis, status post debridement. He was brought to [facility] for a possible stroke. Stroke workup showed old left frontal.    Interval History:   Episode of vomiting yesterday with oral potassium replacement. He remains in sinus rhythm on amiodarone. Rested reasonably well last night.    Review of Systems:    All systems were reviewed and negative except as noted in subjective.unable, patient not following commands    Medical history, surgical history, social history, family history reviewed    Objective     Intake/Output:    Intake/Output Summary (Last 24 hours) at 04/07/17 0758  Last data filed at 04/07/17 0600   Gross per 24 hour   Intake            701.6 ml   Output             1350 ml   Net           -648.4 ml       Nutrition: Mechanical soft, nectar thick, diabetic, cardiac    Infusions:    amiodarone 0.5 mg/min Last Rate: Stopped (04/06/17 1833)       Respiratory: 3 liters       Telemetry:SR with PACs    Vital Signs  Blood pressure 102/72, pulse 82, temperature 98.2 °F (36.8 °C), temperature source Axillary, resp. rate 18, height 69\" (175.3 cm), weight 237 lb (108 kg), SpO2 99 %.    Physical Exam:  General Appearance:   ill-appearing older gentleman    Head:  NC/AT   Eyes:          ROMI EOMI no jaundice   Ears:     Throat:  mucous membranes moist. Several lower teeth teeth missing anteriorly.    Neck:  supple, trachea midline    Back:      Lungs:    bilateral breath sounds with resolution of rhonchi. Inspiratory crackles left chest laterally and posteriorly Symmetric chest excursion     Heart:   distant heart sounds, regular, PMI displaced laterally    Abdomen:    hypoactive bowel sounds, soft, nontender, no organomegaly Cueto catheter " is in place. Scrotum with erythema and induration.    Villa and posterior scrotal wound is packed with some drains.    Rectal:   Deferred   Extremities:  no pitting edema or cyanosis    Pulses:  weak pulses left foot, no palpable pulses right foot but extremity is warm to touch    Skin:  cool and dry    Lymph nodes:    Neurologic:  alert, confused, follows commands with all extremities. Generalized weakness       Results Review:     I reviewed the patient's new clinical results.     Results from last 7 days  Lab Units 04/07/17  0422 04/06/17 2103 04/06/17  0316 04/05/17  0417 04/04/17  0115 04/03/17  0235   SODIUM mmol/L 138  --  137 136 136 134*   POTASSIUM mmol/L 3.5 3.5 3.8 3.9 4.0 3.8   CHLORIDE mmol/L 101  --  104 102 105 105   TOTAL CO2 mmol/L 31.0  --  26.0 25.0 24.8 21.3*   BUN mg/dL 17  --  12 6* 6* 6*   CREATININE mg/dL 0.70  --  0.80 0.60 0.53 0.60   CALCIUM mg/dL 9.1  --  9.0 9.3 8.3 8.4   BILIRUBIN mg/dL  --   --   --  0.3 0.2 0.1*   ALK PHOS U/L  --   --   --  65 69 70   ALT (SGPT) U/L  --   --   --  12 12 9*   AST (SGOT) U/L  --   --   --  22 24 19   GLUCOSE mg/dL 194*  --  211* 170* 130* 178*       Results from last 7 days  Lab Units 04/07/17  0422 04/06/17  0316 04/05/17  0417   WBC 10*3/mm3 12.63* 12.30* 13.42*   HEMOGLOBIN g/dL 9.9* 10.4* 11.4*   HEMATOCRIT % 31.6* 33.9* 36.6*   PLATELETS 10*3/mm3 328 359 405       Results from last 7 days  Lab Units 04/05/17  1349   PH, ARTERIAL pH units 7.408   PO2 ART mm Hg 78.0*   PCO2, ARTERIAL mm Hg 38.5   HCO3 ART mmol/L 24.3     Lab Results   Component Value Date    BLOODCX No growth at 5 days 03/30/2017     Lab Results   Component Value Date    URINECX >100,000 CFU/mL Yeast isolated (A) 04/04/2017   Scrotal wound E. coli    I reviewed the patient's new imaging including images and reports.         Interpretation Summary      · Left ventricular function is severely decreased. Estimated EF appears to be in the range of 10%.  · The left ventricular cavity is  severely dilated.  · Global left ventricular wall motion appears abnormal. There is severe diffuse hypokinesis.  · Left ventricular diastolic dysfunction.  · There is no evidence of a left ventricular thrombus present.  · Moderately reduced right ventricular systolic function noted.  · Left atrial cavity size is mildly dilated.  · Mild mitral valve regurgitation is present                                               All medications reviewed.     amiodarone 200 mg Oral Q8H   Followed by      [START ON 4/13/2017] amiodarone 200 mg Oral Q12H   Followed by      [START ON 4/27/2017] amiodarone 200 mg Oral Daily   apixaban 5 mg Oral Q12H   aspirin 81 mg Oral Daily   atorvastatin 80 mg Oral Nightly   carvedilol 6.25 mg Oral Q12H   ceFAZolin 2 g Intravenous Q8H   fentaNYL 1 patch Transdermal Q72H   heparin flush (porcine) 500 Units Intracatheter Q12H   insulin lispro 0-9 Units Subcutaneous 4x Daily With Meals & Nightly   ipratropium-albuterol 3 mL Nebulization 4x Daily - RT   metroNIDAZOLE 500 mg Intravenous Q6H   potassium chloride 20 mEq Oral Once         Assessment/Plan     Principal Problem:    Sepsis  Active Problems:    E. coli UTI (urinary tract infection)    Yeast UTI    Encephalopathy acute    Cellulitis, scrotum, perineum    Diabetes mellitus, type 2    ICM EF 10%       NSTEMI 5/15 occl LAD, 99% circ, occl SVG to Dz, patent SVG toOM1, patent LIMA to LAD.   Single chamber AICD 12/15, CABG 3/2005    Chronic respiratory failure with hypoxia    Urinary retention    Epididymo-orchitis    61-year-old gentleman with a severe ischemic cardiomyopathy, ejection fraction of 10%, diabetes mellitus type II, who developed a scrotal abscess requiring I&D in [place] on 3/31. Cultures were positive for Escherichia coli. He was in the [facility] for approximately one week and developed some left-sided weakness and altered mentation. He was transported to [place] for stroke. No acute stroke was found on CT of the head or CT  perfusion. No intervention was done. Confusion has improved and focal weakness resolved.  He developed atrial fibrillation with a rapid ventricular response and was placed on amiodarone drip. He converted to SR.  Echocardiogram confirms a dilated left ventricular cavity that with severe dysfunction LVEF 10%. No comment as to whether thrombus is seen.  He was placed on a heparin drip for 24 hours and transition to oral Eliquis. He developed some congestive heart failure that responded promptly to some IV Lasix. His Coreg was restarted.  Infectious disease evaluated and changed antibiotics to cefazolin and Flagyl. Today his urine culture is growing greater than 100,000 yeast. Infectious disease notified. Patient is on amiodarone for atrial fibrillation so unlikely to tolerate Diflucan.  Blood sugars are running 200-250 on sliding scale insulin.       PLAN:   Cefazolin, Flagyl  ID notified about yeast in urine, cannot dc colbert with large scrotal wound and h/o placement for urinary retention.  Could consider suprapubic catheter  Aspirin, Plavix, statin  Coreg  Restart Entresto if BP allows    Nebulized broncho-dilators  Fentanyl patch 25 mcg/h  Amiodarone PO  Eliquis  PT for wound care   Sliding scale insulin, add levemir  Start Pepcid  Replace electrolytes as needed    VTE Prophylaxis:Eliquis    Stress Ulcer Prophylaxis:none    Ella Pope MD  04/07/17  7:58 AM      Time: 40min  I personally provided care to this critically ill patient as documented above.  Critical care time does not include time spent on separately billed procedures.  Non of my critical care time was concurrent with other critical care providers.

## 2017-04-07 NOTE — PLAN OF CARE
Problem: Patient Care Overview (Adult)  Goal: Plan of Care Review  Outcome: Ongoing (interventions implemented as appropriate)    04/07/17 1438   Coping/Psychosocial Response Interventions   Plan Of Care Reviewed With patient;spouse   Outcome Evaluation   Outcome Summary/Follow up Plan Dysphagia: Family reports difficulty with mech-soft - will downgrade to dysphagia level 3 puree with some soft. Cog-Comm: Confusion. Poor attention/focus to task which impacts reponse. Overuse of humor attempts also impacts. Will cont to follow.          Problem: Inpatient SLP  Goal: Dysphagia- Patient will safely consume diet as per recommendation with no signs/symptoms of aspiration  Outcome: Ongoing (interventions implemented as appropriate)    04/06/17 1012   Safely Consume Diet   Safely Consume Diet- SLP, Date Established 04/06/17   Safely Consume Diet- SLP, Time to Achieve by discharge   Safely Consume Diet- SLP, Outcome goal ongoing       Goal: Cognitive-linguistic-Patient will improve Cognitive-linguistic skills to allow optimal participation in care  Outcome: Ongoing (interventions implemented as appropriate)    04/07/17 1438   Cognitive Linguistic- Optimal Participation in Care   Cognitive Linguistic Optimal Participation in Care- SLP, Date Established 04/07/17   Cognitive Linguistic Optimal Participation in Care- SLP, Time to Achieve by discharge   Cognitive Linguistic Optimal Participation in Care- SLP, Outcome goal ongoing

## 2017-04-07 NOTE — PLAN OF CARE
Problem: Patient Care Overview (Adult)  Goal: Plan of Care Review  Outcome: Ongoing (interventions implemented as appropriate)    04/07/17 0436   Coping/Psychosocial Response Interventions   Plan Of Care Reviewed With patient;spouse   Patient Care Overview   Progress progress toward functional goals as expected         Problem: Stroke (Ischemic) (Adult)  Goal: Signs and Symptoms of Listed Potential Problems Will be Absent or Manageable (Stroke)  Outcome: Ongoing (interventions implemented as appropriate)    04/07/17 0436   Stroke (Ischemic)   Problems Assessed (Stroke (Ischemic)/TIA) all   Problems Present (Stroke (Ischemic)/TIA) acute neurologic deterioration;cognitive impairment;eating/swallowing impairment         Problem: Fall Risk (Adult)  Goal: Absence of Falls  Outcome: Ongoing (interventions implemented as appropriate)    04/07/17 0436   Fall Risk (Adult)   Absence of Falls making progress toward outcome         Problem: Skin Integrity Impairment, Risk/Actual (Adult)  Goal: Identify Related Risk Factors and Signs and Symptoms  Outcome: Ongoing (interventions implemented as appropriate)    04/07/17 0436   Skin Integrity Impairment, Risk/Actual   Skin Integrity Impairment, Risk/Actual: Related Risk Factors cognitive impairment;immobility;sensory impairment;surgery/procedure   Signs and Symptoms (Skin Integrity Impairment) necrotic tissue       Goal: Skin Integrity/Wound Healing  Outcome: Ongoing (interventions implemented as appropriate)    04/07/17 0436   Skin Integrity Impairment, Risk/Actual (Adult)   Skin Integrity/Wound Healing making progress toward outcome         Problem: Infection, Risk/Actual (Adult)  Goal: Identify Related Risk Factors and Signs and Symptoms  Outcome: Ongoing (interventions implemented as appropriate)    04/07/17 0436   Infection, Risk/Actual   Infection, Risk/Actual: Related Risk Factors exposure to microbes;prolonged hospitalization;surgery/procedure;skin integrity  impairment;sleep disturbance   Signs and Symptoms (Infection, Risk/Actual) drainage;cultures positive;blood glucose changes       Goal: Infection Prevention/Resolution  Outcome: Ongoing (interventions implemented as appropriate)    04/07/17 0436   Infection, Risk/Actual (Adult)   Infection Prevention/Resolution making progress toward outcome         04/07/17 0436   Infection, Risk/Actual (Adult)   Infection Prevention/Resolution making progress toward outcome

## 2017-04-07 NOTE — PROGRESS NOTES
Acute Care - Speech Language Pathology Initial Evaluation  [facility]   Cognitive-Communication Evaluation     Patient Name: Tomas Salvador  : 1955  MRN: [id number]  Today's Date: 2017  Onset of Illness/Injury or Date of Surgery Date: 17     Referring Physician: Neuro per CVA pathway      Admit Date: 2017     Visit Dx:    ICD-10-CM ICD-9-CM   1. Dysphagia, unspecified type R13.10 787.20   2. Impaired functional mobility, balance, gait, and endurance Z74.09 V49.89   3. Impaired mobility and ADLs Z74.09 799.89   4. Cognitive communication deficit R41.841 799.52     Patient Active Problem List   Diagnosis   • Coronary disease   • Ischemic cardiomyopathy   • Peripheral vascular disease   • ICD (implantable cardioverter-defibrillator) in place, implantation .   • Dyslipidemia, on atorvastain.    • Sepsis   • Diabetes education, encounter for   • Chronic respiratory failure with hypoxia   • Urinary retention   • Hyperlipidemia   • Essential hypertension   • E. coli UTI (urinary tract infection)   • Epididymo-orchitis   • Encephalopathy acute   • Cellulitis   • Yeast UTI     Past Medical History:   Diagnosis Date   • CAD (coronary artery disease)    • Chronic back pain    • Chronic respiratory failure with hypoxia     Night time only at 2 liters/min   • Diabetes mellitus, type 2    • Essential hypertension    • Hyperlipidemia    • Ischemic cardiomyopathy    • NSTEMI (non-ST elevated myocardial infarction)    • Obesity    • PVD (peripheral vascular disease)    • Systolic CHF     EF <20%  3/8/17   • Urinary retention      Past Surgical History:   Procedure Laterality Date   • CARDIAC CATHETERIZATION  2015    Severe multivessel coronary artery disease involving a totally occluded LAD, 99% proximal left circumflex artery; totally occluded saphenous vein graft to the diagonal; patent LIMA to the LAD, and a patent saphenous vein graft to the first obtuse marginal.      • CARDIAC DEFIBRILLATOR  PLACEMENT  2015   • CORONARY ARTERY BYPASS GRAFT  2005    3 vessel   • CYSTOSCOPY N/A 3/31/2017    Procedure: CYSTOSCOPY and scrotal exploration with debridement of necrotizing fasciitis;  Surgeon: Mele Johnson MD;  Location:  [facility] OR;  Service:    • LEG SURGERY Right     unknown, pos vascular bypass   • SCROTAL EXPLORATION N/A 3/31/2017    Procedure: SCROTAL EXPLORATION;  Surgeon: Mele Johnson MD;  Location:  [facility] OR;  Service:           SLP EVALUATION (last 72 hours)      SLP Evaluation       04/07/17 1300                Rehab Evaluation    Subjective Information agree to therapy  -        General Information    Patient Profile Review yes  -SM        Subjective Patient Observations Alert and cooperative  -        Prior Level of Function- Communication functional in a familiar environment/with familiar caregiver  -        Plans/Goals Discussed With family;agreed upon  -        Barriers to Rehab cognitive status  -        Clinical Impression    Patient's Goals For Discharge return home  -        Family Goals For Discharge patient able to return to all previous activities/roles  -        Criteria for Skilled Therapeutic Interventions Met skilled criteria for cognitive linguistic intervention met  -        Rehab Potential good, to achieve stated therapy goals  -        Therapy Frequency 5 times/wk  -        Pain Assessment    Pain Assessment Jay-Kent FACES  -        Jay-Baker FACES Pain Rating 2  -        Cognitive Assessment/Intervention    Current Cognitive/Communication Assessment impaired  -        Orientation Status oriented to;person  -        Follows Commands/Answers Questions 50% of the time;able to follow single-step instructions;needs cueing;needs increased time;needs repetition  -        Additional Documentation Cognitive Assessment Intervention (Group)  -        Cognitive Assessment Intervention    Attention moderate impairment;severe  impairment;needs re-direction;difficulty attending to task/directions;distractible;needs cues to re-direct  -        Problem Solving moderate impairment;severe impairment  -        Organization moderate impairment;severe impairment  -        Communication Assessment/Intervention    Additional Documentation Auditory Comprehen Assess/Intervention (Group);Verbal Expression Assess/Intervention (Group);Motor Speech Assessment/Intervention (Group)   DNT reading/writing 2' difficulty attending to tasks  -        Auditory Comprehen Assess/Intervention    Answers Yes/No Questions successful, basic questions  -        Yes/No Questions Comment with cues to attend  -        Able to Follow Commands success, 1 step commands  -        Follow Commands Comment with cues inconsistently to attend  -        Verbal Expression Assess/Intervention    Conversational Speech mild impairment  -        Conversational Speech Comment Able to convey wants/needs.  Confusion noted intermittetnly, difficult to fully determine as frequent use of humor  -        Motor Speech Assess/Intervention    Motor Speech- Apraxia WNL/WFL  -        Motor Speech- Dysarthria Comment mild imprecise articulation and low volume  -        Communication Treatment Objective and Progress    Receptive Language Treatment Objectives Improve ability to follow directions;Improve ability to comprehend questions  -        Expressive Treatment Objectives Improve ability to construct phrase and sentence level responses  -        Cognitive Linguistic Treatment Objectives Improve attention;Improve orientation;Improve functional problem solving  -        Improve ability to follow directions    Improve ability to follow directions: one-step directions without objects;100%;without cues  -        Status: Improve ability to follow directions New  -        Ability to Follow Directions Progress continue to address  -        Improve ability to comprehend  "questions    Improve ability to comprehend questions: simple yes/no questions;simple general questions;100%;without cues  -SM        Status: Improve ability to comprehend questions New  -SM        Ability to Comprehend Questions Progress continue to address  -SM        Improve ability to construct phrase and sentence level responses    Improve ability to construct phrase and sentence level responses by: answering question with phrase;100%;without cues  -SM        Status: Improve ability to construct phrase and sentence level responses New  -SM        Constructing Phrase and Sentence Level Responses Progress continue to address  -SM        Improve attention    Improve attention by: attending to task;complete selective attention task;80%;with consistent cues  -SM        Status: Improve attention New  -SM        Attention Progress continue to address  -SM        Improve orientation    Improve orientation through: demonstrating orientation to day;demonstrating orientation to month;demonstrating orientation to year;demonstrating orientation to place;demonstrating orientation to disease/impairment;100%;with inconsistent cues  -SM        Status: Improve orientation through New  -SM        Orientation Progress continue to address  -SM        Improve functional problem solving    Improve functional problem solving through: determine solutions to simple ADL/safety problems;answer \"what if\" questions;complete basic reasoning task;complete organization/home management task;70%;with consistent cues  -SM        Status: Improve functional problem solving New  -SM        Functional Problem Solving Progress continue to address  -SM          User Key  (r) = Recorded By, (t) = Taken By, (c) = Cosigned By    Initials Name Effective Dates     Akosua Solomon MS CCC-SLP 06/22/15 -            EDUCATION  The patient has been educated in the following areas:   Cognitive Impairment Communication Impairment.    SLP Recommendation and " Plan        Rehab Potential: good, to achieve stated therapy goals  Criteria for Skilled Therapeutic Interventions Met: skilled criteria for cognitive linguistic intervention met        Therapy Frequency: 5 times/wk          Plan of Care Review  Plan Of Care Reviewed With: patient, spouse  Outcome Summary/Follow up Plan: Dysphagia: Family reports difficulty with mech-soft - will downgrade to dysphagia level 3 puree with some soft.  Cog-Comm: Confusion. Poor attention/focus to task which impacts reponse.  Overuse of humor attempts also impacts.  Will cont to follow.           IP SLP Goals       04/07/17 1438 04/06/17 1012       Safely Consume Diet    Safely Consume Diet- SLP, Date Established  04/06/17  -LS     Safely Consume Diet- SLP, Time to Achieve  by discharge  -LS     Safely Consume Diet- SLP, Outcome  goal ongoing  -LS     Cognitive Linguistic- Optimal Participation in Care    Cognitive Linguistic Optimal Participation in Care- SLP, Date Established 04/07/17  -      Cognitive Linguistic Optimal Participation in Care- SLP, Time to Achieve by discharge  -      Cognitive Linguistic Optimal Participation in Care- SLP, Outcome goal ongoing  -        User Key  (r) = Recorded By, (t) = Taken By, (c) = Cosigned By    Initials Name Provider Type     Akosua Solomon MS CCC-SLP Speech and Language Pathologist     Chacha Pelletier, MS CCC-SLP Speech and Language Pathologist              Time Calculation:         Time Calculation- SLP       04/07/17 1439          Time Calculation- SLP    SLP Start Time 1300  -      SLP Received On 04/07/17  -        User Key  (r) = Recorded By, (t) = Taken By, (c) = Cosigned By    Initials Name Provider Type    JENNIFER Solomon MS CCC-SLP Speech and Language Pathologist          Therapy Charges for Today     Code Description Service Date Service Provider Modifiers Qty    [id number]  ST EVAL SPEECH AND PROD W LANG  2 4/7/2017 Akosua Solomon, MS CCC-SLP GN 1     [id number] [facility] TREATMENT SWALLOW 2 2017 Akosua Solomon, MS CCC-SLP GN 1                     Akosua Solomon, MS CCC-SLP  2017 and Acute Care - Speech Language Pathology   Swallow Treatment Note  Dee     Patient Name: Tomas Salvador  : 1955  MRN: [id number]  Today's Date: 2017  Onset of Illness/Injury or Date of Surgery Date: 17     Referring Physician: Neuro per CVA pathway      Admit Date: 2017    Visit Dx:      ICD-10-CM ICD-9-CM   1. Dysphagia, unspecified type R13.10 787.20   2. Impaired functional mobility, balance, gait, and endurance Z74.09 V49.89   3. Impaired mobility and ADLs Z74.09 799.89   4. Cognitive communication deficit R41.841 799.52     Patient Active Problem List   Diagnosis   • Coronary disease   • Ischemic cardiomyopathy   • Peripheral vascular disease   • ICD (implantable cardioverter-defibrillator) in place, implantation .   • Dyslipidemia, on atorvastain.    • Sepsis   • Diabetes education, encounter for   • Chronic respiratory failure with hypoxia   • Urinary retention   • Hyperlipidemia   • Essential hypertension   • E. coli UTI (urinary tract infection)   • Epididymo-orchitis   • Encephalopathy acute   • Cellulitis   • Yeast UTI             Adult Rehabilitation Note       17 1300 17 1444 17 1443    Rehab Assessment/Intervention    Discipline speech language pathologist  -SM physical therapist  -LS occupational therapist  -JR    Document Type evaluation;therapy note (daily note)  -SM therapy note (daily note)  -LS therapy note (daily note)  -JR    Subjective Information  agree to therapy;no complaints  -LS no complaints;agree to therapy  -JR    Patient Effort, Rehab Treatment adequate  -SM adequate  -LS good  -JR    Symptoms Noted Comment   Pt restless throughout treatment session.  -JR    Precautions/Limitations  fall precautions;oxygen therapy device and L/min  -LS fall precautions;oxygen therapy device and  L/min  -JR    Recorded by [SM] Akosua Solomon MS CCC-SLP [LS] Lily Juárez, PT [JR] Judy Leach, OT    Vital Signs    Pre Systolic BP Rehab  107  -  -JR    Pre Treatment Diastolic BP  76  -LS 75  -JR    Post Systolic BP Rehab  108  -  -JR    Post Treatment Diastolic BP  77  -LS 77  -JR    Pretreatment Heart Rate (beats/min)  82  -LS 80  -JR    Posttreatment Heart Rate (beats/min)  83  -LS 83  -JR    Pre SpO2 (%)  100  -LS 99  -JR    O2 Delivery Pre Treatment  supplemental O2   2.5L  -LS supplemental O2   3L  -JR    Post SpO2 (%)  100  -  -JR    O2 Delivery Post Treatment  supplemental O2  -LS supplemental O2  -JR    Pre Patient Position  Supine  -LS Supine  -JR    Intra Patient Position  Standing  -LS Standing  -JR    Post Patient Position  Sitting  -LS Sitting  -JR    Recorded by  [LS] Lily Juárez, PT [JR] Judy Leach, OT    Pain Assessment    Pain Assessment  0-10  -LS 0-10  -JR    Jay-Kent FACES Pain Rating  0  -LS 0  -JR    Pain Score  0  -LS 0  -JR    Post Pain Score  0  -LS 0  -JR    Recorded by  [LS] Lily Juárez, PT [JR] Judy Leach, OT    Cognitive Assessment/Intervention    Current Cognitive/Communication Assessment  impaired  -LS impaired  -JR    Orientation Status  oriented to;person;disoriented to;place;time  -LS oriented to;person  -JR    Follows Commands/Answers Questions  able to follow single-step instructions;25% of the time;50% of the time;needs cueing;needs increased time;needs repetition  - 25% of the time;50% of the time;able to follow single-step instructions  -JR    Personal Safety  severe impairment;at risk behaviors demonstrated;impulsive;decreased awareness, need for assist;decreased awareness, need for safety;decreased insight to deficits  -LS severe impairment;decreased awareness, need for assist;decreased awareness, need for safety;decreased insight to deficits  -JR    Personal Safety Interventions  fall prevention program maintained;gait  belt;nonskid shoes/slippers when out of bed  -LS     Recorded by  [LS] Lily Juárez, PT [JR] Judy Leach, OT    Dysphagia Treatment Objectives and Progress    Dysphagia Treatment Objectives Improve hyolaryngeal excursion;Other 1  -SM      Recorded by [SM] Akosua Solomon MS CCC-SLP      Improve timing of pharyngeal response    To improve timing of pharyngeal response, patient will: Swallow in timely way using three second prep;Swallow in timely way using super-supraglottic swallow;80%;with consistent cues  -SM      Status: Improve timing of pharyngeal response Progressing as expected  -SM      Timing of Pharyngeal Response Progress 10%;with consistent cues;continue to adress   cognition/focus greatly impacted  -SM      Recorded by [SM] Akosua Solomon MS CCC-SLP      Improve laryngeal closure    To improve laryngeal closure, patient will: Complete supraglottic swallow;80%;with consistent cues  -SM      Status: Improve laryngeal closure Progressing as expected  -SM      Laryngeal Closure Progress 0%;with consistent cues;following model;continue to adress  -SM      Recorded by [SM] Akosua Solomon MS CCC-SLP      Improve closure at entrance to airway    To improve closure at entrance to airway, patient will: Complete super-supraglottic swallow;80%;with consistent cues  -SM      Status: Improve closure at entrance to airway Progressing as expected  -SM      Closure at Entrance to Airway Progress 0%;with consistent cues;following model;continue to adress  -SM      Recorded by [SM] Akosua Solomon MS CCC-SLP      Improve hyolaryngeal excursion    To improve hyolaryngeal excursion, patient will: Complete chin tuck against resistance (comment number of repetitions);90%;with consistent cues   20 sec reps x4  -SM      Status: Improve hyolaryngeal excursion New  -SM      Hyolaryngeal Excursion Progress 40%;with consistent cues;continue to adress  -SM      Recorded by [SM] Akosua Solomon MS  CCC-SLP      Improve tongue base & pharyngeal wall squeeze    To improve tongue base & pharyngeal wall squeeze, patient will: Complete effortful swallow;80%;with consistent cues  -SM      Status: Improve tongue base & pharyngeal wall squeeze Progressing as expected  -SM      Tongue Base/Pharyngeal Wall Squeeze Progress 20%;with consistent cues;following model;continue to adress  -SM      Recorded by [SM] Akosua Solomon MS CCC-SLP      Dysphagia Other 1    Dysphagia Other 1 Objective Tolerate trials of mech-soft without s/s aspiration and no cues  -SM      Status: Dysphagia Other 1 New  -SM      Dysphagia Other 1 Progress 40%;with consistent cues;continue to address  -SM      Comments: Dysphagia Other 1 Choking on mech-soft items.  Better with level 3.  Will downgrade and consider return to mech-soft as more appropriate  -SM      Recorded by [SM] Akosua Solomon MS CCC-SLP      Bed Mobility, Assessment/Treatment    Bed Mobility, Assistive Device  bed rails;draw sheet  - head of bed elevated;draw sheet  -JR    Bed Mob, Supine to Sit, [place]  moderate assist (50% patient effort);2 person assist required;verbal cues required  - moderate assist (50% patient effort);2 person assist required  -    Bed Mobility, Safety Issues  cognitive deficits limit understanding;decreased use of arms for pushing/pulling;decreased use of legs for bridging/pushing  - cognitive deficits limit understanding;decreased use of arms for pushing/pulling;decreased use of legs for bridging/pushing;impaired trunk control for bed mobility  -JR    Bed Mobility, Impairments   strength decreased;impaired balance  -    Bed Mobility, Comment  VC's for sequencing.   -LS Pt required max verbal cues for sequencing through bed mobility  -    Recorded by  [LS] Lily Juárez, PT [JR] Judy Leach, OT    Transfer Assessment/Treatment    Transfers, Sit-Stand [place]  moderate assist (50% patient effort);2 person assist  required;verbal cues required  -LS moderate assist (50% patient effort);2 person assist required  -JR    Transfers, Stand-Sit [place]  moderate assist (50% patient effort);2 person assist required;verbal cues required  -LS moderate assist (50% patient effort);2 person assist required  -JR    Transfers, Sit-Stand-Sit, Assist Device  rolling walker  -LS rolling walker  -JR    Transfer, Safety Issues  impulsivity  -LS balance decreased during turns;sequencing ability decreased;step length decreased;weight-shifting ability decreased  -JR    Transfer, Impairments   strength decreased;impaired balance;coordination impaired;motor control impaired;postural control impaired  -JR    Transfer, Comment  VC's for hand placement.   - Pt required verbal cues for hand placement with transfers. In hallway pt required max verbal cues for stand to sit.  -JR    Recorded by  [LS] Lily Juárez, PT [JR] Judy Leach, OT    Gait Assessment/Treatment    Gait, [place] Level  moderate assist (50% patient effort);2 person assist required;verbal cues required  -LS     Gait, Assistive Device  rolling walker  -LS     Gait, Distance (Feet)  80  -LS     Gait, Gait Deviations  valente decreased;step length decreased  -LS     Gait, Safety Issues  supplemental O2  -LS     Gait, Impairments  strength decreased;impaired balance  -     Gait, Comment  Primarily min x2 for gait; req'd mod assist for maintaining upright posture when pt demonstrated decreased following of commands (initiating sitting prematurely). Followed closely with recliner for safety.   -     Recorded by  [LS] Lily Juárez, PT     Functional Mobility    Functional Mobility- Ind. Level   moderate assist (50% patient effort);2 person assist required   progressed to min A at times  -JR    Functional Mobility- Device   rolling walker  -JR    Functional Mobility- Safety Issues   balance decreased during turns;sequencing ability decreased;step length  decreased;weight-shifting ability decreased  -JR    Functional Mobility- Comment   Pt pushing walker too far out in front of time. Pt required max verbal cues for upright posture. Pt stopped in hallway and difficult to get pt restarted with mobiliity. Pt took seated rest period. Pt followed with chair. Pt requiring max verbal cues to keep grasp on walker.  -JR    Recorded by   [JR] Judy Leach, OT    Motor Skills/Interventions    Additional Documentation  Balance Skills Training (Group)  -LS     Recorded by  [LS] Lily Juárez, PT     Balance Skills Training    Sitting-Level of Assistance  Contact guard  -LS Contact guard  -JR    Sitting-Balance Support  Feet supported  -LS Feet supported  -JR    Standing-Level of Assistance  Minimum assistance;x2  -LS Minimum assistance;x2  -JR    Static Standing Balance Support  assistive device  -LS assistive device  -JR    Gait Balance-Level of Assistance  Moderate assistance;x2  -LS Moderate assistance;x2  -JR    Gait Balance Support  assistive device  -LS assistive device  -JR    Recorded by  [LS] Lily Juárez, PT [JR] Judy Leach, OT    Therapy Exercises    Bilateral Lower Extremities  AAROM:;10 reps;ankle pumps/circles;hip abduction/adduction;LAQ  -LS     Bilateral Upper Extremity   AAROM:;10 reps;elbow flexion/extension;shoulder extension/flexion  -JR    Recorded by  [LS] Lily Juárez, PT [JR] Judy Leach, OT    Positioning and Restraints    Pre-Treatment Position  in bed  -LS in bed  -JR    Post Treatment Position  chair  -LS chair  -JR    In Chair  notified nsg;reclined;call light within reach;encouraged to call for assist;exit alarm on;with family/caregiver;on mechanical lift sling;legs elevated  - notified nsg;reclined;call light within reach;encouraged to call for assist;exit alarm on;with family/caregiver;RUE elevated;LUE elevated;on mechanical lift sling  -JR    Restraints  released:;reapplied:;notified nsg:;soft limb  -LS  released:;reapplied:;notified nsg:;soft limb  -JR    Recorded by  [LS] Lily Juárez, PT [JR] Judy Leach, OT      04/06/17 1025 04/06/17 0900 04/05/17 1500    Rehab Assessment/Intervention    Discipline physical therapist  -  physical therapist  -    Document Type therapy note (daily note)  -  therapy note (daily note);evaluation   wound care eval  -MF    Subjective Information no complaints;agree to therapy   pt somnolent/confused, wife gave OK for tx  -MC  decreased LOC   family gave ok for tx.   -    Precautions/Limitations fall precautions;oxygen therapy device and L/min  -      Recorded by [MC] Rekha Mcgarry, PT  [MF] Pee Roldan, PT    Pain Assessment    Pain Assessment No/denies pain  -  Jay-Baker FACES  -    Jay-Baker FACES Pain Rating   0  -    Pain Intervention(s)   Medication (See MAR)  -    Recorded by [MC] Rekha Mcgarry, PT  [MF] Pee Roldan, PT    Cognitive Assessment/Intervention    Current Cognitive/Communication Assessment   impaired  -    Orientation Status   oriented to;person  -    Follows Commands/Answers Questions   25% of the time;able to follow single-step instructions;needs cueing  -    Recorded by   [MF] Pee Roldan, PT    Dysphagia Treatment Objectives and Progress    Dysphagia Treatment Objectives  Improve timing of pharyngeal response;Improve closure at entrance to airway;Improve tongue base & pharyngeal wall squeeze;Improve laryngeal closure  -LSA     Recorded by  [LSA] Chacha Pelletier, MS CCC-SLP     Improve timing of pharyngeal response    To improve timing of pharyngeal response, patient will:   Swallow in timely way using three second prep;Swallow in timely way using super-supraglottic swallow;80%;with consistent cues  -LSA    Status: Improve timing of pharyngeal response   New  -LSA    Timing of Pharyngeal Response Progress   continue to adress  -LSA    Recorded by   [LSA] Chacha Pelletier, MS CCC-SLP    Improve laryngeal  closure    To improve laryngeal closure, patient will:   Complete supraglottic swallow;80%;with consistent cues  -LSA    Status: Improve laryngeal closure   New  -LSA    Laryngeal Closure Progress   continue to adress  -LSA    Recorded by   [LSA] Chacha Pelletier MS CCC-SLP    Improve closure at entrance to airway    To improve closure at entrance to airway, patient will:   Complete super-supraglottic swallow;80%;with consistent cues  -LSA    Status: Improve closure at entrance to airway   New  -LSA    Closure at Entrance to Airway Progress   continue to adress  -LSA    Recorded by   [LSA] Chacha Pelletier MS CCC-SLP    Improve tongue base & pharyngeal wall squeeze    To improve tongue base & pharyngeal wall squeeze, patient will:   Complete effortful swallow;80%;with consistent cues  -LSA    Status: Improve tongue base & pharyngeal wall squeeze   New  -LSA    Tongue Base/Pharyngeal Wall Squeeze Progress   continue to adress  -LSA    Recorded by   [LSA] Chacha Pelletier MS CCC-ETELVINA    Positioning and Restraints    Pre-Treatment Position in bed  -  in bed  -    Post Treatment Position bed  -  bed  -    In Bed supine;call light within reach;encouraged to call for assist;with family/caregiver  -  supine;call light within reach;with family/caregiver;notified nsg  -MF    Restraints   released:;reapplied:;soft limb;notified nsg:  -MF    Recorded by [] Rekha Mcgarry, PT  [] Pee Roldan, PT      User Key  (r) = Recorded By, (t) = Taken By, (c) = Cosigned By    Initials Name Effective Dates     Pee Roldan, PT 06/19/15 -      Akosua Solomon, MS CCC-SLP 06/22/15 -      Judy Leach, OT 06/22/15 -     LSA Chacha Pelletier, MS CCC-SLP 06/22/15 -     LS Lily Juárez, PT 06/19/15 -      Rekha Mcgarry, PT 03/14/16 -                   IP SLP Goals       04/07/17 1438 04/06/17 1012       Safely Consume Diet    Safely Consume Diet- SLP, Date Established  04/06/17  -LS     Safely  Consume Diet- SLP, Time to Achieve  by discharge  -     Safely Consume Diet- SLP, Outcome  goal ongoing  -     Cognitive Linguistic- Optimal Participation in Care    Cognitive Linguistic Optimal Participation in Care- SLP, Date Established 04/07/17  -      Cognitive Linguistic Optimal Participation in Care- SLP, Time to Achieve by discharge  -      Cognitive Linguistic Optimal Participation in Care- SLP, Outcome goal ongoing  -        User Key  (r) = Recorded By, (t) = Taken By, (c) = Cosigned By    Initials Name Provider Type    JENNIFER Solomon MS CCC-SLP Speech and Language Pathologist    ANDREWS Pelletier, MS CCC-SLP Speech and Language Pathologist          EDUCATION  The patient has been educated in the following areas:   Dysphagia (Swallowing Impairment) Modified Diet Instruction.    SLP Recommendation and Plan                                           Plan of Care Review  Plan Of Care Reviewed With: patient, spouse  Outcome Summary/Follow up Plan: Dysphagia: Family reports difficulty with mech-soft - will downgrade to dysphagia level 3 puree with some soft.  Cog-Comm: Confusion. Poor attention/focus to task which impacts reponse.  Overuse of humor attempts also impacts.  Will cont to follow. Provided family with copy of dysphagia exercises to use between sessions.            Time Calculation:         Time Calculation- SLP       04/07/17 1439          Time Calculation- SLP    SLP Start Time 1300  -      SLP Received On 04/07/17  -        User Key  (r) = Recorded By, (t) = Taken By, (c) = Cosigned By    Initials Name Provider Type    JENNIFER Solomon MS CCC-SLP Speech and Language Pathologist          Therapy Charges for Today     Code Description Service Date Service Provider Modifiers Qty    [id number]  ST EVAL SPEECH AND PROD W LANG  2 4/7/2017 Akosua Solomon MS CCC-SLP GN 1    [id number]  ST TREATMENT SWALLOW 2 4/7/2017 Akosua Solomon MS CCC-SLP GN 1                  Akosua Solomon, MS CCC-SLP  4/7/2017

## 2017-04-07 NOTE — CONSULTS
"Diabetes Education  Assessment/Teaching    Patient Name:  Tomas Salvador  YOB: 1955  MRN: [id number]  Admit Date:  4/4/2017      Assessment Date:  4/7/2017       Most Recent Value    General Information      Referral From:  A1c, Blood glucose, MD order    Height  5' 9\" (1.753 m)    Weight  237 lb (108 kg)    Pregnancy Assessment     Diabetes History     Length of Diabetes Diagnosis  10 + years    Current DM knowledge  poor    Have you had diabetes education/teaching in the past?  no    Do you test your blood sugar at home?  yes    Frequency of checks  once daily    Meter type  contour next    Who performs the test?  pt and sometimes wife    Education Preferences     What areas of diabetes would you like to learn about?  avoiding high blood sugar, diabetes complications, diet information, exercise information, medications for diabetes, understanding diabetes, testing my blood sugar at home, stress/coping skills, resources on diabetes    Nutrition Information     Assessment Topics     Healthy Eating - Assessment  Needs education    Being Active - Assessment  Needs education    Taking Medication - Assessment  Needs education    Problem Solving - Assessment  Needs education    Reducing Risk - Assessment  Needs education    Healthy Coping - Assessment  Needs education    Monitoring - Assessment  Needs education    DM Goals     Healthy Eating - Goal  0-7 days from discharge    Being Active - Goal  30-90 days from discharge    Taking Medication - Goal  0-7 days from discharge    Problem Solving - Goal  0-7 days from discharge    Reducing Risk - Goal  0-7 days from discharge    Healthy Coping - Goal  0-7 days from discharge    Monitoring - Goal  0-7 days from discharge               Most Recent Value    DM Education Needs     Meter  Meter provided    Meter Type  Contour    Frequency of Testing  3 times a day    Blood Glucose Target Range      Medication  Insulin    Problem Solving  Hypoglycemia, " Hyperglycemia, Sick days, Signs, Symptoms, Treatment    Reducing Risks  A1C testing, Blood pressure, Eye exam, Immunizations, Cardiovascular    Healthy Eating  RD consult [plate method used for education]    Physical Activity  Walking    Physical Activity Frequency  Rarely            Other Comments:    Pt was not appropriate for education but wife was ready to be educated because she will be taking care of him. Pt's mom had DM and also had an amputation and stroke. Pt's wife requested and was educated on insulin administration using a vial and syringe and also on using a new contour next meter, with a successful return demonstration.  Discussed and taught patient's wife about type 2 diabetes self-management, risk factors, and importance of blood glucose control to reduce complications. Target blood glucose readings and A1c goals per ADA were reviewed. Reviewed current A1c and discussed its significance. Signs, symptoms and treatment of hyperglycemia and hypoglycemia were discussed. Lifestyle changes such as physical activity with MD approval and healthy eating were encouraged. Stressed the importance of strict blood sugar control after surgery to prevent complications and instructed to  check blood sugar 3 times per day and to call PCP if  Blood glucose is higher than 180 two times or more.  Patient's wife was encouraged to keep record of blood glucose readings to take to follow up appointment with PCP.  Pt was offered a free op follow up appointment however declined at this time. Pt will be going to a SNF at [place] and wife will be the primary care taker. Pt's wife advised to call us with any questions or concerns.       Electronically signed by:  Quang Corley RN  04/07/17 2:11 PM

## 2017-04-07 NOTE — PROGRESS NOTES
"  [facility]   Inpatient Progress Note       LOS: 3 days   Patient Care Team:  Jose Navarro MD as PCP - General (Family Medicine)    Chief Complaint:  Follow-up for CVA and atrial fibrillation    Subjective     Interval History:   Patient alert and will follow commands but not oriented to place or time. Currently in SR  Improving, following commands,no cardiovascular complaints    Review of Systems:   Pertinent positives noted in history, exam, and assessment. Otherwise reviewed and negative.      Objective     Vitals:  Blood pressure 102/72, pulse 82, temperature 98.2 °F (36.8 °C), temperature source Axillary, resp. rate 18, height 69\" (175.3 cm), weight 237 lb (108 kg), SpO2 99 %.     Intake/Output Summary (Last 24 hours) at 04/07/17 0840  Last data filed at 04/07/17 0600   Gross per 24 hour   Intake              515 ml   Output             1300 ml   Net             -785 ml     Physical Exam   Constitutional: He appears well-developed and well-nourished. No distress.   HENT:   Head: Normocephalic and atraumatic.   Neck: No JVD present. No tracheal deviation present.   Cardiovascular: Normal rate, regular rhythm and normal heart sounds.  Exam reveals no friction rub.    No murmur heard.  Pulmonary/Chest: Effort normal. No respiratory distress.   Decreased bases   Abdominal: Soft. Bowel sounds are normal. There is no tenderness.   Musculoskeletal: He exhibits no edema or deformity.   Neurological: He is alert.   Not oriented to place or time   Skin: Skin is warm and dry.     I have examined the patient and agree with the above findings     Results Review:     I reviewed the patient's new clinical results.      Results from last 7 days  Lab Units 04/07/17  0422   WBC 10*3/mm3 12.63*   HEMOGLOBIN g/dL 9.9*   HEMATOCRIT % 31.6*   PLATELETS 10*3/mm3 328       Results from last 7 days  Lab Units 04/07/17  0422  04/05/17  0417   SODIUM mmol/L 138  < > 136   POTASSIUM mmol/L 3.5  < > 3.9 "   CHLORIDE mmol/L 101  < > 102   TOTAL CO2 mmol/L 31.0  < > 25.0   BUN mg/dL 17  < > 6*   CREATININE mg/dL 0.70  < > 0.60   CALCIUM mg/dL 9.1  < > 9.3   BILIRUBIN mg/dL  --   --  0.3   ALK PHOS U/L  --   --  65   ALT (SGPT) U/L  --   --  12   AST (SGOT) U/L  --   --  22   GLUCOSE mg/dL 194*  < > 170*   < > = values in this interval not displayed.    Results from last 7 days  Lab Units 04/07/17  0422   SODIUM mmol/L 138   POTASSIUM mmol/L 3.5   CHLORIDE mmol/L 101   TOTAL CO2 mmol/L 31.0   BUN mg/dL 17   CREATININE mg/dL 0.70   GLUCOSE mg/dL 194*   CALCIUM mg/dL 9.1       Results from last 7 days  Lab Units 04/05/17  1349   INR  1.42       0  Lab Value Date/Time   TROPONINI <0.006 04/04/2017 1056   TROPONINI <0.006 04/03/2017 1851   TROPONINI 0.018 03/31/2017 0542   TROPONINI 0.024 03/30/2017 1515   TROPONINI 3.295 (C) 05/07/2015 2125   TROPONINI 3.801 (C) 05/07/2015 1549   TROPONINI 3.825 (C) 05/07/2015 1133       Results from last 7 days  Lab Units 04/05/17  0417   TSH mIU/mL 0.656       Results from last 7 days  Lab Units 04/05/17  0417   CHOLESTEROL mg/dL 108   TRIGLYCERIDES mg/dL 80   HDL CHOL mg/dL 30*         ECHO:  · Left ventricular function is severely decreased. Estimated EF appears to be in the range of 10%.  · The left ventricular cavity is severely dilated.  · Global left ventricular wall motion appears abnormal. There is severe diffuse hypokinesis.  · Left ventricular diastolic dysfunction.  · There is no evidence of a left ventricular thrombus present.  · Moderately reduced right ventricular systolic function noted.  · Left atrial cavity size is mildly dilated.  · Mild mitral valve regurgitation is present    Tele:  SR with intermittent breakthrough atrial fibrillation    Assessment/Plan     Principal Problem:    Sepsis  Active Problems:    Diabetes mellitus, type 2    Chronic respiratory failure with hypoxia    Urinary retention    E. coli UTI (urinary tract infection)    Epididymo-orchitis     Encephalopathy acute    Cellulitis    Yeast UTI      1. Acute CVA. not a TPA candidate and area of possible ischemia not felt to be amenable to intervention.  2. Ischemic cardiomyopathy.                - EF 10 %, no evidence of LV thrombus.          - On Coreg  3. Atrial fibrillation  - new diagnosis. CHADSVASC=6   -converted back to SR, currently on amiodarone   -will need lifelong anticoagulation, Eliquis has been started  4. CAD, stable  5. Dyslipidemia  6. Hypertension    Plan:   Entresto has been ordered for today, but unsure if BP will tolerate. At this time   Will need diuretics resumed once by mouth intake increases.continue amiodarone taper.    TRESA Mojica  04/07/17  8:40 AM    IJarocho have reviewed the note in full and agree with all aspects of the above including physical exam, assessment, labs and plan with changes made accordingly.     Jarocho Martin MD  04/07/17  1:34 PM          Please note that portions of this note may have been completed with a voice recognition program. Efforts were made to edit the dictations, but occasionally words are mistranscribed.

## 2017-04-07 NOTE — PROGRESS NOTES
"[facility] Progress Note    Admission Date: 4/4/2017    Tomas Salvador  1955  [id number]    Date: 4/7/2017    Meds:    IV Anti-Infectives     Ordered     Dose/Rate Route Frequency Start Stop    04/04/17 1723  metroNIDAZOLE (FLAGYL) IVPB 500 mg     Ordering Provider:  Kike Leon MD    500 mg  100 mL/hr over 60 Minutes Intravenous Every 6 Hours 04/04/17 1800      04/04/17 1552  ceFAZolin in dextrose (ANCEF) IVPB solution 2 g     Ordering Provider:  TRESA Baca    2 g  over 30 Minutes Intravenous Every 8 Hours 04/04/17 1600            CC:  Delisa's gangrene    SUBJECTIVE:  4/4/17:Patient is a 61 y.o. 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 He has a history of underlying diabetes mellitus and severe systolic congestive heart failure. He presented to [facility] [place] on 3/30 with scrotal cellulitis/gangrene. He underwent debridement by urology at [facility] [place] on 3/31 and was treated with Merrem/clindamycin/vancomycin. He developed left facial droop and left sided paresis, prompting a transfer to [facility] for evaluation and therapy of acute stroke. His head CT angiogram was degraded by motion artifact but per Dr. Morrison there was suggestion of right inferior temporal lobe\" opacification\" consistent with a possible stroke. His left facial droop and left-sided paresis has improved today. He is encephalopathic and is unable to provide any reliable history. His wife thinks that he may have had some fevers prior to his admission on 3/30.    4/5/17: afebrile. No hx per patient secondary to encephalopathy.  Afebrile, restless per nsg staff.  Oliguric UOP.  No n/v/d.  No rashes.  4/6/17: More alert and less restless today.  Still non-responsive to questions and does not follow simple commands.  Still with left-sided weakness.  Afebrile.  Still with oliguric UOP.  No n/v/d, no rashes, per nsg staff notes.   4/7/17:  Alert today.  No " fever,chills,sweats.  Wife at bedside with ?s         PE:   Vital Signs  Temp (24hrs), Av.7 °F (36.5 °C), Min:97 °F (36.1 °C), Max:98.2 °F (36.8 °C)    Temp  Min: 97 °F (36.1 °C)  Max: 98.2 °F (36.8 °C)  BP  Min: 85/62  Max: 134/114  Pulse  Min: 75  Max: 92  Resp  Min: 16  Max: 18  SpO2  Min: 90 %  Max: 100 %    GENERAL: He is awake but responsive  to question He is in no acute distress.  HEENT: Normocephalic, atraumatic. PERRL. EOMI. No conjunctival injection. No icterus. Oropharynx clear without evidence of thrush or exudate.   HEART: RRR; No murmur, rubs, gallops.   LUNGS: Clear to auscultation bilaterally without wheezing, rales, rhonchi. Normal respiratory effort. Nonlabored. No dullness.diminished  ABDOMEN: Soft, nontender, nondistended. Positive bowel sounds. No rebound or guarding. NO mass or HSM.  EXT: No cyanosis, clubbing or edema. No cord.  : A Ceuto catheter is in place.  He has decreased scrotal erythema/induration.dressing in place.  There is marked improvement in the previous wound slough.  He still has some Penrose drains in place   MSK: FROM without joint effusions noted arms/legs.   SKIN: Warm and dry without cutaneous eruptions on Inspection/palpation.   NEURO: He is more alert, but confused and is not following simple commands.  PSYCHIATRIC: He is encephalopathic and cannot provide an accurate history.  Right PICC without redness   Laboratory Data      Results from last 7 days  Lab Units 17  0422 17  0316 17  0417   WBC 10*3/mm3 12.63* 12.30* 13.42*   HEMOGLOBIN g/dL 9.9* 10.4* 11.4*   HEMATOCRIT % 31.6* 33.9* 36.6*   PLATELETS 10*3/mm3 328 359 405       Results from last 7 days  Lab Units 17  0422   SODIUM mmol/L 138   POTASSIUM mmol/L 3.5   CHLORIDE mmol/L 101   TOTAL CO2 mmol/L 31.0   BUN mg/dL 17   CREATININE mg/dL 0.70   GLUCOSE mg/dL 194*   CALCIUM mg/dL 9.1       Results from last 7 days  Lab Units 17  0417   ALK PHOS U/L 65   BILIRUBIN mg/dL 0.3   ALT  (SGPT) U/L 12   AST (SGOT) U/L 22           Results from last 7 days  Lab Units 04/04/17  0115   CRP mg/dL 3.42*       Results from last 7 days  Lab Units 04/02/17  0409   LACTATE mmol/L 0.8       Results from last 7 days  Lab Units 04/04/17  1056   CK TOTAL U/L 54       Results from last 7 days  Lab Units 04/01/17  1311   VANCOMYCIN RM mcg/mL 17.60     Estimated Creatinine Clearance: 132.5 mL/min (by C-G formula based on Cr of 0.7).    Microbiology:  Blood Culture   Date Value Ref Range Status   03/30/2017 No growth at 5 days  Final   03/30/2017 No growth at 5 days  Final           Urine Culture   Date Value Ref Range Status   04/04/2017 No growth  Preliminary   03/31/2017 No growth  Final   03/30/2017 >100,000 CFU/mL Escherichia coli (A)  Final      scrotal cx x 2 cxs (3/31) E. coli  Cefazolin sens.    4/4/17:  Urine culture:  >100K cfu Yeast        Radiology:  Imaging Results (last 24 hours)     Procedure Component Value Units Date/Time    CT Head Without Contrast [[id number]] Collected:  04/05/17 1241     Updated:  04/05/17 1241    Narrative:       EXAMINATION: CT HEAD WO CONTRAST-, CT ANGIOGRAM HEAD W WO CONTRAST-  04/04/2017     INDICATION: CVA; altered mental status, bilateral upper extremities  weakness.     TECHNIQUE: Contiguous axial 5 mm sections through the brain parenchyma  without intravenous contrast.  Multislice helical CT angiogram of the head before and after intravenous  contrast.     Two dimensional and three dimensional reformatted images were provided  for this exam.  NASCET criteria are utilized for calculation of stenosis.     COMPARISON: NONE     FINDINGS:   Brain:  The midline structures are central. There is no midline shift. Areas of  decreased attenuation are present in the left frontal periventricular  white matter. An old infarct is present in the left frontal lobe. The  structures of the posterior fossa are grossly normal. The images are  somewhat compromised by motion artifact.     The  bony calvarium is intact.     Brain CTA:  The brain CTA images are extremely compromised by patient motion.  Right:  Distal portions of the right internal carotid artery are normal in  course and caliber. The right middle cerebral artery is grossly  unremarkable. The anterior cerebral artery cannot be evaluated secondary  to motion artifact.     Left:  Distal portions of the left internal carotid artery are normal in course  and caliber. The left middle cerebral artery is normal. The anterior  cerebral artery cannot be well evaluated secondary to motion artifact.     Posterior circulation:  The distal vertebral arteries, basilar artery, and posterior cerebral  arteries are normal in course and caliber.       Impression:       1. Both exams compromised by motion artifact.  2. Grossly no acute intracranial process.  3. 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  4. Normal middle cerebral arteries and posterior circulation.  5. The anterior cerebral arteries are not well evaluated.     D:  04/05/2017  E:  04/05/2017          CT Angiogram Head With & Without Contrast [[id number]] Collected:  04/05/17 1241     Updated:  04/05/17 1241    Narrative:       EXAMINATION: CT HEAD WO CONTRAST-, CT ANGIOGRAM HEAD W WO CONTRAST-  04/04/2017     INDICATION: CVA; altered mental status, bilateral upper extremities  weakness.     TECHNIQUE: Contiguous axial 5 mm sections through the brain parenchyma  without intravenous contrast.  Multislice helical CT angiogram of the head before and after intravenous  contrast.     Two dimensional and three dimensional reformatted images were provided  for this exam.  NASCET criteria are utilized for calculation of stenosis.     COMPARISON: NONE     FINDINGS:   Brain:  The midline structures are central. There is no midline shift. Areas of  decreased attenuation are present in the left frontal periventricular  white matter. An old infarct is present in the left frontal lobe. The  structures of the posterior fossa are  grossly normal. The images are  somewhat compromised by motion artifact.     The bony calvarium is intact.     Brain CTA:  The brain CTA images are extremely compromised by patient motion.  Right:  Distal portions of the right internal carotid artery are normal in  course and caliber. The right middle cerebral artery is grossly  unremarkable. The anterior cerebral artery cannot be evaluated secondary  to motion artifact.     Left:  Distal portions of the left internal carotid artery are normal in course  and caliber. The left middle cerebral artery is normal. The anterior  cerebral artery cannot be well evaluated secondary to motion artifact.     Posterior circulation:  The distal vertebral arteries, basilar artery, and posterior cerebral  arteries are normal in course and caliber.       Impression:       1. Both exams compromised by motion artifact.  2. Grossly no acute intracranial process.  3. 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  4. Normal middle cerebral arteries and posterior circulation.  5. The anterior cerebral arteries are not well evaluated.     D:  04/05/2017  E:  04/05/2017          XR Chest 1 View [[id number]] Collected:  04/05/17 1406     Updated:  04/05/17 1423    Narrative:       EXAMINATION: XR CHEST 1 VIEW-04/04/2017:      INDICATION: Sepsis, cardiomyopathy; R13.10-Dysphagia, unspecified.      COMPARISON: NONE.     FINDINGS:   1.  There is cardiomegaly with perihilar vascular congestion.  2.  Bibasilar consolidative airspace opacities are noted, worse on the  left than right.  3.  ICD pacemaker defibrillator is in place from the left. The upper  lung zones are clear.           Impression:       1.  Cardiomegaly is noted. There is airspace opacity left base with  consolidation. There is congestive hilar and mid lung vascular  abnormalities.  2.  Compared to previous studies of 12/17/2015, it appears that the  central congestive vascular edema is new. The density in the left lower  lobe, retrocardiac area and  the left base effusion are new. Cardiomegaly  is relatively stable.     D:  04/05/2017  E:  04/05/2017     This report was finalized on 4/5/2017 2:21 PM by Dr. Scottie Bowling MD.       CT Head Without Contrast [[id number]] Collected:  04/05/17 1648     Updated:  04/05/17 1648    Narrative:       EXAMINATION: CT HEAD WITHOUT CONTRAST-04/05/2017:      INDICATION: CVA; R13.10-Dysphagia, unspecified; Z74.09-Other reduced  mobility; Z74.09-Other reduced mobility.         TECHNIQUE: CT scan of the head was performed at 5 mm intervals. No  intravenous contrast was utilized.     COMPARISON: 04/04/2017.     FINDINGS: There is a small old left frontal infarct. There is no  intracranial mass, hemorrhage, midline shift or extra-axial fluid  collection.       Impression:       Old left frontal infarct. There are no acute findings.     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 He is significantly improved.  2. Acute right hemispheric cerebral vascular accident-with left sided facial and left-sided weakness. His head CT angiogram here reveals subtle right temporal abnormalities per Dr. Morrison. He has clinically improved.  3. Acute toxic metabolic encephalopathy-secondary to sepsis and cerebral vascular accident  4. 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 Severe systolic congestive heart failure-with an ejection fraction of less than 20% on echocardiogram performed on 3/8/17  6. Escherichia coli urinary tract infection  7. Sepsis-secondary to Delisa's gangrene  8. Leukocytosis/neutrophilia-secondary to scrotal gangrene/cellulitis, worse today.  9.  LLL pulmonary infiltrate  10. Candiduria-he has a relative  contraindication for fluconazole therapy since he is on amiodarone.  I will plan to have his Colbert catheter removed as a treatment for his candiduria.  I discussed this with Dr. Pope and the nursing staff today.      PLAN/RECOMMENDATIONS:   1. Cefazolin 2 g IV every 8 hours  2. Flagyl 500 mg IV every 8 hours  3. PT wound care with debridement and pulse lavage  4.  DC colbert, repeat UA/UC       Kike Leon MD saw and examined patient, verified hx and PE, read all radiographic studies, reviewed labs and micro data, and formulated dx, plan for treatment and all medical decision making.      I discussed his complex situation with Dr. Pope and with Pee in PT wound care again today.  Pee will  remove the Penrose drains in the scrotal wounds tomorrow.  I discussed his disposition with Dr. Pope.  We will consider transfer to [facility].  I will see him again on Monday.        Kike Leon MD  4/7/2017  5:54 PM

## 2017-04-07 NOTE — PROGRESS NOTES
Adult Nutrition  Assessment/PES    Patient Name:  Tomas Salvador  YOB: 1955  MRN: [id number]  Admit Date:  4/4/2017    Assessment Date:  4/7/2017        Reason for Assessment       04/07/17 1238    Reason for Assessment    Reason For Assessment/Visit multidisciplinary rounds;follow up protocol    Time Spent (min) 30    Diagnosis --   per notes of this adm              Nutrition/Diet History       04/07/17 1239    Nutrition/Diet History    Reported/Observed By Patient;Family;RN    Appetite Improved    Other wife reports pt fatigues w/ eating; she is feeding him and allowing him to rest between bites; this allows for more intake.              Labs/Tests/Procedures/Meds       04/07/17 1241    Labs/Tests/Procedures/Meds    Labs/Tests Review Reviewed                Nutrition Prescription Ordered       04/07/17 1241    Nutrition Prescription PO    Current PO Diet Dysphagia    Dysphagia Level 4  Mechanical soft no mixed consistencies    Fluid Consistency Nectar/syrup thick   No Straws    Supplement Magic Cup    Supplement Frequency Daily    Common Modifiers Cardiac;Consistent Carbohydrate            Evaluation of Received Nutrient/Fluid Intake       04/07/17 1242    PO Evaluation    Number of Days PO Intake Evaluated 1 day    Number of Meals 1    % PO Intake 50              Problem/Interventions:        Problem 1       04/07/17 1247    Nutrition Diagnoses Problem 1    Problem 1 Predicted Suboptimal Intake    Etiology (related to) Factors Affecting Nutrition    Functional Diagnosis Dysphagia    Appetite Poor    Signs/Symptoms (evidenced by) PO Intake    Percent (%) intake recorded 50 %    Over number of meals 1                    Intervention Goal       04/07/17 1245    Intervention Goal    General Nutrition support treatment    PO Increase intake            Nutrition Intervention       04/07/17 1247    Nutrition Intervention    RD/Tech Action Menu adjusted;Encourage intake;Care plan reviewd;Follow Tx  progress              Education/Evaluation       04/07/17 1247    Monitor/Evaluation    Monitor Per protocol;PO intake;Supplement intake        Comments:      Electronically signed by:  Melanie Bah RD  04/07/17 12:48 PM

## 2017-04-07 NOTE — PROGRESS NOTES
Acute Care - Physical Therapy Treatment Note  [facility]     Patient Name: Tomas Salvador  : 1955  MRN: [id number]  Today's Date: 2017  Onset of Illness/Injury or Date of Surgery Date: 17  Date of Referral to PT: 17  Referring Physician: Dr. Morrison    Admit Date: 2017    Visit Dx:    ICD-10-CM ICD-9-CM   1. Dysphagia, unspecified type R13.10 787.20   2. Impaired functional mobility, balance, gait, and endurance Z74.09 V49.89   3. Impaired mobility and ADLs Z74.09 799.89   4. Cognitive communication deficit R41.841 799.52     Patient Active Problem List   Diagnosis   • Coronary disease   • Ischemic cardiomyopathy   • Peripheral vascular disease   • ICD (implantable cardioverter-defibrillator) in place, implantation .   • Dyslipidemia, on atorvastain.    • Sepsis   • Diabetes education, encounter for   • Chronic respiratory failure with hypoxia   • Urinary retention   • Hyperlipidemia   • Essential hypertension   • E. coli UTI (urinary tract infection)   • Epididymo-orchitis   • Encephalopathy acute   • Cellulitis   • Yeast UTI               Adult Rehabilitation Note       17 1330 17 1300 17 1444    Rehab Assessment/Intervention    Discipline physical therapist  -EDOUARD speech language pathologist  -SM physical therapist  -LS    Document Type therapy note (daily note)  -EDOUARD evaluation;therapy note (daily note)  - therapy note (daily note)  -LS    Subjective Information agree to therapy;complains of;fatigue  -EDOUARD  agree to therapy;no complaints  -LS    Patient Effort, Rehab Treatment good  -EDOUARD adequate  -SM adequate  -LS    Symptoms Noted During/After Treatment other (see comments)   patient became more agitated with increased activity   -EDOUARD      Symptoms Noted Comment patient became impulsive letting go of walker and wanting to sit then refusing to sit increased agitation  -EDOUARD      Precautions/Limitations fall precautions  -EDOUARD  fall precautions;oxygen therapy device and  L/min  -LS    Recorded by [EDOUARD] Anita Prado PT [SM] Akosua Solomon MS CCC-SLP [LS] Lily Juárez, PT    Vital Signs    Pre Systolic BP Rehab 136  -EDOUARD  107  -LS    Pre Treatment Diastolic BP 54  -EDOUARD  76  -LS    Post Systolic BP Rehab   108  -LS    Post Treatment Diastolic BP   77  -LS    Pretreatment Heart Rate (beats/min) 68  -EDOUARD  82  -LS    Posttreatment Heart Rate (beats/min)   83  -LS    Pre SpO2 (%) 95  -EDOUARD  100  -LS    O2 Delivery Pre Treatment supplemental O2  -EDOUARD  supplemental O2   2.5L  -LS    Post SpO2 (%)   100  -LS    O2 Delivery Post Treatment   supplemental O2  -LS    Pre Patient Position   Supine  -LS    Intra Patient Position   Standing  -LS    Post Patient Position   Sitting  -LS    Recorded by [EDOUARD] Anita Prado, PT  [LS] Lily Juárez, PT    Pain Assessment    Pain Assessment Jay-Kent FACES  -EDOUARD  0-10  -LS    Jay-Kent FACES Pain Rating 2  -EDOUARD  0  -LS    Pain Score   0  -LS    Post Pain Score   0  -LS    Pain Intervention(s) Repositioned;Ambulation/increased activity  -EDOUARD      Recorded by [EDOUARD] Anita Prado, PT  [LS] Lily Juárez, PT    Cognitive Assessment/Intervention    Current Cognitive/Communication Assessment impaired  -EDOUARD  impaired  -LS    Orientation Status oriented to;person  -EDOUARD  oriented to;person;disoriented to;place;time  -LS    Follows Commands/Answers Questions 75% of the time;needs repetition  -EDOUARD  able to follow single-step instructions;25% of the time;50% of the time;needs cueing;needs increased time;needs repetition  -LS    Personal Safety   severe impairment;at risk behaviors demonstrated;impulsive;decreased awareness, need for assist;decreased awareness, need for safety;decreased insight to deficits  -LS    Personal Safety Interventions   fall prevention program maintained;gait belt;nonskid shoes/slippers when out of bed  -LS    Recorded by [EDOUARD] Anita Prado, PT  [LS] Lily Juárez, PT    Dysphagia Treatment Objectives and Progress    Dysphagia  Treatment Objectives  Improve hyolaryngeal excursion;Other 1  -SM     Recorded by  [SM] Akosua Solomon MS CCC-SLP     Improve timing of pharyngeal response    To improve timing of pharyngeal response, patient will:  Swallow in timely way using three second prep;Swallow in timely way using super-supraglottic swallow;80%;with consistent cues  -SM     Status: Improve timing of pharyngeal response  Progressing as expected  -SM     Timing of Pharyngeal Response Progress  10%;with consistent cues;continue to adress   cognition/focus greatly impacted  -SM     Recorded by  [SM] Akosua Solomon MS CCC-SLP     Improve laryngeal closure    To improve laryngeal closure, patient will:  Complete supraglottic swallow;80%;with consistent cues  -SM     Status: Improve laryngeal closure  Progressing as expected  -SM     Laryngeal Closure Progress  0%;with consistent cues;following model;continue to adress  -SM     Recorded by  [SM] Akosua Solomon MS CCC-SLP     Improve closure at entrance to airway    To improve closure at entrance to airway, patient will:  Complete super-supraglottic swallow;80%;with consistent cues  -SM     Status: Improve closure at entrance to airway  Progressing as expected  -SM     Closure at Entrance to Airway Progress  0%;with consistent cues;following model;continue to adress  -SM     Recorded by  [] Akosua Solomon MS CCC-SLP     Improve hyolaryngeal excursion    To improve hyolaryngeal excursion, patient will:  Complete chin tuck against resistance (comment number of repetitions);90%;with consistent cues   20 sec reps x4  -SM     Status: Improve hyolaryngeal excursion  New  -SM     Hyolaryngeal Excursion Progress  40%;with consistent cues;continue to adress  -SM     Recorded by  [] Akosua Solomon MS CCC-SLP     Improve tongue base & pharyngeal wall squeeze    To improve tongue base & pharyngeal wall squeeze, patient will:  Complete effortful swallow;80%;with consistent cues   -SM     Status: Improve tongue base & pharyngeal wall squeeze  Progressing as expected  -SM     Tongue Base/Pharyngeal Wall Squeeze Progress  20%;with consistent cues;following model;continue to adress  -SM     Recorded by  [SM] Akosua Solomon MS CCC-SLP     Dysphagia Other 1    Dysphagia Other 1 Objective  Tolerate trials of mech-soft without s/s aspiration and no cues  -SM     Status: Dysphagia Other 1  New  -SM     Dysphagia Other 1 Progress  40%;with consistent cues;continue to address  -SM     Comments: Dysphagia Other 1  Choking on mech-soft items.  Better with level 3.  Will downgrade and consider return to mech-soft as more appropriate  -SM     Recorded by  [SM] Akosua Solomon MS CCC-SLP     Bed Mobility, Assessment/Treatment    Bed Mobility, Assistive Device bed rails;draw sheet  -EDOUARD  bed rails;draw sheet  -LS    Bed Mobility, Scoot/Bridge, [place] minimum assist (75% patient effort)  -EDOUARD      Bed Mob, Supine to Sit, [place] moderate assist (50% patient effort);2 person assist required  -EDOUARD  moderate assist (50% patient effort);2 person assist required;verbal cues required  -LS    Bed Mobility, Safety Issues   cognitive deficits limit understanding;decreased use of arms for pushing/pulling;decreased use of legs for bridging/pushing  -LS    Bed Mobility, Comment cues for sequencing  -EDOUARD  VC's for sequencing.   -LS    Recorded by [EDOUARD] Anita Prado, PT  [LS] Lily Juárez, PT    Transfer Assessment/Treatment    Transfers, Sit-Stand [place] minimum assist (75% patient effort);2 person assist required  -EDOUARD  moderate assist (50% patient effort);2 person assist required;verbal cues required  -LS    Transfers, Stand-Sit [place] minimum assist (75% patient effort);2 person assist required  -EDOUARD  moderate assist (50% patient effort);2 person assist required;verbal cues required  -LS    Transfers, Sit-Stand-Sit, Assist Device   rolling walker  -LS    Transfer, Safety Issues    impulsivity  -LS    Transfer, Comment   VC's for hand placement.   -LS    Recorded by [EDOUARD] Anita Prado, PT  [LS] Lily Juárez, PT    Gait Assessment/Treatment    Gait, [place] Level minimum assist (75% patient effort);2 person assist required  -EDOUARD  moderate assist (50% patient effort);2 person assist required;verbal cues required  -LS    Gait, Assistive Device rolling walker  -EDOUARD  rolling walker  -LS    Gait, Distance (Feet) 150   family pulling chair behind patient for safety  -EDOUARD  80  -LS    Gait, Gait Deviations   valente decreased;step length decreased  -LS    Gait, Safety Issues balance decreased during turns;sequencing ability decreased;supplemental O2  -EDOUARD  supplemental O2  -LS    Gait, Impairments strength decreased;impaired balance  -EDOUARD  strength decreased;impaired balance  -LS    Gait, Comment patient became more agitated with walking less cooperative needing more cues for safety patient letting go of walker   -EDOUARD  Primarily min x2 for gait; req'd mod assist for maintaining upright posture when pt demonstrated decreased following of commands (initiating sitting prematurely). Followed closely with recliner for safety.   -LS    Recorded by [EDOUARD] Anita Prado, PT  [LS] Lily Juárez, PT    Motor Skills/Interventions    Additional Documentation   Balance Skills Training (Group)  -LS    Recorded by   [LS] Lily Juárez, PT    Balance Skills Training    Sitting-Level of Assistance Contact guard  -EDOUARD  Contact guard  -LS    Sitting-Balance Support Feet supported  -EDOUARD  Feet supported  -LS    Standing-Level of Assistance Minimum assistance;x2  -EDOUARD  Minimum assistance;x2  -LS    Static Standing Balance Support assistive device  -EDOUARD  assistive device  -LS    Gait Balance-Level of Assistance Minimum assistance;x2  -EDOUARD  Moderate assistance;x2  -LS    Gait Balance Support assistive device  -EDOUARD  assistive device  -LS    Recorded by [EDOUARD] Anita Prado, PT  [LS] Lily Juárez, PT    Therapy Exercises     Bilateral Lower Extremities AAROM:;10 reps;sitting;ankle pumps/circles;LAQ  -EDOUARD  AAROM:;10 reps;ankle pumps/circles;hip abduction/adduction;LAQ  -LS    Recorded by [EDOUARD] Anita Prado, PT  [LS] Liyl Juárez, PT    Positioning and Restraints    Pre-Treatment Position in bed  -EDOUARD  in bed  -LS    Post Treatment Position chair  -EDOUARD  chair  -LS    In Chair notified nsg;reclined;sitting;call light within reach;exit alarm on;with family/caregiver  -EDOUARD  notified nsg;reclined;call light within reach;encouraged to call for assist;exit alarm on;with family/caregiver;on mechanical lift sling;legs elevated  -LS    Restraints released:;reapplied:;notified nsg:  -EDOUARD  released:;reapplied:;notified nsg:;soft limb  -LS    Recorded by [EDOUARD] Anita Prado, PT  [LS] Lily Juárez, PT      04/06/17 1443 04/06/17 1025 04/06/17 0900    Rehab Assessment/Intervention    Discipline occupational therapist  -JR physical therapist  -MC     Document Type therapy note (daily note)  - therapy note (daily note)  -     Subjective Information no complaints;agree to therapy  -JR no complaints;agree to therapy   pt somnolent/confused, wife gave OK for tx  -MC     Patient Effort, Rehab Treatment good  -JR      Symptoms Noted Comment Pt restless throughout treatment session.  -JR      Precautions/Limitations fall precautions;oxygen therapy device and L/min  -JR fall precautions;oxygen therapy device and L/min  -MC     Recorded by [JR] Judy Leach OT [MC] Rekha Mcgarry, PT     Vital Signs    Pre Systolic BP Rehab 107  -JR      Pre Treatment Diastolic BP 75  -JR      Post Systolic BP Rehab 108  -JR      Post Treatment Diastolic BP 77  -JR      Pretreatment Heart Rate (beats/min) 80  -JR      Posttreatment Heart Rate (beats/min) 83  -JR      Pre SpO2 (%) 99  -JR      O2 Delivery Pre Treatment supplemental O2   3L  -JR      Post SpO2 (%) 100  -JR      O2 Delivery Post Treatment supplemental O2  -JR      Pre Patient Position Supine  -JR       Intra Patient Position Standing  -JR      Post Patient Position Sitting  -JR      Recorded by [JR] Judy Leach, OT      Pain Assessment    Pain Assessment 0-10  -JR No/denies pain  -     Jay-Kent FACES Pain Rating 0  -JR      Pain Score 0  -JR      Post Pain Score 0  -JR      Recorded by [JR] Judy Leach OT [MC] Rekha Mcgarry, PT     Cognitive Assessment/Intervention    Current Cognitive/Communication Assessment impaired  -JR      Orientation Status oriented to;person  -JR      Follows Commands/Answers Questions 25% of the time;50% of the time;able to follow single-step instructions  -JR      Personal Safety severe impairment;decreased awareness, need for assist;decreased awareness, need for safety;decreased insight to deficits  -JR      Recorded by [JR] Judy Leach OT      Dysphagia Treatment Objectives and Progress    Dysphagia Treatment Objectives   Improve timing of pharyngeal response;Improve closure at entrance to airway;Improve tongue base & pharyngeal wall squeeze;Improve laryngeal closure  -LSA    Recorded by   [LSA] Chacha Pelletier MS CCC-SLP    Bed Mobility, Assessment/Treatment    Bed Mobility, Assistive Device head of bed elevated;draw sheet  -      Bed Mob, Supine to Sit, [place] moderate assist (50% patient effort);2 person assist required  -JR      Bed Mobility, Safety Issues cognitive deficits limit understanding;decreased use of arms for pushing/pulling;decreased use of legs for bridging/pushing;impaired trunk control for bed mobility  -JR      Bed Mobility, Impairments strength decreased;impaired balance  -JR      Bed Mobility, Comment Pt required max verbal cues for sequencing through bed mobility  -JR      Recorded by [JR] Judy Leach, OT      Transfer Assessment/Treatment    Transfers, Sit-Stand [place] moderate assist (50% patient effort);2 person assist required  -JR      Transfers, Stand-Sit [place] moderate assist (50% patient effort);2  person assist required  -JR      Transfers, Sit-Stand-Sit, Assist Device rolling walker  -JR      Transfer, Safety Issues balance decreased during turns;sequencing ability decreased;step length decreased;weight-shifting ability decreased  -JR      Transfer, Impairments strength decreased;impaired balance;coordination impaired;motor control impaired;postural control impaired  -JR      Transfer, Comment Pt required verbal cues for hand placement with transfers. In hallway pt required max verbal cues for stand to sit.  -JR      Recorded by [JR] Judy Leach OT      Functional Mobility    Functional Mobility- Ind. Level moderate assist (50% patient effort);2 person assist required   progressed to min A at times  -JR      Functional Mobility- Device rolling walker  -JR      Functional Mobility- Safety Issues balance decreased during turns;sequencing ability decreased;step length decreased;weight-shifting ability decreased  -JR      Functional Mobility- Comment Pt pushing walker too far out in front of time. Pt required max verbal cues for upright posture. Pt stopped in hallway and difficult to get pt restarted with mobiliity. Pt took seated rest period. Pt followed with chair. Pt requiring max verbal cues to keep grasp on walker.  -JR      Recorded by [JR] Judy Leach OT      Balance Skills Training    Sitting-Level of Assistance Contact guard  -JR      Sitting-Balance Support Feet supported  -JR      Standing-Level of Assistance Minimum assistance;x2  -JR      Static Standing Balance Support assistive device  -JR      Gait Balance-Level of Assistance Moderate assistance;x2  -JR      Gait Balance Support assistive device  -JR      Recorded by [JR] Judy Leach OT      Therapy Exercises    Bilateral Upper Extremity AAROM:;10 reps;elbow flexion/extension;shoulder extension/flexion  -JR      Recorded by [JR] Judy Leach OT      Positioning and Restraints    Pre-Treatment Position in bed  -JR in bed   -     Post Treatment Position chair  -JR bed  -     In Bed  supine;call light within reach;encouraged to call for assist;with family/caregiver  -     In Chair notified nsg;reclined;call light within reach;encouraged to call for assist;exit alarm on;with family/caregiver;RUE elevated;LUE elevated;on mechanical lift sling  -JR      Restraints released:;reapplied:;notified nsg:;soft limb  -JR      Recorded by [JR] Judy Leach, OT [] Rekha Mcgarry, PT       04/05/17 1500          Rehab Assessment/Intervention    Discipline physical therapist  -      Document Type therapy note (daily note);evaluation   wound care eval  -      Subjective Information decreased LOC   family gave ok for tx.   -MF      Recorded by [MF] Pee Roldan, PT      Pain Assessment    Pain Assessment Jay-Baker FACES  -      Jay-Baker FACES Pain Rating 0  -      Pain Intervention(s) Medication (See MAR)  -MF      Recorded by [MF] Pee Roldan, PT      Cognitive Assessment/Intervention    Current Cognitive/Communication Assessment impaired  -      Orientation Status oriented to;person  -      Follows Commands/Answers Questions 25% of the time;able to follow single-step instructions;needs cueing  -      Recorded by [MF] Pee Roldan, PT      Improve timing of pharyngeal response    To improve timing of pharyngeal response, patient will: Swallow in timely way using three second prep;Swallow in timely way using super-supraglottic swallow;80%;with consistent cues  -LSA      Status: Improve timing of pharyngeal response New  -LSA      Timing of Pharyngeal Response Progress continue to adress  -LSA      Recorded by [LSA] Chacha Pelletier MS CCC-SLP      Improve laryngeal closure    To improve laryngeal closure, patient will: Complete supraglottic swallow;80%;with consistent cues  -LSA      Status: Improve laryngeal closure New  -LSA      Laryngeal Closure Progress continue to adress  -LSA      Recorded by [LSA]  Chacha Pelletier, MS CCC-SLP      Improve closure at entrance to airway    To improve closure at entrance to airway, patient will: Complete super-supraglottic swallow;80%;with consistent cues  -LSA      Status: Improve closure at entrance to airway New  -LSA      Closure at Entrance to Airway Progress continue to adress  -LSA      Recorded by [LSA] Chacha Pelletier, MS CCC-SLP      Improve tongue base & pharyngeal wall squeeze    To improve tongue base & pharyngeal wall squeeze, patient will: Complete effortful swallow;80%;with consistent cues  -LSA      Status: Improve tongue base & pharyngeal wall squeeze New  -LSA      Tongue Base/Pharyngeal Wall Squeeze Progress continue to adress  -LSA      Recorded by [LSA] Chacha Pelletier, MS CCC-SLP      Positioning and Restraints    Pre-Treatment Position in bed  -MF      Post Treatment Position bed  -MF      In Bed supine;call light within reach;with family/caregiver;notified nsg  -MF      Restraints released:;reapplied:;soft limb;notified nsg:  -MF      Recorded by [MF] Pee Roldan, PT        User Key  (r) = Recorded By, (t) = Taken By, (c) = Cosigned By    Initials Name Effective Dates    EDOUARD Anita Prado, PT 06/19/15 -      Pee Roldan, PT 06/19/15 -      Akosua Solomon, MS CCC-SLP 06/22/15 -      Judy Leach, OT 06/22/15 -     LSA Chacha Pelletier, MS CCC-SLP 06/22/15 -     LS Lily Juárez, PT 06/19/15 -      Rekha Mcgarry, PT 03/14/16 -                 IP PT Goals       04/06/17 1524 04/06/17 1025 04/05/17 0909    Bed Mobility PT LTG    Bed Mobility PT LTG, Date Established   04/05/17  -    Bed Mobility PT LTG, Time to Achieve   2 wks  -    Bed Mobility PT LTG, Activity Type   supine to sit/sit to supine  -    Bed Mobility PT LTG, [place] Level   supervision required  -    Bed Mobility PT LTG, Outcome goal ongoing  -      Transfer Training PT LTG    Transfer Training PT LTG, Date Established   04/05/17  -     Transfer Training PT LTG, Time to Achieve   2 wks  -LS    Transfer Training PT LTG, Activity Type   sit to stand/stand to sit  -LS    Transfer Training PT LTG, [place] Level   contact guard assist  -LS    Transfer Training PT LTG, Assist Device   walker, rolling  -LS    Transfer Training PT LTG, Outcome goal ongoing  -LS      Gait Training PT LTG    Gait Training Goal PT LTG, Date Established   04/05/17  -LS    Gait Training Goal PT LTG, Time to Achieve   2 wks  -LS    Gait Training Goal PT LTG, [place] Level   contact guard assist  -LS    Gait Training Goal PT LTG, Assist Device   walker, rolling  -LS    Gait Training Goal PT LTG, Distance to Achieve   200  -LS    Gait Training Goal PT LTG, Outcome goal ongoing  -LS      Wound Care PT LTG    Wound Care PT LTG 1, Date Established  04/06/17  -     Wound Care PT LTG 1, Time to Achieve  1 wk  -     Wound Care PT LTG 1, Location  [place]  -     Wound Care PT LTG 1, No S&S of Infection  yes  -     Wound Care PT LTG 1, Decrease Wound Size  10%  -     Wound Care PT LTG 1, Decrease Necrotic Tissue  50%  -     Wound Care PT LTG 1, Decrease Exudate  minimum  -     Wound Care PT LTG 1, No New Skin Break Down  yes  -     Wound Care PT LTG 1, Education  dressing changes;wound care  -     Wound Care PT LTG 1, Education Understanding  verbalize understanding  -       User Key  (r) = Recorded By, (t) = Taken By, (c) = Cosigned By    Initials Name Provider Type     Lily Juárez, PT Physical Therapist     Rekha Mcgarry, PT Physical Therapist          Physical Therapy Education     Title: PT OT SLP Therapies (Active)     Topic: Physical Therapy (Active)     Point: Mobility training (Active)    Learning Progress Summary    Learner Readiness Method Response Comment Documented by Status   Patient Acceptance E NR  EDOUARD 04/07/17 1534 Active    Acceptance E,D NR  LS 04/06/17 1523 Active    Acceptance E,D NR  LS 04/05/17 0908 Active   Significant Other  Acceptance E,D NR  LS 04/06/17 1523 Active    Acceptance E,D NR  LS 04/05/17 0908 Active               Point: Home exercise program (Active)    Learning Progress Summary    Learner Readiness Method Response Comment Documented by Status   Patient Acceptance E NR  EDOUARD 04/07/17 1534 Active    Acceptance E,D NR  LS 04/06/17 1523 Active   Significant Other Acceptance E,D NR  LS 04/06/17 1523 Active               Point: Body mechanics (Active)    Learning Progress Summary    Learner Readiness Method Response Comment Documented by Status   Patient Acceptance E NR  EDOUARD 04/07/17 1534 Active    Acceptance E,D NR  LS 04/06/17 1523 Active    Acceptance E,D NR  LS 04/05/17 0908 Active   Significant Other Acceptance E,D NR  LS 04/06/17 1523 Active    Acceptance E,D NR  LS 04/05/17 0908 Active               Point: Precautions (Active)    Learning Progress Summary    Learner Readiness Method Response Comment Documented by Status   Patient Acceptance E NR  EDOUARD 04/07/17 1534 Active    Acceptance E,D NR  LS 04/06/17 1523 Active    Acceptance E,D NR  LS 04/05/17 0908 Active   Significant Other Acceptance E,D NR  LS 04/06/17 1523 Active    Acceptance E,D NR  LS 04/05/17 0908 Active                      User Key     Initials Effective Dates Name Provider Type Discipline     06/19/15 -  Anita Prado, PT Physical Therapist PT     06/19/15 -  Lily Juárez, PT Physical Therapist PT                    PT Recommendation and Plan  Anticipated Discharge Disposition: inpatient rehabilitation facility  PT Frequency: daily  Plan of Care Review  Plan Of Care Reviewed With: patient  Progress: improving  Outcome Summary/Follow up Plan: patient is still confused but able to increase activity tolerance with less assist today patient needs a lot of cueing poor attention to task we will continue to progress as tolerated          Outcome Measures       04/07/17 1330 04/06/17 1444 04/06/17 1443    How much help from another person do you currently  need...    Turning from your back to your side while in flat bed without using bedrails? 2  -EDOUARD 2  -LS     Moving from lying on back to sitting on the side of a flat bed without bedrails? 2  -EDOUARD 2  -LS     Moving to and from a bed to a chair (including a wheelchair)? 3  -EDOUARD 2  -LS     Standing up from a chair using your arms (e.g., wheelchair, bedside chair)? 3  -EDOUARD 2  -LS     Climbing 3-5 steps with a railing? 1  -EDOUARD 1  -LS     To walk in hospital room? 3  -EDOUARD 2  -LS     AM-PAC 6 Clicks Score 14  -EDOUARD 11  -LS     How much help from another is currently needed...    Putting on and taking off regular lower body clothing?   1  -JR    Bathing (including washing, rinsing, and drying)   2  -JR    Toileting (which includes using toilet bed pan or urinal)   1  -JR    Putting on and taking off regular upper body clothing   2  -JR    Taking care of personal grooming (such as brushing teeth)   2  -JR    Eating meals   2  -JR    Score   10  -JR    Modified Hancock Scale    Modified Hancock Scale   4 - Moderately severe disability.  Unable to walk without assistance, and unable to attend to own bodily needs without assistance.  -JR    Functional Assessment    Outcome Measure Options   AM-PAC 6 Clicks Daily Activity (OT);Modified Linh  -JR      04/05/17 0803 04/05/17 0801       How much help from another person do you currently need...    Turning from your back to your side while in flat bed without using bedrails?  2  -LS     Moving from lying on back to sitting on the side of a flat bed without bedrails?  2  -LS     Moving to and from a bed to a chair (including a wheelchair)?  2  -LS     Standing up from a chair using your arms (e.g., wheelchair, bedside chair)?  2  -LS     Climbing 3-5 steps with a railing?  1  -LS     To walk in hospital room?  2  -LS     AM-PAC 6 Clicks Score  11  -LS     How much help from another is currently needed...    Putting on and taking off regular lower body clothing? 1  -JR      Bathing  (including washing, rinsing, and drying) 2  -JR      Toileting (which includes using toilet bed pan or urinal) 1  -JR      Putting on and taking off regular upper body clothing 2  -JR      Taking care of personal grooming (such as brushing teeth) 2  -JR      Eating meals 2  -JR      Score 10  -JR      Modified Wheeler Scale    Modified Linh Scale 4 - Moderately severe disability.  Unable to walk without assistance, and unable to attend to own bodily needs without assistance.  -JR 4 - Moderately severe disability.  Unable to walk without assistance, and unable to attend to own bodily needs without assistance.  -LS     Functional Assessment    Outcome Measure Options AM-PAC 6 Clicks Daily Activity (OT);Modified Wheeler  -JR AM-PAC 6 Clicks Basic Mobility (PT);Modified Wheeler  -LS       User Key  (r) = Recorded By, (t) = Taken By, (c) = Cosigned By    Initials Name Provider Type    EDOUARD Prado, PT Physical Therapist    JR Judy Leach, OT Occupational Therapist    ANDREWS Juárez, PT Physical Therapist           Time Calculation:         PT Charges       04/07/17 1537          Time Calculation    Start Time 1330  -EDOUARD      PT Received On 04/07/17  -EDOUARD      PT Goal Re-Cert Due Date 04/15/17  -EDOUARD      Time Calculation- PT    Total Timed Code Minutes- PT 28 minute(s)  -EDOUARD        User Key  (r) = Recorded By, (t) = Taken By, (c) = Cosigned By    Initials Name Provider Type    EDOUARD Prado, JENNIFER Physical Therapist          Therapy Charges for Today     Code Description Service Date Service Provider Modifiers Qty    33965488157 HC PT THER PROC EA 15 MIN 4/7/2017 Anita Prado, PT GP 2    34029080531 HC PT THER SUPP EA 15 MIN 4/7/2017 Anita Prado, PT GP 2          PT G-Codes  Outcome Measure Options: AM-PAC 6 Clicks Daily Activity (OT), Zaki Prado, PT  4/7/2017

## 2017-04-07 NOTE — NURSING NOTE
Mr. Salvador is a 61 y.o. male who was transferred from [facility] on 4/4/17 for CVA.       The patient was being treated at [facility] for sepsis.  On the morning of 4/4/17 at approximately 1000 he had a sudden onset of stroke like symptoms.  Upon assessment he was noted to have a facial droop, slurred speech, aphasia, and left sided flaccidity. NIHSS was 22. He went for a STAT CT head. He was not a candidate for IV tPA due to his recent scrotal debridement surgery. He was transferred to [facility] for higher level of care and further stroke evaluation. He arrived at our facility via [employer] at approximately 1335. He was taken straight to the CT scanner for a STAT CTA head (unable to obtain CT perfusion due to patient movement). CTA head was negative for LVO (large vessel occlusion) and therefore not a candidate for intervention; discussed with Dr. Morrison. He will be admitted to the ICU under the Intensivist service for further evaluation. The ischemic stroke order set was initiated.  Neurology, Cardiology, ID, and Urology were consulted.    Stroke Risk Factors: diabetes mellitus, hyperlipidemia, hypertension, smoking MI, and new onset of AFib.     Neurological Assessment  The patient is alert and resting in bed.  He still is confused in conversation; he is oriented to person.  He moves all extremities; left side weaker than right.  Sensation is intact.  Facial droop is present.  He has a partial gaze palsy but no field cut.  Speech is mildly dysarthric.    Last Documented NIHSS  1a. Level Of Consciousness: 0-->Alert: keenly responsive  1b. LOC Questions: 0-->Answers both questions correctly  1c. LOC Commands: 0-->Performs both tasks correctly  2. Best Gaze: 0-->Normal  3. Visual: 0-->No visual loss  4. Facial Palsy: 2-->Partial paralysis (total or near-total paralysis of lower face)  5a. Motor Arm, Left: 0-->No drift: limb holds 90 (or 45) degrees for full 10 secs  5b. Motor Arm, Right: 0-->No  drift: limb holds 90 (or 45) degrees for full 10 secs  6a. Motor Leg, Left: 0-->No drift: leg holds 30 degree position for full 5 secs  6b. Motor Leg, Right: 0-->No drift: leg holds 30 degree position for full 5 secs  7. Limb Ataxia: 0-->Absent  8. Sensory: 0-->Normal: no sensory loss  9. Best Language: 0-->No aphasia: normal  10. Dysarthria: 1-->Mild-to-moderate dysarthria: patient slurs at least some words and, at worst, can be understood with some difficulty  11. Extinction and Inattention (formerly Neglect): 1-->Visual, tactile, auditory, spatial, or personal inattention or extinction to bilateral simultaneous stimulation in one of the sensory modalities    Total (NIH Stroke Scale): 4    Assessment/Plan    Repeat CT head from 4/5/17 does not show any acute findings, only an old left frontal infarct which was present on initial CT scan.  Carotid duplex; 0-49% stenosis bilaterally  Echocardiogram; dilated left atrial cavity, started on Eliquis 5mg BID on 4/6/17  A1C performed at [facility] on 3/30 was 10.40; diabetes educator consulted  PT/OT/SLP following  CM following for discharge needs; Dr. Blancas would like patient to be transferred to LTAC when medically ready.  Wife would like patient closer to home if possible ([place] or [place]).  Spoke with Lorenza GAMING and she will discuss with wife.    Discussed plan of care with wife who is currently at the bedside.  All questions related to stroke answered.  Will provide patient with Eliquis discount card and samples.  We will continue to follow.    Karin Caldera RN, Stroke Navigator/Nurse Extender

## 2017-04-07 NOTE — PLAN OF CARE
Problem: Patient Care Overview (Adult)  Goal: Plan of Care Review  Outcome: Ongoing (interventions implemented as appropriate)    04/07/17 1535   Coping/Psychosocial Response Interventions   Plan Of Care Reviewed With patient   Patient Care Overview   Progress improving   Outcome Evaluation   Outcome Summary/Follow up Plan patient is still confused but able to increase activity tolerance with less assist today patient needs a lot of cueing poor attention to task we will continue to progress as tolerated         Problem: Inpatient Physical Therapy  Goal: Bed Mobility Goal LTG- PT  Outcome: Ongoing (interventions implemented as appropriate)    04/05/17 0909 04/06/17 1524   Bed Mobility PT LTG   Bed Mobility PT LTG, Date Established 04/05/17 --    Bed Mobility PT LTG, Time to Achieve 2 wks --    Bed Mobility PT LTG, Activity Type supine to sit/sit to supine --    Bed Mobility PT LTG, [place] Level supervision required --    Bed Mobility PT LTG, Outcome --  goal ongoing       Goal: Transfer Training Goal 1 LTG- PT  Outcome: Ongoing (interventions implemented as appropriate)    04/05/17 0909 04/06/17 1524   Transfer Training PT LTG   Transfer Training PT LTG, Date Established 04/05/17 --    Transfer Training PT LTG, Time to Achieve 2 wks --    Transfer Training PT LTG, Activity Type sit to stand/stand to sit --    Transfer Training PT LTG, [place] Level contact guard assist --    Transfer Training PT LTG, Assist Device walker, rolling --    Transfer Training PT LTG, Outcome --  goal ongoing       Goal: Gait Training Goal LTG- PT  Outcome: Ongoing (interventions implemented as appropriate)    04/05/17 0909 04/06/17 1524   Gait Training PT LTG   Gait Training Goal PT LTG, Date Established 04/05/17 --    Gait Training Goal PT LTG, Time to Achieve 2 wks --    Gait Training Goal PT LTG, [place] Level contact guard assist --    Gait Training Goal PT LTG, Assist Device walker, rolling --    Gait Training Goal PT  LTG, Distance to Achieve 200 --    Gait Training Goal PT LTG, Outcome --  goal ongoing

## 2017-04-08 NOTE — PLAN OF CARE
Problem: Patient Care Overview (Adult)  Goal: Plan of Care Review  Outcome: Ongoing (interventions implemented as appropriate)    04/08/17 1010   Coping/Psychosocial Response Interventions   Plan Of Care Reviewed With patient   Outcome Evaluation   Outcome Summary/Follow up Plan wounds cont to have moderate slough and thick drainage collecting. Drains removed after discussing case with Dr marcum to allow for better packing and better access to wound base for pulse lavage.          Problem: Inpatient Physical Therapy  Goal: Wound Care Goal 1 LTG- PT  Outcome: Ongoing (interventions implemented as appropriate)    04/06/17 1025 04/08/17 1010   Wound Care PT LTG   Wound Care PT LTG 1, Date Established 04/06/17 --    Wound Care PT LTG 1, Time to Achieve 1 wk --    Wound Care PT LTG 1, Location Scrotum --    Wound Care PT LTG 1, No S&S of Infection yes --    Wound Care PT LTG 1, Decrease Wound Size 10% --    Wound Care PT LTG 1, Decrease Necrotic Tissue 50% --    Wound Care PT LTG 1, Decrease Exudate minimum --    Wound Care PT LTG 1, No New Skin Break Down yes --    Wound Care PT LTG 1, Education dressing changes;wound care --    Wound Care PT LTG 1, Education Understanding verbalize understanding --    Wound Care PT LTG 1, Outcome --  goal ongoing

## 2017-04-08 NOTE — PROGRESS NOTES
Intensive Care Follow-up      LOS: 4 days     Mr. Tomas Salvador, 61 y.o. male is followed for: Sepsis     Subjective - Interval History     No problems overnight  Remains confused at times requiring restraints to protect lines  Cueto catheter out  Scrotal drain still in place  Past dysphagia evaluation and taking some orally    The patient's relevant past medical, surgical and social history were reviewed and updated in Epic as appropriate.     Objective     Infusions:     Medications:    amiodarone 200 mg Oral Q8H   Followed by      [START ON 4/13/2017] amiodarone 200 mg Oral Q12H   Followed by      [START ON 4/27/2017] amiodarone 200 mg Oral Daily   apixaban 5 mg Oral Q12H   aspirin 81 mg Oral Daily   atorvastatin 80 mg Oral Nightly   carvedilol 6.25 mg Oral Q12H   ceFAZolin 2 g Intravenous Q8H   fentaNYL 1 patch Transdermal Q72H   furosemide 40 mg Intravenous Q12H   heparin flush (porcine) 500 Units Intracatheter Q12H   insulin detemir 15 Units Subcutaneous Nightly   insulin lispro 0-9 Units Subcutaneous 4x Daily With Meals & Nightly   metroNIDAZOLE 500 mg Intravenous Q6H   pantoprazole 40 mg Oral Q AM   potassium chloride 20 mEq Oral Once   saccharomyces boulardii 250 mg Oral BID   sacubitril-valsartan 1 tablet Oral Q12H     Intake/Output       04/07/17 0700 - 04/08/17 0659    Intake (ml) 1386.1    Output (ml) 235    Net (ml) 1151.1        Vital Sign Min/Max for last 24 hours  Temp  Min: 97.3 °F (36.3 °C)  Max: 98 °F (36.7 °C)   BP  Min: 98/70  Max: 147/107   Pulse  Min: 74  Max: 92   Resp  Min: 16  Max: 20   SpO2  Min: 90 %  Max: 100 %   Flow (L/min)  Min: 3  Max: 3        Physical Exam:   GENERAL: Somewhat lethargic but will awake.  In no distress   HEENT: No adenopathy or thyromegaly   LUNGS: Rhonchi bilaterally, no respiratory distress   HEART: Regular rate and rhythm without murmurs.  Distant heart sounds   ABDOMEN: Obese, soft, bowel sounds present.  Surgical site noted with drain in  place   EXTREMITIES: Trace pitting edema.  No clubbing or cyanosis   NEURO/PSYCH: Confused at times.  Follow some commands.  Moves all fours.  No overt deficit      Results from last 7 days  Lab Units 04/07/17  0422 04/06/17 0316 04/05/17  0417   WBC 10*3/mm3 12.63* 12.30* 13.42*   HEMOGLOBIN g/dL 9.9* 10.4* 11.4*   PLATELETS 10*3/mm3 328 359 405       Results from last 7 days  Lab Units 04/08/17  0444 04/07/17  0422 04/06/17  2103 04/06/17 0316 04/05/17  0417   SODIUM mmol/L 139 138  --  137 136   POTASSIUM mmol/L 3.6 3.5 3.5 3.8 3.9   TOTAL CO2 mmol/L 31.0 31.0  --  26.0 25.0   BUN mg/dL 18 17  --  12 6*   CREATININE mg/dL 0.60 0.70  --  0.80 0.60   MAGNESIUM mg/dL 2.3 1.9  --  2.0 2.1   PHOSPHORUS mg/dL  --  2.6  --  3.2 2.3*   GLUCOSE mg/dL 176* 194*  --  211* 170*     Estimated Creatinine Clearance: 154.6 mL/min (by C-G formula based on Cr of 0.6).            Results from last 7 days  Lab Units 04/05/17  1349   PH, ARTERIAL pH units 7.408   PCO2, ARTERIAL mm Hg 38.5   PO2 ART mm Hg 78.0*     Lab Results   Component Value Date    LACTATE 0.8 04/02/2017          Images: His recent chest x-ray reveals marked cardiomegaly, hardware in place, non-consolidative interstitial infiltrates    I reviewed the patient's results and images.     Impression      Hospital Problem List     * (Principal)Sepsis    Epididymo-orchitis    Ischemic cardiomyopathy    Overview Signed 6/28/2016  5:09 PM by Mady singh May 2015 echocardiogram:  Ejection fraction 25%.  b. 07/23/2015, 2D echocardiogram:  Severe LV dilation. Ejection fraction 20% to 25%.  c. 10/05/2015, 2D echocardiogram:  Severe LV dilation. EF 20% to 25%.           ICD (implantable cardioverter-defibrillator) in place, implantation 2015.    Overview Signed 7/1/2016 11:55 AM by PAULINA Anderson     ICD  A)BIOTRONIK ICD FOR ICM PRIMARY PREVENTION 2016         Diabetes education, encounter for    Overview Signed 4/4/2017  2:43 PM by Cinthia Weaver,  APRN     3/30/17 Hgb A1c 10.4         Chronic respiratory failure with hypoxia    Overview Addendum 4/5/2017  2:52 PM by Ella Pope MD     Night time only at 2 liters/min  Remote tobacco quit 2002         Urinary retention    E. coli UTI (urinary tract infection)    Encephalopathy acute    Cellulitis    Overview Signed 4/5/2017  2:51 PM by Ella Pope MD     Surgical debridement Les  Culture E.coli         Yeast UTI               Plan        Continue antibiotics  Diuresis  Monitor nutritional intake and add supplements  Monitor for dysphagia/aspiration  Mobilize     Plan of care and goals reviewed with mulitdisciplinary team at daily rounds   I discussed the patient's findings and my recommendations with patient, family and nursing staff       SG Patel MD  Pulmonary and Critical Care Medicine  04/08/17 8:08 AM     Please note that portions of this note were completed with a voice recognition program. Efforts were made to edit the dictations, but occasionally words are mistranscribed.

## 2017-04-08 NOTE — PROGRESS NOTES
"Tomas Salvador    Subjective     CC: encephalopathy    History of Present Illness     Tomas Salvador is admitted with sepsis. He was transferred from [facility] due to concern for stroke. He was evaluated by the neuro-interventional service and no acute treatment was deemed prudent. Any stroke-like symptoms have resolved, and follow-up CT scans have failed to provide evidence of stroke. An ECHO shows a severely depressed EF, and he is anti-coagulated under the direction of cardiology.    His family feels that he continues to improve daily, though he remains clearly encephalopathic this morning.      There have been no other changes in the patient's interval history since Dr. Patel's progress note of 4/8/17.      The following portions of the patient's history were reviewed and updated as appropriate: allergies, current medications, past family history, past medical history, past social history, past surgical history and problem list. I reviewed and confirmed the PFSH from this admission dated 4/4/17.    Review of Systems   Unable to perform ROS: Mental status change       Objective     /68  Pulse 74  Temp 97.6 °F (36.4 °C) (Axillary)   Resp 20  Ht 69\" (175.3 cm)  Wt 237 lb (108 kg)  SpO2 100%  BMI 35 kg/m2    Physical Exam   Neurological: He has normal strength.   Psychiatric: His speech is slurred.        Neurologic Exam     Mental Status   Oriented to person.   Disoriented to place.   Disoriented to time.   Attention: decreased.   Speech: slurred   Level of consciousness: drowsy  Normal comprehension.     Cranial Nerves   Cranial nerves II through XII intact.     Motor Exam   Muscle bulk: normal  Overall muscle tone: normal    Strength   Strength 5/5 throughout.     Sensory Exam   Light touch normal.       Laboratory and radiological testing is notable for a head CT showing only an old left frontal infarct (I viewed images personally), normal BMP. ECHO is as noted above.    Assessment/Plan "         Tomas Salvador continues to suffer from encephalopathy, which is likely multifactorial. I agree with his current treatment and am hopeful that he will continue to gradually improve cognitively. This was discussed with his family.      As part of this visit I reviewed prior lab results, reviewed radiology images, obtained additional history from the family which is incorporated in the HPI and reviewed records from the current hospitalization which is incorporated in the HPI.

## 2017-04-08 NOTE — PROGRESS NOTES
"  [facility]  PROGRESS NOTE    Date of Admission: 4/4/2017  Length of Stay: 4  Primary Care Physician: Jose Navarro MD    Chief Complaint: f/u Afib/ ICM    Subjective      HPI:  Events noted. He is agitated and restrained at this time.      Objective   Vitals: /67 (BP Location: Right arm, Patient Position: Sitting)  Pulse 86  Temp 97.9 °F (36.6 °C) (Axillary)   Resp 18  Ht 69\" (175.3 cm)  Wt 237 lb (108 kg)  SpO2 99%  BMI 35 kg/m2    Physical Exam:  GENERAL: Mildly agitated.   HEENT: Fundiscopic deferred, otherwise unremarkable.  NECK: No Jugular venous distention, adenopathy, or thyromegaly noted.   HEART: No discrete PMI is noted. Regular rhythm, normal rate. ? 2/6 systolic murmur. Difficult exam.  LUNGS: Clear to auscultation bilaterally. No wheezing, rales or ronchi.  ABDOMEN: Flat without evidence of organomegaly, masses, or tenderness.  NEUROLOGIC: No focal abnormalities involving strength or sensation are noted.   EXTREMITIES: No clubbing, cyanosis, or edema noted.    Results:    Results from last 7 days  Lab Units 04/07/17  0422 04/06/17  0316 04/05/17 0417   WBC 10*3/mm3 12.63* 12.30* 13.42*   HEMOGLOBIN g/dL 9.9* 10.4* 11.4*   HEMATOCRIT % 31.6* 33.9* 36.6*   PLATELETS 10*3/mm3 328 359 405       Results from last 7 days  Lab Units 04/08/17  1405 04/08/17  0444 04/07/17  0422  04/06/17  0316   SODIUM mmol/L  --  139 138  --  137   POTASSIUM mmol/L 4.0 3.6 3.5  < > 3.8   CHLORIDE mmol/L  --  105 101  --  104   TOTAL CO2 mmol/L  --  31.0 31.0  --  26.0   BUN mg/dL  --  18 17  --  12   CREATININE mg/dL  --  0.60 0.70  --  0.80   GLUCOSE mg/dL  --  176* 194*  --  211*   < > = values in this interval not displayed.       Results from last 7 days  Lab Units 04/05/17  0417   CHOLESTEROL mg/dL 108   TRIGLYCERIDES mg/dL 80   HDL CHOL mg/dL 30*   LDL CHOL mg/dL 62       Results from last 7 days  Lab Units 04/05/17  0417   TSH mIU/mL 0.656       Results from last " "7 days  Lab Units 04/06/17  0316   BNP pg/mL 1188.0*       Results from last 7 days  Lab Units 04/06/17  1339 04/06/17  0747 04/06/17  0316  04/05/17  1349   PROTIME Seconds  --   --   --   --  15.6*   INR   --   --   --   --  1.42   APTT seconds 60.2* 50.4* 55.8*  < > 29.9   < > = values in this interval not displayed.    Results from last 7 days  Lab Units 04/04/17  1056 04/03/17  1851   CK TOTAL U/L 54  --    TROPONIN I ng/mL <0.006 <0.006       Intake/Output Summary (Last 24 hours) at 04/08/17 1755  Last data filed at 04/08/17 1600   Gross per 24 hour   Intake          1402.78 ml   Output              100 ml   Net          1302.78 ml     I personally viewed and interpreted the patient's EKG/Telemetry data      Current Medications:    amiodarone 200 mg Oral Q8H   Followed by      [START ON 4/13/2017] amiodarone 200 mg Oral Q12H   Followed by      [START ON 4/27/2017] amiodarone 200 mg Oral Daily   apixaban 5 mg Oral Q12H   aspirin 81 mg Oral Daily   atorvastatin 80 mg Oral Nightly   carvedilol 6.25 mg Oral Q12H   ceFAZolin 2 g Intravenous Q8H   fentaNYL 1 patch Transdermal Q72H   furosemide 40 mg Intravenous Q12H   heparin flush (porcine) 500 Units Intracatheter Q12H   insulin detemir 15 Units Subcutaneous Nightly   insulin lispro 0-9 Units Subcutaneous 4x Daily With Meals & Nightly   metroNIDAZOLE 500 mg Intravenous Q6H   pantoprazole 40 mg Oral Q AM   potassium chloride 20 mEq Oral Once   saccharomyces boulardii 250 mg Oral BID   sacubitril-valsartan 1 tablet Oral Q12H          Assessment and Plan:   1. No changes to make from cardiac standpoint. His neurologic status remains a major issue and the most immediate \"pressing problem\".    I, Pee Preciado MD, personally performed the services described as documented by the above named individual. I have made any necessary edits and it is both accurate and complete 4/8/2017  6:03 PM      "

## 2017-04-08 NOTE — PLAN OF CARE
Problem: Fall Risk (Adult)  Goal: Identify Related Risk Factors and Signs and Symptoms  Outcome: Ongoing (interventions implemented as appropriate)  Goal: Absence of Falls  Outcome: Ongoing (interventions implemented as appropriate)    Problem: Skin Integrity Impairment, Risk/Actual (Adult)  Goal: Identify Related Risk Factors and Signs and Symptoms  Outcome: Ongoing (interventions implemented as appropriate)  Goal: Skin Integrity/Wound Healing  Outcome: Ongoing (interventions implemented as appropriate)    Problem: Infection, Risk/Actual (Adult)  Goal: Identify Related Risk Factors and Signs and Symptoms  Outcome: Ongoing (interventions implemented as appropriate)  Goal: Infection Prevention/Resolution  Outcome: Ongoing (interventions implemented as appropriate)

## 2017-04-08 NOTE — PROGRESS NOTES
Acute Care - Wound/Debridement Treatment Note  [facility]     Patient Name: Tomas Salvador  : 1955  MRN: [id number]  Today's Date: 2017  Onset of Illness/Injury or Date of Surgery Date: 17   Date of Referral to PT: 17   Referring Physician: Dr. Morrison       Admit Date: 2017    Visit Dx:    ICD-10-CM ICD-9-CM   1. Dysphagia, unspecified type R13.10 787.20   2. Impaired functional mobility, balance, gait, and endurance Z74.09 V49.89   3. Impaired mobility and ADLs Z74.09 799.89   4. Cognitive communication deficit R41.841 799.52       Patient Active Problem List   Diagnosis   • Coronary disease   • Ischemic cardiomyopathy   • Peripheral vascular disease   • ICD (implantable cardioverter-defibrillator) in place, implantation .   • Dyslipidemia, on atorvastain.    • Sepsis   • Diabetes education, encounter for   • Chronic respiratory failure with hypoxia   • Urinary retention   • Hyperlipidemia   • Essential hypertension   • E. coli UTI (urinary tract infection)   • Epididymo-orchitis   • Encephalopathy acute   • Cellulitis   • Yeast UTI               LDA Wound       17 1010          Incision 17 1056 other (see comments) scrotum    Incision - Properties Group Placement Date: 17  -JW Placement Time:   -JW Side: other (see comments)  -JW Location: scrotum  -JW    Dressing Appearance moist drainage  -MF      Appearance drainage;open areas;moist;other (see comments)   moderate necrotic slough at base of wound.   -MF      Drainage Characteristics/Odor serosanguineous;yellow  -MF      Drainage Amount small  -MF      Wound Cleaning irrigated with;soap and water  -      Wound Interventions pulsatile high pressure lavage   500ml nsaline with fan tip and 120mmHg suction  -MF      Dressing other (see comments)   drains removed per MD request packed with sorbact   -MF      Packing other (see comments)   sorbact to pack and interdry Ag to cover  -MF        User Key  (r) =  Recorded By, (t) = Taken By, (c) = Cosigned By    Initials Name Provider Type     Pee Roldan, PT Physical Therapist    JW Michael Silveira RN Registered Nurse            WOUND DEBRIDEMENT  Debridement Site 1  Location- Site 1: perineal wound  Selective Debridement- Site 1: Wound Surface <20cmsq (~10cm2 total area)  Instruments- Site 1: tweezers, scissors  Excised Tissue Description- Site 1: minimum, slough  Bleeding- Site 1: none                  Adult Rehabilitation Note       04/08/17 1010 04/07/17 1330 04/07/17 1300    Rehab Assessment/Intervention    Discipline physical therapist  - physical therapist  -EDOUARD speech language pathologist  -SM    Document Type therapy note (daily note)  - therapy note (daily note)  -EDOUARD evaluation;therapy note (daily note)  -    Subjective Information agree to therapy;complains of;fatigue;weakness  - agree to therapy;complains of;fatigue  -EDOUARD     Patient Effort, Rehab Treatment  good  -EDOUARD adequate  -SM    Symptoms Noted During/After Treatment  other (see comments)   patient became more agitated with increased activity   -EDOUARD     Symptoms Noted Comment  patient became impulsive letting go of walker and wanting to sit then refusing to sit increased agitation  -EDOUARD     Precautions/Limitations  fall precautions  -EDOUARD     Recorded by [] Pee Roldan, PT [EDOUARD] Anita Prado, PT [] Akosua Solomon, MS CCC-SLP    Vital Signs    Pre Systolic BP Rehab  136  -EDOUARD     Pre Treatment Diastolic BP  54  -EDOUARD     Pretreatment Heart Rate (beats/min)  68  -EDOUARD     Pre SpO2 (%)  95  -EDOUARD     O2 Delivery Pre Treatment  supplemental O2  -EDOUARD     Recorded by  [EDOUARD] Anita Prado PT     Pain Assessment    Pain Assessment Jay-Baker FACES  - Jay-Kent FACES  -EDOUARD     Jay-Baker FACES Pain Rating 0  - 2  -EDOUARD     Pain Intervention(s) Repositioned;Medication (See MAR)  - Repositioned;Ambulation/increased activity  -EDOUARD     Recorded by [] Pee Roldan, PT [EDOUARD] Anita ANDERSEN  Eve, JENNIFER     Cognitive Assessment/Intervention    Current Cognitive/Communication Assessment  impaired  -EDOUARD     Orientation Status  oriented to;person  -EDOUARD     Follows Commands/Answers Questions  75% of the time;needs repetition  -EDOUARD     Recorded by  [EDOUARD] Anita Prado PT     Dysphagia Treatment Objectives and Progress    Dysphagia Treatment Objectives   Improve hyolaryngeal excursion;Other 1  -SM    Recorded by   [SM] Akosua Solomon MS CCC-SLP    Improve timing of pharyngeal response    To improve timing of pharyngeal response, patient will:   Swallow in timely way using three second prep;Swallow in timely way using super-supraglottic swallow;80%;with consistent cues  -SM    Status: Improve timing of pharyngeal response   Progressing as expected  -SM    Timing of Pharyngeal Response Progress   10%;with consistent cues;continue to adress   cognition/focus greatly impacted  -SM    Recorded by   [SM] Akosua Solomon MS CCC-SLP    Improve laryngeal closure    To improve laryngeal closure, patient will:   Complete supraglottic swallow;80%;with consistent cues  -SM    Status: Improve laryngeal closure   Progressing as expected  -SM    Laryngeal Closure Progress   0%;with consistent cues;following model;continue to adress  -SM    Recorded by   [SM] Akosua Solomon MS CCC-SLP    Improve closure at entrance to airway    To improve closure at entrance to airway, patient will:   Complete super-supraglottic swallow;80%;with consistent cues  -SM    Status: Improve closure at entrance to airway   Progressing as expected  -SM    Closure at Entrance to Airway Progress   0%;with consistent cues;following model;continue to adress  -SM    Recorded by   [SM] Akosua Solomon MS CCC-SLP    Improve hyolaryngeal excursion    To improve hyolaryngeal excursion, patient will:   Complete chin tuck against resistance (comment number of repetitions);90%;with consistent cues   20 sec reps x4  -SM    Status: Improve  hyolaryngeal excursion   New  -SM    Hyolaryngeal Excursion Progress   40%;with consistent cues;continue to adress  -SM    Recorded by   [SM] Akosua Solomon MS CCC-SLP    Improve tongue base & pharyngeal wall squeeze    To improve tongue base & pharyngeal wall squeeze, patient will:   Complete effortful swallow;80%;with consistent cues  -SM    Status: Improve tongue base & pharyngeal wall squeeze   Progressing as expected  -SM    Tongue Base/Pharyngeal Wall Squeeze Progress   20%;with consistent cues;following model;continue to adress  -SM    Recorded by   [SM] Akosua Solomon MS CCC-SLP    Dysphagia Other 1    Dysphagia Other 1 Objective   Tolerate trials of mech-soft without s/s aspiration and no cues  -SM    Status: Dysphagia Other 1   New  -SM    Dysphagia Other 1 Progress   40%;with consistent cues;continue to address  -SM    Comments: Dysphagia Other 1   Choking on mech-soft items.  Better with level 3.  Will downgrade and consider return to mech-soft as more appropriate  -SM    Recorded by   [SM] Akosua Solomon MS CCC-SLP    Bed Mobility, Assessment/Treatment    Bed Mobility, Assistive Device  bed rails;draw sheet  -EDOUARD     Bed Mobility, Scoot/Bridge, Person  minimum assist (75% patient effort)  -EDOUARD     Bed Mob, Supine to Sit, Person  moderate assist (50% patient effort);2 person assist required  -EDOUARD     Bed Mobility, Comment  cues for sequencing  -EDOUARD     Recorded by  [EDOUARD] Anita Prado, PT     Transfer Assessment/Treatment    Transfers, Sit-Stand Person  minimum assist (75% patient effort);2 person assist required  -EDOUARD     Transfers, Stand-Sit Person  minimum assist (75% patient effort);2 person assist required  -EDOUARD     Recorded by  [EDOUARD] Anita Prado, PT     Gait Assessment/Treatment    Gait, Person Level  minimum assist (75% patient effort);2 person assist required  -EDOUARD     Gait, Assistive Device  rolling walker  -EDOUARD     Gait, Distance (Feet)  150    family pulling chair behind patient for safety  -EDOUARD     Gait, Safety Issues  balance decreased during turns;sequencing ability decreased;supplemental O2  -EDOUARD     Gait, Impairments  strength decreased;impaired balance  -EDOUARD     Gait, Comment  patient became more agitated with walking less cooperative needing more cues for safety patient letting go of walker   -EDOUARD     Recorded by  [EDOUARD] Anita Prado, PT     Balance Skills Training    Sitting-Level of Assistance  Contact guard  -EDOUARD     Sitting-Balance Support  Feet supported  -EDOUARD     Standing-Level of Assistance  Minimum assistance;x2  -EDOUARD     Static Standing Balance Support  assistive device  -EDOUARD     Gait Balance-Level of Assistance  Minimum assistance;x2  -EDOUARD     Gait Balance Support  assistive device  -EDOUARD     Recorded by  [EDOUARD] Anita Prado, PT     Therapy Exercises    Bilateral Lower Extremities  AAROM:;10 reps;sitting;ankle pumps/circles;LAQ  -EDOUARD     Recorded by  [EDOUARD] Anita Prado PT     Positioning and Restraints    Pre-Treatment Position in bed  - in bed  -EDOUARD     Post Treatment Position bed  - chair  -EDOUARD     In Bed supine;call light within reach;with nsg  -      In Chair  notified nsg;reclined;sitting;call light within reach;exit alarm on;with family/caregiver  -EDOUARD     Restraints  released:;reapplied:;notified nsg:  -EDOUARD     Recorded by [MF] Pee Roldan, PT [EDOUARD] Anita Prado, PT       04/06/17 1444 04/06/17 1443 04/06/17 1025    Rehab Assessment/Intervention    Discipline physical therapist  -LS occupational therapist  -JR physical therapist  -MC    Document Type therapy note (daily note)  -LS therapy note (daily note)  -JR therapy note (daily note)  -    Subjective Information agree to therapy;no complaints  -LS no complaints;agree to therapy  -JR no complaints;agree to therapy   pt somnolent/confused, wife gave OK for tx  -MC    Patient Effort, Rehab Treatment adequate  -LS good  -JR     Symptoms Noted Comment  Pt restless  throughout treatment session.  -JR     Precautions/Limitations fall precautions;oxygen therapy device and L/min  -LS fall precautions;oxygen therapy device and L/min  -JR fall precautions;oxygen therapy device and L/min  -MC    Recorded by [LS] Lily Juárez, PT [JR] Judy Leach, OT [MC] Rekha Mcgarry, PT    Vital Signs    Pre Systolic BP Rehab 107  -  -JR     Pre Treatment Diastolic BP 76  -LS 75  -JR     Post Systolic BP Rehab 108  -  -JR     Post Treatment Diastolic BP 77  -LS 77  -JR     Pretreatment Heart Rate (beats/min) 82  -LS 80  -JR     Posttreatment Heart Rate (beats/min) 83  -LS 83  -JR     Pre SpO2 (%) 100  -LS 99  -JR     O2 Delivery Pre Treatment supplemental O2   2.5L  -LS supplemental O2   3L  -JR     Post SpO2 (%) 100  -  -JR     O2 Delivery Post Treatment supplemental O2  -LS supplemental O2  -JR     Pre Patient Position Supine  -LS Supine  -JR     Intra Patient Position Standing  -LS Standing  -JR     Post Patient Position Sitting  -LS Sitting  -JR     Recorded by [LS] Lily Juárez, PT [JR] Judy Leach, OT     Pain Assessment    Pain Assessment 0-10  -LS 0-10  -JR No/denies pain  -MC    Jay-Kent FACES Pain Rating 0  -LS 0  -JR     Pain Score 0  -LS 0  -JR     Post Pain Score 0  -LS 0  -JR     Recorded by [LS] Lily Juárez, PT [JR] Judy Leach, OT [MC] Rekha Mcgarry, PT    Cognitive Assessment/Intervention    Current Cognitive/Communication Assessment impaired  -LS impaired  -JR     Orientation Status oriented to;person;disoriented to;place;time  -LS oriented to;person  -JR     Follows Commands/Answers Questions able to follow single-step instructions;25% of the time;50% of the time;needs cueing;needs increased time;needs repetition  - 25% of the time;50% of the time;able to follow single-step instructions  -JR     Personal Safety severe impairment;at risk behaviors demonstrated;impulsive;decreased awareness, need for assist;decreased awareness, need  for safety;decreased insight to deficits  -LS severe impairment;decreased awareness, need for assist;decreased awareness, need for safety;decreased insight to deficits  -JR     Personal Safety Interventions fall prevention program maintained;gait belt;nonskid shoes/slippers when out of bed  -LS      Recorded by [LS] Lily Juárez, PT [JR] Judy Leach, OT     Bed Mobility, Assessment/Treatment    Bed Mobility, Assistive Device bed rails;draw sheet  -LS head of bed elevated;draw sheet  -JR     Bed Mob, Supine to Sit, [place] moderate assist (50% patient effort);2 person assist required;verbal cues required  -LS moderate assist (50% patient effort);2 person assist required  -JR     Bed Mobility, Safety Issues cognitive deficits limit understanding;decreased use of arms for pushing/pulling;decreased use of legs for bridging/pushing  -LS cognitive deficits limit understanding;decreased use of arms for pushing/pulling;decreased use of legs for bridging/pushing;impaired trunk control for bed mobility  -JR     Bed Mobility, Impairments  strength decreased;impaired balance  -JR     Bed Mobility, Comment VC's for sequencing.   -LS Pt required max verbal cues for sequencing through bed mobility  -JR     Recorded by [LS] Lily Juárez, PT [JR] Judy Leach, OT     Transfer Assessment/Treatment    Transfers, Sit-Stand [place] moderate assist (50% patient effort);2 person assist required;verbal cues required  -LS moderate assist (50% patient effort);2 person assist required  -JR     Transfers, Stand-Sit [place] moderate assist (50% patient effort);2 person assist required;verbal cues required  -LS moderate assist (50% patient effort);2 person assist required  -JR     Transfers, Sit-Stand-Sit, Assist Device rolling walker  -LS rolling walker  -JR     Transfer, Safety Issues impulsivity  -LS balance decreased during turns;sequencing ability decreased;step length decreased;weight-shifting ability decreased   -JR     Transfer, Impairments  strength decreased;impaired balance;coordination impaired;motor control impaired;postural control impaired  -JR     Transfer, Comment VC's for hand placement.   - Pt required verbal cues for hand placement with transfers. In hallway pt required max verbal cues for stand to sit.  -JR     Recorded by [LS] Lily Juárez, PT [JR] Judy Leach, OT     Gait Assessment/Treatment    Gait, [place] Level moderate assist (50% patient effort);2 person assist required;verbal cues required  -      Gait, Assistive Device rolling walker  -      Gait, Distance (Feet) 80  -      Gait, Gait Deviations valente decreased;step length decreased  -      Gait, Safety Issues supplemental O2  -      Gait, Impairments strength decreased;impaired balance  -      Gait, Comment Primarily min x2 for gait; req'd mod assist for maintaining upright posture when pt demonstrated decreased following of commands (initiating sitting prematurely). Followed closely with recliner for safety.   -LS      Recorded by [LS] Lily Juárez, PT      Functional Mobility    Functional Mobility- Ind. Level  moderate assist (50% patient effort);2 person assist required   progressed to min A at times  -     Functional Mobility- Device  rolling walker  -     Functional Mobility- Safety Issues  balance decreased during turns;sequencing ability decreased;step length decreased;weight-shifting ability decreased  -     Functional Mobility- Comment  Pt pushing walker too far out in front of time. Pt required max verbal cues for upright posture. Pt stopped in hallway and difficult to get pt restarted with mobiliity. Pt took seated rest period. Pt followed with chair. Pt requiring max verbal cues to keep grasp on walker.  -JR     Recorded by  [JR] Judy Leach, OT     Motor Skills/Interventions    Additional Documentation Balance Skills Training (Group)  -      Recorded by [LS] Lily Juárez, PT      Balance Skills  Training    Sitting-Level of Assistance Contact guard  -LS Contact guard  -JR     Sitting-Balance Support Feet supported  -LS Feet supported  -JR     Standing-Level of Assistance Minimum assistance;x2  -LS Minimum assistance;x2  -JR     Static Standing Balance Support assistive device  -LS assistive device  -JR     Gait Balance-Level of Assistance Moderate assistance;x2  -LS Moderate assistance;x2  -JR     Gait Balance Support assistive device  -LS assistive device  -JR     Recorded by [LS] Lily Juárez, PT [JR] Judy Leach, OT     Therapy Exercises    Bilateral Lower Extremities AAROM:;10 reps;ankle pumps/circles;hip abduction/adduction;LAQ  -LS      Bilateral Upper Extremity  AAROM:;10 reps;elbow flexion/extension;shoulder extension/flexion  -JR     Recorded by [LS] Lily Juárez, PT [JR] Judy Leach, OT     Positioning and Restraints    Pre-Treatment Position in bed  - in bed  -JR in bed  -    Post Treatment Position chair  - chair  - bed  -    In Bed   supine;call light within reach;encouraged to call for assist;with family/caregiver  -    In Chair notified nsg;reclined;call light within reach;encouraged to call for assist;exit alarm on;with family/caregiver;on mechanical lift sling;legs elevated  - notified nsg;reclined;call light within reach;encouraged to call for assist;exit alarm on;with family/caregiver;RUE elevated;LUE elevated;on mechanical lift sling  -JR     Restraints released:;reapplied:;notified nsg:;soft limb  -LS released:;reapplied:;notified nsg:;soft limb  -JR     Recorded by [LS] Lily Juárez, PT [JR] Judy Leach, OT [MC] Rekha Mcgarry, PT      04/06/17 0900 04/05/17 1500       Rehab Assessment/Intervention    Discipline  physical therapist  -MF     Document Type  therapy note (daily note);evaluation   wound care eval  -MF     Subjective Information  decreased LOC   family gave ok for tx.   -MF     Recorded by  [MF] Pee Roldan, PT     Pain Assessment     Pain Assessment  Jay-Kent FACES  -MF     Jay-Baker FACES Pain Rating  0  -MF     Pain Intervention(s)  Medication (See MAR)  -MF     Recorded by  [MF] Pee Roldan, PT     Cognitive Assessment/Intervention    Current Cognitive/Communication Assessment  impaired  -MF     Orientation Status  oriented to;person  -MF     Follows Commands/Answers Questions  25% of the time;able to follow single-step instructions;needs cueing  -     Recorded by  [MF] Pee Roldan, PT     Dysphagia Treatment Objectives and Progress    Dysphagia Treatment Objectives Improve timing of pharyngeal response;Improve closure at entrance to airway;Improve tongue base & pharyngeal wall squeeze;Improve laryngeal closure  -LSA      Recorded by [LSA] Chacha Pelletier MS CCC-SLP      Improve timing of pharyngeal response    To improve timing of pharyngeal response, patient will:  Swallow in timely way using three second prep;Swallow in timely way using super-supraglottic swallow;80%;with consistent cues  -LSA     Status: Improve timing of pharyngeal response  New  -LSA     Timing of Pharyngeal Response Progress  continue to adress  -LSA     Recorded by  [LSA] Chacha Pelletier MS CCC-SLP     Improve laryngeal closure    To improve laryngeal closure, patient will:  Complete supraglottic swallow;80%;with consistent cues  -LSA     Status: Improve laryngeal closure  New  -LSA     Laryngeal Closure Progress  continue to adress  -LSA     Recorded by  [LSA] Chacha Pelletier MS CCC-SLP     Improve closure at entrance to airway    To improve closure at entrance to airway, patient will:  Complete super-supraglottic swallow;80%;with consistent cues  -LSA     Status: Improve closure at entrance to airway  New  -LSA     Closure at Entrance to Airway Progress  continue to adress  -LSA     Recorded by  [LSA] Chacha Pelletier MS CCC-SLP     Improve tongue base & pharyngeal wall squeeze    To improve tongue base & pharyngeal wall squeeze, patient will:   Complete effortful swallow;80%;with consistent cues  -LSA     Status: Improve tongue base & pharyngeal wall squeeze  New  -LSA     Tongue Base/Pharyngeal Wall Squeeze Progress  continue to adress  -LSA     Recorded by  [LSA] Chacha Pelletier, MS CCC-[facility]     Positioning and Restraints    Pre-Treatment Position  in bed  -MF     Post Treatment Position  bed  -MF     In Bed  supine;call light within reach;with family/caregiver;notified nsg  -MF     Restraints  released:;reapplied:;soft limb;notified nsg:  -MF     Recorded by  [MF] Pee Roldan, PT       User Key  (r) = Recorded By, (t) = Taken By, (c) = Cosigned By    Initials Name Effective Dates    EDOUARD Anita Prado, PT 06/19/15 -     MF Pee Roldan, PT 06/19/15 -      Akosua Solomon, MS CCC-SLP 06/22/15 -     JR Judy Leach, OT 06/22/15 -     LSA Chacha Pelletier, MS CCC-SLP 06/22/15 -     LS Lily Juárez, PT 06/19/15 -     MC Rekha Mcgarry, PT 03/14/16 -                 IP PT Goals       04/08/17 1010 04/06/17 1524 04/06/17 1025    Bed Mobility PT LTG    Bed Mobility PT LTG, Outcome  goal ongoing  -LS     Transfer Training PT LTG    Transfer Training PT LTG, Outcome  goal ongoing  -LS     Gait Training PT LTG    Gait Training Goal PT LTG, Outcome  goal ongoing  -     Wound Care PT LTG    Wound Care PT LTG 1, Date Established   04/06/17  -    Wound Care PT LTG 1, Time to Achieve   1 wk  -    Wound Care PT LTG 1, Location   Scrotum  -    Wound Care PT LTG 1, No S&S of Infection   yes  -    Wound Care PT LTG 1, Decrease Wound Size   10%  -    Wound Care PT LTG 1, Decrease Necrotic Tissue   50%  -    Wound Care PT LTG 1, Decrease Exudate   minimum  -    Wound Care PT LTG 1, No New Skin Break Down   yes  -    Wound Care PT LTG 1, Education   dressing changes;wound care  -    Wound Care PT LTG 1, Education Understanding   verbalize understanding  -    Wound Care PT LTG 1, Outcome goal ongoing  -        04/05/17 0909           Bed Mobility PT LTG    Bed Mobility PT LTG, Date Established 04/05/17  -LS      Bed Mobility PT LTG, Time to Achieve 2 wks  -LS      Bed Mobility PT LTG, Activity Type supine to sit/sit to supine  -LS      Bed Mobility PT LTG, [place] Level supervision required  -LS      Transfer Training PT LTG    Transfer Training PT LTG, Date Established 04/05/17  -LS      Transfer Training PT LTG, Time to Achieve 2 wks  -LS      Transfer Training PT LTG, Activity Type sit to stand/stand to sit  -LS      Transfer Training PT LTG, [place] Level contact guard assist  -LS      Transfer Training PT LTG, Assist Device walker, rolling  -LS      Gait Training PT LTG    Gait Training Goal PT LTG, Date Established 04/05/17  -LS      Gait Training Goal PT LTG, Time to Achieve 2 wks  -LS      Gait Training Goal PT LTG, [place] Level contact guard assist  -LS      Gait Training Goal PT LTG, Assist Device walker, rolling  -LS      Gait Training Goal PT LTG, Distance to Achieve 200  -LS        User Key  (r) = Recorded By, (t) = Taken By, (c) = Cosigned By    Initials Name Provider Type    VALERI Roldan, PT Physical Therapist    LS Lily Juárez, PT Physical Therapist     Rekha Mcgarry, PT Physical Therapist          Physical Therapy Education     Title: PT OT SLP Therapies (Active)     Topic: Physical Therapy (Active)     Point: Mobility training (Active)    Learning Progress Summary    Learner Readiness Method Response Comment Documented by Status   Patient Acceptance E NR  EDOUARD 04/07/17 1534 Active    Acceptance E,D NR  LS 04/06/17 1523 Active    Acceptance E,D NR  LS 04/05/17 0908 Active   Significant Other Acceptance E,D NR  LS 04/06/17 1523 Active    Acceptance E,D NR  LS 04/05/17 0908 Active               Point: Home exercise program (Active)    Learning Progress Summary    Learner Readiness Method Response Comment Documented by Status   Patient Acceptance E NR  EDOUARD 04/07/17 1534 Active    Acceptance E,D NR   LS 04/06/17 1523 Active   Significant Other Acceptance E,D NR  LS 04/06/17 1523 Active               Point: Body mechanics (Active)    Learning Progress Summary    Learner Readiness Method Response Comment Documented by Status   Patient Acceptance E NR  EDOUARD 04/07/17 1534 Active    Acceptance E,D NR  LS 04/06/17 1523 Active    Acceptance E,D NR  LS 04/05/17 0908 Active   Significant Other Acceptance E,D NR  LS 04/06/17 1523 Active    Acceptance E,D NR  LS 04/05/17 0908 Active               Point: Precautions (Active)    Learning Progress Summary    Learner Readiness Method Response Comment Documented by Status   Patient Acceptance E NR  EDOUARD 04/07/17 1534 Active    Acceptance E,D NR  LS 04/06/17 1523 Active    Acceptance E,D NR  LS 04/05/17 0908 Active   Significant Other Acceptance E,D NR  LS 04/06/17 1523 Active    Acceptance E,D NR  LS 04/05/17 0908 Active                      User Key     Initials Effective Dates Name Provider Type Discipline     06/19/15 -  Anita Prado, PT Physical Therapist PT     06/19/15 -  Lily Juárez, PT Physical Therapist PT                   PT ASSESSMENT (last 72 hours)      PT Evaluation       04/08/17 1010 04/07/17 1330    Rehab Evaluation    Document Type therapy note (daily note)  - therapy note (daily note)  -    Subjective Information agree to therapy;complains of;fatigue;weakness  - agree to therapy;complains of;fatigue  -EDOUARD    Patient Effort, Rehab Treatment  good  -EDOUARD    Symptoms Noted During/After Treatment  other (see comments)   patient became more agitated with increased activity   -EDOUARD    Symptoms Noted Comment  patient became impulsive letting go of walker and wanting to sit then refusing to sit increased agitation  -    General Information    Precautions/Limitations  fall precautions  -EDOUARD    Vital Signs    Pre Systolic BP Rehab  136  -EDOUARD    Pre Treatment Diastolic BP  54  -EDOUARD    Pretreatment Heart Rate (beats/min)  68  -EDOUARD    Pre SpO2 (%)  95  -EDOUARD    O2  Delivery Pre Treatment  supplemental O2  -EDOUARD    Pain Assessment    Pain Assessment Jay-Baker FACES  -MF Jay-Kent FACES  -EDOUARD    Jay-Baker FACES Pain Rating 0  -MF 2  -EDOUARD    Pain Intervention(s) Repositioned;Medication (See MAR)  -MF Repositioned;Ambulation/increased activity  -EDOUARD    Cognitive Assessment/Intervention    Current Cognitive/Communication Assessment  impaired  -EDOUARD    Orientation Status  oriented to;person  -EDOUARD    Follows Commands/Answers Questions  75% of the time;needs repetition  -EDOUARD    Bed Mobility, Assessment/Treatment    Bed Mobility, Assistive Device  bed rails;draw sheet  -EDOUARD    Bed Mobility, Scoot/Bridge, [place]  minimum assist (75% patient effort)  -EDOUARD    Bed Mob, Supine to Sit, [place]  moderate assist (50% patient effort);2 person assist required  -EDOUARD    Bed Mobility, Comment  cues for sequencing  -EDOUARD    Transfer Assessment/Treatment    Transfers, Sit-Stand [place]  minimum assist (75% patient effort);2 person assist required  -EDOUARD    Transfers, Stand-Sit [place]  minimum assist (75% patient effort);2 person assist required  -EDOUARD    Gait Assessment/Treatment    Gait, [place] Level  minimum assist (75% patient effort);2 person assist required  -EDOUARD    Gait, Assistive Device  rolling walker  -EDOUARD    Gait, Distance (Feet)  150   family pulling chair behind patient for safety  -EDOUARD    Gait, Safety Issues  balance decreased during turns;sequencing ability decreased;supplemental O2  -EDOUARD    Gait, Impairments  strength decreased;impaired balance  -EDOUARD    Gait, Comment  patient became more agitated with walking less cooperative needing more cues for safety patient letting go of walker   -EDOUARD    Balance Skills Training    Sitting-Level of Assistance  Contact guard  -EDOUARD    Sitting-Balance Support  Feet supported  -EDOUARD    Standing-Level of Assistance  Minimum assistance;x2  -EDOUARD    Static Standing Balance Support  assistive device  -EDOUARD    Gait Balance-Level of Assistance  Minimum  assistance;x2  -EDOUARD    Gait Balance Support  assistive device  -EDOUARD    Therapy Exercises    Bilateral Lower Extremities  AAROM:;10 reps;sitting;ankle pumps/circles;LAQ  -EDOUARD    Positioning and Restraints    Pre-Treatment Position in bed  - in bed  -EDOUARD    Post Treatment Position bed  - chair  -EDOUARD    In Bed supine;call light within reach;with nsg  -     In Chair  notified nsg;reclined;sitting;call light within reach;exit alarm on;with family/caregiver  -EDOUARD    Restraints  released:;reapplied:;notified nsg:  -EDOUARD      04/07/17 1300 04/06/17 1444    Rehab Evaluation    Document Type evaluation;therapy note (daily note)  -SM therapy note (daily note)  -LS    Subjective Information agree to therapy  -SM agree to therapy;no complaints  -LS    Patient Effort, Rehab Treatment adequate  -SM adequate  -LS    General Information    Precautions/Limitations  fall precautions;oxygen therapy device and L/min  -LS    Vital Signs    Pre Systolic BP Rehab  107  -LS    Pre Treatment Diastolic BP  76  -LS    Post Systolic BP Rehab  108  -LS    Post Treatment Diastolic BP  77  -LS    Pretreatment Heart Rate (beats/min)  82  -LS    Posttreatment Heart Rate (beats/min)  83  -LS    Pre SpO2 (%)  100  -LS    O2 Delivery Pre Treatment  supplemental O2   2.5L  -LS    Post SpO2 (%)  100  -LS    O2 Delivery Post Treatment  supplemental O2  -LS    Pre Patient Position  Supine  -LS    Intra Patient Position  Standing  -LS    Post Patient Position  Sitting  -LS    Pain Assessment    Pain Assessment Jay-Kent FACES  -SM 0-10  -LS    Jay-Kent FACES Pain Rating 2  -SM 0  -LS    Pain Score  0  -LS    Post Pain Score  0  -LS    Cognitive Assessment/Intervention    Current Cognitive/Communication Assessment impaired  -SM impaired  -LS    Orientation Status oriented to;person  -SM oriented to;person;disoriented to;place;time  -LS    Follows Commands/Answers Questions 50% of the time;able to follow single-step instructions;needs cueing;needs increased  time;needs repetition  - able to follow single-step instructions;25% of the time;50% of the time;needs cueing;needs increased time;needs repetition  -    Personal Safety  severe impairment;at risk behaviors demonstrated;impulsive;decreased awareness, need for assist;decreased awareness, need for safety;decreased insight to deficits  -    Personal Safety Interventions  fall prevention program maintained;gait belt;nonskid shoes/slippers when out of bed  -    Additional Documentation Cognitive Assessment Intervention (Group)  -     Cognitive Assessment Intervention    Attention moderate impairment;severe impairment;needs re-direction;difficulty attending to task/directions;distractible;needs cues to re-direct  -     Problem Solving moderate impairment;severe impairment  -     Organization moderate impairment;severe impairment  -     Bed Mobility, Assessment/Treatment    Bed Mobility, Assistive Device  bed rails;draw sheet  -    Bed Mob, Supine to Sit, [place]  moderate assist (50% patient effort);2 person assist required;verbal cues required  -    Bed Mobility, Safety Issues  cognitive deficits limit understanding;decreased use of arms for pushing/pulling;decreased use of legs for bridging/pushing  -    Bed Mobility, Comment  VC's for sequencing.   -LS    Transfer Assessment/Treatment    Transfers, Sit-Stand [place]  moderate assist (50% patient effort);2 person assist required;verbal cues required  -LS    Transfers, Stand-Sit [place]  moderate assist (50% patient effort);2 person assist required;verbal cues required  -LS    Transfers, Sit-Stand-Sit, Assist Device  rolling walker  -LS    Transfer, Safety Issues  impulsivity  -LS    Transfer, Comment  VC's for hand placement.   -LS    Gait Assessment/Treatment    Gait, [place] Level  moderate assist (50% patient effort);2 person assist required;verbal cues required  -LS    Gait, Assistive Device  rolling walker  -LS    Gait,  Distance (Feet)  80  -LS    Gait, Gait Deviations  valente decreased;step length decreased  -LS    Gait, Safety Issues  supplemental O2  -LS    Gait, Impairments  strength decreased;impaired balance  -LS    Gait, Comment  Primarily min x2 for gait; req'd mod assist for maintaining upright posture when pt demonstrated decreased following of commands (initiating sitting prematurely). Followed closely with recliner for safety.   -    Motor Skills/Interventions    Additional Documentation  Balance Skills Training (Group)  -LS    Balance Skills Training    Sitting-Level of Assistance  Contact guard  -LS    Sitting-Balance Support  Feet supported  -LS    Standing-Level of Assistance  Minimum assistance;x2  -LS    Static Standing Balance Support  assistive device  -LS    Gait Balance-Level of Assistance  Moderate assistance;x2  -LS    Gait Balance Support  assistive device  -LS    Therapy Exercises    Bilateral Lower Extremities  AAROM:;10 reps;ankle pumps/circles;hip abduction/adduction;LAQ  -LS    Positioning and Restraints    Pre-Treatment Position  in bed  -LS    Post Treatment Position  chair  -LS    In Chair  notified nsg;reclined;call light within reach;encouraged to call for assist;exit alarm on;with family/caregiver;on mechanical lift sling;legs elevated  -    Restraints  released:;reapplied:;notified nsg:;soft limb  -LS      04/06/17 1443 04/06/17 1025    Rehab Evaluation    Document Type therapy note (daily note)  - therapy note (daily note)  -    Subjective Information no complaints;agree to therapy  - no complaints;agree to therapy   pt somnolent/confused, wife gave OK for tx  -    Patient Effort, Rehab Treatment good  -     Symptoms Noted Comment Pt restless throughout treatment session.  -     General Information    Precautions/Limitations fall precautions;oxygen therapy device and L/min  - fall precautions;oxygen therapy device and L/min  -    Vital Signs    Pre Systolic BP Rehab 107   -JR     Pre Treatment Diastolic BP 75  -JR     Post Systolic BP Rehab 108  -JR     Post Treatment Diastolic BP 77  -JR     Pretreatment Heart Rate (beats/min) 80  -JR     Posttreatment Heart Rate (beats/min) 83  -JR     Pre SpO2 (%) 99  -JR     O2 Delivery Pre Treatment supplemental O2   3L  -JR     Post SpO2 (%) 100  -JR     O2 Delivery Post Treatment supplemental O2  -JR     Pre Patient Position Supine  -JR     Intra Patient Position Standing  -JR     Post Patient Position Sitting  -JR     Pain Assessment    Pain Assessment 0-10  -JR No/denies pain  -    Jay-Kent FACES Pain Rating 0  -JR     Pain Score 0  -JR     Post Pain Score 0  -JR     Cognitive Assessment/Intervention    Current Cognitive/Communication Assessment impaired  -     Orientation Status oriented to;person  -     Follows Commands/Answers Questions 25% of the time;50% of the time;able to follow single-step instructions  -     Personal Safety severe impairment;decreased awareness, need for assist;decreased awareness, need for safety;decreased insight to deficits  -     Bed Mobility, Assessment/Treatment    Bed Mobility, Assistive Device head of bed elevated;draw sheet  -     Bed Mob, Supine to Sit, [place] moderate assist (50% patient effort);2 person assist required  -JR     Bed Mobility, Safety Issues cognitive deficits limit understanding;decreased use of arms for pushing/pulling;decreased use of legs for bridging/pushing;impaired trunk control for bed mobility  -     Bed Mobility, Impairments strength decreased;impaired balance  -     Bed Mobility, Comment Pt required max verbal cues for sequencing through bed mobility  -     Transfer Assessment/Treatment    Transfers, Sit-Stand [place] moderate assist (50% patient effort);2 person assist required  -     Transfers, Stand-Sit [place] moderate assist (50% patient effort);2 person assist required  -JR     Transfers, Sit-Stand-Sit, Assist Device rolling walker  -      Transfer, Safety Issues balance decreased during turns;sequencing ability decreased;step length decreased;weight-shifting ability decreased  -JR     Transfer, Impairments strength decreased;impaired balance;coordination impaired;motor control impaired;postural control impaired  -JR     Transfer, Comment Pt required verbal cues for hand placement with transfers. In hallway pt required max verbal cues for stand to sit.  -     Balance Skills Training    Sitting-Level of Assistance Contact guard  -JR     Sitting-Balance Support Feet supported  -JR     Standing-Level of Assistance Minimum assistance;x2  -JR     Static Standing Balance Support assistive device  -JR     Gait Balance-Level of Assistance Moderate assistance;x2  -JR     Gait Balance Support assistive device  -JR     Therapy Exercises    Bilateral Upper Extremity AAROM:;10 reps;elbow flexion/extension;shoulder extension/flexion  -     Positioning and Restraints    Pre-Treatment Position in bed  - in bed  -    Post Treatment Position chair  - bed  -    In Bed  supine;call light within reach;encouraged to call for assist;with family/caregiver  -    In Chair notified nsg;reclined;call light within reach;encouraged to call for assist;exit alarm on;with family/caregiver;RUE elevated;LUE elevated;on mechanical lift sling  -     Restraints released:;reapplied:;notified nsg:;soft limb  -       04/06/17 0900 04/06/17 0600    Rehab Evaluation    Document Type evaluation  -LSA     Subjective Information no complaints;agree to therapy  -LSA     Pain Assessment    Pain Assessment No/denies pain  -LSA     Muscle Tone Assessment    Left-Side Extremities Muscle Tone Assessment  mildly increased tone  -JE      04/06/17 0400 04/06/17 0200    Muscle Tone Assessment    Left-Side Extremities Muscle Tone Assessment mildly increased tone  - mildly increased tone  -JE      04/06/17 0000 04/05/17 2200    Muscle Tone Assessment    Left-Side Extremities Muscle  Tone Assessment mildly increased tone  -JE mildly increased tone  -JE      04/05/17 2000 04/05/17 1800    Muscle Tone Assessment    Left-Side Extremities Muscle Tone Assessment mildly increased tone  -JE --  -MR    Bilateral Upper Extremities Muscle Tone Assessment  mildly decreased tone  -MR      04/05/17 1600 04/05/17 1500    Rehab Evaluation    Document Type  therapy note (daily note);evaluation   wound care eval  -    Subjective Information  decreased LOC   family gave ok for tx.   -    Pain Assessment    Pain Assessment  Jay-Kent FACES  -    Jay-Baker FACES Pain Rating  0  -    Pain Intervention(s)  Medication (See MAR)  -    Cognitive Assessment/Intervention    Current Cognitive/Communication Assessment  impaired  -    Orientation Status  oriented to;person  -    Follows Commands/Answers Questions  25% of the time;able to follow single-step instructions;needs cueing  -    Muscle Tone Assessment    Left-Side Extremities Muscle Tone Assessment mildly decreased tone  -MR     Positioning and Restraints    Pre-Treatment Position  in bed  -    Post Treatment Position  bed  -    In Bed  supine;call light within reach;with family/caregiver;notified nsg  -    Restraints  released:;reapplied:;soft limb;notified nsg:  -MF      04/05/17 1400 04/05/17 1200    Muscle Tone Assessment    Left-Side Extremities Muscle Tone Assessment mildly decreased tone  -MR mildly decreased tone  -MR      User Key  (r) = Recorded By, (t) = Taken By, (c) = Cosigned By    Initials Name Provider Type    EDOUARD Prado, PT Physical Therapist     Pee Roldan, PT Physical Therapist     Akosua Solomon, MS CCC-SLP Speech and Language Pathologist    JR Judy Leach, OT Occupational Therapist    LSGANESH Pelletier, MS CCC-SLP Speech and Language Pathologist    LS Lily Juárez, PT Physical Therapist     Rekha Mcgarry, PT Physical Therapist    MR Jada Murillo, RN Registered Nurse    FELY ANDERSEN  Edwin, RN Registered Nurse            PT Recommendation and Plan  Anticipated Discharge Disposition: inpatient rehabilitation facility  PT Frequency: daily    Plan Of Care Reviewed With: patient       Outcome Summary/Follow up Plan: wounds cont to have moderate slough and thick drainage collecting.  Drains removed after discussing case with Dr marcum to allow for better packing and better access to wound base for pulse lavage.            Outcome Measures       04/07/17 1330 04/06/17 1444 04/06/17 1443    How much help from another person do you currently need...    Turning from your back to your side while in flat bed without using bedrails? 2  -EDOUARD 2  -LS     Moving from lying on back to sitting on the side of a flat bed without bedrails? 2  -EDOUARD 2  -LS     Moving to and from a bed to a chair (including a wheelchair)? 3  -EDOUARD 2  -LS     Standing up from a chair using your arms (e.g., wheelchair, bedside chair)? 3  -EDOUARD 2  -LS     Climbing 3-5 steps with a railing? 1  -EDOUARD 1  -LS     To walk in hospital room? 3  -EDOUARD 2  -LS     AM-PAC 6 Clicks Score 14  -EDOUARD 11  -LS     How much help from another is currently needed...    Putting on and taking off regular lower body clothing?   1  -JR    Bathing (including washing, rinsing, and drying)   2  -JR    Toileting (which includes using toilet bed pan or urinal)   1  -JR    Putting on and taking off regular upper body clothing   2  -JR    Taking care of personal grooming (such as brushing teeth)   2  -JR    Eating meals   2  -JR    Score   10  -JR    Modified [place] Scale    Modified [place] Scale   4 - Moderately severe disability.  Unable to walk without assistance, and unable to attend to own bodily needs without assistance.  -JR    Functional Assessment    Outcome Measure Options   AM-PAC 6 Clicks Daily Activity (OT);Modified [place]  -JR      User Key  (r) = Recorded By, (t) = Taken By, (c) = Cosigned By    Initials Name Provider Type    EDOUARD Prado, PT Physical  Therapist    JR Judy Leach, OT Occupational Therapist    ANDREWS Juárez, PT Physical Therapist              Time Calculation        PT Charges       04/08/17 1010          Time Calculation    Start Time 1010  -      PT Goal Re-Cert Due Date 04/15/17  -      Time Calculation- PT    Total Timed Code Minutes- PT 25 minute(s)  -        User Key  (r) = Recorded By, (t) = Taken By, (c) = Cosigned By    Initials Name Provider Type     Pee Roldan, PT Physical Therapist             Therapy Charges for Today     Code Description Service Date Service Provider Modifiers Qty    [id number] HC ROSEANNA DEBRIDE OPEN WOUND UP TO 20CM 4/8/2017 Pee Roldan, PT GP 1            PT G-Codes  Outcome Measure Options: AM-PAC 6 Clicks Daily Activity (OT), Modified [place] Roldan, PT  4/8/2017

## 2017-04-09 NOTE — PLAN OF CARE
Problem: Patient Care Overview (Adult)  Goal: Plan of Care Review  Outcome: Ongoing (interventions implemented as appropriate)    04/09/17 1446   Coping/Psychosocial Response Interventions   Plan Of Care Reviewed With patient;spouse   Patient Care Overview   Progress progress toward functional goals as expected   Outcome Evaluation   Outcome Summary/Follow up Plan Pt continues to be mildly agitated with fxl activity; progressing with STS, standing tolerance; following 50% 1 step commands this date; will continue to benefit from skilled OT services to address deficits, facilitate increased fxl I, safe transition to next level of care.         Problem: Inpatient Occupational Therapy  Goal: Bed Mobility Goal LTG- OT  Outcome: Ongoing (interventions implemented as appropriate)    04/05/17 0904 04/09/17 1446   Bed Mobility OT LTG   Bed Mobility OT LTG, Date Established 04/05/17 --    Bed Mobility OT LTG, Time to Achieve 1 wk --    Bed Mobility OT LTG, Activity Type all bed mobility --    Bed Mobility OT LTG, [place] Level minimum assist (75% patient effort) --    Bed Mobility OT LTG, Outcome --  goal ongoing  (Max A x 2 sup>sit; Dep x 2 sit>supine this date)       Goal: Transfer Training Goal 1 LTG- OT  Outcome: Ongoing (interventions implemented as appropriate)    04/05/17 0904 04/09/17 1446   Transfer Training OT LTG   Transfer Training OT LTG, Date Established 04/05/17 --    Transfer Training OT LTG, Time to Achieve 1 wk --    Transfer Training OT LTG, Activity Type all transfers --    Transfer Training OT LTG, [place] Level minimum assist (75% patient effort) --    Transfer Training OT LTG, Outcome --  goal ongoing  (Progressing)       Goal: Orientation Goal LTG- OT  Outcome: Ongoing (interventions implemented as appropriate)    04/05/17 0904 04/09/17 1446   Orientation OT LTG   Orientation OT LTG, Date Established 04/05/17 --    Orientation OT LTG, Time to Achieve 1 wk --    Orientation OT LTG, Activity  Type person;place;50% treatment session --    Orientation OT LTG, Outcome --  goal ongoing       Goal: Follow Directions Goal LTG- OT  Outcome: Ongoing (interventions implemented as appropriate)    04/05/17 0904 04/09/17 1446   Follow Directions OT LTG   Follow Directions OT LTG, Date Established 04/05/17 --    Follow Directions OT LTG, Time to Achieve 1 wk --    Follow Directions OT LTG, Activity Type 1-Step;50% treatment session --    Follow Directions OT LTG, [place] Level with verbal cues --    Follow Directions OT LTG, Outcome --  goal ongoing  (50% this date; continue for consistency)

## 2017-04-09 NOTE — PLAN OF CARE
Problem: Patient Care Overview (Adult)  Goal: Plan of Care Review  Outcome: Ongoing (interventions implemented as appropriate)    Problem: Stroke (Ischemic) (Adult)  Goal: Signs and Symptoms of Listed Potential Problems Will be Absent or Manageable (Stroke)  Outcome: Ongoing (interventions implemented as appropriate)    Problem: Fall Risk (Adult)  Goal: Absence of Falls  Outcome: Ongoing (interventions implemented as appropriate)    Problem: Skin Integrity Impairment, Risk/Actual (Adult)  Goal: Skin Integrity/Wound Healing  Outcome: Ongoing (interventions implemented as appropriate)    Problem: Infection, Risk/Actual (Adult)  Goal: Infection Prevention/Resolution  Outcome: Ongoing (interventions implemented as appropriate)

## 2017-04-09 NOTE — PROGRESS NOTES
Acute Care - Wound/Debridement Treatment Note  [facility]     Patient Name: Tomas Salvador  : 1955  MRN: [id number]  Today's Date: 2017  Onset of Illness/Injury or Date of Surgery Date: 17   Date of Referral to PT: 17   Referring Physician: Dr. Morrison       Admit Date: 2017    Visit Dx:    ICD-10-CM ICD-9-CM   1. Dysphagia, unspecified type R13.10 787.20   2. Impaired functional mobility, balance, gait, and endurance Z74.09 V49.89   3. Impaired mobility and ADLs Z74.09 799.89   4. Cognitive communication deficit R41.841 799.52       Patient Active Problem List   Diagnosis   • Coronary disease   • Ischemic cardiomyopathy   • Peripheral vascular disease   • ICD (implantable cardioverter-defibrillator) in place, implantation .   • Dyslipidemia, on atorvastain.    • Sepsis   • Diabetes education, encounter for   • Chronic respiratory failure with hypoxia   • Urinary retention   • Hyperlipidemia   • Essential hypertension   • E. coli UTI (urinary tract infection)   • Epididymo-orchitis   • Encephalopathy acute   • Cellulitis   • Yeast UTI               LDA Wound       17 1040          Incision 17 1056 other (see comments) scrotum    Incision - Properties Group Placement Date: 17  - Placement Time:   -JW Side: other (see comments)  - Location: scrotum  -    Incision WDL WDL  -      Dressing Appearance moist drainage  -MF      Appearance drainage;open areas   moderate slough at base of wound   -      Incision Length (cm) 10.5  -MF      Incision Width (cm) 3   increased size due to removal of drains  -      Incision Depth (cm) 5  -      Drainage Characteristics/Odor yellow;serous  -      Drainage Amount moderate  -MF      Wound Cleaning irrigated with;sterile normal saline  -      Wound Interventions pulsatile high pressure lavage   500 ml with fan and tunnel tip  -      Packing other (see comments)   sorbact to pack wounds.   -        User Key   (r) = Recorded By, (t) = Taken By, (c) = Cosigned By    Initials Name Provider Type     Pee Roldan, PT Physical Therapist    JAY JAY Silveira RN Registered Nurse            WOUND DEBRIDEMENT  Debridement Site 1  Location- Site 1: perineal wound  Selective Debridement- Site 1: Wound Surface <20cmsq (~10 cm2 )  Instruments- Site 1: tweezers, scissors  Excised Tissue Description- Site 1: minimum, slough  Bleeding- Site 1: none                  Adult Rehabilitation Note       04/09/17 1410 04/09/17 1040 04/08/17 1010    Rehab Assessment/Intervention    Discipline occupational therapist  -TA physical therapist  -MF physical therapist  -MF    Document Type therapy note (daily note)  -TA therapy note (daily note)  -MF therapy note (daily note)  -MF    Subjective Information agree to therapy;no complaints  -TA agree to therapy;complains of;weakness;fatigue;pain  -MF agree to therapy;complains of;fatigue;weakness  -MF    Patient Effort, Rehab Treatment good  -TA      Symptoms Noted During/After Treatment other (see comments)   Increased agitation with activity; resistant to sitting   -TA      Precautions/Limitations fall precautions;oxygen therapy device and L/min  -TA      Recorded by [TA] David Jenkins OT [MF] Pee Roldan, PT [MF] Pee Roldan, PT    Vital Signs    Pre Systolic BP Rehab 107  -TA      Pre Treatment Diastolic BP 69  -TA      Post Systolic BP Rehab 102  -TA      Post Treatment Diastolic BP 68  -TA      Pretreatment Heart Rate (beats/min) 86  -TA      Posttreatment Heart Rate (beats/min) 80  -TA      Pre SpO2 (%) 99  -TA      O2 Delivery Pre Treatment supplemental O2   5.5L  -TA      Intra SpO2 (%) 89  -TA      O2 Delivery Intra Treatment supplemental O2   5.5L  -TA      Post SpO2 (%) 100  -TA      O2 Delivery Post Treatment supplemental O2   5.5L  -TA      Pre Patient Position Supine  -TA      Intra Patient Position Standing  -TA      Post Patient Position Supine  -TA      Recorded  by [TA] David Jenkins OT      Pain Assessment    Pain Assessment 0-10  -TA Jay-Kent FACES  -MF Jay-Baker FACES  -MF    Jay-Baker FACES Pain Rating 0  -TA 2  -MF 0  -MF    Pain Score 0  -TA      Post Pain Score 0  -TA      Pain Location  Other (Comment)   wound  -MF     Pain Intervention(s) Repositioned;Ambulation/increased activity  -TA Repositioned  -MF Repositioned;Medication (See MAR)  -MF    Response to Interventions tolerated  -TA      Recorded by [TA] David Jenkins OT [MF] Pee Roldan, PT [MF] Pee Roldan, PT    Cognitive Assessment/Intervention    Current Cognitive/Communication Assessment impaired  -TA      Orientation Status oriented to;person;disoriented to;place;time  -TA      Follows Commands/Answers Questions 50% of the time;able to follow single-step instructions;needs cueing;needs increased time;needs repetition  -TA      Personal Safety severe impairment;decreased awareness, need for assist;decreased awareness, need for safety;decreased insight to deficits  -TA      Personal Safety Interventions elopement precautions initiated;fall prevention program maintained;gait belt;muscle strengthening facilitated;nonskid shoes/slippers when out of bed;supervised activity  -TA      Recorded by [TA] David Jenkins OT      Bed Mobility, Assessment/Treatment    Bed Mobility, Assistive Device bed rails;head of bed elevated  -TA      Bed Mobility, Scoot/Bridge, [place] dependent (less than 25% patient effort);2 person assist required  -TA      Bed Mob, Supine to Sit, [place] maximum assist (25% patient effort);2 person assist required;verbal cues required;nonverbal cues required (demo/gesture)  -TA      Bed Mob, Sit to Supine, [place] dependent (less than 25% patient effort);2 person assist required;verbal cues required  -TA      Bed Mobility, Safety Issues cognitive deficits limit understanding;decreased use of arms for pushing/pulling;decreased use of legs for  bridging/pushing  -TA      Bed Mobility, Impairments strength decreased;impaired balance  -TA      Bed Mobility, Comment VCs for sequencing, safe hand placement  -TA      Recorded by [TA] David Jenkins OT      Transfer Assessment/Treatment    Transfers, Sit-Stand [place] moderate assist (50% patient effort);2 person assist required;verbal cues required  -TA      Transfers, Stand-Sit [place] moderate assist (50% patient effort);2 person assist required;verbal cues required  -TA      Transfers, Sit-Stand-Sit, Assist Device rolling walker  -TA      Transfer, Safety Issues impulsivity;weight-shifting ability decreased;steps too close front assistive device  -TA      Transfer, Impairments strength decreased;impaired balance;other (see comments)   confusion  -TA      Transfer, Comment VCs for safe hand placement; completed 3 STS with poor safety awareness, resistance to sitting once in standing.  -TA      Recorded by [TA] David Jenkins OT      Functional Mobility    Functional Mobility- Ind. Level 2 person assist required;contact guard assist;verbal cues required  -TA      Functional Mobility- Device rolling walker  -TA      Functional Mobility-Distance (Feet) --   Marching in place  -TA      Functional Mobility- Safety Issues balance decreased during turns;sequencing ability decreased;step length decreased;weight-shifting ability decreased;supplemental O2;steps too close front assistive device  -TA      Functional Mobility- Comment Pt with difficulty motor planning marching in place  -TA      Recorded by [TA] David Jenkins OT      Upper Body Dressing Assessment/Training    UB Dressing Assess/Train, Clothing Type donning:;hospital gown  -TA      UB Dressing Assess/Train, Position edge of bed;sitting  -TA      UB Dressing Assess/Train, [place] moderate assist (50% patient effort)  -TA      UB Dressing Assess/Train, Impairments impaired balance;coordination impaired  -TA      UB Dressing  Assess/Train, Comment impulsive at EOB  -TA      Recorded by [TA] David Jenkins OT      Motor Skills/Interventions    Additional Documentation Balance Skills Training (Group)  -TA      Recorded by [TA] David Jenkins OT      Balance Skills Training    Sitting-Level of Assistance Contact guard  -TA      Sitting-Balance Support Feet supported  -TA      Sitting-Balance Activities Lateral lean;Forward lean;Trunk control activities  -TA      Standing-Level of Assistance Contact guard;x2  -TA      Static Standing Balance Support assistive device  -TA      Standing-Balance Activities Weight Shift A-P;Weight Shift R-L  -TA      Recorded by [TA] David Jenkins OT      Positioning and Restraints    Pre-Treatment Position in bed  -TA in bed  -MF in bed  -MF    Post Treatment Position bed  -TA bed  -MF bed  -MF    In Bed notified nsg;supine;call light within reach;encouraged to call for assist;exit alarm on;patient within staff view;with family/caregiver;side rails up x2;RUE elevated;LUE elevated;SCD pump applied;legs elevated  -TA supine;call light within reach;with nsg  -MF supine;call light within reach;with nsg  -MF    Restraints released:;reapplied:;notified nsg:;soft limb  -TA released:;reapplied:  -MF     Recorded by [TA] David Jenkins OT [MF] Pee Roldan, PT [MF] Pee Roldan, PT      04/07/17 1330 04/07/17 1300       Rehab Assessment/Intervention    Discipline physical therapist  -EDOUARD speech language pathologist  -SM     Document Type therapy note (daily note)  -EDOUARD evaluation;therapy note (daily note)  -SM     Subjective Information agree to therapy;complains of;fatigue  -EDOUARD      Patient Effort, Rehab Treatment good  -EDOUARD adequate  -SM     Symptoms Noted During/After Treatment other (see comments)   patient became more agitated with increased activity   -EDOUARD      Symptoms Noted Comment patient became impulsive letting go of walker and wanting to sit then refusing to sit increased agitation   -EDOUARD      Precautions/Limitations fall precautions  -EDOUARD      Recorded by [EDOUARD] Anita Prado, PT [SM] Akosua Solomon MS CCC-SLP     Vital Signs    Pre Systolic BP Rehab 136  -EDOUARD      Pre Treatment Diastolic BP 54  -EDOUARD      Pretreatment Heart Rate (beats/min) 68  -EDOUARD      Pre SpO2 (%) 95  -EDOUARD      O2 Delivery Pre Treatment supplemental O2  -EDOUARD      Recorded by [EDOUARD] Anita Prado PT      Pain Assessment    Pain Assessment Jay-Kent FACES  -EDOUARD      Jay-Baker FACES Pain Rating 2  -EDOUARD      Pain Intervention(s) Repositioned;Ambulation/increased activity  -EDOUARD      Recorded by [EDOUARD] Anita Prado PT      Cognitive Assessment/Intervention    Current Cognitive/Communication Assessment impaired  -EDOUARD      Orientation Status oriented to;person  -EDOUARD      Follows Commands/Answers Questions 75% of the time;needs repetition  -EDOUARD      Recorded by [EDOUARD] Anita Prado PT      Dysphagia Treatment Objectives and Progress    Dysphagia Treatment Objectives  Improve hyolaryngeal excursion;Other 1  -SM     Recorded by  [SM] Akosua Solomon MS CCC-SLP     Improve timing of pharyngeal response    To improve timing of pharyngeal response, patient will:  Swallow in timely way using three second prep;Swallow in timely way using super-supraglottic swallow;80%;with consistent cues  -SM     Status: Improve timing of pharyngeal response  Progressing as expected  -SM     Timing of Pharyngeal Response Progress  10%;with consistent cues;continue to adress   cognition/focus greatly impacted  -SM     Recorded by  [SM] Akosua Solomon MS CCC-SLP     Improve laryngeal closure    To improve laryngeal closure, patient will:  Complete supraglottic swallow;80%;with consistent cues  -SM     Status: Improve laryngeal closure  Progressing as expected  -SM     Laryngeal Closure Progress  0%;with consistent cues;following model;continue to adress  -SM     Recorded by  [SM] Akosua Solomon MS CCC-SLP     Improve closure at  entrance to airway    To improve closure at entrance to airway, patient will:  Complete super-supraglottic swallow;80%;with consistent cues  -SM     Status: Improve closure at entrance to airway  Progressing as expected  -SM     Closure at Entrance to Airway Progress  0%;with consistent cues;following model;continue to adress  -SM     Recorded by  [SM] Akosua Solomon MS CCC-SLP     Improve hyolaryngeal excursion    To improve hyolaryngeal excursion, patient will:  Complete chin tuck against resistance (comment number of repetitions);90%;with consistent cues   20 sec reps x4  -SM     Status: Improve hyolaryngeal excursion  New  -SM     Hyolaryngeal Excursion Progress  40%;with consistent cues;continue to adress  -SM     Recorded by  [SM] Akosua Solomon MS CCC-SLP     Improve tongue base & pharyngeal wall squeeze    To improve tongue base & pharyngeal wall squeeze, patient will:  Complete effortful swallow;80%;with consistent cues  -SM     Status: Improve tongue base & pharyngeal wall squeeze  Progressing as expected  -SM     Tongue Base/Pharyngeal Wall Squeeze Progress  20%;with consistent cues;following model;continue to adress  -SM     Recorded by  [SM] Akosua Solomon MS CCC-SLP     Dysphagia Other 1    Dysphagia Other 1 Objective  Tolerate trials of mech-soft without s/s aspiration and no cues  -SM     Status: Dysphagia Other 1  New  -SM     Dysphagia Other 1 Progress  40%;with consistent cues;continue to address  -SM     Comments: Dysphagia Other 1  Choking on mech-soft items.  Better with level 3.  Will downgrade and consider return to mech-soft as more appropriate  -SM     Recorded by  [SM] Akosua Solomon MS CCC-SLP     Bed Mobility, Assessment/Treatment    Bed Mobility, Assistive Device bed rails;draw sheet  -EDOUARD      Bed Mobility, Scoot/Bridge, [place] minimum assist (75% patient effort)  -EDOUARD      Bed Mob, Supine to Sit, [place] moderate assist (50% patient effort);2 person  assist required  -EDOUARD      Bed Mobility, Comment cues for sequencing  -EDOUARD      Recorded by [EDOUARD] Anita Prado PT      Transfer Assessment/Treatment    Transfers, Sit-Stand [place] minimum assist (75% patient effort);2 person assist required  -EDOUARD      Transfers, Stand-Sit [place] minimum assist (75% patient effort);2 person assist required  -EDOUARD      Recorded by [EDOUARD] Anita Prado PT      Gait Assessment/Treatment    Gait, [place] Level minimum assist (75% patient effort);2 person assist required  -EDOUARD      Gait, Assistive Device rolling walker  -EDOUARD      Gait, Distance (Feet) 150   family pulling chair behind patient for safety  -EDOUARD      Gait, Safety Issues balance decreased during turns;sequencing ability decreased;supplemental O2  -EDOUARD      Gait, Impairments strength decreased;impaired balance  -EDOUARD      Gait, Comment patient became more agitated with walking less cooperative needing more cues for safety patient letting go of walker   -EDOUARD      Recorded by [EDOUARD] Anita Prado PT      Balance Skills Training    Sitting-Level of Assistance Contact guard  -EDOUARD      Sitting-Balance Support Feet supported  -EDOUARD      Standing-Level of Assistance Minimum assistance;x2  -EDOUARD      Static Standing Balance Support assistive device  -EDOUARD      Gait Balance-Level of Assistance Minimum assistance;x2  -EDOUARD      Gait Balance Support assistive device  -EDOUARD      Recorded by [EDOUARD] Anita Prado PT      Therapy Exercises    Bilateral Lower Extremities AAROM:;10 reps;sitting;ankle pumps/circles;LAQ  -EDOUARD      Recorded by [EDOUARD] Anita Prado PT      Positioning and Restraints    Pre-Treatment Position in bed  -EDOUARD      Post Treatment Position chair  -EDOUARD      In Chair notified nsg;reclined;sitting;call light within reach;exit alarm on;with family/caregiver  -EDOUARD      Restraints released:;reapplied:;notified nsg:  -EDOUARD      Recorded by [EDOUARD] Anita Prado PT        User Key  (r) = Recorded By, (t) = Taken By, (c) =  Cosigned By    Initials Name Effective Dates    EDOUARD Anita ANDERSEN Eve, PT 06/19/15 -     MF Pee Roldan, PT 06/19/15 -     SM Akosua Solomon, MS CCC-SLP 06/22/15 -     TA David Jenkins, OT 03/14/16 -                 IP PT Goals       04/08/17 1010 04/06/17 1524 04/06/17 1025    Bed Mobility PT LTG    Bed Mobility PT LTG, Outcome  goal ongoing  -LS     Transfer Training PT LTG    Transfer Training PT LTG, Outcome  goal ongoing  -LS     Gait Training PT LTG    Gait Training Goal PT LTG, Outcome  goal ongoing  -LS     Wound Care PT LTG    Wound Care PT LTG 1, Date Established   04/06/17  -    Wound Care PT LTG 1, Time to Achieve   1 wk  -MC    Wound Care PT LTG 1, Location   Scrotum  -    Wound Care PT LTG 1, No S&S of Infection   yes  -    Wound Care PT LTG 1, Decrease Wound Size   10%  -    Wound Care PT LTG 1, Decrease Necrotic Tissue   50%  -    Wound Care PT LTG 1, Decrease Exudate   minimum  -    Wound Care PT LTG 1, No New Skin Break Down   yes  -MC    Wound Care PT LTG 1, Education   dressing changes;wound care  -MC    Wound Care PT LTG 1, Education Understanding   verbalize understanding  -    Wound Care PT LTG 1, Outcome goal ongoing  -        04/05/17 0909          Bed Mobility PT LTG    Bed Mobility PT LTG, Date Established 04/05/17  -LS      Bed Mobility PT LTG, Time to Achieve 2 wks  -LS      Bed Mobility PT LTG, Activity Type supine to sit/sit to supine  -LS      Bed Mobility PT LTG, [place] Level supervision required  -LS      Transfer Training PT LTG    Transfer Training PT LTG, Date Established 04/05/17  -LS      Transfer Training PT LTG, Time to Achieve 2 wks  -LS      Transfer Training PT LTG, Activity Type sit to stand/stand to sit  -LS      Transfer Training PT LTG, [place] Level contact guard assist  -LS      Transfer Training PT LTG, Assist Device walker, rolling  -LS      Gait Training PT LTG    Gait Training Goal PT LTG, Date Established 04/05/17  -LS       Gait Training Goal PT LTG, Time to Achieve 2 wks  -LS      Gait Training Goal PT LTG, [place] Level contact guard assist  -LS      Gait Training Goal PT LTG, Assist Device walker, rolling  -LS      Gait Training Goal PT LTG, Distance to Achieve 200  -LS        User Key  (r) = Recorded By, (t) = Taken By, (c) = Cosigned By    Initials Name Provider Type    MF Pee Roldan, PT Physical Therapist    LS Lily Juárez, PT Physical Therapist    DUNCAN Mcgarry, PT Physical Therapist          Physical Therapy Education     Title: PT OT SLP Therapies (Active)     Topic: Physical Therapy (Active)     Point: Mobility training (Active)    Learning Progress Summary    Learner Readiness Method Response Comment Documented by Status   Patient Acceptance E NR  EDOUARD 04/07/17 1534 Active    Acceptance E,D NR   04/06/17 1523 Active    Acceptance E,D NR  LS 04/05/17 0908 Active   Significant Other Acceptance E,D NR  LS 04/06/17 1523 Active    Acceptance E,D NR  LS 04/05/17 0908 Active               Point: Home exercise program (Active)    Learning Progress Summary    Learner Readiness Method Response Comment Documented by Status   Patient Acceptance E NR  EDOUARD 04/07/17 1534 Active    Acceptance E,D NR  LS 04/06/17 1523 Active   Significant Other Acceptance E,D NR  LS 04/06/17 1523 Active               Point: Body mechanics (Active)    Learning Progress Summary    Learner Readiness Method Response Comment Documented by Status   Patient Acceptance E NR  EDOUARD 04/07/17 1534 Active    Acceptance E,D NR  LS 04/06/17 1523 Active    Acceptance E,D NR  LS 04/05/17 0908 Active   Significant Other Acceptance E,D NR  LS 04/06/17 1523 Active    Acceptance E,D NR  LS 04/05/17 0908 Active               Point: Precautions (Active)    Learning Progress Summary    Learner Readiness Method Response Comment Documented by Status   Patient Acceptance E NR  EDOUARD 04/07/17 1534 Active    Acceptance E,D NR  LS 04/06/17 1523 Active    Acceptance E,D  NR   04/05/17 0908 Active   Significant Other Acceptance E,D NR  LS 04/06/17 1523 Active    Acceptance E,D NR   04/05/17 0908 Active                      User Key     Initials Effective Dates Name Provider Type Discipline     06/19/15 -  Anita Prado, PT Physical Therapist PT    LS 06/19/15 -  Lily Juárez, PT Physical Therapist PT                   PT ASSESSMENT (last 72 hours)      PT Evaluation       04/09/17 1410 04/09/17 1040    Rehab Evaluation    Document Type therapy note (daily note)  -TA therapy note (daily note)  -    Subjective Information agree to therapy;no complaints  -TA agree to therapy;complains of;weakness;fatigue;pain  -MF    Patient Effort, Rehab Treatment good  -TA     Symptoms Noted During/After Treatment other (see comments)   Increased agitation with activity; resistant to sitting   -TA     General Information    Precautions/Limitations fall precautions;oxygen therapy device and L/min  -TA     Vital Signs    Pre Systolic BP Rehab 107  -TA     Pre Treatment Diastolic BP 69  -TA     Post Systolic BP Rehab 102  -TA     Post Treatment Diastolic BP 68  -TA     Pretreatment Heart Rate (beats/min) 86  -TA     Posttreatment Heart Rate (beats/min) 80  -TA     Pre SpO2 (%) 99  -TA     O2 Delivery Pre Treatment supplemental O2   5.5L  -TA     Intra SpO2 (%) 89  -TA     O2 Delivery Intra Treatment supplemental O2   5.5L  -TA     Post SpO2 (%) 100  -TA     O2 Delivery Post Treatment supplemental O2   5.5L  -TA     Pre Patient Position Supine  -TA     Intra Patient Position Standing  -TA     Post Patient Position Supine  -TA     Pain Assessment    Pain Assessment 0-10  -TA Jay-Kent FACES  -MF    Jay-Baker FACES Pain Rating 0  -TA 2  -MF    Pain Score 0  -TA     Post Pain Score 0  -TA     Pain Location  Other (Comment)   wound  -MF    Pain Intervention(s) Repositioned;Ambulation/increased activity  -TA Repositioned  -MF    Response to Interventions tolerated  -TA     Cognitive  Assessment/Intervention    Current Cognitive/Communication Assessment impaired  -TA     Orientation Status oriented to;person;disoriented to;place;time  -TA     Follows Commands/Answers Questions 50% of the time;able to follow single-step instructions;needs cueing;needs increased time;needs repetition  -TA     Personal Safety severe impairment;decreased awareness, need for assist;decreased awareness, need for safety;decreased insight to deficits  -TA     Personal Safety Interventions elopement precautions initiated;fall prevention program maintained;gait belt;muscle strengthening facilitated;nonskid shoes/slippers when out of bed;supervised activity  -TA     Bed Mobility, Assessment/Treatment    Bed Mobility, Assistive Device bed rails;head of bed elevated  -TA     Bed Mobility, Scoot/Bridge, [place] dependent (less than 25% patient effort);2 person assist required  -TA     Bed Mob, Supine to Sit, [place] maximum assist (25% patient effort);2 person assist required;verbal cues required;nonverbal cues required (demo/gesture)  -TA     Bed Mob, Sit to Supine, [place] dependent (less than 25% patient effort);2 person assist required;verbal cues required  -TA     Bed Mobility, Safety Issues cognitive deficits limit understanding;decreased use of arms for pushing/pulling;decreased use of legs for bridging/pushing  -TA     Bed Mobility, Impairments strength decreased;impaired balance  -TA     Bed Mobility, Comment VCs for sequencing, safe hand placement  -TA     Transfer Assessment/Treatment    Transfers, Sit-Stand [place] moderate assist (50% patient effort);2 person assist required;verbal cues required  -TA     Transfers, Stand-Sit [place] moderate assist (50% patient effort);2 person assist required;verbal cues required  -TA     Transfers, Sit-Stand-Sit, Assist Device rolling walker  -TA     Transfer, Safety Issues impulsivity;weight-shifting ability decreased;steps too close front assistive  device  -TA     Transfer, Impairments strength decreased;impaired balance;other (see comments)   confusion  -TA     Transfer, Comment VCs for safe hand placement; completed 3 STS with poor safety awareness, resistance to sitting once in standing.  -TA     Motor Skills/Interventions    Additional Documentation Balance Skills Training (Group)  -TA     Balance Skills Training    Sitting-Level of Assistance Contact guard  -TA     Sitting-Balance Support Feet supported  -TA     Sitting-Balance Activities Lateral lean;Forward lean;Trunk control activities  -TA     Standing-Level of Assistance Contact guard;x2  -TA     Static Standing Balance Support assistive device  -TA     Standing-Balance Activities Weight Shift A-P;Weight Shift R-L  -TA     Positioning and Restraints    Pre-Treatment Position in bed  -TA in bed  -    Post Treatment Position bed  -TA bed  -MF    In Bed notified nsg;supine;call light within reach;encouraged to call for assist;exit alarm on;patient within staff view;with family/caregiver;side rails up x2;RUE elevated;LUE elevated;SCD pump applied;legs elevated  -TA supine;call light within reach;with nsg  -    Restraints released:;reapplied:;notified nsg:;soft limb  -TA released:;reapplied:  -      04/08/17 1010 04/07/17 1330    Rehab Evaluation    Document Type therapy note (daily note)  - therapy note (daily note)  -    Subjective Information agree to therapy;complains of;fatigue;weakness  - agree to therapy;complains of;fatigue  -EDOUARD    Patient Effort, Rehab Treatment  good  -EDOUARD    Symptoms Noted During/After Treatment  other (see comments)   patient became more agitated with increased activity   -EDOUARD    Symptoms Noted Comment  patient became impulsive letting go of walker and wanting to sit then refusing to sit increased agitation  -EDOUARD    General Information    Precautions/Limitations  fall precautions  -EDOUARD    Vital Signs    Pre Systolic BP Rehab  136  -EDOUARD    Pre Treatment Diastolic BP  54   -EDOUARD    Pretreatment Heart Rate (beats/min)  68  -EDOUARD    Pre SpO2 (%)  95  -EDOUARD    O2 Delivery Pre Treatment  supplemental O2  -EDOUARD    Pain Assessment    Pain Assessment Jay-Baker FACES  - Jay-Kent FACES  -EDOUARD    Jay-Baker FACES Pain Rating 0  -MF 2  -EDOUARD    Pain Intervention(s) Repositioned;Medication (See MAR)  -MF Repositioned;Ambulation/increased activity  -EDOUARD    Cognitive Assessment/Intervention    Current Cognitive/Communication Assessment  impaired  -EDOUARD    Orientation Status  oriented to;person  -EDOUARD    Follows Commands/Answers Questions  75% of the time;needs repetition  -EDOUARD    Bed Mobility, Assessment/Treatment    Bed Mobility, Assistive Device  bed rails;draw sheet  -EDOUARD    Bed Mobility, Scoot/Bridge, [place]  minimum assist (75% patient effort)  -EDOUARD    Bed Mob, Supine to Sit, [place]  moderate assist (50% patient effort);2 person assist required  -EDOUARD    Bed Mobility, Comment  cues for sequencing  -EDOUARD    Transfer Assessment/Treatment    Transfers, Sit-Stand [place]  minimum assist (75% patient effort);2 person assist required  -EDOUARD    Transfers, Stand-Sit [place]  minimum assist (75% patient effort);2 person assist required  -EDOUARD    Gait Assessment/Treatment    Gait, [place] Level  minimum assist (75% patient effort);2 person assist required  -EDOUARD    Gait, Assistive Device  rolling walker  -EDOUARD    Gait, Distance (Feet)  150   family pulling chair behind patient for safety  -EDOUARD    Gait, Safety Issues  balance decreased during turns;sequencing ability decreased;supplemental O2  -EDOUARD    Gait, Impairments  strength decreased;impaired balance  -EDOUARD    Gait, Comment  patient became more agitated with walking less cooperative needing more cues for safety patient letting go of walker   -EDOUARD    Balance Skills Training    Sitting-Level of Assistance  Contact guard  -EDOUARD    Sitting-Balance Support  Feet supported  -EDOUARD    Standing-Level of Assistance  Minimum assistance;x2  -EDOUARD    Static Standing  Balance Support  assistive device  -EDOUARD    Gait Balance-Level of Assistance  Minimum assistance;x2  -EDOUARD    Gait Balance Support  assistive device  -EDOUARD    Therapy Exercises    Bilateral Lower Extremities  AAROM:;10 reps;sitting;ankle pumps/circles;LAQ  -EDOUARD    Positioning and Restraints    Pre-Treatment Position in bed  - in bed  -EDOUARD    Post Treatment Position bed  - chair  -EDOUARD    In Bed supine;call light within reach;with nsg  -     In Chair  notified nsg;reclined;sitting;call light within reach;exit alarm on;with family/caregiver  -EDOUARD    Restraints  released:;reapplied:;notified nsg:  -EDOUARD      04/07/17 1300       Rehab Evaluation    Document Type evaluation;therapy note (daily note)  -     Subjective Information agree to therapy  -     Patient Effort, Rehab Treatment adequate  -SM     Pain Assessment    Pain Assessment Jay-Kent FACES  -     Jay-Kent FACES Pain Rating 2  -SM     Cognitive Assessment/Intervention    Current Cognitive/Communication Assessment impaired  -     Orientation Status oriented to;person  -     Follows Commands/Answers Questions 50% of the time;able to follow single-step instructions;needs cueing;needs increased time;needs repetition  -     Additional Documentation Cognitive Assessment Intervention (Group)  -     Cognitive Assessment Intervention    Attention moderate impairment;severe impairment;needs re-direction;difficulty attending to task/directions;distractible;needs cues to re-direct  -     Problem Solving moderate impairment;severe impairment  -     Organization moderate impairment;severe impairment  -       User Key  (r) = Recorded By, (t) = Taken By, (c) = Cosigned By    Initials Name Provider Type    EDOUARD Prado, PT Physical Therapist    VALERI Roldan, PT Physical Therapist    SM Akosua Solomon, MS CCC-SLP Speech and Language Pathologist    CHU Jenkins, OT Occupational Therapist            PT Recommendation and  Plan  Anticipated Discharge Disposition: inpatient rehabilitation facility  PT Frequency: daily    Plan Of Care Reviewed With: patient       Outcome Summary/Follow up Plan: scrotal wound cont to improve slowly with decreasing s/s of infection.  Pt cont to have moderate slough and drainage at base of wound and will benefit from cont pulse lavage.           Outcome Measures       04/09/17 1410 04/07/17 1330       How much help from another person do you currently need...    Turning from your back to your side while in flat bed without using bedrails?  2  -EDOUARD     Moving from lying on back to sitting on the side of a flat bed without bedrails?  2  -EDOUARD     Moving to and from a bed to a chair (including a wheelchair)?  3  -EDOUARD     Standing up from a chair using your arms (e.g., wheelchair, bedside chair)?  3  -EDOUARD     Climbing 3-5 steps with a railing?  1  -EDOUARD     To walk in hospital room?  3  -EDOUARD     AM-PAC 6 Clicks Score  14  -EDOUARD     How much help from another is currently needed...    Putting on and taking off regular lower body clothing? 1  -TA      Bathing (including washing, rinsing, and drying) 2  -TA      Toileting (which includes using toilet bed pan or urinal) 1  -TA      Putting on and taking off regular upper body clothing 2  -TA      Taking care of personal grooming (such as brushing teeth) 2  -TA      Eating meals 2  -TA      Score 10  -TA      Modified [place] Scale    Modified [place] Scale 4 - Moderately severe disability.  Unable to walk without assistance, and unable to attend to own bodily needs without assistance.  -TA      Functional Assessment    Outcome Measure Options AM-PAC 6 Clicks Daily Activity (OT);Modified [place]  -TA        User Key  (r) = Recorded By, (t) = Taken By, (c) = Cosigned By    Initials Name Provider Type    EDOUARD Prado, PT Physical Therapist    CHU Jenkins, OT Occupational Therapist              Time Calculation        PT Charges       04/09/17 1040          Time  Calculation    Start Time 1040  -      PT Goal Re-Cert Due Date 04/15/17  -      Time Calculation- PT    Total Timed Code Minutes- PT 25 minute(s)  -        User Key  (r) = Recorded By, (t) = Taken By, (c) = Cosigned By    Initials Name Provider Type    VALERI Roldan, PT Physical Therapist             Therapy Charges for Today     Code Description Service Date Service Provider Modifiers Qty    77260073642 HC ROSEANNA DEBRIDE OPEN WOUND UP TO 20CM 4/8/2017 Pee Roldan, PT GP 1    16228944093 HC ROSEANNA DEBRIDE OPEN WOUND UP TO 20CM 4/9/2017 Pee Roldan, PT GP 1            PT G-Codes  Outcome Measure Options: AM-PAC 6 Clicks Daily Activity (OT), Zaki Roldan, PT  4/9/2017

## 2017-04-09 NOTE — PLAN OF CARE
Problem: Patient Care Overview (Adult)  Goal: Plan of Care Review  Outcome: Ongoing (interventions implemented as appropriate)    04/09/17 1551   Coping/Psychosocial Response Interventions   Plan Of Care Reviewed With patient;spouse   Patient Care Overview   Progress progress toward functional goals as expected   Outcome Evaluation   Outcome Summary/Follow up Plan Pt limited today by decreased ability to follow commands and increased agitation with activity. He performed x 3 STS transfers EOB and performed standing dynamic balance with BUE support on RWx and CGA.         Problem: Stroke (Ischemic) (Adult)  Goal: Signs and Symptoms of Listed Potential Problems Will be Absent or Manageable (Stroke)  Outcome: Ongoing (interventions implemented as appropriate)    04/09/17 1551   Stroke (Ischemic)   Problems Assessed (Stroke (Ischemic)/TIA) motor/sensory impairment   Problems Present (Stroke (Ischemic)/TIA) motor/sensory impairment         Problem: Inpatient Physical Therapy  Goal: Bed Mobility Goal LTG- PT  Outcome: Ongoing (interventions implemented as appropriate)    04/05/17 0909 04/09/17 1551   Bed Mobility PT LTG   Bed Mobility PT LTG, Date Established 04/05/17 --    Bed Mobility PT LTG, Time to Achieve 2 wks --    Bed Mobility PT LTG, Activity Type supine to sit/sit to supine --    Bed Mobility PT LTG, Branch Level supervision required --    Bed Mobility PT LTG, Outcome --  goal ongoing       Goal: Transfer Training Goal 1 LTG- PT  Outcome: Ongoing (interventions implemented as appropriate)    04/05/17 0909 04/09/17 1551   Transfer Training PT LTG   Transfer Training PT LTG, Date Established 04/05/17 --    Transfer Training PT LTG, Time to Achieve 2 wks --    Transfer Training PT LTG, Activity Type sit to stand/stand to sit --    Transfer Training PT LTG, Branch Level contact guard assist --    Transfer Training PT LTG, Assist Device walker, rolling --    Transfer Training PT LTG, Outcome --  goal  ongoing       Goal: Gait Training Goal LTG- PT  Outcome: Ongoing (interventions implemented as appropriate)    04/05/17 0909 04/09/17 1551   Gait Training PT LTG   Gait Training Goal PT LTG, Date Established 04/05/17 --    Gait Training Goal PT LTG, Time to Achieve 2 wks --    Gait Training Goal PT LTG, [place] Level contact guard assist --    Gait Training Goal PT LTG, Assist Device walker, rolling --    Gait Training Goal PT LTG, Distance to Achieve 200 --    Gait Training Goal PT LTG, Outcome --  goal ongoing

## 2017-04-09 NOTE — PROGRESS NOTES
Acute Care - Occupational Therapy Treatment Note  [facility]     Patient Name: Tomas Salvador  : 1955  MRN: [id number]  Today's Date: 2017  Onset of Illness/Injury or Date of Surgery Date: 17  Date of Referral to OT: 17  Referring Physician: Dr. Morrison      Admit Date: 2017    Visit Dx:     ICD-10-CM ICD-9-CM   1. Dysphagia, unspecified type R13.10 787.20   2. Impaired functional mobility, balance, gait, and endurance Z74.09 V49.89   3. Impaired mobility and ADLs Z74.09 799.89   4. Cognitive communication deficit R41.841 799.52     Patient Active Problem List   Diagnosis   • Coronary disease   • Ischemic cardiomyopathy   • Peripheral vascular disease   • ICD (implantable cardioverter-defibrillator) in place, implantation .   • Dyslipidemia, on atorvastain.    • Sepsis   • Diabetes education, encounter for   • Chronic respiratory failure with hypoxia   • Urinary retention   • Hyperlipidemia   • Essential hypertension   • E. coli UTI (urinary tract infection)   • Epididymo-orchitis   • Encephalopathy acute   • Cellulitis   • Yeast UTI             Adult Rehabilitation Note       17 1410 17 1010 17 1330    Rehab Assessment/Intervention    Discipline occupational therapist  -TA physical therapist  -MF physical therapist  -EDOUARD    Document Type therapy note (daily note)  -TA therapy note (daily note)  -MF therapy note (daily note)  -EDOUARD    Subjective Information agree to therapy;no complaints  -TA agree to therapy;complains of;fatigue;weakness  -MF agree to therapy;complains of;fatigue  -EDOUARD    Patient Effort, Rehab Treatment good  -TA  good  -EDOUARD    Symptoms Noted During/After Treatment other (see comments)   Increased agitation with activity; resistant to sitting   -TA  other (see comments)   patient became more agitated with increased activity   -EDOUARD    Symptoms Noted Comment   patient became impulsive letting go of walker and wanting to sit then refusing to sit increased  agitation  -EDOUARD    Precautions/Limitations fall precautions;oxygen therapy device and L/min  -TA  fall precautions  -EDOUARD    Recorded by [TA] David Jenkins, OT [MF] Pee Roldan, PT [EDOUARD] Anita Prado, PT    Vital Signs    Pre Systolic BP Rehab 107  -TA  136  -EDOUARD    Pre Treatment Diastolic BP 69  -TA  54  -EDOUARD    Post Systolic BP Rehab 102  -TA      Post Treatment Diastolic BP 68  -TA      Pretreatment Heart Rate (beats/min) 86  -TA  68  -EDOUARD    Posttreatment Heart Rate (beats/min) 80  -TA      Pre SpO2 (%) 99  -TA  95  -EDOUARD    O2 Delivery Pre Treatment supplemental O2   5.5L  -TA  supplemental O2  -EDOUARD    Intra SpO2 (%) 89  -TA      O2 Delivery Intra Treatment supplemental O2   5.5L  -TA      Post SpO2 (%) 100  -TA      O2 Delivery Post Treatment supplemental O2   5.5L  -TA      Pre Patient Position Supine  -TA      Intra Patient Position Standing  -TA      Post Patient Position Supine  -TA      Recorded by [TA] David Jenkins, OT  [EDOUARD] Anita Prado, PT    Pain Assessment    Pain Assessment 0-10  -TA Jay-Kent FACES  - Jay-Kent FACES  -EDOUARD    Jay-Baker FACES Pain Rating 0  -TA 0  -MF 2  -EDOUARD    Pain Score 0  -TA      Post Pain Score 0  -TA      Pain Intervention(s) Repositioned;Ambulation/increased activity  -TA Repositioned;Medication (See MAR)  -MF Repositioned;Ambulation/increased activity  -EDOUARD    Response to Interventions tolerated  -TA      Recorded by [TA] David Jenkins, OT [MF] Pee Roldan, PT [EDOUARD] Anita Prado, PT    Cognitive Assessment/Intervention    Current Cognitive/Communication Assessment impaired  -TA  impaired  -EDOUARD    Orientation Status oriented to;person;disoriented to;place;time  -TA  oriented to;person  -EDOUARD    Follows Commands/Answers Questions 50% of the time;able to follow single-step instructions;needs cueing;needs increased time;needs repetition  -TA  75% of the time;needs repetition  -EDOUARD    Personal Safety severe impairment;decreased awareness, need for  assist;decreased awareness, need for safety;decreased insight to deficits  -TA      Personal Safety Interventions elopement precautions initiated;fall prevention program maintained;gait belt;muscle strengthening facilitated;nonskid shoes/slippers when out of bed;supervised activity  -TA      Recorded by [TA] David Jenkins OT  [EDOUARD] Anita Prado, PT    Bed Mobility, Assessment/Treatment    Bed Mobility, Assistive Device bed rails;head of bed elevated  -TA  bed rails;draw sheet  -EDOUARD    Bed Mobility, Scoot/Bridge, [place] dependent (less than 25% patient effort);2 person assist required  -TA  minimum assist (75% patient effort)  -EDOUARD    Bed Mob, Supine to Sit, [place] maximum assist (25% patient effort);2 person assist required;verbal cues required;nonverbal cues required (demo/gesture)  -TA  moderate assist (50% patient effort);2 person assist required  -EDOUARD    Bed Mob, Sit to Supine, [place] dependent (less than 25% patient effort);2 person assist required;verbal cues required  -TA      Bed Mobility, Safety Issues cognitive deficits limit understanding;decreased use of arms for pushing/pulling;decreased use of legs for bridging/pushing  -TA      Bed Mobility, Impairments strength decreased;impaired balance  -TA      Bed Mobility, Comment VCs for sequencing, safe hand placement  -TA  cues for sequencing  -EDOUARD    Recorded by [TA] David Jenkins OT  [EDOUARD] Anita Prado, PT    Transfer Assessment/Treatment    Transfers, Sit-Stand [place] moderate assist (50% patient effort);2 person assist required;verbal cues required  -TA  minimum assist (75% patient effort);2 person assist required  -EDOUARD    Transfers, Stand-Sit [place] moderate assist (50% patient effort);2 person assist required;verbal cues required  -TA  minimum assist (75% patient effort);2 person assist required  -EDOUARD    Transfers, Sit-Stand-Sit, Assist Device rolling walker  -TA      Transfer, Safety Issues  impulsivity;weight-shifting ability decreased;steps too close front assistive device  -TA      Transfer, Impairments strength decreased;impaired balance;other (see comments)   confusion  -TA      Transfer, Comment VCs for safe hand placement; completed 3 STS with poor safety awareness, resistance to sitting once in standing.  -TA      Recorded by [TA] David Jenkins OT  [EDOUARD] Anita Prado, PT    Gait Assessment/Treatment    Gait, [place] Level   minimum assist (75% patient effort);2 person assist required  -EDOUARD    Gait, Assistive Device   rolling walker  -EDOUARD    Gait, Distance (Feet)   150   family pulling chair behind patient for safety  -EDOUARD    Gait, Safety Issues   balance decreased during turns;sequencing ability decreased;supplemental O2  -EDOUARD    Gait, Impairments   strength decreased;impaired balance  -EDOUARD    Gait, Comment   patient became more agitated with walking less cooperative needing more cues for safety patient letting go of walker   -EDOUARD    Recorded by   [EDOUARD] Anita Prado, PT    Functional Mobility    Functional Mobility- Ind. Level 2 person assist required;contact guard assist;verbal cues required  -TA      Functional Mobility- Device rolling walker  -TA      Functional Mobility-Distance (Feet) --   Marching in place  -TA      Functional Mobility- Safety Issues balance decreased during turns;sequencing ability decreased;step length decreased;weight-shifting ability decreased;supplemental O2;steps too close front assistive device  -TA      Functional Mobility- Comment Pt with difficulty motor planning marching in place  -TA      Recorded by [TA] David Jenkins OT      Upper Body Dressing Assessment/Training    UB Dressing Assess/Train, Clothing Type donning:;hospital gown  -TA      UB Dressing Assess/Train, Position edge of bed;sitting  -TA      UB Dressing Assess/Train, [place] moderate assist (50% patient effort)  -TA      UB Dressing Assess/Train, Impairments impaired  balance;coordination impaired  -TA      UB Dressing Assess/Train, Comment impulsive at EOB  -TA      Recorded by [TA] David Jenkins OT      Motor Skills/Interventions    Additional Documentation Balance Skills Training (Group)  -TA      Recorded by [TA] David Jenkins OT      Balance Skills Training    Sitting-Level of Assistance Contact guard  -TA  Contact guard  -EDOUARD    Sitting-Balance Support Feet supported  -TA  Feet supported  -EDOUARD    Sitting-Balance Activities Lateral lean;Forward lean;Trunk control activities  -TA      Standing-Level of Assistance Contact guard;x2  -TA  Minimum assistance;x2  -EDOUARD    Static Standing Balance Support assistive device  -TA  assistive device  -EDOUARD    Standing-Balance Activities Weight Shift A-P;Weight Shift R-L  -TA      Gait Balance-Level of Assistance   Minimum assistance;x2  -EDOUARD    Gait Balance Support   assistive device  -EDOUARD    Recorded by [TA] David Jenkins OT  [EDOUARD] Anita Prado, PT    Therapy Exercises    Bilateral Lower Extremities   AAROM:;10 reps;sitting;ankle pumps/circles;LAQ  -EDOUARD    Recorded by   [EDOUARD] Anita Prado, PT    Positioning and Restraints    Pre-Treatment Position in bed  -TA in bed  -MF in bed  -EDOUARD    Post Treatment Position bed  -TA bed  - chair  -EDOUARD    In Bed notified nsg;supine;call light within reach;encouraged to call for assist;exit alarm on;patient within staff view;with family/caregiver;side rails up x2;RUE elevated;LUE elevated;SCD pump applied;legs elevated  -TA supine;call light within reach;with nsg  -MF     In Chair   notified nsg;reclined;sitting;call light within reach;exit alarm on;with family/caregiver  -EDOUARD    Restraints released:;reapplied:;notified nsg:;soft limb  -TA  released:;reapplied:;notified nsg:  -EDOUARD    Recorded by [TA] David Jenkins OT [MF] ePe Roldan, PT [EDOUARD] Anita Prado, PT      04/07/17 1300          Rehab Assessment/Intervention    Discipline speech language pathologist  -       Document Type evaluation;therapy note (daily note)  -SM      Patient Effort, Rehab Treatment adequate  -SM      Recorded by [SM] MS ESTEBAN BurgessSLP      Dysphagia Treatment Objectives and Progress    Dysphagia Treatment Objectives Improve hyolaryngeal excursion;Other 1  -SM      Recorded by [SM] Akosua Solomon MS CCC-ETELVINA      Improve timing of pharyngeal response    To improve timing of pharyngeal response, patient will: Swallow in timely way using three second prep;Swallow in timely way using super-supraglottic swallow;80%;with consistent cues  -SM      Status: Improve timing of pharyngeal response Progressing as expected  -SM      Timing of Pharyngeal Response Progress 10%;with consistent cues;continue to adress   cognition/focus greatly impacted  -SM      Recorded by [SM] Akosua Solomon MS CCC-SLP      Improve laryngeal closure    To improve laryngeal closure, patient will: Complete supraglottic swallow;80%;with consistent cues  -SM      Status: Improve laryngeal closure Progressing as expected  -SM      Laryngeal Closure Progress 0%;with consistent cues;following model;continue to adress  -SM      Recorded by [] Akosua Solomon MS CCC-SLP      Improve closure at entrance to airway    To improve closure at entrance to airway, patient will: Complete super-supraglottic swallow;80%;with consistent cues  -SM      Status: Improve closure at entrance to airway Progressing as expected  -SM      Closure at Entrance to Airway Progress 0%;with consistent cues;following model;continue to adress  -SM      Recorded by [] Akosua Solomon MS CCC-SLP      Improve hyolaryngeal excursion    To improve hyolaryngeal excursion, patient will: Complete chin tuck against resistance (comment number of repetitions);90%;with consistent cues   20 sec reps x4  -SM      Status: Improve hyolaryngeal excursion New  -SM      Hyolaryngeal Excursion Progress 40%;with consistent cues;continue to adress  -SM       Recorded by [SM] Akosua Solomon MS CCC-SLP      Improve tongue base & pharyngeal wall squeeze    To improve tongue base & pharyngeal wall squeeze, patient will: Complete effortful swallow;80%;with consistent cues  -SM      Status: Improve tongue base & pharyngeal wall squeeze Progressing as expected  -SM      Tongue Base/Pharyngeal Wall Squeeze Progress 20%;with consistent cues;following model;continue to adress  -SM      Recorded by [SM] Akosua Solomon MS CCC-SLP      Dysphagia Other 1    Dysphagia Other 1 Objective Tolerate trials of mech-soft without s/s aspiration and no cues  -SM      Status: Dysphagia Other 1 New  -SM      Dysphagia Other 1 Progress 40%;with consistent cues;continue to address  -SM      Comments: Dysphagia Other 1 Choking on mech-soft items.  Better with level 3.  Will downgrade and consider return to mech-soft as more appropriate  -SM      Recorded by [SM] Akosua Solomon, MS CCC-SLP        User Key  (r) = Recorded By, (t) = Taken By, (c) = Cosigned By    Initials Name Effective Dates    EDOUARD Anita Prado, PT 06/19/15 -      Pee Roldan, PT 06/19/15 -     SM Akosua Solomon, MS CCC-SLP 06/22/15 -     TA David Jenkins, OT 03/14/16 -                 OT Goals       04/09/17 1446 04/06/17 1522 04/05/17 0904    Bed Mobility OT LTG    Bed Mobility OT LTG, Date Established   04/05/17  -JR    Bed Mobility OT LTG, Time to Achieve   1 wk  -JR    Bed Mobility OT LTG, Activity Type   all bed mobility  -JR    Bed Mobility OT LTG, [place] Level   minimum assist (75% patient effort)  -JR    Bed Mobility OT LTG, Outcome goal ongoing   Max A x 2 sup>sit; Dep x 2 sit>supine this date  -TA goal ongoing  -JR     Transfer Training OT LTG    Transfer Training OT LTG, Date Established   04/05/17  -JR    Transfer Training OT LTG, Time to Achieve   1 wk  -JR    Transfer Training OT LTG, Activity Type   all transfers  -JR    Transfer Training OT LTG, [place] Level    minimum assist (75% patient effort)  -JR    Transfer Training OT LTG, Outcome goal ongoing   Progressing  -TA goal ongoing  -JR     Orientation OT LTG    Orientation OT LTG, Date Established   04/05/17  -JR    Orientation OT LTG, Time to Achieve   1 wk  -JR    Orientation OT LTG, Activity Type   person;place;50% treatment session  -JR    Orientation OT LTG, Outcome goal ongoing  -TA goal ongoing  -JR     Follow Directions OT LTG    Follow Directions OT LTG, Date Established   04/05/17  -JR    Follow Directions OT LTG, Time to Achieve   1 wk  -JR    Follow Directions OT LTG, Activity Type   1-Step;50% treatment session  -JR    Follow Directions OT LTG, [place] Level   with verbal cues  -JR    Follow Directions OT LTG, Outcome goal ongoing   50% this date; continue for consistency  -TA goal ongoing  -JR       User Key  (r) = Recorded By, (t) = Taken By, (c) = Cosigned By    Initials Name Provider Type    JR Judy Leach, OT Occupational Therapist    CHU Jenkins, OT Occupational Therapist          Occupational Therapy Education     Title: PT OT SLP Therapies (Active)     Topic: Occupational Therapy (Active)     Point: ADL training (Active)    Description: Instruct learner(s) on proper safety adaptation and remediation techniques during self care or transfers.   Instruct in proper use of assistive devices.    Learning Progress Summary    Learner Readiness Method Response Comment Documented by Status   Patient Acceptance E NR Educated pt on safe bed mobility and transfer techniques.  04/05/17 0903 Active               Point: Home exercise program (Active)    Description: Instruct learner(s) on appropriate technique for monitoring, assisting and/or progressing therapeutic exercises/activities.    Learning Progress Summary    Learner Readiness Method Response Comment Documented by Status   Patient Acceptance E,D VU,NR Body mechanics with bed mobility/fxl transfers; BUE HEP; reinforced need for call for  assist with OOB activities. TA 04/09/17 1445 Done    Acceptance E NR Educated pt and family regarding UE HEP  04/06/17 1521 Active   Family Acceptance E NR Educated pt and family regarding UE HEP  04/06/17 1521 Active   Significant Other Acceptance E,D VU,NR Body mechanics with bed mobility/fxl transfers; BUE HEP; reinforced need for call for assist with OOB activities. TA 04/09/17 1445 Done               Point: Precautions (Done)    Description: Instruct learner(s) on prescribed precautions during self-care and functional transfers.    Learning Progress Summary    Learner Readiness Method Response Comment Documented by Status   Patient Acceptance E,D VU,NR Body mechanics with bed mobility/fxl transfers; BUE HEP; reinforced need for call for assist with OOB activities. TA 04/09/17 1445 Done   Significant Other Acceptance E,D VU,NR Body mechanics with bed mobility/fxl transfers; BUE HEP; reinforced need for call for assist with OOB activities. TA 04/09/17 1445 Done               Point: Body mechanics (Done)    Description: Instruct learner(s) on proper positioning and spine alignment during self-care, functional mobility activities and/or exercises.    Learning Progress Summary    Learner Readiness Method Response Comment Documented by Status   Patient Acceptance E,D VU,NR Body mechanics with bed mobility/fxl transfers; BUE HEP; reinforced need for call for assist with OOB activities. TA 04/09/17 1445 Done   Significant Other Acceptance E,D VU,NR Body mechanics with bed mobility/fxl transfers; BUE HEP; reinforced need for call for assist with OOB activities. TA 04/09/17 1445 Done                      User Key     Initials Effective Dates Name Provider Type Discipline     06/22/15 -  Judy Leach OT Occupational Therapist OT     03/14/16 -  David Jenkins OT Occupational Therapist OT                  OT Recommendation and Plan  Anticipated Equipment Needs At Discharge:  (tba further)  Anticipated  Discharge Disposition: inpatient rehabilitation facility  Planned Therapy Interventions: ADL retraining, balance training, bed mobility training, strengthening, transfer training (cognitive retraining)  Therapy Frequency: daily  Plan of Care Review  Plan Of Care Reviewed With: patient, spouse  Progress: progress toward functional goals as expected  Outcome Summary/Follow up Plan: Pt continues to be mildly agitated with fxl activity; progressing with STS, standing tolerance; following 50% 1 step commands this date; will continue to benefit from skilled OT services to address deficits, facilitate increased fxl I, safe transition to next level of care.        Outcome Measures       04/09/17 1410 04/07/17 1330       How much help from another person do you currently need...    Turning from your back to your side while in flat bed without using bedrails?  2  -EDOUARD     Moving from lying on back to sitting on the side of a flat bed without bedrails?  2  -EDOUARD     Moving to and from a bed to a chair (including a wheelchair)?  3  -EDOUARD     Standing up from a chair using your arms (e.g., wheelchair, bedside chair)?  3  -EDOUARD     Climbing 3-5 steps with a railing?  1  -EDOUARD     To walk in hospital room?  3  -EDOUARD     AM-PAC 6 Clicks Score  14  -EDOUARD     How much help from another is currently needed...    Putting on and taking off regular lower body clothing? 1  -TA      Bathing (including washing, rinsing, and drying) 2  -TA      Toileting (which includes using toilet bed pan or urinal) 1  -TA      Putting on and taking off regular upper body clothing 2  -TA      Taking care of personal grooming (such as brushing teeth) 2  -TA      Eating meals 2  -TA      Score 10  -TA      Modified Linh Scale    Modified [place] Scale 4 - Moderately severe disability.  Unable to walk without assistance, and unable to attend to own bodily needs without assistance.  -TA      Functional Assessment    Outcome Measure Options AM-PAC 6 Clicks Daily Activity  (OT);Modified [place]  -TA        User Key  (r) = Recorded By, (t) = Taken By, (c) = Cosigned By    Initials Name Provider Type    EDOUARD Prado, PT Physical Therapist    CHU Jenkins OT Occupational Therapist           Time Calculation:         Time Calculation- OT       04/09/17 1450          Time Calculation- OT    OT Start Time 1410   ttc 14 minutes  -TA      Total Timed Code Minutes- OT 14 minute(s)  -TA      OT Received On 04/09/17  -TA      OT Goal Re-Cert Due Date 04/15/17  -TA        User Key  (r) = Recorded By, (t) = Taken By, (c) = Cosigned By    Initials Name Provider Type    CHU Jenkins OT Occupational Therapist           Therapy Charges for Today     Code Description Service Date Service Provider Modifiers Qty    [id number]  OT THERAPEUTIC ACT EA 15 MIN 4/9/2017 David Jenkins OT GO 1               David Jenkins OT  4/9/2017

## 2017-04-09 NOTE — PROGRESS NOTES
"Critical Care Note     LOS: 5 days   Patient Care Team:  Jose Navarro MD as PCP - General (Family Medicine)    Chief Complaint/Reason for visit: Sepsis, severe ICM      Subjective   61-year-old gentleman hospitalized at [place] for one week with a scrotal cellulitis, status post debridement. He was brought to [facility] for a possible stroke. Stroke workup showed old left frontal.    Interval History:   Having episodes of confusion requiring restraints. Dysphagia diet with some coughing with meals. No further nausea or vomiting. Cueto catheter removed and he is voiding. Ritalin perineal wound debrided yesterday and pulsatile high pressure lavage completed. Surgical drains were removed.    Review of Systems:    All systems were reviewed and negative except as noted in subjective.unable, patient not following commands    Medical history, surgical history, social history, family history reviewed    Objective     Intake/Output:    Intake/Output Summary (Last 24 hours) at 04/09/17 1144  Last data filed at 04/09/17 0800   Gross per 24 hour   Intake            850.6 ml   Output                0 ml   Net            850.6 ml       Nutrition: Mechanical soft, nectar thick, diabetic, cardiac    Infusions:       Respiratory: 3 liters       Telemetry:SR with PACs    Vital Signs  Blood pressure 124/81, pulse 104, temperature 97.9 °F (36.6 °C), temperature source Oral, resp. rate 18, height 69\" (175.3 cm), weight 237 lb (108 kg), SpO2 93 %.    Physical Exam:  General Appearance:   ill-appearing older gentleman    Head:  NC/AT   Eyes:          ROMI EOMI no jaundice   Ears:     Throat:  mucous membranes moist. Several lower teeth teeth missing anteriorly.    Neck:  supple, trachea midline    Back:      Lungs:    bilateral breath sounds with  Scattered rhonchi RUL, Symmetric chest excursion     Heart:   distant heart sounds, regular, PMI displaced laterally    Abdomen:    hypoactive bowel sounds, soft, nontender, no " organomegaly  Scrotum with erythema and induration. Wound packed   Rectal:   Deferred   Extremities:  no pitting edema or cyanosis    Pulses:  weak pulses left foot, no palpable pulses right foot but extremity is warm to touch    Skin:  cool and dry    Lymph nodes:    Neurologic:  alert, confused, follows commands with all extremities. Generalized weakness       Results Review:     I reviewed the patient's new clinical results.     Results from last 7 days  Lab Units 04/09/17  0429 04/08/17  1405 04/08/17  0444 04/07/17  0422  04/05/17  0417 04/04/17  0115   SODIUM mmol/L 137  --  139 138  < > 136 136   POTASSIUM mmol/L 3.9 4.0 3.6 3.5  < > 3.9 4.0   CHLORIDE mmol/L 100  --  105 101  < > 102 105   TOTAL CO2 mmol/L 31.0  --  31.0 31.0  < > 25.0 24.8   BUN mg/dL 17  --  18 17  < > 6* 6*   CREATININE mg/dL 0.70  --  0.60 0.70  < > 0.60 0.53   CALCIUM mg/dL 9.0  --  9.1 9.1  < > 9.3 8.3   BILIRUBIN mg/dL 0.4  --   --   --   --  0.3 0.2   ALK PHOS U/L 71  --   --   --   --  65 69   ALT (SGPT) U/L 25  --   --   --   --  12 12   AST (SGOT) U/L 67*  --   --   --   --  22 24   GLUCOSE mg/dL 196*  --  176* 194*  < > 170* 130*   < > = values in this interval not displayed.    Results from last 7 days  Lab Units 04/09/17  0429 04/07/17  0422 04/06/17  0316   WBC 10*3/mm3 12.63* 12.63* 12.30*   HEMOGLOBIN g/dL 10.6* 9.9* 10.4*   HEMATOCRIT % 34.2* 31.6* 33.9*   PLATELETS 10*3/mm3 372 328 359       Results from last 7 days  Lab Units 04/05/17  1349   PH, ARTERIAL pH units 7.408   PO2 ART mm Hg 78.0*   PCO2, ARTERIAL mm Hg 38.5   HCO3 ART mmol/L 24.3     Lab Results   Component Value Date    BLOODCX No growth at 5 days 03/30/2017     Lab Results   Component Value Date    URINECX >100,000 CFU/mL Yeast isolated (A) 04/04/2017   Scrotal wound E. coli    I reviewed the patient's new imaging including images and reports.           Impression:         1. Cardiomegaly, pulmonary venous hypertension, and left  basilar  effusion/atelectasis, unchanged.  2. Interval clearing of right basilar interstitial disease. No new chest  pathology is seen.     DICTATED:     04/08/2017                   Interpretation Summary      · Left ventricular function is severely decreased. Estimated EF appears to be in the range of 10%.  · The left ventricular cavity is severely dilated.  · Global left ventricular wall motion appears abnormal. There is severe diffuse hypokinesis.  · Left ventricular diastolic dysfunction.  · There is no evidence of a left ventricular thrombus present.  · Moderately reduced right ventricular systolic function noted.  · Left atrial cavity size is mildly dilated.  · Mild mitral valve regurgitation is present                                               Interpretation Summary Carotid duplex     · Right internal carotid artery stenosis of 0-49%.  · Left internal carotid artery stenosis of 0-49%. Velocities not performed, however no significant plaque seen in the left ICA           All medications reviewed.     amiodarone 200 mg Oral Q8H   Followed by      [START ON 4/13/2017] amiodarone 200 mg Oral Q12H   Followed by      [START ON 4/27/2017] amiodarone 200 mg Oral Daily   apixaban 5 mg Oral Q12H   aspirin 81 mg Oral Daily   atorvastatin 80 mg Oral Nightly   carvedilol 6.25 mg Oral Q12H   ceFAZolin 2 g Intravenous Q8H   fentaNYL 1 patch Transdermal Q72H   furosemide 40 mg Intravenous Q12H   heparin flush (porcine) 500 Units Intracatheter Q12H   insulin detemir 15 Units Subcutaneous Nightly   insulin lispro 0-9 Units Subcutaneous 4x Daily With Meals & Nightly   metroNIDAZOLE 500 mg Intravenous Q6H   pantoprazole 40 mg Oral Q AM   potassium chloride 20 mEq Oral Once   saccharomyces boulardii 250 mg Oral BID   sacubitril-valsartan 1 tablet Oral Q12H         Assessment/Plan     Principal Problem:    Sepsis  Active Problems:    E. coli UTI (urinary tract infection)    Yeast UTI    Encephalopathy acute    Cellulitis,  scrotum, perineum    Diabetes mellitus, type 2    ICM EF 10%       NSTEMI 5/15 occl LAD, 99% circ, occl SVG to Dz, patent SVG toOM1, patent LIMA to LAD.   Single chamber AICD 12/15, CABG 3/2005    Chronic respiratory failure with hypoxia    Urinary retention    Epididymo-orchitis    61-year-old gentleman with a severe ischemic cardiomyopathy, ejection fraction of 10%, diabetes mellitus type II, who developed a scrotal abscess requiring I&D in [place] on 3/31. Cultures were positive for Escherichia coli. He was in the [facility] for approximately one week and developed some left-sided weakness and altered mentation. He was transported to [place] for stroke. No acute stroke was found on CT of the head or CT perfusion. No intervention was done. Confusion has improved and focal weakness resolved.  He developed atrial fibrillation with a rapid ventricular response and was placed on amiodarone drip. He converted to SR.  Echocardiogram confirms a dilated left ventricular cavity that with severe dysfunction LVEF 10%. No comment as to whether thrombus is seen.  He was placed on a heparin drip for 24 hours and transition to oral Eliquis. He developed some congestive heart failure that responded promptly to some IV Lasix. His Coreg was restarted as well as Entresto.  Infectious disease evaluated and changed antibiotics to cefazolin and Flagyl.  Urine culture is growing greater than 100,000 yeast, on admission. Infectious disease notified. Patient is on amiodarone for atrial fibrillation so unlikely to tolerate Diflucan. Colbert DCed.   Blood sugars are running 200-360 on sliding scale insulin.       PLAN:   Cefazolin, Flagyl  ID notified about yeast in urine, colbert removed  Aspirin, Plavix, statin  Coreg, Entresto  Daily furosemide Q12H started  Amiodarone PO  Eliquis  PT for wound care   Sliding scale insulin,  Levemir; increase dose  Nebulized BDs  Replace electrolytes as needed  Fentanyl patch for pain plus Norco for  break thru  Floor soon    VTE Prophylaxis:Eliquis    Stress Ulcer Prophylaxis:Protonix    Ella Pope MD  04/09/17  11:44 AM      Time: 30min  I personally provided care to this critically ill patient as documented above.  Critical care time does not include time spent on separately billed procedures.  Non of my critical care time was concurrent with other critical care providers.

## 2017-04-09 NOTE — PROGRESS NOTES
Acute Care - Physical Therapy Treatment Note  [facility]     Patient Name: Tomas Salvador  : 1955  MRN: [id number]  Today's Date: 2017  Onset of Illness/Injury or Date of Surgery Date: 17  Date of Referral to PT: 17  Referring Physician: Dr. Morrison    Admit Date: 2017    Visit Dx:    ICD-10-CM ICD-9-CM   1. Dysphagia, unspecified type R13.10 787.20   2. Impaired functional mobility, balance, gait, and endurance Z74.09 V49.89   3. Impaired mobility and ADLs Z74.09 799.89   4. Cognitive communication deficit R41.841 799.52     Patient Active Problem List   Diagnosis   • Coronary disease   • Ischemic cardiomyopathy   • Peripheral vascular disease   • ICD (implantable cardioverter-defibrillator) in place, implantation .   • Dyslipidemia, on atorvastain.    • Sepsis   • Diabetes education, encounter for   • Chronic respiratory failure with hypoxia   • Urinary retention   • Hyperlipidemia   • Essential hypertension   • E. coli UTI (urinary tract infection)   • Epididymo-orchitis   • Encephalopathy acute   • Cellulitis   • Yeast UTI               Adult Rehabilitation Note       17 1411 17 1410 17 1040    Rehab Assessment/Intervention    Discipline physical therapist  -DR occupational therapist  -TA physical therapist  -    Document Type therapy note (daily note)  -DR therapy note (daily note)  -TA therapy note (daily note)  -    Subjective Information agree to therapy;no complaints  - agree to therapy;no complaints  -TA agree to therapy;complains of;weakness;fatigue;pain  -MF    Patient Effort, Rehab Treatment good  -DR good  -TA     Symptoms Noted During/After Treatment other (see comments)   increased agitation with activity; resistant to sitting  -DR other (see comments)   Increased agitation with activity; resistant to sitting   -TA     Precautions/Limitations fall precautions;oxygen therapy device and L/min  - fall precautions;oxygen therapy device and L/min   -TA     Recorded by [DR] Mikey Moore, PT [TA] David Jenkins, OT [MF] Pee Roldan, PT    Vital Signs    Pre Systolic BP Rehab 107  -  -TA     Pre Treatment Diastolic BP 69  -DR 69  -TA     Post Systolic BP Rehab 102  -  -TA     Post Treatment Diastolic BP 68  -DR 68  -TA     Pretreatment Heart Rate (beats/min) 86  -DR 86  -TA     Posttreatment Heart Rate (beats/min) 80  -DR 80  -TA     Pre SpO2 (%) 99  -DR 99  -TA     O2 Delivery Pre Treatment supplemental O2   5.5L  -DR supplemental O2   5.5L  -TA     Intra SpO2 (%) 89  -DR 89  -TA     O2 Delivery Intra Treatment supplemental O2   5.5L  -DR supplemental O2   5.5L  -TA     Post SpO2 (%) 100  -  -TA     O2 Delivery Post Treatment supplemental O2   5.5L  -DR supplemental O2   5.5L  -TA     Pre Patient Position Supine  -DR Supine  -TA     Intra Patient Position Standing  -DR Standing  -TA     Post Patient Position Supine  -DR Supine  -TA     Recorded by [DR] Mikey Moore, PT [TA] David Jenkins OT     Pain Assessment    Pain Assessment 0-10  -DR 0-10  -TA Jay-Baker FACES  -MF    Jay-Baker FACES Pain Rating  0  -TA 2  -MF    Pain Score 0  -DR 0  -TA     Post Pain Score 0  -DR 0  -TA     Pain Location   Other (Comment)   wound  -MF    Pain Intervention(s)  Repositioned;Ambulation/increased activity  -TA Repositioned  -MF    Response to Interventions  tolerated  -TA     Recorded by [DR] Mikey Moore, PT [TA] David Jenkins OT [MF] Pee Roldan, PT    Cognitive Assessment/Intervention    Current Cognitive/Communication Assessment impaired  -DR impaired  -TA     Orientation Status oriented to;person;disoriented to;place;time;situation  -DR oriented to;person;disoriented to;place;time  -TA     Follows Commands/Answers Questions able to follow single-step instructions;50% of the time;needs cueing;needs increased time;needs repetition  -DR 50% of the time;able to follow single-step instructions;needs cueing;needs increased  time;needs repetition  -TA     Personal Safety severe impairment;decreased awareness, need for assist;decreased awareness, need for safety;decreased insight to deficits;impulsive  -DR severe impairment;decreased awareness, need for assist;decreased awareness, need for safety;decreased insight to deficits  -TA     Personal Safety Interventions fall prevention program maintained;gait belt;nonskid shoes/slippers when out of bed;elopement precautions initiated  - elopement precautions initiated;fall prevention program maintained;gait belt;muscle strengthening facilitated;nonskid shoes/slippers when out of bed;supervised activity  -TA     Recorded by [DR] Mikey Moore, PT [TA] David Jenkins OT     Bed Mobility, Assessment/Treatment    Bed Mobility, Assistive Device bed rails;head of bed elevated;draw sheet  -DR bed rails;head of bed elevated  -TA     Bed Mobility, Scoot/Bridge, [place] dependent (less than 25% patient effort);2 person assist required;verbal cues required  -DR dependent (less than 25% patient effort);2 person assist required  -TA     Bed Mob, Supine to Sit, [place] maximum assist (25% patient effort);2 person assist required;verbal cues required  -DR maximum assist (25% patient effort);2 person assist required;verbal cues required;nonverbal cues required (demo/gesture)  -TA     Bed Mob, Sit to Supine, [place] dependent (less than 25% patient effort);2 person assist required;verbal cues required  -DR dependent (less than 25% patient effort);2 person assist required;verbal cues required  -TA     Bed Mobility, Safety Issues cognitive deficits limit understanding;decreased use of arms for pushing/pulling;decreased use of legs for bridging/pushing  -DR cognitive deficits limit understanding;decreased use of arms for pushing/pulling;decreased use of legs for bridging/pushing  -TA     Bed Mobility, Impairments strength decreased;impaired balance;coordination impaired  -DR strength  decreased;impaired balance  -TA     Bed Mobility, Comment VC for hand placement and sequencing transition of BLE to edge of bed  - VCs for sequencing, safe hand placement  -TA     Recorded by [] Mikey Moore, PT [TA] David Jenkins OT     Transfer Assessment/Treatment    Transfers, Sit-Stand [place] moderate assist (50% patient effort);2 person assist required;verbal cues required  -DR moderate assist (50% patient effort);2 person assist required;verbal cues required  -TA     Transfers, Stand-Sit [place] moderate assist (50% patient effort);2 person assist required;verbal cues required  -DR moderate assist (50% patient effort);2 person assist required;verbal cues required  -TA     Transfers, Sit-Stand-Sit, Assist Device rolling walker  -DR rolling walker  -TA     Transfer, Safety Issues impulsivity;loses balance backward;knees buckling;step length decreased;sequencing ability decreased  - impulsivity;weight-shifting ability decreased;steps too close front assistive device  -TA     Transfer, Impairments strength decreased;impaired balance;coordination impaired   confusion/agitation  -DR strength decreased;impaired balance;other (see comments)   confusion  -TA     Transfer, Comment VC for hand placement. He performed x 3 STS transfers each with mod A x 2 and was resistant to return to sitting during last period of standing. Required encouragement from wife as well as PT/OT.  - VCs for safe hand placement; completed 3 STS with poor safety awareness, resistance to sitting once in standing.  -TA     Recorded by [] Mikey Moore, PT [TA] David Jenkins OT     Gait Assessment/Treatment    Gait, [place] Level not tested  -      Gait, Comment Not assessed today due to increased agitation/confusion upon standing and marching in place.  -      Recorded by [] Mikey Moore, PT      Functional Mobility    Functional Mobility- Ind. Level  2 person assist required;contact guard  assist;verbal cues required  -TA     Functional Mobility- Device  rolling walker  -TA     Functional Mobility-Distance (Feet)  --   Marching in place  -TA     Functional Mobility- Safety Issues  balance decreased during turns;sequencing ability decreased;step length decreased;weight-shifting ability decreased;supplemental O2;steps too close front assistive device  -TA     Functional Mobility- Comment  Pt with difficulty motor planning marching in place  -TA     Recorded by  [TA] David Jenkins OT     Upper Body Dressing Assessment/Training    UB Dressing Assess/Train, Clothing Type  donning:;hospital gown  -TA     UB Dressing Assess/Train, Position  edge of bed;sitting  -TA     UB Dressing Assess/Train, [place]  moderate assist (50% patient effort)  -TA     UB Dressing Assess/Train, Impairments  impaired balance;coordination impaired  -TA     UB Dressing Assess/Train, Comment  impulsive at EOB  -TA     Recorded by  [TA] David Jenkins OT     Motor Skills/Interventions    Additional Documentation Balance Skills Training (Group)  - Balance Skills Training (Group)  -TA     Recorded by [] Mikey Moore, PT [TA] David Jenkins OT     Balance Skills Training    Sitting-Level of Assistance Contact guard  - Contact guard  -TA     Sitting-Balance Support Feet supported  - Feet supported  -TA     Sitting-Balance Activities Lateral lean;Forward lean  - Lateral lean;Forward lean;Trunk control activities  -TA     Standing-Level of Assistance Contact guard;x2  -DR Contact guard;x2  -TA     Static Standing Balance Support assistive device  - assistive device  -TA     Standing-Balance Activities Weight Shift A-P;Weight Shift R-L  -DR Weight Shift A-P;Weight Shift R-L  -TA     Recorded by [] Mikey Moore, PT [TA] David Jenkins OT     Therapy Exercises    Bilateral Lower Extremities AROM:;5 reps;standing;hip flexion  -      Recorded by [] Mikey Moore, PT      Positioning and  Restraints    Pre-Treatment Position in bed  -DR in bed  -TA in bed  -    Post Treatment Position bed  -DR bed  -TA bed  -    In Bed notified nsg;supine;call light within reach;encouraged to call for assist;exit alarm on;with family/caregiver;SCD pump applied;legs elevated  -DR notified nsg;supine;call light within reach;encouraged to call for assist;exit alarm on;patient within staff view;with family/caregiver;side rails up x2;RUE elevated;LUE elevated;SCD pump applied;legs elevated  -TA supine;call light within reach;with nsg  -MF    Restraints released:;reapplied:;soft limb;notified nsg:  -DR released:;reapplied:;notified nsg:;soft limb  -TA released:;reapplied:  -    Recorded by [DR] Mikey Moore, PT [TA] David Jenkins, OT [MF] Pee Roldan, PT      04/08/17 1010 04/07/17 1330 04/07/17 1300    Rehab Assessment/Intervention    Discipline physical therapist  - physical therapist  -EODUARD speech language pathologist  -SM    Document Type therapy note (daily note)  - therapy note (daily note)  -EDOUARD evaluation;therapy note (daily note)  -    Subjective Information agree to therapy;complains of;fatigue;weakness  - agree to therapy;complains of;fatigue  -EDOUARD     Patient Effort, Rehab Treatment  good  -EDOUARD adequate  -SM    Symptoms Noted During/After Treatment  other (see comments)   patient became more agitated with increased activity   -EDOUARD     Symptoms Noted Comment  patient became impulsive letting go of walker and wanting to sit then refusing to sit increased agitation  -EDOUARD     Precautions/Limitations  fall precautions  -EDOUARD     Recorded by [MF] Pee Roldan, PT [EDOUARD] Anita Prado, PT [SM] Akosua Solomon, MS CCC-SLP    Vital Signs    Pre Systolic BP Rehab  136  -EDOUARD     Pre Treatment Diastolic BP  54  -EDOUARD     Pretreatment Heart Rate (beats/min)  68  -EDOUARD     Pre SpO2 (%)  95  -EDOUARD     O2 Delivery Pre Treatment  supplemental O2  -EDOUARD     Recorded by  [EDOUARD] Anita Prado, PT     Pain  Assessment    Pain Assessment Jay-Baker FACES  -MF Jay-[place] FACES  -EDOUARD     Jay-Baker FACES Pain Rating 0  -MF 2  -EDOUARD     Pain Intervention(s) Repositioned;Medication (See MAR)  -MF Repositioned;Ambulation/increased activity  -EDOUARD     Recorded by [MF] Pee Roldan, PT [EDOUARD] Anita Prado PT     Cognitive Assessment/Intervention    Current Cognitive/Communication Assessment  impaired  -EDOUARD     Orientation Status  oriented to;person  -EDOUARD     Follows Commands/Answers Questions  75% of the time;needs repetition  -EDOUARD     Recorded by  [EDOAURD] Anita Prado PT     Dysphagia Treatment Objectives and Progress    Dysphagia Treatment Objectives   Improve hyolaryngeal excursion;Other 1  -SM    Recorded by   [SM] Akosua Solomon MS CCC-SLP    Improve timing of pharyngeal response    To improve timing of pharyngeal response, patient will:   Swallow in timely way using three second prep;Swallow in timely way using super-supraglottic swallow;80%;with consistent cues  -SM    Status: Improve timing of pharyngeal response   Progressing as expected  -SM    Timing of Pharyngeal Response Progress   10%;with consistent cues;continue to adress   cognition/focus greatly impacted  -SM    Recorded by   [SM] Akosua Solomon MS CCC-SLP    Improve laryngeal closure    To improve laryngeal closure, patient will:   Complete supraglottic swallow;80%;with consistent cues  -SM    Status: Improve laryngeal closure   Progressing as expected  -SM    Laryngeal Closure Progress   0%;with consistent cues;following model;continue to adress  -SM    Recorded by   [SM] Akosua Solomon MS CCC-SLP    Improve closure at entrance to airway    To improve closure at entrance to airway, patient will:   Complete super-supraglottic swallow;80%;with consistent cues  -SM    Status: Improve closure at entrance to airway   Progressing as expected  -SM    Closure at Entrance to Airway Progress   0%;with consistent cues;following model;continue to  adress  -SM    Recorded by   [SM] Akosua Solomon MS CCC-SLP    Improve hyolaryngeal excursion    To improve hyolaryngeal excursion, patient will:   Complete chin tuck against resistance (comment number of repetitions);90%;with consistent cues   20 sec reps x4  -SM    Status: Improve hyolaryngeal excursion   New  -SM    Hyolaryngeal Excursion Progress   40%;with consistent cues;continue to adress  -SM    Recorded by   [SM] Akosua Solomon MS CCC-SLP    Improve tongue base & pharyngeal wall squeeze    To improve tongue base & pharyngeal wall squeeze, patient will:   Complete effortful swallow;80%;with consistent cues  -SM    Status: Improve tongue base & pharyngeal wall squeeze   Progressing as expected  -SM    Tongue Base/Pharyngeal Wall Squeeze Progress   20%;with consistent cues;following model;continue to adress  -SM    Recorded by   [SM] Akosua Solomon MS CCC-SLP    Dysphagia Other 1    Dysphagia Other 1 Objective   Tolerate trials of mech-soft without s/s aspiration and no cues  -SM    Status: Dysphagia Other 1   New  -SM    Dysphagia Other 1 Progress   40%;with consistent cues;continue to address  -SM    Comments: Dysphagia Other 1   Choking on mech-soft items.  Better with level 3.  Will downgrade and consider return to mech-soft as more appropriate  -SM    Recorded by   [SM] Akosua Solomon MS CCC-SLP    Bed Mobility, Assessment/Treatment    Bed Mobility, Assistive Device  bed rails;draw sheet  -EDOUARD     Bed Mobility, Scoot/Bridge, [place]  minimum assist (75% patient effort)  -EDOUARD     Bed Mob, Supine to Sit, [place]  moderate assist (50% patient effort);2 person assist required  -EDOUARD     Bed Mobility, Comment  cues for sequencing  -EDOUARD     Recorded by  [EDOUARD] Anita Prado, PT     Transfer Assessment/Treatment    Transfers, Sit-Stand [place]  minimum assist (75% patient effort);2 person assist required  -EDOUARD     Transfers, Stand-Sit [place]  minimum assist (75% patient  effort);2 person assist required  -EDOUARD     Recorded by  [EDOUARD] Anita Prado PT     Gait Assessment/Treatment    Gait, [place] Level  minimum assist (75% patient effort);2 person assist required  -EDOUARD     Gait, Assistive Device  rolling walker  -EDOUARD     Gait, Distance (Feet)  150   family pulling chair behind patient for safety  -EDOUARD     Gait, Safety Issues  balance decreased during turns;sequencing ability decreased;supplemental O2  -EDOUARD     Gait, Impairments  strength decreased;impaired balance  -EDOUARD     Gait, Comment  patient became more agitated with walking less cooperative needing more cues for safety patient letting go of walker   -EDOUARD     Recorded by  [EDOUARD] Anita Prado PT     Balance Skills Training    Sitting-Level of Assistance  Contact guard  -EDOUARD     Sitting-Balance Support  Feet supported  -EDOUARD     Standing-Level of Assistance  Minimum assistance;x2  -EDOUARD     Static Standing Balance Support  assistive device  -EDOUARD     Gait Balance-Level of Assistance  Minimum assistance;x2  -EDOUARD     Gait Balance Support  assistive device  -EDOUARD     Recorded by  [EDOUARD] Anita Prado PT     Therapy Exercises    Bilateral Lower Extremities  AAROM:;10 reps;sitting;ankle pumps/circles;LAQ  -EDOUARD     Recorded by  [EDOUARD] Anita Prado PT     Positioning and Restraints    Pre-Treatment Position in bed  - in bed  -EDOUARD     Post Treatment Position bed  - chair  -EDOUARD     In Bed supine;call light within reach;with nsg  -      In Chair  notified nsg;reclined;sitting;call light within reach;exit alarm on;with family/caregiver  -EDOUARD     Restraints  released:;reapplied:;notified nsg:  -EDOUARD     Recorded by [MF] Pee Roldan, PT [EDOUARD] Anita Prado PT       User Key  (r) = Recorded By, (t) = Taken By, (c) = Cosigned By    Initials Name Effective Dates    EDOUARD Anita Prado, PT 06/19/15 -     VALERI Roldan, PT 06/19/15 -      Akosua Solomon, MS CCC-SLP 06/22/15 -     CHU Jenkins, OT 03/14/16 -      DR Mikey Moore, PT 09/06/16 -                 IP PT Goals       04/09/17 1551 04/08/17 1010 04/06/17 1524    Bed Mobility PT LTG    Bed Mobility PT LTG, Outcome goal ongoing  -DR  goal ongoing  -LS    Transfer Training PT LTG    Transfer Training PT LTG, Outcome goal ongoing  -DR  goal ongoing  -LS    Gait Training PT LTG    Gait Training Goal PT LTG, Outcome goal ongoing  -DR  goal ongoing  -LS    Wound Care PT LTG    Wound Care PT LTG 1, Outcome  goal ongoing  -MF       04/06/17 1025 04/05/17 0909       Bed Mobility PT LTG    Bed Mobility PT LTG, Date Established  04/05/17  -LS     Bed Mobility PT LTG, Time to Achieve  2 wks  -LS     Bed Mobility PT LTG, Activity Type  supine to sit/sit to supine  -LS     Bed Mobility PT LTG, [place] Level  supervision required  -LS     Transfer Training PT LTG    Transfer Training PT LTG, Date Established  04/05/17  -LS     Transfer Training PT LTG, Time to Achieve  2 wks  -LS     Transfer Training PT LTG, Activity Type  sit to stand/stand to sit  -LS     Transfer Training PT LTG, [place] Level  contact guard assist  -LS     Transfer Training PT LTG, Assist Device  walker, rolling  -LS     Gait Training PT LTG    Gait Training Goal PT LTG, Date Established  04/05/17  -LS     Gait Training Goal PT LTG, Time to Achieve  2 wks  -LS     Gait Training Goal PT LTG, [place] Level  contact guard assist  -LS     Gait Training Goal PT LTG, Assist Device  walker, rolling  -LS     Gait Training Goal PT LTG, Distance to Achieve  200  -LS     Wound Care PT LTG    Wound Care PT LTG 1, Date Established 04/06/17  -MC      Wound Care PT LTG 1, Time to Achieve 1 wk  -MC      Wound Care PT LTG 1, Location Scrotum  -MC      Wound Care PT LTG 1, No S&S of Infection yes  -MC      Wound Care PT LTG 1, Decrease Wound Size 10%  -MC      Wound Care PT LTG 1, Decrease Necrotic Tissue 50%  -MC      Wound Care PT LTG 1, Decrease Exudate minimum  -MC      Wound Care PT LTG 1, No New Skin  Break Down yes  -MC      Wound Care PT LTG 1, Education dressing changes;wound care  -MC      Wound Care PT LTG 1, Education Understanding verbalize understanding  -        User Key  (r) = Recorded By, (t) = Taken By, (c) = Cosigned By    Initials Name Provider Type    MF Pee Roldan, PT Physical Therapist    LS Lily Juárez, PT Physical Therapist    MC Rekha Mcgarry, PT Physical Therapist    DR Mikey Moore, PT Physical Therapist          Physical Therapy Education     Title: PT OT SLP Therapies (Active)     Topic: Physical Therapy (Active)     Point: Mobility training (Active)    Learning Progress Summary    Learner Readiness Method Response Comment Documented by Status   Patient Acceptance E NR   04/09/17 1551 Active    Acceptance E NR  EDOUARD 04/07/17 1534 Active    Acceptance E,D NR   04/06/17 1523 Active    Acceptance E,D NR   04/05/17 0908 Active   Significant Other Acceptance E,D NR   04/06/17 1523 Active    Acceptance E,D NR   04/05/17 0908 Active               Point: Home exercise program (Active)    Learning Progress Summary    Learner Readiness Method Response Comment Documented by Status   Patient Acceptance E NR  EDOUARD 04/07/17 1534 Active    Acceptance E,D NR   04/06/17 1523 Active   Significant Other Acceptance E,D NR   04/06/17 1523 Active               Point: Body mechanics (Active)    Learning Progress Summary    Learner Readiness Method Response Comment Documented by Status   Patient Acceptance E NR   04/09/17 1551 Active    Acceptance E NR  EDOUARD 04/07/17 1534 Active    Acceptance E,D NR   04/06/17 1523 Active    Acceptance E,D NR   04/05/17 0908 Active   Significant Other Acceptance E,D NR   04/06/17 1523 Active    Acceptance E,D NR   04/05/17 0908 Active               Point: Precautions (Active)    Learning Progress Summary    Learner Readiness Method Response Comment Documented by Status   Patient Acceptance E NR   04/09/17 1551 Active    Acceptance E NR  EDOUARD  04/07/17 1534 Active    Acceptance E,D NR  LS 04/06/17 1523 Active    Acceptance E,D NR  LS 04/05/17 0908 Active   Significant Other Acceptance E,D NR  LS 04/06/17 1523 Active    Acceptance E,D NR  LS 04/05/17 0908 Active                      User Key     Initials Effective Dates Name Provider Type Discipline    EDOUARD 06/19/15 -  Anita Prado, PT Physical Therapist PT    ANDREWS 06/19/15 -  Lily Juárez, PT Physical Therapist PT     09/06/16 -  Mikey Moore, PT Physical Therapist PT                    PT Recommendation and Plan  Anticipated Discharge Disposition: inpatient rehabilitation facility  PT Frequency: daily  Plan of Care Review  Plan Of Care Reviewed With: patient, spouse  Progress: progress toward functional goals as expected  Outcome Summary/Follow up Plan: Pt limited today by decreased ability to follow commands and increased agitation with activity. He performed x 3 STS transfers EOB and performed standing dynamic balance with BUE support on RWx and CGA.          Outcome Measures       04/09/17 1411 04/09/17 1410 04/07/17 1330    How much help from another person do you currently need...    Turning from your back to your side while in flat bed without using bedrails? 2  -DR  2  -EDOUARD    Moving from lying on back to sitting on the side of a flat bed without bedrails? 1  -  2  -EDOUARD    Moving to and from a bed to a chair (including a wheelchair)? 3  -  3  -EDOUARD    Standing up from a chair using your arms (e.g., wheelchair, bedside chair)? 3  -  3  -EDOUARD    Climbing 3-5 steps with a railing? 1  -  1  -EDOUARD    To walk in hospital room? 3  -  3  -EDOUARD    AM-PAC 6 Clicks Score 13  -  14  -EDOUARD    How much help from another is currently needed...    Putting on and taking off regular lower body clothing?  1  -TA     Bathing (including washing, rinsing, and drying)  2  -TA     Toileting (which includes using toilet bed pan or urinal)  1  -TA     Putting on and taking off regular upper body clothing  2  -TA      Taking care of personal grooming (such as brushing teeth)  2  -TA     Eating meals  2  -TA     Score  10  -TA     Modified [place] Scale    Modified [place] Scale  4 - Moderately severe disability.  Unable to walk without assistance, and unable to attend to own bodily needs without assistance.  -TA     Functional Assessment    Outcome Measure Options AM-PAC 6 Clicks Basic Mobility (PT)  - AM-PAC 6 Clicks Daily Activity (OT);Modified Linh  -TA       User Key  (r) = Recorded By, (t) = Taken By, (c) = Cosigned By    Initials Name Provider Type    EDOUARD Prado, PT Physical Therapist    CHU Jenkins, OT Occupational Therapist    DR Mikey Moore, PT Physical Therapist           Time Calculation:         PT Charges       04/09/17 1553 04/09/17 1040       Time Calculation    Start Time 1411  - 1040  -VALERI     PT Received On 04/09/17  -      PT Goal Re-Cert Due Date 04/15/17  -DR 04/15/17  -VALERI     Time Calculation- PT    Total Timed Code Minutes- PT 15 minute(s)  -DR 25 minute(s)  -       User Key  (r) = Recorded By, (t) = Taken By, (c) = Cosigned By    Initials Name Provider Type    VALERI Roldan, PT Physical Therapist    DR Mikey Moore, PT Physical Therapist          Therapy Charges for Today     Code Description Service Date Service Provider Modifiers Qty    [id number]  PT THER PROC EA 15 MIN 4/9/2017 Mikey Moore, PT GP 1          PT G-Codes  Outcome Measure Options: AM-PAC 6 Clicks Basic Mobility (PT)    Mikye Moore, PT  4/9/2017

## 2017-04-09 NOTE — PROGRESS NOTES
"Tomas Salvador    Subjective     CC: encephalopathy    History of Present Illness     Tomas Salvador is admitted with sepsis. He was transferred from [facility] due to concern for stroke. He was evaluated by the neuro-interventional service and no acute treatment was deemed prudent. Any stroke-like symptoms have resolved, and follow-up CT scans have failed to provide evidence of stroke. An ECHO shows a severely depressed EF, and he is anti-coagulated under the direction of cardiology.    His family again notes continued improve daily, though he remains encephalopathic this morning.      There have been no other changes in the patient's interval history since my last progress note of 4/8/17.      The following portions of the patient's history were reviewed and updated as appropriate: allergies, current medications, past family history, past medical history, past social history, past surgical history and problem list. I reviewed and confirmed the PFSH from this admission dated 4/4/17.    Review of Systems   Unable to perform ROS: Mental status change       Objective     /80  Pulse 85  Temp 98.4 °F (36.9 °C) (Axillary)   Resp 20  Ht 69\" (175.3 cm)  Wt 237 lb (108 kg)  SpO2 92%  BMI 35 kg/m2    Physical Exam   Neurological: He has normal strength.   Psychiatric: His speech is slurred.        Neurologic Exam     Mental Status   Oriented to person.   Disoriented to place.   Disoriented to time.   Attention: decreased.   Speech: slurred   Level of consciousness: alert  Normal comprehension.     Cranial Nerves   Cranial nerves II through XII intact.     Motor Exam   Muscle bulk: normal  Overall muscle tone: normal    Strength   Strength 5/5 throughout.     Sensory Exam   Light touch normal.       Laboratory and radiological testing is notable for an essentially unremarkable CMP this morning.    Assessment/Plan         Tomas Salvador continues to suffer from encephalopathy, which is likely multifactorial, though " he does seem significantly more alert this morning. I agree with his current treatment and am hopeful that he will continue to gradually improve cognitively. This was again discussed with his family.      As part of this visit I reviewed prior lab results and obtained additional history from the family which is incorporated in the HPI.

## 2017-04-09 NOTE — PLAN OF CARE
Problem: Patient Care Overview (Adult)  Goal: Plan of Care Review  Outcome: Ongoing (interventions implemented as appropriate)    04/09/17 1040   Coping/Psychosocial Response Interventions   Plan Of Care Reviewed With patient   Outcome Evaluation   Outcome Summary/Follow up Plan scrotal wound cont to improve slowly with decreasing s/s of infection. Pt cont to have moderate slough and drainage at base of wound and will benefit from cont pulse lavage.          Problem: Inpatient Physical Therapy  Goal: Wound Care Goal 1 LTG- PT  Outcome: Ongoing (interventions implemented as appropriate)    04/06/17 1025 04/08/17 1010   Wound Care PT LTG   Wound Care PT LTG 1, Date Established 04/06/17 --    Wound Care PT LTG 1, Time to Achieve 1 wk --    Wound Care PT LTG 1, Location Scrotum --    Wound Care PT LTG 1, No S&S of Infection yes --    Wound Care PT LTG 1, Decrease Wound Size 10% --    Wound Care PT LTG 1, Decrease Necrotic Tissue 50% --    Wound Care PT LTG 1, Decrease Exudate minimum --    Wound Care PT LTG 1, No New Skin Break Down yes --    Wound Care PT LTG 1, Education dressing changes;wound care --    Wound Care PT LTG 1, Education Understanding verbalize understanding --    Wound Care PT LTG 1, Outcome --  goal ongoing

## 2017-04-10 NOTE — PROGRESS NOTES
Continued Stay Note  [facility]     Patient Name: Tomas Salvador  MRN: [id number]  Today's Date: 4/10/2017    Admit Date: 4/4/2017          Discharge Plan       04/10/17 1435    Case Management/Social Work Plan    Plan LTACH    Additional Comments Goal is to transfer to [facility].              Discharge Codes     None        Expected Discharge Date and Time     Expected Discharge Date Expected Discharge Time    Apr 10, 2017             Ella Cancino RN

## 2017-04-10 NOTE — PROGRESS NOTES
"  [facility]   Inpatient Progress Note       LOS: 6 days   Patient Care Team:  Jose Navarro MD as PCP - General (Family Medicine)    Chief Complaint:  Follow-up for CVA and atrial fibrillation    Subjective     Interval History:   Patient awake. Follows command. But not oriented to place or time.    Review of Systems:   Pertinent positives noted in history, exam, and assessment. Otherwise reviewed and negative.      Objective     Vitals:  Blood pressure 115/69, pulse 70, temperature 97.8 °F (36.6 °C), temperature source Oral, resp. rate 18, height 69\" (175.3 cm), weight 237 lb (108 kg), SpO2 91 %.     Intake/Output Summary (Last 24 hours) at 04/10/17 0826  Last data filed at 04/10/17 0600   Gross per 24 hour   Intake            784.5 ml   Output                0 ml   Net            784.5 ml     Physical Exam   Constitutional: He appears well-developed and well-nourished.   Neck: No JVD present. No tracheal deviation present.   Cardiovascular: Normal rate, regular rhythm and normal heart sounds.  Exam reveals no friction rub.    No murmur heard.  Pulmonary/Chest: Effort normal. No respiratory distress.   Bilateral rhonchi   Abdominal: Soft. Bowel sounds are normal. There is no tenderness.   Musculoskeletal: He exhibits no edema or deformity.   Neurological: He is alert.   Not oriented to place or time          Results Review:     I reviewed the patient's new clinical results.      Results from last 7 days  Lab Units 04/10/17  0458   WBC 10*3/mm3 9.97   HEMOGLOBIN g/dL 10.0*   HEMATOCRIT % 32.3*   PLATELETS 10*3/mm3 337       Results from last 7 days  Lab Units 04/10/17  0458   SODIUM mmol/L 140   POTASSIUM mmol/L 3.7   CHLORIDE mmol/L 102   TOTAL CO2 mmol/L 32.0*   BUN mg/dL 20   CREATININE mg/dL 0.80   CALCIUM mg/dL 8.8   BILIRUBIN mg/dL 0.3   ALK PHOS U/L 66   ALT (SGPT) U/L 11   AST (SGOT) U/L 34*   GLUCOSE mg/dL 91       Results from last 7 days  Lab Units 04/10/17  0458   SODIUM " mmol/L 140   POTASSIUM mmol/L 3.7   CHLORIDE mmol/L 102   TOTAL CO2 mmol/L 32.0*   BUN mg/dL 20   CREATININE mg/dL 0.80   GLUCOSE mg/dL 91   CALCIUM mg/dL 8.8       Results from last 7 days  Lab Units 04/05/17  1349   INR  1.42       0  Lab Value Date/Time   TROPONINI <0.006 04/04/2017 1056   TROPONINI <0.006 04/03/2017 1851   TROPONINI 0.018 03/31/2017 0542   TROPONINI 0.024 03/30/2017 1515   TROPONINI 3.295 (C) 05/07/2015 2125   TROPONINI 3.801 (C) 05/07/2015 1549   TROPONINI 3.825 (C) 05/07/2015 1133       Results from last 7 days  Lab Units 04/05/17  0417   TSH mIU/mL 0.656       Results from last 7 days  Lab Units 04/05/17  0417   CHOLESTEROL mg/dL 108   TRIGLYCERIDES mg/dL 80   HDL CHOL mg/dL 30*         ECHO:  · Left ventricular function is severely decreased. Estimated EF appears to be in the range of 10%.  · The left ventricular cavity is severely dilated.  · Global left ventricular wall motion appears abnormal. There is severe diffuse hypokinesis.  · Left ventricular diastolic dysfunction.  · There is no evidence of a left ventricular thrombus present.  · Moderately reduced right ventricular systolic function noted.  · Left atrial cavity size is mildly dilated.  · Mild mitral valve regurgitation is present    Tele:  SR with intermittent breakthrough atrial fibrillation    Assessment/Plan     Principal Problem:    Sepsis  Active Problems:    Ischemic cardiomyopathy    ICD (implantable cardioverter-defibrillator) in place, implantation 2015.    Diabetes education, encounter for    Chronic respiratory failure with hypoxia    Urinary retention    E. coli UTI (urinary tract infection)    Epididymo-orchitis    Encephalopathy acute    Cellulitis    Yeast UTI      1. Acute CVA. not a TPA candidate and area of possible ischemia not felt to be amenable to intervention.  2. Ischemic cardiomyopathy.                - EF 10 %, no evidence of LV thrombus.          - On Coreg  3. Atrial fibrillation  - new diagnosis.  CHADSVASC=6   -converted back to SR, currently on amiodarone   -will need lifelong anticoagulation, Eliquis has been started  4. CAD, stable  5. Dyslipidemia  6. Hypertension now with hypotension overnight with SBP in the 70 and 80's most of the night.    Plan:    Dc  IV lasix 40 BID given over the weekend. No current diuretics ordered.  Continue amiodarone and Eliquis    Danyell Gunn, TRESA  04/10/17  8:26 AM      I, Jarocho Martin have reviewed the note in full and agree with all aspects of the above including physical exam, assessment, labs and plan with changes made accordingly.     Jarocho Martin MD  04/10/17  8:41 AM            Please note that portions of this note may have been completed with a voice recognition program. Efforts were made to edit the dictations, but occasionally words are mistranscribed.

## 2017-04-10 NOTE — CONSULTS
Adult Nutrition  Assessment/PES    Patient Name:  Tomas Salvador  YOB: 1955  MRN: [id number]  Admit Date:  4/4/2017    Assessment Date:  4/10/2017  Comments:  EN order set initiated per v.o.  given during MDR for post-pyloric feeding. Nsg advised pt placed on vin so trophic feeding now goal w/  Impact Peptide 1.5 @15 ml/hr - do not advance while on pressor support.  Will monitor and advance when appropriate.        Reason for Assessment       04/10/17 1218    Reason for Assessment    Reason For Assessment/Visit dysphagia;multidisciplinary rounds;physician consult;TF/PN    Identified At Risk By Screening Criteria dysphagia or difficulty swallowing;tube feeding or parenteral nutrition;MST SCORE 2+    Time Spent (min) 45    Diagnosis --   per notes of this adm   Patient Active Problem List   Diagnosis   • Coronary disease   • Ischemic cardiomyopathy   • Peripheral vascular disease   • ICD (implantable cardioverter-defibrillator) in place, implantation 2015.   • Dyslipidemia, on atorvastain.    • Sepsis   • Diabetes education, encounter for   • Chronic respiratory failure with hypoxia   • Urinary retention   • Hyperlipidemia   • Essential hypertension   • E. coli UTI (urinary tract infection)   • Epididymo-orchitis   • Encephalopathy acute   • Cellulitis   • Yeast UTI                Nutrition/Diet History       04/10/17 1236    Nutrition/Diet History    Reported/Observed By RN    Other called to advise pt placed on pressor support      04/10/17 1219    Nutrition/Diet History    Reported/Observed By RN;MD    Other pt aspirating on dysphagia diet made NPO; vo given to place corpak for post pyloric feedings, EF 10 % limit water              Labs/Tests/Procedures/Meds       04/10/17 1220    Labs/Tests/Procedures/Meds    Labs/Tests Review Reviewed;K+;C-React PRO;Pre Albumin   K+ replaced            Physical Findings       04/10/17 1221    Physical Findings/Assessment    Additional Documentation Physical  "Appearance (Group)    Physical Appearance    Tubes nasogastric tube    Skin non-healing wound(s)            Estimated/Assessed Needs       04/10/17 1222    Calculation Measurements    Weight Used For Calculations 103 kg (227 lb 1.2 oz)    Height Used for Calculations 1.75 m (5' 8.9\")    Estimated/Assessed Energy Needs    Energy Need Method [place]- [facility]irdre    Age 61    RMR ([facility]) 1818.76    Activity Factors ([facility])  Out of bed, ambulatory  1.3    Total estimated needs ([place] [facility]) 1819 (1.3) =2365    Estimated REE  ([facility]) 1941    Activity Factors 1.3    Estimated Kcal ([employer])  2523    Estimated Kcal Range  1900 to allow for wt mgt (slow loss along w/ wound healing)    Estimated/Assessed Protein Needs    Weight Used for Protein Calculation 73 kg (160 lb 15 oz)   IBW    Protein (gm/kg) 2.0    2.0 Gm Protein (gm) 146    Estimated Protein Range 146            Nutrition Prescription Ordered       04/10/17 1221    Nutrition Prescription PO    Current PO Diet NPO                Problem/Interventions:        Problem 1       04/10/17 1234    Nutrition Diagnoses Problem 1    Problem 1 Needs Alternate Route    Etiology (related to) Factors Affecting Nutrition    Functional Diagnosis Dysphagia    Signs/Symptoms (evidenced by) Other (comment);NPO   aspiration per CXR    Percent (%) intake recorded 0 %    Over number of meals 1                    Intervention Goal       04/10/17 1234    Intervention Goal    General Nutrition support treatment    TF/PN Inititiate TF/PN            Nutrition Intervention       04/10/17 1234    Nutrition Intervention    RD/Tech Action Recommend/ordered;Follow Tx progress;Care plan reviewd    Recommended/Ordered EN            Nutrition Prescription       04/10/17 1234    Nutrition Prescription PO    PO Prescription Begin/change diet    Begin/Change Diet to NPO    Nutrition Prescription EN    Enteral Prescription Enteral begin/change    Enteral " Route ND    Product Impact Peptide 1.5 tom    TF Delivery Method Continuous    Continuous TF Goal Rate (mL/hr) 15 mL/hr   Do not advance while on pressors    Water Flush Frequency Per hour    New EN Prescription Ordered? Yes            Education/Evaluation       04/10/17 1236    Monitor/Evaluation    Monitor Per protocol;TF delivery/tolerance;Pertinent labs          Electronically signed by:  Melanie Bah RD  04/10/17 12:38 PM

## 2017-04-10 NOTE — PROGRESS NOTES
"Critical Care Note     LOS: 6 days   Patient Care Team:  Jose Navarro MD as PCP - General (Family Medicine)    Chief Complaint/Reason for visit: Sepsis, severe ICM      Subjective   61-year-old gentleman with severe ischemic CM, DM, hospitalized at [place] for one week with a scrotal cellulitis, status post debridement. He was brought to [facility] for a possible stroke. Stroke workup showed old left frontal.    Interval History:   This note is that he coughs with attempts to take by mouth and have made him nothing by mouth. His blood pressure has been borderline in the last 24 hours, ranging . In the last 24 hours he has been 80-88 systolic most of the time. He is, more restless and confused. He last voided at 10 PM. Bladder scan reveals over a liter in his bladder. Infectious disease does not wish a Cueto to be re-anchored. Heart rate has dropped into the 50s. He is restless and confused. He was picking at everything and was placed on a low-dose Precedex drip. Corpak was placed and it is in the pyloric channel. He has not had any vomiting. His pain appears adequately controlled.    Review of Systems:    All systems were reviewed and negative except as noted in subjective.unable, patient not following commands    Medical history, surgical history, social history, family history reviewed    Objective     Intake/Output:    Intake/Output Summary (Last 24 hours) at 04/10/17 1246  Last data filed at 04/10/17 1150   Gross per 24 hour   Intake            751.4 ml   Output             1200 ml   Net           -448.6 ml       Nutrition: NPO    Infusions:    dexmedetomidine 0.2-1.5 mcg/kg/hr Last Rate: 0.2 mcg/kg/hr (04/10/17 0807)   phenylephrine 0.5-3 mcg/kg/min Last Rate: 0.5 mcg/kg/min (04/10/17 1244)       Respiratory: 2 liters       Telemetry: SR with PACs    Vital Signs  Blood pressure (!) 89/57, pulse 64, temperature 97.9 °F (36.6 °C), temperature source Oral, resp. rate 18, height 69\" (175.3 " cm), weight 227 lb 1.2 oz (103 kg), SpO2 99 %.    Physical Exam:  General Appearance:   ill-appearing older gentleman    Head:  NC/AT   Eyes:          ROMI EOMI no jaundice   Ears:     Throat:  mucous membranes moist. Several lower teeth teeth missing anteriorly.    Neck:  supple, trachea midline    Back:      Lungs:    bilateral breath sounds with  diffuse rhonchi bilateral UL, Symmetric chest excursion     Heart:   distant heart sounds, regular, PMI displaced laterally    Abdomen:    hypoactive bowel sounds, soft, nontender, no organomegaly  Scrotum with edema, erythema and induration. Wound packed   Rectal:   Deferred   Extremities:  no pitting edema or cyanosis    Pulses:  weak pulses left foot, no palpable pulses right foot but extremity is warm to touch    Skin:  cool and dry    Lymph nodes:    Neurologic:  alert, confused, follows commands with all extremities. Generalized weakness       Results Review:     I reviewed the patient's new clinical results.     Results from last 7 days  Lab Units 04/10/17  0458 04/09/17  0429 04/08/17  1405 04/08/17  0444 04/05/17  0417   SODIUM mmol/L 140 137  --  139  < > 136   POTASSIUM mmol/L 3.7 3.9 4.0 3.6  < > 3.9   CHLORIDE mmol/L 102 100  --  105  < > 102   TOTAL CO2 mmol/L 32.0* 31.0  --  31.0  < > 25.0   BUN mg/dL 20 17  --  18  < > 6*   CREATININE mg/dL 0.80 0.70  --  0.60  < > 0.60   CALCIUM mg/dL 8.8 9.0  --  9.1  < > 9.3   BILIRUBIN mg/dL 0.3 0.4  --   --   --  0.3   ALK PHOS U/L 66 71  --   --   --  65   ALT (SGPT) U/L 11 25  --   --   --  12   AST (SGOT) U/L 34* 67*  --   --   --  22   GLUCOSE mg/dL 91 196*  --  176*  < > 170*   < > = values in this interval not displayed.    Results from last 7 days  Lab Units 04/10/17  0458 04/09/17  0429 04/07/17  0422   WBC 10*3/mm3 9.97 12.63* 12.63*   HEMOGLOBIN g/dL 10.0* 10.6* 9.9*   HEMATOCRIT % 32.3* 34.2* 31.6*   PLATELETS 10*3/mm3 337 372 328       Results from last 7 days  Lab Units 04/05/17  1349   PH, ARTERIAL pH  units 7.408   PO2 ART mm Hg 78.0*   PCO2, ARTERIAL mm Hg 38.5   HCO3 ART mmol/L 24.3     Lab Results   Component Value Date    BLOODCX No growth at 5 days 03/30/2017     Lab Results   Component Value Date    URINECX >100,000 CFU/mL Yeast isolated (A) 04/07/2017   Scrotal wound E. coli    I reviewed the patient's new imaging including images and reports.      FINDINGS: Portable chest reveals patient to be status post median  sternotomy. The heart is enlarged. There is a small left pleural  effusion. Degenerative change is seen within the spine. There is  increased pulmonary vascularity bilaterally.       IMPRESSION:  Stable chest with increased pulmonary vascularity  bilaterally. The heart is enlarged with small left pleural effusion.      D: 04/10/2017                    IMPRESSION:  Feeding tube tip seen in the distal stomach in which  continued advancement is recommended for postpyloric placement.      D: 04/10/2017          All medications reviewed.     [START ON 4/11/2017] amiodarone 200 mg Oral Q24H   apixaban 5 mg Oral Q12H   aspirin 81 mg Oral Daily   atorvastatin 80 mg Oral Nightly   ceFAZolin 2 g Intravenous Q8H   fentaNYL 1 patch Transdermal Q72H   furosemide 40 mg Intravenous Daily   heparin flush (porcine) 500 Units Intracatheter Q12H   insulin detemir 25 Units Subcutaneous Nightly   insulin lispro 0-9 Units Subcutaneous 4x Daily With Meals & Nightly   insulin lispro 5 Units Subcutaneous 4x Daily With Meals & Nightly   ipratropium-albuterol 3 mL Nebulization 4x Daily - RT   metroNIDAZOLE 500 mg Intravenous Q6H   pantoprazole 40 mg Oral Q AM   saccharomyces boulardii 250 mg Oral BID   sacubitril-valsartan 1 tablet Oral Q12H         Assessment/Plan     Principal Problem:    Sepsis  Active Problems:    E. coli UTI (urinary tract infection)    Yeast UTI    Encephalopathy acute    Cellulitis, scrotum, perineum    Diabetes mellitus, type 2    ICM EF 10%       NSTEMI 5/15 occl LAD, 99% circ, occl SVG to Dz,  patent SVG toOM1, patent LIMA to LAD.   Single chamber AICD 12/15, CABG 3/2005    Chronic respiratory failure with hypoxia    Urinary retention    Epididymo-orchitis    61-year-old gentleman with a severe ischemic cardiomyopathy, AICD, ejection fraction of 10%, diabetes mellitus type II, who developed a scrotal abscess requiring I&D in [place] on 3/31. Cultures were positive for Escherichia coli. He was in the [facility] for approximately one week and developed some left-sided weakness and altered mentation. He was transported to [place] for stroke. No acute stroke was found on CT of the head or CT perfusion. No intervention was done.   He developed atrial fibrillation with a rapid ventricular response and was placed on amiodarone drip. He converted to SR.  Echocardiogram confirms a dilated left ventricular cavity that with severe dysfunction LVEF 10%. No comment as to whether thrombus is seen.  He was placed on a heparin drip for 24 hours and transition to oral Eliquis.  His Coreg was restarted as well as Entresto. In the last 24 hours however, his blood pressure has been marginal, 75-85 systolic, heart rate has dropped into the 50s.   Infectious disease evaluated and changed antibiotics to cefazolin and Flagyl for his Escherichia coli scrotal infection.  Urine culture is growing greater than 100,000 yeast, on admission. Infectious disease notified. Nory German. Now he has urinary retention with over a liter of urine in his bladder.  Blood sugars are running  on sliding scale insulin; Levemir,  however he is now nothing by mouth.       PLAN:  Precedex for agitation   Cefazolin, Flagyl  Wound care  Aspirin, Plavix, statin   Entresto   Hold Coreg while on Precedex  Amiodarone PO, drop dose to 200mg daily  Eliquis  Hold diuresis  Phenylephrine if BP remains low   Sliding scale insulin,  Levemir  Nebulized BDs  Replace electrolytes as needed  Fentanyl patch for pain plus Norco for break thru  Start TF once  cor pack in small bowel    VTE Prophylaxis:Eliquis    Stress Ulcer Prophylaxis:Protonix    Ella Pope MD  04/10/17  12:46 PM      Time: 40min  I personally provided care to this critically ill patient as documented above.  Critical care time does not include time spent on separately billed procedures.  Non of my critical care time was concurrent with other critical care providers.

## 2017-04-10 NOTE — PAYOR COMM NOTE
"Tomas Gtz (61 y.o. Male)     Date of Birth Social Security Number Address Home Phone MRN    1955  [address]   [place] [address] [phone] [id number]    Jew Marital Status          None        Admission Date Admission Type Admitting Provider Attending Provider Department, Room/Bed    4/4/17 Elective Ella Pope MD Gerhardstein, Donna C, MD [facility] 2B ICU, N226/1    Discharge Date Discharge Disposition Discharge Destination                      Attending Provider: Ella Pope MD     Allergies:  No Known Allergies    Isolation:  None   Infection:  None   Code Status:  FULL    Ht:  69\" (175.3 cm)   Wt:  237 lb (108 kg)    Admission Cmt:  None   Principal Problem:  Sepsis [A41.9]                 Active Insurance as of 4/4/2017     Primary Coverage     Payor Plan Insurance Group Employer/Plan Group    [employer] BLUE [employer]OSS [employer] BLUE [employer]OSS BLUE [employer] PPO [id number]     Payor Plan Address Payor Plan Phone Number Effective From Effective To    [address] [phone] 11/1/2015     [place] [id number]       Subscriber Name Subscriber Birth Date Member ID       SANJUANA GTZ 1955 [id number]                 Emergency Contacts      (Rel.) Home Phone Work Phone Mobile Phone    Sanjuana Gtz [phone] -- --            Vital Signs (last 72 hrs)       04/07 0700  -  04/08 0659 04/08 0700  -  04/09 0659 04/09 0700  -  04/10 0659 04/10 0700  -  04/10 0929   Most Recent    Temp (°F) 97.3 -  98    97.9 -  98.5    97.5 -  98.5      97.8     97.8 (36.6)    Heart Rate 74 -  92    70 -  102    56 -  104    61 -  71     70    Resp 16 -  20    16 -  22    14 -  22      18     18    BP 98/70 -  (!) 147/107    93/59 -  148/100    (!)73/51 -  131/83    90/57 -  115/69     115/69    SpO2 (%) 90 -  100    90 -  100    92 -  100    91 -  100     91          Lab Results (last 72 hours)     Procedure Component Value Units Date/Time    POC " Glucose Fingerstick [[id number]]  (Abnormal) Collected:  04/07/17 1123    Specimen:  Blood Updated:  04/07/17 1125     Glucose 272 (H) mg/dL     Narrative:       Meter: [id number] : [id number] Hooverfrancesca Khan    POC Glucose Fingerstick [[id number]]  (Abnormal) Collected:  04/07/17 1638    Specimen:  Blood Updated:  04/07/17 1640     Glucose 308 (H) mg/dL     Narrative:       Meter: [id number] : [id number] Hooverfrancesca Khan    POC Glucose Fingerstick [[id number]]  (Abnormal) Collected:  04/07/17 2033    Specimen:  Blood Updated:  04/07/17 2045     Glucose 303 (H) mg/dL     Narrative:       Meter: [id number] : [id number] Omega Chen    Basic Metabolic Panel [[id number]]  (Abnormal) Collected:  04/08/17 0444    Specimen:  Blood Updated:  04/08/17 0619     Glucose 176 (H) mg/dL      BUN 18 mg/dL      Creatinine 0.60 mg/dL      Sodium 139 mmol/L      Potassium 3.6 mmol/L      Chloride 105 mmol/L      CO2 31.0 mmol/L      Calcium 9.1 mg/dL      eGFR Non African Amer 137 mL/min/1.73      BUN/Creatinine Ratio 30.0 (H)     Anion Gap 3.0 mmol/L     Narrative:       National Kidney Foundation Guidelines    Stage                           Description                             GFR                      1                               Normal or High                          90+  2                               Mild decrease                            60-89  3                               Moderate decrease                   30-59  4                               Severe decrease                       15-29  5                               Kidney failure                             <15    Magnesium [[id number]]  (Normal) Collected:  04/08/17 0444    Specimen:  Blood Updated:  04/08/17 0619     Magnesium 2.3 mg/dL     POC Glucose Fingerstick [[id number]]  (Abnormal) Collected:  04/08/17 0730    Specimen:  Blood Updated:  04/08/17 0732     Glucose 185 (H) mg/dL     Narrative:       Meter: [id number] : [id number] Xochitl Underwood     Urinalysis With / Microscopic If Indicated [[id number]]  (Abnormal) Collected:  04/07/17 1141    Specimen:  Urine from Urine, Clean Catch Updated:  04/08/17 0738     Color, UA Red (A)     Appearance, UA Turbid (A)     pH, UA 5.5     Specific Gravity, UA 1.044 (H)     Glucose,  mg/dL (Trace) (A)     Ketones, UA Trace (A)     Bilirubin, UA Moderate (2+) (A)     Blood, UA Large (3+) (A)     Protein,  mg/dL (2+) (A)     Leuk Esterase, UA Moderate (2+) (A)     Nitrite, UA Positive (A)     Urobilinogen, UA 1.0 E.U./dL    Urinalysis, Microscopic Only [[id number]]  (Abnormal) Collected:  04/07/17 1141    Specimen:  Urine from Urine, Clean Catch Updated:  04/08/17 0738     RBC, UA 21-30 (A) /HPF      WBC, UA 13-20 (A) /HPF      Bacteria, UA None Seen /HPF      Squamous Epithelial Cells, UA 0-2 /HPF      Yeast, UA Large/3+ Budding Yeast /HPF      Hyaline Casts, UA None Seen /LPF      Methodology Manual Light Microscopy    POC Glucose Fingerstick [[id number]]  (Abnormal) Collected:  04/08/17 1128    Specimen:  Blood Updated:  04/08/17 1130     Glucose 308 (H) mg/dL     Narrative:       Meter: [id number] : [id number] [place]    Potassium [[id number]]  (Normal) Collected:  04/08/17 1405    Specimen:  Blood Updated:  04/08/17 1423     Potassium 4.0 mmol/L     POC Glucose Fingerstick [[id number]]  (Abnormal) Collected:  04/08/17 1626    Specimen:  Blood Updated:  04/08/17 1628     Glucose 293 (H) mg/dL     Narrative:       Meter: [id number] : [id number] [place]    POC Glucose Fingerstick [[id number]]  (Abnormal) Collected:  04/08/17 2100    Specimen:  Blood Updated:  04/08/17 2105     Glucose 366 (H) mg/dL     Narrative:       Meter: [id number] : [id number] Omega Chen    CBC (No Diff) [[id number]]  (Abnormal) Collected:  04/09/17 0429    Specimen:  Blood Updated:  04/09/17 0532     WBC 12.63 (H) 10*3/mm3      RBC 3.67 (L) 10*6/mm3      Hemoglobin 10.6 (L) g/dL      Hematocrit 34.2 (L) %      MCV 93.2 fL       MCH 28.9 pg      MCHC 31.0 (L) g/dL      RDW 14.2 %      RDW-SD 48.0 fl      MPV 11.2 fL      Platelets 372 10*3/mm3     Comprehensive Metabolic Panel [[id number]]  (Abnormal) Collected:  04/09/17 0429    Specimen:  Blood Updated:  04/09/17 0706     Glucose 196 (H) mg/dL      BUN 17 mg/dL      Creatinine 0.70 mg/dL      Sodium 137 mmol/L      Potassium 3.9 mmol/L      Chloride 100 mmol/L      CO2 31.0 mmol/L      Calcium 9.0 mg/dL      Total Protein 7.7 g/dL      Albumin 3.40 g/dL      ALT (SGPT) 25 U/L      AST (SGOT) 67 (H) U/L      Alkaline Phosphatase 71 U/L      Total Bilirubin 0.4 mg/dL      eGFR Non African Amer 114 mL/min/1.73      Globulin 4.3 gm/dL      A/G Ratio 0.8 (L) g/dL      BUN/Creatinine Ratio 24.3     Anion Gap 6.0 mmol/L     Narrative:       National Kidney Foundation Guidelines    Stage                           Description                             GFR                      1                               Normal or High                          90+  2                               Mild decrease                            60-89  3                               Moderate decrease                   30-59  4                               Severe decrease                       15-29  5                               Kidney failure                             <15    Magnesium [[id number]]  (Normal) Collected:  04/09/17 0429    Specimen:  Blood Updated:  04/09/17 0706     Magnesium 2.0 mg/dL     Phosphorus [[id number]]  (Normal) Collected:  04/09/17 0429    Specimen:  Blood Updated:  04/09/17 0706     Phosphorus 2.4 mg/dL     Calcium, Ionized [[id number]]  (Normal) Collected:  04/09/17 0429    Specimen:  Blood Updated:  04/09/17 0723     Ionized Calcium 1.13 mmol/L     POC Glucose Fingerstick [[id number]]  (Abnormal) Collected:  04/09/17 0725    Specimen:  Blood Updated:  04/09/17 0727     Glucose 214 (H) mg/dL     Narrative:       Meter: [id number] : [id number] [place]    POC Glucose Fingerstick  [[id number]]  (Abnormal) Collected:  04/09/17 1133    Specimen:  Blood Updated:  04/09/17 1138     Glucose 284 (H) mg/dL     Narrative:       Meter: [id number] : [id number] [place]    POC Glucose Fingerstick [[id number]]  (Abnormal) Collected:  04/09/17 1620    Specimen:  Blood Updated:  04/09/17 1622     Glucose 273 (H) mg/dL     Narrative:       Meter: [id number] : [id number] [place]    POC Glucose Fingerstick [[id number]]  (Normal) Collected:  04/09/17 1634    Specimen:  Blood Updated:  04/09/17 1652     Glucose 118 mg/dL     Narrative:       Meter: [id number] : [id number] [place]    POC Glucose Fingerstick [[id number]]  (Abnormal) Collected:  04/09/17 2028    Specimen:  Blood Updated:  04/09/17 2050     Glucose 261 (H) mg/dL     Narrative:       Meter: [id number] : [id number] Omega Chen    CBC & Differential [[id number]] Collected:  04/10/17 0458    Specimen:  Blood Updated:  04/10/17 0711    Narrative:       The following orders were created for panel order CBC & Differential.  Procedure                               Abnormality         Status                     ---------                               -----------         ------                     Scan Slide[[id number]]                    Normal              Final result               CBC Auto Differential[[id number]]         Abnormal            Final result                 Please view results for these tests on the individual orders.    CBC Auto Differential [[id number]]  (Abnormal) Collected:  04/10/17 0458    Specimen:  Blood Updated:  04/10/17 0711     WBC 9.97 10*3/mm3      RBC 3.47 (L) 10*6/mm3      Hemoglobin 10.0 (L) g/dL      Hematocrit 32.3 (L) %      MCV 93.1 fL      MCH 28.8 pg      MCHC 31.0 (L) g/dL      RDW 14.4 %      RDW-SD 48.0 fl      MPV 11.0 fL      Platelets 337 10*3/mm3      Neutrophil % 65.9 %      Lymphocyte % 20.5 (L) %      Monocyte % 9.6 %      Eosinophil % 3.5 (H) %      Basophil % 0.3 %      Immature Grans % 0.2 %       Neutrophils, Absolute 6.57 10*3/mm3      Lymphocytes, Absolute 2.04 10*3/mm3      Monocytes, Absolute 0.96 10*3/mm3      Eosinophils, Absolute 0.35 (H) 10*3/mm3      Basophils, Absolute 0.03 10*3/mm3      Immature Grans, Absolute 0.02 10*3/mm3     Narrative:       Appended report.  These results have been appended to a previously verified report.    Scan Slide [[id number]]  (Normal) Collected:  04/10/17 0458    Specimen:  Blood Updated:  04/10/17 0711     RBC Morphology Normal     WBC Morphology Normal     Platelet Morphology Normal    C-reactive Protein [[id number]]  (Abnormal) Collected:  04/10/17 0458    Specimen:  Blood Updated:  04/10/17 0721     C-Reactive Protein 2.98 (H) mg/dL     Prealbumin [[id number]]  (Abnormal) Collected:  04/10/17 0458    Specimen:  Blood Updated:  04/10/17 0723     Prealbumin 6.4 (L) mg/dL     Urine Culture [[id number]]  (Normal) Collected:  04/07/17 1141    Specimen:  Urine from Urine, Clean Catch Updated:  04/10/17 0726     Urine Culture Culture in progress    POC Glucose Fingerstick [[id number]]  (Normal) Collected:  04/10/17 0733    Specimen:  Blood Updated:  04/10/17 0734     Glucose 82 mg/dL     Narrative:       Meter: [id number] : [id number] [place]    Magnesium [[id number]]  (Normal) Collected:  04/10/17 0458    Specimen:  Blood Updated:  04/10/17 0807     Magnesium 2.1 mg/dL     Phosphorus [[id number]]  (Normal) Collected:  04/10/17 0458    Specimen:  Blood Updated:  04/10/17 0807     Phosphorus 3.1 mg/dL     Comprehensive Metabolic Panel [[id number]]  (Abnormal) Collected:  04/10/17 0458    Specimen:  Blood Updated:  04/10/17 0807     Glucose 91 mg/dL      BUN 20 mg/dL      Creatinine 0.80 mg/dL      Sodium 140 mmol/L      Potassium 3.7 mmol/L      Chloride 102 mmol/L      CO2 32.0 (H) mmol/L      Calcium 8.8 mg/dL      Total Protein 6.9 g/dL      Albumin 3.00 (L) g/dL      ALT (SGPT) 11 U/L      AST (SGOT) 34 (H) U/L      Alkaline Phosphatase 66 U/L      Total Bilirubin 0.3  mg/dL      eGFR Non African Amer 98 mL/min/1.73      Globulin 3.9 gm/dL      A/G Ratio 0.8 (L) g/dL      BUN/Creatinine Ratio 25.0     Anion Gap 6.0 mmol/L     Narrative:       National Kidney Foundation Guidelines    Stage                           Description                             GFR                      1                               Normal or High                          90+  2                               Mild decrease                            60-89  3                               Moderate decrease                   30-59  4                               Severe decrease                       15-29  5                               Kidney failure                             <15          Imaging Results (last 72 hours)     Procedure Component Value Units Date/Time    XR Chest 1 View [[id number]] Collected:  04/08/17 1247     Updated:  04/09/17 0006    Narrative:          EXAMINATION: XR CHEST, SINGLE VIEW - 04/08/2017     INDICATION:  R13.10-Dysphagia, unspecified; Z74.09-Other reduced  mobility;  R41.841-Cognitive communication deficit.      COMPARISON: 4/06/2017 portable chest radiograph.     FINDINGS: Right upper extremity PICC line is seen with its tip in the  distal SVC. A left-sided single-lead defibrillator is again noted. NG  tube has been removed. The heart remains enlarged. There has been  interval clearing of the right lung base since yesterday's exam and  there is persistent but mild left basilar atelectasis or effusion. No  pneumothorax or other new chest disease is seen.        Impression:       1. Cardiomegaly, pulmonary venous hypertension, and left basilar  effusion/atelectasis, unchanged.  2. Interval clearing of right basilar interstitial disease. No new chest  pathology is seen.     DICTATED:     04/08/2017  EDITED:         04/08/2017     This report was finalized on 4/9/2017 12:04 AM by DR. Morgan Mcaedo MD.       XR Chest 1 View [[id number]] Collected:  04/09/17 1934     Updated:   04/10/17 0059    Narrative:          EXAMINATION: XR CHEST, SINGLE VIEW - 04/09/2017     INDICATION:  R13.10-Dysphagia, unspecified; Z74.09-Other reduced  mobility;  R41.841-Cognitive communication deficit.     COMPARISON: 4/08/2017 portable chest radiograph.      FINDINGS: Sternotomy wires and a left-sided single-lead ICD are noted.  Right upper extremity PICC line is again seen with its tip in the distal  SVC. The heart is mildly enlarged. The vasculature appears normal. Mild  patchy opacity of the left base is stable, presumably mild atelectasis  and effusion. No new pulmonary parenchymal disease is seen elsewhere.             Impression:       Stable mild left basilar atelectasis and effusion.     DICTATED:     04/09/2017  EDITED:         04/09/2017     This report was finalized on 4/10/2017 12:57 AM by DR. Morgan Macedo MD.                Physician Progress Notes (last 72 hours) (Notes from 4/7/2017  9:29 AM through 4/10/2017  9:29 AM)      Luis Alberto Phelan MD at 4/7/2017  2:20 PM  Version 1 of 1         Neurology       Patient Care Team:  Jose Navarro MD as PCP - General (Family Medicine)    Chief complaint: Stroke    History:  Shunts CT is not confirm a right hemisphere event.    Proved dramatically since being in [place].  He suffered a chair is walking with therapy and tolerating being up in a chair.    He is eating a lot.      Past Medical History:   Diagnosis Date   • CAD (coronary artery disease)    • Chronic back pain    • Chronic respiratory failure with hypoxia     Night time only at 2 liters/min   • Diabetes mellitus, type 2    • Essential hypertension    • Hyperlipidemia    • Ischemic cardiomyopathy    • NSTEMI (non-ST elevated myocardial infarction) 2015   • Obesity    • PVD (peripheral vascular disease)    • Systolic CHF     EF <20%  3/8/17   • Urinary retention        Vital Signs   Vitals:    04/07/17 0900 04/07/17 1000 04/07/17 1100 04/07/17 1238   BP: 118/73 109/70 108/78    BP Location:        Patient Position:       Pulse: 89 90 89 86   Resp:    16   Temp:       TempSrc:       SpO2: 98% 98% 99% 100%   Weight:       Height:           Physical Exam:   General: Awake and alert.  He is joking with his family and the staff.              Neuro: Oriented to person place.,  And relaxed.    He follows complex commands.        Results Review:  Reviewed    Results from last 7 days  Lab Units 04/07/17  0422   WBC 10*3/mm3 12.63*   HEMOGLOBIN g/dL 9.9*   HEMATOCRIT % 31.6*   PLATELETS 10*3/mm3 328       Results from last 7 days  Lab Units 04/07/17  0422 04/06/17  2103 04/06/17  0316 04/05/17  0417 04/04/17  0115 04/03/17  0235   SODIUM mmol/L 138  --  137 136 136 134*   POTASSIUM mmol/L 3.5 3.5 3.8 3.9 4.0 3.8   CHLORIDE mmol/L 101  --  104 102 105 105   TOTAL CO2 mmol/L 31.0  --  26.0 25.0 24.8 21.3*   BUN mg/dL 17  --  12 6* 6* 6*   CREATININE mg/dL 0.70  --  0.80 0.60 0.53 0.60   CALCIUM mg/dL 9.1  --  9.0 9.3 8.3 8.4   BILIRUBIN mg/dL  --   --   --  0.3 0.2 0.1*   ALK PHOS U/L  --   --   --  65 69 70   ALT (SGPT) U/L  --   --   --  12 12 9*   AST (SGOT) U/L  --   --   --  22 24 19   GLUCOSE mg/dL 194*  --  211* 170* 130* 178*     Imaging Results (last 24 hours)     Procedure Component Value Units Date/Time    XR Chest 1 View [[id number]] Collected:  04/06/17 0909     Updated:  04/06/17 2212    Narrative:       EXAMINATION: XR CHEST 1 VIEW-04/06/2017:      INDICATION: Cardiomyopathy; R13.10-Dysphagia, unspecified; Z74.09-Other  reduced mobility; Z74.09-Other reduced mobility.      COMPARISON: 04/04/2017 portable chest.     FINDINGS: An NG tube is now seen in the stomach. The right upper  extremity PICC line is seen with its tip in the [id number] SVC. The  heart is enlarged. The vasculature has decreased in size now approaching  normal. There is persistent left basilar atelectasis or effusion.           Impression:       1.  PICC line tip and NG tube appear to be in good position.  2.  Resolving pulmonary  vascular congestion.  3.  Persistent mild left basilar atelectasis and effusion.     D:  04/06/2017  E:  04/06/2017     This report was finalized on 4/6/2017 10:10 PM by DR. Morgan Macedo MD.             Assessment:  Does not appear these had an acute stroke.    Early his left frontal lesion is in fact old.        Plan:  Continue current therapy and proceed with  aggressive rehabilitation    Comment:  Remarkable improvement         I discussed the patients findings and my recommendations with patient, family, nursing staff and primary care team    Luis Alberto Phelan MD  04/07/17  2:20 PM         Electronically signed by Luis Alberto Phelan MD at 4/7/2017  2:22 PM      Michael Patel MD at 4/8/2017  8:08 AM  Version 1 of 1         Intensive Care Follow-up      LOS: 4 days     Mr. Tomas Salvador, 61 y.o. male is followed for: Sepsis     Subjective - Interval History     No problems overnight  Remains confused at times requiring restraints to protect lines  Cueto catheter out  Scrotal drain still in place  Past dysphagia evaluation and taking some orally    The patient's relevant past medical, surgical and social history were reviewed and updated in Epic as appropriate.     Objective     Infusions:     Medications:    amiodarone 200 mg Oral Q8H   Followed by      [START ON 4/13/2017] amiodarone 200 mg Oral Q12H   Followed by      [START ON 4/27/2017] amiodarone 200 mg Oral Daily   apixaban 5 mg Oral Q12H   aspirin 81 mg Oral Daily   atorvastatin 80 mg Oral Nightly   carvedilol 6.25 mg Oral Q12H   ceFAZolin 2 g Intravenous Q8H   fentaNYL 1 patch Transdermal Q72H   furosemide 40 mg Intravenous Q12H   heparin flush (porcine) 500 Units Intracatheter Q12H   insulin detemir 15 Units Subcutaneous Nightly   insulin lispro 0-9 Units Subcutaneous 4x Daily With Meals & Nightly   metroNIDAZOLE 500 mg Intravenous Q6H   pantoprazole 40 mg Oral Q AM   potassium chloride 20 mEq Oral Once   saccharomyces boulardii 250 mg Oral  BID   sacubitril-valsartan 1 tablet Oral Q12H     Intake/Output       04/07/17 0700 - 04/08/17 0659    Intake (ml) 1386.1    Output (ml) 235    Net (ml) 1151.1        Vital Sign Min/Max for last 24 hours  Temp  Min: 97.3 °F (36.3 °C)  Max: 98 °F (36.7 °C)   BP  Min: 98/70  Max: 147/107   Pulse  Min: 74  Max: 92   Resp  Min: 16  Max: 20   SpO2  Min: 90 %  Max: 100 %   Flow (L/min)  Min: 3  Max: 3        Physical Exam:   GENERAL: Somewhat lethargic but will awake.  In no distress   HEENT: No adenopathy or thyromegaly   LUNGS: Rhonchi bilaterally, no respiratory distress   HEART: Regular rate and rhythm without murmurs.  Distant heart sounds   ABDOMEN: Obese, soft, bowel sounds present.  Surgical site noted with drain in place   EXTREMITIES: Trace pitting edema.  No clubbing or cyanosis   NEURO/PSYCH: Confused at times.  Follow some commands.  Moves all fours.  No overt deficit      Results from last 7 days  Lab Units 04/07/17  0422 04/06/17  0316 04/05/17  0417   WBC 10*3/mm3 12.63* 12.30* 13.42*   HEMOGLOBIN g/dL 9.9* 10.4* 11.4*   PLATELETS 10*3/mm3 328 359 405       Results from last 7 days  Lab Units 04/08/17  0444 04/07/17  0422 04/06/17  2103 04/06/17  0316 04/05/17  0417   SODIUM mmol/L 139 138  --  137 136   POTASSIUM mmol/L 3.6 3.5 3.5 3.8 3.9   TOTAL CO2 mmol/L 31.0 31.0  --  26.0 25.0   BUN mg/dL 18 17  --  12 6*   CREATININE mg/dL 0.60 0.70  --  0.80 0.60   MAGNESIUM mg/dL 2.3 1.9  --  2.0 2.1   PHOSPHORUS mg/dL  --  2.6  --  3.2 2.3*   GLUCOSE mg/dL 176* 194*  --  211* 170*     Estimated Creatinine Clearance: 154.6 mL/min (by C-G formula based on Cr of 0.6).            Results from last 7 days  Lab Units 04/05/17  1349   PH, ARTERIAL pH units 7.408   PCO2, ARTERIAL mm Hg 38.5   PO2 ART mm Hg 78.0*     Lab Results   Component Value Date    LACTATE 0.8 04/02/2017          Images: His recent chest x-ray reveals marked cardiomegaly, hardware in place, non-consolidative interstitial infiltrates    I reviewed  the patient's results and images.     Impression      Hospital Problem List     * (Principal)Sepsis    Epididymo-orchitis    Ischemic cardiomyopathy    Overview Signed 6/28/2016  5:09 PM by Mady singh May 2015 echocardiogram:  Ejection fraction 25%.  b. 07/23/2015, 2D echocardiogram:  Severe LV dilation. Ejection fraction 20% to 25%.  c. 10/05/2015, 2D echocardiogram:  Severe LV dilation. EF 20% to 25%.           ICD (implantable cardioverter-defibrillator) in place, implantation 2015.    Overview Signed 7/1/2016 11:55 AM by PAULINA Anderson     ICD  A)BIOTRONIK ICD FOR ICM PRIMARY PREVENTION 2016         Diabetes education, encounter for    Overview Signed 4/4/2017  2:43 PM by TRESA Baca     3/30/17 Hgb A1c 10.4         Chronic respiratory failure with hypoxia    Overview Addendum 4/5/2017  2:52 PM by Ella Pope MD     Night time only at 2 liters/min  Remote tobacco quit 2002         Urinary retention    E. coli UTI (urinary tract infection)    Encephalopathy acute    Cellulitis    Overview Signed 4/5/2017  2:51 PM by Ella Pope MD     Surgical debridement Les  Culture E.coli         Yeast UTI               Plan        Continue antibiotics  Diuresis  Monitor nutritional intake and add supplements  Monitor for dysphagia/aspiration  Mobilize     Plan of care and goals reviewed with mulitdisciplinary team at daily rounds   I discussed the patient's findings and my recommendations with patient, family and nursing staff       SG Patel MD  Pulmonary and Critical Care Medicine  04/08/17 8:08 AM     Please note that portions of this note were completed with a voice recognition program. Efforts were made to edit the dictations, but occasionally words are mistranscribed.       Electronically signed by Michael Patel MD at 4/8/2017  8:13 AM      Viet sAhton MD at 4/8/2017  8:38 AM  Version 1 of 1         Tomas Salvador    Subjective     CC:  "encephalopathy    History of Present Illness     Tomas Salvador is admitted with sepsis. He was transferred from [facility] due to concern for stroke. He was evaluated by the neuro-interventional service and no acute treatment was deemed prudent. Any stroke-like symptoms have resolved, and follow-up CT scans have failed to provide evidence of stroke. An ECHO shows a severely depressed EF, and he is anti-coagulated under the direction of cardiology.    His family feels that he continues to improve daily, though he remains clearly encephalopathic this morning.      There have been no other changes in the patient's interval history since Dr. Patel's progress note of 4/8/17.      The following portions of the patient's history were reviewed and updated as appropriate: allergies, current medications, past family history, past medical history, past social history, past surgical history and problem list. I reviewed and confirmed the PFSH from this admission dated 4/4/17.    Review of Systems   Unable to perform ROS: Mental status change       Objective     /68  Pulse 74  Temp 97.6 °F (36.4 °C) (Axillary)   Resp 20  Ht 69\" (175.3 cm)  Wt 237 lb (108 kg)  SpO2 100%  BMI 35 kg/m2    Physical Exam   Neurological: He has normal strength.   Psychiatric: His speech is slurred.        Neurologic Exam     Mental Status   Oriented to person.   Disoriented to place.   Disoriented to time.   Attention: decreased.   Speech: slurred   Level of consciousness: drowsy  Normal comprehension.     Cranial Nerves   Cranial nerves II through XII intact.     Motor Exam   Muscle bulk: normal  Overall muscle tone: normal    Strength   Strength 5/5 throughout.     Sensory Exam   Light touch normal.       Laboratory and radiological testing is notable for a head CT showing only an old left frontal infarct (I viewed images personally), normal BMP. ECHO is as noted above.    Assessment/Plan         Tomas Salvador continues to suffer from " "encephalopathy, which is likely multifactorial. I agree with his current treatment and am hopeful that he will continue to gradually improve cognitively. This was discussed with his family.      As part of this visit I reviewed prior lab results, reviewed radiology images, obtained additional history from the family which is incorporated in the HPI and reviewed records from the current hospitalization which is incorporated in the HPI.     Electronically signed by Viet Ashton MD at 4/8/2017  8:43 AM      Pee Preciado MD at 4/8/2017  5:54 PM  Version 2 of 2           [facility]  PROGRESS NOTE    Date of Admission: 4/4/2017  Length of Stay: 4  Primary Care Physician: Jose Navarro MD    Chief Complaint: f/u Afib/ ICM    Subjective      HPI:  Events noted. He is agitated and restrained at this time.      Objective   Vitals: /67 (BP Location: Right arm, Patient Position: Sitting)  Pulse 86  Temp 97.9 °F (36.6 °C) (Axillary)   Resp 18  Ht 69\" (175.3 cm)  Wt 237 lb (108 kg)  SpO2 99%  BMI 35 kg/m2    Physical Exam:  GENERAL: Mildly agitated.   HEENT: Fundiscopic deferred, otherwise unremarkable.  NECK: No Jugular venous distention, adenopathy, or thyromegaly noted.   HEART: No discrete PMI is noted. Regular rhythm, normal rate. ? 2/6 systolic murmur. Difficult exam.  LUNGS: Clear to auscultation bilaterally. No wheezing, rales or ronchi.  ABDOMEN: Flat without evidence of organomegaly, masses, or tenderness.  NEUROLOGIC: No focal abnormalities involving strength or sensation are noted.   EXTREMITIES: No clubbing, cyanosis, or edema noted.    Results:    Results from last 7 days  Lab Units 04/07/17  0422 04/06/17  0316 04/05/17  0417   WBC 10*3/mm3 12.63* 12.30* 13.42*   HEMOGLOBIN g/dL 9.9* 10.4* 11.4*   HEMATOCRIT % 31.6* 33.9* 36.6*   PLATELETS 10*3/mm3 328 359 405       Results from last 7 days  Lab Units 04/08/17  1405 04/08/17  0444 04/07/17  0422  " 04/06/17  0316   SODIUM mmol/L  --  139 138  --  137   POTASSIUM mmol/L 4.0 3.6 3.5  < > 3.8   CHLORIDE mmol/L  --  105 101  --  104   TOTAL CO2 mmol/L  --  31.0 31.0  --  26.0   BUN mg/dL  --  18 17  --  12   CREATININE mg/dL  --  0.60 0.70  --  0.80   GLUCOSE mg/dL  --  176* 194*  --  211*   < > = values in this interval not displayed.       Results from last 7 days  Lab Units 04/05/17  0417   CHOLESTEROL mg/dL 108   TRIGLYCERIDES mg/dL 80   HDL CHOL mg/dL 30*   LDL CHOL mg/dL 62       Results from last 7 days  Lab Units 04/05/17 0417   TSH mIU/mL 0.656       Results from last 7 days  Lab Units 04/06/17 0316   BNP pg/mL 1188.0*       Results from last 7 days  Lab Units 04/06/17  1339 04/06/17  0747 04/06/17  0316  04/05/17  1349   PROTIME Seconds  --   --   --   --  15.6*   INR   --   --   --   --  1.42   APTT seconds 60.2* 50.4* 55.8*  < > 29.9   < > = values in this interval not displayed.    Results from last 7 days  Lab Units 04/04/17  1056 04/03/17  1851   CK TOTAL U/L 54  --    TROPONIN I ng/mL <0.006 <0.006       Intake/Output Summary (Last 24 hours) at 04/08/17 1755  Last data filed at 04/08/17 1600   Gross per 24 hour   Intake          1402.78 ml   Output              100 ml   Net          1302.78 ml     I personally viewed and interpreted the patient's EKG/Telemetry data      Current Medications:    amiodarone 200 mg Oral Q8H   Followed by      [START ON 4/13/2017] amiodarone 200 mg Oral Q12H   Followed by      [START ON 4/27/2017] amiodarone 200 mg Oral Daily   apixaban 5 mg Oral Q12H   aspirin 81 mg Oral Daily   atorvastatin 80 mg Oral Nightly   carvedilol 6.25 mg Oral Q12H   ceFAZolin 2 g Intravenous Q8H   fentaNYL 1 patch Transdermal Q72H   furosemide 40 mg Intravenous Q12H   heparin flush (porcine) 500 Units Intracatheter Q12H   insulin detemir 15 Units Subcutaneous Nightly   insulin lispro 0-9 Units Subcutaneous 4x Daily With Meals & Nightly   metroNIDAZOLE 500 mg Intravenous Q6H   pantoprazole  "40 mg Oral Q AM   potassium chloride 20 mEq Oral Once   saccharomyces boulardii 250 mg Oral BID   sacubitril-valsartan 1 tablet Oral Q12H          Assessment and Plan:   1. No changes to make from cardiac standpoint. His neurologic status remains a major issue and the most immediate \"pressing problem\".    I, Pee Preciado MD, personally performed the services described as documented by the above named individual. I have made any necessary edits and it is both accurate and complete 4/8/2017  6:03 PM         Electronically signed by Pee Preciado MD at 4/8/2017  6:05 PM      Pee Preciado MD at 4/8/2017  5:54 PM  Version 1 of 2           [facility]  PROGRESS NOTE    Date of Admission: 4/4/2017  Length of Stay: 4  Primary Care Physician: Jose Navarro MD    Chief Complaint: f/u Afib/ ICM    Subjective      HPI:  Events noted. He is agitated and restrained at this time.      Objective   Vitals: /67 (BP Location: Right arm, Patient Position: Sitting)  Pulse 86  Temp 97.9 °F (36.6 °C) (Axillary)   Resp 18  Ht 69\" (175.3 cm)  Wt 237 lb (108 kg)  SpO2 99%  BMI 35 kg/m2    Physical Exam:  GENERAL: Mildly agitated.   HEENT: Fundiscopic deferred, otherwise unremarkable.  NECK: No Jugular venous distention, adenopathy, or thyromegaly noted.   HEART: No discrete PMI is noted. Regular rhythm, normal rate. ? 2/6 systolic murmur. Difficult exam.  LUNGS: Clear to auscultation bilaterally. No wheezing, rales or ronchi.  ABDOMEN: Flat without evidence of organomegaly, masses, or tenderness.  NEUROLOGIC: No focal abnormalities involving strength or sensation are noted.   EXTREMITIES: No clubbing, cyanosis, or edema noted.    Results:    Results from last 7 days  Lab Units 04/07/17  0422 04/06/17  0316 04/05/17  0417   WBC 10*3/mm3 12.63* 12.30* 13.42*   HEMOGLOBIN g/dL 9.9* 10.4* 11.4*   HEMATOCRIT % 31.6* 33.9* 36.6*   PLATELETS 10*3/mm3 328 359 405       Results from " last 7 days  Lab Units 04/08/17  1405 04/08/17  0444 04/07/17  0422  04/06/17  0316   SODIUM mmol/L  --  139 138  --  137   POTASSIUM mmol/L 4.0 3.6 3.5  < > 3.8   CHLORIDE mmol/L  --  105 101  --  104   TOTAL CO2 mmol/L  --  31.0 31.0  --  26.0   BUN mg/dL  --  18 17  --  12   CREATININE mg/dL  --  0.60 0.70  --  0.80   GLUCOSE mg/dL  --  176* 194*  --  211*   < > = values in this interval not displayed.       Results from last 7 days  Lab Units 04/05/17  0417   CHOLESTEROL mg/dL 108   TRIGLYCERIDES mg/dL 80   HDL CHOL mg/dL 30*   LDL CHOL mg/dL 62       Results from last 7 days  Lab Units 04/05/17  0417   TSH mIU/mL 0.656       Results from last 7 days  Lab Units 04/06/17  0316   BNP pg/mL 1188.0*       Results from last 7 days  Lab Units 04/06/17  1339 04/06/17  0747 04/06/17  0316  04/05/17  1349   PROTIME Seconds  --   --   --   --  15.6*   INR   --   --   --   --  1.42   APTT seconds 60.2* 50.4* 55.8*  < > 29.9   < > = values in this interval not displayed.    Results from last 7 days  Lab Units 04/04/17  1056 04/03/17  1851   CK TOTAL U/L 54  --    TROPONIN I ng/mL <0.006 <0.006       Intake/Output Summary (Last 24 hours) at 04/08/17 1755  Last data filed at 04/08/17 1600   Gross per 24 hour   Intake          1402.78 ml   Output              100 ml   Net          1302.78 ml     I personally viewed and interpreted the patient's EKG/Telemetry data      Current Medications:    amiodarone 200 mg Oral Q8H   Followed by      [START ON 4/13/2017] amiodarone 200 mg Oral Q12H   Followed by      [START ON 4/27/2017] amiodarone 200 mg Oral Daily   apixaban 5 mg Oral Q12H   aspirin 81 mg Oral Daily   atorvastatin 80 mg Oral Nightly   carvedilol 6.25 mg Oral Q12H   ceFAZolin 2 g Intravenous Q8H   fentaNYL 1 patch Transdermal Q72H   furosemide 40 mg Intravenous Q12H   heparin flush (porcine) 500 Units Intracatheter Q12H   insulin detemir 15 Units Subcutaneous Nightly   insulin lispro 0-9 Units Subcutaneous 4x Daily With  "Meals & Nightly   metroNIDAZOLE 500 mg Intravenous Q6H   pantoprazole 40 mg Oral Q AM   potassium chloride 20 mEq Oral Once   saccharomyces boulardii 250 mg Oral BID   sacubitril-valsartan 1 tablet Oral Q12H          Assessment and Plan:   1. No changes to make from cardiac standpoint. His neurologic status remains a major issue.    I, Pee Preciado MD, personally performed the services described as documented by the above named individual. I have made any necessary edits and it is both accurate and complete 4/8/2017  6:03 PM         Electronically signed by Pee Preciado MD at 4/8/2017  6:03 PM      Viet Ashton MD at 4/9/2017  8:06 AM  Version 1 of 1         Tomas Salvador    Subjective     CC: encephalopathy    History of Present Illness     Tomas Salvador is admitted with sepsis. He was transferred from [facility] due to concern for stroke. He was evaluated by the neuro-interventional service and no acute treatment was deemed prudent. Any stroke-like symptoms have resolved, and follow-up CT scans have failed to provide evidence of stroke. An ECHO shows a severely depressed EF, and he is anti-coagulated under the direction of cardiology.    His family again notes continued improve daily, though he remains encephalopathic this morning.      There have been no other changes in the patient's interval history since my last progress note of 4/8/17.      The following portions of the patient's history were reviewed and updated as appropriate: allergies, current medications, past family history, past medical history, past social history, past surgical history and problem list. I reviewed and confirmed the PFSH from this admission dated 4/4/17.    Review of Systems   Unable to perform ROS: Mental status change       Objective     /80  Pulse 85  Temp 98.4 °F (36.9 °C) (Axillary)   Resp 20  Ht 69\" (175.3 cm)  Wt 237 lb (108 kg)  SpO2 92%  BMI 35 kg/m2    Physical Exam   Neurological: He has normal " strength.   Psychiatric: His speech is slurred.        Neurologic Exam     Mental Status   Oriented to person.   Disoriented to place.   Disoriented to time.   Attention: decreased.   Speech: slurred   Level of consciousness: alert  Normal comprehension.     Cranial Nerves   Cranial nerves II through XII intact.     Motor Exam   Muscle bulk: normal  Overall muscle tone: normal    Strength   Strength 5/5 throughout.     Sensory Exam   Light touch normal.       Laboratory and radiological testing is notable for an essentially unremarkable CMP this morning.    Assessment/Plan         Tomas Salvador continues to suffer from encephalopathy, which is likely multifactorial, though he does seem significantly more alert this morning. I agree with his current treatment and am hopeful that he will continue to gradually improve cognitively. This was again discussed with his family.      As part of this visit I reviewed prior lab results and obtained additional history from the family which is incorporated in the HPI.     Electronically signed by Viet Ashton MD at 4/9/2017  8:08 AM      Ella Pope MD at 4/9/2017 11:39 AM  Version 1 of 1         Critical Care Note     LOS: 5 days   Patient Care Team:  Jose Navarro MD as PCP - General (Family Medicine)    Chief Complaint/Reason for visit: Sepsis, severe ICM      Subjective   61-year-old gentleman hospitalized at [facility] for one week with a scrotal cellulitis, status post debridement. He was brought to  for a possible stroke. Stroke workup showed old left frontal.    Interval History:   Having episodes of confusion requiring restraints. Dysphagia diet with some coughing with meals. No further nausea or vomiting. Cueto catheter removed and he is voiding. Ritalin perineal wound debrided yesterday and pulsatile high pressure lavage completed. Surgical drains were removed.    Review of Systems:    All systems were reviewed and negative  "except as noted in subjective.unable, patient not following commands    Medical history, surgical history, social history, family history reviewed    Objective     Intake/Output:    Intake/Output Summary (Last 24 hours) at 04/09/17 1144  Last data filed at 04/09/17 0800   Gross per 24 hour   Intake            850.6 ml   Output                0 ml   Net            850.6 ml       Nutrition: Mechanical soft, nectar thick, diabetic, cardiac    Infusions:       Respiratory: 3 liters       Telemetry:SR with PACs    Vital Signs  Blood pressure 124/81, pulse 104, temperature 97.9 °F (36.6 °C), temperature source Oral, resp. rate 18, height 69\" (175.3 cm), weight 237 lb (108 kg), SpO2 93 %.    Physical Exam:  General Appearance:   ill-appearing older gentleman    Head:  NC/AT   Eyes:          ROMI EOMI no jaundice   Ears:     Throat:  mucous membranes moist. Several lower teeth teeth missing anteriorly.    Neck:  supple, trachea midline    Back:      Lungs:    bilateral breath sounds with  Scattered rhonchi RUL, Symmetric chest excursion     Heart:   distant heart sounds, regular, PMI displaced laterally    Abdomen:    hypoactive bowel sounds, soft, nontender, no organomegaly  Scrotum with erythema and induration. Wound packed   Rectal:   Deferred   Extremities:  no pitting edema or cyanosis    Pulses:  weak pulses left foot, no palpable pulses right foot but extremity is warm to touch    Skin:  cool and dry    Lymph nodes:    Neurologic:  alert, confused, follows commands with all extremities. Generalized weakness       Results Review:     I reviewed the patient's new clinical results.     Results from last 7 days  Lab Units 04/09/17  0429 04/08/17  1405 04/08/17  0444 04/07/17  0422  04/05/17  0417 04/04/17  0115   SODIUM mmol/L 137  --  139 138  < > 136 136   POTASSIUM mmol/L 3.9 4.0 3.6 3.5  < > 3.9 4.0   CHLORIDE mmol/L 100  --  105 101  < > 102 105   TOTAL CO2 mmol/L 31.0  --  31.0 31.0  < > 25.0 24.8   BUN mg/dL 17  " --  18 17  < > 6* 6*   CREATININE mg/dL 0.70  --  0.60 0.70  < > 0.60 0.53   CALCIUM mg/dL 9.0  --  9.1 9.1  < > 9.3 8.3   BILIRUBIN mg/dL 0.4  --   --   --   --  0.3 0.2   ALK PHOS U/L 71  --   --   --   --  65 69   ALT (SGPT) U/L 25  --   --   --   --  12 12   AST (SGOT) U/L 67*  --   --   --   --  22 24   GLUCOSE mg/dL 196*  --  176* 194*  < > 170* 130*   < > = values in this interval not displayed.    Results from last 7 days  Lab Units 04/09/17  0429 04/07/17  0422 04/06/17  0316   WBC 10*3/mm3 12.63* 12.63* 12.30*   HEMOGLOBIN g/dL 10.6* 9.9* 10.4*   HEMATOCRIT % 34.2* 31.6* 33.9*   PLATELETS 10*3/mm3 372 328 359       Results from last 7 days  Lab Units 04/05/17  1349   PH, ARTERIAL pH units 7.408   PO2 ART mm Hg 78.0*   PCO2, ARTERIAL mm Hg 38.5   HCO3 ART mmol/L 24.3     Lab Results   Component Value Date    BLOODCX No growth at 5 days 03/30/2017     Lab Results   Component Value Date    URINECX >100,000 CFU/mL Yeast isolated (A) 04/04/2017   Scrotal wound E. coli    I reviewed the patient's new imaging including images and reports.           Impression:         1. Cardiomegaly, pulmonary venous hypertension, and left basilar  effusion/atelectasis, unchanged.  2. Interval clearing of right basilar interstitial disease. No new chest  pathology is seen.     DICTATED:     04/08/2017                   Interpretation Summary      · Left ventricular function is severely decreased. Estimated EF appears to be in the range of 10%.  · The left ventricular cavity is severely dilated.  · Global left ventricular wall motion appears abnormal. There is severe diffuse hypokinesis.  · Left ventricular diastolic dysfunction.  · There is no evidence of a left ventricular thrombus present.  · Moderately reduced right ventricular systolic function noted.  · Left atrial cavity size is mildly dilated.  · Mild mitral valve regurgitation is present                                               Interpretation Summary Carotid duplex      · Right internal carotid artery stenosis of 0-49%.  · Left internal carotid artery stenosis of 0-49%. Velocities not performed, however no significant plaque seen in the left ICA           All medications reviewed.     amiodarone 200 mg Oral Q8H   Followed by      [START ON 4/13/2017] amiodarone 200 mg Oral Q12H   Followed by      [START ON 4/27/2017] amiodarone 200 mg Oral Daily   apixaban 5 mg Oral Q12H   aspirin 81 mg Oral Daily   atorvastatin 80 mg Oral Nightly   carvedilol 6.25 mg Oral Q12H   ceFAZolin 2 g Intravenous Q8H   fentaNYL 1 patch Transdermal Q72H   furosemide 40 mg Intravenous Q12H   heparin flush (porcine) 500 Units Intracatheter Q12H   insulin detemir 15 Units Subcutaneous Nightly   insulin lispro 0-9 Units Subcutaneous 4x Daily With Meals & Nightly   metroNIDAZOLE 500 mg Intravenous Q6H   pantoprazole 40 mg Oral Q AM   potassium chloride 20 mEq Oral Once   saccharomyces boulardii 250 mg Oral BID   sacubitril-valsartan 1 tablet Oral Q12H         Assessment/Plan     Principal Problem:    Sepsis  Active Problems:    E. coli UTI (urinary tract infection)    Yeast UTI    Encephalopathy acute    Cellulitis, scrotum, perineum    Diabetes mellitus, type 2    ICM EF 10%       NSTEMI 5/15 occl LAD, 99% circ, occl SVG to Dz, patent SVG toOM1, patent LIMA to LAD.   Single chamber AICD 12/15, CABG 3/2005    Chronic respiratory failure with hypoxia    Urinary retention    Epididymo-orchitis    61-year-old gentleman with a severe ischemic cardiomyopathy, ejection fraction of 10%, diabetes mellitus type II, who developed a scrotal abscess requiring I&D in [place] on 3/31. Cultures were positive for Escherichia coli. He was in the [facility] for approximately one week and developed some left-sided weakness and altered mentation. He was transported to [facility] for stroke. No acute stroke was found on CT of the head or CT perfusion. No intervention was done. Confusion has improved and focal weakness  resolved.  He developed atrial fibrillation with a rapid ventricular response and was placed on amiodarone drip. He converted to SR.  Echocardiogram confirms a dilated left ventricular cavity that with severe dysfunction LVEF 10%. No comment as to whether thrombus is seen.  He was placed on a heparin drip for 24 hours and transition to oral Eliquis. He developed some congestive heart failure that responded promptly to some IV Lasix. His Coreg was restarted as well as Entresto.  Infectious disease evaluated and changed antibiotics to cefazolin and Flagyl.  Urine culture is growing greater than 100,000 yeast, on admission. Infectious disease notified. Patient is on amiodarone for atrial fibrillation so unlikely to tolerate Diflucan. Colbert DCed.   Blood sugars are running 200-360 on sliding scale insulin.       PLAN:   Cefazolin, Flagyl  ID notified about yeast in urine, colbert removed  Aspirin, Plavix, statin  Coreg, Entresto  Daily furosemide Q12H started  Amiodarone PO  Eliquis  PT for wound care   Sliding scale insulin,  Levemir; increase dose  Nebulized BDs  Replace electrolytes as needed  Fentanyl patch for pain plus Norco for break thru  Floor soon    VTE Prophylaxis:Eliquis    Stress Ulcer Prophylaxis:Protonix    Ella Pope MD  04/09/17  11:44 AM      Time: 30min  I personally provided care to this critically ill patient as documented above.  Critical care time does not include time spent on separately billed procedures.  Non of my critical care time was concurrent with other critical care providers.      Electronically signed by Ella Pope MD at 4/9/2017 11:56 AM      Jarocho Martin MD at 4/10/2017  8:26 AM  Version 2 of 2           [facility] at [facility]   Inpatient Progress Note       LOS: 6 days   Patient Care Team:  Jose Navarro MD as PCP - General (Family Medicine)    Chief Complaint:  Follow-up for CVA and atrial fibrillation    Subjective     Interval History:  "  Patient awake. Follows command. But not oriented to place or time.    Review of Systems:   Pertinent positives noted in history, exam, and assessment. Otherwise reviewed and negative.      Objective     Vitals:  Blood pressure 115/69, pulse 70, temperature 97.8 °F (36.6 °C), temperature source Oral, resp. rate 18, height 69\" (175.3 cm), weight 237 lb (108 kg), SpO2 91 %.     Intake/Output Summary (Last 24 hours) at 04/10/17 0826  Last data filed at 04/10/17 0600   Gross per 24 hour   Intake            784.5 ml   Output                0 ml   Net            784.5 ml     Physical Exam   Constitutional: He appears well-developed and well-nourished.   Neck: No JVD present. No tracheal deviation present.   Cardiovascular: Normal rate, regular rhythm and normal heart sounds.  Exam reveals no friction rub.    No murmur heard.  Pulmonary/Chest: Effort normal. No respiratory distress.   Bilateral rhonchi   Abdominal: Soft. Bowel sounds are normal. There is no tenderness.   Musculoskeletal: He exhibits no edema or deformity.   Neurological: He is alert.   Not oriented to place or time          Results Review:     I reviewed the patient's new clinical results.      Results from last 7 days  Lab Units 04/10/17  0458   WBC 10*3/mm3 9.97   HEMOGLOBIN g/dL 10.0*   HEMATOCRIT % 32.3*   PLATELETS 10*3/mm3 337       Results from last 7 days  Lab Units 04/10/17  0458   SODIUM mmol/L 140   POTASSIUM mmol/L 3.7   CHLORIDE mmol/L 102   TOTAL CO2 mmol/L 32.0*   BUN mg/dL 20   CREATININE mg/dL 0.80   CALCIUM mg/dL 8.8   BILIRUBIN mg/dL 0.3   ALK PHOS U/L 66   ALT (SGPT) U/L 11   AST (SGOT) U/L 34*   GLUCOSE mg/dL 91       Results from last 7 days  Lab Units 04/10/17  0458   SODIUM mmol/L 140   POTASSIUM mmol/L 3.7   CHLORIDE mmol/L 102   TOTAL CO2 mmol/L 32.0*   BUN mg/dL 20   CREATININE mg/dL 0.80   GLUCOSE mg/dL 91   CALCIUM mg/dL 8.8       Results from last 7 days  Lab Units 04/05/17  1349   INR  1.42       0  Lab Value Date/Time "   TROPONINI <0.006 04/04/2017 1056   TROPONINI <0.006 04/03/2017 1851   TROPONINI 0.018 03/31/2017 0542   TROPONINI 0.024 03/30/2017 1515   TROPONINI 3.295 (C) 05/07/2015 2125   TROPONINI 3.801 (C) 05/07/2015 1549   TROPONINI 3.825 (C) 05/07/2015 1133       Results from last 7 days  Lab Units 04/05/17  0417   TSH mIU/mL 0.656       Results from last 7 days  Lab Units 04/05/17  0417   CHOLESTEROL mg/dL 108   TRIGLYCERIDES mg/dL 80   HDL CHOL mg/dL 30*         ECHO:  · Left ventricular function is severely decreased. Estimated EF appears to be in the range of 10%.  · The left ventricular cavity is severely dilated.  · Global left ventricular wall motion appears abnormal. There is severe diffuse hypokinesis.  · Left ventricular diastolic dysfunction.  · There is no evidence of a left ventricular thrombus present.  · Moderately reduced right ventricular systolic function noted.  · Left atrial cavity size is mildly dilated.  · Mild mitral valve regurgitation is present    Tele:  SR with intermittent breakthrough atrial fibrillation    Assessment/Plan     Principal Problem:    Sepsis  Active Problems:    Ischemic cardiomyopathy    ICD (implantable cardioverter-defibrillator) in place, implantation 2015.    Diabetes education, encounter for    Chronic respiratory failure with hypoxia    Urinary retention    E. coli UTI (urinary tract infection)    Epididymo-orchitis    Encephalopathy acute    Cellulitis    Yeast UTI      1. Acute CVA. not a TPA candidate and area of possible ischemia not felt to be amenable to intervention.  2. Ischemic cardiomyopathy.                - EF 10 %, no evidence of LV thrombus.          - On Coreg  3. Atrial fibrillation  - new diagnosis. CHADSVASC=6   -converted back to SR, currently on amiodarone   -will need lifelong anticoagulation, Eliquis has been started  4. CAD, stable  5. Dyslipidemia  6. Hypertension now with hypotension overnight with SBP in the 70 and 80's most of the night.    Plan:  "   Dc  IV lasix 40 BID given over the weekend. No current diuretics ordered.  Continue amiodarone and Eliquis    TRESA Mojica  04/10/17  8:26 AM      IJarocho have reviewed the note in full and agree with all aspects of the above including physical exam, assessment, labs and plan with changes made accordingly.     Jarocho Martin MD  04/10/17  8:41 AM            Please note that portions of this note may have been completed with a voice recognition program. Efforts were made to edit the dictations, but occasionally words are mistranscribed.       Electronically signed by Jarocho Martin MD at 4/10/2017  8:41 AM      TRESA Mojica at 4/10/2017  8:26 AM  Version 1 of 2           [facility]   Inpatient Progress Note       LOS: 6 days   Patient Care Team:  Jose Navarro MD as PCP - General (Family Medicine)    Chief Complaint:  Follow-up for CVA and atrial fibrillation    Subjective     Interval History:   Patient awake. Follows command. But not oriented to place or time.    Review of Systems:   Pertinent positives noted in history, exam, and assessment. Otherwise reviewed and negative.      Objective     Vitals:  Blood pressure 115/69, pulse 70, temperature 97.8 °F (36.6 °C), temperature source Oral, resp. rate 18, height 69\" (175.3 cm), weight 237 lb (108 kg), SpO2 91 %.     Intake/Output Summary (Last 24 hours) at 04/10/17 0826  Last data filed at 04/10/17 0600   Gross per 24 hour   Intake            784.5 ml   Output                0 ml   Net            784.5 ml     Physical Exam   Constitutional: He appears well-developed and well-nourished.   Neck: No JVD present. No tracheal deviation present.   Cardiovascular: Normal rate, regular rhythm and normal heart sounds.  Exam reveals no friction rub.    No murmur heard.  Pulmonary/Chest: Effort normal. No respiratory distress.   Bilateral rhonchi   Abdominal: Soft. Bowel sounds are normal. There is no tenderness. "   Musculoskeletal: He exhibits no edema or deformity.   Neurological: He is alert.   Not oriented to place or time          Results Review:     I reviewed the patient's new clinical results.      Results from last 7 days  Lab Units 04/10/17  0458   WBC 10*3/mm3 9.97   HEMOGLOBIN g/dL 10.0*   HEMATOCRIT % 32.3*   PLATELETS 10*3/mm3 337       Results from last 7 days  Lab Units 04/10/17  0458   SODIUM mmol/L 140   POTASSIUM mmol/L 3.7   CHLORIDE mmol/L 102   TOTAL CO2 mmol/L 32.0*   BUN mg/dL 20   CREATININE mg/dL 0.80   CALCIUM mg/dL 8.8   BILIRUBIN mg/dL 0.3   ALK PHOS U/L 66   ALT (SGPT) U/L 11   AST (SGOT) U/L 34*   GLUCOSE mg/dL 91       Results from last 7 days  Lab Units 04/10/17  0458   SODIUM mmol/L 140   POTASSIUM mmol/L 3.7   CHLORIDE mmol/L 102   TOTAL CO2 mmol/L 32.0*   BUN mg/dL 20   CREATININE mg/dL 0.80   GLUCOSE mg/dL 91   CALCIUM mg/dL 8.8       Results from last 7 days  Lab Units 04/05/17  1349   INR  1.42       0  Lab Value Date/Time   TROPONINI <0.006 04/04/2017 1056   TROPONINI <0.006 04/03/2017 1851   TROPONINI 0.018 03/31/2017 0542   TROPONINI 0.024 03/30/2017 1515   TROPONINI 3.295 (C) 05/07/2015 2125   TROPONINI 3.801 (C) 05/07/2015 1549   TROPONINI 3.825 (C) 05/07/2015 1133       Results from last 7 days  Lab Units 04/05/17  0417   TSH mIU/mL 0.656       Results from last 7 days  Lab Units 04/05/17  0417   CHOLESTEROL mg/dL 108   TRIGLYCERIDES mg/dL 80   HDL CHOL mg/dL 30*         ECHO:  · Left ventricular function is severely decreased. Estimated EF appears to be in the range of 10%.  · The left ventricular cavity is severely dilated.  · Global left ventricular wall motion appears abnormal. There is severe diffuse hypokinesis.  · Left ventricular diastolic dysfunction.  · There is no evidence of a left ventricular thrombus present.  · Moderately reduced right ventricular systolic function noted.  · Left atrial cavity size is mildly dilated.  · Mild mitral valve regurgitation is  present    Tele:  SR with intermittent breakthrough atrial fibrillation    Assessment/Plan     Principal Problem:    Sepsis  Active Problems:    Ischemic cardiomyopathy    ICD (implantable cardioverter-defibrillator) in place, implantation 2015.    Diabetes education, encounter for    Chronic respiratory failure with hypoxia    Urinary retention    E. coli UTI (urinary tract infection)    Epididymo-orchitis    Encephalopathy acute    Cellulitis    Yeast UTI      7. Acute CVA. not a TPA candidate and area of possible ischemia not felt to be amenable to intervention.  8. Ischemic cardiomyopathy.                - EF 10 %, no evidence of LV thrombus.          - On Coreg  9. Atrial fibrillation  - new diagnosis. CHADSVASC=6   -converted back to SR, currently on amiodarone   -will need lifelong anticoagulation, Eliquis has been started  10. CAD, stable  11. Dyslipidemia  12. Hypertension now with hypotension overnight with SBP in the 70 and 80's most of the night.    Plan:   ? Dc entresto. IV lasix 40 BID given over the weekend. No current diuretics ordered.    TRESA Mojica  04/10/17  8:26 AM            Please note that portions of this note may have been completed with a voice recognition program. Efforts were made to edit the dictations, but occasionally words are mistranscribed.       Electronically signed by TRESA Mojica at 4/10/2017  8:34 AM      TRESA Aj at 4/10/2017  8:50 AM  Version 1 of 1         [facility] Progress Note    Admission Date: 4/4/2017    Tomas Salvador  1955  [id number]    Date: 4/10/2017    Meds:    IV Anti-Infectives     Ordered     Dose/Rate Route Frequency Start Stop    04/04/17 1723  metroNIDAZOLE (FLAGYL) IVPB 500 mg     Ordering Provider:  Kike Leon MD    500 mg  100 mL/hr over 60 Minutes Intravenous Every 6 Hours 04/04/17 1800      04/04/17 1552  ceFAZolin in dextrose (ANCEF) IVPB solution 2 g     Ordering Provider:  TRESA Baca     "2 g  over 30 Minutes Intravenous Every 8 Hours 17 1600            CC:  Delisa's gangrene    SUBJECTIVE:  17:Patient is a 61 y.o. 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 He has a history of underlying diabetes mellitus and severe systolic congestive heart failure. He presented to [facility] on 3/30 with scrotal cellulitis/gangrene. He underwent debridement by urology at [facility] on 3/31 and was treated with Merrem/clindamycin/vancomycin. He developed left facial droop and left sided paresis, prompting a transfer to [facility] for evaluation and therapy of acute stroke. His head CT angiogram was degraded by motion artifact but per Dr. Morrison there was suggestion of right inferior temporal lobe\" opacification\" consistent with a possible stroke. His left facial droop and left-sided paresis has improved today. He is encephalopathic and is unable to provide any reliable history. His wife thinks that he may have had some fevers prior to his admission on 3/30.    17: afebrile. No hx per patient secondary to encephalopathy.  Afebrile, restless per nsg staff.  Oliguric UOP.  No n/v/d.  No rashes.  17: More alert and less restless today.  Still non-responsive to questions and does not follow simple commands.  Still with left-sided weakness.  Afebrile.  Still with oliguric UOP.  No n/v/d, no rashes, per nsg staff notes.   17:  Alert today.  No fever,chills,sweats.  Wife at bedside with ?s   4/10/17:  Wants to go home.  Wound care per PT.  No n/v/d.         PE:   Vital Signs  Temp (24hrs), Av.9 °F (36.6 °C), Min:97.5 °F (36.4 °C), Max:98.5 °F (36.9 °C)    Temp  Min: 97.5 °F (36.4 °C)  Max: 98.5 °F (36.9 °C)  BP  Min: 73/51  Max: 124/81  Pulse  Min: 56  Max: 104  Resp  Min: 14  Max: 22  SpO2  Min: 91 %  Max: 100 %    GENERAL: He is awake but responsive  to question He is in no acute distress.  HEENT: Normocephalic, atraumatic. PERRL. " EOMI. No conjunctival injection. No icterus. Oropharynx clear without evidence of thrush or exudate.   HEART: RRR; No murmur, rubs, gallops.   LUNGS: Clear to auscultation bilaterally without wheezing, rales, rhonchi. Normal respiratory effort. Nonlabored. No dullness.diminished  ABDOMEN: Soft, nontender, nondistended. Positive bowel sounds. No rebound or guarding. NO mass or HSM.  EXT: No cyanosis, clubbing or edema. No cord.  : A Cueto catheter is in place.  He has decreased scrotal erythema/induration.dressing in place.  There is marked improvement in the previous wound slough.  He still has some Penrose drains in place   MSK: FROM without joint effusions noted arms/legs.   SKIN: Warm and dry without cutaneous eruptions on Inspection/palpation.   NEURO: He is more alert, but confused and is not following simple commands.  PSYCHIATRIC: He is encephalopathic and cannot provide an accurate history.  Right PICC without redness   Laboratory Data      Results from last 7 days  Lab Units 04/10/17  0458 04/09/17  0429 04/07/17  0422   WBC 10*3/mm3 9.97 12.63* 12.63*   HEMOGLOBIN g/dL 10.0* 10.6* 9.9*   HEMATOCRIT % 32.3* 34.2* 31.6*   PLATELETS 10*3/mm3 337 372 328       Results from last 7 days  Lab Units 04/10/17  0458   SODIUM mmol/L 140   POTASSIUM mmol/L 3.7   CHLORIDE mmol/L 102   TOTAL CO2 mmol/L 32.0*   BUN mg/dL 20   CREATININE mg/dL 0.80   GLUCOSE mg/dL 91   CALCIUM mg/dL 8.8       Results from last 7 days  Lab Units 04/10/17  0458   ALK PHOS U/L 66   BILIRUBIN mg/dL 0.3   ALT (SGPT) U/L 11   AST (SGOT) U/L 34*           Results from last 7 days  Lab Units 04/10/17  0458   CRP mg/dL 2.98*           Results from last 7 days  Lab Units 04/04/17  1056   CK TOTAL U/L 54         Estimated Creatinine Clearance: 115.9 mL/min (by C-G formula based on Cr of 0.8).    Microbiology:  Blood Culture   Date Value Ref Range Status   03/30/2017 No growth at 5 days  Final   03/30/2017 No growth at 5 days  Final           Urine  Culture   Date Value Ref Range Status   04/04/2017 No growth  Preliminary   03/31/2017 No growth  Final   03/30/2017 >100,000 CFU/mL Escherichia coli (A)  Final      scrotal cx x 2 cxs (3/31) E. coli  Cefazolin sens.    4/4/17:  Urine culture:  >100K cfu Yeast    4/7:  UA 13-20 WBCs, large amount of budding yeast/ urine culture: pending     Radiology:  Imaging Results (last 24 hours)     Procedure Component Value Units Date/Time    CT Head Without Contrast [[id number]] Collected:  04/05/17 1241     Updated:  04/05/17 1241    Narrative:       EXAMINATION: CT HEAD WO CONTRAST-, CT ANGIOGRAM HEAD W WO CONTRAST-  04/04/2017     INDICATION: CVA; altered mental status, bilateral upper extremities  weakness.     TECHNIQUE: Contiguous axial 5 mm sections through the brain parenchyma  without intravenous contrast.  Multislice helical CT angiogram of the head before and after intravenous  contrast.     Two dimensional and three dimensional reformatted images were provided  for this exam.  NASCET criteria are utilized for calculation of stenosis.     COMPARISON: NONE     FINDINGS:   Brain:  The midline structures are central. There is no midline shift. Areas of  decreased attenuation are present in the left frontal periventricular  white matter. An old infarct is present in the left frontal lobe. The  structures of the posterior fossa are grossly normal. The images are  somewhat compromised by motion artifact.     The bony calvarium is intact.     Brain CTA:  The brain CTA images are extremely compromised by patient motion.  Right:  Distal portions of the right internal carotid artery are normal in  course and caliber. The right middle cerebral artery is grossly  unremarkable. The anterior cerebral artery cannot be evaluated secondary  to motion artifact.     Left:  Distal portions of the left internal carotid artery are normal in course  and caliber. The left middle cerebral artery is normal. The anterior  cerebral artery  cannot be well evaluated secondary to motion artifact.     Posterior circulation:  The distal vertebral arteries, basilar artery, and posterior cerebral  arteries are normal in course and caliber.       Impression:       1. Both exams compromised by motion artifact.  2. Grossly no acute intracranial process.  3. 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  4. Normal middle cerebral arteries and posterior circulation.  5. The anterior cerebral arteries are not well evaluated.     D:  04/05/2017  E:  04/05/2017          CT Angiogram Head With & Without Contrast [[id number]] Collected:  04/05/17 1241     Updated:  04/05/17 1241    Narrative:       EXAMINATION: CT HEAD WO CONTRAST-, CT ANGIOGRAM HEAD W WO CONTRAST-  04/04/2017     INDICATION: CVA; altered mental status, bilateral upper extremities  weakness.     TECHNIQUE: Contiguous axial 5 mm sections through the brain parenchyma  without intravenous contrast.  Multislice helical CT angiogram of the head before and after intravenous  contrast.     Two dimensional and three dimensional reformatted images were provided  for this exam.  NASCET criteria are utilized for calculation of stenosis.     COMPARISON: NONE     FINDINGS:   Brain:  The midline structures are central. There is no midline shift. Areas of  decreased attenuation are present in the left frontal periventricular  white matter. An old infarct is present in the left frontal lobe. The  structures of the posterior fossa are grossly normal. The images are  somewhat compromised by motion artifact.     The bony calvarium is intact.     Brain CTA:  The brain CTA images are extremely compromised by patient motion.  Right:  Distal portions of the right internal carotid artery are normal in  course and caliber. The right middle cerebral artery is grossly  unremarkable. The anterior cerebral artery cannot be evaluated secondary  to motion artifact.     Left:  Distal portions of the left internal carotid artery are normal in course  and  caliber. The left middle cerebral artery is normal. The anterior  cerebral artery cannot be well evaluated secondary to motion artifact.     Posterior circulation:  The distal vertebral arteries, basilar artery, and posterior cerebral  arteries are normal in course and caliber.       Impression:       1. Both exams compromised by motion artifact.  2. Grossly no acute intracranial process.  3. 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  4. Normal middle cerebral arteries and posterior circulation.  5. The anterior cerebral arteries are not well evaluated.     D:  04/05/2017  E:  04/05/2017          XR Chest 1 View [[id number]] Collected:  04/05/17 1406     Updated:  04/05/17 1423    Narrative:       EXAMINATION: XR CHEST 1 VIEW-04/04/2017:      INDICATION: Sepsis, cardiomyopathy; R13.10-Dysphagia, unspecified.      COMPARISON: NONE.     FINDINGS:   1.  There is cardiomegaly with perihilar vascular congestion.  2.  Bibasilar consolidative airspace opacities are noted, worse on the  left than right.  3.  ICD pacemaker defibrillator is in place from the left. The upper  lung zones are clear.           Impression:       1.  Cardiomegaly is noted. There is airspace opacity left base with  consolidation. There is congestive hilar and mid lung vascular  abnormalities.  2.  Compared to previous studies of 12/17/2015, it appears that the  central congestive vascular edema is new. The density in the left lower  lobe, retrocardiac area and the left base effusion are new. Cardiomegaly  is relatively stable.     D:  04/05/2017  E:  04/05/2017     This report was finalized on 4/5/2017 2:21 PM by Dr. Scottie Bowling MD.       CT Head Without Contrast [[id number]] Collected:  04/05/17 1648     Updated:  04/05/17 1648    Narrative:       EXAMINATION: CT HEAD WITHOUT CONTRAST-04/05/2017:      INDICATION: CVA; R13.10-Dysphagia, unspecified; Z74.09-Other reduced  mobility; Z74.09-Other reduced mobility.         TECHNIQUE: CT scan of the head was performed at  5 mm intervals. No  intravenous contrast was utilized.     COMPARISON: 04/04/2017.     FINDINGS: There is a small old left frontal infarct. There is no  intracranial mass, hemorrhage, midline shift or extra-axial fluid  collection.       Impression:       Old left frontal infarct. There are no acute findings.     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 He is significantly improved.  2. Acute right hemispheric cerebral vascular accident-with left sided facial and left-sided weakness. His head CT angiogram here reveals subtle right temporal abnormalities per Dr. Morrison. He has clinically improved.  3. Acute toxic metabolic encephalopathy-secondary to sepsis and cerebral vascular accident  4. 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 Severe systolic congestive heart failure-with an ejection fraction of less than 20% on echocardiogram performed on 3/8/17  6. Escherichia coli urinary tract infection  7. Sepsis-secondary to Delisa's gangrene  8. Leukocytosis/neutrophilia-secondary to scrotal gangrene/cellulitis, worse today.  9.  LLL pulmonary infiltrate  10. Candiduria-he has a relative contraindication for fluconazole therapy since he is on amiodarone.    PLAN/RECOMMENDATIONS:   1. Cefazolin 2 g IV every 8 hours  2. Flagyl 500 mg IV every 8 hours  3. PT wound care with debridement and pulse lavage         Kike Leon MD saw and examined patient, verified hx and PE, read all radiographic studies, reviewed labs and micro data, and formulated dx, plan for treatment and all medical decision making.            TRESA Aj  4/10/2017  8:50 AM           Electronically signed by Amarilys Carranza  APRN at 4/10/2017  9:11 AM        Consult Notes (last 72 hours) (Notes from 4/7/2017  9:29 AM through 4/10/2017  9:29 AM)     No notes of this type exist for this encounter.

## 2017-04-10 NOTE — PROGRESS NOTES
Neurology       Patient Care Team:  Jose Navarro MD as PCP - General (Family Medicine)    Chief complaint: Confusion    History:  The doctor's blood pressure significantly is now on phenylephrine.    He's been confused.    He's been found to have the fungal infection in his bladder.    He still growing Escherichia coli from his scrotum.      Past Medical History:   Diagnosis Date   • CAD (coronary artery disease)    • Chronic back pain    • Chronic respiratory failure with hypoxia     Night time only at 2 liters/min   • Diabetes mellitus, type 2    • Essential hypertension    • Hyperlipidemia    • Ischemic cardiomyopathy    • NSTEMI (non-ST elevated myocardial infarction) 2015   • Obesity    • PVD (peripheral vascular disease)    • Systolic CHF     EF <20%  3/8/17   • Urinary retention        Vital Signs   Vitals:    04/10/17 1145 04/10/17 1200 04/10/17 1215 04/10/17 1230   BP: (!) 79/56 (!) 85/57 101/62 (!) 89/57   BP Location:  Left arm     Patient Position:  Lying     Pulse: 57 58 77 64   Resp:  18     Temp:  97.9 °F (36.6 °C)     TempSrc:  Oral     SpO2: 99% 99% 100% 99%   Weight:  227 lb 1.2 oz (103 kg)     Height:           Physical Exam:   General: Calm on Precedex.                Neuro: Awake and alert.  Mildly confused.  Follows commands.    His  are equal.    His voice is soft and articulate.        Results Review:  Reviewed    Results from last 7 days  Lab Units 04/10/17  0458   WBC 10*3/mm3 9.97   HEMOGLOBIN g/dL 10.0*   HEMATOCRIT % 32.3*   PLATELETS 10*3/mm3 337       Results from last 7 days  Lab Units 04/10/17  0458 04/09/17  0429 04/08/17  1405 04/08/17  0444  04/05/17  0417   SODIUM mmol/L 140 137  --  139  < > 136   POTASSIUM mmol/L 3.7 3.9 4.0 3.6  < > 3.9   CHLORIDE mmol/L 102 100  --  105  < > 102   TOTAL CO2 mmol/L 32.0* 31.0  --  31.0  < > 25.0   BUN mg/dL 20 17  --  18  < > 6*   CREATININE mg/dL 0.80 0.70  --  0.60  < > 0.60   CALCIUM mg/dL 8.8 9.0  --  9.1  < > 9.3   BILIRUBIN mg/dL  0.3 0.4  --   --   --  0.3   ALK PHOS U/L 66 71  --   --   --  65   ALT (SGPT) U/L 11 25  --   --   --  12   AST (SGOT) U/L 34* 67*  --   --   --  22   GLUCOSE mg/dL 91 196*  --  176*  < > 170*   < > = values in this interval not displayed.  Imaging Results (last 24 hours)     Procedure Component Value Units Date/Time    XR Chest 1 View [[id number]] Collected:  04/09/17 1934     Updated:  04/10/17 0059    Narrative:          EXAMINATION: XR CHEST, SINGLE VIEW - 04/09/2017     INDICATION:  R13.10-Dysphagia, unspecified; Z74.09-Other reduced  mobility;  R41.841-Cognitive communication deficit.     COMPARISON: 4/08/2017 portable chest radiograph.      FINDINGS: Sternotomy wires and a left-sided single-lead ICD are noted.  Right upper extremity PICC line is again seen with its tip in the distal  SVC. The heart is mildly enlarged. The vasculature appears normal. Mild  patchy opacity of the left base is stable, presumably mild atelectasis  and effusion. No new pulmonary parenchymal disease is seen elsewhere.             Impression:       Stable mild left basilar atelectasis and effusion.     DICTATED:     04/09/2017  EDITED:         04/09/2017     This report was finalized on 4/10/2017 12:57 AM by DR. Morgan Macedo MD.       XR Chest 1 View [[id number]] Collected:  04/10/17 1114     Updated:  04/10/17 1114    Narrative:       EXAMINATION: XR CHEST 1 VW-      INDICATION: Pos aspiration; R13.10-Dysphagia, unspecified; Z74.09-Other  reduced mobility; R41.841-Cognitive communication deficit.      COMPARISON: None.     FINDINGS: Portable chest reveals patient to be status post median  sternotomy.  The heart is enlarged.  There is a small left pleural  effusion. Degenerative change is seen within the spine.  There is  increased pulmonary vascularity bilaterally.           Impression:       Stable chest with increased pulmonary vascularity  bilaterally. The heart is enlarged with small left pleural effusion.     D:  04/10/2017  E:   04/10/2017       XR Abdomen KUB [[id number]] Collected:  04/10/17 1224     Updated:  04/10/17 1225    Narrative:       EXAMINATION: XR ABDOMEN KUB-      INDICATION: Keofeed placement - in and ready for x-ray at 11:45;  R13.10-Dysphagia, unspecified; Z74.09-Other reduced mobility;  R41.841-Cognitive communication deficit.      COMPARISON: None.     FINDINGS: Portable KUB reveals the tip of the feeding tube seen in the  distal stomach. There is a fold of the feeding tube seen in the proximal  stomach. Continued advancement is recommended for postpyloric placement.   There is dilated gaseous distended loops of small bowel within the  abdomen.       Impression:       Feeding tube tip seen in the distal stomach in which  continued advancement is recommended for postpyloric placement.     D:  04/10/2017  E:  04/10/2017                Assessment:  Encephalopathy likely secondary to hypotension plus or minus sepsis    Plan:  per pulmonology.  No evidence of primary neurologic issue    Comment:  Guarded prognosis in a critically ill individual         I discussed the patients findings and my recommendations with patient and family    Luis Alberto Phelan MD  04/10/17  1:16 PM

## 2017-04-10 NOTE — PROGRESS NOTES
Acute Care - Wound/Debridement Treatment Note  [facility]     Patient Name: Tomas Salvador  : 1955  MRN: [id number]  Today's Date: 4/10/2017  Onset of Illness/Injury or Date of Surgery Date: 17   Date of Referral to PT: 17   Referring Physician: Dr. Morrison       Admit Date: 2017    Visit Dx:    ICD-10-CM ICD-9-CM   1. Dysphagia, unspecified type R13.10 787.20   2. Impaired functional mobility, balance, gait, and endurance Z74.09 V49.89   3. Impaired mobility and ADLs Z74.09 799.89   4. Cognitive communication deficit R41.841 799.52       Patient Active Problem List   Diagnosis   • Coronary disease   • Ischemic cardiomyopathy   • Peripheral vascular disease   • ICD (implantable cardioverter-defibrillator) in place, implantation .   • Dyslipidemia, on atorvastain.    • Sepsis   • Diabetes education, encounter for   • Chronic respiratory failure with hypoxia   • Urinary retention   • Hyperlipidemia   • Essential hypertension   • E. coli UTI (urinary tract infection)   • Epididymo-orchitis   • Encephalopathy acute   • Cellulitis   • Yeast UTI               LDA Wound       04/10/17 0940          Incision 17 1056 other (see comments) scrotum    Incision - Properties Group Placement Date: 17  - Placement Time: 1056 Side: other (see comments)  -JW Location: scrotum  -JW    Incision WDL WDL  -MF      Dressing Appearance moist drainage  -MF      Appearance drainage;open areas   moderate slough at base / distal portion of wound   -MF      Drainage Characteristics/Odor yellow;serous  -MF      Drainage Amount small  -MF      Wound Cleaning irrigated with;sterile normal saline  -MF      Wound Interventions pulsatile high pressure lavage   500ml nsaline with fan and tunnel tip  -MF      Dressing open to air  -MF      Packing other (see comments)   sorbact to pack wound / tunnels  -MF        User Key  (r) = Recorded By, (t) = Taken By, (c) = Cosigned By    Initials Name Provider  Type     Pee Roldan, PT Physical Therapist    JAY JAY Silveira RN Registered Nurse            WOUND DEBRIDEMENT  Debridement Site 1  Location- Site 1: perineal wound  Selective Debridement- Site 1: Wound Surface <20cmsq (~10cm2 area total debrided)  Instruments- Site 1: tweezers, scissors  Excised Tissue Description- Site 1: minimum, slough  Bleeding- Site 1: none                  Adult Rehabilitation Note       04/10/17 0940 04/09/17 1411 04/09/17 1410    Rehab Assessment/Intervention    Discipline physical therapist  -MF physical therapist  -DR occupational therapist  -TA    Document Type therapy note (daily note)  -MF therapy note (daily note)  - therapy note (daily note)  -TA    Subjective Information agree to therapy;complains of;weakness;fatigue;pain  -VALERI agree to therapy;no complaints  - agree to therapy;no complaints  -TA    Patient Effort, Rehab Treatment  good  -DR good  -TA    Symptoms Noted During/After Treatment  other (see comments)   increased agitation with activity; resistant to sitting  -DR other (see comments)   Increased agitation with activity; resistant to sitting   -TA    Precautions/Limitations  fall precautions;oxygen therapy device and L/min  -DR fall precautions;oxygen therapy device and L/min  -TA    Recorded by [MF] Pee Roldan, PT [DR] Mikey Moore, PT [TA] David Jenkins OT    Vital Signs    Pre Systolic BP Rehab  107  -  -TA    Pre Treatment Diastolic BP  69  -DR 69  -TA    Post Systolic BP Rehab  102  -  -TA    Post Treatment Diastolic BP  68  -DR 68  -TA    Pretreatment Heart Rate (beats/min)  86  -DR 86  -TA    Posttreatment Heart Rate (beats/min)  80  -DR 80  -TA    Pre SpO2 (%)  99  -DR 99  -TA    O2 Delivery Pre Treatment  supplemental O2   5.5L  -DR supplemental O2   5.5L  -TA    Intra SpO2 (%)  89  -DR 89  -TA    O2 Delivery Intra Treatment  supplemental O2   5.5L  -DR supplemental O2   5.5L  -TA    Post SpO2 (%)  100  -  -TA    O2  Delivery Post Treatment  supplemental O2   5.5L  -DR supplemental O2   5.5L  -TA    Pre Patient Position  Supine  -DR Supine  -TA    Intra Patient Position  Standing  -DR Standing  -TA    Post Patient Position  Supine  -DR Supine  -TA    Recorded by  [] Mikey Moore, PT [TA] David Jenkins, ROBIN    Pain Assessment    Pain Assessment Jay-Kent FACES  -MF 0-10  -DR 0-10  -TA    Jay-Kent FACES Pain Rating 2   with dressing change  -MF  0  -TA    Pain Score  0  -DR 0  -TA    Post Pain Score  0  -DR 0  -TA    Pain Intervention(s) Medication (See MAR);Repositioned  -MF  Repositioned;Ambulation/increased activity  -TA    Response to Interventions   tolerated  -TA    Recorded by [MF] Pee Roldan, PT [DR] Mikey Moore, PT [TA] David Jenkins, ROBIN    Cognitive Assessment/Intervention    Current Cognitive/Communication Assessment  impaired  -DR impaired  -TA    Orientation Status  oriented to;person;disoriented to;place;time;situation  -DR oriented to;person;disoriented to;place;time  -TA    Follows Commands/Answers Questions  able to follow single-step instructions;50% of the time;needs cueing;needs increased time;needs repetition  -DR 50% of the time;able to follow single-step instructions;needs cueing;needs increased time;needs repetition  -TA    Personal Safety  severe impairment;decreased awareness, need for assist;decreased awareness, need for safety;decreased insight to deficits;impulsive  -DR severe impairment;decreased awareness, need for assist;decreased awareness, need for safety;decreased insight to deficits  -TA    Personal Safety Interventions  fall prevention program maintained;gait belt;nonskid shoes/slippers when out of bed;elopement precautions initiated  - elopement precautions initiated;fall prevention program maintained;gait belt;muscle strengthening facilitated;nonskid shoes/slippers when out of bed;supervised activity  -TA    Recorded by  [] Mikey Moore, PT [TA] David SAMUEL  Alice, ROBIN    Bed Mobility, Assessment/Treatment    Bed Mobility, Assistive Device  bed rails;head of bed elevated;draw sheet  -DR bed rails;head of bed elevated  -TA    Bed Mobility, Scoot/Bridge, [place]  dependent (less than 25% patient effort);2 person assist required;verbal cues required  -DR dependent (less than 25% patient effort);2 person assist required  -TA    Bed Mob, Supine to Sit, [place]  maximum assist (25% patient effort);2 person assist required;verbal cues required  -DR maximum assist (25% patient effort);2 person assist required;verbal cues required;nonverbal cues required (demo/gesture)  -TA    Bed Mob, Sit to Supine, [place]  dependent (less than 25% patient effort);2 person assist required;verbal cues required  -DR dependent (less than 25% patient effort);2 person assist required;verbal cues required  -TA    Bed Mobility, Safety Issues  cognitive deficits limit understanding;decreased use of arms for pushing/pulling;decreased use of legs for bridging/pushing  -DR cognitive deficits limit understanding;decreased use of arms for pushing/pulling;decreased use of legs for bridging/pushing  -TA    Bed Mobility, Impairments  strength decreased;impaired balance;coordination impaired  -DR strength decreased;impaired balance  -TA    Bed Mobility, Comment  VC for hand placement and sequencing transition of BLE to edge of bed  -DR VCs for sequencing, safe hand placement  -TA    Recorded by  [DR] Mikey Moore, PT [TA] David Jenkins OT    Transfer Assessment/Treatment    Transfers, Sit-Stand [place]  moderate assist (50% patient effort);2 person assist required;verbal cues required  -DR moderate assist (50% patient effort);2 person assist required;verbal cues required  -TA    Transfers, Stand-Sit [place]  moderate assist (50% patient effort);2 person assist required;verbal cues required  -DR moderate assist (50% patient effort);2 person assist required;verbal cues  required  -TA    Transfers, Sit-Stand-Sit, Assist Device  rolling walker  - rolling walker  -TA    Transfer, Safety Issues  impulsivity;loses balance backward;knees buckling;step length decreased;sequencing ability decreased  - impulsivity;weight-shifting ability decreased;steps too close front assistive device  -TA    Transfer, Impairments  strength decreased;impaired balance;coordination impaired   confusion/agitation  - strength decreased;impaired balance;other (see comments)   confusion  -TA    Transfer, Comment  VC for hand placement. He performed x 3 STS transfers each with mod A x 2 and was resistant to return to sitting during last period of standing. Required encouragement from wife as well as PT/OT.  - VCs for safe hand placement; completed 3 STS with poor safety awareness, resistance to sitting once in standing.  -TA    Recorded by  [] Mikey Moore, PT [TA] David Jenkins OT    Gait Assessment/Treatment    Gait, [place] Level  not tested  -     Gait, Comment  Not assessed today due to increased agitation/confusion upon standing and marching in place.  -     Recorded by  [] Mikey Moore, PT     Functional Mobility    Functional Mobility- Ind. Level   2 person assist required;contact guard assist;verbal cues required  -TA    Functional Mobility- Device   rolling walker  -TA    Functional Mobility-Distance (Feet)   --   Marching in place  -TA    Functional Mobility- Safety Issues   balance decreased during turns;sequencing ability decreased;step length decreased;weight-shifting ability decreased;supplemental O2;steps too close front assistive device  -TA    Functional Mobility- Comment   Pt with difficulty motor planning marching in place  -TA    Recorded by   [TA] David Jenkins OT    Upper Body Dressing Assessment/Training    UB Dressing Assess/Train, Clothing Type   donning:;hospital gown  -TA    UB Dressing Assess/Train, Position   edge of bed;sitting  -TA    UB  Dressing Assess/Train, [place]   moderate assist (50% patient effort)  -TA    UB Dressing Assess/Train, Impairments   impaired balance;coordination impaired  -TA    UB Dressing Assess/Train, Comment   impulsive at EOB  -TA    Recorded by   [TA] David Jenkins OT    Motor Skills/Interventions    Additional Documentation  Balance Skills Training (Group)  - Balance Skills Training (Group)  -TA    Recorded by  [] Mikey Moore, PT [TA] David Jenkins OT    Balance Skills Training    Sitting-Level of Assistance  Contact guard  - Contact guard  -TA    Sitting-Balance Support  Feet supported  -DR Feet supported  -TA    Sitting-Balance Activities  Lateral lean;Forward lean  - Lateral lean;Forward lean;Trunk control activities  -TA    Standing-Level of Assistance  Contact guard;x2  -DR Contact guard;x2  -TA    Static Standing Balance Support  assistive device  -DR assistive device  -TA    Standing-Balance Activities  Weight Shift A-P;Weight Shift R-L  -DR Weight Shift A-P;Weight Shift R-L  -TA    Recorded by  [] Mikey Moore, PT [TA] David Jenkins OT    Therapy Exercises    Bilateral Lower Extremities  AROM:;5 reps;standing;hip flexion  -DR     Recorded by  [DR] Mikey Moore, PT     Positioning and Restraints    Pre-Treatment Position in bed  - in bed  -DR in bed  -TA    Post Treatment Position bed  - bed  -DR bed  -TA    In Bed supine;call light within reach;with nsg  -MF notified nsg;supine;call light within reach;encouraged to call for assist;exit alarm on;with family/caregiver;SCD pump applied;legs elevated  -DR notified nsg;supine;call light within reach;encouraged to call for assist;exit alarm on;patient within staff view;with family/caregiver;side rails up x2;RUE elevated;LUE elevated;SCD pump applied;legs elevated  -TA    Restraints released:;reapplied:   nsg in room  - released:;reapplied:;soft limb;notified nsg:  - released:;reapplied:;notified nsg:;soft limb  -TA     Recorded by [MF] Pee Roldan, PT [DR] Mikey Moore, PT [TA] David Jenkins, OT      04/09/17 1040 04/08/17 1010 04/07/17 1330    Rehab Assessment/Intervention    Discipline physical therapist  - physical therapist  - physical therapist  -EDOUARD    Document Type therapy note (daily note)  - therapy note (daily note)  - therapy note (daily note)  -EDOUARD    Subjective Information agree to therapy;complains of;weakness;fatigue;pain  - agree to therapy;complains of;fatigue;weakness  - agree to therapy;complains of;fatigue  -EDOUARD    Patient Effort, Rehab Treatment   good  -EDOUARD    Symptoms Noted During/After Treatment   other (see comments)   patient became more agitated with increased activity   -EDOUARD    Symptoms Noted Comment   patient became impulsive letting go of walker and wanting to sit then refusing to sit increased agitation  -EDOUARD    Precautions/Limitations   fall precautions  -EDOUARD    Recorded by [MF] Pee Roldan, PT [MF] Pee Roldan, PT [EDOUARD] Anita Prado, PT    Vital Signs    Pre Systolic BP Rehab   136  -EDOUARD    Pre Treatment Diastolic BP   54  -EDOUARD    Pretreatment Heart Rate (beats/min)   68  -EDOUARD    Pre SpO2 (%)   95  -EDOUARD    O2 Delivery Pre Treatment   supplemental O2  -EDOUARD    Recorded by   [EDOUARD] Anita Prado PT    Pain Assessment    Pain Assessment Jay-Baker FACES  -MF Jay-Baker FACES  - Jay-Kent FACES  -EDOUARD    Jay-Baker FACES Pain Rating 2  -MF 0  -MF 2  -EDOUARD    Pain Location Other (Comment)   wound  -      Pain Intervention(s) Repositioned  -MF Repositioned;Medication (See MAR)  -MF Repositioned;Ambulation/increased activity  -EDOUARD    Recorded by [MF] Pee Roldan, PT [MF] Pee Roldan, PT [EDOUARD] Anita Prado, PT    Cognitive Assessment/Intervention    Current Cognitive/Communication Assessment   impaired  -EDOUARD    Orientation Status   oriented to;person  -EDOUARD    Follows Commands/Answers Questions   75% of the time;needs repetition  -EDOUARD    Recorded by   [EDOUARD] Anita  GANESH Prado PT    Bed Mobility, Assessment/Treatment    Bed Mobility, Assistive Device   bed rails;draw sheet  -EDOUARD    Bed Mobility, Scoot/Bridge, [place]   minimum assist (75% patient effort)  -EDOUARD    Bed Mob, Supine to Sit, [place]   moderate assist (50% patient effort);2 person assist required  -EDOUARD    Bed Mobility, Comment   cues for sequencing  -EDOUARD    Recorded by   [EDOUARD] Anita Prado PT    Transfer Assessment/Treatment    Transfers, Sit-Stand [place]   minimum assist (75% patient effort);2 person assist required  -EDOUARD    Transfers, Stand-Sit [place]   minimum assist (75% patient effort);2 person assist required  -EDOUARD    Recorded by   [EDOUARD] Anita Prado PT    Gait Assessment/Treatment    Gait, [place] Level   minimum assist (75% patient effort);2 person assist required  -EDOUARD    Gait, Assistive Device   rolling walker  -EDOUARD    Gait, Distance (Feet)   150   family pulling chair behind patient for safety  -EDOUARD    Gait, Safety Issues   balance decreased during turns;sequencing ability decreased;supplemental O2  -EDOUARD    Gait, Impairments   strength decreased;impaired balance  -EDOUARD    Gait, Comment   patient became more agitated with walking less cooperative needing more cues for safety patient letting go of walker   -EDOUARD    Recorded by   [EDOAURD] Anita Prado, PT    Balance Skills Training    Sitting-Level of Assistance   Contact guard  -EDOUARD    Sitting-Balance Support   Feet supported  -EDOUARD    Standing-Level of Assistance   Minimum assistance;x2  -EDOUARD    Static Standing Balance Support   assistive device  -EDOUARD    Gait Balance-Level of Assistance   Minimum assistance;x2  -EDOUARD    Gait Balance Support   assistive device  -EDOUARD    Recorded by   [EDOUARD] Anita Prado PT    Therapy Exercises    Bilateral Lower Extremities   AAROM:;10 reps;sitting;ankle pumps/circles;LAQ  -EDOUARD    Recorded by   [EDOUARD] Anita Prado PT    Positioning and Restraints    Pre-Treatment Position in bed  -MF in bed  - in bed   -EDOUARD    Post Treatment Position bed  -MF bed  -MF chair  -EDOUARD    In Bed supine;call light within reach;with nsg  -MF supine;call light within reach;with nsg  -MF     In Chair   notified nsg;reclined;sitting;call light within reach;exit alarm on;with family/caregiver  -EDOUARD    Restraints released:;reapplied:  -MF  released:;reapplied:;notified nsg:  -EDOUARD    Recorded by [MF] Pee Roldan, PT [MF] Pee Roldan, PT [EDOUARD] Anita Prado, PT      04/07/17 1300          Rehab Assessment/Intervention    Discipline speech language pathologist  -      Document Type evaluation;therapy note (daily note)  -SM      Patient Effort, Rehab Treatment adequate  -SM      Recorded by [SM] Akosua Solomon MS CCC-SLP      Dysphagia Treatment Objectives and Progress    Dysphagia Treatment Objectives Improve hyolaryngeal excursion;Other 1  -SM      Recorded by [SM] Akosua Solomon MS CCC-SLP      Improve timing of pharyngeal response    To improve timing of pharyngeal response, patient will: Swallow in timely way using three second prep;Swallow in timely way using super-supraglottic swallow;80%;with consistent cues  -SM      Status: Improve timing of pharyngeal response Progressing as expected  -SM      Timing of Pharyngeal Response Progress 10%;with consistent cues;continue to adress   cognition/focus greatly impacted  -SM      Recorded by [SM] Akosua Solomon MS CCC-SLP      Improve laryngeal closure    To improve laryngeal closure, patient will: Complete supraglottic swallow;80%;with consistent cues  -SM      Status: Improve laryngeal closure Progressing as expected  -SM      Laryngeal Closure Progress 0%;with consistent cues;following model;continue to adress  -SM      Recorded by [SM] Akosua Solomon MS CCC-SLP      Improve closure at entrance to airway    To improve closure at entrance to airway, patient will: Complete super-supraglottic swallow;80%;with consistent cues  -SM      Status: Improve closure  at entrance to airway Progressing as expected  -SM      Closure at Entrance to Airway Progress 0%;with consistent cues;following model;continue to adress  -SM      Recorded by [SM] Akosua Solomon MS CCC-SLP      Improve hyolaryngeal excursion    To improve hyolaryngeal excursion, patient will: Complete chin tuck against resistance (comment number of repetitions);90%;with consistent cues   20 sec reps x4  -SM      Status: Improve hyolaryngeal excursion New  -SM      Hyolaryngeal Excursion Progress 40%;with consistent cues;continue to adress  -SM      Recorded by [SM] Akosua Solomon MS CCC-SLP      Improve tongue base & pharyngeal wall squeeze    To improve tongue base & pharyngeal wall squeeze, patient will: Complete effortful swallow;80%;with consistent cues  -SM      Status: Improve tongue base & pharyngeal wall squeeze Progressing as expected  -SM      Tongue Base/Pharyngeal Wall Squeeze Progress 20%;with consistent cues;following model;continue to adress  -SM      Recorded by [SM] Akosua Solomon MS CCC-SLP      Dysphagia Other 1    Dysphagia Other 1 Objective Tolerate trials of mech-soft without s/s aspiration and no cues  -SM      Status: Dysphagia Other 1 New  -SM      Dysphagia Other 1 Progress 40%;with consistent cues;continue to address  -SM      Comments: Dysphagia Other 1 Choking on mech-soft items.  Better with level 3.  Will downgrade and consider return to mech-soft as more appropriate  -SM      Recorded by [SM] Akosua Solomon MS CCC-SLP        User Key  (r) = Recorded By, (t) = Taken By, (c) = Cosigned By    Initials Name Effective Dates    EDOUARD Prado, PT 06/19/15 -      Pee Roldan, PT 06/19/15 -     SM Akosua Solomon, MS CCC-SLP 06/22/15 -     TA David Jenkins, OT 03/14/16 -     DR Mikey Moore, PT 09/06/16 -                 IP PT Goals       04/09/17 1551 04/08/17 1010 04/06/17 1524    Bed Mobility PT LTG    Bed Mobility PT LTG, Outcome goal  ongoing  -DR  goal ongoing  -LS    Transfer Training PT LTG    Transfer Training PT LTG, Outcome goal ongoing  -DR  goal ongoing  -LS    Gait Training PT LTG    Gait Training Goal PT LTG, Outcome goal ongoing  -DR  goal ongoing  -LS    Wound Care PT LTG    Wound Care PT LTG 1, Outcome  goal ongoing  -MF       04/06/17 1025 04/05/17 0909       Bed Mobility PT LTG    Bed Mobility PT LTG, Date Established  04/05/17  -LS     Bed Mobility PT LTG, Time to Achieve  2 wks  -LS     Bed Mobility PT LTG, Activity Type  supine to sit/sit to supine  -LS     Bed Mobility PT LTG, [place] Level  supervision required  -LS     Transfer Training PT LTG    Transfer Training PT LTG, Date Established  04/05/17  -LS     Transfer Training PT LTG, Time to Achieve  2 wks  -LS     Transfer Training PT LTG, Activity Type  sit to stand/stand to sit  -LS     Transfer Training PT LTG, [place] Level  contact guard assist  -LS     Transfer Training PT LTG, Assist Device  walker, rolling  -LS     Gait Training PT LTG    Gait Training Goal PT LTG, Date Established  04/05/17  -LS     Gait Training Goal PT LTG, Time to Achieve  2 wks  -LS     Gait Training Goal PT LTG, [place] Level  contact guard assist  -LS     Gait Training Goal PT LTG, Assist Device  walker, rolling  -LS     Gait Training Goal PT LTG, Distance to Achieve  200  -LS     Wound Care PT LTG    Wound Care PT LTG 1, Date Established 04/06/17  -MC      Wound Care PT LTG 1, Time to Achieve 1 wk  -MC      Wound Care PT LTG 1, Location Scrotum  -MC      Wound Care PT LTG 1, No S&S of Infection yes  -      Wound Care PT LTG 1, Decrease Wound Size 10%  -MC      Wound Care PT LTG 1, Decrease Necrotic Tissue 50%  -MC      Wound Care PT LTG 1, Decrease Exudate minimum  -      Wound Care PT LTG 1, No New Skin Break Down yes  -      Wound Care PT LTG 1, Education dressing changes;wound care  -MC      Wound Care PT LTG 1, Education Understanding verbalize understanding  -         User Key  (r) = Recorded By, (t) = Taken By, (c) = Cosigned By    Initials Name Provider Type    VALERI Roldan, PT Physical Therapist    ANDREWS Juárez, PT Physical Therapist    DUNCAN Mcgarry, PT Physical Therapist    DR Mikey Moore, PT Physical Therapist          Physical Therapy Education     Title: PT OT SLP Therapies (Active)     Topic: Physical Therapy (Active)     Point: Mobility training (Active)    Learning Progress Summary    Learner Readiness Method Response Comment Documented by Status   Patient Acceptance E NR   04/09/17 1551 Active    Acceptance E NR  EDOUARD 04/07/17 1534 Active    Acceptance E,D NR   04/06/17 1523 Active    Acceptance E,D NR   04/05/17 0908 Active   Significant Other Acceptance E,D NR   04/06/17 1523 Active    Acceptance E,D NR   04/05/17 0908 Active               Point: Home exercise program (Active)    Learning Progress Summary    Learner Readiness Method Response Comment Documented by Status   Patient Acceptance E NR  EDOUARD 04/07/17 1534 Active    Acceptance E,D NR   04/06/17 1523 Active   Significant Other Acceptance E,D NR   04/06/17 1523 Active               Point: Body mechanics (Active)    Learning Progress Summary    Learner Readiness Method Response Comment Documented by Status   Patient Acceptance E NR   04/09/17 1551 Active    Acceptance E NR  EDOUARD 04/07/17 1534 Active    Acceptance E,D NR   04/06/17 1523 Active    Acceptance E,D NR   04/05/17 0908 Active   Significant Other Acceptance E,D NR   04/06/17 1523 Active    Acceptance E,D NR   04/05/17 0908 Active               Point: Precautions (Active)    Learning Progress Summary    Learner Readiness Method Response Comment Documented by Status   Patient Acceptance E NR   04/09/17 1551 Active    Acceptance E NR  EDOUARD 04/07/17 1534 Active    Acceptance E,D NR   04/06/17 1523 Active    Acceptance E,D NR   04/05/17 0908 Active   Significant Other Acceptance E,D NR   04/06/17 1523 Active     Acceptance E,D NR   04/05/17 0908 Active                      User Key     Initials Effective Dates Name Provider Type Discipline    EDOUARD 06/19/15 -  Anita Prado, PT Physical Therapist PT    LS 06/19/15 -  Lily Juárez, PT Physical Therapist PT     09/06/16 -  Mikey Moore, PT Physical Therapist PT                   PT ASSESSMENT (last 72 hours)      PT Evaluation       04/10/17 0940 04/09/17 1411    Rehab Evaluation    Document Type therapy note (daily note)  - therapy note (daily note)  -DR    Subjective Information agree to therapy;complains of;weakness;fatigue;pain  - agree to therapy;no complaints  -DR    Patient Effort, Rehab Treatment  good  -DR    Symptoms Noted During/After Treatment  other (see comments)   increased agitation with activity; resistant to sitting  -DR    General Information    Precautions/Limitations  fall precautions;oxygen therapy device and L/min  -DR    Vital Signs    Pre Systolic BP Rehab  107  -DR    Pre Treatment Diastolic BP  69  -DR    Post Systolic BP Rehab  102  -DR    Post Treatment Diastolic BP  68  -DR    Pretreatment Heart Rate (beats/min)  86  -DR    Posttreatment Heart Rate (beats/min)  80  -DR    Pre SpO2 (%)  99  -DR    O2 Delivery Pre Treatment  supplemental O2   5.5L  -DR    Intra SpO2 (%)  89  -DR    O2 Delivery Intra Treatment  supplemental O2   5.5L  -DR    Post SpO2 (%)  100  -DR    O2 Delivery Post Treatment  supplemental O2   5.5L  -DR    Pre Patient Position  Supine  -DR    Intra Patient Position  Standing  -DR    Post Patient Position  Supine  -DR    Pain Assessment    Pain Assessment Jay-Donte ARGUELLO  - 0-10  -DR    Jay-Donte FACES Pain Rating 2   with dressing change  -MF     Pain Score  0  -DR    Post Pain Score  0  -DR    Pain Intervention(s) Medication (See MAR);Repositioned  -     Cognitive Assessment/Intervention    Current Cognitive/Communication Assessment  impaired  -DR    Orientation Status  oriented to;person;disoriented  to;place;time;situation  -DR    Follows Commands/Answers Questions  able to follow single-step instructions;50% of the time;needs cueing;needs increased time;needs repetition  -DR    Personal Safety  severe impairment;decreased awareness, need for assist;decreased awareness, need for safety;decreased insight to deficits;impulsive  -DR    Personal Safety Interventions  fall prevention program maintained;gait belt;nonskid shoes/slippers when out of bed;elopement precautions initiated  -DR    Bed Mobility, Assessment/Treatment    Bed Mobility, Assistive Device  bed rails;head of bed elevated;draw sheet  -DR    Bed Mobility, Scoot/Bridge, [place]  dependent (less than 25% patient effort);2 person assist required;verbal cues required  -DR    Bed Mob, Supine to Sit, [place]  maximum assist (25% patient effort);2 person assist required;verbal cues required  -DR    Bed Mob, Sit to Supine, [place]  dependent (less than 25% patient effort);2 person assist required;verbal cues required  -DR    Bed Mobility, Safety Issues  cognitive deficits limit understanding;decreased use of arms for pushing/pulling;decreased use of legs for bridging/pushing  -DR    Bed Mobility, Impairments  strength decreased;impaired balance;coordination impaired  -DR    Bed Mobility, Comment  VC for hand placement and sequencing transition of BLE to edge of bed  -DR    Transfer Assessment/Treatment    Transfers, Sit-Stand [place]  moderate assist (50% patient effort);2 person assist required;verbal cues required  -DR    Transfers, Stand-Sit [place]  moderate assist (50% patient effort);2 person assist required;verbal cues required  -DR    Transfers, Sit-Stand-Sit, Assist Device  rolling walker  -DR    Transfer, Safety Issues  impulsivity;loses balance backward;knees buckling;step length decreased;sequencing ability decreased  -DR    Transfer, Impairments  strength decreased;impaired balance;coordination impaired    confusion/agitation  -    Transfer, Comment  VC for hand placement. He performed x 3 STS transfers each with mod A x 2 and was resistant to return to sitting during last period of standing. Required encouragement from wife as well as PT/OT.  -    Gait Assessment/Treatment    Gait, [place] Level  not tested  -    Gait, Comment  Not assessed today due to increased agitation/confusion upon standing and marching in place.  -    Motor Skills/Interventions    Additional Documentation  Balance Skills Training (Group)  -    Balance Skills Training    Sitting-Level of Assistance  Contact guard  -DR    Sitting-Balance Support  Feet supported  -DR    Sitting-Balance Activities  Lateral lean;Forward lean  -DR    Standing-Level of Assistance  Contact guard;x2  -DR    Static Standing Balance Support  assistive device  -DR    Standing-Balance Activities  Weight Shift A-P;Weight Shift R-L  -DR    Therapy Exercises    Bilateral Lower Extremities  AROM:;5 reps;standing;hip flexion  -DR    Positioning and Restraints    Pre-Treatment Position in bed  - in bed  -DR    Post Treatment Position bed  - bed  -    In Bed supine;call light within reach;with nsg  - notified nsg;supine;call light within reach;encouraged to call for assist;exit alarm on;with family/caregiver;SCD pump applied;legs elevated  -DR    Restraints released:;reapplied:   nsg in room  - released:;reapplied:;soft limb;notified nsg:  -      04/09/17 1410 04/09/17 1040    Rehab Evaluation    Document Type therapy note (daily note)  -TA therapy note (daily note)  -    Subjective Information agree to therapy;no complaints  -TA agree to therapy;complains of;weakness;fatigue;pain  -    Patient Effort, Rehab Treatment good  -TA     Symptoms Noted During/After Treatment other (see comments)   Increased agitation with activity; resistant to sitting   -TA     General Information    Precautions/Limitations fall precautions;oxygen therapy device and  L/min  -TA     Vital Signs    Pre Systolic BP Rehab 107  -TA     Pre Treatment Diastolic BP 69  -TA     Post Systolic BP Rehab 102  -TA     Post Treatment Diastolic BP 68  -TA     Pretreatment Heart Rate (beats/min) 86  -TA     Posttreatment Heart Rate (beats/min) 80  -TA     Pre SpO2 (%) 99  -TA     O2 Delivery Pre Treatment supplemental O2   5.5L  -TA     Intra SpO2 (%) 89  -TA     O2 Delivery Intra Treatment supplemental O2   5.5L  -TA     Post SpO2 (%) 100  -TA     O2 Delivery Post Treatment supplemental O2   5.5L  -TA     Pre Patient Position Supine  -TA     Intra Patient Position Standing  -TA     Post Patient Position Supine  -TA     Pain Assessment    Pain Assessment 0-10  -TA Jay-Kent FACES  -MF    Jay-Baker FACES Pain Rating 0  -TA 2  -MF    Pain Score 0  -TA     Post Pain Score 0  -TA     Pain Location  Other (Comment)   wound  -MF    Pain Intervention(s) Repositioned;Ambulation/increased activity  -TA Repositioned  -MF    Response to Interventions tolerated  -TA     Cognitive Assessment/Intervention    Current Cognitive/Communication Assessment impaired  -TA     Orientation Status oriented to;person;disoriented to;place;time  -TA     Follows Commands/Answers Questions 50% of the time;able to follow single-step instructions;needs cueing;needs increased time;needs repetition  -TA     Personal Safety severe impairment;decreased awareness, need for assist;decreased awareness, need for safety;decreased insight to deficits  -TA     Personal Safety Interventions elopement precautions initiated;fall prevention program maintained;gait belt;muscle strengthening facilitated;nonskid shoes/slippers when out of bed;supervised activity  -TA     Bed Mobility, Assessment/Treatment    Bed Mobility, Assistive Device bed rails;head of bed elevated  -TA     Bed Mobility, Scoot/Bridge, [place] dependent (less than 25% patient effort);2 person assist required  -TA     Bed Mob, Supine to Sit, [place] maximum assist  (25% patient effort);2 person assist required;verbal cues required;nonverbal cues required (demo/gesture)  -TA     Bed Mob, Sit to Supine, [place] dependent (less than 25% patient effort);2 person assist required;verbal cues required  -TA     Bed Mobility, Safety Issues cognitive deficits limit understanding;decreased use of arms for pushing/pulling;decreased use of legs for bridging/pushing  -TA     Bed Mobility, Impairments strength decreased;impaired balance  -TA     Bed Mobility, Comment VCs for sequencing, safe hand placement  -TA     Transfer Assessment/Treatment    Transfers, Sit-Stand [place] moderate assist (50% patient effort);2 person assist required;verbal cues required  -TA     Transfers, Stand-Sit [place] moderate assist (50% patient effort);2 person assist required;verbal cues required  -TA     Transfers, Sit-Stand-Sit, Assist Device rolling walker  -TA     Transfer, Safety Issues impulsivity;weight-shifting ability decreased;steps too close front assistive device  -TA     Transfer, Impairments strength decreased;impaired balance;other (see comments)   confusion  -TA     Transfer, Comment VCs for safe hand placement; completed 3 STS with poor safety awareness, resistance to sitting once in standing.  -TA     Motor Skills/Interventions    Additional Documentation Balance Skills Training (Group)  -TA     Balance Skills Training    Sitting-Level of Assistance Contact guard  -TA     Sitting-Balance Support Feet supported  -TA     Sitting-Balance Activities Lateral lean;Forward lean;Trunk control activities  -TA     Standing-Level of Assistance Contact guard;x2  -TA     Static Standing Balance Support assistive device  -TA     Standing-Balance Activities Weight Shift A-P;Weight Shift R-L  -TA     Positioning and Restraints    Pre-Treatment Position in bed  -TA in bed  -MF    Post Treatment Position bed  -TA bed  -MF    In Bed notified nsg;supine;call light within reach;encouraged to call for  assist;exit alarm on;patient within staff view;with family/caregiver;side rails up x2;RUE elevated;LUE elevated;SCD pump applied;legs elevated  -TA supine;call light within reach;with nsg  -    Restraints released:;reapplied:;notified nsg:;soft limb  -TA released:;reapplied:  -      04/08/17 1010 04/07/17 1330    Rehab Evaluation    Document Type therapy note (daily note)  - therapy note (daily note)  -    Subjective Information agree to therapy;complains of;fatigue;weakness  - agree to therapy;complains of;fatigue  -    Patient Effort, Rehab Treatment  good  -EDOUARD    Symptoms Noted During/After Treatment  other (see comments)   patient became more agitated with increased activity   -EDOUARD    Symptoms Noted Comment  patient became impulsive letting go of walker and wanting to sit then refusing to sit increased agitation  -    General Information    Precautions/Limitations  fall precautions  -    Vital Signs    Pre Systolic BP Rehab  136  -EDOUARD    Pre Treatment Diastolic BP  54  -EDOUARD    Pretreatment Heart Rate (beats/min)  68  -EDOUARD    Pre SpO2 (%)  95  -EDOUARD    O2 Delivery Pre Treatment  supplemental O2  -    Pain Assessment    Pain Assessment Jay-Baker FACES  - Jay-Kent FACES  -    Jay-Baker FACES Pain Rating 0  - 2  -EDOUARD    Pain Intervention(s) Repositioned;Medication (See MAR)  - Repositioned;Ambulation/increased activity  -    Cognitive Assessment/Intervention    Current Cognitive/Communication Assessment  impaired  -    Orientation Status  oriented to;person  -EDOUARD    Follows Commands/Answers Questions  75% of the time;needs repetition  -EDOUARD    Bed Mobility, Assessment/Treatment    Bed Mobility, Assistive Device  bed rails;draw sheet  -    Bed Mobility, Scoot/Bridge, [place]  minimum assist (75% patient effort)  -EDOUARD    Bed Mob, Supine to Sit, [place]  moderate assist (50% patient effort);2 person assist required  -EDOUARD    Bed Mobility, Comment  cues for sequencing  -EDOUARD    Transfer  Assessment/Treatment    Transfers, Sit-Stand [place]  minimum assist (75% patient effort);2 person assist required  -EDOUARD    Transfers, Stand-Sit [place]  minimum assist (75% patient effort);2 person assist required  -EDOUARD    Gait Assessment/Treatment    Gait, [place] Level  minimum assist (75% patient effort);2 person assist required  -EDOUARD    Gait, Assistive Device  rolling walker  -EDOUARD    Gait, Distance (Feet)  150   family pulling chair behind patient for safety  -EDOUARD    Gait, Safety Issues  balance decreased during turns;sequencing ability decreased;supplemental O2  -EDOUARD    Gait, Impairments  strength decreased;impaired balance  -EDOUARD    Gait, Comment  patient became more agitated with walking less cooperative needing more cues for safety patient letting go of walker   -EDOUARD    Balance Skills Training    Sitting-Level of Assistance  Contact guard  -EDOUARD    Sitting-Balance Support  Feet supported  -EDOUARD    Standing-Level of Assistance  Minimum assistance;x2  -EDOUARD    Static Standing Balance Support  assistive device  -EDOUARD    Gait Balance-Level of Assistance  Minimum assistance;x2  -EDOUARD    Gait Balance Support  assistive device  -EDOUARD    Therapy Exercises    Bilateral Lower Extremities  AAROM:;10 reps;sitting;ankle pumps/circles;LAQ  -EDOUARD    Positioning and Restraints    Pre-Treatment Position in bed  - in bed  -EDOUARD    Post Treatment Position bed  - chair  -EDOUARD    In Bed supine;call light within reach;with nsg  -MF     In Chair  notified nsg;reclined;sitting;call light within reach;exit alarm on;with family/caregiver  -EDOUARD    Restraints  released:;reapplied:;notified nsg:  -EDOUARD      04/07/17 1300       Rehab Evaluation    Document Type evaluation;therapy note (daily note)  -SM     Subjective Information agree to therapy  -SM     Patient Effort, Rehab Treatment adequate  -SM     Pain Assessment    Pain Assessment Jay-Kent FACES  -SM     Jay-Kent FACES Pain Rating 2  -SM     Cognitive Assessment/Intervention    Current  Cognitive/Communication Assessment impaired  -SM     Orientation Status oriented to;person  -SM     Follows Commands/Answers Questions 50% of the time;able to follow single-step instructions;needs cueing;needs increased time;needs repetition  -SM     Additional Documentation Cognitive Assessment Intervention (Group)  -SM     Cognitive Assessment Intervention    Attention moderate impairment;severe impairment;needs re-direction;difficulty attending to task/directions;distractible;needs cues to re-direct  -SM     Problem Solving moderate impairment;severe impairment  -SM     Organization moderate impairment;severe impairment  -SM       User Key  (r) = Recorded By, (t) = Taken By, (c) = Cosigned By    Initials Name Provider Type    EDOUARD Prado, PT Physical Therapist    VALERI Roldan, PT Physical Therapist    SM Akosua Solomon, MS CCC-SLP Speech and Language Pathologist    CHU Jenkins, OT Occupational Therapist    DR Mikey Moore, PT Physical Therapist            PT Recommendation and Plan  Anticipated Discharge Disposition: inpatient rehabilitation facility  PT Frequency: daily    Plan Of Care Reviewed With: patient       Outcome Summary/Follow up Plan: wound bed cont to appear healthy with moderate slough remaining at distal portion of scrotum, but with decreased induration and no erythema noted.  Pt will cont to benefit from debridement and pulse lavage to decrease bioburden.           Outcome Measures       04/09/17 1411 04/09/17 1410 04/07/17 1330    How much help from another person do you currently need...    Turning from your back to your side while in flat bed without using bedrails? 2  -DR  2  -EDOUARD    Moving from lying on back to sitting on the side of a flat bed without bedrails? 1  -DR  2  -EDOUARD    Moving to and from a bed to a chair (including a wheelchair)? 3  -DR  3  -EDOUARD    Standing up from a chair using your arms (e.g., wheelchair, bedside chair)? 3  -DR  3  -EDOUARD    Climbing  3-5 steps with a railing? 1  -DR  1  -EDOUARD    To walk in hospital room? 3  -DR  3  -EDOUARD    AM-PAC 6 Clicks Score 13  -DR  14  -EDOUARD    How much help from another is currently needed...    Putting on and taking off regular lower body clothing?  1  -TA     Bathing (including washing, rinsing, and drying)  2  -TA     Toileting (which includes using toilet bed pan or urinal)  1  -TA     Putting on and taking off regular upper body clothing  2  -TA     Taking care of personal grooming (such as brushing teeth)  2  -TA     Eating meals  2  -TA     Score  10  -TA     Modified Lead Scale    Modified Lead Scale  4 - Moderately severe disability.  Unable to walk without assistance, and unable to attend to own bodily needs without assistance.  -TA     Functional Assessment    Outcome Measure Options AM-PAC 6 Clicks Basic Mobility (PT)  - AM-PAC 6 Clicks Daily Activity (OT);Modified [place]  -TA       User Key  (r) = Recorded By, (t) = Taken By, (c) = Cosigned By    Initials Name Provider Type    EDOUARD Prado, PT Physical Therapist    CHU Jenkins, OT Occupational Therapist    DR Mikey Moore, PT Physical Therapist              Time Calculation        PT Charges       04/10/17 0940          Time Calculation    Start Time 0940  -      PT Goal Re-Cert Due Date 04/15/17  -      Time Calculation- PT    Total Timed Code Minutes- PT 25 minute(s)  -        User Key  (r) = Recorded By, (t) = Taken By, (c) = Cosigned By    Initials Name Provider Type     Pee Roldan, PT Physical Therapist             Therapy Charges for Today     Code Description Service Date Service Provider Modifiers Qty    30409877723 HC ROSEANNA DEBRIDE OPEN WOUND UP TO 20CM 4/9/2017 Pee Roldan, PT GP 1    [id number] HC ROSEANNA DEBRIDE OPEN WOUND UP TO 20CM 4/10/2017 Pee Roldan, PT GP 1            PT G-Codes  Outcome Measure Options: AM-PAC 6 Clicks Basic Mobility (PT)        Pee Roldan, PT  4/10/2017

## 2017-04-10 NOTE — PLAN OF CARE
Problem: Patient Care Overview (Adult)  Goal: Plan of Care Review  Outcome: Ongoing (interventions implemented as appropriate)    04/10/17 1151   Coping/Psychosocial Response Interventions   Plan Of Care Reviewed With patient   Patient Care Overview   Progress progress toward functional goals is gradual   Outcome Evaluation   Outcome Summary/Follow up Plan Clinical Re-eval completed, RN concerned regarding possible decline in swallowing skills, ? aspiration. Pt lethargic but cooperative for evaluation. Had to be cued to initiate swallow w/ all PO. Very weak OMEs, RN placing nutrition source. Rec: NPO until re-eval tomorrow. RN to call if pt improves.

## 2017-04-10 NOTE — PLAN OF CARE
Problem: Patient Care Overview (Adult)  Goal: Plan of Care Review  Outcome: Ongoing (interventions implemented as appropriate)    04/10/17 0940   Coping/Psychosocial Response Interventions   Plan Of Care Reviewed With patient   Outcome Evaluation   Outcome Summary/Follow up Plan wound bed cont to appear healthy with moderate slough remaining at distal portion of scrotum, but with decreased induration and no erythema noted. Pt will cont to benefit from debridement and pulse lavage to decrease bioburden.          Problem: Inpatient Physical Therapy  Goal: Wound Care Goal 1 LTG- PT  Outcome: Ongoing (interventions implemented as appropriate)    04/06/17 1025 04/08/17 1010   Wound Care PT LTG   Wound Care PT LTG 1, Date Established 04/06/17 --    Wound Care PT LTG 1, Time to Achieve 1 wk --    Wound Care PT LTG 1, Location Scrotum --    Wound Care PT LTG 1, No S&S of Infection yes --    Wound Care PT LTG 1, Decrease Wound Size 10% --    Wound Care PT LTG 1, Decrease Necrotic Tissue 50% --    Wound Care PT LTG 1, Decrease Exudate minimum --    Wound Care PT LTG 1, No New Skin Break Down yes --    Wound Care PT LTG 1, Education dressing changes;wound care --    Wound Care PT LTG 1, Education Understanding verbalize understanding --    Wound Care PT LTG 1, Outcome --  goal ongoing

## 2017-04-10 NOTE — PROGRESS NOTES
Acute Care - Speech Language Pathology   Swallow Initial Evaluation [facility]   Clinical Swallow Evaluation       Patient Name: Tomas Salvador  : 1955  MRN: [id number]  Today's Date: 4/10/2017  Onset of Illness/Injury or Date of Surgery Date: 17     Referring Physician: MARKOS      Admit Date: 2017    Visit Dx:     ICD-10-CM ICD-9-CM   1. Dysphagia, unspecified type R13.10 787.20   2. Impaired functional mobility, balance, gait, and endurance Z74.09 V49.89   3. Impaired mobility and ADLs Z74.09 799.89   4. Cognitive communication deficit R41.841 799.52     Patient Active Problem List   Diagnosis   • Coronary disease   • Ischemic cardiomyopathy   • Peripheral vascular disease   • ICD (implantable cardioverter-defibrillator) in place, implantation .   • Dyslipidemia, on atorvastain.    • Sepsis   • Diabetes education, encounter for   • Chronic respiratory failure with hypoxia   • Urinary retention   • Hyperlipidemia   • Essential hypertension   • E. coli UTI (urinary tract infection)   • Epididymo-orchitis   • Encephalopathy acute   • Cellulitis   • Yeast UTI     Past Medical History:   Diagnosis Date   • CAD (coronary artery disease)    • Chronic back pain    • Chronic respiratory failure with hypoxia     Night time only at 2 liters/min   • Diabetes mellitus, type 2    • Essential hypertension    • Hyperlipidemia    • Ischemic cardiomyopathy    • NSTEMI (non-ST elevated myocardial infarction)    • Obesity    • PVD (peripheral vascular disease)    • Systolic CHF     EF <20%  3/8/17   • Urinary retention      Past Surgical History:   Procedure Laterality Date   • CARDIAC CATHETERIZATION  2015    Severe multivessel coronary artery disease involving a totally occluded LAD, 99% proximal left circumflex artery; totally occluded saphenous vein graft to the diagonal; patent LIMA to the LAD, and a patent saphenous vein graft to the first obtuse marginal.      • CARDIAC DEFIBRILLATOR PLACEMENT   2015   • CORONARY ARTERY BYPASS GRAFT  2005    3 vessel   • CYSTOSCOPY N/A 3/31/2017    Procedure: CYSTOSCOPY and scrotal exploration with debridement of necrotizing fasciitis;  Surgeon: Mele Johnson MD;  Location:  [facility] OR;  Service:    • LEG SURGERY Right     unknown, pos vascular bypass   • SCROTAL EXPLORATION N/A 3/31/2017    Procedure: SCROTAL EXPLORATION;  Surgeon: Mele Johnson MD;  Location:  [facility] OR;  Service:           SWALLOW EVALUATION (last 72 hours)      Swallow Evaluation       04/10/17 0930                Rehab Evaluation    Document Type evaluation  -SG        Subjective Information agree to therapy;complains of;fatigue  -SG        Patient Effort, Rehab Treatment adequate  -SG        Symptoms Noted During/After Treatment fatigue  -SG        Symptoms Noted Comment pt lethargic, recent med admin  -SG        General Information    Patient Profile Review yes  -SG        Onset of Illness/Injury 04/03/17  -SG        Referring Physician CVA  -SG        Subjective Patient Observations lethargic and confused  -SG        Pertinent History Of Current Problem sepsis and CVA-- RN concerned regarding possible neruo decline over weekend  -SG        Current Diet Limitations NPO  -SG        Precautions/Limitations, Vision WFL  -SG        Precautions/Limitations, Hearing WFL  -SG        Prior Level of Function- Communication functional in all spheres  -SG        Prior Level of Function- Swallowing other (comment)   unknown baseline  -SG        Plans/Goals Discussed With patient;agreed upon  -SG        Barriers to Rehab medically complex  -SG        Clinical Impression    Patient's Goals For Discharge patient could not state  -SG        SLP Swallowing Diagnosis pharyngeal dysfunction   r/o decline in pharyngeal function when pt more alert  -SG        Rehab Potential/Prognosis, Swallowing good, to achieve stated therapy goals  -SG        Criteria for Skilled Therapeutic Interventions Met skilled  criteria for dysphagia intervention met  -SG        FCM, Swallowing 1-->Level 1  -SG        Therapy Frequency 5 times/wk  -SG        SLP Diet Recommendation NPO: unsafe for food/liquid intake   until re-eval tomorrow when pt more alert  -SG        Recommended Diagnostics reassess via clinical swallow (non-instrumental exam)  -SG        SLP Rec. for Method of Medication Administration meds via alternate route  -SG        Pain Assessment    Pain Assessment Jay-Kent FACES  -SG        Jay-Kent FACES Pain Rating 0  -SG        Pain Score 0  -SG        Oral Motor Structure and Function    Oral Motor Anatomy and Physiology patient demonstrates anatomy that is WNL  -SG        Dentition Assessment poor oral hygiene  -SG        Secretion Management wet vocal quality  -SG        Mucosal Quality dry  -SG        Volitional Swallow mild to moderate difficulties initiating volitional swallow  -SG        Volitional Cough weak volitional cough  -SG        Oral Musculature General Assessment oral labial impairment;lingual impairment;buccal impairment  -SG        General Feeding/Swallowing Observations    Current Feeding Method NPO  -SG        Respiratory Support Currently in Use nasal cannula in use  -SG        Clinical Swallow Exam    Mode of Presentation fed by clinician;spoon  -SG        Respiratory Concerns increased respiratory rate during eating  -SG        Oral Phase Results impaired oral phase, signs of dysfunction present  -SG        Pharyngeal Phase Results no signs/symptoms of pharyngeal impairment  -SG        Summary of Clinical Exam Clinical Re-eval completed, RN concerned regarding possible decline in swallowing skills, ? aspiration. Pt lethargic but cooperative for evaluation. Had to be cued to initiate swallow w/ all PO. Very weak OMEs, RN placing nutrition source. Rec: NPO until re-eval tomorrow. RN to call if pt improves.  -SG        Swallow Recommendations    Oral Care oral care with toothbrush and dentifrice  BID and PRN  -SG        Recommended Diet NPO: unsafe for food/liquid intake  -SG          User Key  (r) = Recorded By, (t) = Taken By, (c) = Cosigned By    Initials Name Effective Dates    SG Lyndsey Garza Darian-Jose David, MS [facility]-SLP 06/22/15 -         EDUCATION  The patient has been educated in the following areas:   Dysphagia (Swallowing Impairment) Oral Care/Hydration NPO rationale.    SLP Recommendation and Plan  SLP Swallowing Diagnosis: pharyngeal dysfunction (r/o decline in pharyngeal function when pt more alert)  SLP Diet Recommendation: NPO: unsafe for food/liquid intake (until re-eval tomorrow when pt more alert)     SLP Rec. for Method of Medication Administration: meds via alternate route     Recommended Diagnostics: reassess via clinical swallow (non-instrumental exam)  Criteria for Skilled Therapeutic Interventions Met: skilled criteria for dysphagia intervention met  Anticipated Discharge Disposition: other (see comments) (unknown at this time)  Rehab Potential/Prognosis, Swallowing: good, to achieve stated therapy goals  Therapy Frequency: 5 times/wk             Plan of Care Review  Plan Of Care Reviewed With: patient  Progress: progress toward functional goals is gradual  Outcome Summary/Follow up Plan: Clinical Re-eval completed, RN concerned regarding possible decline in swallowing skills, ? aspiration. Pt lethargic but cooperative for evaluation. Had to be cued to initiate swallow w/ all PO. Very weak OMEs, RN placing nutrition source. Rec: NPO until re-eval tomorrow. RN to call if pt improves.          IP SLP Goals       04/07/17 1438 04/06/17 1012       Safely Consume Diet    Safely Consume Diet- SLP, Date Established  04/06/17  -LS     Safely Consume Diet- SLP, Time to Achieve  by discharge  -LS     Safely Consume Diet- SLP, Outcome  goal ongoing  -LS     Cognitive Linguistic- Optimal Participation in Care    Cognitive Linguistic Optimal Participation in Care- SLP, Date Established 04/07/17  -SM       Cognitive Linguistic Optimal Participation in Care- SLP, Time to Achieve by discharge  -      Cognitive Linguistic Optimal Participation in Care- SLP, Outcome goal ongoing  -        User Key  (r) = Recorded By, (t) = Taken By, (c) = Cosigned By    Initials Name Provider Type    JENNIFER Solomon, MS CCC-SLP Speech and Language Pathologist    ANDREWS Pelletier, MS CCC-SLP Speech and Language Pathologist               Time Calculation:         Time Calculation- SLP       04/10/17 1151          Time Calculation- SLP    SLP Start Time 0930  -      SLP Received On 04/10/17  -        User Key  (r) = Recorded By, (t) = Taken By, (c) = Cosigned By    Initials Name Provider Type     Lyndsey Sultana MS CCC-SLP Speech and Language Pathologist          Therapy Charges for Today     Code Description Service Date Service Provider Modifiers Qty    [id number] HC ST EVAL ORAL PHARYNG SWALLOW 3 4/10/2017 Lyndsey Sultana MS CCC-SLP GN 1               Lyndsey Sultana MS CCC-SLP  4/10/2017

## 2017-04-10 NOTE — PROGRESS NOTES
"[facility] Progress Note    Admission Date: 4/4/2017    Tomas Salvador  1955  [id number]    Date: 4/10/2017    Meds:    IV Anti-Infectives     Ordered     Dose/Rate Route Frequency Start Stop    04/04/17 1723  metroNIDAZOLE (FLAGYL) IVPB 500 mg     Ordering Provider:  Kike Leon MD    500 mg  100 mL/hr over 60 Minutes Intravenous Every 6 Hours 04/04/17 1800      04/04/17 1552  ceFAZolin in dextrose (ANCEF) IVPB solution 2 g     Ordering Provider:  TRESA Baca    2 g  over 30 Minutes Intravenous Every 8 Hours 04/04/17 1600            CC:  Delisa's gangrene    SUBJECTIVE:  4/4/17:Patient is a 61 y.o. 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 He has a history of underlying diabetes mellitus and severe systolic congestive heart failure. He presented to [facility] on 3/30 with scrotal cellulitis/gangrene. He underwent debridement by urology at [facility] on 3/31 and was treated with Merrem/clindamycin/vancomycin. He developed left facial droop and left sided paresis, prompting a transfer to [facility] for evaluation and therapy of acute stroke. His head CT angiogram was degraded by motion artifact but per Dr. Morrison there was suggestion of right inferior temporal lobe\" opacification\" consistent with a possible stroke. His left facial droop and left-sided paresis has improved today. He is encephalopathic and is unable to provide any reliable history. His wife thinks that he may have had some fevers prior to his admission on 3/30.    4/5/17: afebrile. No hx per patient secondary to encephalopathy.  Afebrile, restless per nsg staff.  Oliguric UOP.  No n/v/d.  No rashes.  4/6/17: More alert and less restless today.  Still non-responsive to questions and does not follow simple commands.  Still with left-sided weakness.  Afebrile.  Still with oliguric UOP.  No n/v/d, no rashes, per nsg staff notes.   4/7/17:  Alert today.  No " fever,chills,sweats.  Wife at bedside with ?s   4/10/17:  Wants to go home.  Wound care per PT.  No n/v/d.         PE:   Vital Signs  Temp (24hrs), Av.9 °F (36.6 °C), Min:97.5 °F (36.4 °C), Max:98.5 °F (36.9 °C)    Temp  Min: 97.5 °F (36.4 °C)  Max: 98.5 °F (36.9 °C)  BP  Min: 73/51  Max: 122/48  Pulse  Min: 55  Max: 96  Resp  Min: 14  Max: 18  SpO2  Min: 90 %  Max: 100 %    GENERAL: He is awake but responsive  to question He is in no acute distress.  HEENT: Normocephalic, atraumatic. PERRL. EOMI. No conjunctival injection. No icterus. Oropharynx clear without evidence of thrush or exudate.   HEART: RRR; No murmur, rubs, gallops.   LUNGS: Clear to auscultation bilaterally without wheezing, rales, rhonchi. Normal respiratory effort. Nonlabored. No dullness.diminished  ABDOMEN: Soft, nontender, nondistended. Positive bowel sounds. No rebound or guarding. NO mass or HSM.  EXT: No cyanosis, clubbing or edema. No cord.  : scrotal wound with some slough base of wound; several areas left scrotum which track into distal wound.  Right scrotal wound, tracks to distal wound.  His slough and induration has markedly improved.  MSK: FROM without joint effusions noted arms/legs.   SKIN: Warm and dry without cutaneous eruptions on Inspection/palpation.   NEURO: He is more alert, but confused and is not following simple commands.  PSYCHIATRIC: He is encephalopathic and cannot provide an accurate history.  Right PICC without redness   Laboratory Data      Results from last 7 days  Lab Units 04/10/17  0458 17  0429 17  0422   WBC 10*3/mm3 9.97 12.63* 12.63*   HEMOGLOBIN g/dL 10.0* 10.6* 9.9*   HEMATOCRIT % 32.3* 34.2* 31.6*   PLATELETS 10*3/mm3 337 372 328       Results from last 7 days  Lab Units 04/10/17  0458   SODIUM mmol/L 140   POTASSIUM mmol/L 3.7   CHLORIDE mmol/L 102   TOTAL CO2 mmol/L 32.0*   BUN mg/dL 20   CREATININE mg/dL 0.80   GLUCOSE mg/dL 91   CALCIUM mg/dL 8.8       Results from last 7 days  Lab  Units 04/10/17  0458   ALK PHOS U/L 66   BILIRUBIN mg/dL 0.3   ALT (SGPT) U/L 11   AST (SGOT) U/L 34*           Results from last 7 days  Lab Units 04/10/17  0458   CRP mg/dL 2.98*           Results from last 7 days  Lab Units 04/04/17  1056   CK TOTAL U/L 54         Estimated Creatinine Clearance: 113.2 mL/min (by C-G formula based on Cr of 0.8).    Microbiology:  Blood Culture   Date Value Ref Range Status   03/30/2017 No growth at 5 days  Final   03/30/2017 No growth at 5 days  Final           Urine Culture   Date Value Ref Range Status   04/04/2017 No growth  Preliminary   03/31/2017 No growth  Final   03/30/2017 >100,000 CFU/mL Escherichia coli (A)  Final      scrotal cx x 2 cxs (3/31) E. coli  Cefazolin sens.    4/4/17:  Urine culture:  >100K cfu Yeast    4/7:  UA 13-20 WBCs, large amount of budding yeast/ urine culture: pending     Radiology:  Imaging Results (last 24 hours)     Procedure Component Value Units Date/Time    CT Head Without Contrast [[id number]] Collected:  04/05/17 1241     Updated:  04/05/17 1241    Narrative:       EXAMINATION: CT HEAD WO CONTRAST-, CT ANGIOGRAM HEAD W WO CONTRAST-  04/04/2017     INDICATION: CVA; altered mental status, bilateral upper extremities  weakness.     TECHNIQUE: Contiguous axial 5 mm sections through the brain parenchyma  without intravenous contrast.  Multislice helical CT angiogram of the head before and after intravenous  contrast.     Two dimensional and three dimensional reformatted images were provided  for this exam.  NASCET criteria are utilized for calculation of stenosis.     COMPARISON: NONE     FINDINGS:   Brain:  The midline structures are central. There is no midline shift. Areas of  decreased attenuation are present in the left frontal periventricular  white matter. An old infarct is present in the left frontal lobe. The  structures of the posterior fossa are grossly normal. The images are  somewhat compromised by motion artifact.     The bony  calvarium is intact.     Brain CTA:  The brain CTA images are extremely compromised by patient motion.  Right:  Distal portions of the right internal carotid artery are normal in  course and caliber. The right middle cerebral artery is grossly  unremarkable. The anterior cerebral artery cannot be evaluated secondary  to motion artifact.     Left:  Distal portions of the left internal carotid artery are normal in course  and caliber. The left middle cerebral artery is normal. The anterior  cerebral artery cannot be well evaluated secondary to motion artifact.     Posterior circulation:  The distal vertebral arteries, basilar artery, and posterior cerebral  arteries are normal in course and caliber.       Impression:       1. Both exams compromised by motion artifact.  2. Grossly no acute intracranial process.  3. 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  4. Normal middle cerebral arteries and posterior circulation.  5. The anterior cerebral arteries are not well evaluated.     D:  04/05/2017  E:  04/05/2017          CT Angiogram Head With & Without Contrast [[id number]] Collected:  04/05/17 1241     Updated:  04/05/17 1241    Narrative:       EXAMINATION: CT HEAD WO CONTRAST-, CT ANGIOGRAM HEAD W WO CONTRAST-  04/04/2017     INDICATION: CVA; altered mental status, bilateral upper extremities  weakness.     TECHNIQUE: Contiguous axial 5 mm sections through the brain parenchyma  without intravenous contrast.  Multislice helical CT angiogram of the head before and after intravenous  contrast.     Two dimensional and three dimensional reformatted images were provided  for this exam.  NASCET criteria are utilized for calculation of stenosis.     COMPARISON: NONE     FINDINGS:   Brain:  The midline structures are central. There is no midline shift. Areas of  decreased attenuation are present in the left frontal periventricular  white matter. An old infarct is present in the left frontal lobe. The  structures of the posterior fossa are  grossly normal. The images are  somewhat compromised by motion artifact.     The bony calvarium is intact.     Brain CTA:  The brain CTA images are extremely compromised by patient motion.  Right:  Distal portions of the right internal carotid artery are normal in  course and caliber. The right middle cerebral artery is grossly  unremarkable. The anterior cerebral artery cannot be evaluated secondary  to motion artifact.     Left:  Distal portions of the left internal carotid artery are normal in course  and caliber. The left middle cerebral artery is normal. The anterior  cerebral artery cannot be well evaluated secondary to motion artifact.     Posterior circulation:  The distal vertebral arteries, basilar artery, and posterior cerebral  arteries are normal in course and caliber.       Impression:       1. Both exams compromised by motion artifact.  2. Grossly no acute intracranial process.  3. 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  4. Normal middle cerebral arteries and posterior circulation.  5. The anterior cerebral arteries are not well evaluated.     D:  04/05/2017  E:  04/05/2017          XR Chest 1 View [[id number]] Collected:  04/05/17 1406     Updated:  04/05/17 1423    Narrative:       EXAMINATION: XR CHEST 1 VIEW-04/04/2017:      INDICATION: Sepsis, cardiomyopathy; R13.10-Dysphagia, unspecified.      COMPARISON: NONE.     FINDINGS:   1.  There is cardiomegaly with perihilar vascular congestion.  2.  Bibasilar consolidative airspace opacities are noted, worse on the  left than right.  3.  ICD pacemaker defibrillator is in place from the left. The upper  lung zones are clear.           Impression:       1.  Cardiomegaly is noted. There is airspace opacity left base with  consolidation. There is congestive hilar and mid lung vascular  abnormalities.  2.  Compared to previous studies of 12/17/2015, it appears that the  central congestive vascular edema is new. The density in the left lower  lobe, retrocardiac area and  the left base effusion are new. Cardiomegaly  is relatively stable.     D:  04/05/2017  E:  04/05/2017     This report was finalized on 4/5/2017 2:21 PM by Dr. Scottie Bowling MD.       CT Head Without Contrast [[id number]] Collected:  04/05/17 1648     Updated:  04/05/17 1648    Narrative:       EXAMINATION: CT HEAD WITHOUT CONTRAST-04/05/2017:      INDICATION: CVA; R13.10-Dysphagia, unspecified; Z74.09-Other reduced  mobility; Z74.09-Other reduced mobility.         TECHNIQUE: CT scan of the head was performed at 5 mm intervals. No  intravenous contrast was utilized.     COMPARISON: 04/04/2017.     FINDINGS: There is a small old left frontal infarct. There is no  intracranial mass, hemorrhage, midline shift or extra-axial fluid  collection.       Impression:       Old left frontal infarct. There are no acute findings.     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 He is significantly improved.  2. Acute right hemispheric cerebral vascular accident-with left sided facial and left-sided weakness. His head CT angiogram here reveals subtle right temporal abnormalities per Dr. Morrison. He has clinically improved.  3. Acute toxic metabolic encephalopathy-secondary to sepsis and cerebral vascular accident  4. 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 Severe systolic congestive heart failure-with an ejection fraction of less than 20% on echocardiogram performed on 3/8/17  6. Escherichia coli urinary tract infection  7. Sepsis-secondary to Delisa's gangrene  8. Leukocytosis/neutrophilia-secondary to scrotal gangrene/cellulitis, worse today.  9.  LLL pulmonary infiltrate  10. Candiduria-he has a relative  contraindication for fluconazole therapy since he is on amiodarone.    PLAN/RECOMMENDATIONS:   1. Cefazolin 2 g IV every 8 hours  2. Flagyl 500 mg IV every 8 hours  3. PT wound care with debridement and pulse lavage  4.  Continue to leave the Cueto catheter out-performed in and out catheterization instead.  I discussed this with the nursing staff today.         Kike Leon MD saw and examined patient, verified hx and PE, read all radiographic studies, reviewed labs and micro data, and formulated dx, plan for treatment and all medical decision making.      I discussed his wound care with Pee Mast in PT wound care today.  We examined his wound together.  Discussed his situation with Dr. Pope.        Kike Leon MD  4/10/2017  3:00 PM

## 2017-04-11 NOTE — PROGRESS NOTES
Neurology       Patient Care Team:  Jose Navarro MD as PCP - General (Family Medicine)    Chief complaint: Altered mental state    History:  Patient is in a serious and gradual decline.    He is bradycardia down to the 40s and dropped his pressure.    His wife is made of an AMD.      Past Medical History:   Diagnosis Date   • CAD (coronary artery disease)    • Chronic back pain    • Chronic respiratory failure with hypoxia     Night time only at 2 liters/min   • Diabetes mellitus, type 2    • Essential hypertension    • Hyperlipidemia    • Ischemic cardiomyopathy    • NSTEMI (non-ST elevated myocardial infarction) 2015   • Obesity    • PVD (peripheral vascular disease)    • Systolic CHF     EF <20%  3/8/17   • Urinary retention        Vital Signs   Vitals:    04/11/17 1100 04/11/17 1105 04/11/17 1130 04/11/17 1200   BP: 116/76  117/81 114/78   BP Location:       Patient Position:       Pulse: 66 66 69 69   Resp:       Temp:       TempSrc:       SpO2: 100% 100% 100%    Weight:       Height:           Physical Exam:   General: Unarousable              Neuro: No response to stimulation    Results Review:  Reviewed    Results from last 7 days  Lab Units 04/10/17  0458   WBC 10*3/mm3 9.97   HEMOGLOBIN g/dL 10.0*   HEMATOCRIT % 32.3*   PLATELETS 10*3/mm3 337       Results from last 7 days  Lab Units 04/11/17 0417 04/10/17  2006 04/10/17  0458 04/09/17  0429   SODIUM mmol/L 138 138 140 137   POTASSIUM mmol/L 4.1 4.2 3.7 3.9   CHLORIDE mmol/L 102 102 102 100   TOTAL CO2 mmol/L 30.0 29.0 32.0* 31.0   BUN mg/dL 19 21 20 17   CREATININE mg/dL 0.60 0.70 0.80 0.70   CALCIUM mg/dL 9.4 9.3 8.8 9.0   BILIRUBIN mg/dL  --  0.4 0.3 0.4   ALK PHOS U/L  --  74 66 71   ALT (SGPT) U/L  --  14 11 25   AST (SGOT) U/L  --  70* 34* 67*   GLUCOSE mg/dL 90 97 91 196*     Imaging Results (last 24 hours)     Procedure Component Value Units Date/Time    XR Abdomen KUB [[id number]] Collected:  04/10/17 1224     Updated:  04/10/17 1555     Narrative:       EXAMINATION: XR ABDOMEN KUB-      INDICATION: Keofeed placement - in and ready for x-ray at 11:45;  R13.10-Dysphagia, unspecified; Z74.09-Other reduced mobility;  R41.841-Cognitive communication deficit.      COMPARISON: None.     FINDINGS: Portable KUB reveals the tip of the feeding tube seen in the  distal stomach. There is a fold of the feeding tube seen in the proximal  stomach. Continued advancement is recommended for postpyloric placement.   There is dilated gaseous distended loops of small bowel within the  abdomen.       Impression:       Feeding tube tip seen in the distal stomach in which  continued advancement is recommended for postpyloric placement.     D:  04/10/2017  E:  04/10/2017     This report was finalized on 4/10/2017 3:54 PM by Dr. Megan Posey MD.       XR Chest 1 View [[id number]] Collected:  04/10/17 1114     Updated:  04/10/17 1559    Narrative:       EXAMINATION: XR CHEST 1 VW-      INDICATION: Pos aspiration; R13.10-Dysphagia, unspecified; Z74.09-Other  reduced mobility; R41.841-Cognitive communication deficit.      COMPARISON: None.     FINDINGS: Portable chest reveals patient to be status post median  sternotomy.  The heart is enlarged.  There is a small left pleural  effusion. Degenerative change is seen within the spine.  There is  increased pulmonary vascularity bilaterally.           Impression:       Stable chest with increased pulmonary vascularity  bilaterally. The heart is enlarged with small left pleural effusion.     D:  04/10/2017  E:  04/10/2017     This report was finalized on 4/10/2017 3:57 PM by Dr. Megan Posey MD.       XR Abdomen KUB [[id number]] Collected:  04/10/17 1646     Updated:  04/10/17 1647    Narrative:       EXAMINATION: XR ABDOMEN KUB- 04/10/2017     INDICATION: Keofeed placement; R13.10-Dysphagia, unspecified;  Z74.09-Other reduced mobility; R41.841-Cognitive communication deficit      COMPARISON: NONE     FINDINGS: KUB reveals  feeding tube identified tip in the distal stomach.  There is some slight retraction at the tip of the tube with folds seen  in the proximal stomach. Continued advancement is recommended for post  pyloric placement.           Impression:       Feeding tube tip retracted into the distal stomach. Fold is  seen in the fundus of the stomach. Continued advancement is recommended  for post pyloric placement.     D:  04/10/2017  E:  04/10/2017          XR Abdomen KUB [[id number]] Updated:  04/11/17 0148    CT Head Without Contrast [[id number]] Collected:  04/05/17 1241     Updated:  04/11/17 0802    Narrative:       EXAMINATION: CT HEAD WO CONTRAST-, CT ANGIOGRAM HEAD W WO CONTRAST-  04/04/2017     INDICATION: CVA; altered mental status, bilateral upper extremities  weakness.     TECHNIQUE: Contiguous axial 5 mm sections through the brain parenchyma  without intravenous contrast.  Multislice helical CT angiogram of the head before and after intravenous  contrast.     Two dimensional and three dimensional reformatted images were provided  for this exam.  NASCET criteria are utilized for calculation of stenosis.     COMPARISON: NONE     FINDINGS:   Brain:  The midline structures are central. There is no midline shift. Areas of  decreased attenuation are present in the left frontal periventricular  white matter. An old infarct is present in the left frontal lobe. The  structures of the posterior fossa are grossly normal. The images are  somewhat compromised by motion artifact.     The bony calvarium is intact.     Brain CTA:  The brain CTA images are extremely compromised by patient motion.  Right:  Distal portions of the right internal carotid artery are normal in  course and caliber. The right middle cerebral artery is grossly  unremarkable. The anterior cerebral artery cannot be evaluated secondary  to motion artifact.     Left:  Distal portions of the left internal carotid artery are normal in course  and caliber. The left  middle cerebral artery is normal. The anterior  cerebral artery cannot be well evaluated secondary to motion artifact.     Posterior circulation:  The distal vertebral arteries, basilar artery, and posterior cerebral  arteries are normal in course and caliber.       Impression:       1. Both exams compromised by motion artifact.  2. Grossly no acute intracranial process.  3. 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  4. Normal middle cerebral arteries and posterior circulation.  5. The anterior cerebral arteries are not well evaluated.     D:  04/05/2017  E:  04/05/2017        This report was finalized on 4/11/2017 8:00 AM by Dr. Jace Esquivel MD.       CT Angiogram Head With & Without Contrast [[id number]] Collected:  04/05/17 1241     Updated:  04/11/17 0802    Narrative:       EXAMINATION: CT HEAD WO CONTRAST-, CT ANGIOGRAM HEAD W WO CONTRAST-  04/04/2017     INDICATION: CVA; altered mental status, bilateral upper extremities  weakness.     TECHNIQUE: Contiguous axial 5 mm sections through the brain parenchyma  without intravenous contrast.  Multislice helical CT angiogram of the head before and after intravenous  contrast.     Two dimensional and three dimensional reformatted images were provided  for this exam.  NASCET criteria are utilized for calculation of stenosis.     COMPARISON: NONE     FINDINGS:   Brain:  The midline structures are central. There is no midline shift. Areas of  decreased attenuation are present in the left frontal periventricular  white matter. An old infarct is present in the left frontal lobe. The  structures of the posterior fossa are grossly normal. The images are  somewhat compromised by motion artifact.     The bony calvarium is intact.     Brain CTA:  The brain CTA images are extremely compromised by patient motion.  Right:  Distal portions of the right internal carotid artery are normal in  course and caliber. The right middle cerebral artery is grossly  unremarkable. The anterior cerebral artery  cannot be evaluated secondary  to motion artifact.     Left:  Distal portions of the left internal carotid artery are normal in course  and caliber. The left middle cerebral artery is normal. The anterior  cerebral artery cannot be well evaluated secondary to motion artifact.     Posterior circulation:  The distal vertebral arteries, basilar artery, and posterior cerebral  arteries are normal in course and caliber.       Impression:       1. Both exams compromised by motion artifact.  2. Grossly no acute intracranial process.  3. 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  4. Normal middle cerebral arteries and posterior circulation.  5. The anterior cerebral arteries are not well evaluated.     D:  04/05/2017  E:  04/05/2017        This report was finalized on 4/11/2017 8:00 AM by Dr. Jace Esquivel MD.             Assessment:  Mixed encephalopathy    Plan:  I agree with recommendation and plan for AMD.    It appears that the patient is actively dying.        Comment:  We'll see on request         I discussed the patients findings and my recommendations with nursing staff    Luis Alberto Phelan MD  04/11/17  12:52 PM

## 2017-04-11 NOTE — PROGRESS NOTES
"[facility] Progress Note    Admission Date: 4/4/2017    Tomas Salvador  1955  [id number]    Date: 4/11/2017    Meds:    IV Anti-Infectives     Ordered     Dose/Rate Route Frequency Start Stop    04/04/17 1723  metroNIDAZOLE (FLAGYL) IVPB 500 mg     Ordering Provider:  Kike Leon MD    500 mg  100 mL/hr over 60 Minutes Intravenous Every 6 Hours 04/04/17 1800      04/04/17 1552  ceFAZolin in dextrose (ANCEF) IVPB solution 2 g     Ordering Provider:  TRESA Baca    2 g  over 30 Minutes Intravenous Every 8 Hours 04/04/17 1600            CC:  Delisa's gangrene    SUBJECTIVE:  4/4/17:Patient is a 61 y.o. 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 He has a history of underlying diabetes mellitus and severe systolic congestive heart failure. He presented to [facility] on 3/30 with scrotal cellulitis/gangrene. He underwent debridement by urology at [facility] on 3/31 and was treated with Merrem/clindamycin/vancomycin. He developed left facial droop and left sided paresis, prompting a transfer to [facility] for evaluation and therapy of acute stroke. His head CT angiogram was degraded by motion artifact but per Dr. Morrison there was suggestion of right inferior temporal lobe\" opacification\" consistent with a possible stroke. His left facial droop and left-sided paresis has improved today. He is encephalopathic and is unable to provide any reliable history. His wife thinks that he may have had some fevers prior to his admission on 3/30.    4/5/17: afebrile. No hx per patient secondary to encephalopathy.  Afebrile, restless per nsg staff.  Oliguric UOP.  No n/v/d.  No rashes.  4/6/17: More alert and less restless today.  Still non-responsive to questions and does not follow simple commands.  Still with left-sided weakness.  Afebrile.  Still with oliguric UOP.  No n/v/d, no rashes, per nsg staff notes.   4/7/17:  Alert today.  No " fever,chills,sweats.  Wife at bedside with ?s   4/10/17:  Wants to go home.  Wound care per PT.  No n/v/d.   17:  Now on Dopamine and Bautista, bipap.  Decompensated last pm.          PE:   Vital Signs  Temp (24hrs), Av °F (36.7 °C), Min:97.5 °F (36.4 °C), Max:98.5 °F (36.9 °C)    Temp  Min: 97.5 °F (36.4 °C)  Max: 98.5 °F (36.9 °C)  BP  Min: 91/61  Max: 130/76  Pulse  Min: 48  Max: 82  Resp  Min: 16  Max: 23  SpO2  Min: 90 %  Max: 100 %    GENERAL: Somnelent;  He is in no acute distress on bipap  HEENT: Normocephalic, atraumatic. PERRL. EOMI. No conjunctival injection. No icterus. Oropharynx clear without evidence of thrush or exudate.   HEART: RRR; No murmur, rubs, gallops.   LUNGS: few anterior rhonchi   ABDOMEN: Soft, nontender, nondistended. Positive bowel sounds. No rebound or guarding. NO mass or HSM.  EXT: No cyanosis, clubbing or edema. No cord.  : scrotal wound  Dressing in  Place   MSK: FROM without joint effusions noted arms/legs.   SKIN: Warm and dry without cutaneous eruptions on Inspection/palpation.   NEURO: somnelent       Right PICC without redness   Laboratory Data      Results from last 7 days  Lab Units 04/10/17  0458 17  0429 17  0422   WBC 10*3/mm3 9.97 12.63* 12.63*   HEMOGLOBIN g/dL 10.0* 10.6* 9.9*   HEMATOCRIT % 32.3* 34.2* 31.6*   PLATELETS 10*3/mm3 337 372 328       Results from last 7 days  Lab Units 17   SODIUM mmol/L 138   POTASSIUM mmol/L 4.1   CHLORIDE mmol/L 102   TOTAL CO2 mmol/L 30.0   BUN mg/dL 19   CREATININE mg/dL 0.60   GLUCOSE mg/dL 90   CALCIUM mg/dL 9.4       Results from last 7 days  Lab Units 04/10/17  2006   ALK PHOS U/L 74   BILIRUBIN mg/dL 0.4   ALT (SGPT) U/L 14   AST (SGOT) U/L 70*           Results from last 7 days  Lab Units 04/10/17  0458   CRP mg/dL 2.98*                 Estimated Creatinine Clearance: 150.9 mL/min (by C-G formula based on Cr of 0.6).    Microbiology:  Blood Culture   Date Value Ref Range Status   2017 No  growth at 5 days  Final   03/30/2017 No growth at 5 days  Final           Urine Culture   Date Value Ref Range Status   04/04/2017 No growth  Preliminary   03/31/2017 No growth  Final   03/30/2017 >100,000 CFU/mL Escherichia coli (A)  Final      scrotal cx x 2 cxs (3/31) E. coli  Cefazolin sens.    4/4/17:  Urine culture:  >100K cfu Yeast    4/7:  UA 13-20 WBCs, large amount of budding yeast/ urine culture: pending     Radiology:  Imaging Results (last 24 hours)     Procedure Component Value Units Date/Time    CT Head Without Contrast [[id number]] Collected:  04/05/17 1241     Updated:  04/05/17 1241    Narrative:       EXAMINATION: CT HEAD WO CONTRAST-, CT ANGIOGRAM HEAD W WO CONTRAST-  04/04/2017     INDICATION: CVA; altered mental status, bilateral upper extremities  weakness.     TECHNIQUE: Contiguous axial 5 mm sections through the brain parenchyma  without intravenous contrast.  Multislice helical CT angiogram of the head before and after intravenous  contrast.     Two dimensional and three dimensional reformatted images were provided  for this exam.  NASCET criteria are utilized for calculation of stenosis.     COMPARISON: NONE     FINDINGS:   Brain:  The midline structures are central. There is no midline shift. Areas of  decreased attenuation are present in the left frontal periventricular  white matter. An old infarct is present in the left frontal lobe. The  structures of the posterior fossa are grossly normal. The images are  somewhat compromised by motion artifact.     The bony calvarium is intact.     Brain CTA:  The brain CTA images are extremely compromised by patient motion.  Right:  Distal portions of the right internal carotid artery are normal in  course and caliber. The right middle cerebral artery is grossly  unremarkable. The anterior cerebral artery cannot be evaluated secondary  to motion artifact.     Left:  Distal portions of the left internal carotid artery are normal in course  and  caliber. The left middle cerebral artery is normal. The anterior  cerebral artery cannot be well evaluated secondary to motion artifact.     Posterior circulation:  The distal vertebral arteries, basilar artery, and posterior cerebral  arteries are normal in course and caliber.       Impression:       1. Both exams compromised by motion artifact.  2. Grossly no acute intracranial process.  3. 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  4. Normal middle cerebral arteries and posterior circulation.  5. The anterior cerebral arteries are not well evaluated.     D:  04/05/2017  E:  04/05/2017          CT Angiogram Head With & Without Contrast [[id number]] Collected:  04/05/17 1241     Updated:  04/05/17 1241    Narrative:       EXAMINATION: CT HEAD WO CONTRAST-, CT ANGIOGRAM HEAD W WO CONTRAST-  04/04/2017     INDICATION: CVA; altered mental status, bilateral upper extremities  weakness.     TECHNIQUE: Contiguous axial 5 mm sections through the brain parenchyma  without intravenous contrast.  Multislice helical CT angiogram of the head before and after intravenous  contrast.     Two dimensional and three dimensional reformatted images were provided  for this exam.  NASCET criteria are utilized for calculation of stenosis.     COMPARISON: NONE     FINDINGS:   Brain:  The midline structures are central. There is no midline shift. Areas of  decreased attenuation are present in the left frontal periventricular  white matter. An old infarct is present in the left frontal lobe. The  structures of the posterior fossa are grossly normal. The images are  somewhat compromised by motion artifact.     The bony calvarium is intact.     Brain CTA:  The brain CTA images are extremely compromised by patient motion.  Right:  Distal portions of the right internal carotid artery are normal in  course and caliber. The right middle cerebral artery is grossly  unremarkable. The anterior cerebral artery cannot be evaluated secondary  to motion artifact.      Left:  Distal portions of the left internal carotid artery are normal in course  and caliber. The left middle cerebral artery is normal. The anterior  cerebral artery cannot be well evaluated secondary to motion artifact.     Posterior circulation:  The distal vertebral arteries, basilar artery, and posterior cerebral  arteries are normal in course and caliber.       Impression:       1. Both exams compromised by motion artifact.  2. Grossly no acute intracranial process.  3. 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  4. Normal middle cerebral arteries and posterior circulation.  5. The anterior cerebral arteries are not well evaluated.     D:  04/05/2017  E:  04/05/2017          XR Chest 1 View [[id number]] Collected:  04/05/17 1406     Updated:  04/05/17 1423    Narrative:       EXAMINATION: XR CHEST 1 VIEW-04/04/2017:      INDICATION: Sepsis, cardiomyopathy; R13.10-Dysphagia, unspecified.      COMPARISON: NONE.     FINDINGS:   1.  There is cardiomegaly with perihilar vascular congestion.  2.  Bibasilar consolidative airspace opacities are noted, worse on the  left than right.  3.  ICD pacemaker defibrillator is in place from the left. The upper  lung zones are clear.           Impression:       1.  Cardiomegaly is noted. There is airspace opacity left base with  consolidation. There is congestive hilar and mid lung vascular  abnormalities.  2.  Compared to previous studies of 12/17/2015, it appears that the  central congestive vascular edema is new. The density in the left lower  lobe, retrocardiac area and the left base effusion are new. Cardiomegaly  is relatively stable.     D:  04/05/2017  E:  04/05/2017     This report was finalized on 4/5/2017 2:21 PM by Dr. Scottie Bowling MD.       CT Head Without Contrast [[id number]] Collected:  04/05/17 1648     Updated:  04/05/17 1648    Narrative:       EXAMINATION: CT HEAD WITHOUT CONTRAST-04/05/2017:      INDICATION: CVA; R13.10-Dysphagia, unspecified; Z74.09-Other reduced  mobility;  Z74.09-Other reduced mobility.         TECHNIQUE: CT scan of the head was performed at 5 mm intervals. No  intravenous contrast was utilized.     COMPARISON: 04/04/2017.     FINDINGS: There is a small old left frontal infarct. There is no  intracranial mass, hemorrhage, midline shift or extra-axial fluid  collection.       Impression:       Old left frontal infarct. There are no acute findings.     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 He is significantly improved.  2. Acute right hemispheric cerebral vascular accident-with left sided facial and left-sided weakness. His head CT angiogram here reveals subtle right temporal abnormalities per Dr. Morrison. He has clinically improved.  3. Acute toxic metabolic encephalopathy-secondary to sepsis and cerebral vascular accident  4. 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 Severe systolic congestive heart failure-with an ejection fraction of less than 20% on echocardiogram performed on 3/8/17  6. Escherichia coli urinary tract infection  7. Sepsis-secondary to Delisa's gangrene  8. Leukocytosis/neutrophilia-secondary to scrotal gangrene/cellulitis, improved   9.  LLL pulmonary infiltrate  10. Candiduria-he has a relative contraindication for fluconazole therapy since he is on amiodarone.    PLAN/RECOMMENDATIONS:   1. Cefazolin 2 g IV every 8 hours  2. Flagyl 500 mg IV every 8 hours  3. PT wound care with debridement and pulse lavage  4.  Continue to leave the Cueto catheter out- in and out catheterization  Prn retention          Kike Leon MD saw and examined patient, verified hx and PE, read all radiographic studies, reviewed labs and micro data, and  formulated dx, plan for treatment and all medical decision making.      He appears to have a poor prognosis, primarily related to his severe systolic congestive heart failure.  I discussed his situation with Dr. Pope today.  .        Kike Leon MD  4/11/2017  5:24 PM

## 2017-04-11 NOTE — PLAN OF CARE
Problem: Patient Care Overview (Adult)  Goal: Plan of Care Review  Outcome: Ongoing (interventions implemented as appropriate)    04/11/17 1530   Coping/Psychosocial Response Interventions   Plan Of Care Reviewed With patient   Outcome Evaluation   Outcome Summary/Follow up Plan perineal wound noted to have decreasing exudate with improved granulation tissue today. PT will cont with pulse lavage now every other day to help minimize inflammation and improve healing potential.          Problem: Inpatient Physical Therapy  Goal: Wound Care Goal 1 LTG- PT  Outcome: Ongoing (interventions implemented as appropriate)    04/06/17 1025 04/08/17 1010   Wound Care PT LTG   Wound Care PT LTG 1, Date Established 04/06/17 --    Wound Care PT LTG 1, Time to Achieve 1 wk --    Wound Care PT LTG 1, Location Scrotum --    Wound Care PT LTG 1, No S&S of Infection yes --    Wound Care PT LTG 1, Decrease Wound Size 10% --    Wound Care PT LTG 1, Decrease Necrotic Tissue 50% --    Wound Care PT LTG 1, Decrease Exudate minimum --    Wound Care PT LTG 1, No New Skin Break Down yes --    Wound Care PT LTG 1, Education dressing changes;wound care --    Wound Care PT LTG 1, Education Understanding verbalize understanding --    Wound Care PT LTG 1, Outcome --  goal ongoing

## 2017-04-11 NOTE — PROGRESS NOTES
Acute Care - Wound/Debridement Treatment Note  [facility]     Patient Name: Tomas Salvador  : 1955  MRN: [id number]  Today's Date: 2017  Onset of Illness/Injury or Date of Surgery Date: 17   Date of Referral to PT: 17   Referring Physician: Dr. Morrison       Admit Date: 2017    Visit Dx:    ICD-10-CM ICD-9-CM   1. Dysphagia, unspecified type R13.10 787.20   2. Impaired functional mobility, balance, gait, and endurance Z74.09 V49.89   3. Impaired mobility and ADLs Z74.09 799.89   4. Cognitive communication deficit R41.841 799.52       Patient Active Problem List   Diagnosis   • Coronary disease   • Ischemic cardiomyopathy   • Peripheral vascular disease   • ICD (implantable cardioverter-defibrillator) in place, implantation .   • Dyslipidemia, on atorvastain.    • Sepsis   • Diabetes education, encounter for   • Chronic respiratory failure with hypoxia   • Urinary retention   • Hyperlipidemia   • Essential hypertension   • E. coli UTI (urinary tract infection)   • Epididymo-orchitis   • Encephalopathy acute   • Cellulitis   • Yeast UTI               LDA Wound       17 1530          Incision 17 1056 other (see comments) scrotum    Incision - Properties Group Placement Date: 17  - Placement Time: 1056 Side: other (see comments)  - Location: scrotum  -JW    Incision WDL WDL  -MF      Dressing Appearance moist drainage  -MF      Appearance drainage;open areas  -MF      Drainage Characteristics/Odor yellow;serous  -MF      Drainage Amount small  -MF      Wound Cleaning irrigated with;sterile normal saline  -MF      Dressing open to air  -MF      Packing other (see comments)   sorbact  -MF        User Key  (r) = Recorded By, (t) = Taken By, (c) = Cosigned By    Initials Name Provider Type    VALERI Roldan PT Physical Therapist    JAY JAY Silveira RN Registered Nurse            WOUND DEBRIDEMENT  Debridement Site 1  Location- Site 1: perineal  wound  Selective Debridement- Site 1: Wound Surface <20cmsq (~5 cm2 area total)  Instruments- Site 1: tweezers, scissors  Excised Tissue Description- Site 1: minimum, slough  Bleeding- Site 1: none                  Adult Rehabilitation Note       04/11/17 1530 04/10/17 0940 04/09/17 1411    Rehab Assessment/Intervention    Discipline physical therapist  -MF physical therapist  -MF physical therapist  -    Document Type therapy note (daily note)  -MF therapy note (daily note)  -MF therapy note (daily note)  -DR    Subjective Information decreased LOC   RN gave ok for tx.  - agree to therapy;complains of;weakness;fatigue;pain  - agree to therapy;no complaints  -DR    Patient Effort, Rehab Treatment   good  -DR    Symptoms Noted During/After Treatment   other (see comments)   increased agitation with activity; resistant to sitting  -DR    Precautions/Limitations   fall precautions;oxygen therapy device and L/min  -DR    Recorded by [MF] Pee Roldan, PT [MF] Pee Roldan, PT [DR] Mikey oMore, PT    Vital Signs    Pre Systolic BP Rehab   107  -DR    Pre Treatment Diastolic BP   69  -DR    Post Systolic BP Rehab   102  -DR    Post Treatment Diastolic BP   68  -DR    Pretreatment Heart Rate (beats/min)   86  -DR    Posttreatment Heart Rate (beats/min)   80  -DR    Pre SpO2 (%)   99  -DR    O2 Delivery Pre Treatment   supplemental O2   5.5L  -DR    Intra SpO2 (%)   89  -DR    O2 Delivery Intra Treatment   supplemental O2   5.5L  -DR    Post SpO2 (%)   100  -DR    O2 Delivery Post Treatment   supplemental O2   5.5L  -DR    Pre Patient Position   Supine  -DR    Intra Patient Position   Standing  -DR    Post Patient Position   Supine  -DR    Recorded by   [DR] Mikey Moore, PT    Pain Assessment    Pain Assessment Jay-Baker FACES  - Jay-Baker FACES  - 0-10  -DR    Jay-Kent FACES Pain Rating 2  - 2   with dressing change  -     Pain Score   0  -DR    Post Pain Score   0  -DR    Pain  Intervention(s)  Medication (See MAR);Repositioned  -MF     Recorded by [MF] Pee Roldan, PT [MF] Pee Roldan, PT [DR] Mikey Moore, PT    Cognitive Assessment/Intervention    Current Cognitive/Communication Assessment   impaired  -DR    Orientation Status   oriented to;person;disoriented to;place;time;situation  -DR    Follows Commands/Answers Questions   able to follow single-step instructions;50% of the time;needs cueing;needs increased time;needs repetition  -    Personal Safety   severe impairment;decreased awareness, need for assist;decreased awareness, need for safety;decreased insight to deficits;impulsive  -DR    Personal Safety Interventions   fall prevention program maintained;gait belt;nonskid shoes/slippers when out of bed;elopement precautions initiated  -DR    Recorded by   [] Mikey Moore, PT    Bed Mobility, Assessment/Treatment    Bed Mobility, Assistive Device   bed rails;head of bed elevated;draw sheet  -    Bed Mobility, Scoot/Bridge, [place]   dependent (less than 25% patient effort);2 person assist required;verbal cues required  -    Bed Mob, Supine to Sit, [place]   maximum assist (25% patient effort);2 person assist required;verbal cues required  -DR    Bed Mob, Sit to Supine, [place]   dependent (less than 25% patient effort);2 person assist required;verbal cues required  -DR    Bed Mobility, Safety Issues   cognitive deficits limit understanding;decreased use of arms for pushing/pulling;decreased use of legs for bridging/pushing  -DR    Bed Mobility, Impairments   strength decreased;impaired balance;coordination impaired  -DR    Bed Mobility, Comment   VC for hand placement and sequencing transition of BLE to edge of bed  -DR    Recorded by   [DR] Mikey Moore, PT    Transfer Assessment/Treatment    Transfers, Sit-Stand [place]   moderate assist (50% patient effort);2 person assist required;verbal cues required  -    Transfers, Stand-Sit  [place]   moderate assist (50% patient effort);2 person assist required;verbal cues required  -    Transfers, Sit-Stand-Sit, Assist Device   rolling walker  -DR    Transfer, Safety Issues   impulsivity;loses balance backward;knees buckling;step length decreased;sequencing ability decreased  -    Transfer, Impairments   strength decreased;impaired balance;coordination impaired   confusion/agitation  -    Transfer, Comment   VC for hand placement. He performed x 3 STS transfers each with mod A x 2 and was resistant to return to sitting during last period of standing. Required encouragement from wife as well as PT/OT.  -    Recorded by   [] Mikey Moore, PT    Gait Assessment/Treatment    Gait, [place] Level   not tested  -    Gait, Comment   Not assessed today due to increased agitation/confusion upon standing and marching in place.  -    Recorded by   [] Mikey Moore, PT    Motor Skills/Interventions    Additional Documentation   Balance Skills Training (Group)  -    Recorded by   [] Mikey Moore, PT    Balance Skills Training    Sitting-Level of Assistance   Contact guard  -    Sitting-Balance Support   Feet supported  -    Sitting-Balance Activities   Lateral lean;Forward lean  -    Standing-Level of Assistance   Contact guard;x2  -    Static Standing Balance Support   assistive device  -    Standing-Balance Activities   Weight Shift A-P;Weight Shift R-L  -DR    Recorded by   [] Mikey Moore, PT    Therapy Exercises    Bilateral Lower Extremities   AROM:;5 reps;standing;hip flexion  -    Recorded by   [] Mikey Moore, PT    Positioning and Restraints    Pre-Treatment Position in bed  - in bed  - in bed  -DR    Post Treatment Position bed  - bed  - bed  -    In Bed supine;call light within reach;notified [facility]  - supine;call light within reach;with nsg  - notified nsg;supine;call light within reach;encouraged to call for assist;exit alarm on;with  family/caregiver;SCD pump applied;legs elevated  -    Restraints  released:;reapplied:   nsg in room  - released:;reapplied:;soft limb;notified nsg:  -DR    Recorded by [MF] Pee Roldan, PT [MF] Pee Roldan, PT [DR] Mikey Moore, PT      04/09/17 1410 04/09/17 1040       Rehab Assessment/Intervention    Discipline occupational therapist  -TA physical therapist  -     Document Type therapy note (daily note)  -TA therapy note (daily note)  -     Subjective Information agree to therapy;no complaints  -TA agree to therapy;complains of;weakness;fatigue;pain  -MF     Patient Effort, Rehab Treatment good  -TA      Symptoms Noted During/After Treatment other (see comments)   Increased agitation with activity; resistant to sitting   -TA      Precautions/Limitations fall precautions;oxygen therapy device and L/min  -TA      Recorded by [TA] David Jenkins OT [MF] Pee Roldan, PT     Vital Signs    Pre Systolic BP Rehab 107  -TA      Pre Treatment Diastolic BP 69  -TA      Post Systolic BP Rehab 102  -TA      Post Treatment Diastolic BP 68  -TA      Pretreatment Heart Rate (beats/min) 86  -TA      Posttreatment Heart Rate (beats/min) 80  -TA      Pre SpO2 (%) 99  -TA      O2 Delivery Pre Treatment supplemental O2   5.5L  -TA      Intra SpO2 (%) 89  -TA      O2 Delivery Intra Treatment supplemental O2   5.5L  -TA      Post SpO2 (%) 100  -TA      O2 Delivery Post Treatment supplemental O2   5.5L  -TA      Pre Patient Position Supine  -TA      Intra Patient Position Standing  -TA      Post Patient Position Supine  -TA      Recorded by [TA] David Jenkins OT      Pain Assessment    Pain Assessment 0-10  -TA Jay-Kent FACES  -     Jay-Baker FACES Pain Rating 0  -TA 2  -MF     Pain Score 0  -TA      Post Pain Score 0  -TA      Pain Location  Other (Comment)   wound  -MF     Pain Intervention(s) Repositioned;Ambulation/increased activity  -TA Repositioned  -     Response to Interventions  tolerated  -TA      Recorded by [TA] David Jenkins OT [MF] Pee Roldan PT     Cognitive Assessment/Intervention    Current Cognitive/Communication Assessment impaired  -TA      Orientation Status oriented to;person;disoriented to;place;time  -TA      Follows Commands/Answers Questions 50% of the time;able to follow single-step instructions;needs cueing;needs increased time;needs repetition  -TA      Personal Safety severe impairment;decreased awareness, need for assist;decreased awareness, need for safety;decreased insight to deficits  -TA      Personal Safety Interventions elopement precautions initiated;fall prevention program maintained;gait belt;muscle strengthening facilitated;nonskid shoes/slippers when out of bed;supervised activity  -TA      Recorded by [TA] David Jenkins OT      Bed Mobility, Assessment/Treatment    Bed Mobility, Assistive Device bed rails;head of bed elevated  -TA      Bed Mobility, Scoot/Bridge, [place] dependent (less than 25% patient effort);2 person assist required  -TA      Bed Mob, Supine to Sit, [place] maximum assist (25% patient effort);2 person assist required;verbal cues required;nonverbal cues required (demo/gesture)  -TA      Bed Mob, Sit to Supine, [place] dependent (less than 25% patient effort);2 person assist required;verbal cues required  -TA      Bed Mobility, Safety Issues cognitive deficits limit understanding;decreased use of arms for pushing/pulling;decreased use of legs for bridging/pushing  -TA      Bed Mobility, Impairments strength decreased;impaired balance  -TA      Bed Mobility, Comment VCs for sequencing, safe hand placement  -TA      Recorded by [TA] David Jenkins OT      Transfer Assessment/Treatment    Transfers, Sit-Stand [place] moderate assist (50% patient effort);2 person assist required;verbal cues required  -TA      Transfers, Stand-Sit [place] moderate assist (50% patient effort);2 person assist  required;verbal cues required  -TA      Transfers, Sit-Stand-Sit, Assist Device rolling walker  -TA      Transfer, Safety Issues impulsivity;weight-shifting ability decreased;steps too close front assistive device  -TA      Transfer, Impairments strength decreased;impaired balance;other (see comments)   confusion  -TA      Transfer, Comment VCs for safe hand placement; completed 3 STS with poor safety awareness, resistance to sitting once in standing.  -TA      Recorded by [TA] David Jenkins OT      Functional Mobility    Functional Mobility- Ind. Level 2 person assist required;contact guard assist;verbal cues required  -TA      Functional Mobility- Device rolling walker  -TA      Functional Mobility-Distance (Feet) --   Marching in place  -TA      Functional Mobility- Safety Issues balance decreased during turns;sequencing ability decreased;step length decreased;weight-shifting ability decreased;supplemental O2;steps too close front assistive device  -TA      Functional Mobility- Comment Pt with difficulty motor planning marching in place  -TA      Recorded by [TA] David Jenkins OT      Upper Body Dressing Assessment/Training    UB Dressing Assess/Train, Clothing Type donning:;hospital gown  -TA      UB Dressing Assess/Train, Position edge of bed;sitting  -TA      UB Dressing Assess/Train, [place] moderate assist (50% patient effort)  -TA      UB Dressing Assess/Train, Impairments impaired balance;coordination impaired  -TA      UB Dressing Assess/Train, Comment impulsive at EOB  -TA      Recorded by [TA] David Jenkins OT      Motor Skills/Interventions    Additional Documentation Balance Skills Training (Group)  -TA      Recorded by [TA] David Jenkins OT      Balance Skills Training    Sitting-Level of Assistance Contact guard  -TA      Sitting-Balance Support Feet supported  -TA      Sitting-Balance Activities Lateral lean;Forward lean;Trunk control activities  -TA      Standing-Level  of Assistance Contact guard;x2  -TA      Static Standing Balance Support assistive device  -TA      Standing-Balance Activities Weight Shift A-P;Weight Shift R-L  -TA      Recorded by [TA] David Jenkins OT      Positioning and Restraints    Pre-Treatment Position in bed  -TA in bed  -MF     Post Treatment Position bed  -TA bed  -MF     In Bed notified nsg;supine;call light within reach;encouraged to call for assist;exit alarm on;patient within staff view;with family/caregiver;side rails up x2;RUE elevated;LUE elevated;SCD pump applied;legs elevated  -TA supine;call light within reach;with nsg  -MF     Restraints released:;reapplied:;notified nsg:;soft limb  -TA released:;reapplied:  -MF     Recorded by [TA] David Jenkins OT [MF] Pee Roldan, PT       User Key  (r) = Recorded By, (t) = Taken By, (c) = Cosigned By    Initials Name Effective Dates     Pee Roldan, PT 06/19/15 -     TA David Jenkins OT 03/14/16 -     DR Mikey Moore, PT 09/06/16 -                 IP PT Goals       04/11/17 1015 04/09/17 1551 04/08/17 1010    Bed Mobility PT LTG    Bed Mobility PT LTG, Outcome goal not met  -LS goal ongoing  -DR     Bed Mobility PT LTG, Reason Goal Not Met medical status inhibits participation  -LS      Transfer Training PT LTG    Transfer Training PT LTG, Outcome goal not met  -LS goal ongoing  -DR     Transfer Training PT LTG, Reason Goal Not Met medical status inhibits participation  -LS      Gait Training PT LTG    Gait Training Goal PT LTG, Outcome goal not met  -LS goal ongoing  -DR     Gait Training Goal PT LTG, Reason Goal Not Met medical status inhibits participation  -LS      Wound Care PT LTG    Wound Care PT LTG 1, Outcome   goal ongoing  -MF      04/06/17 1524 04/06/17 1025 04/05/17 0909    Bed Mobility PT LTG    Bed Mobility PT LTG, Date Established   04/05/17  -LS    Bed Mobility PT LTG, Time to Achieve   2 wks  -LS    Bed Mobility PT LTG, Activity Type   supine to sit/sit  to supine  -LS    Bed Mobility PT LTG, [place] Level   supervision required  -LS    Bed Mobility PT LTG, Outcome goal ongoing  -LS      Transfer Training PT LTG    Transfer Training PT LTG, Date Established   04/05/17  -LS    Transfer Training PT LTG, Time to Achieve   2 wks  -LS    Transfer Training PT LTG, Activity Type   sit to stand/stand to sit  -LS    Transfer Training PT LTG, [place] Level   contact guard assist  -LS    Transfer Training PT LTG, Assist Device   walker, rolling  -LS    Transfer Training PT LTG, Outcome goal ongoing  -LS      Gait Training PT LTG    Gait Training Goal PT LTG, Date Established   04/05/17  -LS    Gait Training Goal PT LTG, Time to Achieve   2 wks  -LS    Gait Training Goal PT LTG, [place] Level   contact guard assist  -LS    Gait Training Goal PT LTG, Assist Device   walker, rolling  -LS    Gait Training Goal PT LTG, Distance to Achieve   200  -LS    Gait Training Goal PT LTG, Outcome goal ongoing  -LS      Wound Care PT LTG    Wound Care PT LTG 1, Date Established  04/06/17  -     Wound Care PT LTG 1, Time to Achieve  1 wk  -MC     Wound Care PT LTG 1, Location  Scrotum  -     Wound Care PT LTG 1, No S&S of Infection  yes  -     Wound Care PT LTG 1, Decrease Wound Size  10%  -     Wound Care PT LTG 1, Decrease Necrotic Tissue  50%  -MC     Wound Care PT LTG 1, Decrease Exudate  minimum  -     Wound Care PT LTG 1, No New Skin Break Down  yes  -MC     Wound Care PT LTG 1, Education  dressing changes;wound care  -MC     Wound Care PT LTG 1, Education Understanding  verbalize understanding  -       User Key  (r) = Recorded By, (t) = Taken By, (c) = Cosigned By    Initials Name Provider Type    VALERI Roldan, PT Physical Therapist    ANDREWS Juárez, PT Physical Therapist    DUNCAN Mcgarry, PT Physical Therapist    DR Mikey Moore, PT Physical Therapist          Physical Therapy Education     Title: PT OT SLP Therapies (Active)     Topic:  Physical Therapy (Active)     Point: Mobility training (Active)    Learning Progress Summary    Learner Readiness Method Response Comment Documented by Status   Patient Acceptance E NR   04/09/17 1551 Active    Acceptance E NR  EDOUARD 04/07/17 1534 Active    Acceptance E,D NR   04/06/17 1523 Active    Acceptance E,D NR   04/05/17 0908 Active   Significant Other Acceptance E,D NR  LS 04/06/17 1523 Active    Acceptance E,D NR   04/05/17 0908 Active               Point: Home exercise program (Active)    Learning Progress Summary    Learner Readiness Method Response Comment Documented by Status   Patient Acceptance E NR  EDOUARD 04/07/17 1534 Active    Acceptance E,D NR   04/06/17 1523 Active   Significant Other Acceptance E,D NR   04/06/17 1523 Active               Point: Body mechanics (Active)    Learning Progress Summary    Learner Readiness Method Response Comment Documented by Status   Patient Acceptance E NR   04/09/17 1551 Active    Acceptance E NR  EDOUARD 04/07/17 1534 Active    Acceptance E,D NR   04/06/17 1523 Active    Acceptance E,D NR   04/05/17 0908 Active   Significant Other Acceptance E,D NR   04/06/17 1523 Active    Acceptance E,D NR   04/05/17 0908 Active               Point: Precautions (Active)    Learning Progress Summary    Learner Readiness Method Response Comment Documented by Status   Patient Acceptance E NR   04/09/17 1551 Active    Acceptance E NR  EDOUARD 04/07/17 1534 Active    Acceptance E,D NR   04/06/17 1523 Active    Acceptance E,D NR   04/05/17 0908 Active   Significant Other Acceptance E,D NR   04/06/17 1523 Active    Acceptance E,D NR   04/05/17 0908 Active                      User Key     Initials Effective Dates Name Provider Type Discipline    EDOUARD 06/19/15 -  Anita Prado, PT Physical Therapist PT    ANDREWS 06/19/15 -  Lily Juárez, PT Physical Therapist PT     09/06/16 -  Mikey Moore, PT Physical Therapist PT                   PT ASSESSMENT (last 72 hours)       PT Evaluation       04/11/17 1530 04/10/17 0940    Rehab Evaluation    Document Type therapy note (daily note)  - therapy note (daily note)  -MF    Subjective Information decreased LOC   RN gave ok for tx.  - agree to therapy;complains of;weakness;fatigue;pain  -MF    Pain Assessment    Pain Assessment Jay-Baker FACES  - Jay-Baker FACES  -    Jay-Baker FACES Pain Rating 2  - 2   with dressing change  -    Pain Intervention(s)  Medication (See MAR);Repositioned  -    Positioning and Restraints    Pre-Treatment Position in bed  - in bed  -    Post Treatment Position bed  - bed  -MF    In Bed supine;call light within reach;notified nsg  - supine;call light within reach;with nsg  -    Restraints  released:;reapplied:   nsg in room  -      04/10/17 0930 04/09/17 1411    Rehab Evaluation    Document Type evaluation  -SG therapy note (daily note)  -DR    Subjective Information agree to therapy;complains of;fatigue  -SG agree to therapy;no complaints  -DR    Patient Effort, Rehab Treatment adequate  -SG good  -DR    Symptoms Noted During/After Treatment fatigue  -SG other (see comments)   increased agitation with activity; resistant to sitting  -DR    Symptoms Noted Comment pt lethargic, recent med admin  -SG     General Information    Precautions/Limitations  fall precautions;oxygen therapy device and L/min  -DR    Vital Signs    Pre Systolic BP Rehab  107  -DR    Pre Treatment Diastolic BP  69  -DR    Post Systolic BP Rehab  102  -DR    Post Treatment Diastolic BP  68  -DR    Pretreatment Heart Rate (beats/min)  86  -DR    Posttreatment Heart Rate (beats/min)  80  -DR    Pre SpO2 (%)  99  -DR    O2 Delivery Pre Treatment  supplemental O2   5.5L  -DR    Intra SpO2 (%)  89  -DR    O2 Delivery Intra Treatment  supplemental O2   5.5L  -DR    Post SpO2 (%)  100  -DR    O2 Delivery Post Treatment  supplemental O2   5.5L  -DR    Pre Patient Position  Supine  -DR    Intra Patient Position  Standing   -DR    Post Patient Position  Supine  -DR    Pain Assessment    Pain Assessment Jay-Kent FACES  -SG 0-10  -DR    Jay-Kent FACES Pain Rating 0  -SG     Pain Score 0  -SG 0  -DR    Post Pain Score  0  -DR    Cognitive Assessment/Intervention    Current Cognitive/Communication Assessment  impaired  -DR    Orientation Status  oriented to;person;disoriented to;place;time;situation  -DR    Follows Commands/Answers Questions  able to follow single-step instructions;50% of the time;needs cueing;needs increased time;needs repetition  -DR    Personal Safety  severe impairment;decreased awareness, need for assist;decreased awareness, need for safety;decreased insight to deficits;impulsive  -DR    Personal Safety Interventions  fall prevention program maintained;gait belt;nonskid shoes/slippers when out of bed;elopement precautions initiated  -DR    Bed Mobility, Assessment/Treatment    Bed Mobility, Assistive Device  bed rails;head of bed elevated;draw sheet  -DR    Bed Mobility, Scoot/Bridge, [place]  dependent (less than 25% patient effort);2 person assist required;verbal cues required  -DR    Bed Mob, Supine to Sit, [place]  maximum assist (25% patient effort);2 person assist required;verbal cues required  -DR    Bed Mob, Sit to Supine, [place]  dependent (less than 25% patient effort);2 person assist required;verbal cues required  -DR    Bed Mobility, Safety Issues  cognitive deficits limit understanding;decreased use of arms for pushing/pulling;decreased use of legs for bridging/pushing  -DR    Bed Mobility, Impairments  strength decreased;impaired balance;coordination impaired  -DR    Bed Mobility, Comment  VC for hand placement and sequencing transition of BLE to edge of bed  -DR    Transfer Assessment/Treatment    Transfers, Sit-Stand [place]  moderate assist (50% patient effort);2 person assist required;verbal cues required  -DR    Transfers, Stand-Sit [place]  moderate assist (50% patient  effort);2 person assist required;verbal cues required  -    Transfers, Sit-Stand-Sit, Assist Device  rolling walker  -    Transfer, Safety Issues  impulsivity;loses balance backward;knees buckling;step length decreased;sequencing ability decreased  -    Transfer, Impairments  strength decreased;impaired balance;coordination impaired   confusion/agitation  -    Transfer, Comment  VC for hand placement. He performed x 3 STS transfers each with mod A x 2 and was resistant to return to sitting during last period of standing. Required encouragement from wife as well as PT/OT.  -    Gait Assessment/Treatment    Gait, [place] Level  not tested  -    Gait, Comment  Not assessed today due to increased agitation/confusion upon standing and marching in place.  -    Motor Skills/Interventions    Additional Documentation  Balance Skills Training (Group)  -    Balance Skills Training    Sitting-Level of Assistance  Contact guard  -DR    Sitting-Balance Support  Feet supported  -    Sitting-Balance Activities  Lateral lean;Forward lean  -DR    Standing-Level of Assistance  Contact guard;x2  -DR    Static Standing Balance Support  assistive device  -DR    Standing-Balance Activities  Weight Shift A-P;Weight Shift R-L  -DR    Therapy Exercises    Bilateral Lower Extremities  AROM:;5 reps;standing;hip flexion  -DR    Positioning and Restraints    Pre-Treatment Position  in bed  -DR    Post Treatment Position  bed  -DR    In Bed  notified nsg;supine;call light within reach;encouraged to call for assist;exit alarm on;with family/caregiver;SCD pump applied;legs elevated  -DR    Restraints  released:;reapplied:;soft limb;notified nsg:  -      04/09/17 1410 04/09/17 1040    Rehab Evaluation    Document Type therapy note (daily note)  -TA therapy note (daily note)  -    Subjective Information agree to therapy;no complaints  -TA agree to therapy;complains of;weakness;fatigue;pain  -    Patient Effort, Rehab  Treatment good  -TA     Symptoms Noted During/After Treatment other (see comments)   Increased agitation with activity; resistant to sitting   -TA     General Information    Precautions/Limitations fall precautions;oxygen therapy device and L/min  -TA     Vital Signs    Pre Systolic BP Rehab 107  -TA     Pre Treatment Diastolic BP 69  -TA     Post Systolic BP Rehab 102  -TA     Post Treatment Diastolic BP 68  -TA     Pretreatment Heart Rate (beats/min) 86  -TA     Posttreatment Heart Rate (beats/min) 80  -TA     Pre SpO2 (%) 99  -TA     O2 Delivery Pre Treatment supplemental O2   5.5L  -TA     Intra SpO2 (%) 89  -TA     O2 Delivery Intra Treatment supplemental O2   5.5L  -TA     Post SpO2 (%) 100  -TA     O2 Delivery Post Treatment supplemental O2   5.5L  -TA     Pre Patient Position Supine  -TA     Intra Patient Position Standing  -TA     Post Patient Position Supine  -TA     Pain Assessment    Pain Assessment 0-10  -TA [place] FACES  -MF    Jay-[place] FACES Pain Rating 0  -TA 2  -MF    Pain Score 0  -TA     Post Pain Score 0  -TA     Pain Location  Other (Comment)   wound  -MF    Pain Intervention(s) Repositioned;Ambulation/increased activity  -TA Repositioned  -MF    Response to Interventions tolerated  -TA     Cognitive Assessment/Intervention    Current Cognitive/Communication Assessment impaired  -TA     Orientation Status oriented to;person;disoriented to;place;time  -TA     Follows Commands/Answers Questions 50% of the time;able to follow single-step instructions;needs cueing;needs increased time;needs repetition  -TA     Personal Safety severe impairment;decreased awareness, need for assist;decreased awareness, need for safety;decreased insight to deficits  -TA     Personal Safety Interventions elopement precautions initiated;fall prevention program maintained;gait belt;muscle strengthening facilitated;nonskid shoes/slippers when out of bed;supervised activity  -TA     Bed Mobility, Assessment/Treatment     Bed Mobility, Assistive Device bed rails;head of bed elevated  -TA     Bed Mobility, Scoot/Bridge, [place] dependent (less than 25% patient effort);2 person assist required  -TA     Bed Mob, Supine to Sit, [place] maximum assist (25% patient effort);2 person assist required;verbal cues required;nonverbal cues required (demo/gesture)  -TA     Bed Mob, Sit to Supine, [place] dependent (less than 25% patient effort);2 person assist required;verbal cues required  -TA     Bed Mobility, Safety Issues cognitive deficits limit understanding;decreased use of arms for pushing/pulling;decreased use of legs for bridging/pushing  -TA     Bed Mobility, Impairments strength decreased;impaired balance  -TA     Bed Mobility, Comment VCs for sequencing, safe hand placement  -TA     Transfer Assessment/Treatment    Transfers, Sit-Stand [place] moderate assist (50% patient effort);2 person assist required;verbal cues required  -TA     Transfers, Stand-Sit [place] moderate assist (50% patient effort);2 person assist required;verbal cues required  -TA     Transfers, Sit-Stand-Sit, Assist Device rolling walker  -TA     Transfer, Safety Issues impulsivity;weight-shifting ability decreased;steps too close front assistive device  -TA     Transfer, Impairments strength decreased;impaired balance;other (see comments)   confusion  -TA     Transfer, Comment VCs for safe hand placement; completed 3 STS with poor safety awareness, resistance to sitting once in standing.  -TA     Motor Skills/Interventions    Additional Documentation Balance Skills Training (Group)  -TA     Balance Skills Training    Sitting-Level of Assistance Contact guard  -TA     Sitting-Balance Support Feet supported  -TA     Sitting-Balance Activities Lateral lean;Forward lean;Trunk control activities  -TA     Standing-Level of Assistance Contact guard;x2  -TA     Static Standing Balance Support assistive device  -TA     Standing-Balance Activities  Weight Shift A-P;Weight Shift R-L  -TA     Positioning and Restraints    Pre-Treatment Position in bed  -TA in bed  -MF    Post Treatment Position bed  -TA bed  -MF    In Bed notified nsg;supine;call light within reach;encouraged to call for assist;exit alarm on;patient within staff view;with family/caregiver;side rails up x2;RUE elevated;LUE elevated;SCD pump applied;legs elevated  -TA supine;call light within reach;with nsg  -MF    Restraints released:;reapplied:;notified nsg:;soft limb  -TA released:;reapplied:  -MF      User Key  (r) = Recorded By, (t) = Taken By, (c) = Cosigned By    Initials Name Provider Type    VALERI Roldan, PT Physical Therapist    EFFIE Sultana, MS CCC-SLP Speech and Language Pathologist    CHU Jenkins, OT Occupational Therapist    DR Mikey Moore, PT Physical Therapist            PT Recommendation and Plan  Anticipated Discharge Disposition: inpatient rehabilitation facility  PT Frequency: daily    Plan Of Care Reviewed With: patient       Outcome Summary/Follow up Plan: perineal wound noted to have decreasing exudate with improved granulation tissue today. PT will cont with pulse lavage now every other day to help minimize inflammation and improve healing potential.           Outcome Measures       04/11/17 1000 04/09/17 1411 04/09/17 1410    How much help from another person do you currently need...    Turning from your back to your side while in flat bed without using bedrails?  2  -DR     Moving from lying on back to sitting on the side of a flat bed without bedrails?  1  -DR     Moving to and from a bed to a chair (including a wheelchair)?  3  -DR     Standing up from a chair using your arms (e.g., wheelchair, bedside chair)?  3  -DR     Climbing 3-5 steps with a railing?  1  -DR     To walk in hospital room?  3  -DR     AM-PAC 6 Clicks Score  13  -DR     How much help from another is currently needed...    Putting on and taking off regular lower  body clothing?   1  -TA    Bathing (including washing, rinsing, and drying)   2  -TA    Toileting (which includes using toilet bed pan or urinal)   1  -TA    Putting on and taking off regular upper body clothing   2  -TA    Taking care of personal grooming (such as brushing teeth)   2  -TA    Eating meals   2  -TA    Score   10  -TA    Modified Norman Scale    Modified Linh Scale 4 - Moderately severe disability.  Unable to walk without assistance, and unable to attend to own bodily needs without assistance.   per last OT note  -LS  4 - Moderately severe disability.  Unable to walk without assistance, and unable to attend to own bodily needs without assistance.  -TA    Functional Assessment    Outcome Measure Options  BASSEM-PAC 6 Clicks Basic Mobility (PT)  -DR LUEVANO-PAC 6 Clicks Daily Activity (OT);Modified Norman  -TA      User Key  (r) = Recorded By, (t) = Taken By, (c) = Cosigned By    Initials Name Provider Type    LS Lily Juárez, PT Physical Therapist    TA David Jenkins, OT Occupational Therapist    DR Mikey Moore, PT Physical Therapist              Time Calculation        PT Charges       04/11/17 1530          Time Calculation    Start Time 1530  -      PT Goal Re-Cert Due Date 04/15/17  -      Time Calculation- PT    Total Timed Code Minutes- PT 25 minute(s)  -        User Key  (r) = Recorded By, (t) = Taken By, (c) = Cosigned By    Initials Name Provider Type     Pee Roldan, PT Physical Therapist             Therapy Charges for Today     Code Description Service Date Service Provider Modifiers Qty    50335687400 HC ROSEANNA DEBRIDE OPEN WOUND UP TO 20CM 4/10/2017 Pee Roldan, PT GP 1    [id number] HC ROSEANNA DEBRIDE OPEN WOUND UP TO 20CM 4/11/2017 Pee Roldan, PT GP 1            PT G-Codes  Outcome Measure Options: -PAC 6 Clicks Basic Mobility (PT)        Pee Roldan, PT  4/11/2017

## 2017-04-11 NOTE — PLAN OF CARE
Problem: Inpatient Occupational Therapy  Goal: Bed Mobility Goal LTG- OT  Outcome: Unable to achieve outcome(s) by discharge Date Met:  04/11/17 04/11/17 1343   Bed Mobility OT LTG   Bed Mobility OT LTG, Outcome goal not met   Bed Mobility OT LTG, Reason Goal Not Met medical status inhibits participation       Goal: Transfer Training Goal 1 LTG- OT  Outcome: Unable to achieve outcome(s) by discharge Date Met:  04/11/17 04/11/17 1343   Transfer Training OT LTG   Transfer Training OT LTG, Outcome goal not met   Transfer Training OT LTG, Reason Goal Not Met medical status inhibits participation       Goal: Orientation Goal LTG- OT  Outcome: Unable to achieve outcome(s) by discharge Date Met:  04/11/17 04/11/17 1343   Orientation OT LTG   Orientation OT LTG, Outcome goal not met   Orientation OT LTG, Reason Goal Not Met medical status inhibits participation       Goal: Follow Directions Goal LTG- OT  Outcome: Unable to achieve outcome(s) by discharge Date Met:  04/11/17 04/11/17 1343   Follow Directions OT LTG   Follow Directions OT LTG, Outcome goal not met   Follow Directions OT LTG, Reason Goal Not Met medical status inhibits participation

## 2017-04-11 NOTE — PAYOR COMM NOTE
"Tomas Gtz (61 y.o. Male)     Date of Birth Social Security Number Address Home Phone MRN    1955  [address]   [place] [address] [phone] [id number]    [place] Marital Status          None        Admission Date Admission Type Admitting Provider Attending Provider Department, Room/Bed    4/4/17 Elective Ella Pope MD Gerhardstein, Donna C, MD [facility] 2B ICU, N226/1    Discharge Date Discharge Disposition Discharge Destination                      Attending Provider: Ella Pope MD     Allergies:  No Known Allergies    Isolation:  None   Infection:  None   Code Status:  Conditional    Ht:  69\" (175.3 cm)   Wt:  227 lb 1.2 oz (103 kg)    Admission Cmt:  None   Principal Problem:  Sepsis [A41.9]                 Active Insurance as of 4/4/2017     Primary Coverage     Payor Plan Insurance Group Employer/Plan Group    [employer]HEM BLUE CROSS AN[employer]M BLUE CROSS BLUE SHIELD PPO [id number]     Payor Plan Address Payor Plan Phone Number Effective From Effective To    [address] [phone] 11/1/2015     [place] [address]       Subscriber Name Subscriber Birth Date Member ID       SANJUANA GTZ [id number]                 Emergency Contacts      (Rel.) Home Phone Work Phone Mobile Phone    Sanjuana Gtz [phone] -- --            Vital Signs (last 24 hours)       04/10 0700  -  04/11 0659 04/11 0700  -  04/11 0913   Most Recent    Temp (°F) 97.5 -  98.5       98.5 (36.9)    Heart Rate (!)46 -  82      63     63    Resp 16 -  23       16    BP (!)77/50 -  130/76       121/75    SpO2 (%) 90 -  100      100     100          Hospital Medications (active)       Dose Frequency Start End    amiodarone (PACERONE) tablet 200 mg 200 mg Every 24 Hours Scheduled 4/11/2017     Sig - Route: Take 1 tablet by mouth Daily. - Oral    apixaban (ELIQUIS) tablet 5 mg 5 mg Every 12 Hours Scheduled 4/6/2017     Sig - Route: Take 1 tablet by " mouth Every 12 (Twelve) Hours. - Oral    aspirin chewable tablet 81 mg 81 mg Daily 4/7/2017     Sig - Route: Chew 1 tablet Daily. - Oral    atorvastatin (LIPITOR) tablet 80 mg 80 mg Nightly 4/5/2017     Sig - Route: Take 2 tablets by mouth Every Night. - Oral    ceFAZolin in dextrose (ANCEF) IVPB solution 2 g 2 g Every 8 Hours 4/4/2017     Sig - Route: Infuse 100 mL into a venous catheter Every 8 (Eight) Hours. - Intravenous    dexmedetomidine (PRECEDEX) 400 MCG/100ML infusion 0.2-1.5 mcg/kg/hr × 108 kg Titrated 4/9/2017     Sig - Route: Infuse 21.6-162 mcg/hr into a venous catheter Dose Adjusted By Provider As Needed. - Intravenous    dextrose (D50W) solution 25 g 25 g Every 15 Minutes PRN 4/4/2017     Sig - Route: Infuse 50 mL into a venous catheter Every 15 (Fifteen) Minutes As Needed for Low Blood Sugar (Blood Sugar Less Than 70, Patient Has IV Access - Unresponsive, NPO or Unable To Safely Swallow). - Intravenous    dextrose (GLUTOSE) oral gel 15 g 15 g Every 15 Minutes PRN 4/4/2017     Sig - Route: Take 15 g by mouth Every 15 (Fifteen) Minutes As Needed for Low Blood Sugar (Blood Sugar Less Than 70, Patient Alert, Is Not NPO & Can Safely Swallow). - Oral    DOPamine infusion 1600 mcg/mL 2-20 mcg/kg/min × 103 kg Titrated 4/10/2017     Sig - Route: Infuse 206-2,060 mcg/min into a venous catheter Dose Adjusted By Provider As Needed. - Intravenous    fentaNYL (DURAGESIC) 25 MCG/HR patch 1 patch 1 patch Every 72 Hours 4/5/2017 4/17/2017    Sig - Route: Place 1 patch on the skin Every 72 (Seventy-Two) Hours. - Transdermal    furosemide (LASIX) injection 40 mg 40 mg Daily 4/10/2017     Sig - Route: Infuse 4 mL into a venous catheter Daily. - Intravenous    glucagon (GLUCAGEN) injection 1 mg 1 mg Every 15 Minutes PRN 4/4/2017     Sig - Route: Inject 1 mg under the skin Every 15 (Fifteen) Minutes As Needed (Blood Glucose Less Than 70 - Patient Without IV Access - Unresponsive, NPO or Unable To Safely Swallow). -  "Subcutaneous    heparin flush (porcine) 100 UNIT/ML injection 500 Units 500 Units Every 12 Hours Scheduled 4/5/2017     Sig - Route: 5 mL by Intracatheter route Every 12 (Twelve) Hours. - Intracatheter    Cosign for Ordering: Accepted by Ella Pope MD on 4/5/2017  2:40 PM    HYDROcodone-acetaminophen (NORCO) 7.5-325 MG per tablet 1 tablet 1 tablet Every 4 Hours PRN 4/6/2017 4/16/2017    Sig - Route: Take 1 tablet by mouth Every 4 (Four) Hours As Needed for Moderate Pain (4-6). - Oral    HYDROmorphone (DILAUDID) injection 0.5 mg 0.5 mg Once 4/10/2017 4/10/2017    Sig - Route: Infuse 0.5 mg into a venous catheter 1 (One) Time. - Intravenous    HYDROmorphone (DILAUDID) injection 0.5 mg 0.5 mg Once 4/10/2017 4/10/2017    Sig - Route: Infuse 0.5 mg into a venous catheter 1 (One) Time. - Intravenous    insulin detemir (LEVEMIR) injection 25 Units 25 Units Nightly 4/9/2017     Sig - Route: Inject 25 Units under the skin Every Night. - Subcutaneous    insulin regular (humuLIN R,novoLIN R) injection 0-9 Units 0-9 Units Every 6 Hours Scheduled 4/10/2017     Sig - Route: Inject 0-9 Units under the skin Every 6 (Six) Hours. - Subcutaneous    ipratropium-albuterol (DUO-NEB) nebulizer solution 3 mL 3 mL Every 4 Hours PRN 4/7/2017     Sig - Route: Take 3 mL by nebulization Every 4 (Four) Hours As Needed for Shortness of Air. - Nebulization    ipratropium-albuterol (DUO-NEB) nebulizer solution 3 mL 3 mL 4 Times Daily - RT 4/10/2017     Sig - Route: Take 3 mL by nebulization 4 (Four) Times a Day. - Nebulization    Magnesium Sulfate 2 gram Bolus, followed by 8 gram infusion (total Mg dose 10 grams)- Mg less than or equal to 1mg/dL 2 g As Needed 4/7/2017     Sig - Route: Infuse 50 mL into a venous catheter As Needed (Mg less than or equal to 1mg/dL). - Intravenous    Linked Group 1:  \"Or\" Linked Group Details        magnesium sulfate 4 gram infusion- Mg 1.6-1.9 mg/dL 4 g As Needed 4/7/2017     Sig - Route: Infuse 100 mL " "into a venous catheter As Needed (Mg 1.6-1.9 mg/dL). - Intravenous    Linked Group 1:  \"Or\" Linked Group Details        Magnesium Sulfate 6 gram Infusion (2 gm x 3) -Mg 1.1 -1.5 mg/dL 2 g As Needed 4/7/2017     Sig - Route: Infuse 50 mL into a venous catheter As Needed (Mg 1.1 -1.5 mg/dL). - Intravenous    Linked Group 1:  \"Or\" Linked Group Details        metoclopramide (REGLAN) tablet 5 mg 5 mg Once 4/10/2017 4/10/2017    Sig - Route: Take 1 tablet by mouth 1 (One) Time. - Oral    metroNIDAZOLE (FLAGYL) IVPB 500 mg 500 mg Every 6 Hours 4/4/2017     Sig - Route: Infuse 100 mL into a venous catheter Every 6 (Six) Hours. - Intravenous    pantoprazole (PROTONIX) injection 40 mg 40 mg Every Early Morning 4/11/2017     Sig - Route: Infuse 10 mL into a venous catheter Every Morning. - Intravenous    phenylephrine (ARCELIA-SYNEPHRINE) 1 % nasal spray 1 spray 1 spray Every 6 Hours PRN 4/10/2017     Sig - Route: 1 spray by Each Nare route Every 6 (Six) Hours As Needed for Congestion. - Each Nare    phenylephrine (ARCELIA-SYNEPHRINE) in 0.9% NS 50 mg/250 mL infusion 0.5-3 mcg/kg/min × 108 kg Titrated 4/10/2017     Sig - Route: Infuse  mcg/min into a venous catheter Dose Adjusted By Provider As Needed. - Intravenous    potassium chloride (KLOR-CON) packet 40 mEq 40 mEq As Needed 4/8/2017     Sig - Route: Take 40 mEq by mouth As Needed (potassium replacement, see admin instructions). - Oral    Cosign for Ordering: Accepted by Ella Pope MD on 4/9/2017 11:56 AM    Linked Group 2:  \"Or\" Linked Group Details        potassium chloride (KLOR-CON) packet 40 mEq 40 mEq As Needed 4/8/2017     Sig - Route: Take 40 mEq by mouth As Needed (potassium replacement, see admin instructions). - Oral    Cosign for Ordering: Accepted by Ella Pope MD on 4/9/2017 11:56 AM    Linked Group 3:  \"Or\" Linked Group Details        potassium chloride (MICRO-K) CR capsule 40 mEq 40 mEq As Needed 4/8/2017     Sig - Route: Take 4 capsules " "by mouth As Needed (potassium replacement.  see admin instructions). - Oral    Cosign for Ordering: Accepted by Ella Pope MD on 4/9/2017 11:56 AM    Linked Group 2:  \"Or\" Linked Group Details        potassium chloride (MICRO-K) CR capsule 40 mEq 40 mEq As Needed 4/8/2017     Sig - Route: Take 4 capsules by mouth As Needed (potassium replacement.  see admin instructions). - Oral    Cosign for Ordering: Accepted by Ella Pope MD on 4/9/2017 11:56 AM    Linked Group 3:  \"Or\" Linked Group Details        potassium chloride 10 mEq in 100 mL IVPB 10 mEq Every 1 Hour PRN 4/8/2017     Sig - Route: Infuse 100 mL into a venous catheter Every 1 (One) Hour As Needed (potassium protocol PERIPHERAL - see admin instructions). - Intravenous    Cosign for Ordering: Accepted by Ella Pope MD on 4/9/2017 11:56 AM    Linked Group 2:  \"Or\" Linked Group Details        potassium chloride 20 mEq in 50 mL IVPB 20 mEq Every 1 Hour PRN 4/7/2017     Sig - Route: Infuse 50 mL into a venous catheter Every 1 (One) Hour As Needed (See admin Instructions.). - Intravenous    potassium chloride 20 mEq in 50 mL IVPB 20 mEq Every 1 Hour PRN 4/8/2017     Sig - Route: Infuse 50 mL into a venous catheter Every 1 (One) Hour As Needed (potassium protocol CENTRAL IV - see admin instructions). - Intravenous    Cosign for Ordering: Accepted by Ella Pope MD on 4/9/2017 11:56 AM    Linked Group 3:  \"Or\" Linked Group Details        promethazine (PHENERGAN) injection 12.5 mg 12.5 mg Every 6 Hours PRN 4/6/2017     Sig - Route: Infuse 0.5 mL into a venous catheter Every 6 (Six) Hours As Needed for Nausea or Vomiting. - Intravenous    saccharomyces boulardii (FLORASTOR) capsule 250 mg 250 mg 2 Times Daily 4/7/2017     Sig - Route: Take 1 capsule by mouth 2 (Two) Times a Day. - Oral    sacubitril-valsartan (ENTRESTO) 24-26 MG tablet 1 tablet 1 tablet Every 12 Hours Scheduled 4/7/2017     Sig - Route: Take 1 tablet by mouth " "Every 12 (Twelve) Hours. - Oral    sodium chloride 0.9 % flush 1-10 mL 1-10 mL As Needed 4/4/2017     Sig - Route: Infuse 1-10 mL into a venous catheter As Needed for Line Care. - Intravenous    amiodarone (PACERONE) tablet 200 mg (Discontinued) 200 mg Every 8 Hours 4/6/2017 4/10/2017    Sig - Route: Take 1 tablet by mouth Every 8 (Eight) Hours. - Oral    Linked Group 4:  \"Followed by\" Linked Group Details        amiodarone (PACERONE) tablet 200 mg (Discontinued) 200 mg Every 12 Hours 4/13/2017 4/10/2017    Sig - Route: Take 1 tablet by mouth Every 12 (Twelve) Hours. - Oral    Linked Group 4:  \"Followed by\" Linked Group Details        amiodarone (PACERONE) tablet 200 mg (Discontinued) 200 mg Daily 4/27/2017 4/10/2017    Sig - Route: Take 1 tablet by mouth Daily. - Oral    Linked Group 4:  \"Followed by\" Linked Group Details        carvedilol (COREG) tablet 6.25 mg (Discontinued) 6.25 mg Every 12 Hours Scheduled 4/5/2017 4/10/2017    Sig - Route: Take 1 tablet by mouth Every 12 (Twelve) Hours. - Oral    HYDROmorphone (DILAUDID) injection 0.5 mg (Discontinued) 0.5 mg Every 2 Hours PRN 4/10/2017 4/10/2017    Sig - Route: Infuse 0.5 mg into a venous catheter Every 2 (Two) Hours As Needed for Severe Pain (7-10). - Intravenous    insulin lispro (humaLOG) injection 0-9 Units (Discontinued) 0-9 Units 4 Times Daily With Meals & Nightly 4/7/2017 4/10/2017    Sig - Route: Inject 0-9 Units under the skin 4 (Four) Times a Day With Meals & at Bedtime. - Subcutaneous    insulin lispro (humaLOG) injection 5 Units (Discontinued) 5 Units 4 Times Daily With Meals & Nightly 4/9/2017 4/10/2017    Sig - Route: Inject 5 Units under the skin 4 (Four) Times a Day With Meals & at Bedtime. - Subcutaneous    pantoprazole (PROTONIX) EC tablet 40 mg (Discontinued) 40 mg Every Early Morning 4/7/2017 4/10/2017    Sig - Route: Take 1 tablet by mouth Every Morning. - Oral    potassium chloride (KLOR-CON) packet 20 mEq (Discontinued) 20 mEq Once " 4/6/2017 4/10/2017    Sig - Route: Take 20 mEq by mouth 1 (One) Time. - Oral    Reason for Discontinue: Therapy completed          Lab Results (last 24 hours)     Procedure Component Value Units Date/Time    Urine Culture [[id number]]  (Abnormal) Collected:  04/07/17 1141    Specimen:  Urine from Urine, Clean Catch Updated:  04/10/17 1136     Urine Culture >100,000 CFU/mL Yeast isolated (A)    POC Glucose Fingerstick [[id number]]  (Normal) Collected:  04/10/17 1147    Specimen:  Blood Updated:  04/10/17 1154     Glucose 99 mg/dL     Narrative:       Meter: [id number] : [id number] [place]    POC Glucose Fingerstick [[id number]]  (Normal) Collected:  04/10/17 1811    Specimen:  Blood Updated:  04/10/17 1823     Glucose 89 mg/dL     Narrative:       Meter: [id number] : [id number] [place]    POC Glucose Fingerstick [[id number]]  (Normal) Collected:  04/10/17 2029    Specimen:  Blood Updated:  04/10/17 2032     Glucose 103 mg/dL     Narrative:       Meter: [id number] : [id number] [facility]    Comprehensive Metabolic Panel [[id number]]  (Abnormal) Collected:  04/10/17 2006    Specimen:  Blood Updated:  04/10/17 2038     Glucose 97 mg/dL      BUN 21 mg/dL      Creatinine 0.70 mg/dL      Sodium 138 mmol/L      Potassium 4.2 mmol/L      Chloride 102 mmol/L      CO2 29.0 mmol/L      Calcium 9.3 mg/dL      Total Protein 7.4 g/dL      Albumin 3.30 g/dL      ALT (SGPT) 14 U/L      AST (SGOT) 70 (H) U/L      Alkaline Phosphatase 74 U/L      Total Bilirubin 0.4 mg/dL      eGFR Non African Amer 114 mL/min/1.73      Globulin 4.1 gm/dL      A/G Ratio 0.8 (L) g/dL      BUN/Creatinine Ratio 30.0 (H)     Anion Gap 7.0 mmol/L     Narrative:       National Kidney Foundation Guidelines    Stage                           Description                             GFR                      1                               Normal or High                          90+  2                               Mild decrease                             60-89  3                               Moderate decrease                   30-59  4                               Severe decrease                       15-29  5                               Kidney failure                             <15    Magnesium [[id number]]  (Normal) Collected:  04/10/17 2006    Specimen:  Blood Updated:  04/10/17 2038     Magnesium 2.1 mg/dL     Phosphorus [[id number]]  (Normal) Collected:  04/10/17 2006    Specimen:  Blood Updated:  04/10/17 2038     Phosphorus 3.4 mg/dL     POC Glucose Fingerstick [[id number]]  (Normal) Collected:  04/10/17 2303    Specimen:  Blood Updated:  04/10/17 2308     Glucose 109 mg/dL     Narrative:       Meter: [id number] : [id number] [facility]    POC Glucose Fingerstick [[id number]]  (Normal) Collected:  04/11/17 0500    Specimen:  Blood Updated:  04/11/17 0505     Glucose 97 mg/dL     Narrative:       Meter: [id number] : [id number] [facility]    Basic Metabolic Panel [[id number]]  (Abnormal) Collected:  04/11/17 0417    Specimen:  Blood Updated:  04/11/17 0539     Glucose 90 mg/dL      BUN 19 mg/dL      Creatinine 0.60 mg/dL      Sodium 138 mmol/L      Potassium 4.1 mmol/L      Chloride 102 mmol/L      CO2 30.0 mmol/L      Calcium 9.4 mg/dL      eGFR Non African Amer 137 mL/min/1.73      BUN/Creatinine Ratio 31.7 (H)     Anion Gap 6.0 mmol/L     Narrative:       National Kidney Foundation Guidelines    Stage                           Description                             GFR                      1                               Normal or High                          90+  2                               Mild decrease                            60-89  3                               Moderate decrease                   30-59  4                               Severe decrease                       15-29  5                               Kidney failure                             <15    Magnesium [[id number]]  (Normal) Collected:  04/11/17 0417     Specimen:  Blood Updated:  04/11/17 0539     Magnesium 2.3 mg/dL     Phosphorus [[id number]]  (Normal) Collected:  04/11/17 0417    Specimen:  Blood Updated:  04/11/17 0539     Phosphorus 3.4 mg/dL         Imaging Results (last 24 hours)     Procedure Component Value Units Date/Time    XR Abdomen KUB [[id number]] Collected:  04/10/17 1224     Updated:  04/10/17 1556    Narrative:       EXAMINATION: XR ABDOMEN KUB-      INDICATION: Keofeed placement - in and ready for x-ray at 11:45;  R13.10-Dysphagia, unspecified; Z74.09-Other reduced mobility;  R41.841-Cognitive communication deficit.      COMPARISON: None.     FINDINGS: Portable KUB reveals the tip of the feeding tube seen in the  distal stomach. There is a fold of the feeding tube seen in the proximal  stomach. Continued advancement is recommended for postpyloric placement.   There is dilated gaseous distended loops of small bowel within the  abdomen.       Impression:       Feeding tube tip seen in the distal stomach in which  continued advancement is recommended for postpyloric placement.     D:  04/10/2017  E:  04/10/2017     This report was finalized on 4/10/2017 3:54 PM by Dr. Megan Posey MD.       XR Chest 1 View [[id number]] Collected:  04/10/17 1114     Updated:  04/10/17 1559    Narrative:       EXAMINATION: XR CHEST 1 VW-      INDICATION: Pos aspiration; R13.10-Dysphagia, unspecified; Z74.09-Other  reduced mobility; R41.841-Cognitive communication deficit.      COMPARISON: None.     FINDINGS: Portable chest reveals patient to be status post median  sternotomy.  The heart is enlarged.  There is a small left pleural  effusion. Degenerative change is seen within the spine.  There is  increased pulmonary vascularity bilaterally.           Impression:       Stable chest with increased pulmonary vascularity  bilaterally. The heart is enlarged with small left pleural effusion.     D:  04/10/2017  E:  04/10/2017     This report was finalized on 4/10/2017 3:57  PM by Dr. Megan Posey MD.       XR Abdomen KUB [[id number]] Collected:  04/10/17 1646     Updated:  04/10/17 1647    Narrative:       EXAMINATION: XR ABDOMEN KUB- 04/10/2017     INDICATION: Keofeed placement; R13.10-Dysphagia, unspecified;  Z74.09-Other reduced mobility; R41.841-Cognitive communication deficit      COMPARISON: NONE     FINDINGS: KUB reveals feeding tube identified tip in the distal stomach.  There is some slight retraction at the tip of the tube with folds seen  in the proximal stomach. Continued advancement is recommended for post  pyloric placement.           Impression:       Feeding tube tip retracted into the distal stomach. Fold is  seen in the fundus of the stomach. Continued advancement is recommended  for post pyloric placement.     D:  04/10/2017  E:  04/10/2017          XR Abdomen KUB [[id number]] Updated:  04/11/17 0148    CT Head Without Contrast [[id number]] Collected:  04/05/17 1241     Updated:  04/11/17 0802    Narrative:       EXAMINATION: CT HEAD WO CONTRAST-, CT ANGIOGRAM HEAD W WO CONTRAST-  04/04/2017     INDICATION: CVA; altered mental status, bilateral upper extremities  weakness.     TECHNIQUE: Contiguous axial 5 mm sections through the brain parenchyma  without intravenous contrast.  Multislice helical CT angiogram of the head before and after intravenous  contrast.     Two dimensional and three dimensional reformatted images were provided  for this exam.  NASCET criteria are utilized for calculation of stenosis.     COMPARISON: NONE     FINDINGS:   Brain:  The midline structures are central. There is no midline shift. Areas of  decreased attenuation are present in the left frontal periventricular  white matter. An old infarct is present in the left frontal lobe. The  structures of the posterior fossa are grossly normal. The images are  somewhat compromised by motion artifact.     The bony calvarium is intact.     Brain CTA:  The brain CTA images are extremely compromised  by patient motion.  Right:  Distal portions of the right internal carotid artery are normal in  course and caliber. The right middle cerebral artery is grossly  unremarkable. The anterior cerebral artery cannot be evaluated secondary  to motion artifact.     Left:  Distal portions of the left internal carotid artery are normal in course  and caliber. The left middle cerebral artery is normal. The anterior  cerebral artery cannot be well evaluated secondary to motion artifact.     Posterior circulation:  The distal vertebral arteries, basilar artery, and posterior cerebral  arteries are normal in course and caliber.       Impression:       1. Both exams compromised by motion artifact.  2. Grossly no acute intracranial process.  3. 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  4. Normal middle cerebral arteries and posterior circulation.  5. The anterior cerebral arteries are not well evaluated.     D:  04/05/2017  E:  04/05/2017        This report was finalized on 4/11/2017 8:00 AM by Dr. Jaec Esquivel MD.       CT Angiogram Head With & Without Contrast [[id number]] Collected:  04/05/17 1241     Updated:  04/11/17 0802    Narrative:       EXAMINATION: CT HEAD WO CONTRAST-, CT ANGIOGRAM HEAD W WO CONTRAST-  04/04/2017     INDICATION: CVA; altered mental status, bilateral upper extremities  weakness.     TECHNIQUE: Contiguous axial 5 mm sections through the brain parenchyma  without intravenous contrast.  Multislice helical CT angiogram of the head before and after intravenous  contrast.     Two dimensional and three dimensional reformatted images were provided  for this exam.  NASCET criteria are utilized for calculation of stenosis.     COMPARISON: NONE     FINDINGS:   Brain:  The midline structures are central. There is no midline shift. Areas of  decreased attenuation are present in the left frontal periventricular  white matter. An old infarct is present in the left frontal lobe. The  structures of the posterior fossa are grossly  normal. The images are  somewhat compromised by motion artifact.     The bony calvarium is intact.     Brain CTA:  The brain CTA images are extremely compromised by patient motion.  Right:  Distal portions of the right internal carotid artery are normal in  course and caliber. The right middle cerebral artery is grossly  unremarkable. The anterior cerebral artery cannot be evaluated secondary  to motion artifact.     Left:  Distal portions of the left internal carotid artery are normal in course  and caliber. The left middle cerebral artery is normal. The anterior  cerebral artery cannot be well evaluated secondary to motion artifact.     Posterior circulation:  The distal vertebral arteries, basilar artery, and posterior cerebral  arteries are normal in course and caliber.       Impression:       1. Both exams compromised by motion artifact.  2. Grossly no acute intracranial process.  3. 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  4. Normal middle cerebral arteries and posterior circulation.  5. The anterior cerebral arteries are not well evaluated.     D:  04/05/2017  E:  04/05/2017        This report was finalized on 4/11/2017 8:00 AM by Dr. Jace Esquivel MD.              Physician Progress Notes (last 24 hours) (Notes from 4/10/2017  9:13 AM through 4/11/2017  9:13 AM)      Ella Pope MD at 4/10/2017 12:46 PM  Version 1 of 1         Critical Care Note     LOS: 6 days   Patient Care Team:  Jose Navarro MD as PCP - General (Family Medicine)    Chief Complaint/Reason for visit: Sepsis, severe ICM      Subjective   61-year-old gentleman with severe ischemic CM, DM, hospitalized at [place] for one week with a scrotal cellulitis, status post debridement. He was brought to [facility] for a possible stroke. Stroke workup showed old left frontal.    Interval History:   This note is that he coughs with attempts to take by mouth and have made him nothing by mouth. His blood pressure has been borderline in the last  "24 hours, ranging . In the last 24 hours he has been 80-88 systolic most of the time. He is, more restless and confused. He last voided at 10 PM. Bladder scan reveals over a liter in his bladder. Infectious disease does not wish a Cueto to be re-anchored. Heart rate has dropped into the 50s. He is restless and confused. He was picking at everything and was placed on a low-dose Precedex drip. Corpak was placed and it is in the pyloric channel. He has not had any vomiting. His pain appears adequately controlled.    Review of Systems:    All systems were reviewed and negative except as noted in subjective.unable, patient not following commands    Medical history, surgical history, social history, family history reviewed    Objective     Intake/Output:    Intake/Output Summary (Last 24 hours) at 04/10/17 1246  Last data filed at 04/10/17 1150   Gross per 24 hour   Intake            751.4 ml   Output             1200 ml   Net           -448.6 ml       Nutrition: NPO    Infusions:    dexmedetomidine 0.2-1.5 mcg/kg/hr Last Rate: 0.2 mcg/kg/hr (04/10/17 0807)   phenylephrine 0.5-3 mcg/kg/min Last Rate: 0.5 mcg/kg/min (04/10/17 1244)       Respiratory: 2 liters       Telemetry: SR with PACs    Vital Signs  Blood pressure (!) 89/57, pulse 64, temperature 97.9 °F (36.6 °C), temperature source Oral, resp. rate 18, height 69\" (175.3 cm), weight 227 lb 1.2 oz (103 kg), SpO2 99 %.    Physical Exam:  General Appearance:   ill-appearing older gentleman    Head:  NC/AT   Eyes:          ROMI EOMI no jaundice   Ears:     Throat:  mucous membranes moist. Several lower teeth teeth missing anteriorly.    Neck:  supple, trachea midline    Back:      Lungs:    bilateral breath sounds with  diffuse rhonchi bilateral UL, Symmetric chest excursion     Heart:   distant heart sounds, regular, PMI displaced laterally    Abdomen:    hypoactive bowel sounds, soft, nontender, no organomegaly  Scrotum with edema, erythema and induration. Wound " packed   Rectal:   Deferred   Extremities:  no pitting edema or cyanosis    Pulses:  weak pulses left foot, no palpable pulses right foot but extremity is warm to touch    Skin:  cool and dry    Lymph nodes:    Neurologic:  alert, confused, follows commands with all extremities. Generalized weakness       Results Review:     I reviewed the patient's new clinical results.     Results from last 7 days  Lab Units 04/10/17  0458 04/09/17  0429 04/08/17  1405 04/08/17  0444  04/05/17  0417   SODIUM mmol/L 140 137  --  139  < > 136   POTASSIUM mmol/L 3.7 3.9 4.0 3.6  < > 3.9   CHLORIDE mmol/L 102 100  --  105  < > 102   TOTAL CO2 mmol/L 32.0* 31.0  --  31.0  < > 25.0   BUN mg/dL 20 17  --  18  < > 6*   CREATININE mg/dL 0.80 0.70  --  0.60  < > 0.60   CALCIUM mg/dL 8.8 9.0  --  9.1  < > 9.3   BILIRUBIN mg/dL 0.3 0.4  --   --   --  0.3   ALK PHOS U/L 66 71  --   --   --  65   ALT (SGPT) U/L 11 25  --   --   --  12   AST (SGOT) U/L 34* 67*  --   --   --  22   GLUCOSE mg/dL 91 196*  --  176*  < > 170*   < > = values in this interval not displayed.    Results from last 7 days  Lab Units 04/10/17  0458 04/09/17  0429 04/07/17  0422   WBC 10*3/mm3 9.97 12.63* 12.63*   HEMOGLOBIN g/dL 10.0* 10.6* 9.9*   HEMATOCRIT % 32.3* 34.2* 31.6*   PLATELETS 10*3/mm3 337 372 328       Results from last 7 days  Lab Units 04/05/17  1349   PH, ARTERIAL pH units 7.408   PO2 ART mm Hg 78.0*   PCO2, ARTERIAL mm Hg 38.5   HCO3 ART mmol/L 24.3     Lab Results   Component Value Date    BLOODCX No growth at 5 days 03/30/2017     Lab Results   Component Value Date    URINECX >100,000 CFU/mL Yeast isolated (A) 04/07/2017   Scrotal wound E. coli    I reviewed the patient's new imaging including images and reports.      FINDINGS: Portable chest reveals patient to be status post median  sternotomy. The heart is enlarged. There is a small left pleural  effusion. Degenerative change is seen within the spine. There is  increased pulmonary vascularity  bilaterally.       IMPRESSION:  Stable chest with increased pulmonary vascularity  bilaterally. The heart is enlarged with small left pleural effusion.      D: 04/10/2017                    IMPRESSION:  Feeding tube tip seen in the distal stomach in which  continued advancement is recommended for postpyloric placement.      D: 04/10/2017          All medications reviewed.     [START ON 4/11/2017] amiodarone 200 mg Oral Q24H   apixaban 5 mg Oral Q12H   aspirin 81 mg Oral Daily   atorvastatin 80 mg Oral Nightly   ceFAZolin 2 g Intravenous Q8H   fentaNYL 1 patch Transdermal Q72H   furosemide 40 mg Intravenous Daily   heparin flush (porcine) 500 Units Intracatheter Q12H   insulin detemir 25 Units Subcutaneous Nightly   insulin lispro 0-9 Units Subcutaneous 4x Daily With Meals & Nightly   insulin lispro 5 Units Subcutaneous 4x Daily With Meals & Nightly   ipratropium-albuterol 3 mL Nebulization 4x Daily - RT   metroNIDAZOLE 500 mg Intravenous Q6H   pantoprazole 40 mg Oral Q AM   saccharomyces boulardii 250 mg Oral BID   sacubitril-valsartan 1 tablet Oral Q12H         Assessment/Plan     Principal Problem:    Sepsis  Active Problems:    E. coli UTI (urinary tract infection)    Yeast UTI    Encephalopathy acute    Cellulitis, scrotum, perineum    Diabetes mellitus, type 2    ICM EF 10%       NSTEMI 5/15 occl LAD, 99% circ, occl SVG to Dz, patent SVG toOM1, patent LIMA to LAD.   Single chamber AICD 12/15, CABG 3/2005    Chronic respiratory failure with hypoxia    Urinary retention    Epididymo-orchitis    61-year-old gentleman with a severe ischemic cardiomyopathy, AICD, ejection fraction of 10%, diabetes mellitus type II, who developed a scrotal abscess requiring I&D in [place] on 3/31. Cultures were positive for Escherichia coli. He was in the [facility] for approximately one week and developed some left-sided weakness and altered mentation. He was transported to [place] for stroke. No acute stroke was found on CT  of the head or CT perfusion. No intervention was done.   He developed atrial fibrillation with a rapid ventricular response and was placed on amiodarone drip. He converted to SR.  Echocardiogram confirms a dilated left ventricular cavity that with severe dysfunction LVEF 10%. No comment as to whether thrombus is seen.  He was placed on a heparin drip for 24 hours and transition to oral Eliquis.  His Coreg was restarted as well as Entresto. In the last 24 hours however, his blood pressure has been marginal, 75-85 systolic, heart rate has dropped into the 50s.   Infectious disease evaluated and changed antibiotics to cefazolin and Flagyl for his Escherichia coli scrotal infection.  Urine culture is growing greater than 100,000 yeast, on admission. Infectious disease notified. Cueto DCed. Now he has urinary retention with over a liter of urine in his bladder.  Blood sugars are running  on sliding scale insulin; Levemir,  however he is now nothing by mouth.       PLAN:  Precedex for agitation   Cefazolin, Flagyl  Wound care  Aspirin, Plavix, statin   Entresto   Hold Coreg while on Precedex  Amiodarone PO, drop dose to 200mg daily  Eliquis  Hold diuresis  Phenylephrine if BP remains low   Sliding scale insulin,  Levemir  Nebulized BDs  Replace electrolytes as needed  Fentanyl patch for pain plus Norco for break thru  Start TF once cor pack in small bowel    VTE Prophylaxis:Eliquis    Stress Ulcer Prophylaxis:Protonix    Ella Pope MD  04/10/17  12:46 PM      Time: 40min  I personally provided care to this critically ill patient as documented above.  Critical care time does not include time spent on separately billed procedures.  Non of my critical care time was concurrent with other critical care providers.      Electronically signed by Ella Pope MD at 4/10/2017 12:58 PM      Luis Alberto Phelan MD at 4/10/2017  1:16 PM  Version 1 of 1         Neurology       Patient Care Team:  Jose JAMES  MD Ramon as PCP - General (Family Medicine)    Chief complaint: Confusion    History:  The doctor's blood pressure significantly is now on phenylephrine.    He's been confused.    He's been found to have the fungal infection in his bladder.    He still growing Escherichia coli from his scrotum.      Past Medical History:   Diagnosis Date   • CAD (coronary artery disease)    • Chronic back pain    • Chronic respiratory failure with hypoxia     Night time only at 2 liters/min   • Diabetes mellitus, type 2    • Essential hypertension    • Hyperlipidemia    • Ischemic cardiomyopathy    • NSTEMI (non-ST elevated myocardial infarction) 2015   • Obesity    • PVD (peripheral vascular disease)    • Systolic CHF     EF <20%  3/8/17   • Urinary retention        Vital Signs   Vitals:    04/10/17 1145 04/10/17 1200 04/10/17 1215 04/10/17 1230   BP: (!) 79/56 (!) 85/57 101/62 (!) 89/57   BP Location:  Left arm     Patient Position:  Lying     Pulse: 57 58 77 64   Resp:  18     Temp:  97.9 °F (36.6 °C)     TempSrc:  Oral     SpO2: 99% 99% 100% 99%   Weight:  227 lb 1.2 oz (103 kg)     Height:           Physical Exam:   General: Calm on Precedex.                Neuro: Awake and alert.  Mildly confused.  Follows commands.    His  are equal.    His voice is soft and articulate.        Results Review:  Reviewed    Results from last 7 days  Lab Units 04/10/17  0458   WBC 10*3/mm3 9.97   HEMOGLOBIN g/dL 10.0*   HEMATOCRIT % 32.3*   PLATELETS 10*3/mm3 337       Results from last 7 days  Lab Units 04/10/17  0458 04/09/17  0429 04/08/17  1405 04/08/17  0444  04/05/17  0417   SODIUM mmol/L 140 137  --  139  < > 136   POTASSIUM mmol/L 3.7 3.9 4.0 3.6  < > 3.9   CHLORIDE mmol/L 102 100  --  105  < > 102   TOTAL CO2 mmol/L 32.0* 31.0  --  31.0  < > 25.0   BUN mg/dL 20 17  --  18  < > 6*   CREATININE mg/dL 0.80 0.70  --  0.60  < > 0.60   CALCIUM mg/dL 8.8 9.0  --  9.1  < > 9.3   BILIRUBIN mg/dL 0.3 0.4  --   --   --  0.3   ALK PHOS U/L 66  71  --   --   --  65   ALT (SGPT) U/L 11 25  --   --   --  12   AST (SGOT) U/L 34* 67*  --   --   --  22   GLUCOSE mg/dL 91 196*  --  176*  < > 170*   < > = values in this interval not displayed.  Imaging Results (last 24 hours)     Procedure Component Value Units Date/Time    XR Chest 1 View [[id number]] Collected:  04/09/17 1934     Updated:  04/10/17 0059    Narrative:          EXAMINATION: XR CHEST, SINGLE VIEW - 04/09/2017     INDICATION:  R13.10-Dysphagia, unspecified; Z74.09-Other reduced  mobility;  R41.841-Cognitive communication deficit.     COMPARISON: 4/08/2017 portable chest radiograph.      FINDINGS: Sternotomy wires and a left-sided single-lead ICD are noted.  Right upper extremity PICC line is again seen with its tip in the distal  SVC. The heart is mildly enlarged. The vasculature appears normal. Mild  patchy opacity of the left base is stable, presumably mild atelectasis  and effusion. No new pulmonary parenchymal disease is seen elsewhere.             Impression:       Stable mild left basilar atelectasis and effusion.     DICTATED:     04/09/2017  EDITED:         04/09/2017     This report was finalized on 4/10/2017 12:57 AM by DR. Morgan Macedo MD.       XR Chest 1 View [[id number]] Collected:  04/10/17 1114     Updated:  04/10/17 1114    Narrative:       EXAMINATION: XR CHEST 1 VW-      INDICATION: Pos aspiration; R13.10-Dysphagia, unspecified; Z74.09-Other  reduced mobility; R41.841-Cognitive communication deficit.      COMPARISON: None.     FINDINGS: Portable chest reveals patient to be status post median  sternotomy.  The heart is enlarged.  There is a small left pleural  effusion. Degenerative change is seen within the spine.  There is  increased pulmonary vascularity bilaterally.           Impression:       Stable chest with increased pulmonary vascularity  bilaterally. The heart is enlarged with small left pleural effusion.     D:  04/10/2017  E:  04/10/2017       XR Abdomen KUB [[id number]]  Collected:  04/10/17 1224     Updated:  04/10/17 1225    Narrative:       EXAMINATION: XR ABDOMEN KUB-      INDICATION: Keofeed placement - in and ready for x-ray at 11:45;  R13.10-Dysphagia, unspecified; Z74.09-Other reduced mobility;  R41.841-Cognitive communication deficit.      COMPARISON: None.     FINDINGS: Portable KUB reveals the tip of the feeding tube seen in the  distal stomach. There is a fold of the feeding tube seen in the proximal  stomach. Continued advancement is recommended for postpyloric placement.   There is dilated gaseous distended loops of small bowel within the  abdomen.       Impression:       Feeding tube tip seen in the distal stomach in which  continued advancement is recommended for postpyloric placement.     D:  04/10/2017  E:  04/10/2017                Assessment:  Encephalopathy likely secondary to hypotension plus or minus sepsis    Plan:  per pulmonology.  No evidence of primary neurologic issue    Comment:  Guarded prognosis in a critically ill individual         I discussed the patients findings and my recommendations with patient and family    Luis Alberto Phelan MD  04/10/17  1:16 PM         Electronically signed by Luis Alberto Phelan MD at 4/10/2017  1:18 PM      TRESA Aj at 4/11/2017  7:11 AM  Version 1 of 1         [facility] Progress Note    Admission Date: 4/4/2017    Tomas Salvador  1955  [id number]    Date: 4/11/2017    Meds:    IV Anti-Infectives     Ordered     Dose/Rate Route Frequency Start Stop    04/04/17 1723  metroNIDAZOLE (FLAGYL) IVPB 500 mg     Ordering Provider:  Kike Leon MD    500 mg  100 mL/hr over 60 Minutes Intravenous Every 6 Hours 04/04/17 1800      04/04/17 1552  ceFAZolin in dextrose (ANCEF) IVPB solution 2 g     Ordering Provider:  TRESA Baca    2 g  over 30 Minutes Intravenous Every 8 Hours 04/04/17 1600            CC:  Delisa's gangrene    SUBJECTIVE:  4/4/17:Patient is a 61 y.o. 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 He has a history of underlying diabetes mellitus and severe systolic congestive heart failure. He presented to [facility] on 3/30 with scrotal cellulitis/gangrene. He underwent debridement by urology at [facility] on 3/31 and was treated with Merrem/clindamycin/vancomycin. He developed left facial droop and left sided paresis, prompting a transfer to [facility] for evaluation and therapy of acute stroke. His head CT angiogram was degraded by motion artifact but per Dr. Morrison there was suggestion of right inferior temporal lobe\" opacification\" consistent with a possible stroke. His left facial droop and left-sided paresis has improved today. He is encephalopathic and is unable to provide any reliable history. His wife thinks that he may have had some fevers prior to his admission on 3/30.    17: afebrile. No hx per patient secondary to encephalopathy.  Afebrile, restless per nsg staff.  Oliguric UOP.  No n/v/d.  No rashes.  17: More alert and less restless today.  Still non-responsive to questions and does not follow simple commands.  Still with left-sided weakness.  Afebrile.  Still with oliguric UOP.  No n/v/d, no rashes, per nsg staff notes.   17:  Alert today.  No fever,chills,sweats.  Wife at bedside with ?s   4/10/17:  Wants to go home.  Wound care per PT.  No n/v/d.   17:  Now on Dopamine and Bautista, bipap.  Decompensated last pm.          PE:   Vital Signs  Temp (24hrs), Av °F (36.7 °C), Min:97.5 °F (36.4 °C), Max:98.5 °F (36.9 °C)    Temp  Min: 97.5 °F (36.4 °C)  Max: 98.5 °F (36.9 °C)  BP  Min: 77/50  Max: 130/76  Pulse  Min: 46  Max: 82  Resp  Min: 16  Max: 23  SpO2  Min: 90 %  Max: 100 %    GENERAL: Somnelent;  He is in no acute distress on bipap  HEENT: Normocephalic, atraumatic. PERRL. EOMI. No conjunctival injection. No icterus. Oropharynx clear without evidence of thrush or exudate.   HEART: RRR; No " murmur, rubs, gallops.   LUNGS: few anterior rhonchi   ABDOMEN: Soft, nontender, nondistended. Positive bowel sounds. No rebound or guarding. NO mass or HSM.  EXT: No cyanosis, clubbing or edema. No cord.  : scrotal wound  Dressing in  Place   MSK: FROM without joint effusions noted arms/legs.   SKIN: Warm and dry without cutaneous eruptions on Inspection/palpation.   NEURO: somnelent       Right PICC without redness   Laboratory Data      Results from last 7 days  Lab Units 04/10/17  0458 04/09/17  0429 04/07/17  0422   WBC 10*3/mm3 9.97 12.63* 12.63*   HEMOGLOBIN g/dL 10.0* 10.6* 9.9*   HEMATOCRIT % 32.3* 34.2* 31.6*   PLATELETS 10*3/mm3 337 372 328       Results from last 7 days  Lab Units 04/11/17  0417   SODIUM mmol/L 138   POTASSIUM mmol/L 4.1   CHLORIDE mmol/L 102   TOTAL CO2 mmol/L 30.0   BUN mg/dL 19   CREATININE mg/dL 0.60   GLUCOSE mg/dL 90   CALCIUM mg/dL 9.4       Results from last 7 days  Lab Units 04/10/17  2006   ALK PHOS U/L 74   BILIRUBIN mg/dL 0.4   ALT (SGPT) U/L 14   AST (SGOT) U/L 70*           Results from last 7 days  Lab Units 04/10/17  0458   CRP mg/dL 2.98*           Results from last 7 days  Lab Units 04/04/17  1056   CK TOTAL U/L 54         Estimated Creatinine Clearance: 150.9 mL/min (by C-G formula based on Cr of 0.6).    Microbiology:  Blood Culture   Date Value Ref Range Status   03/30/2017 No growth at 5 days  Final   03/30/2017 No growth at 5 days  Final           Urine Culture   Date Value Ref Range Status   04/04/2017 No growth  Preliminary   03/31/2017 No growth  Final   03/30/2017 >100,000 CFU/mL Escherichia coli (A)  Final      scrotal cx x 2 cxs (3/31) E. coli  Cefazolin sens.    4/4/17:  Urine culture:  >100K cfu Yeast    4/7:  UA 13-20 WBCs, large amount of budding yeast/ urine culture: pending     Radiology:  Imaging Results (last 24 hours)     Procedure Component Value Units Date/Time    CT Head Without Contrast [[id number]] Collected:  04/05/17 1241     Updated:   04/05/17 1241    Narrative:       EXAMINATION: CT HEAD WO CONTRAST-, CT ANGIOGRAM HEAD W WO CONTRAST-  04/04/2017     INDICATION: CVA; altered mental status, bilateral upper extremities  weakness.     TECHNIQUE: Contiguous axial 5 mm sections through the brain parenchyma  without intravenous contrast.  Multislice helical CT angiogram of the head before and after intravenous  contrast.     Two dimensional and three dimensional reformatted images were provided  for this exam.  NASCET criteria are utilized for calculation of stenosis.     COMPARISON: NONE     FINDINGS:   Brain:  The midline structures are central. There is no midline shift. Areas of  decreased attenuation are present in the left frontal periventricular  white matter. An old infarct is present in the left frontal lobe. The  structures of the posterior fossa are grossly normal. The images are  somewhat compromised by motion artifact.     The bony calvarium is intact.     Brain CTA:  The brain CTA images are extremely compromised by patient motion.  Right:  Distal portions of the right internal carotid artery are normal in  course and caliber. The right middle cerebral artery is grossly  unremarkable. The anterior cerebral artery cannot be evaluated secondary  to motion artifact.     Left:  Distal portions of the left internal carotid artery are normal in course  and caliber. The left middle cerebral artery is normal. The anterior  cerebral artery cannot be well evaluated secondary to motion artifact.     Posterior circulation:  The distal vertebral arteries, basilar artery, and posterior cerebral  arteries are normal in course and caliber.       Impression:       1. Both exams compromised by motion artifact.  2. Grossly no acute intracranial process.  3. 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  4. Normal middle cerebral arteries and posterior circulation.  5. The anterior cerebral arteries are not well evaluated.     D:  04/05/2017  E:  04/05/2017          CT  Angiogram Head With & Without Contrast [[id number]] Collected:  04/05/17 1241     Updated:  04/05/17 1241    Narrative:       EXAMINATION: CT HEAD WO CONTRAST-, CT ANGIOGRAM HEAD W WO CONTRAST-  04/04/2017     INDICATION: CVA; altered mental status, bilateral upper extremities  weakness.     TECHNIQUE: Contiguous axial 5 mm sections through the brain parenchyma  without intravenous contrast.  Multislice helical CT angiogram of the head before and after intravenous  contrast.     Two dimensional and three dimensional reformatted images were provided  for this exam.  NASCET criteria are utilized for calculation of stenosis.     COMPARISON: NONE     FINDINGS:   Brain:  The midline structures are central. There is no midline shift. Areas of  decreased attenuation are present in the left frontal periventricular  white matter. An old infarct is present in the left frontal lobe. The  structures of the posterior fossa are grossly normal. The images are  somewhat compromised by motion artifact.     The bony calvarium is intact.     Brain CTA:  The brain CTA images are extremely compromised by patient motion.  Right:  Distal portions of the right internal carotid artery are normal in  course and caliber. The right middle cerebral artery is grossly  unremarkable. The anterior cerebral artery cannot be evaluated secondary  to motion artifact.     Left:  Distal portions of the left internal carotid artery are normal in course  and caliber. The left middle cerebral artery is normal. The anterior  cerebral artery cannot be well evaluated secondary to motion artifact.     Posterior circulation:  The distal vertebral arteries, basilar artery, and posterior cerebral  arteries are normal in course and caliber.       Impression:       1. Both exams compromised by motion artifact.  2. Grossly no acute intracranial process.  3. 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  4. Normal middle cerebral arteries and posterior circulation.  5. The anterior  cerebral arteries are not well evaluated.     D:  04/05/2017  E:  04/05/2017          XR Chest 1 View [[id number]] Collected:  04/05/17 1406     Updated:  04/05/17 1423    Narrative:       EXAMINATION: XR CHEST 1 VIEW-04/04/2017:      INDICATION: Sepsis, cardiomyopathy; R13.10-Dysphagia, unspecified.      COMPARISON: NONE.     FINDINGS:   1.  There is cardiomegaly with perihilar vascular congestion.  2.  Bibasilar consolidative airspace opacities are noted, worse on the  left than right.  3.  ICD pacemaker defibrillator is in place from the left. The upper  lung zones are clear.           Impression:       1.  Cardiomegaly is noted. There is airspace opacity left base with  consolidation. There is congestive hilar and mid lung vascular  abnormalities.  2.  Compared to previous studies of 12/17/2015, it appears that the  central congestive vascular edema is new. The density in the left lower  lobe, retrocardiac area and the left base effusion are new. Cardiomegaly  is relatively stable.     D:  04/05/2017  E:  04/05/2017     This report was finalized on 4/5/2017 2:21 PM by Dr. Scottie Bowling MD.       CT Head Without Contrast [[id number]] Collected:  04/05/17 1648     Updated:  04/05/17 1648    Narrative:       EXAMINATION: CT HEAD WITHOUT CONTRAST-04/05/2017:      INDICATION: CVA; R13.10-Dysphagia, unspecified; Z74.09-Other reduced  mobility; Z74.09-Other reduced mobility.         TECHNIQUE: CT scan of the head was performed at 5 mm intervals. No  intravenous contrast was utilized.     COMPARISON: 04/04/2017.     FINDINGS: There is a small old left frontal infarct. There is no  intracranial mass, hemorrhage, midline shift or extra-axial fluid  collection.       Impression:       Old left frontal infarct. There are no acute findings.     D:  04/05/2017  E:  04/05/2017             XR Chest 1 View [[id number]] Updated:  04/06/17 0212            I personally read the radiographic studies     Impression:   1. 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 He is significantly improved.  2. Acute right hemispheric cerebral vascular accident-with left sided facial and left-sided weakness. His head CT angiogram here reveals subtle right temporal abnormalities per Dr. Morrison. He has clinically improved.  3. Acute toxic metabolic encephalopathy-secondary to sepsis and cerebral vascular accident  4. 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 Severe systolic congestive heart failure-with an ejection fraction of less than 20% on echocardiogram performed on 3/8/17  6. Escherichia coli urinary tract infection  7. Sepsis-secondary to Delisa's gangrene  8. Leukocytosis/neutrophilia-secondary to scrotal gangrene/cellulitis, improved   9.  LLL pulmonary infiltrate  10. Candiduria-he has a relative contraindication for fluconazole therapy since he is on amiodarone.    PLAN/RECOMMENDATIONS:   1. Cefazolin 2 g IV every 8 hours  2. Flagyl 500 mg IV every 8 hours  3. PT wound care with debridement and pulse lavage  4.  Continue to leave the Cueto catheter out- in and out catheterization  Prn retention          Kike Leon MD saw and examined patient, verified hx and PE, read all radiographic studies, reviewed labs and micro data, and formulated dx, plan for treatment and all medical decision making.      .        TRESA Aj  4/11/2017  7:11 AM           Electronically signed by TRESA Aj at 4/11/2017  7:25 AM        Consult Notes (last 24 hours) (Notes from 4/10/2017  9:13 AM through 4/11/2017  9:13 AM)     No notes of this type exist for this encounter.

## 2017-04-11 NOTE — SIGNIFICANT NOTE
04/11/17 1010   SLP Deferred Reason   SLP Deferred Reason Unable to evaluate, medical status change  (Pt on Bi-PAP and not alert/able to participate in clinical swallow eval. SLP for follow up 4/12/17.)

## 2017-04-11 NOTE — PROGRESS NOTES
"Critical Care Note     LOS: 7 days   Patient Care Team:  Jose Navarro MD as PCP - General (Family Medicine)    Chief Complaint/Reason for visit: Sepsis, severe ICM      Subjective   61-year-old gentleman with severe ischemic CM, DM, hospitalized at [place] for one week with a scrotal cellulitis, status post debridement. He was brought to [facility] for a possible stroke. Stroke workup showed old left frontal.      Interval History:   Patient is doing poorly. Yesterday he was hypotensive and bradycardic. He is now on dopamine and phenylephrine to maintain his blood pressure. Respiratory status deteriorated and he was placed on BiPAP. Wife made him and took conditional code with no intubation or CPR. Amiodarone, Coreg, Entresto discontinued.      Review of Systems:    All systems were reviewed and negative except as noted in subjective.unable, patient not following commands    Medical history, surgical history, social history, family history reviewed    Objective     Intake/Output:    Intake/Output Summary (Last 24 hours) at 04/11/17 1347  Last data filed at 04/11/17 0600   Gross per 24 hour   Intake           1352.9 ml   Output              300 ml   Net           1052.9 ml       Nutrition: NPO    Infusions:    dexmedetomidine 0.2-1.5 mcg/kg/hr Last Rate: 0.6 mcg/kg/hr (04/11/17 0553)   DOPamine 2-20 mcg/kg/min Last Rate: 4 mcg/kg/min (04/11/17 1037)   phenylephrine 0.5-3 mcg/kg/min Last Rate: 0.4 mcg/kg/min (04/11/17 0149)       Respiratory: BiPAP 18/8 rate 12 50%       Telemetry: SB/SR    Vital Signs  Blood pressure 103/89, pulse 67, temperature 98.5 °F (36.9 °C), temperature source Axillary, resp. rate 16, height 69\" (175.3 cm), weight 227 lb 1.2 oz (103 kg), SpO2 90 %.    Physical Exam:  General Appearance:   ill-appearing older gentleman    Head:  NC/AT   Eyes:          ROMI EOMI no jaundice   Ears:     Throat:  BiPAP in place   Neck:  supple, trachea midline    Back:      Lungs:    bilateral " breath sounds with  diffuse rhonchi bilateral UL, Symmetric chest excursion     Heart:   distant heart sounds, regular, PMI displaced laterally    Abdomen:    hypoactive bowel sounds, soft, nontender, no organomegaly  Scrotum with edema, erythema and induration. Wound packed   Rectal:   Deferred   Extremities:  no pitting edema or cyanosis    Pulses:  weak pulses left foot, no palpable pulses right foot but extremity is warm to touch    Skin:  cool and dry    Lymph nodes:    Neurologic:   Lethargic      Results Review:     I reviewed the patient's new clinical results.     Results from last 7 days  Lab Units 04/11/17  0417 04/10/17  2006 04/10/17  0458 04/09/17  0429   SODIUM mmol/L 138 138 140 137   POTASSIUM mmol/L 4.1 4.2 3.7 3.9   CHLORIDE mmol/L 102 102 102 100   TOTAL CO2 mmol/L 30.0 29.0 32.0* 31.0   BUN mg/dL 19 21 20 17   CREATININE mg/dL 0.60 0.70 0.80 0.70   CALCIUM mg/dL 9.4 9.3 8.8 9.0   BILIRUBIN mg/dL  --  0.4 0.3 0.4   ALK PHOS U/L  --  74 66 71   ALT (SGPT) U/L  --  14 11 25   AST (SGOT) U/L  --  70* 34* 67*   GLUCOSE mg/dL 90 97 91 196*       Results from last 7 days  Lab Units 04/10/17  0458 04/09/17  0429 04/07/17  0422   WBC 10*3/mm3 9.97 12.63* 12.63*   HEMOGLOBIN g/dL 10.0* 10.6* 9.9*   HEMATOCRIT % 32.3* 34.2* 31.6*   PLATELETS 10*3/mm3 337 372 328       Results from last 7 days  Lab Units 04/05/17  1349   PH, ARTERIAL pH units 7.408   PO2 ART mm Hg 78.0*   PCO2, ARTERIAL mm Hg 38.5   HCO3 ART mmol/L 24.3     Lab Results   Component Value Date    BLOODCX No growth at 5 days 03/30/2017     Lab Results   Component Value Date    URINECX >100,000 CFU/mL Candida glabrata (A) 04/07/2017   Scrotal wound E. coli    I reviewed the patient's new imaging including images and reports.                    IMPRESSION:  Stable chest with increased pulmonary vascularity  bilaterally. The heart is enlarged with small left pleural effusion.      D: 04/10/2017          All medications reviewed.     amiodarone  200 mg Oral Q24H   apixaban 5 mg Oral Q12H   aspirin 81 mg Oral Daily   ceFAZolin 2 g Intravenous Q8H   fentaNYL 1 patch Transdermal Q72H   heparin flush (porcine) 500 Units Intracatheter Q12H   insulin detemir 25 Units Subcutaneous Nightly   insulin regular 0-9 Units Subcutaneous Q6H   ipratropium-albuterol 3 mL Nebulization 4x Daily - RT   metroNIDAZOLE 500 mg Intravenous Q6H   pantoprazole 40 mg Intravenous Q AM   saccharomyces boulardii 250 mg Oral BID         Assessment/Plan     Principal Problem:    Sepsis  Active Problems:    E. coli UTI (urinary tract infection)    Yeast UTI    Encephalopathy acute    Cellulitis, scrotum, perineum    Diabetes mellitus, type 2    ICM EF 10%       NSTEMI 5/15 occl LAD, 99% circ, occl SVG to Dz, patent SVG toOM1, patent LIMA to LAD.   Single chamber AICD 12/15, CABG 3/2005    Chronic respiratory failure with hypoxia    Urinary retention    Epididymo-orchitis    61-year-old gentleman with a severe ischemic cardiomyopathy, AICD, ejection fraction of 10%, diabetes mellitus type II, who developed a scrotal abscess requiring I&D in [place] on 3/31. Cultures were positive for Escherichia coli. He was in the [place] [facility] for approximately one week and developed some left-sided weakness and altered mentation. He was transported to [place] for stroke. No acute stroke was found on CT of the head or CT perfusion. No intervention was done.   He developed atrial fibrillation with a rapid ventricular response and was placed on amiodarone drip. He converted to SR.  Echocardiogram confirms a dilated left ventricular cavity that with severe dysfunction LVEF 10%. No comment as to whether thrombus is seen.  He was placed on a heparin drip for 24 hours and transition to oral Eliquis.   In the last 24 hours however, his blood pressure has been marginal, 75-85 systolic, heart rate has dropped into the 40s and dopamine, phenylephrine initiated. Precedex was added for agitation. This may be  playing a role in his bradycardia but is not the sole cause of his bradycardia. He was agitated and pulling at all of his lines and tubes until it was initiated. Narcotics or benzodiazepine seemed to knock him out and then he arouses more agitated than before.  Infectious disease evaluated and changed antibiotics to cefazolin and Flagyl for his Escherichia coli scrotal infection.  Urine culture is growing greater than 100,000 yeast, on admission. Infectious disease notified. Cueto DCed. Now he has urinary retention with over a liter of urine in his bladder. He continues to require in and out catheterization  Blood sugars are running  on sliding scale insulin; Levemir,  however he is now nothing by mouth. Corpak is in the pylorus. Because he is requiring BiPAP I do not want to feed and lasts the feeding tube is in the small bowel.       PLAN:  Limited resuscitation, still full support  Dopamine to support HR, phenylephrine to support mean BP  Precedex for agitation   Cefazolin, Flagyl  Wound care  Aspirin, Plavix, statin  Eliquis  Hold diuresis  Decrease Levemir   Sliding scale insulin  Nebulized BDs  Replace electrolytes as needed  Fentanyl patch for pain plus Norco for break thru  Start TF once cor pack in small bowel    VTE Prophylaxis:Eliquis    Stress Ulcer Prophylaxis:Protonix    Ella Pope MD  04/11/17  1:47 PM      Time: 40min  I personally provided care to this critically ill patient as documented above.  Critical care time does not include time spent on separately billed procedures.  Non of my critical care time was concurrent with other critical care providers.

## 2017-04-11 NOTE — SIGNIFICANT NOTE
Wife called [place] Maxwell about changing Code status on patient.  She want to make him an AND-full support, and to not continue any more interventions.  I spoke to the wife and told her we can change the status and continue current support.  He is currently on Dopamine for HR and BP support, and was placed on BiPap before I came into the room.  She is agreeable to current care.  I will be around if she has any concerns this evening.

## 2017-04-11 NOTE — PROGRESS NOTES
"  [facility]   Inpatient Progress Note       LOS: 7 days   Patient Care Team:  Jose Navarro MD as PCP - General (Family Medicine)    Chief Complaint:  Follow-up for CVA and atrial fibrillation    Subjective     Interval History:   Patient obtunded. Attention throughout the evening now on pressors and BiPAP.    Review of Systems:   Pertinent positives noted in history, exam, and assessment. Otherwise reviewed and negative.      Objective     Vitals:  Blood pressure 117/79, pulse 66, temperature 98.5 °F (36.9 °C), temperature source Axillary, resp. rate 16, height 69\" (175.3 cm), weight 227 lb 1.2 oz (103 kg), SpO2 100 %.     Intake/Output Summary (Last 24 hours) at 04/11/17 1216  Last data filed at 04/11/17 0600   Gross per 24 hour   Intake           1352.9 ml   Output              300 ml   Net           1052.9 ml     Physical Exam   Constitutional: He appears well-developed and well-nourished.   HENT:   Mouth/Throat: Oropharynx is clear and moist.   Neck: No JVD present. Carotid bruit is not present.   Cardiovascular: Regular rhythm, S1 normal, S2 normal, normal heart sounds and intact distal pulses.  Exam reveals no gallop, no S3 and no S4.    No murmur heard.  Pulses:       Carotid pulses are 2+ on the right side, and 2+ on the left side.       Radial pulses are 2+ on the right side, and 2+ on the left side.   Pulmonary/Chest: Breath sounds normal. He has no wheezes. He has no rales.   Abdominal: Soft. Bowel sounds are normal. He exhibits no mass. There is no tenderness.   Musculoskeletal: He exhibits no edema.   Neurological:   Unresponsive except to deepstimuli   Skin: Skin is warm and dry. No rash noted.          Results Review:     I reviewed the patient's new clinical results.      Results from last 7 days  Lab Units 04/10/17  0458   WBC 10*3/mm3 9.97   HEMOGLOBIN g/dL 10.0*   HEMATOCRIT % 32.3*   PLATELETS 10*3/mm3 337       Results from last 7 days  Lab Units 04/11/17  0417 " 04/10/17  2006   SODIUM mmol/L 138 138   POTASSIUM mmol/L 4.1 4.2   CHLORIDE mmol/L 102 102   TOTAL CO2 mmol/L 30.0 29.0   BUN mg/dL 19 21   CREATININE mg/dL 0.60 0.70   CALCIUM mg/dL 9.4 9.3   BILIRUBIN mg/dL  --  0.4   ALK PHOS U/L  --  74   ALT (SGPT) U/L  --  14   AST (SGOT) U/L  --  70*   GLUCOSE mg/dL 90 97       Results from last 7 days  Lab Units 04/11/17  0417   SODIUM mmol/L 138   POTASSIUM mmol/L 4.1   CHLORIDE mmol/L 102   TOTAL CO2 mmol/L 30.0   BUN mg/dL 19   CREATININE mg/dL 0.60   GLUCOSE mg/dL 90   CALCIUM mg/dL 9.4       Results from last 7 days  Lab Units 04/05/17  1349   INR  1.42       0  Lab Value Date/Time   TROPONINI <0.006 04/04/2017 1056   TROPONINI <0.006 04/03/2017 1851   TROPONINI 0.018 03/31/2017 0542   TROPONINI 0.024 03/30/2017 1515   TROPONINI 3.295 (C) 05/07/2015 2125   TROPONINI 3.801 (C) 05/07/2015 1549   TROPONINI 3.825 (C) 05/07/2015 1133       Results from last 7 days  Lab Units 04/05/17  0417   TSH mIU/mL 0.656       Results from last 7 days  Lab Units 04/05/17  0417   CHOLESTEROL mg/dL 108   TRIGLYCERIDES mg/dL 80   HDL CHOL mg/dL 30*         ECHO:  · Left ventricular function is severely decreased. Estimated EF appears to be in the range of 10%.  · The left ventricular cavity is severely dilated.  · Global left ventricular wall motion appears abnormal. There is severe diffuse hypokinesis.  · Left ventricular diastolic dysfunction.  · There is no evidence of a left ventricular thrombus present.  · Moderately reduced right ventricular systolic function noted.  · Left atrial cavity size is mildly dilated.  · Mild mitral valve regurgitation is present    Tele: is bradycardia  Assessment/Plan     Principal Problem:    Sepsis  Active Problems:    Ischemic cardiomyopathy    ICD (implantable cardioverter-defibrillator) in place, implantation 2015.    Diabetes education, encounter for    Chronic respiratory failure with hypoxia    Urinary retention    E. coli UTI (urinary tract  infection)    Epididymo-orchitis    Encephalopathy acute    Cellulitis    Yeast UTI      1. Ischemic cardiomyopathy.                - EF 10 %, no evidence of LV thrombus.          -   Atrial fibrillation  - new diagnosis. CHADSVASC=6   -converted back to SR, currently on amiodarone   -will need lifelong anticoagulation, Eliquis has been started  2. CAD, stable  3. Dyslipidemia  4. Hypotension/shock requiring pressors.  Likely septic plus minus some volume issues.  5. Encephalopathy/delirium due to hypotension    Plan:   At this time, patient blood pressure too low we will hold CARDIOVASCULAR medicines.  He does not need diuresis.heck a chest x-ray.    Jarocho Martin MD  04/11/17  12:16 PM              Please note that portions of this note may have been completed with a voice recognition program. Efforts were made to edit the dictations, but occasionally words are mistranscribed.

## 2017-04-11 NOTE — THERAPY DISCHARGE NOTE
Acute Care - Occupational Therapy Discharge Summary  [facility]     Patient Name: Tomas Salvador  : 1955  MRN: [id number]    Today's Date: 2017  Onset of Illness/Injury or Date of Surgery Date: 17    Date of Referral to OT: 17  Referring Physician: Dr. Morrison      Admit Date: 2017        OT Recommendation and Plan    Visit Dx:    ICD-10-CM ICD-9-CM   1. Dysphagia, unspecified type R13.10 787.20   2. Impaired functional mobility, balance, gait, and endurance Z74.09 V49.89   3. Impaired mobility and ADLs Z74.09 799.89   4. Cognitive communication deficit R41.841 799.52                     OT Goals       17 1343 17 1446 17 1522    Bed Mobility OT LTG    Bed Mobility OT LTG, Outcome goal not met  -JR goal ongoing   Max A x 2 sup>sit; Dep x 2 sit>supine this date  -TA goal ongoing  -JR    Bed Mobility OT LTG, Reason Goal Not Met medical status inhibits participation  -JR      Transfer Training OT LTG    Transfer Training OT LTG, Outcome goal not met  -JR goal ongoing   Progressing  -TA goal ongoing  -JR    Transfer Training OT LTG, Reason Goal Not Met medical status inhibits participation  -JR      Orientation OT LTG    Orientation OT LTG, Outcome goal not met  -JR goal ongoing  -TA goal ongoing  -JR    Orientation OT LTG, Reason Goal Not Met medical status inhibits participation  -JR      Follow Directions OT LTG    Follow Directions OT LTG, Outcome goal not met  -JR goal ongoing   50% this date; continue for consistency  -TA goal ongoing  -JR    Follow Directions OT LTG, Reason Goal Not Met medical status inhibits participation  -JR        17 0904          Bed Mobility OT LTG    Bed Mobility OT LTG, Date Established 17  -JR      Bed Mobility OT LTG, Time to Achieve 1 wk  -JR      Bed Mobility OT LTG, Activity Type all bed mobility  -JR      Bed Mobility OT LTG, [place] Level minimum assist (75% patient effort)  -JR      Transfer Training OT LTG     Transfer Training OT LTG, Date Established 04/05/17  -JR      Transfer Training OT LTG, Time to Achieve 1 wk  -JR      Transfer Training OT LTG, Activity Type all transfers  -JR      Transfer Training OT LTG, [place] Level minimum assist (75% patient effort)  -JR      Orientation OT LTG    Orientation OT LTG, Date Established 04/05/17  -JR      Orientation OT LTG, Time to Achieve 1 wk  -JR      Orientation OT LTG, Activity Type person;place;50% treatment session  -JR      Follow Directions OT LTG    Follow Directions OT LTG, Date Established 04/05/17  -JR      Follow Directions OT LTG, Time to Achieve 1 wk  -JR      Follow Directions OT LTG, Activity Type 1-Step;50% treatment session  -JR      Follow Directions OT LTG, [place] Level with verbal cues  -        User Key  (r) = Recorded By, (t) = Taken By, (c) = Cosigned By    Initials Name Provider Type    JR Judy Leach, OT Occupational Therapist    CHU Jenkins, OT Occupational Therapist                Outcome Measures       04/11/17 1000 04/09/17 1411 04/09/17 1410    How much help from another person do you currently need...    Turning from your back to your side while in flat bed without using bedrails?  2  -DR     Moving from lying on back to sitting on the side of a flat bed without bedrails?  1  -DR     Moving to and from a bed to a chair (including a wheelchair)?  3  -DR     Standing up from a chair using your arms (e.g., wheelchair, bedside chair)?  3  -DR     Climbing 3-5 steps with a railing?  1  -DR     To walk in hospital room?  3  -DR     AM-PAC 6 Clicks Score  13  -DR     How much help from another is currently needed...    Putting on and taking off regular lower body clothing?   1  -TA    Bathing (including washing, rinsing, and drying)   2  -TA    Toileting (which includes using toilet bed pan or urinal)   1  -TA    Putting on and taking off regular upper body clothing   2  -TA    Taking care of personal grooming (such as  brushing teeth)   2  -TA    Eating meals   2  -TA    Score   10  -TA    Modified [place] Scale    Modified [place] Scale 4 - Moderately severe disability.  Unable to walk without assistance, and unable to attend to own bodily needs without assistance.   per last OT note  -LS  4 - Moderately severe disability.  Unable to walk without assistance, and unable to attend to own bodily needs without assistance.  -TA    Functional Assessment    Outcome Measure Options  AM-PAC 6 Clicks Basic Mobility (PT)  - AM-PAC 6 Clicks Daily Activity (OT);Modified [place]  -TA      User Key  (r) = Recorded By, (t) = Taken By, (c) = Cosigned By    Initials Name Provider Type    LS Lily Juárez, PT Physical Therapist    TA David Jenkins, OT Occupational Therapist    DR Mikey Moore, PT Physical Therapist          Therapy Charges for Today     Code Description Service Date Service Provider Modifiers Qty    [id number]  OT CARE PLAN EA 15 MIN 4/10/2017 Judy Leach, OT GO 1    [id number]  OT CARE PLAN EA 15 MIN 4/11/2017 Judy Leach, OT GO 1          OT Discharge Summary  Anticipated Discharge Disposition: inpatient rehabilitation facility  Reason for Discharge: other (comment) (medical status inhibits participation with therapy)  Outcomes Achieved: Refer to plan of care for updates on goals achieved  Discharge Destination: other (comment) (declining medical status)      Judy Leach OT  4/11/2017

## 2017-04-11 NOTE — THERAPY DISCHARGE NOTE
Acute Care - Physical Therapy Discharge Summary  [facility]       Patient Name: Tomas Salvador  : 1955  MRN: [id number]    Today's Date: 2017  Onset of Illness/Injury or Date of Surgery Date: 17    Date of Referral to PT: 17  Referring Physician: Dr. Morrison      Admit Date: 2017      PT Recommendation and Plan    Visit Dx:    ICD-10-CM ICD-9-CM   1. Dysphagia, unspecified type R13.10 787.20   2. Impaired functional mobility, balance, gait, and endurance Z74.09 V49.89   3. Impaired mobility and ADLs Z74.09 799.89   4. Cognitive communication deficit R41.841 799.52             Outcome Measures       17 1000 17 1411 17 1410    How much help from another person do you currently need...    Turning from your back to your side while in flat bed without using bedrails?  2  -DR     Moving from lying on back to sitting on the side of a flat bed without bedrails?  1  -DR     Moving to and from a bed to a chair (including a wheelchair)?  3  -DR     Standing up from a chair using your arms (e.g., wheelchair, bedside chair)?  3  -DR     Climbing 3-5 steps with a railing?  1  -DR     To walk in hospital room?  3  -DR     AM-PAC 6 Clicks Score  13  -DR     How much help from another is currently needed...    Putting on and taking off regular lower body clothing?   1  -TA    Bathing (including washing, rinsing, and drying)   2  -TA    Toileting (which includes using toilet bed pan or urinal)   1  -TA    Putting on and taking off regular upper body clothing   2  -TA    Taking care of personal grooming (such as brushing teeth)   2  -TA    Eating meals   2  -TA    Score   10  -TA    Modified [place] Scale    Modified [place] Scale 4 - Moderately severe disability.  Unable to walk without assistance, and unable to attend to own bodily needs without assistance.   per last OT note  -LS  4 - Moderately severe disability.  Unable to walk without assistance, and unable to attend to own bodily  needs without assistance.  -TA    Functional Assessment    Outcome Measure Options  AM-PAC 6 Clicks Basic Mobility (PT)  -DR AM-PAC 6 Clicks Daily Activity (OT);Modified [place]  -TA      User Key  (r) = Recorded By, (t) = Taken By, (c) = Cosigned By    Initials Name Provider Type    LS Lily Juárez, PT Physical Therapist    CHU Jenkins, OT Occupational Therapist    DR Mikey Moore, PT Physical Therapist                      IP PT Goals       04/11/17 1015 04/09/17 1551 04/08/17 1010    Bed Mobility PT LTG    Bed Mobility PT LTG, Outcome goal not met  -LS goal ongoing  -DR     Bed Mobility PT LTG, Reason Goal Not Met medical status inhibits participation  -LS      Transfer Training PT LTG    Transfer Training PT LTG, Outcome goal not met  -LS goal ongoing  -DR     Transfer Training PT LTG, Reason Goal Not Met medical status inhibits participation  -LS      Gait Training PT LTG    Gait Training Goal PT LTG, Outcome goal not met  -LS goal ongoing  -DR     Gait Training Goal PT LTG, Reason Goal Not Met medical status inhibits participation  -LS      Wound Care PT LTG    Wound Care PT LTG 1, Outcome   goal ongoing  -MF      04/06/17 1524 04/06/17 1025 04/05/17 0909    Bed Mobility PT LTG    Bed Mobility PT LTG, Date Established   04/05/17  -LS    Bed Mobility PT LTG, Time to Achieve   2 wks  -LS    Bed Mobility PT LTG, Activity Type   supine to sit/sit to supine  -LS    Bed Mobility PT LTG, [place] Level   supervision required  -LS    Bed Mobility PT LTG, Outcome goal ongoing  -LS      Transfer Training PT LTG    Transfer Training PT LTG, Date Established   04/05/17  -LS    Transfer Training PT LTG, Time to Achieve   2 wks  -LS    Transfer Training PT LTG, Activity Type   sit to stand/stand to sit  -LS    Transfer Training PT LTG, [place] Level   contact guard assist  -LS    Transfer Training PT LTG, Assist Device   walker, rolling  -LS    Transfer Training PT LTG, Outcome goal ongoing  -LS       Gait Training PT LTG    Gait Training Goal PT LTG, Date Established   04/05/17  -    Gait Training Goal PT LTG, Time to Achieve   2 wks  -LS    Gait Training Goal PT LTG, [place] Level   contact guard assist  -LS    Gait Training Goal PT LTG, Assist Device   walker, rolling  -LS    Gait Training Goal PT LTG, Distance to Achieve   200  -LS    Gait Training Goal PT LTG, Outcome goal ongoing  -LS      Wound Care PT LTG    Wound Care PT LTG 1, Date Established  04/06/17  -     Wound Care PT LTG 1, Time to Achieve  1 wk  -     Wound Care PT LTG 1, Location  Scrotum  -     Wound Care PT LTG 1, No S&S of Infection  yes  -     Wound Care PT LTG 1, Decrease Wound Size  10%  -     Wound Care PT LTG 1, Decrease Necrotic Tissue  50%  -     Wound Care PT LTG 1, Decrease Exudate  minimum  -     Wound Care PT LTG 1, No New Skin Break Down  yes  -     Wound Care PT LTG 1, Education  dressing changes;wound care  -     Wound Care PT LTG 1, Education Understanding  verbalize understanding  -       User Key  (r) = Recorded By, (t) = Taken By, (c) = Cosigned By    Initials Name Provider Type     Pee Roldan, PT Physical Therapist    LS Lily Juárez, PT Physical Therapist    DUNCAN Mcgarry, PT Physical Therapist    DR Mikey Moore, PT Physical Therapist              PT Discharge Summary  Anticipated Discharge Disposition: inpatient rehabilitation facility  Reason for Discharge: Unable to participate, other (comment) (per MD request in rounds)  Outcomes Achieved: Refer to plan of care for updates on goals achieved      Lily Juárez, PT   4/11/2017

## 2017-04-11 NOTE — PLAN OF CARE
Problem: Patient Care Overview (Adult)  Goal: Plan of Care Review  Outcome: Ongoing (interventions implemented as appropriate)    04/11/17 0212   Coping/Psychosocial Response Interventions   Plan Of Care Reviewed With patient;family   Patient Care Overview   Progress unable to show any progress toward functional goals         Problem: Stroke (Ischemic) (Adult)  Goal: Signs and Symptoms of Listed Potential Problems Will be Absent or Manageable (Stroke)  Outcome: Ongoing (interventions implemented as appropriate)    Problem: Fall Risk (Adult)  Goal: Identify Related Risk Factors and Signs and Symptoms  Outcome: Ongoing (interventions implemented as appropriate)    04/11/17 0212   Fall Risk   Fall Risk: Related Risk Factors age-related changes;confusion/agitation;depression/anxiety;fatigue/slow reaction;gait/mobility problems;polypharmacy;sensory deficits   Fall Risk: Signs and Symptoms presence of risk factors         Problem: Skin Integrity Impairment, Risk/Actual (Adult)  Goal: Identify Related Risk Factors and Signs and Symptoms  Outcome: Ongoing (interventions implemented as appropriate)    04/11/17 0212   Skin Integrity Impairment, Risk/Actual   Skin Integrity Impairment, Risk/Actual: Related Risk Factors age extremes;cognitive impairment;fluid/nutrition status;immobility;infection/disease process;mechanical factors;medication effects;surgery/procedure   Signs and Symptoms (Skin Integrity Impairment) necrotic tissue       Goal: Skin Integrity/Wound Healing  Outcome: Ongoing (interventions implemented as appropriate)    04/11/17 0212   Skin Integrity Impairment, Risk/Actual (Adult)   Skin Integrity/Wound Healing making progress toward outcome         Problem: Infection, Risk/Actual (Adult)  Goal: Identify Related Risk Factors and Signs and Symptoms  Outcome: Ongoing (interventions implemented as appropriate)    04/11/17 0212   Infection, Risk/Actual   Infection, Risk/Actual: Related Risk Factors age  extremes;exposure to microbes;prolonged hospitalization;medication effects;skin integrity impairment;surgery/procedure   Signs and Symptoms (Infection, Risk/Actual) drainage;cultures positive;blood glucose changes       Goal: Infection Prevention/Resolution  Outcome: Ongoing (interventions implemented as appropriate)

## 2017-04-12 NOTE — PROGRESS NOTES
Neurology       Patient Care Team:  Jose Navarro MD as PCP - General (Family Medicine)    Chief complaint: Confusion    History:  Patient is pulled out his feeding tube.    He is currently on Precedex.    He is also on Bautista-Synephrine and dopamine.    Blood pressure and heart rate is stabilized on the above.      Past Medical History:   Diagnosis Date   • CAD (coronary artery disease)    • Chronic back pain    • Chronic respiratory failure with hypoxia     Night time only at 2 liters/min   • Diabetes mellitus, type 2    • Essential hypertension    • Hyperlipidemia    • Ischemic cardiomyopathy    • NSTEMI (non-ST elevated myocardial infarction) 2015   • Obesity    • PVD (peripheral vascular disease)    • Systolic CHF     EF <20%  3/8/17   • Urinary retention        Vital Signs   Vitals:    04/12/17 1030 04/12/17 1045 04/12/17 1100 04/12/17 1120   BP: 116/70 117/73 117/72    BP Location:       Patient Position:       Pulse: 72 74 75 72   Resp:       Temp:       TempSrc:       SpO2: 97% 98% 99% 100%   Weight:       Height:           Physical Exam:   General: Awake, confused, disoriented              Neuro: Patient is not verbal and is not conversational.    Does not follow commands.        Results Review:  Reviewed    Results from last 7 days  Lab Units 04/12/17  0442   WBC 10*3/mm3 16.37*   HEMOGLOBIN g/dL 12.2*   HEMATOCRIT % 38.0*   PLATELETS 10*3/mm3 300       Results from last 7 days  Lab Units 04/12/17  0442 04/11/17  0417 04/10/17  2006 04/10/17  0458 04/09/17  0429   SODIUM mmol/L 136 138 138 140 137   POTASSIUM mmol/L 4.7 4.1 4.2 3.7 3.9   CHLORIDE mmol/L 105 102 102 102 100   TOTAL CO2 mmol/L 18.0* 30.0 29.0 32.0* 31.0   BUN mg/dL 19 19 21 20 17   CREATININE mg/dL 0.60 0.60 0.70 0.80 0.70   CALCIUM mg/dL 9.0 9.4 9.3 8.8 9.0   BILIRUBIN mg/dL  --   --  0.4 0.3 0.4   ALK PHOS U/L  --   --  74 66 71   ALT (SGPT) U/L  --   --  14 11 25   AST (SGOT) U/L  --   --  70* 34* 67*   GLUCOSE mg/dL 60* 90 97 91 196*      Imaging Results (last 24 hours)     Procedure Component Value Units Date/Time    XR Abdomen KUB [[id number]] Updated:  04/11/17 1441    XR Abdomen KUB [[id number]] Collected:  04/11/17 1617     Updated:  04/11/17 1648    Narrative:       EXAMINATION: XR ABDOMEN KUB- 04/11/2017     INDICATION: verify placement of Keofeed; R13.10-Dysphagia, unspecified;  Z74.09-Other reduced mobility; R41.841-Cognitive communication deficit      COMPARISON: NONE     FINDINGS: The Keofeed tube is coiled in the fundus of the stomach. The  tube will need to be retracted or pulled back by at least 10 cm to  reduce the coil and then gently advanced with the guidewire placed  within the lumen and Reglan augmentation.       Impression:       Keofeed tube coiled in the fundus of the stomach. The tube  should be retracted by least 10 cm prior to re-advancing with the  guidewire in place in the lumen and Reglan augmentation.     D:  04/11/2017  E:  04/11/2017     This report was finalized on 4/11/2017 4:46 PM by Dr. Scottie Bowling MD.       XR Abdomen KUB [[id number]]  (Abnormal) Collected:  04/11/17 2300     Updated:  04/12/17 0140    Narrative:         EXAM:    XR Abdomen, 1 View    CLINICAL HISTORY:    61 years old, male; Device placement; Gi device; Other: Feeding tube   placement; Patient HX: Keofeed placement vertification. Dysphagia    TECHNIQUE:    Frontal supine view of the abdomen/pelvis.    COMPARISON:    No relevant prior studies available.    FINDINGS:    Gastrointestinal tract: Nonspecific air-filled bowel loops.    Bones/joints: Degenerative changes.  Sternal sutures intact.  No acute   fracture.    Tubes, lines and devices:  Keofeed tube is looped in stomach, tip directed   towards gastric antrum.      Impression:         Keofeed tube looped in stomach, tip directed towards gastric antrum.      THIS DOCUMENT HAS BEEN ELECTRONICALLY SIGNED BY BERTHA MILLER MD    XR Abdomen KUB [[id number]]  (Abnormal) Collected:  04/12/17 1281      Updated:  04/12/17 0620    Narrative:         EXAM:    XR Abdomen, 1 View    CLINICAL HISTORY:    61 years old, male; Device placement; Gi device; Other: Keofeed placement;   Patient HX: Keofeed reconfirmation placement. Dysphagia    TECHNIQUE:    Frontal supine view of the abdomen/pelvis.    EXAM DATE/TIME:    4/12/2017 3:30 AM    COMPARISON:    CR - XR ABDOMEN KUB 4/12/2017 12:51:20 AM    FINDINGS:    Gastrointestinal tract: Mildly dilated loops of colon.    Bones/joints:  Unremarkable.    Other findings: Feeding tube at the level of the antrum.  Left pacemaker.    Prior median sternotomy.  Cardiomegaly with possible left basal infiltrate/pleural fluid.      Impression:       Mildly dilated loops of colon.  Feeding tube, left pacemaker, median sternotomy.  Cardiomegaly with possible left basal infiltrate/pleural fluid.    THIS DOCUMENT HAS BEEN ELECTRONICALLY SIGNED BY SYED GRIMM MD    XR Chest 1 View [[id number]] Updated:  04/12/17 1113          Assessment:  Mixed encephalopathy    Plan:  Continue support    Comment:  Guarded prognosis         I discussed the patients findings and my recommendations with nursing staff    Luis Alberto Phelan MD  04/12/17  11:24 AM

## 2017-04-12 NOTE — PROGRESS NOTES
"Critical Care Note     LOS: 8 days   Patient Care Team:  Jose Navarro MD as PCP - General (Family Medicine)    Chief Complaint/Reason for visit: Sepsis, severe ICM      Subjective   61-year-old gentleman with severe ischemic CM, DM, hospitalized at [facility] for one week with a scrotal cellulitis, status post debridement. He was brought to [facility] for a possible stroke. Stroke workup showed old left frontal.      Interval History:   Patient is doing poorly. Bipap removed and he is on NC, RR elevated but not labored.  Calm on Precedex.  Unrestrained last pm and pulled corpack. Urinary retention> 1000mls      Review of Systems:    All systems were reviewed and negative except as noted in subjective.unable, patient not following commands    Medical history, surgical history, social history, family history reviewed    Objective     Intake/Output:    Intake/Output Summary (Last 24 hours) at 04/12/17 1519  Last data filed at 04/12/17 1400   Gross per 24 hour   Intake          1907.38 ml   Output              625 ml   Net          1282.38 ml       Nutrition: NPO    Infusions:    dexmedetomidine 0.2-1.5 mcg/kg/hr Last Rate: 1.2 mcg/kg/hr (04/12/17 1413)   DOPamine 2-20 mcg/kg/min Last Rate: 4 mcg/kg/min (04/12/17 9668)   phenylephrine 0.5-3 mcg/kg/min Last Rate: 0.2 mcg/kg/min (04/12/17 0818)       Respiratory: BiPAP 18/8 rate 12 50%; 3 liters       Telemetry: SB/SR    Vital Signs  Blood pressure 122/78, pulse 75, temperature 98.7 °F (37.1 °C), temperature source Axillary, resp. rate 15, height 69\" (175.3 cm), weight 227 lb 1.2 oz (103 kg), SpO2 97 %.    Physical Exam:  General Appearance:   ill-appearing older gentleman    Head:  NC/AT   Eyes:          ROMI EOMI no jaundice   Ears:     Throat:  no exudate   Neck:  supple, trachea midline    Back:      Lungs:    bilateral breath sounds with  diffuse rhonchi bilateral UL,L>R Symmetric chest excursion     Heart:   distant heart sounds, regular, PMI displaced " laterally    Abdomen:    hypoactive bowel sounds, soft, nontender, no organomegaly  Scrotum with edema, erythema and induration. Wound packed   Rectal:   Deferred   Extremities:  no pitting edema or cyanosis    Pulses:  weak pulses left foot, no palpable pulses right foot but extremity is warm to touch    Skin:  cool and dry    Lymph nodes:    Neurologic:   Lethargic      Results Review:     I reviewed the patient's new clinical results.     Results from last 7 days  Lab Units 04/12/17  0442 04/11/17  0417 04/10/17  2006 04/10/17  0458 04/09/17  0429   SODIUM mmol/L 136 138 138 140 137   POTASSIUM mmol/L 4.7 4.1 4.2 3.7 3.9   CHLORIDE mmol/L 105 102 102 102 100   TOTAL CO2 mmol/L 18.0* 30.0 29.0 32.0* 31.0   BUN mg/dL 19 19 21 20 17   CREATININE mg/dL 0.60 0.60 0.70 0.80 0.70   CALCIUM mg/dL 9.0 9.4 9.3 8.8 9.0   BILIRUBIN mg/dL  --   --  0.4 0.3 0.4   ALK PHOS U/L  --   --  74 66 71   ALT (SGPT) U/L  --   --  14 11 25   AST (SGOT) U/L  --   --  70* 34* 67*   GLUCOSE mg/dL 60* 90 97 91 196*       Results from last 7 days  Lab Units 04/12/17  0442 04/10/17  0458 04/09/17  0429   WBC 10*3/mm3 16.37* 9.97 12.63*   HEMOGLOBIN g/dL 12.2* 10.0* 10.6*   HEMATOCRIT % 38.0* 32.3* 34.2*   PLATELETS 10*3/mm3 300 337 372         Lab Results   Component Value Date    BLOODCX No growth at 5 days 03/30/2017     Lab Results   Component Value Date    URINECX No growth 04/11/2017   Scrotal wound E. coli    I reviewed the patient's new imaging including images and reports.                       IMPRESSION:  The heart is enlarged with worsening of the pulmonary  vascularity. Small bilateral pleural effusions are present.      D: 04/12/2017      All medications reviewed.     apixaban 5 mg Oral Q12H   aspirin 81 mg Oral Daily   ceFAZolin 2 g Intravenous Q8H   fentaNYL 1 patch Transdermal Q72H   heparin flush (porcine) 500 Units Intracatheter Q12H   insulin detemir 10 Units Subcutaneous Nightly   insulin regular 0-9 Units Subcutaneous Q6H    ipratropium-albuterol 3 mL Nebulization 4x Daily - RT   metroNIDAZOLE 500 mg Intravenous Q6H   pantoprazole 40 mg Intravenous Q AM   saccharomyces boulardii 250 mg Oral BID         Assessment/Plan     Principal Problem:    Sepsis  Active Problems:    E. coli UTI (urinary tract infection)    Yeast UTI    Encephalopathy acute    Cellulitis, scrotum, perineum    Diabetes mellitus, type 2    ICM EF 10%       NSTEMI 5/15 occl LAD, 99% circ, occl SVG to Dz, patent SVG toOM1, patent LIMA to LAD.   Single chamber AICD 12/15, CABG 3/2005    Chronic respiratory failure with hypoxia    Urinary retention    Epididymo-orchitis    61-year-old gentleman with a severe ischemic cardiomyopathy, AICD, ejection fraction of 10%, diabetes mellitus type II, who developed a scrotal abscess requiring I&D in [place] on 3/31. Cultures were positive for Escherichia coli. He was in the [facility] for approximately one week and developed some left-sided weakness and altered mentation. He was transported to [place] for stroke. No acute stroke was found on CT of the head or CT perfusion. No intervention was done.   He developed atrial fibrillation with a rapid ventricular response and was placed on amiodarone drip. He converted to SR.  Echocardiogram confirms a dilated left ventricular cavity that with severe dysfunction LVEF 10%. No comment as to whether thrombus is seen.  He was placed on a heparin drip for 24 hours and transition to oral Eliquis.   In the last 48 hours however, his blood pressure has been marginal, 75-85 systolic, heart rate has dropped into the 40s and dopamine, phenylephrine initiated. Precedex was added for agitation. This may be playing a role in his bradycardia but is not the sole cause of his bradycardia. He was agitated and pulling at all of his lines and tubes until it was initiated. Narcotics or benzodiazepine seemed to knock him out and then he arouses more agitated than before.  Infectious disease evaluated  and changed antibiotics to cefazolin and Flagyl for his Escherichia coli scrotal infection.  Urine culture is growing greater than 100,000 yeast, on admission. Infectious disease notified. Nory German. Now he has urinary retention with over a liter of urine in his bladder. He continues to require in and out catheterization. With his scrotal and penile edema, I&O cath difficult.  In addition, strict I&O needed to monitor his CHF.   Blood sugars are running  on sliding scale insulin; Levemir,  however he is now nothing by mouth. Corpak was pulled out last pm. Because he is requiring BiPAP I do not want to feed and lasts the feeding tube is in the small bowel.       PLAN:  Limited resuscitation, still full support  Dopamine to support HR, phenylephrine to support mean BP  Cueto reanchored for strict I&O  Precedex for agitation   Cefazolin, Flagyl  Wound care  Aspirin, Plavix, statin  Eliquis  One dose lasix  Decrease Levemir more   Sliding scale insulin  Nebulized BDs  Replace electrolytes as needed  Fentanyl patch for pain plus Norco for break thru  Start TF once cor pack replaced in small bowel    VTE Prophylaxis:Eliquis    Stress Ulcer Prophylaxis:Protonix    Ella Pope MD  04/12/17  3:19 PM      Time: 40min  I personally provided care to this critically ill patient as documented above.  Critical care time does not include time spent on separately billed procedures.  Non of my critical care time was concurrent with other critical care providers.

## 2017-04-12 NOTE — PROGRESS NOTES
Acute Care - Wound/Debridement Treatment Note  [facility]     Patient Name: Tomas Salvador  : 1955  MRN: [id number]  Today's Date: 2017  Onset of Illness/Injury or Date of Surgery Date: 17   Date of Referral to PT: 17   Referring Physician: Dr. Morrison       Admit Date: 2017    Visit Dx:    ICD-10-CM ICD-9-CM   1. Dysphagia, unspecified type R13.10 787.20   2. Impaired functional mobility, balance, gait, and endurance Z74.09 V49.89   3. Impaired mobility and ADLs Z74.09 799.89   4. Cognitive communication deficit R41.841 799.52       Patient Active Problem List   Diagnosis   • Coronary disease   • Ischemic cardiomyopathy   • Peripheral vascular disease   • ICD (implantable cardioverter-defibrillator) in place, implantation .   • Dyslipidemia, on atorvastain.    • Sepsis   • Diabetes education, encounter for   • Chronic respiratory failure with hypoxia   • Urinary retention   • Hyperlipidemia   • Essential hypertension   • E. coli UTI (urinary tract infection)   • Epididymo-orchitis   • Encephalopathy acute   • Cellulitis   • Yeast UTI               LDA Wound       17 1545          Incision 17 1056 other (see comments) scrotum    Incision - Properties Group Placement Date: 17  - Placement Time: 1056JW Side: other (see comments)  - Location: scrotum  -JW    Incision WDL ex  -MF      Dressing Appearance moist drainage  -MF      Appearance drainage;redness  -MF      Drainage Characteristics/Odor serous  -MF      Drainage Amount scant  -MF      Wound Cleaning irrigated with;sterile normal saline  -MF      Dressing open to air  -MF      Packing other (see comments)   cutimed sorbact  -MF        User Key  (r) = Recorded By, (t) = Taken By, (c) = Cosigned By    Initials Name Provider Type    VALERI Roldan PT Physical Therapist    JAY JAY Silveira RN Registered Nurse            WOUND DEBRIDEMENT  Debridement Site 1  Location- Site 1: perineal  wound  Selective Debridement- Site 1: Wound Surface <20cmsq  Instruments- Site 1: tweezers, scissors  Excised Tissue Description- Site 1: minimum, slough  Bleeding- Site 1: none                  Adult Rehabilitation Note       04/12/17 1545 04/11/17 1530 04/10/17 0940    Rehab Assessment/Intervention    Discipline physical therapist  -MF physical therapist  -MF physical therapist  -MF    Document Type therapy note (daily note)  -MF therapy note (daily note)  -MF therapy note (daily note)  -MF    Subjective Information decreased LOC   pt confused. RN gave ok for tx.   -MF decreased LOC   RN gave ok for tx.  -MF agree to therapy;complains of;weakness;fatigue;pain  -    Recorded by [] Pee Roldan, PT [] Pee Roldan, PT [] Pee Roldan, PT    Pain Assessment    Pain Assessment Jay-Baker FACES  - Jay-Baker FACES  - Jay-Baker FACES  -    Jay-Baker FACES Pain Rating 2  -MF 2  -MF 2   with dressing change  -    Pain Intervention(s) Repositioned  -  Medication (See MAR);Repositioned  -    Recorded by [] Pee Roldan, PT [] Pee Roldan, PT [] Pee Roldan, PT    Cognitive Assessment/Intervention    Current Cognitive/Communication Assessment impaired  -      Recorded by [] Pee Roldan PT      Positioning and Restraints    Pre-Treatment Position in bed  - in bed  - in bed  -    Post Treatment Position bed  - bed  - bed  -    In Bed supine;call light within reach  - supine;call light within reach;notified ns  - supine;call light within reach;with nsg  -    Restraints   released:;reapplied:   nsg in room  -    Recorded by [] Pee Roldan, PT [] Pee Roldan, PT [] Pee Roldan, PT      User Key  (r) = Recorded By, (t) = Taken By, (c) = Cosigned By    Initials Name Effective Dates     Pee Roldan, PT 06/19/15 -                 IP PT Goals       04/11/17 1015 04/09/17 1551 04/08/17 1010    Bed Mobility PT LTG     Bed Mobility PT LTG, Outcome goal not met  -LS goal ongoing  -DR     Bed Mobility PT LTG, Reason Goal Not Met medical status inhibits participation  -LS      Transfer Training PT LTG    Transfer Training PT LTG, Outcome goal not met  -LS goal ongoing  -DR     Transfer Training PT LTG, Reason Goal Not Met medical status inhibits participation  -LS      Gait Training PT LTG    Gait Training Goal PT LTG, Outcome goal not met  -LS goal ongoing  -DR     Gait Training Goal PT LTG, Reason Goal Not Met medical status inhibits participation  -LS      Wound Care PT LTG    Wound Care PT LTG 1, Outcome   goal ongoing  -MF      04/06/17 1524 04/06/17 1025 04/05/17 0909    Bed Mobility PT LTG    Bed Mobility PT LTG, Date Established   04/05/17  -LS    Bed Mobility PT LTG, Time to Achieve   2 wks  -LS    Bed Mobility PT LTG, Activity Type   supine to sit/sit to supine  -LS    Bed Mobility PT LTG, [place] Level   supervision required  -LS    Bed Mobility PT LTG, Outcome goal ongoing  -LS      Transfer Training PT LTG    Transfer Training PT LTG, Date Established   04/05/17  -LS    Transfer Training PT LTG, Time to Achieve   2 wks  -LS    Transfer Training PT LTG, Activity Type   sit to stand/stand to sit  -LS    Transfer Training PT LTG, [place] Level   contact guard assist  -LS    Transfer Training PT LTG, Assist Device   walker, rolling  -LS    Transfer Training PT LTG, Outcome goal ongoing  -LS      Gait Training PT LTG    Gait Training Goal PT LTG, Date Established   04/05/17  -LS    Gait Training Goal PT LTG, Time to Achieve   2 wks  -LS    Gait Training Goal PT LTG, [place] Level   contact guard assist  -LS    Gait Training Goal PT LTG, Assist Device   walker, rolling  -LS    Gait Training Goal PT LTG, Distance to Achieve   200  -LS    Gait Training Goal PT LTG, Outcome goal ongoing  -LS      Wound Care PT LTG    Wound Care PT LTG 1, Date Established  04/06/17  -MC     Wound Care PT LTG 1, Time to Achieve  1  wk  -     Wound Care PT LTG 1, Location  Scrotum  -     Wound Care PT LTG 1, No S&S of Infection  yes  -     Wound Care PT LTG 1, Decrease Wound Size  10%  -     Wound Care PT LTG 1, Decrease Necrotic Tissue  50%  -     Wound Care PT LTG 1, Decrease Exudate  minimum  -     Wound Care PT LTG 1, No New Skin Break Down  yes  -     Wound Care PT LTG 1, Education  dressing changes;wound care  -     Wound Care PT LTG 1, Education Understanding  verbalize understanding  -       User Key  (r) = Recorded By, (t) = Taken By, (c) = Cosigned By    Initials Name Provider Type     Pee Roldan, PT Physical Therapist    ANDREWS Juárez, PT Physical Therapist    DUNCAN Mcgarry, PT Physical Therapist    DR Mikey Moore, PT Physical Therapist          Physical Therapy Education     Title: PT OT SLP Therapies (Active)     Topic: Physical Therapy (Active)     Point: Mobility training (Active)    Learning Progress Summary    Learner Readiness Method Response Comment Documented by Status   Patient Acceptance E NR   04/09/17 1551 Active    Acceptance E NR   04/07/17 1534 Active    Acceptance E,D NR   04/06/17 1523 Active    Acceptance E,D NR   04/05/17 0908 Active   Significant Other Acceptance E,D NR   04/06/17 1523 Active    Acceptance E,D NR   04/05/17 0908 Active               Point: Home exercise program (Active)    Learning Progress Summary    Learner Readiness Method Response Comment Documented by Status   Patient Acceptance E NR  EDOUARD 04/07/17 1534 Active    Acceptance E,D NR   04/06/17 1523 Active   Significant Other Acceptance E,D NR   04/06/17 1523 Active               Point: Body mechanics (Active)    Learning Progress Summary    Learner Readiness Method Response Comment Documented by Status   Patient Acceptance E NR   04/09/17 1551 Active    Acceptance E NR  EDOUARD 04/07/17 1534 Active    Acceptance E,D NR   04/06/17 1523 Active    Acceptance E,D NR   04/05/17 0908 Active    Significant Other Acceptance E,D NR  LS 04/06/17 1523 Active    Acceptance E,D NR  LS 04/05/17 0908 Active               Point: Precautions (Active)    Learning Progress Summary    Learner Readiness Method Response Comment Documented by Status   Patient Acceptance E NR   04/09/17 1551 Active    Acceptance E NR  EDOUARD 04/07/17 1534 Active    Acceptance E,D NR  LS 04/06/17 1523 Active    Acceptance E,D NR  LS 04/05/17 0908 Active   Significant Other Acceptance E,D NR  LS 04/06/17 1523 Active    Acceptance E,D NR  LS 04/05/17 0908 Active                      User Key     Initials Effective Dates Name Provider Type Discipline     06/19/15 -  Anita Prado, PT Physical Therapist PT     06/19/15 -  Lily Juárez, PT Physical Therapist PT     09/06/16 -  Mikey Moore, PT Physical Therapist PT                   PT ASSESSMENT (last 72 hours)      PT Evaluation       04/12/17 1545 04/11/17 1530    Rehab Evaluation    Document Type therapy note (daily note)  - therapy note (daily note)  -    Subjective Information decreased LOC   pt confused. RN gave ok for tx.   -MF decreased LOC   RN gave ok for tx.  -    Pain Assessment    Pain Assessment Jay-Baker FACES  - Jay-Baker FACES  -    Jay-Baker FACES Pain Rating 2  - 2  -    Pain Intervention(s) Repositioned  -     Cognitive Assessment/Intervention    Current Cognitive/Communication Assessment impaired  -MF     Positioning and Restraints    Pre-Treatment Position in bed  - in bed  -    Post Treatment Position bed  - bed  -    In Bed supine;call light within reach  - supine;call light within reach;notified ns  -      04/10/17 0940 04/10/17 0930    Rehab Evaluation    Document Type therapy note (daily note)  - evaluation  -    Subjective Information agree to therapy;complains of;weakness;fatigue;pain  -MF agree to therapy;complains of;fatigue  -SG    Patient Effort, Rehab Treatment  adequate  -SG    Symptoms Noted During/After  Treatment  fatigue  -SG    Symptoms Noted Comment  pt lethargic, recent med admin  -SG    Pain Assessment    Pain Assessment Jay-Baker FACES  - Jay-Kent FACES  -    Jay-Kent FACES Pain Rating 2   with dressing change  - 0  -SG    Pain Score  0  -SG    Pain Intervention(s) Medication (See MAR);Repositioned  -     Positioning and Restraints    Pre-Treatment Position in bed  -     Post Treatment Position bed  -MF     In Bed supine;call light within reach;with nsg  -MF     Restraints released:;reapplied:   nsg in room  -       User Key  (r) = Recorded By, (t) = Taken By, (c) = Cosigned By    Initials Name Provider Type     Pee Roldan, PT Physical Therapist    SG Lyndsey Sultana, MS CCC-SLP Speech and Language Pathologist            PT Recommendation and Plan  Anticipated Discharge Disposition: inpatient rehabilitation facility  PT Frequency: daily    Plan Of Care Reviewed With: patient       Outcome Summary/Follow up Plan: perineal wound noted to have decreasing exudate with improved granulation tissue today. PT will cont with pulse lavage now every other day to help minimize inflammation and improve healing potential.           Outcome Measures       04/11/17 1000          Modified [place] Scale    Modified [place] Scale 4 - Moderately severe disability.  Unable to walk without assistance, and unable to attend to own bodily needs without assistance.   per last OT note  -LS        User Key  (r) = Recorded By, (t) = Taken By, (c) = Cosigned By    Initials Name Provider Type    ANDREWS Juárez, PT Physical Therapist              Time Calculation         Therapy Charges for Today     Code Description Service Date Service Provider Modifiers Qty    33166665107 HC ROSEANNA DEBRIDE OPEN WOUND UP TO 20CM 4/11/2017 Pee Roldan, PT GP 1    [id number] HC ROSEANNA DEBRIDE OPEN WOUND UP TO 20CM 4/12/2017 Pee Roldan, PT GP 1            PT G-Codes  Outcome Measure Options: AM-PAC 6 Clicks Basic  Mobility (PT)        Pee Roldan, PT  4/12/2017

## 2017-04-12 NOTE — PROGRESS NOTES
"[facility] Progress Note    Admission Date: 4/4/2017    Tomas Salvador  1955  [id number]    Date: 4/12/2017    Meds:    IV Anti-Infectives     Ordered     Dose/Rate Route Frequency Start Stop    04/04/17 1723  metroNIDAZOLE (FLAGYL) IVPB 500 mg     Ordering Provider:  Kike Leon MD    500 mg  100 mL/hr over 60 Minutes Intravenous Every 6 Hours 04/04/17 1800      04/04/17 1552  ceFAZolin in dextrose (ANCEF) IVPB solution 2 g     Ordering Provider:  TRESA Baca    2 g  over 30 Minutes Intravenous Every 8 Hours 04/04/17 1600            CC:  Delisa's gangrene    SUBJECTIVE:  4/4/17:Patient is a 61 y.o. 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 He has a history of underlying diabetes mellitus and severe systolic congestive heart failure. He presented to [facility] on 3/30 with scrotal cellulitis/gangrene. He underwent debridement by urology at [facility] on 3/31 and was treated with Merrem/clindamycin/vancomycin. He developed left facial droop and left sided paresis, prompting a transfer to [facility] for evaluation and therapy of acute stroke. His head CT angiogram was degraded by motion artifact but per Dr. Morrison there was suggestion of right inferior temporal lobe\" opacification\" consistent with a possible stroke. His left facial droop and left-sided paresis has improved today. He is encephalopathic and is unable to provide any reliable history. His wife thinks that he may have had some fevers prior to his admission on 3/30.    4/5/17: afebrile. No hx per patient secondary to encephalopathy.  Afebrile, restless per nsg staff.  Oliguric UOP.  No n/v/d.  No rashes.  4/6/17: More alert and less restless today.  Still non-responsive to questions and does not follow simple commands.  Still with left-sided weakness.  Afebrile.  Still with oliguric UOP.  No n/v/d, no rashes, per nsg staff notes.   4/7/17:  Alert today.  No " fever,chills,sweats.  Wife at bedside with ?s   4/10/17:  Wants to go home.  Wound care per PT.  No n/v/d.   17:  Now on Dopamine and Bautista, bipap.  Decompensated last pm.    17:  Somnolent; still on pressors         PE:   Vital Signs  Temp (24hrs), Av °F (36.7 °C), Min:96.8 °F (36 °C), Max:98.7 °F (37.1 °C)    Temp  Min: 96.8 °F (36 °C)  Max: 98.7 °F (37.1 °C)  BP  Min: 84/62  Max: 134/86  Pulse  Min: 55  Max: 85  Resp  Min: 14  Max: 20  SpO2  Min: 81 %  Max: 100 %    GENERAL: Somnelent;  He is in no acute distress on bipap  HEENT: Normocephalic, atraumatic. PERRL. EOMI. No conjunctival injection. No icterus. Oropharynx clear without evidence of thrush or exudate.   HEART: RRR; No murmur, rubs, gallops.   LUNGS: few anterior rhonchi   ABDOMEN: Soft, nontender, nondistended. Positive bowel sounds. No rebound or guarding. NO mass or HSM.  EXT: No cyanosis, clubbing or edema. No cord.  : scrotal wound  Dressing in  Place    Cueto anchored   MSK: FROM without joint effusions noted arms/legs.   SKIN: Warm and dry without cutaneous eruptions on Inspection/palpation.   NEURO: somnelent       Right PICC without redness   Laboratory Data      Results from last 7 days  Lab Units 17  0442 04/10/17  0458 17  0429   WBC 10*3/mm3 16.37* 9.97 12.63*   HEMOGLOBIN g/dL 12.2* 10.0* 10.6*   HEMATOCRIT % 38.0* 32.3* 34.2*   PLATELETS 10*3/mm3 300 337 372       Results from last 7 days  Lab Units 17  0442   SODIUM mmol/L 136   POTASSIUM mmol/L 4.7   CHLORIDE mmol/L 105   TOTAL CO2 mmol/L 18.0*   BUN mg/dL 19   CREATININE mg/dL 0.60   GLUCOSE mg/dL 60*   CALCIUM mg/dL 9.0       Results from last 7 days  Lab Units 04/10/17  2006   ALK PHOS U/L 74   BILIRUBIN mg/dL 0.4   ALT (SGPT) U/L 14   AST (SGOT) U/L 70*           Results from last 7 days  Lab Units 04/10/17  0458   CRP mg/dL 2.98*                 Estimated Creatinine Clearance: 150.9 mL/min (by C-G formula based on Cr of 0.6).    Microbiology:  Blood  Culture   Date Value Ref Range Status   03/30/2017 No growth at 5 days  Final   03/30/2017 No growth at 5 days  Final           Urine Culture   Date Value Ref Range Status   04/04/2017 No growth  Preliminary   03/31/2017 No growth  Final   03/30/2017 >100,000 CFU/mL Escherichia coli (A)  Final      scrotal cx x 2 cxs (3/31) E. coli  Cefazolin sens.    4/4/17:  Urine culture:  >100K cfu Yeast    4/7:  UA 13-20 WBCs, large amount of budding yeast/ urine culture: pending     Radiology:  Imaging Results (last 24 hours)     Procedure Component Value Units Date/Time    CT Head Without Contrast [[id number]] Collected:  04/05/17 1241     Updated:  04/05/17 1241    Narrative:       EXAMINATION: CT HEAD WO CONTRAST-, CT ANGIOGRAM HEAD W WO CONTRAST-  04/04/2017     INDICATION: CVA; altered mental status, bilateral upper extremities  weakness.     TECHNIQUE: Contiguous axial 5 mm sections through the brain parenchyma  without intravenous contrast.  Multislice helical CT angiogram of the head before and after intravenous  contrast.     Two dimensional and three dimensional reformatted images were provided  for this exam.  NASCET criteria are utilized for calculation of stenosis.     COMPARISON: NONE     FINDINGS:   Brain:  The midline structures are central. There is no midline shift. Areas of  decreased attenuation are present in the left frontal periventricular  white matter. An old infarct is present in the left frontal lobe. The  structures of the posterior fossa are grossly normal. The images are  somewhat compromised by motion artifact.     The bony calvarium is intact.     Brain CTA:  The brain CTA images are extremely compromised by patient motion.  Right:  Distal portions of the right internal carotid artery are normal in  course and caliber. The right middle cerebral artery is grossly  unremarkable. The anterior cerebral artery cannot be evaluated secondary  to motion artifact.     Left:  Distal portions of the left  internal carotid artery are normal in course  and caliber. The left middle cerebral artery is normal. The anterior  cerebral artery cannot be well evaluated secondary to motion artifact.     Posterior circulation:  The distal vertebral arteries, basilar artery, and posterior cerebral  arteries are normal in course and caliber.       Impression:       1. Both exams compromised by motion artifact.  2. Grossly no acute intracranial process.  3. 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  4. Normal middle cerebral arteries and posterior circulation.  5. The anterior cerebral arteries are not well evaluated.     D:  04/05/2017  E:  04/05/2017          CT Angiogram Head With & Without Contrast [[id number]] Collected:  04/05/17 1241     Updated:  04/05/17 1241    Narrative:       EXAMINATION: CT HEAD WO CONTRAST-, CT ANGIOGRAM HEAD W WO CONTRAST-  04/04/2017     INDICATION: CVA; altered mental status, bilateral upper extremities  weakness.     TECHNIQUE: Contiguous axial 5 mm sections through the brain parenchyma  without intravenous contrast.  Multislice helical CT angiogram of the head before and after intravenous  contrast.     Two dimensional and three dimensional reformatted images were provided  for this exam.  NASCET criteria are utilized for calculation of stenosis.     COMPARISON: NONE     FINDINGS:   Brain:  The midline structures are central. There is no midline shift. Areas of  decreased attenuation are present in the left frontal periventricular  white matter. An old infarct is present in the left frontal lobe. The  structures of the posterior fossa are grossly normal. The images are  somewhat compromised by motion artifact.     The bony calvarium is intact.     Brain CTA:  The brain CTA images are extremely compromised by patient motion.  Right:  Distal portions of the right internal carotid artery are normal in  course and caliber. The right middle cerebral artery is grossly  unremarkable. The anterior cerebral artery cannot  be evaluated secondary  to motion artifact.     Left:  Distal portions of the left internal carotid artery are normal in course  and caliber. The left middle cerebral artery is normal. The anterior  cerebral artery cannot be well evaluated secondary to motion artifact.     Posterior circulation:  The distal vertebral arteries, basilar artery, and posterior cerebral  arteries are normal in course and caliber.       Impression:       1. Both exams compromised by motion artifact.  2. Grossly no acute intracranial process.  3. 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  4. Normal middle cerebral arteries and posterior circulation.  5. The anterior cerebral arteries are not well evaluated.     D:  04/05/2017  E:  04/05/2017          XR Chest 1 View [[id number]] Collected:  04/05/17 1406     Updated:  04/05/17 1423    Narrative:       EXAMINATION: XR CHEST 1 VIEW-04/04/2017:      INDICATION: Sepsis, cardiomyopathy; R13.10-Dysphagia, unspecified.      COMPARISON: NONE.     FINDINGS:   1.  There is cardiomegaly with perihilar vascular congestion.  2.  Bibasilar consolidative airspace opacities are noted, worse on the  left than right.  3.  ICD pacemaker defibrillator is in place from the left. The upper  lung zones are clear.           Impression:       1.  Cardiomegaly is noted. There is airspace opacity left base with  consolidation. There is congestive hilar and mid lung vascular  abnormalities.  2.  Compared to previous studies of 12/17/2015, it appears that the  central congestive vascular edema is new. The density in the left lower  lobe, retrocardiac area and the left base effusion are new. Cardiomegaly  is relatively stable.     D:  04/05/2017  E:  04/05/2017     This report was finalized on 4/5/2017 2:21 PM by Dr. Scottie Bowling MD.       CT Head Without Contrast [[id number]] Collected:  04/05/17 1648     Updated:  04/05/17 1648    Narrative:       EXAMINATION: CT HEAD WITHOUT CONTRAST-04/05/2017:      INDICATION: CVA;  R13.10-Dysphagia, unspecified; Z74.09-Other reduced  mobility; Z74.09-Other reduced mobility.         TECHNIQUE: CT scan of the head was performed at 5 mm intervals. No  intravenous contrast was utilized.     COMPARISON: 04/04/2017.     FINDINGS: There is a small old left frontal infarct. There is no  intracranial mass, hemorrhage, midline shift or extra-axial fluid  collection.       Impression:       Old left frontal infarct. There are no acute findings.     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 He is significantly improved.  2. Acute right hemispheric cerebral vascular accident-with left sided facial and left-sided weakness. His head CT angiogram here reveals subtle right temporal abnormalities per Dr. Morrison. He has clinically improved.  3. Acute toxic metabolic encephalopathy-secondary to sepsis and cerebral vascular accident  4. 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 Severe systolic congestive heart failure-with an ejection fraction of less than 20% on echocardiogram performed on 3/8/17  6. Escherichia coli urinary tract infection  7. Sepsis-secondary to Delisa's gangrene  8. Leukocytosis/neutrophilia-secondary to scrotal gangrene/cellulitis, improved   9.  LLL pulmonary infiltrate  10. Candiduria-he has a relative contraindication for fluconazole therapy since he is on amiodarone.    PLAN/RECOMMENDATIONS:   1. Cefazolin 2 g IV every 8 hours  2. Flagyl 500 mg IV every 8 hours  3. PT wound care with debridement and pulse lavage  4.  Suggest removal of the Cueto catheter soon           Kike Leon MD saw and examined patient, verified hx and PE, read all radiographic studies, reviewed labs and micro data, and formulated dx, plan for  treatment and all medical decision making.              Kike Leon MD  4/12/2017  3:39 PM

## 2017-04-12 NOTE — PLAN OF CARE
Problem: Patient Care Overview (Adult)  Goal: Plan of Care Review  Outcome: Ongoing (interventions implemented as appropriate)  Pt is confused    Problem: Stroke (Ischemic) (Adult)  Goal: Signs and Symptoms of Listed Potential Problems Will be Absent or Manageable (Stroke)  Outcome: Ongoing (interventions implemented as appropriate)    Problem: Fall Risk (Adult)  Goal: Absence of Falls  Outcome: Ongoing (interventions implemented as appropriate)    Problem: Skin Integrity Impairment, Risk/Actual (Adult)  Goal: Skin Integrity/Wound Healing  Outcome: Ongoing (interventions implemented as appropriate)    Problem: Infection, Risk/Actual (Adult)  Goal: Infection Prevention/Resolution  Outcome: Ongoing (interventions implemented as appropriate)

## 2017-04-12 NOTE — PLAN OF CARE
Problem: Patient Care Overview (Adult)  Goal: Plan of Care Review  Outcome: Ongoing (interventions implemented as appropriate)  Goal: Adult Individualization and Mutuality  Outcome: Ongoing (interventions implemented as appropriate)  Goal: Discharge Needs Assessment  Outcome: Ongoing (interventions implemented as appropriate)    Problem: Stroke (Ischemic) (Adult)  Goal: Signs and Symptoms of Listed Potential Problems Will be Absent or Manageable (Stroke)  Outcome: Ongoing (interventions implemented as appropriate)    Problem: Fall Risk (Adult)  Goal: Identify Related Risk Factors and Signs and Symptoms  Outcome: Ongoing (interventions implemented as appropriate)  Goal: Absence of Falls  Outcome: Ongoing (interventions implemented as appropriate)    Problem: Skin Integrity Impairment, Risk/Actual (Adult)  Goal: Identify Related Risk Factors and Signs and Symptoms  Outcome: Ongoing (interventions implemented as appropriate)  Goal: Skin Integrity/Wound Healing  Outcome: Ongoing (interventions implemented as appropriate)    Problem: Infection, Risk/Actual (Adult)  Goal: Identify Related Risk Factors and Signs and Symptoms  Outcome: Ongoing (interventions implemented as appropriate)  Goal: Infection Prevention/Resolution  Outcome: Ongoing (interventions implemented as appropriate)    Problem: SAFETY - NON-VIOLENT RESTRAINT  Goal: Remains free of injury from restraints (Non-Violent Restraint)  Outcome: Ongoing (interventions implemented as appropriate)  Goal: Free from restraint(s) (Non-Violent Restraint)  Outcome: Ongoing (interventions implemented as appropriate)

## 2017-04-12 NOTE — PROGRESS NOTES
Adult Nutrition  Assessment/PES    Patient Name:  Tomas Salvador  YOB: 1955  MRN: [id number]  Admit Date:  4/4/2017    Assessment Date:  4/12/2017  Comments:  Awaiting corpak placement confirmation w/ plans to initiate trophic feeding of Impact Peptide 1.5 at 15ml/hr - no advancement w/ current pressor support.          Reason for Assessment       04/12/17 1212    Reason for Assessment    Reason For Assessment/Visit follow up protocol;multidisciplinary rounds;TF/PN    Identified At Risk By Screening Criteria dysphagia or difficulty swallowing;tube feeding or parenteral nutrition    Time Spent (min) 30    Diagnosis --   per notes of this adm   Patient Active Problem List   Diagnosis   • Coronary disease   • Ischemic cardiomyopathy   • Peripheral vascular disease   • ICD (implantable cardioverter-defibrillator) in place, implantation 2015.   • Dyslipidemia, on atorvastain.    • Sepsis   • Diabetes education, encounter for   • Chronic respiratory failure with hypoxia   • Urinary retention   • Hyperlipidemia   • Essential hypertension   • E. coli UTI (urinary tract infection)   • Epididymo-orchitis   • Encephalopathy acute   • Cellulitis   • Yeast UTI                Nutrition/Diet History       04/12/17 1213    Nutrition/Diet History    Reported/Observed By RN    Other feeding tube pulled out  twice; replaced, reglan given for advancement,  awaiting placement  confirmation              Labs/Tests/Procedures/Meds       04/12/17 1219    Labs/Tests/Procedures/Meds    Labs/Tests Review Reviewed    Medication Review Pressors                Nutrition Prescription Ordered       04/12/17 1215    Nutrition Prescription PO    Current PO Diet NPO            Evaluation of Received Nutrient/Fluid Intake       04/12/17 1216    EN Evaluation    Number of Days EN Intake Evaluated 1 day    EN Average Volume Delivered (mL/day) 0 mL/day              Problem/Interventions:        Problem 1       04/12/17 1217    Nutrition  Diagnoses Problem 1    Problem 1 Needs Alternate Route    Etiology (related to) Factors Affecting Nutrition    Functional Diagnosis Dysphagia    Signs/Symptoms (evidenced by) NPO                    Intervention Goal       04/12/17 1218    Intervention Goal    General Nutrition support treatment    TF/PN Inititiate TF/PN            Nutrition Intervention       04/12/17 1221    Nutrition Intervention    RD/Tech Action Care plan reviewd;Follow Tx progress              Education/Evaluation       04/12/17 1221    Monitor/Evaluation    Monitor Per protocol;Pertinent labs;TF delivery/tolerance;I&O        Electronically signed by:  Melanie Bah RD  04/12/17 12:22 PM

## 2017-04-13 NOTE — PROGRESS NOTES
Neurology       Patient Care Team:  Jose Navarro MD as PCP - General (Family Medicine)    Chief complaint: Confusion    History:  Patient is the awake.  He said quite confused.  He is agitated from time to time and is on Precedex off and on.      Past Medical History:   Diagnosis Date   • CAD (coronary artery disease)    • Chronic back pain    • Chronic respiratory failure with hypoxia     Night time only at 2 liters/min   • Diabetes mellitus, type 2    • Essential hypertension    • Hyperlipidemia    • Ischemic cardiomyopathy    • NSTEMI (non-ST elevated myocardial infarction) 2015   • Obesity    • PVD (peripheral vascular disease)    • Systolic CHF     EF <20%  3/8/17   • Urinary retention        Vital Signs   Vitals:    04/13/17 1100 04/13/17 1115 04/13/17 1130 04/13/17 1207   BP: 113/70 111/80 113/72    BP Location:       Patient Position:       Pulse: 65 68 68 66   Resp:    14   Temp:       TempSrc:       SpO2: 97% 97% 97% 97%   Weight:       Height:           Physical Exam:   General: Restless and uncomfortable              Neuro: Speaks to his wife.  He has not conversational.    Motor testing shows diffuse weakness.    Results Review:  Reviewed    Results from last 7 days  Lab Units 04/13/17  0609   WBC 10*3/mm3 11.19*   HEMOGLOBIN g/dL 11.5*   HEMATOCRIT % 36.8*   PLATELETS 10*3/mm3 386       Results from last 7 days  Lab Units 04/13/17  0609 04/12/17  0442 04/11/17  0417 04/10/17  2006 04/10/17  0458 04/09/17  0429   SODIUM mmol/L 133 136 138 138 140 137   POTASSIUM mmol/L 3.9 4.7 4.1 4.2 3.7 3.9   CHLORIDE mmol/L 96* 105 102 102 102 100   TOTAL CO2 mmol/L 28.0 18.0* 30.0 29.0 32.0* 31.0   BUN mg/dL 18 19 19 21 20 17   CREATININE mg/dL 0.60 0.60 0.60 0.70 0.80 0.70   CALCIUM mg/dL 9.0 9.0 9.4 9.3 8.8 9.0   BILIRUBIN mg/dL  --   --   --  0.4 0.3 0.4   ALK PHOS U/L  --   --   --  74 66 71   ALT (SGPT) U/L  --   --   --  14 11 25   AST (SGOT) U/L  --   --   --  70* 34* 67*   GLUCOSE mg/dL 140* 60* 90 97 91  196*     Imaging Results (last 24 hours)     Procedure Component Value Units Date/Time    XR Abdomen KUB [[id number]] Collected:  04/12/17 1704     Updated:  04/12/17 1708    Narrative:          EXAMINATION: XR ABDOMEN KUB-      INDICATION: Keofeed placement; R13.10-Dysphagia, unspecified;  Z74.09-Other reduced mobility; Z74.09-Other reduced mobility;  R41.841-Cognitive communication deficit      COMPARISON: 4/12/2017 KUB 5:03 AM     FINDINGS: Patient's feeding tube is seen in the distal stomach but is no  longer redundantly coiled. It would probably need to be advanced another  20 to 30 cm to be in satisfactory position. There is persistent diffuse  dilatation of the colon suggesting colonic ileus.           Impression:       1. Feeding tube in the distal stomach, but no longer redundantly coiled  as described. Persistent gaseous distention of the colon.     This report was finalized on 4/12/2017 5:06 PM by DR. Morgan Macedo MD.       XR Abdomen KUB [[id number]] Collected:  04/12/17 1546     Updated:  04/13/17 1156    Narrative:       EXAMINATION: XR ABDOMEN, KUB-04/11/2017:      INDICATION: VERIFY FEEDING TUBE PLACEMENT/LOCATION; R13.10-Dysphagia,  unspecified; Z74.09-Other reduced mobility; Z74.09-Other reduced  mobility; R41.841-Cognitive communication deficit.      COMPARISON: 04/11/2017.     FINDINGS: The tip of the nasoenteric feeding tube ends at the duodenal  bulb. There is mild gaseous distention of loops of large bowel. The  heart size is normal. There is some increased attenuation in the  retrocardiac region.           Impression:       Feeding tube tip ending at duodenal bulb.     D:  04/12/2017  E:  04/12/2017     This report was finalized on 4/13/2017 11:54 AM by Dr. Jace Esquivel MD.       FL GI Tube Placement [[id number]] Collected:  04/13/17 1515     Updated:  04/13/17 1515    Narrative:       EXAMINATION: FL GI TUBE PLACEMENT-     INDICATION: multiple attempts to place at bedside;  R13.10-Dysphagia,  unspecified; Z74.09-Other reduced mobility; Z74.09-Other reduced  mobility; R41.841-Cognitive communication deficit         TECHNIQUE: 3 minutes and 23 seconds of fluoroscopic time was used for  this exam. 2 associated images were saved. A Keofeed feeding tube was  advanced into the duodenum under fluoroscopic guidance.     COMPARISON: NONE     FINDINGS: There is mild redundancy of the duodenal C-loop. The end of  the patent tube is positioned in the second portion of the duodenum.  Positioning was confirmed with administration of approximately 8 cc of  water-soluble contrast.          Impression:       Successful positioning of Keofeed feeding tube into the  proximal duodenum under fluoroscopic guidance.                 Assessment:  Mixed encephalopathy    Plan:  Poor long-term prognosis discussed at length with the patient's wife    Comment:  Patient is not making any progress.         I discussed the patients findings and my recommendations with family and nursing staff    Luis Alberto Phelan MD  04/13/17  3:18 PM

## 2017-04-13 NOTE — PROGRESS NOTES
"[facility] Progress Note    Admission Date: 4/4/2017    Tomas Salvador  1955  [id number]    Date: 4/13/2017    Meds:    IV Anti-Infectives     Ordered     Dose/Rate Route Frequency Start Stop    04/04/17 1723  metroNIDAZOLE (FLAGYL) IVPB 500 mg     Ordering Provider:  Kike Leon MD    500 mg  100 mL/hr over 60 Minutes Intravenous Every 6 Hours 04/04/17 1800      04/04/17 1552  ceFAZolin in dextrose (ANCEF) IVPB solution 2 g     Ordering Provider:  TRESA Baca    2 g  over 30 Minutes Intravenous Every 8 Hours 04/04/17 1600            CC:  Delisa's gangrene    SUBJECTIVE:  4/4/17:Patient is a 61 y.o. 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 He has a history of underlying diabetes mellitus and severe systolic congestive heart failure. He presented to [facility] on 3/30 with scrotal cellulitis/gangrene. He underwent debridement by urology at [facility] on 3/31 and was treated with Merrem/clindamycin/vancomycin. He developed left facial droop and left sided paresis, prompting a transfer to [facility] for evaluation and therapy of acute stroke. His head CT angiogram was degraded by motion artifact but per Dr. Morrison there was suggestion of right inferior temporal lobe\" opacification\" consistent with a possible stroke. His left facial droop and left-sided paresis has improved today. He is encephalopathic and is unable to provide any reliable history. His wife thinks that he may have had some fevers prior to his admission on 3/30.    4/5/17: afebrile. No hx per patient secondary to encephalopathy.  Afebrile, restless per nsg staff.  Oliguric UOP.  No n/v/d.  No rashes.  4/6/17: More alert and less restless today.  Still non-responsive to questions and does not follow simple commands.  Still with left-sided weakness.  Afebrile.  Still with oliguric UOP.  No n/v/d, no rashes, per nsg staff notes.   4/7/17:  Alert today.  No " fever,chills,sweats.  Wife at bedside with ?s   4/10/17:  Wants to go home.  Wound care per PT.  No n/v/d.   17:  Now on Dopamine and Bautista, bipap.  Decompensated last pm.    17:  Somnolent; still on pressors   17 Still on pressors/Precedex; Now on bipap.  Had pulled corpak out night before.          PE:   Vital Signs  Temp (24hrs), Av.2 °F (36.8 °C), Min:97.8 °F (36.6 °C), Max:98.7 °F (37.1 °C)    Temp  Min: 97.8 °F (36.6 °C)  Max: 98.7 °F (37.1 °C)  BP  Min: 86/69  Max: 131/89  Pulse  Min: 51  Max: 79  Resp  Min: 14  Max: 34  SpO2  Min: 89 %  Max: 100 %    GENERAL:   He is in no acute distress on bipap  HEENT: Normocephalic, atraumatic. PERRL. EOMI. No conjunctival injection. No icterus. Oropharynx clear without evidence of thrush or exudate.   HEART: RRR; No murmur, rubs, gallops.   LUNGS: few anterior rhonchi   ABDOMEN: Soft, nontender, nondistended. Positive bowel sounds. No rebound or guarding. NO mass or HSM.  EXT: No cyanosis, clubbing or edema. No cord.  : scrotal wound  Dressing in  Place    Cueto anchored   MSK: FROM without joint effusions noted arms/legs.   SKIN: Warm and dry without cutaneous eruptions on Inspection/palpation.   NEURO:sedated  Right PICC without redness      Laboratory Data      Results from last 7 days  Lab Units 17  0609 17  0442 04/10/17  0458   WBC 10*3/mm3 11.19* 16.37* 9.97   HEMOGLOBIN g/dL 11.5* 12.2* 10.0*   HEMATOCRIT % 36.8* 38.0* 32.3*   PLATELETS 10*3/mm3 386 300 337       Results from last 7 days  Lab Units 17  0609   SODIUM mmol/L 133   POTASSIUM mmol/L 3.9   CHLORIDE mmol/L 96*   TOTAL CO2 mmol/L 28.0   BUN mg/dL 18   CREATININE mg/dL 0.60   GLUCOSE mg/dL 140*   CALCIUM mg/dL 9.0       Results from last 7 days  Lab Units 04/10/17  2006   ALK PHOS U/L 74   BILIRUBIN mg/dL 0.4   ALT (SGPT) U/L 14   AST (SGOT) U/L 70*           Results from last 7 days  Lab Units 04/10/17  0458   CRP mg/dL 2.98*                 Estimated Creatinine  Clearance: 150.9 mL/min (by C-G formula based on Cr of 0.6).    Microbiology:  Blood Culture   Date Value Ref Range Status   03/30/2017 No growth at 5 days  Final   03/30/2017 No growth at 5 days  Final           Urine Culture   Date Value Ref Range Status   04/04/2017 No growth  Preliminary   03/31/2017 No growth  Final   03/30/2017 >100,000 CFU/mL Escherichia coli (A)  Final      scrotal cx x 2 cxs (3/31) E. coli  Cefazolin sens.    4/4/17:  Urine culture:  >100K cfu  Candida glabrata   4/7:  UA 13-20 WBCs, large amount of budding yeast/ urine culture: pending     Radiology:  Imaging Results (last 24 hours)     Procedure Component Value Units Date/Time    CT Head Without Contrast [[id number]] Collected:  04/05/17 1241     Updated:  04/05/17 1241    Narrative:       EXAMINATION: CT HEAD WO CONTRAST-, CT ANGIOGRAM HEAD W WO CONTRAST-  04/04/2017     INDICATION: CVA; altered mental status, bilateral upper extremities  weakness.     TECHNIQUE: Contiguous axial 5 mm sections through the brain parenchyma  without intravenous contrast.  Multislice helical CT angiogram of the head before and after intravenous  contrast.     Two dimensional and three dimensional reformatted images were provided  for this exam.  NASCET criteria are utilized for calculation of stenosis.     COMPARISON: NONE     FINDINGS:   Brain:  The midline structures are central. There is no midline shift. Areas of  decreased attenuation are present in the left frontal periventricular  white matter. An old infarct is present in the left frontal lobe. The  structures of the posterior fossa are grossly normal. The images are  somewhat compromised by motion artifact.     The bony calvarium is intact.     Brain CTA:  The brain CTA images are extremely compromised by patient motion.  Right:  Distal portions of the right internal carotid artery are normal in  course and caliber. The right middle cerebral artery is grossly  unremarkable. The anterior cerebral  artery cannot be evaluated secondary  to motion artifact.     Left:  Distal portions of the left internal carotid artery are normal in course  and caliber. The left middle cerebral artery is normal. The anterior  cerebral artery cannot be well evaluated secondary to motion artifact.     Posterior circulation:  The distal vertebral arteries, basilar artery, and posterior cerebral  arteries are normal in course and caliber.       Impression:       1. Both exams compromised by motion artifact.  2. Grossly no acute intracranial process.  3. 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  4. Normal middle cerebral arteries and posterior circulation.  5. The anterior cerebral arteries are not well evaluated.     D:  04/05/2017  E:  04/05/2017          CT Angiogram Head With & Without Contrast [[id number]] Collected:  04/05/17 1241     Updated:  04/05/17 1241    Narrative:       EXAMINATION: CT HEAD WO CONTRAST-, CT ANGIOGRAM HEAD W WO CONTRAST-  04/04/2017     INDICATION: CVA; altered mental status, bilateral upper extremities  weakness.     TECHNIQUE: Contiguous axial 5 mm sections through the brain parenchyma  without intravenous contrast.  Multislice helical CT angiogram of the head before and after intravenous  contrast.     Two dimensional and three dimensional reformatted images were provided  for this exam.  NASCET criteria are utilized for calculation of stenosis.     COMPARISON: NONE     FINDINGS:   Brain:  The midline structures are central. There is no midline shift. Areas of  decreased attenuation are present in the left frontal periventricular  white matter. An old infarct is present in the left frontal lobe. The  structures of the posterior fossa are grossly normal. The images are  somewhat compromised by motion artifact.     The bony calvarium is intact.     Brain CTA:  The brain CTA images are extremely compromised by patient motion.  Right:  Distal portions of the right internal carotid artery are normal in  course and caliber.  The right middle cerebral artery is grossly  unremarkable. The anterior cerebral artery cannot be evaluated secondary  to motion artifact.     Left:  Distal portions of the left internal carotid artery are normal in course  and caliber. The left middle cerebral artery is normal. The anterior  cerebral artery cannot be well evaluated secondary to motion artifact.     Posterior circulation:  The distal vertebral arteries, basilar artery, and posterior cerebral  arteries are normal in course and caliber.       Impression:       1. Both exams compromised by motion artifact.  2. Grossly no acute intracranial process.  3. 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  4. Normal middle cerebral arteries and posterior circulation.  5. The anterior cerebral arteries are not well evaluated.     D:  04/05/2017  E:  04/05/2017          XR Chest 1 View [[id number]] Collected:  04/05/17 1406     Updated:  04/05/17 1423    Narrative:       EXAMINATION: XR CHEST 1 VIEW-04/04/2017:      INDICATION: Sepsis, cardiomyopathy; R13.10-Dysphagia, unspecified.      COMPARISON: NONE.     FINDINGS:   1.  There is cardiomegaly with perihilar vascular congestion.  2.  Bibasilar consolidative airspace opacities are noted, worse on the  left than right.  3.  ICD pacemaker defibrillator is in place from the left. The upper  lung zones are clear.           Impression:       1.  Cardiomegaly is noted. There is airspace opacity left base with  consolidation. There is congestive hilar and mid lung vascular  abnormalities.  2.  Compared to previous studies of 12/17/2015, it appears that the  central congestive vascular edema is new. The density in the left lower  lobe, retrocardiac area and the left base effusion are new. Cardiomegaly  is relatively stable.     D:  04/05/2017  E:  04/05/2017     This report was finalized on 4/5/2017 2:21 PM by Dr. Scottie Bowling MD.       CT Head Without Contrast [[id number]] Collected:  04/05/17 1648     Updated:  04/05/17 1648    Narrative:        EXAMINATION: CT HEAD WITHOUT CONTRAST-04/05/2017:      INDICATION: CVA; R13.10-Dysphagia, unspecified; Z74.09-Other reduced  mobility; Z74.09-Other reduced mobility.         TECHNIQUE: CT scan of the head was performed at 5 mm intervals. No  intravenous contrast was utilized.     COMPARISON: 04/04/2017.     FINDINGS: There is a small old left frontal infarct. There is no  intracranial mass, hemorrhage, midline shift or extra-axial fluid  collection.       Impression:       Old left frontal infarct. There are no acute findings.     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 He is significantly improved.  2. Acute right hemispheric cerebral vascular accident-with left sided facial and left-sided weakness. His head CT angiogram here reveals subtle right temporal abnormalities per Dr. Morrison. He has clinically improved.  3. Acute toxic metabolic encephalopathy-secondary to sepsis and cerebral vascular accident  4. 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 Severe systolic congestive heart failure-with an ejection fraction of less than 20% on echocardiogram performed on 3/8/17  6. Escherichia coli urinary tract infection  7. Sepsis-secondary to Delisa's gangrene  8. Leukocytosis/neutrophilia-secondary to scrotal gangrene/cellulitis, improved   9.  LLL pulmonary infiltrate  10. Candiduria-he has a relative contraindication for fluconazole therapy since he is on amiodarone.    PLAN/RECOMMENDATIONS:   1. Cefazolin 2 g IV every 8 hours  2. Flagyl 500 mg IV every 8 hours  3. PT wound care with debridement and pulse lavage             Kike Leon MD saw and examined patient, verified hx and PE, read all  radiographic studies, reviewed labs and micro data, and formulated dx, plan for treatment and all medical decision making.              Kike Leon MD  4/13/2017  5:23 PM

## 2017-04-13 NOTE — PROGRESS NOTES
"Critical Care Note     LOS: 9 days   Patient Care Team:  Jose Navarro MD as PCP - General (Family Medicine)    Chief Complaint/Reason for visit: Sepsis, severe ICM      Subjective   61-year-old gentleman with severe ischemic CM, DM, hospitalized at [place] for one week with a scrotal cellulitis, status post debridement. He was brought to [facility] for a possible stroke. Stroke workup showed old left frontal.      Interval History:     Continues to require blood pressure support. Good urine output yesterday after some furosemide. Currently sedated on Precedex. When that drip is weaned just a small amount he wakes up and becomes incredibly agitated. Corpak but has been placed and replaced at least 4 times and it continues to be in the stomach.     Review of Systems:    All systems were reviewed and negative except as noted in subjective.unable, patient not following commands    Medical history, surgical history, social history, family history reviewed    Objective     Intake/Output:    Intake/Output Summary (Last 24 hours) at 04/13/17 1035  Last data filed at 04/13/17 0600   Gross per 24 hour   Intake          1801.41 ml   Output             2000 ml   Net          -198.59 ml       Nutrition: NPO    Infusions:    dexmedetomidine 0.2-1.5 mcg/kg/hr Last Rate: 1.2 mcg/kg/hr (04/13/17 0742)   DOPamine 2-20 mcg/kg/min Last Rate: 2 mcg/kg/min (04/13/17 0403)   phenylephrine 0.5-3 mcg/kg/min Last Rate: 0.5 mcg/kg/min (04/13/17 0403)       Respiratory: BiPAP 18/8 rate 12 50%; 3 liters       Telemetry: SB/SR    Vital Signs  Blood pressure 104/71, pulse 62, temperature 98.2 °F (36.8 °C), temperature source Axillary, resp. rate 16, height 69\" (175.3 cm), weight 227 lb 1.2 oz (103 kg), SpO2 100 %.    Physical Exam:  General Appearance:   ill-appearing older gentleman    Head:  NC/AT   Eyes:          ROMI EOMI no jaundice   Ears:     Throat:  no exudate   Neck:  supple, trachea midline    Back:      Lungs:    " bilateral breath sounds and clear anteriorly, diminished at the bases. Symmetric chest excursion     Heart:   distant heart sounds, regular, PMI displaced laterally    Abdomen:    hypoactive bowel sounds, soft, nontender, no organomegaly  Scrotum with edema, erythema and induration. Wound packed   Rectal:   Deferred   Extremities:  no pitting edema or cyanosis    Pulses:  weak pulses left foot, no palpable pulses right foot but extremity is warm to touch    Skin:  cool and dry    Lymph nodes:    Neurologic:   Lethargic      Results Review:     I reviewed the patient's new clinical results.     Results from last 7 days  Lab Units 04/13/17  0609 04/12/17 0442 04/11/17  0417 04/10/17  2006 04/10/17  0458 04/09/17  0429   SODIUM mmol/L 133 136 138 138 140 137   POTASSIUM mmol/L 3.9 4.7 4.1 4.2 3.7 3.9   CHLORIDE mmol/L 96* 105 102 102 102 100   TOTAL CO2 mmol/L 28.0 18.0* 30.0 29.0 32.0* 31.0   BUN mg/dL 18 19 19 21 20 17   CREATININE mg/dL 0.60 0.60 0.60 0.70 0.80 0.70   CALCIUM mg/dL 9.0 9.0 9.4 9.3 8.8 9.0   BILIRUBIN mg/dL  --   --   --  0.4 0.3 0.4   ALK PHOS U/L  --   --   --  74 66 71   ALT (SGPT) U/L  --   --   --  14 11 25   AST (SGOT) U/L  --   --   --  70* 34* 67*   GLUCOSE mg/dL 140* 60* 90 97 91 196*       Results from last 7 days  Lab Units 04/13/17  0609 04/12/17  0442 04/10/17  0458   WBC 10*3/mm3 11.19* 16.37* 9.97   HEMOGLOBIN g/dL 11.5* 12.2* 10.0*   HEMATOCRIT % 36.8* 38.0* 32.3*   PLATELETS 10*3/mm3 386 300 337         Lab Results   Component Value Date    BLOODCX No growth at 5 days 03/30/2017     Lab Results   Component Value Date    URINECX No growth at 2 days 04/11/2017   Scrotal wound E. coli    I reviewed the patient's new imaging including images and reports.                       IMPRESSION:  The heart is enlarged with worsening of the pulmonary  vascularity. Small bilateral pleural effusions are present.      D: 04/12/2017      All medications reviewed.     apixaban 5 mg Oral Q12H    aspirin 300 mg Rectal Daily   ceFAZolin 2 g Intravenous Q8H   fentaNYL 1 patch Transdermal Q72H   heparin flush (porcine) 500 Units Intracatheter Q12H   insulin detemir 10 Units Subcutaneous Nightly   insulin regular 0-9 Units Subcutaneous Q6H   ipratropium-albuterol 3 mL Nebulization 4x Daily - RT   metroNIDAZOLE 500 mg Intravenous Q6H   pantoprazole 40 mg Intravenous Q AM   saccharomyces boulardii 250 mg Oral BID         Assessment/Plan     Principal Problem:    Sepsis  Active Problems:    E. coli UTI (urinary tract infection)    Yeast UTI    Encephalopathy acute    Cellulitis, scrotum, perineum, E.coli    Diabetes mellitus, type 2    ICM EF 10%       NSTEMI 5/15 occl LAD, 99% circ, occl SVG to Dz, patent SVG toOM1, patent LIMA to LAD.   Single chamber AICD 12/15, CABG 3/2005    Chronic respiratory failure with hypoxia    Urinary retention    Epididymo-orchitis    61-year-old gentleman with a severe ischemic cardiomyopathy, AICD, ejection fraction of 10%, diabetes mellitus type II, who developed a scrotal abscess requiring I&D in [place] on 3/31. Cultures were positive for Escherichia coli. He was in the [facility] for approximately one week and developed some left-sided weakness and altered mentation. He was transported to [place] for stroke. No acute stroke was found on CT of the head or CT perfusion. No intervention was done.   He developed atrial fibrillation with a rapid ventricular response and was placed on amiodarone drip. He converted to SR.  Echocardiogram confirms a dilated left ventricular cavity  with severe dysfunction LVEF 10%. No comment as to whether thrombus is seen.  He was placed on a heparin drip for 24 hours and transition to oral Eliquis.   However, his blood pressure then dropped to 75-85 systolic, heart rate has dropped into the 40s and dopamine, phenylephrine initiated. Precedex was added for agitation. This may be playing a role in his bradycardia but is not the sole cause of his  bradycardia. Coreg, amiodarone, Entresto were stopped.    He is agitated and pulling at all of his lines and tubes until it was initiated. Narcotics or benzodiazepine seemed to knock him out and then he arouses more agitated than before. Precedex keeps him calm, but not awake enough to follow commands.   Infectious disease evaluated and changed antibiotics to cefazolin and Flagyl for his Escherichia coli scrotal infection on admit here.  Urine culture is growing greater than 100,000 Candida glabrata, on admission. Infectious disease notified. Nory DCed. He developed urinary retention with over a liter of urine in his bladder. He continues to require in and out catheterization. With his scrotal and penile edema, I&O cath difficult.  In addition, strict I&O needed to monitor his CHF.   Blood sugars are running 140-170 on sliding scale insulin; Levemir,  however he is now nothing by mouth. Corpak was pulled out last pm. Because he is requiring BiPAP I do not want to feed and lasts the feeding tube is in the small bowel. Multiple attempts have been made unsuccessfully.      PLAN:  Limited resuscitation, still full support  Dopamine to support HR, phenylephrine to support mean BP  Cueto reanchored for strict I&O  Precedex for agitation   Cefazolin, Flagyl  Wound care  Aspirin, Plavix, statin  Eliquis  Lasix PRN   Levemir   Sliding scale insulin  Nebulized BDs  Replace electrolytes as needed  Fentanyl patch for pain plus Norco for break thru  corpack under flouro in radiology  CXR am,  Monitor electrolytes and replace as needed    VTE Prophylaxis:Eliquis    Stress Ulcer Prophylaxis:Protonix    Ella Pope MD  04/13/17  10:35 AM      Time: 45 min  I personally provided care to this critically ill patient as documented above.  Critical care time does not include time spent on separately billed procedures.  Non of my critical care time was concurrent with other critical care providers.

## 2017-04-13 NOTE — PROGRESS NOTES
Continued Stay Note  [facility]     Patient Name: Tomas Salvador  MRN: [id number]  Today's Date: 4/13/2017    Admit Date: 4/4/2017          Discharge Plan       04/13/17 1011    Case Management/Social Work Plan    Plan Update    Additional Comments Goal is to transfer to [facility].              Discharge Codes     None        Expected Discharge Date and Time     Expected Discharge Date Expected Discharge Time    Apr 10, 2017         Juliet at [facility] is following pt, she will start precert when pt is off of drips.    Ella Cancino RN

## 2017-04-13 NOTE — PLAN OF CARE
Problem: Patient Care Overview (Adult)  Goal: Plan of Care Review  Outcome: Ongoing (interventions implemented as appropriate)  Pt is confused    Problem: Stroke (Ischemic) (Adult)  Goal: Signs and Symptoms of Listed Potential Problems Will be Absent or Manageable (Stroke)  Outcome: Ongoing (interventions implemented as appropriate)    Problem: Fall Risk (Adult)  Goal: Absence of Falls  Outcome: Ongoing (interventions implemented as appropriate)    Problem: Skin Integrity Impairment, Risk/Actual (Adult)  Goal: Skin Integrity/Wound Healing  Outcome: Ongoing (interventions implemented as appropriate)    Problem: Infection, Risk/Actual (Adult)  Goal: Infection Prevention/Resolution  Outcome: Ongoing (interventions implemented as appropriate)    Problem: SAFETY - NON-VIOLENT RESTRAINT  Goal: Remains free of injury from restraints (Non-Violent Restraint)  Outcome: Ongoing (interventions implemented as appropriate)  Goal: Free from restraint(s) (Non-Violent Restraint)  Outcome: Ongoing (interventions implemented as appropriate)

## 2017-04-13 NOTE — PROGRESS NOTES
"  [facility]   Inpatient Progress Note       LOS: 9 days   Patient Care Team:  Jose Navarro MD as PCP - General (Family Medicine)    Chief Complaint:  Follow-up for CVA and atrial fibrillation    Subjective     Interval History:   Patient currently nonresponsive. Still on pressor therapy. Maintaining SR.  No change since yesterday  Review of Systems:   Pertinent positives noted in history, exam, and assessment. Otherwise reviewed and negative.      Objective     Vitals:  Blood pressure 111/80, pulse 68, temperature 97.8 °F (36.6 °C), temperature source Axillary, resp. rate 16, height 69\" (175.3 cm), weight 227 lb 1.2 oz (103 kg), SpO2 97 %.     Intake/Output Summary (Last 24 hours) at 04/13/17 1132  Last data filed at 04/13/17 0600   Gross per 24 hour   Intake          1801.41 ml   Output             2000 ml   Net          -198.59 ml     Physical Exam   Constitutional: He appears well-developed and well-nourished.   Neck: No JVD present. No tracheal deviation present.   Cardiovascular: Normal rate, regular rhythm and normal heart sounds.    Pulmonary/Chest: Effort normal and breath sounds normal. No respiratory distress.   Abdominal: Soft. Bowel sounds are normal. There is no tenderness.   Musculoskeletal: He exhibits no edema or deformity.   Neurological:   nonresponsive   Skin: Skin is warm and dry.    I have examined the patient and agree with the above findings       Results Review:     I reviewed the patient's new clinical results.      Results from last 7 days  Lab Units 04/13/17  0609   WBC 10*3/mm3 11.19*   HEMOGLOBIN g/dL 11.5*   HEMATOCRIT % 36.8*   PLATELETS 10*3/mm3 386       Results from last 7 days  Lab Units 04/13/17  0609  04/10/17  2006   SODIUM mmol/L 133  < > 138   POTASSIUM mmol/L 3.9  < > 4.2   CHLORIDE mmol/L 96*  < > 102   TOTAL CO2 mmol/L 28.0  < > 29.0   BUN mg/dL 18  < > 21   CREATININE mg/dL 0.60  < > 0.70   CALCIUM mg/dL 9.0  < > 9.3   BILIRUBIN mg/dL  --   " --  0.4   ALK PHOS U/L  --   --  74   ALT (SGPT) U/L  --   --  14   AST (SGOT) U/L  --   --  70*   GLUCOSE mg/dL 140*  < > 97   < > = values in this interval not displayed.    Results from last 7 days  Lab Units 04/13/17  0609   SODIUM mmol/L 133   POTASSIUM mmol/L 3.9   CHLORIDE mmol/L 96*   TOTAL CO2 mmol/L 28.0   BUN mg/dL 18   CREATININE mg/dL 0.60   GLUCOSE mg/dL 140*   CALCIUM mg/dL 9.0           0  Lab Value Date/Time   TROPONINI <0.006 04/04/2017 1056   TROPONINI <0.006 04/03/2017 1851   TROPONINI 0.018 03/31/2017 0542   TROPONINI 0.024 03/30/2017 1515   TROPONINI 3.295 (C) 05/07/2015 2125   TROPONINI 3.801 (C) 05/07/2015 1549   TROPONINI 3.825 (C) 05/07/2015 1133                 ECHO:  · Left ventricular function is severely decreased. Estimated EF appears to be in the range of 10%.  · The left ventricular cavity is severely dilated.  · Global left ventricular wall motion appears abnormal. There is severe diffuse hypokinesis.  · Left ventricular diastolic dysfunction.  · There is no evidence of a left ventricular thrombus present.  · Moderately reduced right ventricular systolic function noted.  · Left atrial cavity size is mildly dilated.  · Mild mitral valve regurgitation is present    Tele: SR  Assessment/Plan     Principal Problem:    Sepsis  Active Problems:    Ischemic cardiomyopathy    ICD (implantable cardioverter-defibrillator) in place, implantation 2015.    Diabetes education, encounter for    Chronic respiratory failure with hypoxia    Urinary retention    E. coli UTI (urinary tract infection)    Epididymo-orchitis    Encephalopathy acute    Cellulitis    Yeast UTI      1. Ischemic cardiomyopathy.                - EF 10 %, no evidence of LV thrombus.         2.  Atrial fibrillation   - new diagnosis. CHADSVASC=6   -converted back to SR, currently on amiodarone   -will need lifelong anticoagulation, Eliquis has been started  3. CAD, stable  4.   Dyslipidemia  5.  Hypotension/shock requiring  pressors.  Likely septic plus minus some volume issues.  6.  Encephalopathy/delirium due to hypotension    Plan:   Continue support/pressors for the septic shock.  Blood pressure too low for any cardiovascular medicines.  Will follow from afar, please call if any acute questions.    TRESA Mojica  04/13/17  11:32 AM      IJarocho have reviewed the note in full and agree with all aspects of the above including physical exam, assessment, labs and plan with changes made accordingly.     Jarocho Martin MD  04/13/17  1:29 PM              Please note that portions of this note may have been completed with a voice recognition program. Efforts were made to edit the dictations, but occasionally words are mistranscribed.

## 2017-04-13 NOTE — PROGRESS NOTES
Adult Nutrition  Assessment/PES    Patient Name:  Tomas Salvador  YOB: 1955  MRN: [id number]  Admit Date:  4/4/2017    Assessment Date:  4/13/2017     Comments: RD follow-up. Plan to have feeding tube placed under fluoro by radiology today and start trophic feeding, TF order in Epic. Will adjust TF order as medically appropriate/as pt weans off pressors.     At this point pt has been npo x3 days, minimal nutrition since adm (4/4), and poor PO intake 2 weeks prior to adm- discussed with MD and pharmacy. Plan to start TPN to supplement pt's kcal and pro needs until TF able to be advanced to goal rate.     Will order TPN in Epic-D18%, 7.0% AA to goal rate of 70 ml/hr.     Pt at increased risk for re-feeding syndrome, rec close monitoring of electrolytes and replacing as needed.  Will continue to follow.          Reason for Assessment       04/13/17 1026    Reason for Assessment    Reason For Assessment/Visit multidisciplinary rounds;follow up protocol;TF/PN;NPO/Clr Policy   npo x3 days    Time Spent (min) 45    Diagnosis --   per notes this adm   Principal Problem:    Sepsis  Active Problems:    Ischemic cardiomyopathy    ICD (implantable cardioverter-defibrillator) in place, implantation 2015.    Diabetes education, encounter for    Chronic respiratory failure with hypoxia    Urinary retention    E. coli UTI (urinary tract infection)    Epididymo-orchitis    Encephalopathy acute    Cellulitis    Yeast UTI             Nutrition/Diet History       04/13/17 1033    Nutrition/Diet History    Reported/Observed By RN;MD    Other pt on bipap overnight. Plan to have feeding tube placed under fluoro by radiology today and start trophic feeding              Labs/Tests/Procedures/Meds       04/13/17 1035    Labs/Tests/Procedures/Meds    Labs/Tests Review Reviewed    Medication Review Pressors   vin @ 0.5 mcg/kg/min, dopamine @ 2 mcg/kg/min     Results from last 7 days  Lab Units 04/13/17  0609  04/10/17  2006    SODIUM mmol/L 133  < > 138   POTASSIUM mmol/L 3.9  < > 4.2   CHLORIDE mmol/L 96*  < > 102   TOTAL CO2 mmol/L 28.0  < > 29.0   BUN mg/dL 18  < > 21   CREATININE mg/dL 0.60  < > 0.70   CALCIUM mg/dL 9.0  < > 9.3   BILIRUBIN mg/dL  --   --  0.4   ALK PHOS U/L  --   --  74   ALT (SGPT) U/L  --   --  14   AST (SGOT) U/L  --   --  70*   GLUCOSE mg/dL 140*  < > 97   < > = values in this interval not displayed.             Nutrition Prescription Ordered       04/13/17 1035    Nutrition Prescription PO    Current PO Diet NPO    Nutrition Prescription EN    Enteral Route --   pending    Product Impact Peptide 1.5 tom    TF Delivery Method Continuous    Continuous TF Goal Rate (mL/hr) 15 mL/hr    Continuous TF Current Rate (mL/hr) 0 mL/hr    Water flush (mL)  120 mL    Water Flush Frequency Other (comment)   with medication administration                Problem/Interventions:        Problem 1       04/13/17 1037    Nutrition Diagnoses Problem 1    Problem 1 Needs Alternate Route    Etiology (related to) Factors Affecting Nutrition    Functional Diagnosis Dysphagia    Signs/Symptoms (evidenced by) NPO            Problem 2       04/13/17 1037    Nutrition Diagnoses Problem 2    Problem 2 Inadequate Intake/Infusion    Etiology (related to) --   unable to get feeding tube placed; placement pending under fluoro today    Signs/Symptoms (evidenced by) --   has been npo x3 days, minimal intake since adm, poor PO intake 2 weeks prior to adm                  Intervention Goal       04/13/17 1038    Intervention Goal    General Nutrition support treatment    TF/PN Inititiate TF/PN   initiate trophic TF and TPN- ok per MD            Nutrition Intervention       04/13/17 1038    Nutrition Intervention    RD/Tech Action Care plan reviewd;Follow Tx progress;Recommend/ordered    Recommended/Ordered PN            Nutrition Prescription       04/13/17 1038    Nutrition Prescription EN    Enteral Prescription Continue same protocol   initiate  trophic TF as ordered in Epic    Nutrition Prescription PN    Parenteral Prescription PN begin/change;Parenteral to supply    PN Route PICC    Dextrose Concentration (%) Other (comment)   18%; GIR= 2.0 mg/kg/min    Amino Acid Concentration (%) 7.0%    PN Goal Rate (mL/hr) 70 mL/hr    PN Starting Rate (mL/hr) 25 mL/hr    New PN Prescription Ordered? Yes    PN to Supply    Kcal/Day 1496 Kcal/day   + 450 kcal from TF= 1946 kcal total    Protein (gm/day) 117 gm/day   + 28 g pro from TF= 145 g pro total    Other Orders    Other will continue to follow and adjust EN order as medically appropriate/as pt weans off pressors. At this point pt has been npo x3 days, minimal nutrition since adm, and poor PO intake 2 weeks prior to adm- discussed with MD and pharmacy. Plan to start TPN to supplement pt's kcal and pro needs until TF able to be advanced to goal rate. Pt at increased risk for re-feeding syndrome, rec close monitoring of electrolytes and replacing as needed            Education/Evaluation       04/13/17 1044    Monitor/Evaluation    Monitor Per protocol;Pertinent labs;TF delivery/tolerance;Symptoms          Electronically signed by:  Celeste Sterling, MS RD/JENSEN [facility]  04/13/17 11:05 AM

## 2017-04-13 NOTE — PROGRESS NOTES
Continued Stay Note  [facility]     Patient Name: Tomas Salvador  MRN: [id number]  Today's Date: 4/13/2017    Admit Date: 4/4/2017          Discharge Plan       04/13/17 1232    Case Management/Social Work Plan    Plan LTACH    Patient/Family In Agreement With Plan yes    Additional Comments SW spoke to pt's wife at bedside.  Pt's wife stated is concerned about insurance payment when pt leaves the hospital.  SW explained that wherever pt goes, this is pre-certed through the insurance first.  Pt's wife was extremely grateful for this information.  SW encouraged pt's wife to get some rest as she is extremely overwhlemed and stated she has not been sleeping much at all.  SW will continue to follow if there are any other social needs that need to be addressed.       04/13/17 1011    Case Management/Social Work Plan    Plan Update    Additional Comments Goal is to transfer to [facility].              Discharge Codes     None        Expected Discharge Date and Time     Expected Discharge Date Expected Discharge Time    Apr 10, 2017             DAIANA Robles

## 2017-04-14 NOTE — PROGRESS NOTES
"Critical Care Note     LOS: 10 days   Patient Care Team:  Jose Navarro MD as PCP - General (Family Medicine)    Chief Complaint/Reason for visit: Sepsis, severe ICM      Subjective   61-year-old gentleman with severe ischemic CM, DM, hospitalized at [facility] for one week with a scrotal cellulitis, status post debridement. He was brought to [facility] for a possible stroke. Stroke workup showed old left frontal.      Interval History:     He continues to be confused and agitated on Precedex drip. He has persistent bradycardia with attempts to wean dopamine. No fever. Corpak placed under fluoroscopy yesterday and trophic feedings initiated.TPN started because nothing by mouth for 5 days. Receiving wound care per physical therapy with improvement.    Review of Systems:    All systems were reviewed and negative except as noted in subjective.unable, patient not following commands    Medical history, surgical history, social history, family history reviewed    Objective     Intake/Output:    Intake/Output Summary (Last 24 hours) at 04/14/17 1135  Last data filed at 04/14/17 0900   Gross per 24 hour   Intake          2896.29 ml   Output              650 ml   Net          2246.29 ml       Nutrition: TPN;  Trophic feeding via corpack    Infusions:    Adult Central 2-in-1 TPN  Last Rate: 50 mL/hr at 04/14/17 0608   DOPamine 2-20 mcg/kg/min Last Rate: 4 mcg/kg/min (04/14/17 0812)   Pharmacy to Dose TPN     phenylephrine 0.5-3 mcg/kg/min Last Rate: Stopped (04/14/17 0048)       Respiratory: BiPAP 18/8 rate 12 50%; 3 liters       Telemetry: SB/SR    Vital Signs  Blood pressure (!) 83/53, pulse 78, temperature 98.1 °F (36.7 °C), temperature source Axillary, resp. rate 16, height 69\" (175.3 cm), weight 227 lb 1.2 oz (103 kg), SpO2 97 %.    Physical Exam:  General Appearance:   ill-appearing older gentleman    Head:  NC/AT   Eyes:          ROMI EOMI no jaundice   Ears:     Throat:  BiPAP in place   Neck:  supple, " trachea midline    Back:      Lungs:    bilateral breath sounds with rhonchi anteriorly, diminished at the bases. Symmetric chest excursion     Heart:   distant heart sounds, regular, PMI displaced laterally    Abdomen:    hypoactive bowel sounds, soft, nontender, no organomegaly  Scrotum with edema, erythema and induration. Wound packed   Rectal:   Deferred   Extremities:  no pitting edema or cyanosis    Pulses:  weak pulses left foot, no palpable pulses right foot but extremity is warm to touch    Skin:  cool and dry    Lymph nodes:    Neurologic:   Awake, restrained, trying to pull at gown, sheets. Follows commands intermittently      Results Review:     I reviewed the patient's new clinical results.     Results from last 7 days  Lab Units 04/14/17  0340 04/13/17  0609 04/12/17  0442  04/10/17  2006 04/10/17  0458   SODIUM mmol/L 136 133 136  < > 138 140   POTASSIUM mmol/L 4.3 3.9 4.7  < > 4.2 3.7   CHLORIDE mmol/L 102 96* 105  < > 102 102   TOTAL CO2 mmol/L 30.0 28.0 18.0*  < > 29.0 32.0*   BUN mg/dL 16 18 19  < > 21 20   CREATININE mg/dL 0.60 0.60 0.60  < > 0.70 0.80   CALCIUM mg/dL 8.9 9.0 9.0  < > 9.3 8.8   BILIRUBIN mg/dL 0.3  --   --   --  0.4 0.3   ALK PHOS U/L 61  --   --   --  74 66   ALT (SGPT) U/L 4*  --   --   --  14 11   AST (SGOT) U/L 19  --   --   --  70* 34*   GLUCOSE mg/dL 200* 140* 60*  < > 97 91   < > = values in this interval not displayed.    Results from last 7 days  Lab Units 04/13/17  0609 04/12/17  0442 04/10/17  0458   WBC 10*3/mm3 11.19* 16.37* 9.97   HEMOGLOBIN g/dL 11.5* 12.2* 10.0*   HEMATOCRIT % 36.8* 38.0* 32.3*   PLATELETS 10*3/mm3 386 300 337         Lab Results   Component Value Date    BLOODCX No growth at 5 days 03/30/2017     Lab Results   Component Value Date    URINECX No growth at 2 days 04/11/2017   Scrotal wound E. coli    I reviewed the patient's new imaging including images and reports.    Chest x-ray today, worsening left effusion, cardiomegaly, persistent vascular  congestion, left subclavian single-lead ICD          All medications reviewed.     apixaban 5 mg Oral Q12H   aspirin 300 mg Rectal Daily   ceFAZolin 2 g Intravenous Q8H   fentaNYL 1 patch Transdermal Q72H   furosemide 40 mg Intravenous Daily   heparin flush (porcine) 500 Units Intracatheter Q12H   insulin detemir 10 Units Subcutaneous Nightly   insulin regular 0-9 Units Subcutaneous Q6H   ipratropium-albuterol 3 mL Nebulization 4x Daily - RT   metroNIDAZOLE 500 mg Intravenous Q6H   pantoprazole 40 mg Intravenous Q AM   saccharomyces boulardii 250 mg Oral BID         Assessment/Plan     Principal Problem:    Sepsis  Active Problems:    E. coli UTI (urinary tract infection)    Yeast UTI    Encephalopathy acute    Cellulitis, scrotum, perineum, E.coli    Diabetes mellitus, type 2    ICM EF 10%       NSTEMI 5/15 occl LAD, 99% circ, occl SVG to Dz, patent SVG toOM1, patent LIMA to LAD.   Single chamber AICD 12/15, CABG 3/2005    Chronic respiratory failure with hypoxia    Urinary retention    Epididymo-orchitis    61-year-old gentleman with a severe ischemic cardiomyopathy, AICD, ejection fraction of 10%, diabetes mellitus type II, who developed a scrotal abscess requiring I&D in [place] on 3/31. Cultures were positive for Escherichia coli. He was in the [facility] [facility] for approximately one week and developed some left-sided weakness and altered mentation. He was transported to [place] for stroke. No acute stroke was found on CT of the head or CT perfusion. No intervention was done.   He developed atrial fibrillation with a rapid ventricular response and was placed on amiodarone drip. He converted to SR.  Echocardiogram confirms a dilated left ventricular cavity  with severe dysfunction LVEF 10%. No comment as to whether thrombus is seen.  He was placed on a heparin drip for 24 hours and transition to oral Eliquis.   However, his blood pressure then dropped to 75-85 systolic, heart rate dropped into the 40s and  dopamine, phenylephrine initiated. Precedex was added for agitation. This may be playing a role in his bradycardia but is not the sole cause of his bradycardia. Coreg, amiodarone, Entresto were stopped. On 4/11/17    He is agitated and pulling at all of his lines and tubes until Precedex was initiated. Narcotics or benzodiazepine seemed to knock him out and then he arouses more agitated than before. QTc is prolonged, therefore cannot start seroquel.    Infectious disease evaluated and changed antibiotics to cefazolin and Flagyl for his Escherichia coli scrotal infection on admit here.  Urine culture was growing greater than 100,000 Candida glabrata, on admission. Infectious disease notified. Colbert DCed. He developed urinary retention with over a liter of urine in his bladder. He continues to require in and out catheterization. With his scrotal and penile edema, I&O cath difficult, colbert reanchored.  In addition, strict I&O needed to monitor his CHF. Repeat culture was negative.   Blood sugars are running 170-220 now that nutrition started,  on sliding scale insulin; Levemir.  Corpak was placed in radiology yesterday and trophic feedings started.  He also started TPN because of inconsistent feeding tolerances and possible ileus on KUB.       PLAN:  Limited resuscitation, still full support  Dopamine to support HR, phenylephrine to support mean BP  Colbert  for strict I&O, urinary retention  DC precedex and allow to awaken   Start low dose prn ativan   Cefazolin, Flagyl  Wound care  Aspirin, Plavix, statin  Eliquis  Lasix PRN   Levemir   Sliding scale insulin  Nebulized BDs  Replace electrolytes as needed  Fentanyl patch for pain plus Norco for break thru  TPN plus trophic feeds  Monitor electrolytes and replace as needed    VTE Prophylaxis:Eliquis    Stress Ulcer Prophylaxis:Protonix    Ella Pope MD  04/14/17  11:35 AM      Time: 45 min  I personally provided care to this critically ill patient as  documented above.  Critical care time does not include time spent on separately billed procedures.  Non of my critical care time was concurrent with other critical care providers.

## 2017-04-14 NOTE — PLAN OF CARE
Problem: Patient Care Overview (Adult)  Goal: Plan of Care Review  Outcome: Ongoing (interventions implemented as appropriate)    04/14/17 1120   Coping/Psychosocial Response Interventions   Plan Of Care Reviewed With patient   Patient Care Overview   Progress progress toward functional goals as expected   Outcome Evaluation   Outcome Summary/Follow up Plan Scrotal wound continues to improve, with little yellow slough remaining and more granulation present throughout. Pt will continue to benefit from skilled PT wound care, including pulse lavage every other day, to continue progress.         Problem: Inpatient Physical Therapy  Goal: Wound Care Goal 1 LTG- PT  Outcome: Ongoing (interventions implemented as appropriate)    04/06/17 1025 04/14/17 1120   Wound Care PT LTG   Wound Care PT LTG 1, Date Established 04/06/17 --    Wound Care PT LTG 1, Time to Achieve 1 wk --    Wound Care PT LTG 1, Location Scrotum --    Wound Care PT LTG 1, No S&S of Infection yes --    Wound Care PT LTG 1, Decrease Wound Size 10% --    Wound Care PT LTG 1, Decrease Necrotic Tissue 50% --    Wound Care PT LTG 1, Decrease Exudate minimum --    Wound Care PT LTG 1, No New Skin Break Down yes --    Wound Care PT LTG 1, Education dressing changes;wound care --    Wound Care PT LTG 1, Education Understanding verbalize understanding --    Wound Care PT LTG 1, Outcome --  goal ongoing

## 2017-04-14 NOTE — PROGRESS NOTES
Adult Nutrition  Assessment/PES    Patient Name:  Tomas Salavdor  YOB: 1955  MRN: [id number]  Admit Date:  4/4/2017    Assessment Date:  4/14/2017        Reason for Assessment       04/14/17 1245    Reason for Assessment    Reason For Assessment/Visit follow up protocol;multidisciplinary rounds;TF/PN    Identified At Risk By Screening Criteria MST SCORE 2+;tube feeding or parenteral nutrition    Time Spent (min) 30    Diagnosis --   per notes of this adm   Patient Active Problem List   Diagnosis   • Coronary disease   • Ischemic cardiomyopathy   • Peripheral vascular disease   • ICD (implantable cardioverter-defibrillator) in place, implantation 2015.   • Dyslipidemia, on atorvastain.    • Sepsis   • Diabetes education, encounter for   • Chronic respiratory failure with hypoxia   • Urinary retention   • Hyperlipidemia   • Essential hypertension   • E. coli UTI (urinary tract infection)   • Epididymo-orchitis   • Encephalopathy acute   • Cellulitis   • Yeast UTI                Nutrition/Diet History       04/14/17 1245    Nutrition/Diet History    Reported/Observed By RN;MD    Other pt off vin; tolerating EN at 25mL; PN at 60 ml/hr; maintain TF at this rate do not advance when PN gets to goal will give lasix daily; Pt's ileus is mild              Labs/Tests/Procedures/Meds       04/14/17 1251    Labs/Tests/Procedures/Meds    Medication Review Reviewed, pertinent;Pressors   dopamine @ mcg/kg/min; vin - off      04/14/17 1250    Labs/Tests/Procedures/Meds    Labs/Tests Review Reviewed;Glucose            Physical Findings       04/14/17 1250    Physical Appearance    Tubes nasoduoduenal tube   placement per radiology under flouro              Nutrition Prescription Ordered       04/14/17 1251    Nutrition Prescription PO    Current PO Diet NPO    Nutrition Prescription EN    Enteral Route ND    Product Impact Peptide 1.5 tom    TF Delivery Method Continuous    Continuous TF Goal Rate (mL/hr) 25 mL/hr     Continuous TF Current Rate (mL/hr) 25 mL/hr    Continuous TF Goal Volume (mL) 600 mL    Continuous TF Current Volume (mL) 600 mL    Nutrition Prescription PN    PN Goal Rate (mL/hr) 70 mL/hr    PN Current Rate (mL/hr) 60 mL/hr    PN Goal Volume (mL) 1680 mL    PN Current Volume (mL) 1440 mL    Dextrose Concentration (%) Other (comment)   D18w    Amino Acid Concentration (%) 7 %            Evaluation of Received Nutrient/Fluid Intake       04/14/17 1253    Evaluation of Received Nutrient/Fluid Intake    Nutrition Delivered Calorie Evaluation;Protein Evaluation    Calorie Intake Evaluation    Enteral Calories (kcal) 362    Parenteral Calories (kcal) 275    Total Calories (kcal) 637    % Kcal Needs 31    Protein Intake Evaluation    Enteral Protein (gm) 22    Parenteral Protein (gm) 21    Total Protein (gm) 43    % Protein Needs 29    EN Evaluation    Number of Days EN Intake Evaluated 1 day    EN Average Volume Delivered (mL/day) 241 mL/day    % Goal Volume  40 %    PN Evaluation    Number of Days PN Evaluated 1 day    PN Average delivery (mL/day)  313 mL/day              Problem/Interventions:          Problem 2       04/14/17 1302    Nutrition Diagnoses Problem 2    Problem 2 Inadequate Intake/Infusion    Inadequate Intake Type Enteral;Parenteral    Macronutrient Kcal;Protein    Etiology (related to) MNT for Treatment/Condition    Signs/Symptoms (evidenced by) Kcal;PRO;EN Intake Delivery    Percent (%) of estimated Kcal need 31 %    Percent (%) of estimated PRO need 29 %                  Intervention Goal       04/14/17 1304    Intervention Goal    General Nutrition support treatment    TF/PN Adjust TF/PN            Nutrition Intervention       04/14/17 1304    Nutrition Intervention    RD/Tech Action Follow Tx progress;Care plan reviewd            Nutrition Prescription       04/14/17 1304    Nutrition Prescription EN    Enteral Prescription Continue same protocol    Product Impact Peptide 1.5 tom    Continuous  TF Goal Rate (mL/hr) 25 mL/hr    Continuous TF Goal Volume (mL) 600 mL    Water Flush Frequency Per hour    Nutrition Prescription PN    Parenteral Prescription Continue same protocal    PN Route PICC    PN Goal Rate (mL/hr) 70 mL/hr    PN Goal Volume (mL) 1680 mL            Education/Evaluation       04/14/17 1305    Monitor/Evaluation    Monitor Per protocol;I&O;Pertinent labs;TF delivery/tolerance;PN delivery/tolerance        Comments:      Electronically signed by:  Melanie Bah RD  04/14/17 1:05 PM

## 2017-04-14 NOTE — PLAN OF CARE
Problem: Patient Care Overview (Adult)  Goal: Plan of Care Review  Outcome: Ongoing (interventions implemented as appropriate)    Problem: Stroke (Ischemic) (Adult)  Goal: Signs and Symptoms of Listed Potential Problems Will be Absent or Manageable (Stroke)  Outcome: Ongoing (interventions implemented as appropriate)    Problem: Fall Risk (Adult)  Goal: Absence of Falls  Outcome: Ongoing (interventions implemented as appropriate)    Problem: Skin Integrity Impairment, Risk/Actual (Adult)  Goal: Skin Integrity/Wound Healing  Outcome: Ongoing (interventions implemented as appropriate)    Problem: Infection, Risk/Actual (Adult)  Goal: Infection Prevention/Resolution  Outcome: Ongoing (interventions implemented as appropriate)    Problem: SAFETY - NON-VIOLENT RESTRAINT  Goal: Remains free of injury from restraints (Non-Violent Restraint)  Outcome: Ongoing (interventions implemented as appropriate)  Goal: Free from restraint(s) (Non-Violent Restraint)  Outcome: Ongoing (interventions implemented as appropriate)

## 2017-04-14 NOTE — PROGRESS NOTES
"[facility] Progress Note    Admission Date: 4/4/2017    Tomas Salvador  1955  [id number]    Date: 4/14/2017    Meds:    IV Anti-Infectives     Ordered     Dose/Rate Route Frequency Start Stop    04/04/17 1723  metroNIDAZOLE (FLAGYL) IVPB 500 mg     Ordering Provider:  Kike Leon MD    500 mg  100 mL/hr over 60 Minutes Intravenous Every 6 Hours 04/04/17 1800      04/04/17 1552  ceFAZolin in dextrose (ANCEF) IVPB solution 2 g     Ordering Provider:  TRESA Baca    2 g  over 30 Minutes Intravenous Every 8 Hours 04/04/17 1600            CC:  Delisa's gangrene    SUBJECTIVE:  4/4/17:Patient is a 61 y.o. 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 He has a history of underlying diabetes mellitus and severe systolic congestive heart failure. He presented to [facility] on 3/30 with scrotal cellulitis/gangrene. He underwent debridement by urology at [facility] on 3/31 and was treated with Merrem/clindamycin/vancomycin. He developed left facial droop and left sided paresis, prompting a transfer to [facility] for evaluation and therapy of acute stroke. His head CT angiogram was degraded by motion artifact but per Dr. Morrison there was suggestion of right inferior temporal lobe\" opacification\" consistent with a possible stroke. His left facial droop and left-sided paresis has improved today. He is encephalopathic and is unable to provide any reliable history. His wife thinks that he may have had some fevers prior to his admission on 3/30.    4/5/17: afebrile. No hx per patient secondary to encephalopathy.  Afebrile, restless per nsg staff.  Oliguric UOP.  No n/v/d.  No rashes.  4/6/17: More alert and less restless today.  Still non-responsive to questions and does not follow simple commands.  Still with left-sided weakness.  Afebrile.  Still with oliguric UOP.  No n/v/d, no rashes, per nsg staff notes.   4/7/17:  Alert today.  No " fever,chills,sweats.  Wife at bedside with ?s   4/10/17:  Wants to go home.  Wound care per PT.  No n/v/d.   17:  Now on Dopamine and Bautista, bipap.  Decompensated last pm.    17:  Somnolent; still on pressors   17 Still on pressors/Precedex; Now on bipap.  Had pulled corpak out night before.    17: Per RN staff notes: afebrile, on/off precedex for agitation, on dopamine, on flagyl and cefazolin, on and off bipap.  Can not keep clothes on him.  Attempted to do Is and Os but too difficult without his foreskin getting pulled into meatus, so therefore, just placed Cueto cath.  Not anchored, because he keeps trying to pull it out.  Would is getting daily misted by PT and packed.  He is more alert today, but still not oriented.        PE:   Vital Signs  Temp (24hrs), Av.6 °F (37 °C), Min:98.1 °F (36.7 °C), Max:99.2 °F (37.3 °C)    Temp  Min: 98.1 °F (36.7 °C)  Max: 99.2 °F (37.3 °C)  BP  Min: 83/53  Max: 126/83  Pulse  Min: 54  Max: 118  Resp  Min: 16  Max: 20  SpO2  Min: 89 %  Max: 100 %    GENERAL:   He is in no acute distress, more alert.  Restless with no clothing on his body.  HEENT: Normocephalic, atraumatic. PERRL. EOMI. No conjunctival injection. No icterus. Oropharynx clear without evidence of thrush or exudate.   HEART: RRR; No murmur, rubs, gallops.   LUNGS: few scattered rhonchi   ABDOMEN: Soft, nontender, nondistended. Positive bowel sounds. No rebound or guarding. NO mass or HSM.  EXT: No cyanosis, clubbing or edema. No cord.  : scrotal wound with packing and large area with good granulation   Cueto not anchored as described above.  MSK: FROM without joint effusions noted arms/legs.   SKIN: Warm and dry without cutaneous eruptions on Inspection/palpation.   NEURO: more alert but not oriented.  Right PICC without redness      Laboratory Data      Results from last 7 days  Lab Units 17  0609 17  0442 04/10/17  0458   WBC 10*3/mm3 11.19* 16.37* 9.97   HEMOGLOBIN g/dL 11.5*  12.2* 10.0*   HEMATOCRIT % 36.8* 38.0* 32.3*   PLATELETS 10*3/mm3 386 300 337       Results from last 7 days  Lab Units 04/14/17  0340   SODIUM mmol/L 136   POTASSIUM mmol/L 4.3   CHLORIDE mmol/L 102   TOTAL CO2 mmol/L 30.0   BUN mg/dL 16   CREATININE mg/dL 0.60   GLUCOSE mg/dL 200*   CALCIUM mg/dL 8.9       Results from last 7 days  Lab Units 04/14/17  0340   ALK PHOS U/L 61   BILIRUBIN mg/dL 0.3   ALT (SGPT) U/L 4*   AST (SGOT) U/L 19           Results from last 7 days  Lab Units 04/13/17  1519   CRP mg/dL 4.33*                 Estimated Creatinine Clearance: 150.9 mL/min (by C-G formula based on Cr of 0.6).    Microbiology:  Blood Culture   Date Value Ref Range Status   03/30/2017 No growth at 5 days  Final   03/30/2017 No growth at 5 days  Final           Urine Culture   Date Value Ref Range Status   04/04/2017 No growth  Preliminary   03/31/2017 No growth  Final   03/30/2017 >100,000 CFU/mL Escherichia coli (A)  Final      scrotal cx x 2 cxs (3/31) E. coli  Cefazolin sens.    Urine Culture   Date Value Ref Range Status   04/11/2017 No growth at 2 days  Final   04/07/2017 >100,000 CFU/mL Candida glabrata (A)  Final         Radiology:  Imaging Results (last 24 hours)     Procedure Component Value Units Date/Time    XR Chest 1 View [[id number]] Collected:  04/14/17 1118     Updated:  04/14/17 1312    Narrative:       EXAMINATION: XR CHEST 1 VW- 04/14/2017     INDICATION: R13.10-Dysphagia, unspecified; Z74.09-Other reduced  mobility; R41.841-Cognitive communication deficit      COMPARISON: 04/12/2017     FINDINGS:   1. Cardiomegaly is noted. There is perihilar vascular and interstitial  congestive disease.  2. There is consolidation left base with a left effusion.           Impression:       Compared to previous studies of 2 days ago, the right lung  base has improved somewhat, however, the diffuse pattern of vascular and  interstitial congestive edema in the mid and upper lung zones appears to  be somewhat worse.  Also, the cardiomegaly remains impressive and is  stable without improvement and there is left base consolidation with a  left pleural effusion.     D:  04/14/2017  E:  04/14/2017     This report was finalized on 4/14/2017 1:10 PM by Dr. Scottie Bowling MD.               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 He is significantly improved.  2. Acute right hemispheric cerebral vascular accident-with left sided facial and left-sided weakness. His head CT angiogram here reveals subtle right temporal abnormalities per Dr. Morrison. He has clinically improved.  3. Acute toxic metabolic encephalopathy-secondary to sepsis and cerebral vascular accident  4. 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 Severe systolic congestive heart failure-with an ejection fraction of less than 20% on echocardiogram performed on 3/8/17  6. Escherichia coli urinary tract infection  7. Sepsis-secondary to Delisa's gangrene  8. Leukocytosis/neutrophilia-secondary to scrotal gangrene/cellulitis, improved   9.  LLL pulmonary infiltrate  10. Candiduria-he has a relative contraindication for fluconazole therapy since he is on amiodarone--off amiodarone now.  F/u urine culture negative.    PLAN/RECOMMENDATIONS:   1. Cefazolin 2 g IV every 8 hours  2. Flagyl 500 mg IV every 8 hours  3. PT wound care with debridement and pulse lavage      Kike Leon MD saw and examined patient, verified hx and PE, read all radiographic studies, reviewed labs and micro data, and formulated dx, plan for treatment and all medical decision making.      Rush Patel PA-C for Kike Leon MD    He is hypotensive secondary to cardiovascular disease and not sepsis.  He does not have fever or significant leukocytosis and his scrotal wound has dramatically improved.  I do not think that his deterioration is  secondary to sepsis.  I discussed his complex situation with Dr. Pope again today.    Kike Leon MD  4/14/2017  4:38 PM

## 2017-04-15 NOTE — PROGRESS NOTES
"[facility] Progress Note    Admission Date: 4/4/2017    Tomas Salvador  1955  [id number]    Date: 4/15/2017    Meds:    IV Anti-Infectives     Ordered     Dose/Rate Route Frequency Start Stop    04/04/17 1723  metroNIDAZOLE (FLAGYL) IVPB 500 mg     Ordering Provider:  Kike Leon MD    500 mg  100 mL/hr over 60 Minutes Intravenous Every 6 Hours 04/04/17 1800      04/04/17 1552  ceFAZolin in dextrose (ANCEF) IVPB solution 2 g     Ordering Provider:  TRESA Baca    2 g  over 30 Minutes Intravenous Every 8 Hours 04/04/17 1600            CC:  Delisa's gangrene    SUBJECTIVE:  4/4/17:Patient is a 61 y.o. 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 He has a history of underlying diabetes mellitus and severe systolic congestive heart failure. He presented to [facility] on 3/30 with scrotal cellulitis/gangrene. He underwent debridement by urology at [facility] on 3/31 and was treated with Merrem/clindamycin/vancomycin. He developed left facial droop and left sided paresis, prompting a transfer to [facility] for evaluation and therapy of acute stroke. His head CT angiogram was degraded by motion artifact but per Dr. Morrison there was suggestion of right inferior temporal lobe\" opacification\" consistent with a possible stroke. His left facial droop and left-sided paresis has improved today. He is encephalopathic and is unable to provide any reliable history. His wife thinks that he may have had some fevers prior to his admission on 3/30.    4/5/17: afebrile. No hx per patient secondary to encephalopathy.  Afebrile, restless per nsg staff.  Oliguric UOP.  No n/v/d.  No rashes.  4/6/17: More alert and less restless today.  Still non-responsive to questions and does not follow simple commands.  Still with left-sided weakness.  Afebrile.  Still with oliguric UOP.  No n/v/d, no rashes, per nsg staff notes.   4/7/17:  Alert today.  No " fever,chills,sweats.  Wife at bedside with ?s   4/10/17:  Wants to go home.  Wound care per PT.  No n/v/d.   17:  Now on Dopamine and Bautista, bipap.  Decompensated last pm.    17:  Somnolent; still on pressors   17 Still on pressors/Precedex; Now on bipap.  Had pulled corpak out night before.    17: Per RN staff notes: afebrile, on/off precedex for agitation, on dopamine, on flagyl and cefazolin, on and off bipap.  Can not keep clothes on him.  Attempted to do Is and Os but too difficult without his foreskin getting pulled into meatus, so therefore, just placed Cueto cath.  Not anchored, because he keeps trying to pull it out.  Would is getting daily misted by PT and packed.  He is more alert today, but still not oriented.  4/15/17: He is improved today and off of vasopressor therapy.  He has been receiving some diuretic therapy for ulnar edema.  He is less confused.  He remains afebrile.        PE:   Vital Signs  Temp (24hrs), Av.1 °F (36.7 °C), Min:97.4 °F (36.3 °C), Max:98.5 °F (36.9 °C)    Temp  Min: 97.4 °F (36.3 °C)  Max: 98.5 °F (36.9 °C)  BP  Min: 101/66  Max: 151/96  Pulse  Min: 105  Max: 144  Resp  Min: 16  Max: 24  SpO2  Min: 46 %  Max: 100 %    GENERAL:   He is in no acute distress, more alert.  Restless with no clothing on his body.  HEENT: Normocephalic, atraumatic. PERRL. EOMI. No conjunctival injection. No icterus. Oropharynx clear without evidence of thrush or exudate.   HEART: RRR; No murmur, rubs, gallops.   LUNGS: few scattered rhonchi   ABDOMEN: Soft, nontender, nondistended. Positive bowel sounds. No rebound or guarding. NO mass or HSM.  EXT: No cyanosis, clubbing or edema. No cord.  : scrotal wound continues to improve with increased granulation and decreased slough.  There is decreased scrotal wound drainage.  MSK: FROM without joint effusions noted arms/legs.   SKIN: Warm and dry without cutaneous eruptions on Inspection/palpation.   NEURO: more alert but not  oriented.  Right PICC without redness      Laboratory Data      Results from last 7 days  Lab Units 04/15/17  0444 04/13/17  0609 04/12/17  0442   WBC 10*3/mm3 11.16* 11.19* 16.37*   HEMOGLOBIN g/dL 10.4* 11.5* 12.2*   HEMATOCRIT % 34.1* 36.8* 38.0*   PLATELETS 10*3/mm3 377 386 300       Results from last 7 days  Lab Units 04/15/17  0444   SODIUM mmol/L 138   POTASSIUM mmol/L 4.0   CHLORIDE mmol/L 102   TOTAL CO2 mmol/L 30.0   BUN mg/dL 28*   CREATININE mg/dL 0.80   GLUCOSE mg/dL 420*   CALCIUM mg/dL 9.0       Results from last 7 days  Lab Units 04/14/17  0340   ALK PHOS U/L 61   BILIRUBIN mg/dL 0.3   ALT (SGPT) U/L 4*   AST (SGOT) U/L 19           Results from last 7 days  Lab Units 04/13/17  1519   CRP mg/dL 4.33*                 Estimated Creatinine Clearance: 110.6 mL/min (by C-G formula based on Cr of 0.8).    Microbiology:  Blood Culture   Date Value Ref Range Status   03/30/2017 No growth at 5 days  Final   03/30/2017 No growth at 5 days  Final           Urine Culture   Date Value Ref Range Status   04/04/2017 No growth  Preliminary   03/31/2017 No growth  Final   03/30/2017 >100,000 CFU/mL Escherichia coli (A)  Final      scrotal cx x 2 cxs (3/31) E. coli  Cefazolin sens.    Urine Culture   Date Value Ref Range Status   04/11/2017 No growth at 2 days  Final   04/07/2017 >100,000 CFU/mL Candida glabrata (A)  Final         Radiology:  Imaging Results (last 24 hours)     Procedure Component Value Units Date/Time    XR Chest 1 View [[id number]] Updated:  04/15/17 0418        I read his chest x-ray today.  He has pulmonary edema.    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 He is significantly improved.  2. Acute right hemispheric cerebral vascular accident-with left sided facial and left-sided weakness. His head CT angiogram here reveals subtle right temporal abnormalities per Dr. Morrison. He  has clinically improved.  3. Acute toxic metabolic encephalopathy-secondary to sepsis and cerebral vascular accident  4. 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 Severe systolic congestive heart failure-with an ejection fraction of less than 20% on echocardiogram performed on 3/8/17  6. Escherichia coli urinary tract infection  7. Sepsis-secondary to Delisa's gangrene  8. Leukocytosis/neutrophilia-secondary to scrotal gangrene/cellulitis, improved   9.  Pulmonary edema  10. Candiduria-he has a relative contraindication for fluconazole therapy since he is on amiodarone--off amiodarone now.  F/u urine culture negative.  11.  Cardiogenic shock-with pulmonary edema, now improving        PLAN/RECOMMENDATIONS:   1. Cefazolin 2 g IV every 8 hours  2. Flagyl 500 mg IV every 8 hours  3. PT wound care with debridement and pulse lavage    I discussed his very complex situation with his son today.    Kike Leon MD  4/15/2017  3:22 PM

## 2017-04-15 NOTE — PROGRESS NOTES
Acute Care - Wound/Debridement Progress Note  [facility]     Patient Name: Tomas Salvador  : 1955  MRN: [id number]  Today's Date: 4/15/2017  Onset of Illness/Injury or Date of Surgery Date: 17   Date of Referral to PT: 17   Referring Physician: Dr. Morrison       Admit Date: 2017    Visit Dx:    ICD-10-CM ICD-9-CM   1. Dysphagia, unspecified type R13.10 787.20   2. Impaired functional mobility, balance, gait, and endurance Z74.09 V49.89   3. Impaired mobility and ADLs Z74.09 799.89   4. Cognitive communication deficit R41.841 799.52       Patient Active Problem List   Diagnosis   • Coronary disease   • Ischemic cardiomyopathy   • Peripheral vascular disease   • ICD (implantable cardioverter-defibrillator) in place, implantation .   • Dyslipidemia, on atorvastain.    • Sepsis   • Diabetes education, encounter for   • Chronic respiratory failure with hypoxia   • Urinary retention   • Hyperlipidemia   • Essential hypertension   • E. coli UTI (urinary tract infection)   • Epididymo-orchitis   • Encephalopathy acute   • Cellulitis   • Yeast UTI               LDA Wound       04/15/17 1455          Incision 17 1056 other (see comments) scrotum    Incision - Properties Group Placement Date: 17  - Placement Time: 1056JW Side: other (see comments)  - Location: scrotum  -JW    Incision WDL ex  -MC      Dressing Appearance moist drainage   RN replaced sorbact w/conform after BM soiled packing  -MC      Appearance redness;open areas;moist   scant necrotic tissue remaining  -      Drainage Characteristics/Odor serosanguineous  -      Drainage Amount scant  -MC      Wound Cleaning irrigated with;sterile normal saline;cleansed with;other (see comments)   phase one  -      Wound Interventions debrided   swabs to depth of tunneled areas  -MC      Dressing open to air  -MC      Packing other (see comments)   cutimed sorbact ribbons  -        User Key  (r) = Recorded By, (t) =  Taken By, (c) = Cosigned By    Initials Name Provider Type     Rekha Mcgarry PT Physical Therapist    JW Michael Silveira RN Registered Nurse            WOUND DEBRIDEMENT  Debridement Site 1  Location- Site 1: perineal wound  Selective Debridement- Site 1: Wound Surface <20cmsq (2cm2 area)  Instruments- Site 1: tweezers  Excised Tissue Description- Site 1: minimum, slough  Bleeding- Site 1: none                  Adult Rehabilitation Note       04/15/17 1455 04/14/17 1120       Rehab Assessment/Intervention    Discipline physical therapist  -MC physical therapist  -     Document Type therapy note (daily note)  - therapy note (daily note)  -     Subjective Information agree to therapy  -MC agree to therapy  -MC     Recorded by [MC] Rekha Mcgarry PT [MC] Rekha Mcgarry PT     Pain Assessment    Pain Assessment Jay-Kent FACES  - Jay-Baker FACES  -     Pain Score 0  -MC 0  -MC     Post Pain Score 0  -MC 0  -MC     Recorded by [MC] Rekha Mcgarry PT [MC] Rekha Mcgarry PT     Positioning and Restraints    Pre-Treatment Position in bed  - in bed  -     Post Treatment Position bed  - bed  -MC     In Bed supine;call light within reach;encouraged to call for assist;with nsg  - notified nsg;supine;call light within reach;encouraged to call for assist  -     Restraints --   wrist restraints in place, did not release.  - mitten   did not remove restraints  -MC     Recorded by [MC] Rekha Mcgarry PT [MC] Rekha Mcgarry PT       User Key  (r) = Recorded By, (t) = Taken By, (c) = Cosigned By    Initials Name Effective Dates     Rekha Mcgarry PT 03/14/16 -                 IP PT Goals       04/15/17 1455 04/14/17 1120 04/11/17 1015    Bed Mobility PT LTG    Bed Mobility PT LTG, Outcome   goal not met  -LS    Bed Mobility PT LTG, Reason Goal Not Met   medical status inhibits participation  -LS    Transfer Training PT LTG    Transfer Training PT LTG, Outcome   goal not met   -LS    Transfer Training PT LTG, Reason Goal Not Met   medical status inhibits participation  -LS    Gait Training PT LTG    Gait Training Goal PT LTG, Outcome   goal not met  -LS    Gait Training Goal PT LTG, Reason Goal Not Met   medical status inhibits participation  -LS    Wound Care PT LTG    Wound Care PT LTG 1, Outcome goal ongoing  -MC goal ongoing  -MC       04/09/17 1551 04/08/17 1010 04/06/17 1524    Bed Mobility PT LTG    Bed Mobility PT LTG, Outcome goal ongoing  -DR  goal ongoing  -LS    Transfer Training PT LTG    Transfer Training PT LTG, Outcome goal ongoing  -DR  goal ongoing  -LS    Gait Training PT LTG    Gait Training Goal PT LTG, Outcome goal ongoing  -DR  goal ongoing  -LS    Wound Care PT LTG    Wound Care PT LTG 1, Outcome  goal ongoing  -MF       04/06/17 1025 04/05/17 0909       Bed Mobility PT LTG    Bed Mobility PT LTG, Date Established  04/05/17  -LS     Bed Mobility PT LTG, Time to Achieve  2 wks  -LS     Bed Mobility PT LTG, Activity Type  supine to sit/sit to supine  -LS     Bed Mobility PT LTG, A[place]on Level  supervision required  -LS     Transfer Training PT LTG    Transfer Training PT LTG, Date Established  04/05/17  -LS     Transfer Training PT LTG, Time to Achieve  2 wks  -LS     Transfer Training PT LTG, Activity Type  sit to stand/stand to sit  -LS     Transfer Training PT LTG, A[place]on Level  contact guard assist  -LS     Transfer Training PT LTG, Assist Device  walker, rolling  -LS     Gait Training PT LTG    Gait Training Goal PT LTG, Date Established  04/05/17  -LS     Gait Training Goal PT LTG, Time to Achieve  2 wks  -LS     Gait Training Goal PT LTG, Afton Level  contact guard assist  -LS     Gait Training Goal PT LTG, Assist Device  walker, rolling  -LS     Gait Training Goal PT LTG, Distance to Achieve  200  -LS     Wound Care PT LTG    Wound Care PT LTG 1, Date Established 04/06/17  -MC      Wound Care PT LTG 1, Time to Achieve 1 wk  -MC      Wound  Care PT LTG 1, Location Scrotum  -      Wound Care PT LTG 1, No S&S of Infection yes  -      Wound Care PT LTG 1, Decrease Wound Size 10%  -      Wound Care PT LTG 1, Decrease Necrotic Tissue 50%  -      Wound Care PT LTG 1, Decrease Exudate minimum  -      Wound Care PT LTG 1, No New Skin Break Down yes  -      Wound Care PT LTG 1, Education dressing changes;wound care  -      Wound Care PT LTG 1, Education Understanding verbalize understanding  -        User Key  (r) = Recorded By, (t) = Taken By, (c) = Cosigned By    Initials Name Provider Type    MF Pee Roldan, PT Physical Therapist    ANDREWS Juárez, PT Physical Therapist    DUNCAN Mcgarry, PT Physical Therapist    DR Mikey Moore, PT Physical Therapist          Physical Therapy Education     Title: PT OT SLP Therapies (Active)     Topic: Physical Therapy (Active)     Point: Mobility training (Active)    Learning Progress Summary    Learner Readiness Method Response Comment Documented by Status   Patient Acceptance E NR   04/09/17 1551 Active    Acceptance E NR   04/07/17 1534 Active    Acceptance E,D NR   04/06/17 1523 Active    Acceptance E,D NR   04/05/17 0908 Active   Significant Other Acceptance E,D NR   04/06/17 1523 Active    Acceptance E,D NR   04/05/17 0908 Active               Point: Home exercise program (Active)    Learning Progress Summary    Learner Readiness Method Response Comment Documented by Status   Patient Acceptance E NR  EDOUARD 04/07/17 1534 Active    Acceptance E,D NR   04/06/17 1523 Active   Significant Other Acceptance E,D NR   04/06/17 1523 Active               Point: Body mechanics (Active)    Learning Progress Summary    Learner Readiness Method Response Comment Documented by Status   Patient Acceptance E NR   04/09/17 1551 Active    Acceptance E NR  EDOUARD 04/07/17 1534 Active    Acceptance E,D NR   04/06/17 1523 Active    Acceptance E,D NR   04/05/17 0908 Active   Significant Other  Acceptance E,D NR   04/06/17 1523 Active    Acceptance E,D NR   04/05/17 0908 Active               Point: Precautions (Active)    Learning Progress Summary    Learner Readiness Method Response Comment Documented by Status   Patient Acceptance E NR   04/09/17 1551 Active    Acceptance E NR  EDOUARD 04/07/17 1534 Active    Acceptance E,D NR  LS 04/06/17 1523 Active    Acceptance E,D NR  LS 04/05/17 0908 Active   Significant Other Acceptance E,D NR  LS 04/06/17 1523 Active    Acceptance E,D NR   04/05/17 0908 Active                      User Key     Initials Effective Dates Name Provider Type Discipline     06/19/15 -  Anita Prado, PT Physical Therapist PT     06/19/15 -  Lily Juárez, PT Physical Therapist PT     09/06/16 -  Mikey Moore, PT Physical Therapist PT                   PT ASSESSMENT (last 72 hours)      PT Evaluation       04/15/17 1455 04/14/17 1600    Rehab Evaluation    Document Type therapy note (daily note)  -     Subjective Information agree to therapy  -     Pain Assessment    Pain Assessment Jay-Kent FACES  -     Pain Score 0  -     Post Pain Score 0  -     Muscle Tone Assessment    Muscle Tone Assessment  Bilateral Upper Extremities;Bilateral Lower Extremities  -BG    Sensory Assessment/Intervention    Light Touch  LUE;RUE;LLE;RLE  -BG    Positioning and Restraints    Pre-Treatment Position in bed  -     Post Treatment Position bed  -     In Bed supine;call light within reach;encouraged to call for assist;with nsg  -     Restraints --   wrist restraints in place, did not release.  -       04/14/17 1200 04/14/17 1120    Rehab Evaluation    Document Type  therapy note (daily note)  -    Subjective Information  agree to therapy  -    Pain Assessment    Pain Assessment  Jay-Kent FACES  -    Pain Score  0  -MC    Post Pain Score  0  -    Muscle Tone Assessment    Muscle Tone Assessment Bilateral Upper Extremities;Bilateral Lower Extremities  -BG      Sensory Assessment/Intervention    Light Touch LUE;RUE;LLE;RLE  -BG     Positioning and Restraints    Pre-Treatment Position  in bed  -    Post Treatment Position  bed  -    In Bed  notified nsg;supine;call light within reach;encouraged to call for assist  -    Restraints  mitten   did not remove restraints  -      04/14/17 0800       Muscle Tone Assessment    Muscle Tone Assessment Bilateral Upper Extremities;Bilateral Lower Extremities  -BG     Sensory Assessment/Intervention    Light Touch LUE;RUE;LLE;RLE  -BG       User Key  (r) = Recorded By, (t) = Taken By, (c) = Cosigned By    Initials Name Provider Type    DUNCAN Mcgarry, PT Physical Therapist    BG Shellie Cedillo, [place] Registered Nurse            PT Recommendation and Plan  Anticipated Discharge Disposition: inpatient rehabilitation facility  PT Frequency: daily    Plan Of Care Reviewed With: patient   Progress: progress toward functional goals as expected   Outcome Summary/Follow up Plan: Very little necrotic tissue remaining in open areas of scrotum at this time. Pt appears to be improving with the current POC. Discussed changing packing after BMs with RN. Left extra supplies, including Phase One wound cleanser to decrease bioburden if fecal matter enters the wound bed.            Time Calculation        PT Charges       04/15/17 1455          Time Calculation    Start Time 1455  -      PT Goal Re-Cert Due Date 04/25/17  -        User Key  (r) = Recorded By, (t) = Taken By, (c) = Cosigned By    Initials Name Provider Type    DUNCAN Mcgarry PT Physical Therapist             Therapy Charges for Today     Code Description Service Date Service Provider Modifiers Qty    55173788680 HC ROSEANNA DEBRIDE OPEN WOUND UP TO 20CM 4/14/2017 Rekha Mcgarry, PT GP 1    [id number] HC ROSEANNA DEBRIDE OPEN WOUND UP TO 20CM 4/15/2017 Rekha Mcgarry, PT GP 1            PT G-Codes  Outcome Measure Options: AM-PAC 6 Clicks Basic Mobility  (PT)        Rekha Mcgarry, PT  4/15/2017

## 2017-04-15 NOTE — PROGRESS NOTES
"Critical Care Note     LOS: 11 days   Patient Care Team:  Jose Navarro MD as PCP - General (Family Medicine)    Chief Complaint/Reason for visit: Sepsis, severe ICM      Subjective   61-year-old gentleman with severe ischemic CM, DM, hospitalized at [place] for one week with a scrotal cellulitis, status post debridement. He was brought to [facility] for a possible stroke. Stroke workup showed old left frontal.      Interval History:   Since Precedex stopped his heart rate has increased to 100, 120 bpm. He is restless and restrained. He does remain afebrile. He continues to require BiPAP for oxygenation and increased respiratory rate.    Review of Systems:    All systems were reviewed and negative except as noted in subjective.unable, patient not following commands    Medical history, surgical history, social history, family history reviewed    Objective     Intake/Output:    Intake/Output Summary (Last 24 hours) at 04/15/17 1727  Last data filed at 04/15/17 1400   Gross per 24 hour   Intake          2529.67 ml   Output             1350 ml   Net          1179.67 ml       Nutrition: TPN;  Trophic feeding via corpack    Infusions:    Adult Central 2-in-1 TPN  Last Rate: 70 mL/hr at 04/15/17 1400   Adult Central 2-in-1 TPN     DOPamine 2-20 mcg/kg/min Last Rate: 4 mcg/kg/min (04/14/17 0812)   Pharmacy to Dose TPN     phenylephrine 0.5-3 mcg/kg/min Last Rate: Stopped (04/14/17 0048)       Respiratory: BiPAP 18/8 rate 12 50%       Telemetry: SB/SR    Vital Signs  Blood pressure 143/85, pulse 103, temperature 97.4 °F (36.3 °C), temperature source Axillary, resp. rate 24, height 69\" (175.3 cm), weight 216 lb 7.9 oz (98.2 kg), SpO2 92 %.    Physical Exam:  General Appearance:   ill-appearing older gentleman    Head:  NC/AT   Eyes:          ROMI EOMI no jaundice   Ears:     Throat:  BiPAP in place   Neck:  supple, trachea midline    Back:      Lungs:    bilateral breath sounds with rhonchi anteriorly, " diminished at the bases. Symmetric chest excursion     Heart:   distant heart sounds, regular, PMI displaced laterally    Abdomen:    hypoactive bowel sounds, soft, nontender, no organomegaly  Scrotum with edema, erythema and induration. Wound packed   Rectal:   Deferred   Extremities:  no pitting edema or cyanosis    Pulses:  weak pulses left foot, no palpable pulses right foot but extremity is warm to touch    Skin:  cool and dry    Lymph nodes:    Neurologic:   Awake, restrained, trying to pull at gown, sheets. Follows commands intermittently      Results Review:     I reviewed the patient's new clinical results.     Results from last 7 days  Lab Units 04/15/17  0444 04/14/17  0340 04/13/17  0609  04/10/17  2006 04/10/17  0458   SODIUM mmol/L 138 136 133  < > 138 140   POTASSIUM mmol/L 4.0 4.3 3.9  < > 4.2 3.7   CHLORIDE mmol/L 102 102 96*  < > 102 102   TOTAL CO2 mmol/L 30.0 30.0 28.0  < > 29.0 32.0*   BUN mg/dL 28* 16 18  < > 21 20   CREATININE mg/dL 0.80 0.60 0.60  < > 0.70 0.80   CALCIUM mg/dL 9.0 8.9 9.0  < > 9.3 8.8   BILIRUBIN mg/dL  --  0.3  --   --  0.4 0.3   ALK PHOS U/L  --  61  --   --  74 66   ALT (SGPT) U/L  --  4*  --   --  14 11   AST (SGOT) U/L  --  19  --   --  70* 34*   GLUCOSE mg/dL 420* 200* 140*  < > 97 91   < > = values in this interval not displayed.    Results from last 7 days  Lab Units 04/15/17  0444 04/13/17  0609 04/12/17  0442   WBC 10*3/mm3 11.16* 11.19* 16.37*   HEMOGLOBIN g/dL 10.4* 11.5* 12.2*   HEMATOCRIT % 34.1* 36.8* 38.0*   PLATELETS 10*3/mm3 377 386 300         Lab Results   Component Value Date    BLOODCX No growth at 5 days 03/30/2017     Lab Results   Component Value Date    URINECX No growth at 2 days 04/11/2017   Scrotal wound E. coli    I reviewed the patient's new imaging including images and reports.    Chest x-ray today reveals markedly worse edema particularly the right lung but bilateral. ICD present in the left subclavian with significant cardiomegaly, bilateral  pleural effusions.            All medications reviewed.     apixaban 5 mg Oral Q12H   aspirin 300 mg Rectal Daily   carvedilol 6.25 mg Oral Q12H   ceFAZolin 2 g Intravenous Q8H   fentaNYL 1 patch Transdermal Q72H   furosemide 40 mg Intravenous Daily   heparin flush (porcine) 500 Units Intracatheter Q12H   insulin detemir 10 Units Subcutaneous Nightly   insulin regular 0-14 Units Subcutaneous Q6H   ipratropium-albuterol 3 mL Nebulization 4x Daily - RT   metroNIDAZOLE 500 mg Intravenous Q6H   pantoprazole 40 mg Intravenous Q AM   saccharomyces boulardii 250 mg Oral BID         Assessment/Plan     Principal Problem:    Sepsis  Active Problems:    E. coli UTI (urinary tract infection)    Yeast UTI    Encephalopathy acute    Cellulitis, scrotum, perineum, E.coli    Diabetes mellitus, type 2    ICM EF 10%       NSTEMI 5/15 occl LAD, 99% circ, occl SVG to Dz, patent SVG toOM1, patent LIMA to LAD.   Single chamber AICD 12/15, CABG 3/2005    Chronic respiratory failure with hypoxia    Urinary retention    Epididymo-orchitis    61-year-old gentleman with a severe ischemic cardiomyopathy, AICD, ejection fraction of 10%, diabetes mellitus type II, who developed a scrotal abscess requiring I&D in [place] on 3/31. Cultures were positive for Escherichia coli. He was in the [facility] for approximately one week and developed some left-sided weakness and altered mentation. He was transported to [facility] for stroke. No acute stroke was found on CT of the head or CT perfusion. No intervention was done.   He developed atrial fibrillation with a rapid ventricular response and was placed on amiodarone drip. He converted to SR.  Echocardiogram confirms a dilated left ventricular cavity  with severe dysfunction LVEF 10%. No comment as to whether thrombus is seen.  He was placed on a heparin drip for 24 hours and transition to oral Eliquis.   However, his blood pressure then dropped to 75-85 systolic, heart rate dropped into the 40s  and dopamine, phenylephrine initiated. Precedex was added for agitation.  Coreg, amiodarone, Entresto were stopped. On 4/11/17. Finally with his persistent bradycardia and hypotension Precedex was stopped. Blood pressure did improve and now heart rate is in the 100s off dopamine and Bautista-Synephrine. He did receive a dose of furosemide this morning. However despite this his chest x-ray shows worsening congestive heart failure. Coreg was restarted for heart rate control.     Infectious disease evaluated and changed antibiotics to cefazolin and Flagyl for his Escherichia coli scrotal infection on admit here.  Urine culture was growing greater than 100,000 Candida glabrata, on admission. Infectious disease notified. Colbert DCed. He developed urinary retention with over a liter of urine in his bladder. He continues to require in and out catheterization. With his scrotal and penile edema, I&O cath difficult, colbert reanchored.  In addition, strict I&O needed to monitor his CHF. Repeat culture was negative.     Blood sugars are running 170-220 now that nutrition started,  on sliding scale insulin; Levemir.  Corpak was placed in radiology yesterday and trophic feedings started.  He also started TPN because of inconsistent feeding tolerances and possible ileus on KUB.       PLAN:  Limited resuscitation, still full support  Resume Coreg  More diuresis  Continue BiPAP  Aspirin, Eliquis  Ancef, Flagyl  Wound care  Increase long-acting insulin  Decrease TPN ans increase TF    VTE Prophylaxis:Eliquis    Stress Ulcer Prophylaxis:Protonix    Ella Pope MD  04/15/17  5:27 PM      Time: 40 min  I personally provided care to this critically ill patient as documented above.  Critical care time does not include time spent on separately billed procedures.  Non of my critical care time was concurrent with other critical care providers.

## 2017-04-15 NOTE — PLAN OF CARE
Problem: Patient Care Overview (Adult)  Goal: Plan of Care Review  Outcome: Ongoing (interventions implemented as appropriate)    04/15/17 1455   Coping/Psychosocial Response Interventions   Plan Of Care Reviewed With patient   Patient Care Overview   Progress progress toward functional goals as expected   Outcome Evaluation   Outcome Summary/Follow up Plan Very little necrotic tissue remaining in open areas of scrotum at this time. Pt appears to be improving with the current POC. Discussed changing packing after BMs with RN. Left extra supplies, including Phase One wound cleanser to decrease bioburden if fecal matter enters the wound bed.         Problem: Inpatient Physical Therapy  Goal: Wound Care Goal 1 LTG- PT  Outcome: Ongoing (interventions implemented as appropriate)    04/06/17 1025 04/15/17 1455   Wound Care PT LTG   Wound Care PT LTG 1, Date Established 04/06/17 --    Wound Care PT LTG 1, Time to Achieve 1 wk --    Wound Care PT LTG 1, Location Scrotum --    Wound Care PT LTG 1, No S&S of Infection yes --    Wound Care PT LTG 1, Decrease Wound Size 10% --    Wound Care PT LTG 1, Decrease Necrotic Tissue 50% --    Wound Care PT LTG 1, Decrease Exudate minimum --    Wound Care PT LTG 1, No New Skin Break Down yes --    Wound Care PT LTG 1, Education dressing changes;wound care --    Wound Care PT LTG 1, Education Understanding verbalize understanding --    Wound Care PT LTG 1, Outcome --  goal ongoing

## 2017-04-16 NOTE — PROGRESS NOTES
Acute Care - Wound/Debridement Treatment Note  [facility]     Patient Name: Tomas Salvador  : 1955  MRN: [id number]  Today's Date: 2017  Onset of Illness/Injury or Date of Surgery Date: 17   Date of Referral to PT: 17   Referring Physician: Dr. Morrison       Admit Date: 2017    Visit Dx:    ICD-10-CM ICD-9-CM   1. Dysphagia, unspecified type R13.10 787.20   2. Impaired functional mobility, balance, gait, and endurance Z74.09 V49.89   3. Impaired mobility and ADLs Z74.09 799.89   4. Cognitive communication deficit R41.841 799.52       Patient Active Problem List   Diagnosis   • Coronary disease   • Ischemic cardiomyopathy   • Peripheral vascular disease   • ICD (implantable cardioverter-defibrillator) in place, implantation .   • Dyslipidemia, on atorvastain.    • Sepsis   • Diabetes education, encounter for   • Chronic respiratory failure with hypoxia   • Urinary retention   • Hyperlipidemia   • Essential hypertension   • E. coli UTI (urinary tract infection)   • Epididymo-orchitis   • Encephalopathy acute   • Cellulitis   • Yeast UTI               LDA Wound       17 1450          Incision 17 1056 other (see comments) scrotum    Incision - Properties Group Placement Date: 17  - Placement Time: 1056JW Side: other (see comments)  - Location: scrotum  -JW    Incision WDL ex  -MC      Dressing Appearance moist drainage;intact  -MC      Appearance open areas;pink;redness;drainage;moist   min slough remaining in larger open area  -      Drainage Characteristics/Odor serosanguineous  -      Drainage Amount small  -MC      Wound Cleaning irrigated with;sterile normal saline  -      Wound Interventions debrided;pulsatile high pressure lavage   500mL Nsaline, fan and tunnel tip  -      Dressing open to air  -MC      Packing Incision packed with;other (see comments)   cutimed sorbact ribbons  -        User Key  (r) = Recorded By, (t) = Taken By, (c) =  Cosigned By    Initials Name Provider Type     Rekha Mcgarry, PT Physical Therapist    JW Michael Silveira RN Registered Nurse            WOUND DEBRIDEMENT  Debridement Site 1  Location- Site 1: perineal wound  Selective Debridement- Site 1: Wound Surface <20cmsq (2cm2 area)  Instruments- Site 1: tweezers, scissors  Excised Tissue Description- Site 1: minimum, slough  Bleeding- Site 1: none                  Adult Rehabilitation Note       04/16/17 1450 04/15/17 1455 04/14/17 1120    Rehab Assessment/Intervention    Discipline physical therapist  -MC physical therapist  -MC physical therapist  -    Document Type therapy note (daily note)  - therapy note (daily note)  - therapy note (daily note)  -    Subjective Information agree to therapy  -MC agree to therapy  -MC agree to therapy  -MC    Recorded by [MC] Rekha Mcgarry, PT [MC] Rekha Mcgarry, PT [MC] Rekha Mcgarry, PT    Pain Assessment    Pain Assessment Jay-Kent FACES  - Jay-Baker FACES  - Jay-Baker FACES  -    Pain Score 0  -MC 0  -MC 0  -MC    Post Pain Score 0  -MC 0  -MC 0  -MC    Recorded by [MC] Rekha Mcgarry PT [MC] Rekha Mcgarry, PT [] Rekha Mcgarry PT    Cognitive Assessment/Intervention    Current Cognitive/Communication Assessment impaired  -      Orientation Status oriented to;person  -      Follows Commands/Answers Questions 75% of the time;able to follow single-step instructions;needs repetition  -MC      Recorded by [MC] Rekha Mcgarry PT      Positioning and Restraints    Pre-Treatment Position in bed  - in bed  - in bed  -    Post Treatment Position bed  - bed  - bed  -    In Bed supine;call light within reach;encouraged to call for assist;notified nsg  - supine;call light within reach;encouraged to call for assist;with nsg  -MC notified nsg;supine;call light within reach;encouraged to call for assist  -    Restraints  --   wrist restraints in place, did not release.  -  mitsimi   did not remove restraints  -MC    Recorded by [MC] Rekha Mcgarry, PT [MC] Rekha Mcgarry, PT [MC] Rekha Mcgarry, PT      User Key  (r) = Recorded By, (t) = Taken By, (c) = Cosigned By    Initials Name Effective Dates    MC Rekha Mcgarry, PT 03/14/16 -                 IP PT Goals       04/16/17 1450 04/15/17 1455 04/14/17 1120    Wound Care PT LTG    Wound Care PT LTG 1, Outcome goal ongoing  -MC goal ongoing  -MC goal ongoing  -MC      04/11/17 1015 04/09/17 1551 04/08/17 1010    Bed Mobility PT LTG    Bed Mobility PT LTG, Outcome goal not met  -LS goal ongoing  -DR     Bed Mobility PT LTG, Reason Goal Not Met medical status inhibits participation  -LS      Transfer Training PT LTG    Transfer Training PT LTG, Outcome goal not met  -LS goal ongoing  -DR     Transfer Training PT LTG, Reason Goal Not Met medical status inhibits participation  -LS      Gait Training PT LTG    Gait Training Goal PT LTG, Outcome goal not met  -LS goal ongoing  -DR     Gait Training Goal PT LTG, Reason Goal Not Met medical status inhibits participation  -LS      Wound Care PT LTG    Wound Care PT LTG 1, Outcome   goal ongoing  -MF      04/06/17 1524 04/06/17 1025 04/05/17 0909    Bed Mobility PT LTG    Bed Mobility PT LTG, Date Established   04/05/17  -LS    Bed Mobility PT LTG, Time to Achieve   2 wks  -LS    Bed Mobility PT LTG, Activity Type   supine to sit/sit to supine  -LS    Bed Mobility PT LTG, Mellette Level   supervision required  -LS    Bed Mobility PT LTG, Outcome goal ongoing  -LS      Transfer Training PT LTG    Transfer Training PT LTG, Date Established   04/05/17  -LS    Transfer Training PT LTG, Time to Achieve   2 wks  -LS    Transfer Training PT LTG, Activity Type   sit to stand/stand to sit  -LS    Transfer Training PT LTG, Mellette Level   contact guard assist  -LS    Transfer Training PT LTG, Assist Device   walker, rolling  -LS    Transfer Training PT LTG, Outcome goal ongoing  -LS       Gait Training PT LTG    Gait Training Goal PT LTG, Date Established   04/05/17  -    Gait Training Goal PT LTG, Time to Achieve   2 wks  -LS    Gait Training Goal PT LTG, [place] Level   contact guard assist  -LS    Gait Training Goal PT LTG, Assist Device   walker, rolling  -LS    Gait Training Goal PT LTG, Distance to Achieve   200  -LS    Gait Training Goal PT LTG, Outcome goal ongoing  -LS      Wound Care PT LTG    Wound Care PT LTG 1, Date Established  04/06/17  -     Wound Care PT LTG 1, Time to Achieve  1 wk  -     Wound Care PT LTG 1, Location  Scrotum  -     Wound Care PT LTG 1, No S&S of Infection  yes  -     Wound Care PT LTG 1, Decrease Wound Size  10%  -     Wound Care PT LTG 1, Decrease Necrotic Tissue  50%  -     Wound Care PT LTG 1, Decrease Exudate  minimum  -     Wound Care PT LTG 1, No New Skin Break Down  yes  -     Wound Care PT LTG 1, Education  dressing changes;wound care  -     Wound Care PT LTG 1, Education Understanding  verbalize understanding  -       User Key  (r) = Recorded By, (t) = Taken By, (c) = Cosigned By    Initials Name Provider Type    MF Pee Roldan, PT Physical Therapist    LS Lily Juárez, PT Physical Therapist    DUNCAN Mcgarry, PT Physical Therapist    DR Mikey Moore, PT Physical Therapist          Physical Therapy Education     Title: PT OT SLP Therapies (Active)     Topic: Physical Therapy (Active)     Point: Mobility training (Active)    Learning Progress Summary    Learner Readiness Method Response Comment Documented by Status   Patient Acceptance E NR   04/09/17 1551 Active    Acceptance E NR  EDOUARD 04/07/17 1534 Active    Acceptance E,D NR  LS 04/06/17 1523 Active    Acceptance E,D NR  LS 04/05/17 0908 Active   Significant Other Acceptance E,D NR  LS 04/06/17 1523 Active    Acceptance E,D NR  LS 04/05/17 0908 Active               Point: Home exercise program (Active)    Learning Progress Summary    Learner Readiness Method  Response Comment Documented by Status   Patient Acceptance E NR  EDOUARD 04/07/17 1534 Active    Acceptance E,D NR  LS 04/06/17 1523 Active   Significant Other Acceptance E,D NR  LS 04/06/17 1523 Active               Point: Body mechanics (Active)    Learning Progress Summary    Learner Readiness Method Response Comment Documented by Status   Patient Acceptance E NR  DR 04/09/17 1551 Active    Acceptance E NR  EDOUARD 04/07/17 1534 Active    Acceptance E,D NR  LS 04/06/17 1523 Active    Acceptance E,D NR  LS 04/05/17 0908 Active   Significant Other Acceptance E,D NR  LS 04/06/17 1523 Active    Acceptance E,D NR  LS 04/05/17 0908 Active               Point: Precautions (Active)    Learning Progress Summary    Learner Readiness Method Response Comment Documented by Status   Patient Acceptance E NR  DR 04/09/17 1551 Active    Acceptance E NR  EODUARD 04/07/17 1534 Active    Acceptance E,D NR  LS 04/06/17 1523 Active    Acceptance E,D NR  LS 04/05/17 0908 Active   Significant Other Acceptance E,D NR  LS 04/06/17 1523 Active    Acceptance E,D NR  LS 04/05/17 0908 Active                      User Key     Initials Effective Dates Name Provider Type Discipline    EDOUARD 06/19/15 -  Anita Prado, PT Physical Therapist PT     06/19/15 -  Lily Juárez, PT Physical Therapist PT     09/06/16 -  Mikey Moore, PT Physical Therapist PT                   PT ASSESSMENT (last 72 hours)      PT Evaluation       04/16/17 1450 04/15/17 1455    Rehab Evaluation    Document Type therapy note (daily note)  -MC therapy note (daily note)  -    Subjective Information agree to therapy  - agree to therapy  -    Pain Assessment    Pain Assessment Jay-Kent FACES  - Jay-Baker FACES  -MC    Pain Score 0  -MC 0  -MC    Post Pain Score 0  -MC 0  -MC    Cognitive Assessment/Intervention    Current Cognitive/Communication Assessment impaired  -MC     Orientation Status oriented to;person  -MC     Follows Commands/Answers Questions 75% of the  time;able to follow single-step instructions;needs repetition  -     Positioning and Restraints    Pre-Treatment Position in bed  - in bed  -MC    Post Treatment Position bed  - bed  -MC    In Bed supine;call light within reach;encouraged to call for assist;notified nsg  - supine;call light within reach;encouraged to call for assist;with nsg  -    Restraints  --   wrist restraints in place, did not release.  -      04/14/17 1600 04/14/17 1200    Muscle Tone Assessment    Muscle Tone Assessment Bilateral Upper Extremities;Bilateral Lower Extremities  -BG Bilateral Upper Extremities;Bilateral Lower Extremities  -BG    Sensory Assessment/Intervention    Light Touch LUE;RUE;LLE;RLE  -BG LUE;RUE;LLE;RLE  -BG      04/14/17 1120 04/14/17 0800    Rehab Evaluation    Document Type therapy note (daily note)  -     Subjective Information agree to therapy  -     Pain Assessment    Pain Assessment Jay-Kent FACES  -     Pain Score 0  -     Post Pain Score 0  -     Muscle Tone Assessment    Muscle Tone Assessment  Bilateral Upper Extremities;Bilateral Lower Extremities  -BG    Sensory Assessment/Intervention    Light Touch  LUE;RUE;LLE;RLE  -BG    Positioning and Restraints    Pre-Treatment Position in bed  -MC     Post Treatment Position bed  -     In Bed notified nsg;supine;call light within reach;encouraged to call for assist  -     Restraints mitten   did not remove restraints  -       User Key  (r) = Recorded By, (t) = Taken By, (c) = Cosigned By    Initials Name Provider Type    DUNCAN Mcgarry, PT Physical Therapist    BG Shellie Cedillo RN Registered Nurse            PT Recommendation and Plan  Anticipated Discharge Disposition: inpatient rehabilitation facility  PT Frequency: daily    Plan Of Care Reviewed With: patient, spouse   Progress: progress toward functional goals as expected   Outcome Summary/Follow up Plan: Pt continues to have red/pink areas to most of open wounds around  the scrotum. Pt will continue to benefit from current POC to facilitate healing. Pt may require adjustment in dressings if he starts having loose BMs with tube feeds.            Time Calculation        PT Charges       04/16/17 1450          Time Calculation    Start Time 1450  -      PT Goal Re-Cert Due Date 04/25/17  -        User Key  (r) = Recorded By, (t) = Taken By, (c) = Cosigned By    Initials Name Provider Type     Rekha Mcgarry, PT Physical Therapist             Therapy Charges for Today     Code Description Service Date Service Provider Modifiers Qty    76489189683 HC ROSEANNA DEBRIDE OPEN WOUND UP TO 20CM 4/15/2017 Rekha Mcgarry, PT GP 1    [id number] HC ROSEANNA DEBRIDE OPEN WOUND UP TO 20CM 4/16/2017 Rekha Mcgarry, PT GP 1            PT G-Codes  Outcome Measure Options: AM-PAC 6 Clicks Basic Mobility (PT)        Rekha Mcgarry, PT  4/16/2017

## 2017-04-16 NOTE — PROGRESS NOTES
"[facility] Progress Note    Admission Date: 4/4/2017    Tomas Salvador  1955  [id number]    Date: 4/16/2017    Meds:    IV Anti-Infectives     Ordered     Dose/Rate Route Frequency Start Stop    04/04/17 1723  metroNIDAZOLE (FLAGYL) IVPB 500 mg     Ordering Provider:  Kike Leon MD    500 mg  100 mL/hr over 60 Minutes Intravenous Every 6 Hours 04/04/17 1800      04/04/17 1552  ceFAZolin in dextrose (ANCEF) IVPB solution 2 g     Ordering Provider:  TRESA Baca    2 g  over 30 Minutes Intravenous Every 8 Hours 04/04/17 1600            CC:  Delisa's gangrene    SUBJECTIVE:  4/4/17:Patient is a 61 y.o. 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 He has a history of underlying diabetes mellitus and severe systolic congestive heart failure. He presented to [facility] on 3/30 with scrotal cellulitis/gangrene. He underwent debridement by urology at [facility] on 3/31 and was treated with Merrem/clindamycin/vancomycin. He developed left facial droop and left sided paresis, prompting a transfer to [facility] for evaluation and therapy of acute stroke. His head CT angiogram was degraded by motion artifact but per Dr. Morrison there was suggestion of right inferior temporal lobe\" opacification\" consistent with a possible stroke. His left facial droop and left-sided paresis has improved today. He is encephalopathic and is unable to provide any reliable history. His wife thinks that he may have had some fevers prior to his admission on 3/30.    4/5/17: afebrile. No hx per patient secondary to encephalopathy.  Afebrile, restless per nsg staff.  Oliguric UOP.  No n/v/d.  No rashes.  4/6/17: More alert and less restless today.  Still non-responsive to questions and does not follow simple commands.  Still with left-sided weakness.  Afebrile.  Still with oliguric UOP.  No n/v/d, no rashes, per nsg staff notes.   4/7/17:  Alert today.  No " fever,chills,sweats.  Wife at bedside with ?s   4/10/17:  Wants to go home.  Wound care per PT.  No n/v/d.   17:  Now on Dopamine and Bautista, bipap.  Decompensated last pm.    17:  Somnolent; still on pressors   17 Still on pressors/Precedex; Now on bipap.  Had pulled corpak out night before.    17: Per RN staff notes: afebrile, on/off precedex for agitation, on dopamine, on flagyl and cefazolin, on and off bipap.  Can not keep clothes on him.  Attempted to do Is and Os but too difficult without his foreskin getting pulled into meatus, so therefore, just placed Cueto cath.  Not anchored, because he keeps trying to pull it out.  Would is getting daily misted by PT and packed.  He is more alert today, but still not oriented.  4/15/17: He is improved today and off of vasopressor therapy.  He has been receiving some diuretic therapy for pulmonary edema.  He is less confused.  He remains afebrile.  17: He is continued to receive diuretic therapy.  He remains off of vasopressor therapy.  He has remained afebrile.      PE:   Vital Signs  Temp (24hrs), Av.1 °F (36.7 °C), Min:97.7 °F (36.5 °C), Max:98.7 °F (37.1 °C)    Temp  Min: 97.7 °F (36.5 °C)  Max: 98.7 °F (37.1 °C)  BP  Min: 110/72  Max: 150/92  Pulse  Min: 102  Max: 118  Resp  Min: 18  Max: 24  SpO2  Min: 46 %  Max: 100 %    GENERAL:   He is in no acute distress, more alert.  Restless with no clothing on his body.  HEENT: Normocephalic, atraumatic. PERRL. EOMI. No conjunctival injection. No icterus. Oropharynx clear without evidence of thrush or exudate.   HEART: RRR; No murmur, rubs, gallops.   LUNGS: few scattered rhonchi   ABDOMEN: Soft, nontender, nondistended. Positive bowel sounds. No rebound or guarding. NO mass or HSM.  EXT: No cyanosis, clubbing or edema. No cord.  : The lower, posterior midline scrotal wounds are continuing to improve with decreased slough and drainage.  A Cueto catheter is in place but this is not anchored.  MSK:  FROM without joint effusions noted arms/legs.   SKIN: Warm and dry without cutaneous eruptions on Inspection/palpation.   NEURO: more alert but not oriented.  Right PICC without redness      Laboratory Data      Results from last 7 days  Lab Units 04/15/17  0444 04/13/17  0609 04/12/17  0442   WBC 10*3/mm3 11.16* 11.19* 16.37*   HEMOGLOBIN g/dL 10.4* 11.5* 12.2*   HEMATOCRIT % 34.1* 36.8* 38.0*   PLATELETS 10*3/mm3 377 386 300       Results from last 7 days  Lab Units 04/16/17  0448   SODIUM mmol/L 138   POTASSIUM mmol/L 3.7   CHLORIDE mmol/L 101   TOTAL CO2 mmol/L 33.0*   BUN mg/dL 32*   CREATININE mg/dL 0.70   GLUCOSE mg/dL 346*   CALCIUM mg/dL 9.3       Results from last 7 days  Lab Units 04/14/17  0340   ALK PHOS U/L 61   BILIRUBIN mg/dL 0.3   ALT (SGPT) U/L 4*   AST (SGOT) U/L 19           Results from last 7 days  Lab Units 04/13/17  1519   CRP mg/dL 4.33*                 Estimated Creatinine Clearance: 126.4 mL/min (by C-G formula based on Cr of 0.7).    Microbiology:  Blood Culture   Date Value Ref Range Status   03/30/2017 No growth at 5 days  Final   03/30/2017 No growth at 5 days  Final           Urine Culture   Date Value Ref Range Status   04/04/2017 No growth  Preliminary   03/31/2017 No growth  Final   03/30/2017 >100,000 CFU/mL Escherichia coli (A)  Final      scrotal cx x 2 cxs (3/31) E. coli  Cefazolin sens.    Urine Culture   Date Value Ref Range Status   04/11/2017 No growth at 2 days  Final   04/07/2017 >100,000 CFU/mL Candida glabrata (A)  Final         Radiology:  Imaging Results (last 24 hours)     Procedure Component Value Units Date/Time    XR Chest 1 View [[id number]] Collected:  04/15/17 1838     Updated:  04/15/17 1838    Narrative:          EXAMINATION: XR CHEST, SINGLE VIEW - 04/15/2017     INDICATION:  R13.10-Dysphagia, unspecified; Z74.09-Other reduced  mobility; R41.841-Cognitive communication deficit.     COMPARISON: 4/14/2017.      FINDINGS: Portable chest reveals the heart to be  enlarged. Increased  pulmonary vascularity bilaterally. Lines and tubes remain unchanged.  Small left pleural effusion. No pneumothorax. No new focal parenchymal  opacification present.        Impression:       Stable chest with no change in the increased pulmonary  vascularity.     DICTATED:     04/15/2017  EDITED:         04/15/2017       XR Chest 1 View [[id number]] Collected:  04/16/17 0852     Updated:  04/16/17 0853    Narrative:          EXAMINATION: XR CHEST 1 VW-      INDICATION: chf; R13.10-Dysphagia, unspecified; Z74.09-Other reduced  mobility; Z74.09-Other reduced mobility; R41.841-Cognitive communication  deficit      COMPARISON: 04/15/2017     FINDINGS: Portable chest reveals low lung volumes.     Heart is enlarged. Increased pulmonary vascularity seen bilaterally  unchanged in the interval. Ill-defined opacification seen at the left  lung base is small right pleural effusion. Patient status post median  sternotomy. Lines and tubes in satisfactory position.           Impression:       Stable chest as above.              I readHis 4/16 chest x-ray.  He has decreased pulmonary edema.    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 He is significantly improved.  2. Acute right hemispheric cerebral vascular accident-with left sided facial and left-sided weakness. His head CT angiogram here reveals subtle right temporal abnormalities per Dr. Morrison. He has clinically improved.  3. Acute toxic metabolic encephalopathy-secondary to sepsis and cerebral vascular accident  4. 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 Severe systolic congestive heart failure-with an ejection fraction of less than 20% on echocardiogram performed on 3/8/17  6. Escherichia coli urinary tract infection  7. Sepsis-secondary to Delisa's gangrene  8.  Leukocytosis/neutrophilia-secondary to scrotal gangrene/cellulitis, improved   9.  Pulmonary edema-improving  10. Candiduria-this resolved with Cueto catheter removal.  11.  Cardiogenic shock-with pulmonary edema, now significantly improved.         PLAN/RECOMMENDATIONS:   1. Cefazolin 2 g IV every 8 hours  2. Flagyl 500 mg IV every 8 hours  3.  Continue wound care  4.  I may be able to switch him to oral antibiotic therapy today.    I discussed his very complex situation with his wife today.    Kike Leon MD  4/16/2017  11:13 AM

## 2017-04-16 NOTE — PROGRESS NOTES
"Critical Care Note     LOS: 12 days   Patient Care Team:  Jose Navarro MD as PCP - General (Family Medicine)    Chief Complaint/Reason for visit: Sepsis, severe ICM      Subjective   61-year-old gentleman with severe ischemic CM, DM, hospitalized at [place] for one week with a scrotal cellulitis, status post debridement. He was brought to [facility] for a possible stroke. Stroke workup showed old left frontal.  Has an ICM with EF 10%, PAF now SR. Amiodarone stopped for bradycardia.     Interval History:   He received 1 mg of Ativan last night and actually rested for the first time. Precedex caused bradycardia and hypotension and was stopped. Now is tachycardic. Remains in SR.  He remains on BiPAP. Excellent diuresis yesterday with Lasix.    Review of Systems:    All systems were reviewed and negative except as noted in subjective.unable, patient not following commands    Medical history, surgical history, social history, family history reviewed    Objective     Intake/Output:    Intake/Output Summary (Last 24 hours) at 04/16/17 0930  Last data filed at 04/16/17 0545   Gross per 24 hour   Intake          2792.67 ml   Output             2525 ml   Net           267.67 ml       Nutrition: TPN plus trophic TF Peptamin AF    Infusions:    Adult Central 2-in-1 TPN  Last Rate: 70 mL/hr at 04/15/17 1843   Pharmacy to Dose TPN     phenylephrine 0.5-3 mcg/kg/min Last Rate: Stopped (04/14/17 0048)       Respiratory: BiPAP 18/8 rate 12 50%       Telemetry: SR/ST    Vital Signs  Blood pressure 128/80, pulse 106, temperature 97.8 °F (36.6 °C), temperature source Axillary, resp. rate 24, height 69\" (175.3 cm), weight 216 lb 7.9 oz (98.2 kg), SpO2 90 %.    Physical Exam:  General Appearance:   ill-appearing older gentleman    Head:  NC/AT   Eyes:          ROMI EOMI no jaundice   Ears:     Throat:  BiPAP in place, corpack in nares   Neck:  supple, trachea midline    Back:      Lungs:    bilateral breath sounds with " rhonchi anteriorly, diminished at the bases L>R. Symmetric chest excursion     Heart:   distant heart sounds, regular, PMI displaced laterally    Abdomen:    active bowel sounds, soft, nontender, no organomegaly  Scrotum with edema, erythema and induration. Wound packed   Rectal:   Deferred   Extremities:  no pitting edema or cyanosis    Pulses:  weak pulses left foot, no palpable pulses right foot but extremity is warm to touch    Skin:  cool and dry    Lymph nodes:    Neurologic:   Sleeping      Results Review:     I reviewed the patient's new clinical results.     Results from last 7 days  Lab Units 04/16/17 0448 04/15/17  0444 04/14/17  0340  04/10/17  2006 04/10/17  0458   SODIUM mmol/L 138 138 136  < > 138 140   POTASSIUM mmol/L 3.7 4.0 4.3  < > 4.2 3.7   CHLORIDE mmol/L 101 102 102  < > 102 102   TOTAL CO2 mmol/L 33.0* 30.0 30.0  < > 29.0 32.0*   BUN mg/dL 32* 28* 16  < > 21 20   CREATININE mg/dL 0.70 0.80 0.60  < > 0.70 0.80   CALCIUM mg/dL 9.3 9.0 8.9  < > 9.3 8.8   BILIRUBIN mg/dL  --   --  0.3  --  0.4 0.3   ALK PHOS U/L  --   --  61  --  74 66   ALT (SGPT) U/L  --   --  4*  --  14 11   AST (SGOT) U/L  --   --  19  --  70* 34*   GLUCOSE mg/dL 346* 420* 200*  < > 97 91   < > = values in this interval not displayed.    Results from last 7 days  Lab Units 04/15/17  0444 04/13/17  0609 04/12/17 0442   WBC 10*3/mm3 11.16* 11.19* 16.37*   HEMOGLOBIN g/dL 10.4* 11.5* 12.2*   HEMATOCRIT % 34.1* 36.8* 38.0*   PLATELETS 10*3/mm3 377 386 300         Lab Results   Component Value Date    BLOODCX No growth at 5 days 03/30/2017     Lab Results   Component Value Date    URINECX No growth at 2 days 04/11/2017   Scrotal wound E. coli    I reviewed the patient's new imaging including images and reports.       Heart is enlarged. Increased pulmonary vascularity seen bilaterally  unchanged in the interval. Ill-defined opacification seen at the left  lung base is small right pleural effusion. Patient status post  median  sternotomy. Lines and tubes in satisfactory position.       IMPRESSION:  Stable chest as above.            All medications reviewed.     apixaban 5 mg Oral Q12H   aspirin 300 mg Rectal Daily   carvedilol 12.5 mg Oral Q12H   ceFAZolin 2 g Intravenous Q8H   fentaNYL 1 patch Transdermal Q72H   furosemide 40 mg Intravenous Daily   heparin flush (porcine) 500 Units Intracatheter Q12H   insulin detemir 15 Units Subcutaneous Q12H   insulin regular 0-14 Units Subcutaneous Q6H   ipratropium-albuterol 3 mL Nebulization 4x Daily - RT   LORazepam 1 mg Intravenous Nightly   metroNIDAZOLE 500 mg Intravenous Q6H   pantoprazole 40 mg Intravenous Q AM   saccharomyces boulardii 250 mg Oral BID         Assessment/Plan     Principal Problem:    Sepsis  Active Problems:    E. coli UTI (urinary tract infection)    Yeast UTI    Encephalopathy acute    Cellulitis, scrotum, perineum, E.coli    Diabetes mellitus, type 2    ICM EF 10%       NSTEMI 5/15 occl LAD, 99% circ, occl SVG to Dz, patent SVG toOM1, patent LIMA to LAD.   Single chamber AICD 12/15, CABG 3/2005    Chronic respiratory failure with hypoxia    Urinary retention    Epididymo-orchitis    61-year-old gentleman with a severe ischemic cardiomyopathy, AICD, ejection fraction of 10%, diabetes mellitus type II, who developed a scrotal abscess requiring I&D in [place] on 3/31. Cultures were positive for Escherichia coli. He was in the [facility] for approximately one week and developed some left-sided weakness and altered mentation. He was transported to [facility] for stroke. No acute stroke was found on CT of the head or CT perfusion. No intervention was done.   He developed atrial fibrillation with a rapid ventricular response and was placed on amiodarone drip. He converted to SR.  Echocardiogram confirms a dilated left ventricular cavity  with severe dysfunction LVEF 10%. No comment as to whether thrombus is seen.  He was placed on a heparin drip for 24 hours and  transition to oral Eliquis.   However, his blood pressure then dropped to 75-85 systolic, heart rate dropped into the 40s and dopamine, phenylephrine initiated. Precedex was added for agitation.  Coreg, amiodarone, Entresto were stopped. On 4/11/17. Finally with his persistent bradycardia and hypotension Precedex was stopped. Blood pressure did improve and now heart rate is in the 100s off dopamine and Bautista-Synephrine. He did develop pulmonary edema on CXR. Yesterday urine output 2600 after 2 doses of lasix.  Coreg was restarted for heart rate control, but still in low 100s. .     Infectious disease evaluated and changed antibiotics to cefazolin and Flagyl for his Escherichia coli scrotal infection on admit here.  Urine culture was growing greater than 100,000 Candida glabrata, on admission. Infectious disease notified. Colbert DCed. He developed urinary retention with over a liter of urine in his bladder. He continues to require in and out catheterization. With his scrotal and penile edema, I&O cath difficult, colbert reanchored.  In addition, strict I&O needed to monitor his CHF. Repeat culture was negative.     Blood sugars are running 300-400 now that nutrition started,  on sliding scale insulin; Levemir.  Corpak was placed in radiology yesterday and trophic feedings started.  He also started TPN because of inconsistent feeding tolerances and possible ileus on KUB. However, with improved BP should be able to increase TF and taper TPN      PLAN:  Limited resuscitation, still full support  Increase  Coreg to 12.5  More diuresis  Continue BiPAP; trial high flow NC  Aspirin, Eliquis  Ancef, Flagyl  Wound care  Increase long-acting insulin  Decrease TPN and increase TF  Ativan HS  Continue fentanyl patch with intermittent norco for pain      VTE Prophylaxis:Eliquis    Stress Ulcer Prophylaxis:Protonix    Ella Pope MD  04/16/17  9:30 AM      Time: 40 min  I personally provided care to this critically ill  patient as documented above.  Critical care time does not include time spent on separately billed procedures.  Non of my critical care time was concurrent with other critical care providers.

## 2017-04-16 NOTE — PLAN OF CARE
Problem: Patient Care Overview (Adult)  Goal: Plan of Care Review  Outcome: Ongoing (interventions implemented as appropriate)    04/16/17 1450   Coping/Psychosocial Response Interventions   Plan Of Care Reviewed With patient;spouse   Patient Care Overview   Progress progress toward functional goals as expected   Outcome Evaluation   Outcome Summary/Follow up Plan Pt continues to have red/pink areas to most of open wounds around the scrotum. Pt will continue to benefit from current POC to facilitate healing. Pt may require adjustment in dressings if he starts having loose BMs with tube feeds.         Problem: Inpatient Physical Therapy  Goal: Wound Care Goal 1 LTG- PT  Outcome: Ongoing (interventions implemented as appropriate)    04/06/17 1025 04/16/17 1450   Wound Care PT LTG   Wound Care PT LTG 1, Date Established 04/06/17 --    Wound Care PT LTG 1, Time to Achieve 1 wk --    Wound Care PT LTG 1, Location Scrotum --    Wound Care PT LTG 1, No S&S of Infection yes --    Wound Care PT LTG 1, Decrease Wound Size 10% --    Wound Care PT LTG 1, Decrease Necrotic Tissue 50% --    Wound Care PT LTG 1, Decrease Exudate minimum --    Wound Care PT LTG 1, No New Skin Break Down yes --    Wound Care PT LTG 1, Education dressing changes;wound care --    Wound Care PT LTG 1, Education Understanding verbalize understanding --    Wound Care PT LTG 1, Outcome --  goal ongoing

## 2017-04-17 PROBLEM — R09.02 HYPOXEMIA: Status: ACTIVE | Noted: 2017-01-01

## 2017-04-17 NOTE — PLAN OF CARE
Problem: Patient Care Overview (Adult)  Goal: Plan of Care Review  Outcome: Ongoing (interventions implemented as appropriate)    04/17/17 1400   Coping/Psychosocial Response Interventions   Plan Of Care Reviewed With patient   Outcome Evaluation   Outcome Summary/Follow up Plan granulation increasing with continued light debridement. PT changed to hydrofera blue for packing to allow for better management of exduate. PT will cont with light debridement and pulse lavage with decreased freq to allow for decreased inflammation and improved healing potential.          Problem: Inpatient Physical Therapy  Goal: Wound Care Goal 1 LTG- PT  Outcome: Ongoing (interventions implemented as appropriate)    04/06/17 1025 04/16/17 1450   Wound Care PT LTG   Wound Care PT LTG 1, Date Established 04/06/17 --    Wound Care PT LTG 1, Time to Achieve 1 wk --    Wound Care PT LTG 1, Location Scrotum --    Wound Care PT LTG 1, No S&S of Infection yes --    Wound Care PT LTG 1, Decrease Wound Size 10% --    Wound Care PT LTG 1, Decrease Necrotic Tissue 50% --    Wound Care PT LTG 1, Decrease Exudate minimum --    Wound Care PT LTG 1, No New Skin Break Down yes --    Wound Care PT LTG 1, Education dressing changes;wound care --    Wound Care PT LTG 1, Education Understanding verbalize understanding --    Wound Care PT LTG 1, Outcome --  goal ongoing

## 2017-04-17 NOTE — PROGRESS NOTES
Acute Care - Wound/Debridement Treatment Note  [facility]     Patient Name: Tomas Salvador  : 1955  MRN: [id number]  Today's Date: 2017  Onset of Illness/Injury or Date of Surgery Date: 17   Date of Referral to PT: 17   Referring Physician: Dr. Morrison       Admit Date: 2017    Visit Dx:    ICD-10-CM ICD-9-CM   1. Dysphagia, unspecified type R13.10 787.20   2. Impaired functional mobility, balance, gait, and endurance Z74.09 V49.89   3. Impaired mobility and ADLs Z74.09 799.89   4. Cognitive communication deficit R41.841 799.52       Patient Active Problem List   Diagnosis   • Coronary disease   • Ischemic cardiomyopathy with EF 10% on Echo 17   • Peripheral vascular disease   • ICD (implantable cardioverter-defibrillator) in place, implantation .   • Dyslipidemia, on atorvastain.    • S/P Sepsis due to  source 3/30/17   • CVA 17 with rapid improvement   • Diabetes Mellitus HgbA1c 10.4   • Chronic hypoxic respiratory failure on nocturnal o2   • CAD s/p CABG , C . Grafts patent except SVG to diagonal   • Urinary retention requiring colbert   • Hyperlipidemia   • Essential hypertension   • Hx of PVD (peripheral vascular disease)   • E. coli UTI    • Epididymo-orchitis   • Encephalopathy acute   • Fourniers with E-Coli Cellulitis. Debrided in [address] 3/30/17   • Yeast UTI               LDA Wound       17 1405          Incision 17 1056 other (see comments) scrotum    Incision - Properties Group Placement Date: 17  -JW Placement Time:   -JW Side: other (see comments)  -JW Location: scrotum  -JW    Incision WDL ex  -MF      Dressing Appearance moist drainage  -MF      Appearance drainage;redness;other (see comments)   mod slough, but ~50% granulation noted today.   -MF      Incision Length (cm) 10.5  -MF      Incision Width (cm) 3  -MF      Incision Depth (cm) 5  -MF      Drainage Characteristics/Odor serosanguineous  -MF      Drainage Amount small   -      Wound Cleaning irrigated with;sterile normal saline  -      Wound Interventions debrided  -      Dressing foam;low-adherent  -      Packing Incision packed with;other (see comments)   hydroferablue  -        User Key  (r) = Recorded By, (t) = Taken By, (c) = Cosigned By    Initials Name Provider Type     Pee Roldan, PT Physical Therapist    JAY JAY Silveira RN Registered Nurse            WOUND DEBRIDEMENT  Debridement Site 1  Location- Site 1: perineal wound  Selective Debridement- Site 1: Wound Surface <20cmsq (~10cm2 total area)  Instruments- Site 1: tweezers, scissors  Excised Tissue Description- Site 1: minimum, slough  Bleeding- Site 1: none                  Adult Rehabilitation Note       04/17/17 1400 04/16/17 1450 04/15/17 1455    Rehab Assessment/Intervention    Discipline physical therapist  - physical therapist  - physical therapist  -    Document Type therapy note (daily note)  - therapy note (daily note)  - therapy note (daily note)  -    Subjective Information agree to therapy;no complaints;decreased LOC   Pt confused, but stated he was fine when asked about pain.  - agree to therapy  - agree to therapy  -MC    Recorded by [] Pee Roldan, PT [] Rekha Mcgarry, PT [] Rekha Mcgarry, PT    Pain Assessment    Pain Assessment Jay-Baker FACES  - Jay-Baker FACES  - Jay-Baker FACES  -    Jay-Kent FACES Pain Rating 0  -      Pain Score  0  -MC 0  -MC    Post Pain Score  0  - 0  -MC    Recorded by [] Pee Roldan, PT [] Rekha Mcgarry, PT [] Rekha Mcgarry, PT    Cognitive Assessment/Intervention    Current Cognitive/Communication Assessment impaired  - impaired  -     Orientation Status oriented to;person  - oriented to;person  -     Follows Commands/Answers Questions 50% of the time;able to follow single-step instructions;needs cueing  - 75% of the time;able to follow single-step instructions;needs repetition   -MC     Recorded by [] Pee Roldan, PT [MC] Rekha Mcgarry, PT     Positioning and Restraints    Pre-Treatment Position in bed  -MF in bed  -MC in bed  -MC    Post Treatment Position bed  - bed  - bed  -MC    In Bed supine;call light within reach;with nsg  -MF supine;call light within reach;encouraged to call for assist;notified nsg  -MC supine;call light within reach;encouraged to call for assist;with nsg  -MC    Restraints   --   wrist restraints in place, did not release.  -MC    Recorded by [] Pee Roldan, PT [MC] Rekha Mcgarry, PT [MC] Rekha Mcgarry, PT      User Key  (r) = Recorded By, (t) = Taken By, (c) = Cosigned By    Initials Name Effective Dates     Pee Roldan, PT 06/19/15 -     MC Rekha Mcgarry, PT 03/14/16 -                 IP PT Goals       04/16/17 1450 04/15/17 1455 04/14/17 1120    Wound Care PT LTG    Wound Care PT LTG 1, Outcome goal ongoing  - goal ongoing  -MC goal ongoing  -MC      04/11/17 1015 04/09/17 1551 04/08/17 1010    Bed Mobility PT LTG    Bed Mobility PT LTG, Outcome goal not met  -LS goal ongoing  -DR     Bed Mobility PT LTG, Reason Goal Not Met medical status inhibits participation  -LS      Transfer Training PT LTG    Transfer Training PT LTG, Outcome goal not met  -LS goal ongoing  -DR     Transfer Training PT LTG, Reason Goal Not Met medical status inhibits participation  -LS      Gait Training PT LTG    Gait Training Goal PT LTG, Outcome goal not met  -LS goal ongoing  -DR     Gait Training Goal PT LTG, Reason Goal Not Met medical status inhibits participation  -LS      Wound Care PT LTG    Wound Care PT LTG 1, Outcome   goal ongoing  -      04/06/17 1524 04/06/17 1025 04/05/17 0909    Bed Mobility PT LTG    Bed Mobility PT LTG, Date Established   04/05/17  -LS    Bed Mobility PT LTG, Time to Achieve   2 wks  -LS    Bed Mobility PT LTG, Activity Type   supine to sit/sit to supine  -LS    Bed Mobility PT LTG, [place] Level    supervision required  -LS    Bed Mobility PT LTG, Outcome goal ongoing  -LS      Transfer Training PT LTG    Transfer Training PT LTG, Date Established   04/05/17  -LS    Transfer Training PT LTG, Time to Achieve   2 wks  -LS    Transfer Training PT LTG, Activity Type   sit to stand/stand to sit  -LS    Transfer Training PT LTG, [place] Level   contact guard assist  -LS    Transfer Training PT LTG, Assist Device   walker, rolling  -LS    Transfer Training PT LTG, Outcome goal ongoing  -LS      Gait Training PT LTG    Gait Training Goal PT LTG, Date Established   04/05/17  -LS    Gait Training Goal PT LTG, Time to Achieve   2 wks  -LS    Gait Training Goal PT LTG, [place] Level   contact guard assist  -LS    Gait Training Goal PT LTG, Assist Device   walker, rolling  -LS    Gait Training Goal PT LTG, Distance to Achieve   200  -LS    Gait Training Goal PT LTG, Outcome goal ongoing  -LS      Wound Care PT LTG    Wound Care PT LTG 1, Date Established  04/06/17  -     Wound Care PT LTG 1, Time to Achieve  1 wk  -     Wound Care PT LTG 1, Location  Scrotum  -     Wound Care PT LTG 1, No S&S of Infection  yes  -     Wound Care PT LTG 1, Decrease Wound Size  10%  -     Wound Care PT LTG 1, Decrease Necrotic Tissue  50%  -     Wound Care PT LTG 1, Decrease Exudate  minimum  -     Wound Care PT LTG 1, No New Skin Break Down  yes  -     Wound Care PT LTG 1, Education  dressing changes;wound care  -     Wound Care PT LTG 1, Education Understanding  verbalize understanding  -       User Key  (r) = Recorded By, (t) = Taken By, (c) = Cosigned By    Initials Name Provider Type    VALERI Roldan, PT Physical Therapist    ANDREWS Juárez, PT Physical Therapist    DUNCAN Mcgarry, PT Physical Therapist    DR Mikey Moore, PT Physical Therapist          Physical Therapy Education     Title: PT OT SLP Therapies (Active)     Topic: Physical Therapy (Active)     Point: Mobility training  (Active)    Learning Progress Summary    Learner Readiness Method Response Comment Documented by Status   Patient Acceptance E NR   04/09/17 1551 Active    Acceptance E NR  EDOUARD 04/07/17 1534 Active    Acceptance E,D NR   04/06/17 1523 Active    Acceptance E,D NR  LS 04/05/17 0908 Active   Significant Other Acceptance E,D NR  LS 04/06/17 1523 Active    Acceptance E,D NR  LS 04/05/17 0908 Active               Point: Home exercise program (Active)    Learning Progress Summary    Learner Readiness Method Response Comment Documented by Status   Patient Acceptance E NR  EDOUARD 04/07/17 1534 Active    Acceptance E,D NR  LS 04/06/17 1523 Active   Significant Other Acceptance E,D NR  LS 04/06/17 1523 Active               Point: Body mechanics (Active)    Learning Progress Summary    Learner Readiness Method Response Comment Documented by Status   Patient Acceptance E NR   04/09/17 1551 Active    Acceptance E NR  EDOUARD 04/07/17 1534 Active    Acceptance E,D NR  LS 04/06/17 1523 Active    Acceptance E,D NR   04/05/17 0908 Active   Significant Other Acceptance E,D NR   04/06/17 1523 Active    Acceptance E,D NR   04/05/17 0908 Active               Point: Precautions (Active)    Learning Progress Summary    Learner Readiness Method Response Comment Documented by Status   Patient Acceptance E NR   04/09/17 1551 Active    Acceptance E NR  EDOUARD 04/07/17 1534 Active    Acceptance E,D NR   04/06/17 1523 Active    Acceptance E,D NR  LS 04/05/17 0908 Active   Significant Other Acceptance E,D NR  LS 04/06/17 1523 Active    Acceptance E,D NR   04/05/17 0908 Active                      User Key     Initials Effective Dates Name Provider Type Discipline    EDOUARD 06/19/15 -  Anita Prado, PT Physical Therapist PT     06/19/15 -  Lily Juárez, PT Physical Therapist PT     09/06/16 -  Mikey Moore, PT Physical Therapist PT                   PT ASSESSMENT (last 72 hours)      PT Evaluation       04/17/17 1400 04/16/17 1450     Rehab Evaluation    Document Type therapy note (daily note)  - therapy note (daily note)  -    Subjective Information agree to therapy;no complaints;decreased LOC   Pt confused, but stated he was fine when asked about pain.  - agree to therapy  -    Pain Assessment    Pain Assessment Jay-Baker FACES  - Jay-Baker FACES  -    Jay-[facility] Pain Rating 0  -     Pain Score  0  -MC    Post Pain Score  0  -    Cognitive Assessment/Intervention    Current Cognitive/Communication Assessment impaired  - impaired  -    Orientation Status oriented to;person  - oriented to;person  -    Follows Commands/Answers Questions 50% of the time;able to follow single-step instructions;needs cueing  - 75% of the time;able to follow single-step instructions;needs repetition  -    Positioning and Restraints    Pre-Treatment Position in bed  - in bed  -    Post Treatment Position bed  - bed  -    In Bed supine;call light within reach;with nsg  - supine;call light within reach;encouraged to call for assist;notified [facility]      04/15/17 1658       Rehab Evaluation    Document Type therapy note (daily note)  -     Subjective Information agree to therapy  -     Pain Assessment    Pain Assessment Jay-Kent FACES  -     Pain Score 0  -MC     Post Pain Score 0  -     Positioning and Restraints    Pre-Treatment Position in bed  -     Post Treatment Position bed  -     In Bed supine;call light within reach;encouraged to call for assist;with [facility]     Restraints --   wrist restraints in place, did not release.  -       User Key  (r) = Recorded By, (t) = Taken By, (c) = Cosigned By    Initials Name Provider Type     Pee Roldan, PT Physical Therapist     Rekha Mcgarry, PT Physical Therapist            PT Recommendation and Plan  Anticipated Discharge Disposition: inpatient rehabilitation facility  PT Frequency: daily    Plan Of Care Reviewed With: patient       Outcome  Summary/Follow up Plan: granulation increasing with continued light debridement.  PT changed to hydrofera blue for packing to allow for better management of exduate.  PT will cont with light debridement and pulse lavage with decreased freq to allow for decreased inflammation and improved healing potential.             Time Calculation        PT Charges       04/17/17 1400          Time Calculation    Start Time 1400  -      PT Goal Re-Cert Due Date 04/25/17  -      Time Calculation- PT    Total Timed Code Minutes- PT 25 minute(s)  -        User Key  (r) = Recorded By, (t) = Taken By, (c) = Cosigned By    Initials Name Provider Type     Pee Roldan, PT Physical Therapist             Therapy Charges for Today     Code Description Service Date Service Provider Modifiers Qty    26131083211 HC PT ROSEANNA DEBRIDE OPEN WOUND EA ADD 20CM 4/17/2017 Pee Roldan, PT GP 1            PT G-Codes  Outcome Measure Options: AM-PAC 6 Clicks Basic Mobility (PT)        Pee Roldan, PT  4/17/2017

## 2017-04-17 NOTE — PROGRESS NOTES
Continued Stay Note  [facility]     Patient Name: Tomas Salvador  MRN: [id number]  Today's Date: 4/17/2017    Admit Date: 4/4/2017          Discharge Plan       04/17/17 1348    Case Management/Social Work Plan    Plan [place] [facility]/TACH    Additional Comments Goal is to transfer to [facility] when medically ready.              Discharge Codes     None        Expected Discharge Date and Time     Expected Discharge Date Expected Discharge Time    Apr 20, 2017             Bernadette Pian RN

## 2017-04-17 NOTE — PROGRESS NOTES
Adult Nutrition  Assessment/PES    Patient Name:  Tomas Salvador  YOB: 1955  MRN: [id number]  Admit Date:  4/4/2017    Assessment Date:  4/17/2017  Comments:  EN at goal rate of 70 mL of Impact Peptide 1.5 , free water increased to 50 mL/hr; PN dc'd.      Reason for Assessment       04/17/17 1414    Reason for Assessment    Reason For Assessment/Visit follow up protocol;multidisciplinary rounds;TF/PN    Identified At Risk By Screening Criteria tube feeding or parenteral nutrition;dysphagia or difficulty swallowing    Time Spent (min) 30    Diagnosis --   per notes of this adm   Patient Active Problem List   Diagnosis   • Coronary disease   • Ischemic cardiomyopathy with EF 10% on Echo 4/4/17   • Peripheral vascular disease   • ICD (implantable cardioverter-defibrillator) in place, implantation 2015.   • Dyslipidemia, on atorvastain.    • S/P Sepsis due to  source 3/30/17   • CVA 4/4/17 with rapid improvement   • Diabetes Mellitus HgbA1c 10.4   • Chronic hypoxic respiratory failure on nocturnal o2   • CAD s/p CABG 2005, C 2015. Grafts patent except SVG to diagonal   • Urinary retention requiring colbert   • Hyperlipidemia   • Essential hypertension   • Hx of PVD (peripheral vascular disease)   • E. coli UTI    • Epididymo-orchitis   • Encephalopathy acute   • Fourn[place] with E-Coli Cellulitis. Debrided in [place] 3/30/17   • Yeast UTI                Nutrition/Diet History       04/17/17 1416    Nutrition/Diet History    Reported/Observed By RN;MD    Other tolerating TF at goal rate; TPN is off- pt is volume depleted s/p diuresisi - vo given for free water at 50 mL/hr today - address volume status  tomorrow in rounds              Labs/Tests/Procedures/Meds       04/17/17 1418    Labs/Tests/Procedures/Meds    Labs/Tests Review Reviewed;BUN;Phos   Phos replacement ordered    Medication Review Reviewed, pertinent;Diuretic            Physical Findings       04/17/17 1419    Physical Appearance    Tubes  nasoduoduenal tube              Nutrition Prescription Ordered       04/17/17 1419    Nutrition Prescription PO    Current PO Diet NPO    Nutrition Prescription EN    Enteral Route ND    Product Impact Peptide 1.5 tom    TF Delivery Method Continuous    Continuous TF Goal Rate (mL/hr) 70 mL/hr    Continuous TF Current Rate (mL/hr) 70 mL/hr    Continuous TF Goal Volume (mL) 1300 mL    Continuous TF Current Volume (mL) 1680 mL    Water flush (mL)  50 mL    Water Flush Frequency Per hour            Evaluation of Received Nutrient/Fluid Intake       04/17/17 1420    Calculation Measurements    Weight Used For Calculations 98.2 kg (216 lb 7.9 oz)    Evaluation of Received Nutrient/Fluid Intake    Number of Days Evaluated 3 days    Nutrition Delivered Calorie Evaluation;Protein Evaluation;Fluid Evaluation;Fiber Evaluation;RDI    Calorie Intake Evaluation    Enteral Calories (kcal) 852    Parenteral Calories (kcal) 1057    Total Calories (kcal) 1909    Total Calories (kcal/Kg) 19.44 kcal/kg    % Kcal Needs 95    Protein Intake Evaluation    Enteral Protein (gm) 53    Parenteral Protein (gm) 83    Total Protein (gm) 136    % Protein Needs 93    Fluid Intake Evaluation    Enteral  Fluid (mL) 437    Free Water Flush Fluid (mL) 421    Parenteral Fluid (mL) 375   sterile H20    Total Fluid Intake (mL) 1233    Total Fluid Intake (mL/kg) 12.56 mL/kg    Fiber Intake Evaluation    Fiber 0    Recommended Daily Intake Evaluation    RDI Met    EN Evaluation    Number of Days EN Intake Evaluated 3 days    EN Average Volume Delivered (mL/day) 568 mL/day    % Goal Volume  44 %    PN Evaluation    Number of Days PN Evaluated 3 days    PN Average delivery (mL/day)  1186 mL/day    PN Average lipid delivery (mL/day)  0 mL/day              Problem/Interventions:        Problem 1       04/17/17 1428    Nutrition Diagnoses Problem 1    Problem 1 Needs Alternate Route    Etiology (related to) Factors Affecting Nutrition    Functional Diagnosis  Dysphagia    Signs/Symptoms (evidenced by) NPO            Problem 2       04/17/17 1428    Nutrition Diagnoses Problem 2    Problem 2 Inadequate Intake/Infusion    Inadequate Intake Type Enteral    Macronutrient Kcal;Protein    Etiology (related to) MNT for Treatment/Condition    Signs/Symptoms (evidenced by) Kcal;PRO;EN Intake Delivery    Percent (%) of EN goal 44 %   infusion rate now at goal                      Nutrition Prescription       04/17/17 1429    Nutrition Prescription EN    Enteral Prescription Continue same protocol    Enteral Route ND    Product Impact Peptide 1.5 tom    TF Delivery Method Continuous    Continuous TF Goal Rate (mL/hr) 70 mL/hr    Continuous TF Goal Volume (mL) 1300 mL    Water flush (mL)  50 mL    Water Flush Frequency Per hour    New EN Prescription Ordered? Yes            Education/Evaluation       04/17/17 1430    Monitor/Evaluation    Monitor Per protocol;I&O;Pertinent labs;TF delivery/tolerance;Weight            Electronically signed by:  Melanie Bah RD  04/17/17 2:33 PM

## 2017-04-17 NOTE — PROGRESS NOTES
"[facility] Progress Note    Admission Date: 4/4/2017    Tomas Salvador  1955  [id number]    Date: 4/17/2017    Meds:    IV Anti-Infectives     Ordered     Dose/Rate Route Frequency Start Stop    04/04/17 1723  metroNIDAZOLE (FLAGYL) IVPB 500 mg     Ordering Provider:  Kike Leon MD    500 mg  100 mL/hr over 60 Minutes Intravenous Every 6 Hours 04/04/17 1800      04/04/17 1552  ceFAZolin in dextrose (ANCEF) IVPB solution 2 g     Ordering Provider:  TRESA Baca    2 g  over 30 Minutes Intravenous Every 8 Hours 04/04/17 1600            CC:  Delisa's gangrene    SUBJECTIVE:  4/4/17:Patient is a 61 y.o. 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 He has a history of underlying diabetes mellitus and severe systolic congestive heart failure. He presented to [facility] on 3/30 with scrotal cellulitis/gangrene. He underwent debridement by urology at [facility] on 3/31 and was treated with Merrem/clindamycin/vancomycin. He developed left facial droop and left sided paresis, prompting a transfer to [facility] for evaluation and therapy of acute stroke. His head CT angiogram was degraded by motion artifact but per Dr. Morrison there was suggestion of right inferior temporal lobe\" opacification\" consistent with a possible stroke. His left facial droop and left-sided paresis has improved today. He is encephalopathic and is unable to provide any reliable history. His wife thinks that he may have had some fevers prior to his admission on 3/30.    4/5/17: afebrile. No hx per patient secondary to encephalopathy.  Afebrile, restless per nsg staff.  Oliguric UOP.  No n/v/d.  No rashes.  4/6/17: More alert and less restless today.  Still non-responsive to questions and does not follow simple commands.  Still with left-sided weakness.  Afebrile.  Still with oliguric UOP.  No n/v/d, no rashes, per nsg staff notes.   4/7/17:  Alert today.  No " fever,chills,sweats.  Wife at bedside with ?s   4/10/17:  Wants to go home.  Wound care per PT.  No n/v/d.   17:  Now on Dopamine and Bautista, bipap.  Decompensated last pm.    17:  Somnolent; still on pressors   17 Still on pressors/Precedex; Now on bipap.  Had pulled corpak out night before.    17: Per RN staff notes: afebrile, on/off precedex for agitation, on dopamine, on flagyl and cefazolin, on and off bipap.  Can not keep clothes on him.  Attempted to do Is and Os but too difficult without his foreskin getting pulled into meatus, so therefore, just placed Cueto cath.  Not anchored, because he keeps trying to pull it out.  Would is getting daily misted by PT and packed.  He is more alert today, but still not oriented.  4/15/17: He is improved today and off of vasopressor therapy.  He has been receiving some diuretic therapy for pulmonary edema.  He is less confused.  He remains afebrile.  17: He is continued to receive diuretic therapy.  He remains off of vasopressor therapy.  He has remained afebrile  17:  Remains restrained.  Opens eyes to voice.  Nonproductive cough.       PE:   Vital Signs  Temp (24hrs), Av.3 °F (36.8 °C), Min:98 °F (36.7 °C), Max:98.6 °F (37 °C)    Temp  Min: 98 °F (36.7 °C)  Max: 98.6 °F (37 °C)  BP  Min: 83/68  Max: 126/75  Pulse  Min: 94  Max: 147  Resp  Min: 16  Max: 26  SpO2  Min: 83 %  Max: 100 %    GENERAL:   He is in no acute distress, more alert.   HEENT: Normocephalic, atraumatic. PERRL. EOMI. No conjunctival injection. No icterus. Oropharynx clear without evidence of thrush or exudate.   HEART: RRR; No murmur, rubs, gallops.   LUNGS: few scattered rhonchi anteriorly   ABDOMEN: Soft, nontender, nondistended. Positive bowel sounds. No rebound or guarding. NO mass or HSM.  EXT: No cyanosis, clubbing or edema. No cord.  : The lower, posterior midline scrotal wounds are continuing to improve with decreased slough and drainage.  A Cueto catheter is in  place  MSK: FROM without joint effusions noted arms/legs.   SKIN: Warm and dry without cutaneous eruptions on Inspection/palpation.   NEURO: more alert but not oriented.  Right PICC without redness      Laboratory Data      Results from last 7 days  Lab Units 04/17/17  0034 04/15/17  0444 04/13/17  0609   WBC 10*3/mm3 9.34 11.16* 11.19*   HEMOGLOBIN g/dL 9.8* 10.4* 11.5*   HEMATOCRIT % 32.4* 34.1* 36.8*   PLATELETS 10*3/mm3 306 377 386       Results from last 7 days  Lab Units 04/17/17  0033   SODIUM mmol/L 145   POTASSIUM mmol/L 3.6   CHLORIDE mmol/L 105   TOTAL CO2 mmol/L 35.0*   BUN mg/dL 40*   CREATININE mg/dL 0.60   GLUCOSE mg/dL 149*   CALCIUM mg/dL 8.9       Results from last 7 days  Lab Units 04/17/17  0033   ALK PHOS U/L 77   BILIRUBIN mg/dL 0.3   ALT (SGPT) U/L 5*   AST (SGOT) U/L 22           Results from last 7 days  Lab Units 04/13/17  1519   CRP mg/dL 4.33*                 Estimated Creatinine Clearance: 147.5 mL/min (by C-G formula based on Cr of 0.6).    Microbiology:  Blood Culture   Date Value Ref Range Status   03/30/2017 No growth at 5 days  Final   03/30/2017 No growth at 5 days  Final           Urine Culture   Date Value Ref Range Status   04/04/2017 No growth  Preliminary   03/31/2017 No growth  Final   03/30/2017 >100,000 CFU/mL Escherichia coli (A)  Final      scrotal cx x 2 cxs (3/31) E. coli  Cefazolin sens.    Urine Culture   Date Value Ref Range Status   04/11/2017 No growth at 2 days  Final   04/07/2017 >100,000 CFU/mL Candida glabrata (A)  Final         Radiology:  Imaging Results (last 24 hours)     Procedure Component Value Units Date/Time    XR Chest 1 View [[id number]] Collected:  04/16/17 0852     Updated:  04/16/17 1414    Narrative:          EXAMINATION: XR CHEST 1 VW - 04/16/2017     INDICATION: R13.10-Dysphagia, unspecified; Z74.09-Other reduced  mobility;  R41.841-Cognitive communication deficit.     COMPARISON: 4/15/2017.     FINDINGS: Portable chest reveals low lung volumes.  The heart is  enlarged. Increased pulmonary vascularity seen bilaterally is unchanged  in the interval. Ill-defined opacification seen at the left lung base  with small right pleural effusion. Patient is status post median  sternotomy. Lines and tubes in satisfactory position.           Impression:       Stable chest as above.     DICTATED:     04/16/2017  EDITED:         04/16/2017       XR Chest 1 View [[id number]] Collected:  04/15/17 1838     Updated:  04/16/17 1701    Narrative:          EXAMINATION: XR CHEST, SINGLE VIEW - 04/15/2017     INDICATION:  R13.10-Dysphagia, unspecified; Z74.09-Other reduced  mobility; R41.841-Cognitive communication deficit.     COMPARISON: 4/14/2017.      FINDINGS: Portable chest reveals the heart to be enlarged. Increased  pulmonary vascularity bilaterally. Lines and tubes remain unchanged.  Small left pleural effusion. No pneumothorax. No new focal parenchymal  opacification present.        Impression:       Stable chest with no change in the increased pulmonary  vascularity.     DICTATED:     04/15/2017  EDITED:         04/15/2017     This report was finalized on 4/16/2017 4:59 PM by Dr. Megan Posey MD.       XR Chest 1 View [[id number]] Collected:  04/17/17 0910     Updated:  04/17/17 0910    Narrative:       EXAMINATION: XR CHEST 1 VIEW-04/17/2017:      INDICATION: CHF; R13.10-Dysphagia, unspecified; Z74.09-Other reduced  mobility; Z74.09-Other reduced mobility; R41.841-Cognitive communication  deficit.      COMPARISON: 04/16/2017.     FINDINGS: Portable chest reveals lines and tubes to be in satisfactory  position. Lung volumes are low. The heart is enlarged. Increased  markings seen throughout the right lung and left lung base. Findings are  stable and unchanged. Small bilateral pleural effusions are present.           Impression:       Stable chest as above.     D:  04/17/2017  E:  04/17/2017                 I readHis 4/16 chest x-ray.  He has decreased pulmonary  edema.    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 He is significantly improved.  2. Acute right hemispheric cerebral vascular accident-with left sided facial and left-sided weakness. His head CT angiogram here reveals subtle right temporal abnormalities per Dr. Morrison. He has clinically improved.  3. Acute toxic metabolic encephalopathy-secondary to sepsis and cerebral vascular accident  4. 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 Severe systolic congestive heart failure-with an ejection fraction of less than 20% on echocardiogram performed on 3/8/17  6. Escherichia coli urinary tract infection  7. Sepsis-secondary to Delisa's gangrene  8. Leukocytosis/neutrophilia-secondary to scrotal gangrene/cellulitis, improved   9.  Pulmonary edema-improving  10. Candiduria-  11. Cardiogenic shock-with pulmonary edema, now significantly improved.         PLAN/RECOMMENDATIONS:   1. Cefazolin 2 g IV every 8 hours  2. Flagyl 500 mg IV every 8 hours  3.  Continue wound care      TRESA Aj  4/17/2017  9:16 AM

## 2017-04-17 NOTE — PLAN OF CARE
Problem: Patient Care Overview (Adult)  Goal: Plan of Care Review  Outcome: Ongoing (interventions implemented as appropriate)  Goal: Adult Individualization and Mutuality  Outcome: Ongoing (interventions implemented as appropriate)  Goal: Discharge Needs Assessment  Outcome: Ongoing (interventions implemented as appropriate)    Problem: Stroke (Ischemic) (Adult)  Goal: Signs and Symptoms of Listed Potential Problems Will be Absent or Manageable (Stroke)  Outcome: Ongoing (interventions implemented as appropriate)    Problem: Fall Risk (Adult)  Goal: Identify Related Risk Factors and Signs and Symptoms  Outcome: Ongoing (interventions implemented as appropriate)  Goal: Absence of Falls  Outcome: Ongoing (interventions implemented as appropriate)    Problem: Skin Integrity Impairment, Risk/Actual (Adult)  Goal: Identify Related Risk Factors and Signs and Symptoms  Outcome: Ongoing (interventions implemented as appropriate)  Goal: Skin Integrity/Wound Healing  Outcome: Ongoing (interventions implemented as appropriate)    Problem: Infection, Risk/Actual (Adult)  Goal: Identify Related Risk Factors and Signs and Symptoms  Outcome: Ongoing (interventions implemented as appropriate)  Goal: Infection Prevention/Resolution  Outcome: Ongoing (interventions implemented as appropriate)    Problem: SAFETY - NON-VIOLENT RESTRAINT  Goal: Remains free of injury from restraints (Non-Violent Restraint)  Outcome: Ongoing (interventions implemented as appropriate)

## 2017-04-17 NOTE — PROGRESS NOTES
Intensivist Note     4/17/2017  Hospital Day: 13      Mr. Tomas Salvador, 61 y.o. male is followed for:  Principal Problem:    CVA 4/4/17 with rapid improvement  Active Problems:    S/P Sepsis due to  source 3/30/17    Epididymo-orchitis    Fourniers with E-Coli Cellulitis. Debrided in [place] 3/30/17    Urinary retention requiring colbert    E. coli UTI     Yeast UTI    Ischemic cardiomyopathy with EF 10% on Echo 4/4/17    ICD (implantable cardioverter-defibrillator) in place, implantation 2015.    CAD s/p CABG 2005, C 2015. Grafts patent except SVG to diagonal    Encephalopathy acute    Diabetes Mellitus HgbA1c 10.4    Chronic hypoxic respiratory failure on nocturnal o2    Hx of PVD (peripheral vascular disease)       SUBJECTIVE     Subjective    61-year-old white male with multiple medical problems as noted above. Severe ischemic cardiomyopathy with EF 10%, previous CABG in 2005, requirement for ICD for primary prophylaxis, diabetes mellitus, chronic home nocturnal oxygen. He is a known poorly controlled diabetic (hemoglobin A1c 10.4) and presented to [place] with sepsis due to a urologic source 3/30/17. 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 Subsequent to his surgery in [place], he developed neurologic symptoms suggestive of a stroke and was transferred to [facility]/4/17. Workup just showed an old left frontal stroke and he apparently fully recovered (questionable TIA). While here he developed atrial fibrillation with rapid ventricular response for which he received amiodarone. He converted to sinus rhythm. He was initially placed on a heparin drip but is now anticoagulated on Eliquis. He subsequently developed some hypotension and bradycardia requiring dopamine and phenylephrine. Coreg, amiodarone, and Entresto were all stopped. Hypotension persisted so Precedex which had been used for agitation was also stopped and he subsequently improved. He then  "had some problems with pulmonary edema but responded to diuretics with improvement.    The present time he continues on cefazolin and Flagyl for his Escherichia coli scrotal infection and Escherichia coli UTI. His Cueto catheter was removed but unfortunately he had urinary retention of greater than a liter requiring in and out catheterization. This had to be stopped because of his scrotal and penile edema as catheterization was difficult and he now continues to have a Cueto in place. 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 Swallowing remains a  Problem and since he could not safely swallow was on TPN initially. It took quite a while to get a Corpak in place but is finally in, and bridled in position. He remains on enteral feeds and off TPN.      The patient's relevant past medical, surgical and social history were reviewed and updated in Epic as appropriate.    OBJECTIVE     /78  Pulse (!) 126  Temp 98.2 °F (36.8 °C) (Oral)   Resp 20  Ht 69\" (175.3 cm)  Wt 216 lb 7.9 oz (98.2 kg)  SpO2 98%  BMI 31.97 kg/m2  Oxygen Concentration (%): 30  Flow (L/min): 2    Flowsheet Rows         First Filed Value    Admission Height  69\" (175.3 cm) Documented at 04/05/2017 0919    Admission Weight  237 lb (108 kg) Documented at 04/05/2017 0919        Intake & Output (last day)       04/16 0701 - 04/17 0700 04/17 0701 - 04/18 0700    I.V. (mL/kg) 1020 (10.4) 28 (0.3)    Other 607 260    NG/GT 1252 270    IV Piggyback 500 200         Irrigation 600     Total Intake(mL/kg) 4496 (45.8) 758 (7.7)    Urine (mL/kg/hr) 650 (0.3) 600 (1.1)    Total Output 650 600    Net +3846 +158                Objective    Exam:  General Exam:  Middle-aged white male appearing much older than stated age  HEENT: Pupils equal and reactive. Nose and throat clear. Nasoduodenal tube in place  Neck:                          Supple, no JVD, thyromegaly, or adenopathy  Lungs: Bilateral " rhonchi  Cardiovascular: Irregularly irregular rhythm with a variable S1 and normal S2. Atrial fibrillation on monitor with a heart rate of 124  Abdomen: Soft nontender without organomegaly or masses. Tolerating enteral feeds   and rectal: Cueto in place  Extremities: No cyanosis clubbing edema. Right PICC line in place  Neurologic:                 Confused, agitated, requiring restraints. Exam however is symmetric    Chest X-Ray: Persistent cardiomegaly. Persistent bilateral lower lobe infiltrates (congestive versus pneumonic). If anything seem worse than on admission    INFUSIONS    phenylephrine 0.5-3 mcg/kg/min Last Rate: Stopped (04/14/17 0048)         Results from last 7 days  Lab Units 04/17/17  0034 04/15/17  0444 04/13/17  0609   WBC 10*3/mm3 9.34 11.16* 11.19*   HEMOGLOBIN g/dL 9.8* 10.4* 11.5*   HEMATOCRIT % 32.4* 34.1* 36.8*   PLATELETS 10*3/mm3 306 377 386       Results from last 7 days  Lab Units 04/17/17  0033 04/16/17 0448   SODIUM mmol/L 145 138   POTASSIUM mmol/L 3.6 3.7   CHLORIDE mmol/L 105 101   TOTAL CO2 mmol/L 35.0* 33.0*   BUN mg/dL 40* 32*   CREATININE mg/dL 0.60 0.70   GLUCOSE mg/dL 149* 346*   CALCIUM mg/dL 8.9 9.3       Results from last 7 days  Lab Units 04/17/17  0033 04/16/17 0448 04/15/17  0444   MAGNESIUM mg/dL 2.0 2.0 1.9   PHOSPHORUS mg/dL 2.0* 2.5 3.0       No results found for: SEDRATE  Lab Results   Component Value Date    BNP 3279.0 (H) 04/15/2017     Lab Results   Component Value Date    CKTOTAL 54 04/04/2017    CKMB 1.22 03/31/2017    CKMBINDEX 3.8 (H) 03/31/2017    TROPONINI <0.006 04/04/2017     Lab Results   Component Value Date    TSH 0.656 04/05/2017     Lab Results   Component Value Date    LACTATE 0.8 04/02/2017     No results found for: CORTISOL          Non-Invasive Ventilation Settings     Start     Ordered    04/17/17 1018  . BIPAP; Titrate for SPO2: Greater Than or Equal To, 90%  At Bedtime & As Needed-RT     Question Answer Comment   . BIPAP    Titrate for  SPO2 Greater Than or Equal To    Titrate for SPO2 90%        04/17/17 1017       I reviewed the patient's results, images and medication.    Assessment/Plan   ASSESSMENT      Principal Problem:    CVA 4/4/17 with rapid improvement  Active Problems:    S/P Sepsis due to  source 3/30/17    Epididymo-orchitis    Fourniers with E-Coli Cellulitis. Debrided in [place] 3/30/17    Urinary retention requiring colbert    E. coli UTI     Yeast UTI    Ischemic cardiomyopathy with EF 10% on Echo 4/4/17    ICD (implantable cardioverter-defibrillator) in place, implantation 2015.    CAD s/p CABG 2005, LHC 2015. Grafts patent except SVG to diagonal    Encephalopathy acute    Diabetes Mellitus HgbA1c 10.4    Chronic hypoxic respiratory failure on nocturnal o2    Hx of PVD (peripheral vascular disease)      DISCUSSION: Still not doing well. Sodium and BUN are increasing and needs more free water. I like to take his Colbert out and do in and out catheterization, but catheterization has been difficult because of penile and scrotal edema.    PLAN     1. Consider irrigating Colbert since it is unable to be removed at this time  2. Defer to ID regarding therapy of Candida glabrata cystitis. Probably cannot clear it up as long as Colbert is in place.  3. Needs increased free water  4. Mobilize and physical therapy  5. Continue BiPAP at night to reduce work of breathing  6. Strict I and O and daily weights  7. Watch for development of hospital acquired pneumonia  8. Continue aggressive pulmonary toilet    Plan of care and goals reviewed with mulitdisciplinary team at daily rounds.    I discussed the patient's findings and my recommendations with nursing staff    Time spent Critical care 35 min (It does not include procedure time).    Michael Gaona MD  Intensive Care Medicine  04/17/17 12:33 PM

## 2017-04-18 NOTE — PROGRESS NOTES
Neurology       Patient Care Team:  Jose Navarro MD as PCP - General (Family Medicine)    Chief complaint: Confusion    History:  Patient continues to language.    He is been overly sedated with a milligram of lorazepam.    Dr. Dillard and is switched to Seroquel 25 mg twice a day on schedule 25 mg every eight hours when necessary.    He's also added lorazepam 0.5 mg when necessary.    Fentanyl altogether.      Past Medical History:   Diagnosis Date   • CAD (coronary artery disease)    • Chronic back pain    • Chronic respiratory failure with hypoxia     Night time only at 2 liters/min   • Diabetes mellitus, type 2    • Essential hypertension    • Hyperlipidemia    • Ischemic cardiomyopathy    • NSTEMI (non-ST elevated myocardial infarction) 2015   • Obesity    • PVD (peripheral vascular disease)    • Systolic CHF     EF <20%  3/8/17   • Urinary retention        Vital Signs   Vitals:    04/18/17 1130 04/18/17 1200 04/18/17 1221 04/18/17 1230   BP: (!) 83/64 (!) 89/65  (!) 87/67   BP Location:       Patient Position:       Pulse: 118 81 112 85   Resp:  16 16    Temp:  98.2 °F (36.8 °C)     TempSrc:  Axillary     SpO2: 91% 99% 99% 100%   Weight:       Height:           Physical Exam:   General: Calm awake              Neuro: Patient is not conversational.  He does not follow commands.  His speech is incoherent.    Motor testing shows him to move all extremities.        Results Review:  Reviewed    Results from last 7 days  Lab Units 04/18/17  0358   WBC 10*3/mm3 11.60*   HEMOGLOBIN g/dL 10.0*   HEMATOCRIT % 32.7*   PLATELETS 10*3/mm3 352       Results from last 7 days  Lab Units 04/18/17  0358 04/17/17  1155 04/17/17  0033   SODIUM mmol/L 145 145 145   POTASSIUM mmol/L 4.1 4.0 3.6   CHLORIDE mmol/L 105 105 105   TOTAL CO2 mmol/L 38.0* 37.0* 35.0*   BUN mg/dL 51* 45* 40*   CREATININE mg/dL 0.70 0.70 0.60   CALCIUM mg/dL 9.0 9.0 8.9   BILIRUBIN mg/dL 0.2* 0.3 0.3   ALK PHOS U/L 102* 91 77   ALT (SGPT) U/L 6* 5* 5*    AST (SGOT) U/L 30 27 22   GLUCOSE mg/dL 171* 238* 149*     Imaging Results (last 24 hours)     ** No results found for the last 24 hours. **          Assessment:  Mixed encephalopathy    Plan:  Agree with the current strategy.  I prefer to use when necessary Seroquel prior to using Ativan given his sensitivity to the Ativan    Comment:           I discussed the patients findings and my recommendations with nursing staff    Luis Alberto Phelan MD  04/18/17  1:19 PM

## 2017-04-18 NOTE — PROGRESS NOTES
Acute Care - Wound/Debridement Treatment Note  [facility]     Patient Name: Tomas Salvador  : 1955  MRN: [id number]  Today's Date: 2017  Onset of Illness/Injury or Date of Surgery Date: 17   Date of Referral to PT: 17   Referring Physician: Dr. Morrison       Admit Date: 2017    Visit Dx:    ICD-10-CM ICD-9-CM   1. Dysphagia, unspecified type R13.10 787.20   2. Impaired functional mobility, balance, gait, and endurance Z74.09 V49.89   3. Impaired mobility and ADLs Z74.09 799.89   4. Cognitive communication deficit R41.841 799.52       Patient Active Problem List   Diagnosis   • Coronary disease   • Ischemic cardiomyopathy with EF 10% on Echo 17   • Peripheral vascular disease   • ICD (implantable cardioverter-defibrillator) in place, implantation .   • Dyslipidemia, on atorvastain.    • S/P Sepsis due to  source 3/30/17   • CVA 17 with rapid improvement   • Diabetes Mellitus HgbA1c 10.4   • Chronic hypoxic respiratory failure on nocturnal o2   • CAD s/p CABG , C . Grafts patent except SVG to diagonal   • Urinary retention requiring colbert   • Hyperlipidemia   • Essential hypertension   • Hx of PVD (peripheral vascular disease)   • E. 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   • Yeast UTI               LDA Wound       17 1005          Incision 17 1056 other (see comments) scrotum    Incision - Properties Group Placement Date: 17  -JW Placement Time: 1056JW Side: other (see comments)  -JW Location: scrotum  -JW    Incision WDL ex  -MF      Dressing Appearance moist drainage  -MF      Appearance drainage;redness  -MF      Drainage Characteristics/Odor serosanguineous  -MF      Drainage Amount scant  -MF      Wound Cleaning irrigated with;sterile normal saline  -MF      Wound Interventions debrided  -MF      Dressing foam;low-adherent  -MF      Packing  Incision packed with;other (see comments)   hydrofera blue  -        User Key  (r) = Recorded By, (t) = Taken By, (c) = Cosigned By    Initials Name Provider Type     Pee Roldan, PT Physical Therapist    JAY JAY Silveira, RN Registered Nurse            WOUND DEBRIDEMENT  Debridement Site 1  Location- Site 1: perineal wound  Selective Debridement- Site 1: Wound Surface <20cmsq (~5 cm2 total surface area)  Instruments- Site 1: tweezers, scissors  Excised Tissue Description- Site 1: minimum, slough  Bleeding- Site 1: none                  Adult Rehabilitation Note       04/18/17 1000 04/17/17 1400 04/16/17 1450    Rehab Assessment/Intervention    Discipline physical therapist  - physical therapist  - physical therapist  -    Document Type therapy note (daily note)  - therapy note (daily note)  - therapy note (daily note)  -    Subjective Information agree to therapy;no complaints;decreased LOC   RN gave ok for tx.  - agree to therapy;no complaints;decreased LOC   Pt confused, but stated he was fine when asked about pain.  - agree to therapy  -    Recorded by [] Pee Roldan, PT [] Pee Roldan, PT [MC] Rekha Mcgarry, PT    Pain Assessment    Pain Assessment Jay-Baker FACES  - Jay-Baker FACES  - Jay-Baker FACES  -    Jay-Kent FACES Pain Rating 0  - 0  -     Pain Score   0  -    Post Pain Score   0  -    Pain Intervention(s) Repositioned  -      Recorded by [] Pee Roldan, PT [] Pee Roldan, PT [MC] Rekha Mcgarry, PT    Cognitive Assessment/Intervention    Current Cognitive/Communication Assessment  impaired  -MF impaired  -    Orientation Status  oriented to;person  - oriented to;person  -    Follows Commands/Answers Questions  50% of the time;able to follow single-step instructions;needs cueing  - 75% of the time;able to follow single-step instructions;needs repetition  -    Recorded by  [] Pee Roldan, PT [MC]  Rekha Mcgarry, PT    Positioning and Restraints    Pre-Treatment Position in bed  -MF in bed  -MF in bed  -MC    Post Treatment Position bed  - bed  - bed  -MC    In Bed supine;call light within reach  -MF supine;call light within reach;with nsg  -MF supine;call light within reach;encouraged to call for assist;notified nsg  -MC    Recorded by [] Pee Roldan, PT [] Pee Roldan, PT [] Rekha Mcgarry, PT      User Key  (r) = Recorded By, (t) = Taken By, (c) = Cosigned By    Initials Name Effective Dates     Pee Roldan, PT 06/19/15 -     MC Rekha Mcgarry, PT 03/14/16 -                 IP PT Goals       04/16/17 1450 04/15/17 1455 04/14/17 1120    Wound Care PT LTG    Wound Care PT LTG 1, Outcome goal ongoing  -MC goal ongoing  -MC goal ongoing  -MC      04/11/17 1015 04/09/17 1551 04/08/17 1010    Bed Mobility PT LTG    Bed Mobility PT LTG, Outcome goal not met  -LS goal ongoing  -DR     Bed Mobility PT LTG, Reason Goal Not Met medical status inhibits participation  -LS      Transfer Training PT LTG    Transfer Training PT LTG, Outcome goal not met  -LS goal ongoing  -DR     Transfer Training PT LTG, Reason Goal Not Met medical status inhibits participation  -LS      Gait Training PT LTG    Gait Training Goal PT LTG, Outcome goal not met  -LS goal ongoing  -DR     Gait Training Goal PT LTG, Reason Goal Not Met medical status inhibits participation  -LS      Wound Care PT LTG    Wound Care PT LTG 1, Outcome   goal ongoing  -MF      04/06/17 1524 04/06/17 1025 04/05/17 0909    Bed Mobility PT LTG    Bed Mobility PT LTG, Date Established   04/05/17  -LS    Bed Mobility PT LTG, Time to Achieve   2 wks  -LS    Bed Mobility PT LTG, Activity Type   supine to sit/sit to supine  -LS    Bed Mobility PT LTG, [place] Level   supervision required  -LS    Bed Mobility PT LTG, Outcome goal ongoing  -LS      Transfer Training PT LTG    Transfer Training PT LTG, Date Established   04/05/17   -LS    Transfer Training PT LTG, Time to Achieve   2 wks  -LS    Transfer Training PT LTG, Activity Type   sit to stand/stand to sit  -LS    Transfer Training PT LTG, [place] Level   contact guard assist  -LS    Transfer Training PT LTG, Assist Device   walker, rolling  -LS    Transfer Training PT LTG, Outcome goal ongoing  -LS      Gait Training PT LTG    Gait Training Goal PT LTG, Date Established   04/05/17  -LS    Gait Training Goal PT LTG, Time to Achieve   2 wks  -LS    Gait Training Goal PT LTG, [place] Level   contact guard assist  -LS    Gait Training Goal PT LTG, Assist Device   walker, rolling  -LS    Gait Training Goal PT LTG, Distance to Achieve   200  -LS    Gait Training Goal PT LTG, Outcome goal ongoing  -LS      Wound Care PT LTG    Wound Care PT LTG 1, Date Established  04/06/17  -MC     Wound Care PT LTG 1, Time to Achieve  1 wk  -MC     Wound Care PT LTG 1, Location  Scrotum  -     Wound Care PT LTG 1, No S&S of Infection  yes  -     Wound Care PT LTG 1, Decrease Wound Size  10%  -     Wound Care PT LTG 1, Decrease Necrotic Tissue  50%  -     Wound Care PT LTG 1, Decrease Exudate  minimum  -     Wound Care PT LTG 1, No New Skin Break Down  yes  -     Wound Care PT LTG 1, Education  dressing changes;wound care  -     Wound Care PT LTG 1, Education Understanding  verbalize understanding  -       User Key  (r) = Recorded By, (t) = Taken By, (c) = Cosigned By    Initials Name Provider Type     Pee Roldan, PT Physical Therapist    ANDREWS Juárez, PT Physical Therapist    DUNCAN Mcgarry, PT Physical Therapist    DR Mikey Moore, PT Physical Therapist          Physical Therapy Education     Title: PT OT SLP Therapies (Active)     Topic: Physical Therapy (Active)     Point: Mobility training (Active)    Learning Progress Summary    Learner Readiness Method Response Comment Documented by Status   Patient Acceptance E VU  AP 04/18/17 0557 Done    Acceptance E NR    04/09/17 1551 Active    Acceptance E NR  EDOUARD 04/07/17 1534 Active    Acceptance E,D NR   04/06/17 1523 Active    Acceptance E,D NR   04/05/17 0908 Active   Family Acceptance E VU  AP 04/18/17 0557 Done   Significant Other Acceptance E,D NR   04/06/17 1523 Active    Acceptance E,D NR  LS 04/05/17 0908 Active               Point: Home exercise program (Active)    Learning Progress Summary    Learner Readiness Method Response Comment Documented by Status   Patient Acceptance E VU  AP 04/18/17 0557 Done    Acceptance E NR  EDOUARD 04/07/17 1534 Active    Acceptance E,D NR   04/06/17 1523 Active   Family Acceptance E VU  AP 04/18/17 0557 Done   Significant Other Acceptance E,D NR   04/06/17 1523 Active               Point: Body mechanics (Active)    Learning Progress Summary    Learner Readiness Method Response Comment Documented by Status   Patient Acceptance E VU  AP 04/18/17 0557 Done    Acceptance E NR   04/09/17 1551 Active    Acceptance E NR   04/07/17 1534 Active    Acceptance E,D NR   04/06/17 1523 Active    Acceptance E,D NR   04/05/17 0908 Active   Family Acceptance E VU  AP 04/18/17 0557 Done   Significant Other Acceptance E,D NR   04/06/17 1523 Active    Acceptance E,D NR   04/05/17 0908 Active               Point: Precautions (Active)    Learning Progress Summary    Learner Readiness Method Response Comment Documented by Status   Patient Acceptance E VU  AP 04/18/17 0557 Done    Acceptance E NR   04/09/17 1551 Active    Acceptance E NR  EDOUARD 04/07/17 1534 Active    Acceptance E,D NR   04/06/17 1523 Active    Acceptance E,D NR   04/05/17 0908 Active   Family Acceptance E VU  AP 04/18/17 0557 Done   Significant Other Acceptance E,D NR   04/06/17 1523 Active    Acceptance E,D NR   04/05/17 0908 Active                      User Key     Initials Effective Dates Name Provider Type Discipline     06/19/15 -  Anita Prado, PT Physical Therapist PT     06/19/15 -  Lily Juárez,  PT Physical Therapist PT    AP 06/16/16 -  Shahla Sherman, RN Registered Nurse Nurse     09/06/16 -  Mikey Moore PT Physical Therapist PT                   PT ASSESSMENT (last 72 hours)      PT Evaluation       04/18/17 1000 04/17/17 1400    Rehab Evaluation    Document Type therapy note (daily note)  - therapy note (daily note)  -MF    Subjective Information agree to therapy;no complaints;decreased LOC   RN gave ok for tx.  - agree to therapy;no complaints;decreased LOC   Pt confused, but stated he was fine when asked about pain.  -    Pain Assessment    Pain Assessment Jay-Baker FACES  - Jay-Baker FACES  -    Jay-Baker FACES Pain Rating 0  - 0  -    Pain Intervention(s) Repositioned  -     Cognitive Assessment/Intervention    Current Cognitive/Communication Assessment  impaired  -    Orientation Status  oriented to;person  -MF    Follows Commands/Answers Questions  50% of the time;able to follow single-step instructions;needs cueing  -    Positioning and Restraints    Pre-Treatment Position in bed  - in bed  -    Post Treatment Position bed  - bed  -    In Bed supine;call light within reach  - supine;call light within reach;with nsg  -      04/16/17 1450       Rehab Evaluation    Document Type therapy note (daily note)  -     Subjective Information agree to therapy  -     Pain Assessment    Pain Assessment Jay-Kent FACES  -     Pain Score 0  -     Post Pain Score 0  -     Cognitive Assessment/Intervention    Current Cognitive/Communication Assessment impaired  -     Orientation Status oriented to;person  -     Follows Commands/Answers Questions 75% of the time;able to follow single-step instructions;needs repetition  -     Positioning and Restraints    Pre-Treatment Position in bed  -     Post Treatment Position bed  -     In Bed supine;call light within reach;encouraged to call for assist;notified [facility]       User Key  (r) = Recorded By, (t) =  Taken By, (c) = Cosigned By    Initials Name Provider Type     Pee Roldan, PT Physical Therapist     Rekha Mcgarry PT Physical Therapist            PT Recommendation and Plan  Anticipated Discharge Disposition: inpatient rehabilitation facility  PT Frequency: daily    Plan Of Care Reviewed With: patient       Outcome Summary/Follow up Plan: hydrofera blue helping to better manage the exudate with dressing changes.  PT will cont with light debridement PRN and dressing management daily.  PT will cont to hold pulse lavage at this point with no significant s/s of infection and dressing / debridement managing slough well.              Time Calculation        PT Charges       04/18/17 1000          Time Calculation    Start Time 1000  -      PT Goal Re-Cert Due Date 04/25/17  -      Time Calculation- PT    Total Timed Code Minutes- PT 25 minute(s)  -        User Key  (r) = Recorded By, (t) = Taken By, (c) = Cosigned By    Initials Name Provider Type     Pee Roldan, PT Physical Therapist             Therapy Charges for Today     Code Description Service Date Service Provider Modifiers Qty    27674177456 HC ROSEANNA DEBRIDE OPEN WOUND UP TO 20CM 4/17/2017 Pee Roldan, PT GP 1    [id number] HC ROSEANNA DEBRIDE OPEN WOUND UP TO 20CM 4/18/2017 Pee Roldan, PT GP 1            PT G-Codes  Outcome Measure Options: AM-PAC 6 Clicks Basic Mobility (PT)        Pee Roldan, PT  4/18/2017

## 2017-04-18 NOTE — PLAN OF CARE
Problem: Patient Care Overview (Adult)  Goal: Plan of Care Review  Outcome: Ongoing (interventions implemented as appropriate)    04/18/17 1000   Coping/Psychosocial Response Interventions   Plan Of Care Reviewed With patient   Outcome Evaluation   Outcome Summary/Follow up Plan hydrofera blue helping to better manage the exudate with dressing changes. PT will cont with light debridement PRN and dressing management daily. PT will cont to hold pulse lavage at this point with no significant s/s of infection and dressing / debridement managing slough well.          Problem: Inpatient Physical Therapy  Goal: Wound Care Goal 1 LTG- PT  Outcome: Ongoing (interventions implemented as appropriate)    04/06/17 1025 04/16/17 1450   Wound Care PT LTG   Wound Care PT LTG 1, Date Established 04/06/17 --    Wound Care PT LTG 1, Time to Achieve 1 wk --    Wound Care PT LTG 1, Location Scrotum --    Wound Care PT LTG 1, No S&S of Infection yes --    Wound Care PT LTG 1, Decrease Wound Size 10% --    Wound Care PT LTG 1, Decrease Necrotic Tissue 50% --    Wound Care PT LTG 1, Decrease Exudate minimum --    Wound Care PT LTG 1, No New Skin Break Down yes --    Wound Care PT LTG 1, Education dressing changes;wound care --    Wound Care PT LTG 1, Education Understanding verbalize understanding --    Wound Care PT LTG 1, Outcome --  goal ongoing

## 2017-04-18 NOTE — PROGRESS NOTES
"[facility] Progress Note    Admission Date: 4/4/2017    Tomas Salvador  1955  [id number]    Date: 4/18/2017    Meds:    IV Anti-Infectives     Ordered     Dose/Rate Route Frequency Start Stop    04/04/17 1723  metroNIDAZOLE (FLAGYL) IVPB 500 mg     Ordering Provider:  Kike Leon MD    500 mg  100 mL/hr over 60 Minutes Intravenous Every 6 Hours 04/04/17 1800      04/04/17 1552  ceFAZolin in dextrose (ANCEF) IVPB solution 2 g     Ordering Provider:  TRESA Baca    2 g  over 30 Minutes Intravenous Every 8 Hours 04/04/17 1600            CC:  Delisa's gangrene    SUBJECTIVE:  4/4/17:Patient is a 61 y.o. 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 He has a history of underlying diabetes mellitus and severe systolic congestive heart failure. He presented to [facility], [place], operated by [facility] on 3/30 with scrotal cellulitis/gangrene. He underwent debridement by urology at [facility], [place], operated by [facility] on 3/31 and was treated with Merrem/clindamycin/vancomycin. He developed left facial droop and left sided paresis, prompting a transfer to [facility] for evaluation and therapy of acute stroke. His head CT angiogram was degraded by motion artifact but per Dr. Morrison there was suggestion of right inferior temporal lobe\" opacification\" consistent with a possible stroke. His left facial droop and left-sided paresis has improved today. He is encephalopathic and is unable to provide any reliable history. His wife thinks that he may have had some fevers prior to his admission on 3/30.    4/5/17: afebrile. No hx per patient secondary to encephalopathy.  Afebrile, restless per nsg staff.  Oliguric UOP.  No n/v/d.  No rashes.  4/6/17: More alert and less restless today.  Still non-responsive to questions and does not follow simple commands.  Still with left-sided weakness.  Afebrile.  Still with oliguric UOP.  No n/v/d, no rashes, per nsg staff notes.   4/7/17:  Alert today.  No " fever,chills,sweats.  Wife at bedside with ?s   4/10/17:  Wants to go home.  Wound care per PT.  No n/v/d.   17:  Now on Dopamine and Bautista, bipap.  Decompensated last pm.    17:  Somnolent; still on pressors   17 Still on pressors/Precedex; Now on bipap.  Had pulled corpak out night before.    17: Per RN staff notes: afebrile, on/off precedex for agitation, on dopamine, on flagyl and cefazolin, on and off bipap.  Can not keep clothes on him.  Attempted to do Is and Os but too difficult without his foreskin getting pulled into meatus, so therefore, just placed Cueto cath.  Not anchored, because he keeps trying to pull it out.  Would is getting daily misted by PT and packed.  He is more alert today, but still not oriented.  4/15/17: He is improved today and off of vasopressor therapy.  He has been receiving some diuretic therapy for pulmonary edema.  He is less confused.  He remains afebrile.  17: He is continued to receive diuretic therapy.  He remains off of vasopressor therapy.  He has remained afebrile  17:  Remains restrained.  Opens eyes to voice.  Nonproductive cough.   17:  Opens eyes.  Does not follow commands.  His Cueto catheter is still in place and he is undergoing diuresis for his CHF/pulmonary edema.      PE:   Vital Signs  Temp (24hrs), Av.3 °F (36.8 °C), Min:97.6 °F (36.4 °C), Max:98.8 °F (37.1 °C)    Temp  Min: 97.6 °F (36.4 °C)  Max: 98.8 °F (37.1 °C)  BP  Min: 83/69  Max: 113/76  Pulse  Min: 79  Max: 138  Resp  Min: 14  Max: 22  SpO2  Min: 74 %  Max: 100 %    GENERAL:   He is in no acute distress, more alert.   HEENT: Normocephalic, atraumatic. PERRL. EOMI. No conjunctival injection. No icterus. Oropharynx clear without evidence of thrush or exudate.   HEART: RRR; No murmur, rubs, gallops.   LUNGS: few scattered rhonchi anteriorly   ABDOMEN: Soft, nontender, nondistended. Positive bowel sounds. No rebound or guarding. NO mass or HSM.  EXT: No cyanosis, clubbing  or edema. No cord.  : The lower, posterior midline scrotal wounds are continuing to improve with decreased slough and drainage.  A Cueto catheter is in place  MSK: FROM without joint effusions noted arms/legs.   SKIN: Warm and dry without cutaneous eruptions on Inspection/palpation.   NEURO: more alert but not oriented.  Right PICC without redness      Laboratory Data      Results from last 7 days  Lab Units 04/18/17  0358 04/17/17  0034 04/15/17  0444   WBC 10*3/mm3 11.60* 9.34 11.16*   HEMOGLOBIN g/dL 10.0* 9.8* 10.4*   HEMATOCRIT % 32.7* 32.4* 34.1*   PLATELETS 10*3/mm3 352 306 377       Results from last 7 days  Lab Units 04/18/17  0358   SODIUM mmol/L 145   POTASSIUM mmol/L 4.1   CHLORIDE mmol/L 105   TOTAL CO2 mmol/L 38.0*   BUN mg/dL 51*   CREATININE mg/dL 0.70   GLUCOSE mg/dL 171*   CALCIUM mg/dL 9.0       Results from last 7 days  Lab Units 04/18/17  0358   ALK PHOS U/L 102*   BILIRUBIN mg/dL 0.2*   ALT (SGPT) U/L 6*   AST (SGOT) U/L 30       Results from last 7 days  Lab Units 04/18/17  0358   SED RATE mm/hr 92*       Results from last 7 days  Lab Units 04/17/17  1155   CRP mg/dL 2.82*                 Estimated Creatinine Clearance: 126.4 mL/min (by C-G formula based on Cr of 0.7).    Microbiology:  Blood Culture   Date Value Ref Range Status   03/30/2017 No growth at 5 days  Final   03/30/2017 No growth at 5 days  Final           Urine Culture   Date Value Ref Range Status   04/04/2017 No growth  Preliminary   03/31/2017 No growth  Final   03/30/2017 >100,000 CFU/mL Escherichia coli (A)  Final      scrotal cx x 2 cxs (3/31) E. coli  Cefazolin sens.    Urine Culture   Date Value Ref Range Status   04/11/2017 No growth at 2 days  Final   04/07/2017 >100,000 CFU/mL Candida glabrata (A)  Final         Radiology:  Imaging Results (last 24 hours)     Procedure Component Value Units Date/Time    XR Chest 1 View [[id number]] Collected:  04/16/17 0852     Updated:  04/17/17 1113    Narrative:          EXAMINATION:  XR CHEST 1 VW - 04/16/2017     INDICATION: R13.10-Dysphagia, unspecified; Z74.09-Other reduced  mobility;  R41.841-Cognitive communication deficit.     COMPARISON: 4/15/2017.     FINDINGS: Portable chest reveals low lung volumes. The heart is  enlarged. Increased pulmonary vascularity seen bilaterally is unchanged  in the interval. Ill-defined opacification seen at the left lung base  with small right pleural effusion. Patient is status post median  sternotomy. Lines and tubes in satisfactory position.           Impression:       Stable chest as above.     DICTATED:     04/16/2017  EDITED:         04/16/2017     This report was finalized on 4/17/2017 11:11 AM by Dr. Megan Posey MD.       XR Chest 1 View [[id number]] Collected:  04/17/17 0910     Updated:  04/17/17 1121    Narrative:       EXAMINATION: XR CHEST 1 VIEW-04/17/2017:      INDICATION: CHF; R13.10-Dysphagia, unspecified; Z74.09-Other reduced  mobility; Z74.09-Other reduced mobility; R41.841-Cognitive communication  deficit.      COMPARISON: 04/16/2017.     FINDINGS: Portable chest reveals lines and tubes to be in satisfactory  position. Lung volumes are low. The heart is enlarged. Increased  markings seen throughout the right lung and left lung base. Findings are  stable and unchanged. Small bilateral pleural effusions are present.           Impression:       Stable chest as above.     D:  04/17/2017  E:  04/17/2017     This report was finalized on 4/17/2017 11:18 AM by Dr. Megan Posey MD.           I readHis 4/16 chest x-ray.  He has decreased pulmonary edema.    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  I will plan to discontinue his antibiotic therapy.  2. Acute right hemispheric cerebral vascular accident.  3. Acute toxic metabolic encephalopathy-secondary to sepsis and cerebral vascular accident  4. 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 Severe systolic congestive  heart failure  6. Escherichia coli urinary tract infection-improved  7. Sepsis-secondary to Delisa's gangrene, now improved  8. Leukocytosis/neutrophilia-secondary to scrotal gangrene/cellulitis, improved   9.  Pulmonary edema-improving  10. Candiduria--this resolved with removal of his Cueto catheter.  The treatment for his previous candiduria was removal of the Cueto catheter.  11. Cardiogenic shock-with pulmonary edema, now significantly improved.         PLAN/RECOMMENDATIONS:   1.  Discontinue cefazolin and Flagyl  2.  Continue scrotal wound care  3.  Remove the Cueto catheter when feasible  4.  Avoid obtaining a urinalysis and urine culture unless he is symptomatic      Dr. Leon has obtained the history, performed the physical exam and formulated the above treatment plan.     TRESA Aj  4/18/2017  8:25 AM

## 2017-04-18 NOTE — PAYOR COMM NOTE
"Tomas Gtz (61 y.o. Male)     Date of Birth Social Security Number Address Home Phone MRN    1955  [address]   [place] [address] [phone] [id number]    Sabianism Marital Status          None        Admission Date Admission Type Admitting Provider Attending Provider Department, Room/Bed    4/4/17 Elective Ella Pope MD Gerhardstein, Donna C, MD [facility] 2B ICU, N226/1    Discharge Date Discharge Disposition Discharge Destination                      Attending Provider: Ella Pope MD     Allergies:  No Known Allergies    Isolation:  None   Infection:  None   Code Status:  Conditional    Ht:  69\" (175.3 cm)   Wt:  216 lb 7.9 oz (98.2 kg)    Admission Cmt:  None   Principal Problem:  CVA 4/4/17 with rapid improvement [I63.9]                 Active Insurance as of 4/4/2017     Primary Coverage     Payor Plan Insurance Group Employer/Plan Group    [employer] BLUE CROSS [facility] PPO [id number]     Payor Plan Address Payor Plan Phone Number Effective From Effective To    [address] [phone] 11/1/2015     [place] [address]       Subscriber Name Subscriber Birth Date Member ID       SANJUANA GTZ 1955 [id number]                 Emergency Contacts      (Rel.) Home Phone Work Phone Mobile Phone    Sanjuana Gtz [phone] -- --    KiaMirta (Son) -- [phone] --    Prabhu Peter (Son) [phone] --            Vital Signs (last 24 hours)       04/17 0700  -  04/18 0659 04/18 0700  -  04/18 0959   Most Recent    Temp (°F) 97.6 -  98.8       98.2 (36.8)    Heart Rate 79 -  (!)138    112 -  (!)129     (!) 129    Resp 14 -  22      20     20    BP (!)83/59 -  113/76      113/74     113/74    SpO2 (%) (!)74 -  100      100     100          Hospital Medications (active)       Dose Frequency Start End    apixaban (ELIQUIS) tablet 5 mg 5 mg Every 12 Hours Scheduled 4/6/2017     Sig - Route: " Take 1 tablet by mouth Every 12 (Twelve) Hours. - Oral    aspirin chewable tablet 324 mg 324 mg Daily 4/17/2017     Sig - Route: Chew 4 tablets Daily. - Oral    bisacodyl (DULCOLAX) suppository 10 mg 10 mg Daily PRN 4/12/2017     Sig - Route: Insert 1 suppository into the rectum Daily As Needed for Constipation. - Rectal    carvedilol (COREG) tablet 12.5 mg 12.5 mg Every 12 Hours Scheduled 4/16/2017     Sig - Route: Take 1 tablet by mouth Every 12 (Twelve) Hours. - Oral    ceFAZolin in dextrose (ANCEF) IVPB solution 2 g 2 g Every 8 Hours 4/4/2017     Sig - Route: Infuse 100 mL into a venous catheter Every 8 (Eight) Hours. - Intravenous    dextrose (D50W) solution 25 g 25 g Every 15 Minutes PRN 4/4/2017     Sig - Route: Infuse 50 mL into a venous catheter Every 15 (Fifteen) Minutes As Needed for Low Blood Sugar (Blood Sugar Less Than 70, Patient Has IV Access - Unresponsive, NPO or Unable To Safely Swallow). - Intravenous    dextrose (GLUTOSE) oral gel 15 g 15 g Every 15 Minutes PRN 4/4/2017     Sig - Route: Take 15 g by mouth Every 15 (Fifteen) Minutes As Needed for Low Blood Sugar (Blood Sugar Less Than 70, Patient Alert, Is Not NPO & Can Safely Swallow). - Oral    fentaNYL (DURAGESIC) 25 MCG/HR patch 1 patch 1 patch Every 72 Hours 4/5/2017 4/22/2017    Sig - Route: Place 1 patch on the skin Every 72 (Seventy-Two) Hours. - Transdermal    furosemide (LASIX) injection 40 mg 40 mg Daily 4/14/2017     Sig - Route: Infuse 4 mL into a venous catheter Daily. - Intravenous    glucagon (GLUCAGEN) injection 1 mg 1 mg Every 15 Minutes PRN 4/4/2017     Sig - Route: Inject 1 mg under the skin Every 15 (Fifteen) Minutes As Needed (Blood Glucose Less Than 70 - Patient Without IV Access - Unresponsive, NPO or Unable To Safely Swallow). - Subcutaneous    heparin flush (porcine) 100 UNIT/ML injection 500 Units 500 Units Every 12 Hours Scheduled 4/5/2017     Sig - Route: 5 mL by Intracatheter route Every 12 (Twelve) Hours. -  "Intracatheter    Cosign for Ordering: Accepted by Ella Pope MD on 4/5/2017  2:40 PM    HYDROcodone-acetaminophen (NORCO) 7.5-325 MG per tablet 1 tablet 1 tablet Every 4 Hours PRN 4/6/2017 4/26/2017    Sig - Route: Take 1 tablet by mouth Every 4 (Four) Hours As Needed for Moderate Pain (4-6). - Oral    insulin detemir (LEVEMIR) injection 25 Units 25 Units Every 12 Hours Scheduled 4/16/2017     Sig - Route: Inject 25 Units under the skin Every 12 (Twelve) Hours. - Subcutaneous    insulin lispro (humaLOG) injection 0-24 Units 0-24 Units Every 6 Hours Scheduled 4/17/2017     Sig - Route: Inject 0-24 Units under the skin Every 6 (Six) Hours. - Subcutaneous    ipratropium-albuterol (DUO-NEB) nebulizer solution 3 mL 3 mL Every 4 Hours PRN 4/7/2017     Sig - Route: Take 3 mL by nebulization Every 4 (Four) Hours As Needed for Shortness of Air. - Nebulization    ipratropium-albuterol (DUO-NEB) nebulizer solution 3 mL 3 mL 4 Times Daily - RT 4/10/2017     Sig - Route: Take 3 mL by nebulization 4 (Four) Times a Day. - Nebulization    LORazepam (ATIVAN) injection 1 mg 1 mg Nightly 4/15/2017 4/25/2017    Sig - Route: Infuse 0.5 mL into a venous catheter Every Night. - Intravenous    Magnesium Sulfate 2 gram Bolus, followed by 8 gram infusion (total Mg dose 10 grams)- Mg less than or equal to 1mg/dL 2 g As Needed 4/7/2017     Sig - Route: Infuse 50 mL into a venous catheter As Needed (Mg less than or equal to 1mg/dL). - Intravenous    Linked Group 1:  \"Or\" Linked Group Details        magnesium sulfate 4 gram infusion- Mg 1.6-1.9 mg/dL 4 g As Needed 4/7/2017     Sig - Route: Infuse 100 mL into a venous catheter As Needed (Mg 1.6-1.9 mg/dL). - Intravenous    Linked Group 1:  \"Or\" Linked Group Details        Magnesium Sulfate 6 gram Infusion (2 gm x 3) -Mg 1.1 -1.5 mg/dL 2 g As Needed 4/7/2017     Sig - Route: Infuse 50 mL into a venous catheter As Needed (Mg 1.1 -1.5 mg/dL). - Intravenous    Linked Group 1:  \"Or\" Linked " "Group Details        metroNIDAZOLE (FLAGYL) IVPB 500 mg 500 mg Every 6 Hours 4/4/2017     Sig - Route: Infuse 100 mL into a venous catheter Every 6 (Six) Hours. - Intravenous    pantoprazole (PROTONIX) injection 40 mg 40 mg Every Early Morning 4/11/2017     Sig - Route: Infuse 10 mL into a venous catheter Every Morning. - Intravenous    potassium & sodium phosphates (PHOS-NAK) 280-160-250 MG packet - for Phosphorus 1.25 - 2.1 mg/dL 2 packet Once As Needed 4/17/2017     Sig - Route: Take 2 packets by mouth 1 (One) Time As Needed (Phosphorus 1.25 - 2.1 mg/dL). - Oral    Linked Group 2:  \"Or\" Linked Group Details        potassium & sodium phosphates (PHOS-NAK) 280-160-250 MG packet - for Phosphorus less than 1.25 mg/dL 2 packet Every 6 Hours PRN 4/17/2017     Sig - Route: Take 2 packets by mouth Every 6 (Six) Hours As Needed (Phosphorus less than 1.25 mg/dL). - Oral    Linked Group 2:  \"Or\" Linked Group Details        potassium chloride 20 mEq in 50 mL IVPB 20 mEq Every 1 Hour PRN 4/8/2017     Sig - Route: Infuse 50 mL into a venous catheter Every 1 (One) Hour As Needed (potassium protocol CENTRAL IV - see admin instructions). - Intravenous    Cosign for Ordering: Accepted by Ella Pope MD on 4/9/2017 11:56 AM    Linked Group 3:  \"Or\" Linked Group Details        promethazine (PHENERGAN) injection 12.5 mg 12.5 mg Every 6 Hours PRN 4/6/2017     Sig - Route: Infuse 0.5 mL into a venous catheter Every 6 (Six) Hours As Needed for Nausea or Vomiting. - Intravenous    saccharomyces boulardii (FLORASTOR) capsule 250 mg 250 mg 2 Times Daily 4/7/2017     Sig - Route: Take 1 capsule by mouth 2 (Two) Times a Day. - Oral    sodium chloride 0.9 % flush 1-10 mL 1-10 mL As Needed 4/4/2017     Sig - Route: Infuse 1-10 mL into a venous catheter As Needed for Line Care. - Intravenous    phenylephrine (ARCELIA-SYNEPHRINE) 1 % nasal spray 1 spray (Discontinued) 1 spray Every 6 Hours PRN 4/10/2017 4/17/2017    Sig - Route: 1 spray by " Each Nare route Every 6 (Six) Hours As Needed for Congestion. - Each Nare    phenylephrine (ARCELIA-SYNEPHRINE) in 0.9% NS 50 mg/250 mL infusion (Discontinued) 0.5-3 mcg/kg/min × 108 kg Titrated 4/10/2017 4/17/2017    Sig - Route: Infuse  mcg/min into a venous catheter Dose Adjusted By Provider As Needed. - Intravenous          Lab Results (last 24 hours)     Procedure Component Value Units Date/Time    POC Glucose Fingerstick [[id number]]  (Abnormal) Collected:  04/17/17 1138    Specimen:  Blood Updated:  04/17/17 1140     Glucose 238 (H) mg/dL     Narrative:       Meter: [id number] : [id number] Kiko Khan    Urinalysis [[id number]]  (Abnormal) Collected:  04/17/17 1126    Specimen:  Urine from Urine, Catheter Updated:  04/17/17 1200     Color, UA Yellow     Appearance, UA Clear     pH, UA 5.5     Specific Gravity, UA 1.016     Glucose, UA Negative     Ketones, UA Negative     Bilirubin, UA Negative     Blood, UA Large (3+) (A)     Protein, UA Negative     Leuk Esterase, UA Small (1+) (A)     Nitrite, UA Negative     Urobilinogen, UA 0.2 E.U./dL    C-reactive Protein [[id number]]  (Abnormal) Collected:  04/17/17 1155    Specimen:  Blood Updated:  04/17/17 1256     C-Reactive Protein 2.82 (H) mg/dL     Prealbumin [[id number]]  (Abnormal) Collected:  04/17/17 1155    Specimen:  Blood Updated:  04/17/17 1256     Prealbumin 8.3 (L) mg/dL     Calcium, Ionized [[id number]]  (Normal) Collected:  04/17/17 1155    Specimen:  Blood Updated:  04/17/17 1257     Ionized Calcium 1.18 mmol/L     Comprehensive Metabolic Panel [[id number]]  (Abnormal) Collected:  04/17/17 1155    Specimen:  Blood Updated:  04/17/17 1257     Glucose 238 (H) mg/dL      BUN 45 (H) mg/dL      Creatinine 0.70 mg/dL      Sodium 145 mmol/L      Potassium 4.0 mmol/L      Chloride 105 mmol/L      CO2 37.0 (H) mmol/L      Calcium 9.0 mg/dL      Total Protein 7.4 g/dL      Albumin 3.10 (L) g/dL      ALT (SGPT) 5 (L) U/L      AST (SGOT) 27 U/L      Alkaline  Phosphatase 91 U/L      Total Bilirubin 0.3 mg/dL      eGFR Non African Amer 114 mL/min/1.73      Globulin 4.3 gm/dL      A/G Ratio 0.7 (L) g/dL      BUN/Creatinine Ratio 64.3 (H)     Anion Gap 3.0 mmol/L     Narrative:       National Kidney Foundation Guidelines    Stage                           Description                             GFR                      1                               Normal or High                          90+  2                               Mild decrease                            60-89  3                               Moderate decrease                   30-59  4                               Severe decrease                       15-29  5                               Kidney failure                             <15    POC Glucose Fingerstick [[id number]]  (Abnormal) Collected:  04/17/17 1636    Specimen:  Blood Updated:  04/17/17 1638     Glucose 229 (H) mg/dL     Narrative:       Meter: [id number] : [id number] Hoover Bill    POC Glucose Fingerstick [[id number]]  (Abnormal) Collected:  04/17/17 1757    Specimen:  Blood Updated:  04/17/17 1759     Glucose 189 (H) mg/dL     Narrative:       Meter: [id number] : [id number] Kiko Bill    POC Glucose Fingerstick [[id number]]  (Abnormal) Collected:  04/17/17 2300    Specimen:  Blood Updated:  04/17/17 2302     Glucose 168 (H) mg/dL     Narrative:       Meter: [id number] : [id number] Omega Chen    Blood Gas, Arterial [[id number]]  (Abnormal) Collected:  04/18/17 0447    Specimen:  Arterial Blood Updated:  04/18/17 0452     Site Arterial: left brachial     Paulino's Test Positive     pH, Arterial 7.438 pH units      pCO2, Arterial 51.7 (H) mm Hg      pO2, Arterial 96.7 mm Hg      HCO3, Arterial 35.0 (H) mmol/L      Base Excess, Arterial 9.9 (H) mmol/L      Hemoglobin, Blood Gas 10.2 (L) g/dL      Hematocrit, Blood Gas 31.1 %      Oxyhemoglobin 95.7 %      Methemoglobin 0.7 %      Carboxyhemoglobin 1.9 %      CO2 Content 36.6 (H)      Barometric Pressure for Blood Gas -- mmHg       N/A        Modality Cannula - Nasal     FIO2 28 %     POC Glucose Fingerstick [[id number]]  (Abnormal) Collected:  04/18/17 0516    Specimen:  Blood Updated:  04/18/17 0522     Glucose 200 (H) mg/dL     Narrative:       Meter: [id number] : [id number] Omega Chen    Comprehensive Metabolic Panel [[id number]]  (Abnormal) Collected:  04/18/17 0358    Specimen:  Blood Updated:  04/18/17 0524     Glucose 171 (H) mg/dL      BUN 51 (H) mg/dL      Creatinine 0.70 mg/dL      Sodium 145 mmol/L      Potassium 4.1 mmol/L      Chloride 105 mmol/L      CO2 38.0 (H) mmol/L      Calcium 9.0 mg/dL      Total Protein 7.3 g/dL      Albumin 3.20 g/dL      ALT (SGPT) 6 (L) U/L      AST (SGOT) 30 U/L      Alkaline Phosphatase 102 (H) U/L      Total Bilirubin 0.2 (L) mg/dL      eGFR Non African Amer 114 mL/min/1.73      Globulin 4.1 gm/dL      A/G Ratio 0.8 (L) g/dL      BUN/Creatinine Ratio 72.9 (H)     Anion Gap 2.0 (L) mmol/L     Narrative:       National Kidney Foundation Guidelines    Stage                           Description                             GFR                      1                               Normal or High                          90+  2                               Mild decrease                            60-89  3                               Moderate decrease                   30-59  4                               Severe decrease                       15-29  5                               Kidney failure                             <15    Magnesium [[id number]]  (Normal) Collected:  04/18/17 0358    Specimen:  Blood Updated:  04/18/17 0524     Magnesium 2.1 mg/dL     Phosphorus [[id number]]  (Normal) Collected:  04/18/17 0358    Specimen:  Blood Updated:  04/18/17 0524     Phosphorus 2.4 mg/dL     Lactate Dehydrogenase [[id number]]  (Abnormal) Collected:  04/18/17 0358    Specimen:  Blood Updated:  04/18/17 0524      (H) U/L     CBC & Differential  [[id number]] Collected:  04/18/17 0358    Specimen:  Blood Updated:  04/18/17 0636    Narrative:       The following orders were created for panel order CBC & Differential.  Procedure                               Abnormality         Status                     ---------                               -----------         ------                     Manual Differential[[id number]]           Abnormal            Final result               Scan Slide[[id number]]                                        Final result               CBC Auto Differential[[id number]]         Abnormal            Final result                 Please view results for these tests on the individual orders.    CBC Auto Differential [[id number]]  (Abnormal) Collected:  04/18/17 0358    Specimen:  Blood Updated:  04/18/17 0636     WBC 11.60 (H) 10*3/mm3      RBC 3.35 (L) 10*6/mm3      Hemoglobin 10.0 (L) g/dL      Hematocrit 32.7 (L) %      MCV 97.6 fL      MCH 29.9 pg      MCHC 30.6 (L) g/dL      RDW 17.8 (H) %      RDW-SD 57.9 (H) fl      MPV 12.1 (H) fL      Platelets 352 10*3/mm3     Scan Slide [[id number]] Collected:  04/18/17 0358    Specimen:  Blood Updated:  04/18/17 0636     Scan Slide --      See Manual Differential Results       Manual Differential [[id number]]  (Abnormal) Collected:  04/18/17 0358    Specimen:  Blood Updated:  04/18/17 0636     Neutrophil % 70.0 %      Lymphocyte % 15.0 (L) %      Monocyte % 8.0 %      Eosinophil % 7.0 (H) %      Basophil % 0.0 %      Neutrophils Absolute 8.12 10*3/mm3      Lymphocytes Absolute 1.74 10*3/mm3      Monocytes Absolute 0.93 10*3/mm3      Eosinophils Absolute 0.81 (H) 10*3/mm3      Basophils Absolute 0.00 10*3/mm3      RBC Morphology Normal     WBC Morphology Normal     Platelet Morphology Normal    Sedimentation Rate [[id number]]  (Abnormal) Collected:  04/18/17 0358    Specimen:  Blood Updated:  04/18/17 0643     Sed Rate 92 (H) mm/hr     Urine Culture [[id number]]  (Normal) Collected:  04/17/17 1126    Specimen:   Urine from Urine, Catheter Updated:  04/18/17 0824     Urine Culture No growth        Imaging Results (last 24 hours)     Procedure Component Value Units Date/Time    XR Chest 1 View [[id number]] Collected:  04/16/17 0852     Updated:  04/17/17 1113    Narrative:          EXAMINATION: XR CHEST 1 VW - 04/16/2017     INDICATION: R13.10-Dysphagia, unspecified; Z74.09-Other reduced  mobility;  R41.841-Cognitive communication deficit.     COMPARISON: 4/15/2017.     FINDINGS: Portable chest reveals low lung volumes. The heart is  enlarged. Increased pulmonary vascularity seen bilaterally is unchanged  in the interval. Ill-defined opacification seen at the left lung base  with small right pleural effusion. Patient is status post median  sternotomy. Lines and tubes in satisfactory position.           Impression:       Stable chest as above.     DICTATED:     04/16/2017  EDITED:         04/16/2017     This report was finalized on 4/17/2017 11:11 AM by Dr. Megan Posey MD.       XR Chest 1 View [[id number]] Collected:  04/17/17 0910     Updated:  04/17/17 1121    Narrative:       EXAMINATION: XR CHEST 1 VIEW-04/17/2017:      INDICATION: CHF; R13.10-Dysphagia, unspecified; Z74.09-Other reduced  mobility; Z74.09-Other reduced mobility; R41.841-Cognitive communication  deficit.      COMPARISON: 04/16/2017.     FINDINGS: Portable chest reveals lines and tubes to be in satisfactory  position. Lung volumes are low. The heart is enlarged. Increased  markings seen throughout the right lung and left lung base. Findings are  stable and unchanged. Small bilateral pleural effusions are present.           Impression:       Stable chest as above.     D:  04/17/2017  E:  04/17/2017     This report was finalized on 4/17/2017 11:18 AM by Dr. Megan Posey MD.              Physician Progress Notes (last 24 hours) (Notes from 4/17/2017  9:59 AM through 4/18/2017  9:59 AM)      Michael Gaona MD at 4/17/2017 12:33 PM  Version 2 of 2          Intensivist Note     4/17/2017  Hospital Day: 13      Mr. Tomas Salvador, 61 y.o. male is followed for:  Principal Problem:    CVA 4/4/17 with rapid improvement  Active Problems:    S/P Sepsis due to  source 3/30/17    Epididymo-orchitis    Fourniers with E-Coli Cellulitis. Debrided in [place] 3/30/17    Urinary retention requiring colbert    E. coli UTI     Yeast UTI    Ischemic cardiomyopathy with EF 10% on Echo 4/4/17    ICD (implantable cardioverter-defibrillator) in place, implantation 2015.    CAD s/p CABG 2005, C 2015. Grafts patent except SVG to diagonal    Encephalopathy acute    Diabetes Mellitus HgbA1c 10.4    Chronic hypoxic respiratory failure on nocturnal o2    Hx of PVD (peripheral vascular disease)       SUBJECTIVE     Subjective    61-year-old white male with multiple medical problems as noted above. Severe ischemic cardiomyopathy with EF 10%, previous CABG in 2005, requirement for ICD for primary prophylaxis, diabetes mellitus, chronic home nocturnal oxygen. He is a known poorly controlled diabetic (hemoglobin A1c 10.4) and presented to [place] with sepsis due to a urologic source 3/30/17. 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 Subsequent to his surgery in [place], he developed neurologic symptoms suggestive of a stroke and was transferred to [facility]/4/17. Workup just showed an old left frontal stroke and he apparently fully recovered (questionable TIA). While here he developed atrial fibrillation with rapid ventricular response for which he received amiodarone. He converted to sinus rhythm. He was initially placed on a heparin drip but is now anticoagulated on Eliquis. He subsequently developed some hypotension and bradycardia requiring dopamine and phenylephrine. Coreg, amiodarone, and Entresto were all stopped. Hypotension persisted so Precedex which had been used for agitation was also stopped and he subsequently improved.  "He then had some problems with pulmonary edema but responded to diuretics with improvement.    The present time he continues on cefazolin and Flagyl for his Escherichia coli scrotal infection and Escherichia coli UTI. His Cueto catheter was removed but unfortunately he had urinary retention of greater than a liter requiring in and out catheterization. This had to be stopped because of his scrotal and penile edema as catheterization was difficult and he now continues to have a Cueto in place. 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 Swallowing remains a  Problem and since he could not safely swallow was on TPN initially. It took quite a while to get a Corpak in place but is finally in, and bridled in position. He remains on enteral feeds and off TPN.      The patient's relevant past medical, surgical and social history were reviewed and updated in Epic as appropriate.    OBJECTIVE     /78  Pulse (!) 126  Temp 98.2 °F (36.8 °C) (Oral)   Resp 20  Ht 69\" (175.3 cm)  Wt 216 lb 7.9 oz (98.2 kg)  SpO2 98%  BMI 31.97 kg/m2  Oxygen Concentration (%): 30  Flow (L/min): 2    Flowsheet Rows         First Filed Value    Admission Height  69\" (175.3 cm) Documented at 04/05/2017 0919    Admission Weight  237 lb (108 kg) Documented at 04/05/2017 0919        Intake & Output (last day)       04/16 0701 - 04/17 0700 04/17 0701 - 04/18 0700    I.V. (mL/kg) 1020 (10.4) 28 (0.3)    Other 607 260    NG/GT 1252 270    IV Piggyback 500 200         Irrigation 600     Total Intake(mL/kg) 4496 (45.8) 758 (7.7)    Urine (mL/kg/hr) 650 (0.3) 600 (1.1)    Total Output 650 600    Net +3846 +158                Objective    Exam:  General Exam:  Middle-aged white male appearing much older than stated age  HEENT: Pupils equal and reactive. Nose and throat clear. Nasoduodenal tube in place  Neck:                          Supple, no JVD, thyromegaly, or adenopathy  Lungs: Bilateral " rhonchi  Cardiovascular: Irregularly irregular rhythm with a variable S1 and normal S2. Atrial fibrillation on monitor with a heart rate of 124  Abdomen: Soft nontender without organomegaly or masses. Tolerating enteral feeds   and rectal: Cueto in place  Extremities: No cyanosis clubbing edema. Right PICC line in place  Neurologic:                 Confused, agitated, requiring restraints. Exam however is symmetric    Chest X-Ray: Persistent cardiomegaly. Persistent bilateral lower lobe infiltrates (congestive versus pneumonic). If anything seem worse than on admission    INFUSIONS    phenylephrine 0.5-3 mcg/kg/min Last Rate: Stopped (04/14/17 0048)         Results from last 7 days  Lab Units 04/17/17  0034 04/15/17  0444 04/13/17  0609   WBC 10*3/mm3 9.34 11.16* 11.19*   HEMOGLOBIN g/dL 9.8* 10.4* 11.5*   HEMATOCRIT % 32.4* 34.1* 36.8*   PLATELETS 10*3/mm3 306 377 386       Results from last 7 days  Lab Units 04/17/17  0033 04/16/17 0448   SODIUM mmol/L 145 138   POTASSIUM mmol/L 3.6 3.7   CHLORIDE mmol/L 105 101   TOTAL CO2 mmol/L 35.0* 33.0*   BUN mg/dL 40* 32*   CREATININE mg/dL 0.60 0.70   GLUCOSE mg/dL 149* 346*   CALCIUM mg/dL 8.9 9.3       Results from last 7 days  Lab Units 04/17/17  0033 04/16/17 0448 04/15/17  0444   MAGNESIUM mg/dL 2.0 2.0 1.9   PHOSPHORUS mg/dL 2.0* 2.5 3.0       No results found for: SEDRATE  Lab Results   Component Value Date    BNP 3279.0 (H) 04/15/2017     Lab Results   Component Value Date    CKTOTAL 54 04/04/2017    CKMB 1.22 03/31/2017    CKMBINDEX 3.8 (H) 03/31/2017    TROPONINI <0.006 04/04/2017     Lab Results   Component Value Date    TSH 0.656 04/05/2017     Lab Results   Component Value Date    LACTATE 0.8 04/02/2017     No results found for: CORTISOL          Non-Invasive Ventilation Settings     Start     Ordered    04/17/17 1018  . BIPAP; Titrate for SPO2: Greater Than or Equal To, 90%  At Bedtime & As Needed-RT     Question Answer Comment   . BIPAP    Titrate for  SPO2 Greater Than or Equal To    Titrate for SPO2 90%        04/17/17 1017       I reviewed the patient's results, images and medication.    Assessment/Plan   ASSESSMENT      Principal Problem:    CVA 4/4/17 with rapid improvement  Active Problems:    S/P Sepsis due to  source 3/30/17    Epididymo-orchitis    Fourniers with E-Coli Cellulitis. Debrided in [place] 3/30/17    Urinary retention requiring colbert    E. coli UTI     Yeast UTI    Ischemic cardiomyopathy with EF 10% on Echo 4/4/17    ICD (implantable cardioverter-defibrillator) in place, implantation 2015.    CAD s/p CABG 2005, LHC 2015. Grafts patent except SVG to diagonal    Encephalopathy acute    Diabetes Mellitus HgbA1c 10.4    Chronic hypoxic respiratory failure on nocturnal o2    Hx of PVD (peripheral vascular disease)      DISCUSSION: Still not doing well. Sodium and BUN are increasing and needs more free water. I like to take his Colbert out and do in and out catheterization, but catheterization has been difficult because of penile and scrotal edema.    PLAN     1. Consider irrigating Colbert since it is unable to be removed at this time  2. Defer to ID regarding therapy of Candida glabrata cystitis. Probably cannot clear it up as long as Colbert is in place.  3. Needs increased free water  4. Mobilize and physical therapy  5. Continue BiPAP at night to reduce work of breathing  6. Strict I and O and daily weights  7. Watch for development of hospital acquired pneumonia  8. Continue aggressive pulmonary toilet    Plan of care and goals reviewed with mulitdisciplinary team at daily rounds.    I discussed the patient's findings and my recommendations with nursing staff    Time spent Critical care 35 min (It does not include procedure time).    Michael Gaona MD  Intensive Care Medicine  04/17/17 12:33 PM        Electronically signed by Michael Gaona MD at 4/18/2017  8:34 AM      Michael Gaona MD at 4/17/2017 12:33 PM  Version 1 of 2          Intensivist Note     4/17/2017  Hospital Day: 13      Mr. Tomas Salvador, 61 y.o. male is followed for:  Principal Problem:    CVA 4/4/17 with rapid improvement  Active Problems:    S/P Sepsis due to  source 3/30/17    Epididymo-orchitis    Fourniers with E-Coli Cellulitis. Debrided in [place] 3/30/17    Urinary retention requiring colbert    E. coli UTI     Yeast UTI    Ischemic cardiomyopathy with EF 10% on Echo 4/4/17    ICD (implantable cardioverter-defibrillator) in place, implantation 2015.    CAD s/p CABG 2005, C 2015. Grafts patent except SVG to diagonal    Encephalopathy acute    Diabetes Mellitus HgbA1c 10.4    Chronic hypoxic respiratory failure on nocturnal o2    Hx of PVD (peripheral vascular disease)       SUBJECTIVE     Subjective    61-year-old white male with multiple medical problems as noted above. Severe ischemic cardiomyopathy with EF 10%, previous CABG in 2005, requirement for ICD for primary prophylaxis, diabetes mellitus, chronic home nocturnal oxygen. He is a known poorly controlled diabetic (hemoglobin A1c 10.4) and presented to [facility] with sepsis due to a urologic source 3/30/17. 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 Subsequent to his surgery in [place], he developed neurologic symptoms suggestive of a stroke and was transferred to [facility]/4/17. Workup just showed an old left frontal stroke and he apparently fully recovered (questionable TIA). While here he developed atrial fibrillation with rapid ventricular response for which he received amiodarone. He converted to sinus rhythm. He was initially placed on a heparin drip but is now anticoagulated on Eliquis. He subsequently developed some hypotension and bradycardia requiring dopamine and phenylephrine. Coreg, amiodarone, and Entresto were all stopped. Hypotension persisted so Precedex which had been used for agitation was also stopped and he subsequently improved. He  "then had some problems with pulmonary edema but responded to diuretics with improvement.    The present time he continues on cefazolin and Flagyl for his Escherichia coli scrotal infection and Escherichia coli UTI. His Cueto catheter was removed but unfortunately he had urinary retention of greater than a liter requiring in and out catheterization. This had to be stopped because of his scrotal and penile edema as catheterization was difficult and he now continues to have a Cueto in place. 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 New problems he could not safely swallow so was on TPN initially. It took quite a while to get a Corpak in place but is finally in, and bridled in position. He remains on enteral feeds.      The patient's relevant past medical, surgical and social history were reviewed and updated in Epic as appropriate.    OBJECTIVE     /78  Pulse (!) 126  Temp 98.2 °F (36.8 °C) (Oral)   Resp 20  Ht 69\" (175.3 cm)  Wt 216 lb 7.9 oz (98.2 kg)  SpO2 98%  BMI 31.97 kg/m2  Oxygen Concentration (%): 30  Flow (L/min): 2    Flowsheet Rows         First Filed Value    Admission Height  69\" (175.3 cm) Documented at 04/05/2017 0919    Admission Weight  237 lb (108 kg) Documented at 04/05/2017 0919        Intake & Output (last day)       04/16 0701 - 04/17 0700 04/17 0701 - 04/18 0700    I.V. (mL/kg) 1020 (10.4) 28 (0.3)    Other 607 260    NG/GT 1252 270    IV Piggyback 500 200         Irrigation 600     Total Intake(mL/kg) 4496 (45.8) 758 (7.7)    Urine (mL/kg/hr) 650 (0.3) 600 (1.1)    Total Output 650 600    Net +3846 +158                Objective    Exam:  General Exam:  Middle-aged white male appearing much older than stated age  HEENT: Pupils equal and reactive. Nose and throat clear. Nasoduodenal tube in place  Neck:                          Supple, no JVD, thyromegaly, or adenopathy  Lungs: Bilateral rhonchi  Cardiovascular: Irregularly " irregular rhythm with a variable S1 and normal S2. Atrial fibrillation on monitor with a heart rate of 124  Abdomen: Soft nontender without organomegaly or masses. Tolerating enteral feeds   and rectal: Cueto in place  Extremities: No cyanosis clubbing edema. Right PICC line in place  Neurologic:                 Confused, agitated, requiring restraints. Exam however is symmetric    Chest X-Ray: Persistent cardiomegaly. Persistent bilateral lower lobe infiltrates (congestive versus pneumonic). If anything seem worse than on admission    INFUSIONS    phenylephrine 0.5-3 mcg/kg/min Last Rate: Stopped (04/14/17 0048)         Results from last 7 days  Lab Units 04/17/17  0034 04/15/17  0444 04/13/17  0609   WBC 10*3/mm3 9.34 11.16* 11.19*   HEMOGLOBIN g/dL 9.8* 10.4* 11.5*   HEMATOCRIT % 32.4* 34.1* 36.8*   PLATELETS 10*3/mm3 306 377 386       Results from last 7 days  Lab Units 04/17/17  0033 04/16/17 0448   SODIUM mmol/L 145 138   POTASSIUM mmol/L 3.6 3.7   CHLORIDE mmol/L 105 101   TOTAL CO2 mmol/L 35.0* 33.0*   BUN mg/dL 40* 32*   CREATININE mg/dL 0.60 0.70   GLUCOSE mg/dL 149* 346*   CALCIUM mg/dL 8.9 9.3       Results from last 7 days  Lab Units 04/17/17  0033 04/16/17  0448 04/15/17  0444   MAGNESIUM mg/dL 2.0 2.0 1.9   PHOSPHORUS mg/dL 2.0* 2.5 3.0       No results found for: SEDRATE  Lab Results   Component Value Date    BNP 3279.0 (H) 04/15/2017     Lab Results   Component Value Date    CKTOTAL 54 04/04/2017    CKMB 1.22 03/31/2017    CKMBINDEX 3.8 (H) 03/31/2017    TROPONINI <0.006 04/04/2017     Lab Results   Component Value Date    TSH 0.656 04/05/2017     Lab Results   Component Value Date    LACTATE 0.8 04/02/2017     No results found for: CORTISOL          Non-Invasive Ventilation Settings     Start     Ordered    04/17/17 1018  . BIPAP; Titrate for SPO2: Greater Than or Equal To, 90%  At Bedtime & As Needed-RT     Question Answer Comment   . BIPAP    Titrate for SPO2 Greater Than or Equal To     Titrate for SPO2 90%        04/17/17 1017       I reviewed the patient's results, images and medication.    Assessment/Plan   ASSESSMENT      Principal Problem:    CVA 4/4/17 with rapid improvement  Active Problems:    S/P Sepsis due to  source 3/30/17    Epididymo-orchitis    Fourniers with E-Coli Cellulitis. Debrided in [place] 3/30/17    Urinary retention requiring colbert    E. coli UTI     Yeast UTI    Ischemic cardiomyopathy with EF 10% on Echo 4/4/17    ICD (implantable cardioverter-defibrillator) in place, implantation 2015.    CAD s/p CABG 2005, C 2015. Grafts patent except SVG to diagonal    Encephalopathy acute    Diabetes Mellitus HgbA1c 10.4    Chronic hypoxic respiratory failure on nocturnal o2    Hx of PVD (peripheral vascular disease)      DISCUSSION: Still not doing well. Sodium and BUN are increasing and needs more free water. I like to take his Colbert out and do in and out catheterization, but catheterization has been difficult because of penile and scrotal edema.    PLAN     1. Consider irrigating Colbert since it is unable to be removed at this time  2. Defer to ID regarding therapy of Candida glabrata cystitis. Probably cannot clear it up as long as Colbert is in place.  3. Needs increased free water  4. Mobilize and physical therapy  5. Continue BiPAP at night to reduce work of breathing  6. Strict I and O and daily weights  7. Watch for development of hospital acquired pneumonia  8. Continue aggressive pulmonary toilet    Plan of care and goals reviewed with mulitdisciplinary team at daily rounds.    I discussed the patient's findings and my recommendations with nursing staff    Time spent Critical care 35 min (It does not include procedure time).    Michael Gaona MD  Intensive Care Medicine  04/17/17 12:33 PM        Electronically signed by Michael Gaona MD at 4/17/2017 12:52 PM      TRESA Aj at 4/18/2017  8:25 AM  Version 1 of 1         [facility] Progress Note    Admission  "Date: 4/4/2017    Tomas Salvador  1955  [id number]    Date: 4/18/2017    Meds:    IV Anti-Infectives     Ordered     Dose/Rate Route Frequency Start Stop    04/04/17 1723  metroNIDAZOLE (FLAGYL) IVPB 500 mg     Ordering Provider:  Kike Leon MD    500 mg  100 mL/hr over 60 Minutes Intravenous Every 6 Hours 04/04/17 1800      04/04/17 1552  ceFAZolin in dextrose (ANCEF) IVPB solution 2 g     Ordering Provider:  TRESA Baca    2 g  over 30 Minutes Intravenous Every 8 Hours 04/04/17 1600            CC:  Delisa's gangrene    SUBJECTIVE:  4/4/17:Patient is a 61 y.o. 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 He has a history of underlying diabetes mellitus and severe systolic congestive heart failure. He presented to [facility] on 3/30 with scrotal cellulitis/gangrene. He underwent debridement by urology at [facility] on 3/31 and was treated with Merrem/clindamycin/vancomycin. He developed left facial droop and left sided paresis, prompting a transfer to [facility] for evaluation and therapy of acute stroke. His head CT angiogram was degraded by motion artifact but per Dr. Morrison there was suggestion of right inferior temporal lobe\" opacification\" consistent with a possible stroke. His left facial droop and left-sided paresis has improved today. He is encephalopathic and is unable to provide any reliable history. His wife thinks that he may have had some fevers prior to his admission on 3/30.    4/5/17: afebrile. No hx per patient secondary to encephalopathy.  Afebrile, restless per nsg staff.  Oliguric UOP.  No n/v/d.  No rashes.  4/6/17: More alert and less restless today.  Still non-responsive to questions and does not follow simple commands.  Still with left-sided weakness.  Afebrile.  Still with oliguric UOP.  No n/v/d, no rashes, per nsg staff notes.   4/7/17:  Alert today.  No fever,chills,sweats.  Wife at bedside with ?s "   4/10/17:  Wants to go home.  Wound care per PT.  No n/v/d.   17:  Now on Dopamine and Bautista, bipap.  Decompensated last pm.    17:  Somnolent; still on pressors   17 Still on pressors/Precedex; Now on bipap.  Had pulled corpak out night before.    17: Per RN staff notes: afebrile, on/off precedex for agitation, on dopamine, on flagyl and cefazolin, on and off bipap.  Can not keep clothes on him.  Attempted to do Is and Os but too difficult without his foreskin getting pulled into meatus, so therefore, just placed Cueto cath.  Not anchored, because he keeps trying to pull it out.  Would is getting daily misted by PT and packed.  He is more alert today, but still not oriented.  4/15/17: He is improved today and off of vasopressor therapy.  He has been receiving some diuretic therapy for pulmonary edema.  He is less confused.  He remains afebrile.  17: He is continued to receive diuretic therapy.  He remains off of vasopressor therapy.  He has remained afebrile  17:  Remains restrained.  Opens eyes to voice.  Nonproductive cough.   17:  Opens eyes.  Does not follow commands,       PE:   Vital Signs  Temp (24hrs), Av.3 °F (36.8 °C), Min:97.6 °F (36.4 °C), Max:98.8 °F (37.1 °C)    Temp  Min: 97.6 °F (36.4 °C)  Max: 98.8 °F (37.1 °C)  BP  Min: 83/69  Max: 113/76  Pulse  Min: 79  Max: 138  Resp  Min: 14  Max: 22  SpO2  Min: 74 %  Max: 100 %    GENERAL:   He is in no acute distress, more alert.   HEENT: Normocephalic, atraumatic. PERRL. EOMI. No conjunctival injection. No icterus. Oropharynx clear without evidence of thrush or exudate.   HEART: RRR; No murmur, rubs, gallops.   LUNGS: few scattered rhonchi anteriorly   ABDOMEN: Soft, nontender, nondistended. Positive bowel sounds. No rebound or guarding. NO mass or HSM.  EXT: No cyanosis, clubbing or edema. No cord.  : The lower, posterior midline scrotal wounds are continuing to improve with decreased slough and drainage.  A Cueto  catheter is in place  MSK: FROM without joint effusions noted arms/legs.   SKIN: Warm and dry without cutaneous eruptions on Inspection/palpation.   NEURO: more alert but not oriented.  Right PICC without redness      Laboratory Data      Results from last 7 days  Lab Units 04/18/17  0358 04/17/17  0034 04/15/17  0444   WBC 10*3/mm3 11.60* 9.34 11.16*   HEMOGLOBIN g/dL 10.0* 9.8* 10.4*   HEMATOCRIT % 32.7* 32.4* 34.1*   PLATELETS 10*3/mm3 352 306 377       Results from last 7 days  Lab Units 04/18/17  0358   SODIUM mmol/L 145   POTASSIUM mmol/L 4.1   CHLORIDE mmol/L 105   TOTAL CO2 mmol/L 38.0*   BUN mg/dL 51*   CREATININE mg/dL 0.70   GLUCOSE mg/dL 171*   CALCIUM mg/dL 9.0       Results from last 7 days  Lab Units 04/18/17  0358   ALK PHOS U/L 102*   BILIRUBIN mg/dL 0.2*   ALT (SGPT) U/L 6*   AST (SGOT) U/L 30       Results from last 7 days  Lab Units 04/18/17  0358   SED RATE mm/hr 92*       Results from last 7 days  Lab Units 04/17/17  1155   CRP mg/dL 2.82*                 Estimated Creatinine Clearance: 126.4 mL/min (by C-G formula based on Cr of 0.7).    Microbiology:  Blood Culture   Date Value Ref Range Status   03/30/2017 No growth at 5 days  Final   03/30/2017 No growth at 5 days  Final           Urine Culture   Date Value Ref Range Status   04/04/2017 No growth  Preliminary   03/31/2017 No growth  Final   03/30/2017 >100,000 CFU/mL Escherichia coli (A)  Final      scrotal cx x 2 cxs (3/31) E. coli  Cefazolin sens.    Urine Culture   Date Value Ref Range Status   04/11/2017 No growth at 2 days  Final   04/07/2017 >100,000 CFU/mL Candida glabrata (A)  Final         Radiology:  Imaging Results (last 24 hours)     Procedure Component Value Units Date/Time    XR Chest 1 View [[id number]] Collected:  04/16/17 0852     Updated:  04/17/17 1113    Narrative:          EXAMINATION: XR CHEST 1 VW - 04/16/2017     INDICATION: R13.10-Dysphagia, unspecified; Z74.09-Other reduced  mobility;  R41.841-Cognitive communication  deficit.     COMPARISON: 4/15/2017.     FINDINGS: Portable chest reveals low lung volumes. The heart is  enlarged. Increased pulmonary vascularity seen bilaterally is unchanged  in the interval. Ill-defined opacification seen at the left lung base  with small right pleural effusion. Patient is status post median  sternotomy. Lines and tubes in satisfactory position.           Impression:       Stable chest as above.     DICTATED:     04/16/2017  EDITED:         04/16/2017     This report was finalized on 4/17/2017 11:11 AM by Dr. Megan Posey MD.       XR Chest 1 View [[id number]] Collected:  04/17/17 0910     Updated:  04/17/17 1121    Narrative:       EXAMINATION: XR CHEST 1 VIEW-04/17/2017:      INDICATION: CHF; R13.10-Dysphagia, unspecified; Z74.09-Other reduced  mobility; Z74.09-Other reduced mobility; R41.841-Cognitive communication  deficit.      COMPARISON: 04/16/2017.     FINDINGS: Portable chest reveals lines and tubes to be in satisfactory  position. Lung volumes are low. The heart is enlarged. Increased  markings seen throughout the right lung and left lung base. Findings are  stable and unchanged. Small bilateral pleural effusions are present.           Impression:       Stable chest as above.     D:  04/17/2017  E:  04/17/2017     This report was finalized on 4/17/2017 11:18 AM by Dr. Megan Posey MD.           I readHis 4/16 chest x-ray.  He has decreased pulmonary edema.    Impression:   1. 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 He is significantly improved.  2. Acute right hemispheric cerebral vascular accident-with left sided facial and left-sided weakness. His head CT angiogram here reveals subtle right temporal abnormalities per Dr. Morrison. He has clinically improved.  3. Acute toxic metabolic encephalopathy-secondary to sepsis and cerebral vascular accident  4. Type 2 diabetes  mellitus-this clearly contributed to his Delisa's gangrene  5. Severe systolic congestive heart failure-with an ejection fraction of less than 20% on echocardiogram performed on 3/8/17  6. Escherichia coli urinary tract infection  7. Sepsis-secondary to Delisa's gangrene  8. Leukocytosis/neutrophilia-secondary to scrotal gangrene/cellulitis, improved   9.  Pulmonary edema-improving  10. Candiduria-  11. Cardiogenic shock-with pulmonary edema, now significantly improved.         PLAN/RECOMMENDATIONS:   1. Cefazolin 2 g IV every 8 hours  2. Flagyl 500 mg IV every 8 hours  3.  Continue wound care per PT      Dr. Leon has obtained the history, performed the physical exam and formulated the above treatment plan.     TRESA Aj  4/18/2017  8:25 AM           Electronically signed by TRESA Aj at 4/18/2017  8:28 AM        Consult Notes (last 24 hours) (Notes from 4/17/2017  9:59 AM through 4/18/2017  9:59 AM)     No notes of this type exist for this encounter.

## 2017-04-18 NOTE — PROGRESS NOTES
Intensivist Note     4/18/2017  Hospital Day: 14      Mr. Tomas Salvador, 61 y.o. male is followed for:  Principal Problem:    CVA 4/4/17 with rapid improvement  Active Problems:    S/P Sepsis due to  source 3/30/17    Epididymo-orchitis    Fourniers gangrene with E-Coli Cellulitis. Debrided in [place] 3/30/17    Urinary retention requiring colbert    E. coli UTI     Yeast UTI    Ischemic cardiomyopathy with EF 10% on Echo 4/4/17    ICD (implantable cardioverter-defibrillator) in place, implantation 2015.    CAD s/p CABG 2005, C 2015. Grafts patent except SVG to diagonal    Encephalopathy acute    Diabetes Mellitus HgbA1c 10.4    Chronic hypoxic respiratory failure on nocturnal o2    Hx of PVD (peripheral vascular disease)       SUBJECTIVE     Subjective    61-year-old white male with multiple medical problems as noted above. Severe ischemic cardiomyopathy with EF 10%, previous CABG in 2005, requirement for ICD for primary prophylaxis, diabetes mellitus, chronic home nocturnal oxygen. He is a known poorly controlled diabetic (hemoglobin A1c 10.4) and presented to [place] with sepsis due to a urologic source 3/30/17. 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 Subsequent to his surgery in [place], he developed neurologic symptoms suggestive of a stroke and was transferred to [facility]/4/17. Workup just showed an old left frontal stroke and he apparently fully recovered (questionable TIA). While here he developed atrial fibrillation with rapid ventricular response for which he received amiodarone. He converted to sinus rhythm. He was initially placed on a heparin drip but is now anticoagulated on Eliquis. He subsequently developed some hypotension and bradycardia requiring dopamine and phenylephrine. Coreg, amiodarone, and Entresto were all stopped. Hypotension persisted so Precedex which had been used for agitation was also stopped and he subsequently improved.  "He then had some problems with pulmonary edema but responded to diuretics with improvement. He continues however to have persistent bilateral lower lobe infiltrates although no fever or significant leukocytosis.     At the present time he continues on cefazolin and Flagyl for his Escherichia coli scrotal infection and Escherichia coli UTI. His Cueto catheter was removed but unfortunately he had urinary retention of greater than a liter requiring in and out catheterization. This had to be stopped because of his scrotal and penile edema as catheterization was difficult, and Cueto catheter remains anchored in place. 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 Swallowing remains a  problem and since he could not safely swallow was on TPN initially. It took quite a while to get a Corpak in place but it is finally in, and bridled in position. He remains on enteral feeds and off TPN.    Mental status has not improved. He remains encephalopathic requiring restraints although no focal defects on motor exam. Ativan helps but this makes him too lethargic. Because of his mental status his cough is poor and he has required some NT suctioning     The patient's relevant past medical, surgical and social history were reviewed and updated in Epic as appropriate.    OBJECTIVE     /63  Pulse 82  Temp 98.2 °F (36.8 °C) (Oral)   Resp 20  Ht 69\" (175.3 cm)  Wt 216 lb 7.9 oz (98.2 kg)  SpO2 97%  BMI 31.97 kg/m2  Oxygen Concentration (%): 30  Flow (L/min): 2    Flowsheet Rows         First Filed Value    Admission Height  69\" (175.3 cm) Documented at 04/05/2017 0919    Admission Weight  237 lb (108 kg) Documented at 04/05/2017 0919        Intake & Output (last day)       04/17 0701 - 04/18 0700 04/18 0701 - 04/19 0700    I.V. (mL/kg) 204 (2.1) 36.3 (0.4)    Other 1061 300    NG/GT 1415 278    IV Piggyback 656.8     TPN      Irrigation      Total Intake(mL/kg) 3336.8 (34) 614.3 " (6.3)    Urine (mL/kg/hr) 1300 (0.6) 200 (0.5)    Total Output 1300 200    Net +2036.8 +414.3                Objective    Exam:  General Exam:  Chronically ill-appearing middle-aged white male who is quite restless and confused  HEENT: Pupils equal and reactive. Nose and throat clear. Nasoduodenal tube in place  Neck:                          Supple, no JVD, thyromegaly, or adenopathy  Lungs: Scattered rhonchi  Cardiovascular: Irregularly irregular rhythm with a variable S1 and normal S2. Rate 108.  Abdomen: Soft nontender without organomegaly or masses. Tolerating impact 1.5 at 70 mL an hour +50 mL an hour water   and rectal: Cueto catheter in place  Extremities: No cyanosis or clubbing but trace edema. Right arm PICC line in place  Neurologic:                 Intermittently very restless and confused, alternating with lethargy after Ativan.    Chest X-Ray 4/17/17: Marked cardiomegaly with PPM ICD in place with the generator over the left chest. Bilateral lower lobe infiltrate/atelectasis unchanged    INFUSIONS         Results from last 7 days  Lab Units 04/18/17 0358 04/17/17  0034 04/15/17  0444   WBC 10*3/mm3 11.60* 9.34 11.16*   HEMOGLOBIN g/dL 10.0* 9.8* 10.4*   HEMATOCRIT % 32.7* 32.4* 34.1*   PLATELETS 10*3/mm3 352 306 377       Results from last 7 days  Lab Units 04/18/17  0358 04/17/17  1155   SODIUM mmol/L 145 145   POTASSIUM mmol/L 4.1 4.0   CHLORIDE mmol/L 105 105   TOTAL CO2 mmol/L 38.0* 37.0*   BUN mg/dL 51* 45*   CREATININE mg/dL 0.70 0.70   GLUCOSE mg/dL 171* 238*   CALCIUM mg/dL 9.0 9.0       Results from last 7 days  Lab Units 04/18/17  0358 04/17/17  0033 04/16/17  0448   MAGNESIUM mg/dL 2.1 2.0 2.0   PHOSPHORUS mg/dL 2.4 2.0* 2.5       Lab Results   Component Value Date    SEDRATE 92 (H) 04/18/2017     Lab Results   Component Value Date    BNP 3279.0 (H) 04/15/2017     Lab Results   Component Value Date    CKTOTAL 54 04/04/2017    CKMB 1.22 03/31/2017    CKMBINDEX 3.8 (H) 03/31/2017     TROPONINI <0.006 04/04/2017     Lab Results   Component Value Date    TSH 0.656 04/05/2017     Lab Results   Component Value Date    LACTATE 0.8 04/02/2017     No results found for: CORTISOL      Results from last 7 days  Lab Units 04/18/17  0447   PH, ARTERIAL pH units 7.438   PCO2, ARTERIAL mm Hg 51.7*   PO2 ART mm Hg 96.7   HCO3 ART mmol/L 35.0*   FIO2 % 28       Noninvasive ventilation when necessary     Start     Ordered    04/17/17 1018  . BIPAP; Titrate for SPO2: Greater Than or Equal To, 90%  At Bedtime & As Needed-RT     Question Answer Comment   . BIPAP    Titrate for SPO2 Greater Than or Equal To    Titrate for SPO2 90%        04/17/17 1017       I reviewed the patient's results, images and medication.    Assessment/Plan   ASSESSMENT      Principal Problem:    CVA 4/4/17 with rapid improvement  Active Problems:    S/P Sepsis due to  source 3/30/17    Epididymo-orchitis    Fourniers gangrene with E-Coli Cellulitis. Debrided in [place] 3/30/17    Urinary retention requiring colbert    E. coli UTI     Yeast UTI    Ischemic cardiomyopathy with EF 10% on Echo 4/4/17    ICD (implantable cardioverter-defibrillator) in place, implantation 2015.    CAD s/p CABG 2005, LHC 2015. Grafts patent except SVG to diagonal    Encephalopathy acute    Diabetes Mellitus HgbA1c 10.4    Chronic hypoxic respiratory failure on nocturnal o2    Hx of PVD (peripheral vascular disease)      DISCUSSION: No real improvement. He remains encephalopathic and requires intermittent benzodiazepines which make him too lethargic. (This mornings ABGs revealed a PCO2 mildly elevated at 51),  tolerating enteral feeds but cannot be trusted to eat without aspiration, elevated sodium is now stable and no longer rising but BUN has increased despite the fact that his volume status is 2 L positive since yesterday (not on steroids, no evidence of GI bleeding, or other causes of azotemia)    PLAN     1. Continue restraints as necessary  2. Will try  Seroquel and limit the doses of Ativan he receives  3. Need to get him up in chair  4. Continue Eliquis as per cardiology  5. Antibiotics per ID. I have asked them to comment on whether we should treat his Candida glabrata  6. If can get the situation under control from a neurologic standpoint will ask neurology to review case again    Plan of care and goals reviewed with mulitdisciplinary team at daily rounds.    I discussed the patient's findings and my recommendations with nursing staff    Time spent Critical care 35 min (It does not include procedure time).    Michael Gaona MD  Intensive Care Medicine  04/18/17 11:14 AM

## 2017-04-18 NOTE — PROGRESS NOTES
Adult Nutrition  Assessment/PES    Patient Name:  Tomas Salvador  YOB: 1955  MRN: [id number]  Admit Date:  4/4/2017    Assessment Date:  4/18/2017     Comments: RD follow-up for pt on nutrition support. Pt continues to tolerate tube feeding, will maintain free water at 50 ml/hr at this time. Will continue follow.           Reason for Assessment       04/18/17 1218    Reason for Assessment    Reason For Assessment/Visit multidisciplinary rounds;follow up protocol;TF/PN    Time Spent (min) 45    Diagnosis --   per notes this adm    Neurological Encephalopathy   remains encephalopathic    Principal Problem:    CVA 4/4/17 with rapid improvement  Active Problems:    Ischemic cardiomyopathy with EF 10% on Echo 4/4/17    ICD (implantable cardioverter-defibrillator) in place, implantation 2015.    S/P Sepsis due to  source 3/30/17    Diabetes Mellitus HgbA1c 10.4    Chronic hypoxic respiratory failure on nocturnal o2    CAD s/p CABG 2005, LHC 2015. Grafts patent except SVG to diagonal    Urinary retention requiring colbert    Hx of PVD (peripheral vascular disease)    E. coli UTI     Epididymo-orchitis    Encephalopathy acute    Fourniers gangrene with E-Coli Cellulitis. Debrided in [place] 3/30/17    Yeast UTI             Nutrition/Diet History       04/18/17 1220    Nutrition/Diet History    Reported/Observed By RN;MD    Other tolerating TF at goal rate. per MD- keep free water at 50 ml/hr at this time            Anthropometrics       04/18/17 1227    Anthropometrics    RD Documented Current Weight  98 kg (216 lb)            Labs/Tests/Procedures/Meds       04/18/17 1227    Labs/Tests/Procedures/Meds    Labs/Tests Review Reviewed;BUN;Na+;K+   replacing K+    Medication Review Reviewed, pertinent     Results from last 7 days  Lab Units 04/18/17  0358   SODIUM mmol/L 145   POTASSIUM mmol/L 4.1   CHLORIDE mmol/L 105   TOTAL CO2 mmol/L 38.0*   BUN mg/dL 51*   CREATININE mg/dL 0.70   CALCIUM mg/dL 9.0  "  BILIRUBIN mg/dL 0.2*   ALK PHOS U/L 102*   ALT (SGPT) U/L 6*   AST (SGOT) U/L 30   GLUCOSE mg/dL 171*              Estimated/Assessed Needs       04/18/17 1229    Calculation Measurements    Weight Used For Calculations 98 kg (216 lb)    Height Used for Calculations 1.753 m (5' 9\")    Estimated/Assessed Energy Needs    kcal/kg 14   25 kcal/kg IBW= 1825 kcal/day     14 Kcal/Kg (kcal) 1371.68    Age 61    RMR (Burke-St. Jeor Equation) 1770.14    Estimated/Assessed Protein Needs    Weight Used for Protein Calculation 72.6 kg (160 lb)   IBW    Protein (gm/kg) 2.0    2.0 Gm Protein (gm) 145.15            Nutrition Prescription Ordered       04/18/17 1234    Nutrition Prescription PO    Current PO Diet NPO    Nutrition Prescription EN    Enteral Route ND    Product Impact Peptide 1.5 tom    TF Delivery Method Continuous    Continuous TF Goal Rate (mL/hr) 70 mL/hr    Continuous TF Current Rate (mL/hr) 70 mL/hr    Continuous TF Goal Volume (mL) 1400 mL    Water flush (mL)  50 mL    Water Flush Frequency Per hour            Evaluation of Received Nutrient/Fluid Intake       04/18/17 1235    Evaluation of Received Nutrient/Fluid Intake    Number of Days Evaluated 1 day    Calorie Intake Evaluation    Enteral Calories (kcal) 2123    Total Calories (kcal) 2123    % Kcal Needs 118    Protein Intake Evaluation    Enteral Protein (gm) 133    Total Protein (gm) 133    % Protein Needs 91    Fluid Intake Evaluation    Enteral  Fluid (mL) 1090    Free Water Flush Fluid (mL) 1061    Total Fluid Intake (mL) 2151    Fiber Intake Evaluation    Fiber 0    EN Evaluation    Number of Days EN Intake Evaluated 1 day    EN Average Volume Delivered (mL/day) 1415 mL/day              Problem/Interventions:        Problem 1       04/18/17 1237    Nutrition Diagnoses Problem 1    Problem 1 Needs Alternate Route    Etiology (related to) --   clinical condition/encephalopathy/dysphagia    Signs/Symptoms (evidenced by) NPO                  "   Intervention Goal       04/18/17 1238    Intervention Goal    General Nutrition support treatment    TF/PN Maintain TF/PN            Nutrition Intervention       04/18/17 1238    Nutrition Intervention    RD/Tech Action Care plan reviewd;Follow Tx progress            Nutrition Prescription       04/18/17 1238    Nutrition Prescription EN    Enteral Prescription Continue same protocol            Education/Evaluation       04/18/17 1238    Monitor/Evaluation    Monitor Per protocol;Pertinent labs;TF delivery/tolerance;Symptoms          Electronically signed by:  Celeste Sterling MS RD/LD [facility]  04/18/17 12:38 PM

## 2017-04-18 NOTE — PLAN OF CARE
Problem: Fall Risk (Adult)  Goal: Identify Related Risk Factors and Signs and Symptoms  Outcome: Ongoing (interventions implemented as appropriate)  Goal: Absence of Falls  Outcome: Ongoing (interventions implemented as appropriate)    Problem: Skin Integrity Impairment, Risk/Actual (Adult)  Goal: Identify Related Risk Factors and Signs and Symptoms  Outcome: Ongoing (interventions implemented as appropriate)  Goal: Skin Integrity/Wound Healing  Outcome: Ongoing (interventions implemented as appropriate)    Problem: Infection, Risk/Actual (Adult)  Goal: Identify Related Risk Factors and Signs and Symptoms  Outcome: Ongoing (interventions implemented as appropriate)  Goal: Infection Prevention/Resolution  Outcome: Ongoing (interventions implemented as appropriate)    Problem: SAFETY - NON-VIOLENT RESTRAINT  Goal: Remains free of injury from restraints (Non-Violent Restraint)  Outcome: Ongoing (interventions implemented as appropriate)  Goal: Free from restraint(s) (Non-Violent Restraint)  Outcome: Ongoing (interventions implemented as appropriate)    Problem: NPPV/CPAP (Adult)  Goal: Signs and Symptoms of Listed Potential Problems Will be Absent or Manageable (NPPV/CPAP)  Outcome: Ongoing (interventions implemented as appropriate)

## 2017-04-19 PROBLEM — I48.91 ATRIAL FIBRILLATION (HCC): Status: ACTIVE | Noted: 2017-01-01

## 2017-04-19 NOTE — PROGRESS NOTES
Adult Nutrition  Assessment/PES    Patient Name:  Tomas Salvador  YOB: 1955  MRN: [id number]  Admit Date:  4/4/2017    Assessment Date:  4/19/2017     Comments: RD follow-up for pt on nutrition support. Pt continues to tolerate tube feeding, will decrease goal rate to 65 ml/hr to better meet pt's kcal and pro needs and maintain free water at 50 ml/hr. Will continue to follow.           Reason for Assessment       04/19/17 1325    Reason for Assessment    Reason For Assessment/Visit follow up protocol;multidisciplinary rounds;TF/PN    Time Spent (min) 30    Diagnosis --   per notes this adm    Skin --   per PT wound note (4/19)- Tunnels appear to be clear of slough, with minimal slough remaining in central wound bed. Hydrofera blue appears to be managing exudate well and providing adequately moist wound bed.   Principal Problem:    CVA 4/4/17 with rapid improvement  Active Problems:    Ischemic cardiomyopathy with EF 10% on Echo 4/4/17    ICD (implantable cardioverter-defibrillator) in place, implantation 2015.    S/P Sepsis due to  source 3/30/17    Diabetes Mellitus HgbA1c 10.4    Chronic hypoxic respiratory failure on nocturnal o2    CAD s/p CABG 2005, C 2015. Grafts patent except SVG to diagonal    Urinary retention requiring colbert    Hx of PVD (peripheral vascular disease)    E. coli UTI     Epididymo-orchitis    Encephalopathy acute    Fourniers gangrene with E-Coli Cellulitis. Debrided in [place] 3/30/17    Yeast UTI             Nutrition/Diet History       04/19/17 1335    Nutrition/Diet History    Reported/Observed By RN;MD    Other tolerating TF at goal rate. per MD- keep free water at 50 ml/hr at this time. Plan for SLP re-eval today as pt will probably need PEG placement              Labs/Tests/Procedures/Meds       04/19/17 1337    Labs/Tests/Procedures/Meds    Labs/Tests Review Reviewed;BUN;Na+    Procedure Review SLP   per SLP (4/19)- Pt on bipap this am - RN requested check back  in pm     Swallow eval status Pending    Medication Review Reviewed, pertinent     Results from last 7 days  Lab Units 04/19/17  0357 04/18/17  0358   SODIUM mmol/L 143 145   POTASSIUM mmol/L 3.8 4.1   CHLORIDE mmol/L 105 105   TOTAL CO2 mmol/L 36.0* 38.0*   BUN mg/dL 53* 51*   CREATININE mg/dL 0.60 0.70   CALCIUM mg/dL 8.9 9.0   BILIRUBIN mg/dL  --  0.2*   ALK PHOS U/L  --  102*   ALT (SGPT) U/L  --  6*   AST (SGOT) U/L  --  30   GLUCOSE mg/dL 175* 171*                Nutrition Prescription Ordered       04/19/17 1337    Nutrition Prescription PO    Current PO Diet NPO    Nutrition Prescription EN    Enteral Route ND    Product Impact Peptide 1.5 tom    TF Delivery Method Continuous    Continuous TF Goal Rate (mL/hr) 70 mL/hr    Continuous TF Current Rate (mL/hr) 70 mL/hr    Continuous TF Goal Volume (mL) 1400 mL    Water flush (mL)  50 mL    Water Flush Frequency Per hour            Evaluation of Received Nutrient/Fluid Intake       04/19/17 1338    Evaluation of Received Nutrient/Fluid Intake    Number of Days Evaluated 1 day    Calorie Intake Evaluation    Enteral Calories (kcal) 2409    Total Calories (kcal) 2409    % Kcal Needs 134    Protein Intake Evaluation    Enteral Protein (gm) 151    Total Protein (gm) 151    % Protein Needs 103    Fluid Intake Evaluation    Enteral  Fluid (mL) 1237    Free Water Flush Fluid (mL) 1471    Total Fluid Intake (mL) 2708    EN Evaluation    Number of Days EN Intake Evaluated 1 day    EN Average Volume Delivered (mL/day) 1606 mL/day              Problem/Interventions:        Problem 1       04/19/17 1339    Nutrition Diagnoses Problem 1    Problem 1 Needs Alternate Route    Etiology (related to) --   clinical condition/encephalopathy/dysphagia    Signs/Symptoms (evidenced by) NPO                    Intervention Goal       04/19/17 1339    Intervention Goal    General Nutrition support treatment    TF/PN Adjust TF/PN            Nutrition Intervention       04/19/17 9044     Nutrition Intervention    RD/Tech Action Follow Tx progress;Care plan reviewd;Recommend/ordered    Recommended/Ordered EN            Nutrition Prescription       04/19/17 1339    Nutrition Prescription EN    Enteral Prescription Enteral begin/change;Enteral to supply    Enteral Route ND    Product Impact Peptide 1.5 tom    TF Delivery Method Continuous    Continuous TF Goal Rate (mL/hr) 65 mL/hr    Continuous TF Goal Volume (mL) 1300 mL    Water flush (mL)  50 mL    Water Flush Frequency Per hour    New EN Prescription Ordered? Yes    EN to Supply    Kcal/Day 1950 Kcal/Day    Protein (gm/day) 122 gm/day    TF Free H2O (mL) 1001 mL    Total Free H2O (mL/day) 2001 mL/day            Education/Evaluation       04/19/17 1340    Monitor/Evaluation    Monitor Per protocol;Pertinent labs;TF delivery/tolerance;Symptoms          Electronically signed by:  Celeste Streling MS RD/JENSEN [facility]  04/19/17 2:12 PM

## 2017-04-19 NOTE — PLAN OF CARE
Problem: Patient Care Overview (Adult)  Goal: Plan of Care Review  Outcome: Ongoing (interventions implemented as appropriate)    04/19/17 6782   Outcome Evaluation   Outcome Summary/Follow up Plan Clinical swallow eval: Suspect severe pharyngeal dysphagia. Pt came of Bi-PAP for eval. No overt s/s of asp w/ ice chips. Wet vocal quality and delayed cough w/ thins. DNT other consistently 2' significant wet vocal quality and weak cough. Weak gag present on suction. REC: re-eval 4/20. NPO. with meds via alt route.

## 2017-04-19 NOTE — PROGRESS NOTES
Acute Care - Wound/Debridement Treatment Note  [facility]     Patient Name: Tomas Salvador  : 1955  MRN: [id number]  Today's Date: 2017  Onset of Illness/Injury or Date of Surgery Date: 17   Date of Referral to PT: 17   Referring Physician: Dr. Morrison       Admit Date: 2017    Visit Dx:    ICD-10-CM ICD-9-CM   1. Dysphagia, unspecified type R13.10 787.20   2. Impaired functional mobility, balance, gait, and endurance Z74.09 V49.89   3. Impaired mobility and ADLs Z74.09 799.89   4. Cognitive communication deficit R41.841 799.52       Patient Active Problem List   Diagnosis   • Coronary disease   • Ischemic cardiomyopathy with EF 10% on Echo 17   • Peripheral vascular disease   • ICD (implantable cardioverter-defibrillator) in place, implantation .   • Dyslipidemia, on atorvastain.    • S/P Sepsis due to  source 3/30/17   • CVA 17 with rapid improvement   • Diabetes Mellitus HgbA1c 10.4   • Chronic hypoxic respiratory failure on nocturnal o2   • CAD s/p CABG , C . Grafts patent except SVG to diagonal   • Urinary retention requiring colbert   • Hyperlipidemia   • Essential hypertension   • Hx of PVD (peripheral vascular disease)   • E. 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   • Yeast UTI               LDA Wound       17 0835          Incision 17 1056 other (see comments) scrotum    Incision - Properties Group Placement Date: 17  -JW Placement Time: 1056JW Side: other (see comments)  -JW Location: scrotum  -JW    Incision WDL ex  -MC      Dressing Appearance moist drainage;intact  -MC      Appearance open areas;pink;redness;moist;drainage   tunnels appear to be clear of slough  -MC      Drainage Characteristics/Odor serosanguineous  -MC      Drainage Amount small  -MC      Wound Cleaning irrigated with;sterile normal saline  -MC      Wound Interventions  "debrided  -      Dressing Dressing applied;low-adherent   6\" optifoam gentle  -      Packing Incision packed with;other (see comments)   saline-moistened hydrofera blue x3 strips, 1 pad  -        User Key  (r) = Recorded By, (t) = Taken By, (c) = Cosigned By    Initials Name Provider Type     Rekha Mcgarry, PT Physical Therapist    JAY JAY Silveira RN Registered Nurse            WOUND DEBRIDEMENT  Debridement Site 1  Location- Site 1: perineal wound  Selective Debridement- Site 1: Wound Surface <20cmsq (4cm2 total area)  Instruments- Site 1: tweezers, scissors  Excised Tissue Description- Site 1: minimum, slough  Bleeding- Site 1: none                  Adult Rehabilitation Note       04/19/17 0835 04/18/17 1000 04/17/17 1400    Rehab Assessment/Intervention    Discipline physical therapist  - physical therapist  - physical therapist  -    Document Type therapy note (daily note)  - therapy note (daily note)  - therapy note (daily note)  -    Subjective Information agree to therapy   pt kept trying to convince PT to remove restraint mittens  - agree to therapy;no complaints;decreased LOC   RN gave ok for tx.  - agree to therapy;no complaints;decreased LOC   Pt confused, but stated he was fine when asked about pain.  -MF    Recorded by [] Rekha Mcgarry, PT [] Pee Roldan, PT [] Pee Roldan, PT    Pain Assessment    Pain Assessment Jay-Kent FACES  - Jay-Baker FACES  - Jay-Baker FACES  -    Jay-Baker FACES Pain Rating 0  - 0  - 0  -    Pain Intervention(s)  Repositioned  -MF     Recorded by [] Rekha Mcgarry, PT [] Pee Roldan, PT [] Pee Roldan, PT    Cognitive Assessment/Intervention    Current Cognitive/Communication Assessment   impaired  -    Orientation Status   oriented to;person  -MF    Follows Commands/Answers Questions   50% of the time;able to follow single-step instructions;needs cueing  -MF    Recorded by   [] Pee" JACE Roldan PT    Positioning and Restraints    Pre-Treatment Position in bed  - in bed  - in bed  -    Post Treatment Position bed  - bed  - bed  -    In Bed supine;call light within reach;encouraged to call for assist;notified nsg  - supine;call light within reach  - supine;call light within reach;with nsg  -    Recorded by [] Rekha Mcgarry, PT [] Pee Roldan, PT [] Pee Roldan, PT      04/16/17 1450          Rehab Assessment/Intervention    Discipline physical therapist  -      Document Type therapy note (daily note)  -      Subjective Information agree to therapy  -      Recorded by [] Rekha Mcgarry PT      Pain Assessment    Pain Assessment Jay-Kent FACES  -      Pain Score 0  -      Post Pain Score 0  -MC      Recorded by [] Rekha Mcgarry PT      Cognitive Assessment/Intervention    Current Cognitive/Communication Assessment impaired  -      Orientation Status oriented to;person  -      Follows Commands/Answers Questions 75% of the time;able to follow single-step instructions;needs repetition  -      Recorded by [] Rekha Mcgarry PT      Positioning and Restraints    Pre-Treatment Position in bed  -      Post Treatment Position bed  -      In Bed supine;call light within reach;encouraged to call for assist;notified nsg  -      Recorded by [] Rekha Mcgarry PT        User Key  (r) = Recorded By, (t) = Taken By, (c) = Cosigned By    Initials Name Effective Dates     Pee Roldan, PT 06/19/15 -      Rekha Mcgarry, PT 03/14/16 -                 IP PT Goals       04/19/17 0835 04/16/17 1450 04/15/17 1455    Wound Care PT LTG    Wound Care PT LTG 1, Outcome goal ongoing  -MC goal ongoing  -MC goal ongoing  -MC      04/14/17 1120 04/11/17 1015 04/09/17 1551    Bed Mobility PT LTG    Bed Mobility PT LTG, Outcome  goal not met  -LS goal ongoing  -DR    Bed Mobility PT LTG, Reason Goal Not Met  medical status inhibits  participation  -LS     Transfer Training PT LTG    Transfer Training PT LTG, Outcome  goal not met  -LS goal ongoing  -DR    Transfer Training PT LTG, Reason Goal Not Met  medical status inhibits participation  -LS     Gait Training PT LTG    Gait Training Goal PT LTG, Outcome  goal not met  -LS goal ongoing  -DR    Gait Training Goal PT LTG, Reason Goal Not Met  medical status inhibits participation  -LS     Wound Care PT LTG    Wound Care PT LTG 1, Outcome goal ongoing  -MC        04/08/17 1010 04/06/17 1524 04/06/17 1025    Bed Mobility PT LTG    Bed Mobility PT LTG, Outcome  goal ongoing  -LS     Transfer Training PT LTG    Transfer Training PT LTG, Outcome  goal ongoing  -LS     Gait Training PT LTG    Gait Training Goal PT LTG, Outcome  goal ongoing  -LS     Wound Care PT LTG    Wound Care PT LTG 1, Date Established   04/06/17  -    Wound Care PT LTG 1, Time to Achieve   1 wk  -MC    Wound Care PT LTG 1, Location   Scrotum  -    Wound Care PT LTG 1, No S&S of Infection   yes  -    Wound Care PT LTG 1, Decrease Wound Size   10%  -    Wound Care PT LTG 1, Decrease Necrotic Tissue   50%  -    Wound Care PT LTG 1, Decrease Exudate   minimum  -    Wound Care PT LTG 1, No New Skin Break Down   yes  -    Wound Care PT LTG 1, Education   dressing changes;wound care  -    Wound Care PT LTG 1, Education Understanding   verbalize understanding  -    Wound Care PT LTG 1, Outcome goal ongoing  -        User Key  (r) = Recorded By, (t) = Taken By, (c) = Cosigned By    Initials Name Provider Type    VALERI oRldan, PT Physical Therapist    ANDREWS Juárez, PT Physical Therapist    DUNCAN Mcgarry, PT Physical Therapist    DR Mikey Moore, PT Physical Therapist          Physical Therapy Education     Title: PT OT SLP Therapies (Active)     Topic: Physical Therapy (Active)     Point: Mobility training (Active)    Learning Progress Summary    Learner Readiness Method Response Comment Documented  by Status   Patient Acceptance E VU  AP 04/18/17 0557 Done    Acceptance E NR   04/09/17 1551 Active    Acceptance E NR  EDOUARD 04/07/17 1534 Active    Acceptance E,D NR  LS 04/06/17 1523 Active    Acceptance E,D NR  LS 04/05/17 0908 Active   Family Acceptance E VU  AP 04/18/17 0557 Done   Significant Other Acceptance E,D NR  LS 04/06/17 1523 Active    Acceptance E,D NR  LS 04/05/17 0908 Active               Point: Home exercise program (Active)    Learning Progress Summary    Learner Readiness Method Response Comment Documented by Status   Patient Acceptance E VU  AP 04/18/17 0557 Done    Acceptance E NR  EDOUARD 04/07/17 1534 Active    Acceptance E,D NR  LS 04/06/17 1523 Active   Family Acceptance E VU  AP 04/18/17 0557 Done   Significant Other Acceptance E,D NR  LS 04/06/17 1523 Active               Point: Body mechanics (Active)    Learning Progress Summary    Learner Readiness Method Response Comment Documented by Status   Patient Acceptance E VU  AP 04/18/17 0557 Done    Acceptance E NR   04/09/17 1551 Active    Acceptance E NR  EDOUARD 04/07/17 1534 Active    Acceptance E,D NR   04/06/17 1523 Active    Acceptance E,D NR   04/05/17 0908 Active   Family Acceptance E VU  AP 04/18/17 0557 Done   Significant Other Acceptance E,D NR   04/06/17 1523 Active    Acceptance E,D NR   04/05/17 0908 Active               Point: Precautions (Active)    Learning Progress Summary    Learner Readiness Method Response Comment Documented by Status   Patient Acceptance E VU  AP 04/18/17 0557 Done    Acceptance E NR   04/09/17 1551 Active    Acceptance E NR  EDOUARD 04/07/17 1534 Active    Acceptance E,D NR   04/06/17 1523 Active    Acceptance E,D NR   04/05/17 0908 Active   Family Acceptance E VU  AP 04/18/17 0557 Done   Significant Other Acceptance E,D NR   04/06/17 1523 Active    Acceptance E,D NR   04/05/17 0908 Active                      User Key     Initials Effective Dates Name Provider Type Discipline     06/19/15 -   Anita Prado, PT Physical Therapist PT    LS 06/19/15 -  Lily Juárez, PT Physical Therapist PT    AP 06/16/16 -  Shahla Sherman, RN Registered Nurse Nurse     09/06/16 -  Mikey Moore, PT Physical Therapist PT                   PT ASSESSMENT (last 72 hours)      PT Evaluation       04/19/17 0835 04/19/17 0800    Rehab Evaluation    Document Type therapy note (daily note)  -     Subjective Information agree to therapy   pt kept trying to convince PT to remove restraint mittens  -     Pain Assessment    Pain Assessment Jay-Kent FACES  -     Jay-Kent FACES Pain Rating 0  -     Muscle Tone Assessment    Muscle Tone Assessment  Bilateral Upper Extremities  -BG    Positioning and Restraints    Pre-Treatment Position in bed  -MC     Post Treatment Position bed  -     In Bed supine;call light within reach;encouraged to call for assist;notified ns  -       04/18/17 1000 04/17/17 1400    Rehab Evaluation    Document Type therapy note (daily note)  - therapy note (daily note)  -    Subjective Information agree to therapy;no complaints;decreased LOC   RN gave ok for tx.  - agree to therapy;no complaints;decreased LOC   Pt confused, but stated he was fine when asked about pain.  -MF    Pain Assessment    Pain Assessment Jay-Baker FACES  - Jay-Baker FACES  -    Jay-Baker FACES Pain Rating 0  - 0  -    Pain Intervention(s) Repositioned  -     Cognitive Assessment/Intervention    Current Cognitive/Communication Assessment  impaired  -    Orientation Status  oriented to;person  -    Follows Commands/Answers Questions  50% of the time;able to follow single-step instructions;needs cueing  -    Positioning and Restraints    Pre-Treatment Position in bed  - in bed  -    Post Treatment Position bed  - bed  -MF    In Bed supine;call light within reach  - supine;call light within reach;with ns  -      04/16/17 1450       Rehab Evaluation    Document Type therapy note  (daily note)  -     Subjective Information agree to therapy  -     Pain Assessment    Pain Assessment Jay-Kent FACES  -     Pain Score 0  -     Post Pain Score 0  -     Cognitive Assessment/Intervention    Current Cognitive/Communication Assessment impaired  -     Orientation Status oriented to;person  -     Follows Commands/Answers Questions 75% of the time;able to follow single-step instructions;needs repetition  -     Positioning and Restraints    Pre-Treatment Position in bed  -     Post Treatment Position bed  -     In Bed supine;call light within reach;encouraged to call for assist;notified nsg  -       User Key  (r) = Recorded By, (t) = Taken By, (c) = Cosigned By    Initials Name Provider Type     Pee Roldan, PT Physical Therapist     Rekha Mcgarry, PT Physical Therapist    BG Shellie Cedillo, RN Registered Nurse            PT Recommendation and Plan  Anticipated Discharge Disposition: inpatient rehabilitation facility  PT Frequency: daily    Plan Of Care Reviewed With: patient   Progress: progress toward functional goals as expected   Outcome Summary/Follow up Plan: Tunnels appear to be clear of slough, with minimal slough remaining in central wound bed. Hydrofera blue appears to be managing exudate well and providing adequately moist wound bed. Left extra supplies in case dressing is soiled. PT will continue to hold pulse lavage.            Time Calculation        PT Charges       04/19/17 0835          Time Calculation    Start Time 0835  -      PT Goal Re-Cert Due Date 04/25/17  -        User Key  (r) = Recorded By, (t) = Taken By, (c) = Cosigned By    Initials Name Provider Type     Rekha Mcgarry, PT Physical Therapist             Therapy Charges for Today     Code Description Service Date Service Provider Modifiers Qty    [id number]  ROSEANNA DEBRIDE OPEN WOUND UP TO 20CM 4/19/2017 Rekha Mcgarry, PT GP 1            PT G-Codes  Outcome Measure  Options: AM-PAC 6 Clicks Basic Mobility (PT)        Rekha Mcgarry, PT  4/19/2017

## 2017-04-19 NOTE — PROGRESS NOTES
Continued Stay Note  [facility]     Patient Name: Tomas Salvador  MRN: [id number]  Today's Date: 4/19/2017    Admit Date: 4/4/2017          Discharge Plan       04/19/17 1114    Case Management/Social Work Plan    Additional Comments  Faxed clinical packet to Rosita Kelsey RN c Admissions [facility].  She will come see him today or in AM.        04/19/17 1055    Case Management/Social Work Plan    Plan L/TACH    Patient/Family In Agreement With Plan yes    Additional Comments Mr. Salvador's wife would like him to transfer to [facility] when he is medically ready.  It would be easier for her to work and family could visit him there.  Dr. Cross states that would be ok to go there.  Referral made to [facility].                Discharge Codes     None        Expected Discharge Date and Time     Expected Discharge Date Expected Discharge Time    Apr 20, 2017             Bernadette Pina RN

## 2017-04-19 NOTE — PROGRESS NOTES
Adult Nutrition  Assessment/PES    Patient Name:  Tomas Salvador  YOB: 1955  MRN: [id number]  Admit Date:  4/4/2017    Assessment Date:  4/19/2017        Reason for Assessment       04/19/17 1325    Reason for Assessment    Reason For Assessment/Visit follow up protocol;multidisciplinary rounds;TF/PN    Time Spent (min) 30    Diagnosis --   per notes this adm    Skin --   per PT wound note (4/19)- Tunnels appear to be clear of slough, with minimal slough remaining in central wound bed. Hydrofera blue appears to be managing exudate well and providing adequately moist wound bed.              Nutrition/Diet History       04/19/17 1335    Nutrition/Diet History    Reported/Observed By RN;MD    Other tolerating TF at goal rate. per MD- keep free water at 50 ml/hr at this time. Plan for SLP re-eval today as pt will probably need PEG placement              Labs/Tests/Procedures/Meds       04/19/17 1337    Labs/Tests/Procedures/Meds    Labs/Tests Review Reviewed;BUN;Na+    Procedure Review SLP   per SLP (4/19)- Pt on bipap this am - RN requested check back in pm     Swallow eval status Pending    Medication Review Reviewed, pertinent     Results from last 7 days  Lab Units 04/19/17  0357 04/18/17  0358   SODIUM mmol/L 143 145   POTASSIUM mmol/L 3.8 4.1   CHLORIDE mmol/L 105 105   TOTAL CO2 mmol/L 36.0* 38.0*   BUN mg/dL 53* 51*   CREATININE mg/dL 0.60 0.70   CALCIUM mg/dL 8.9 9.0   BILIRUBIN mg/dL  --  0.2*   ALK PHOS U/L  --  102*   ALT (SGPT) U/L  --  6*   AST (SGOT) U/L  --  30   GLUCOSE mg/dL 175* 171*                Nutrition Prescription Ordered       04/19/17 1337    Nutrition Prescription PO    Current PO Diet NPO    Nutrition Prescription EN    Enteral Route ND    Product Impact Peptide 1.5 tom    TF Delivery Method Continuous    Continuous TF Goal Rate (mL/hr) 70 mL/hr    Continuous TF Current Rate (mL/hr) 70 mL/hr    Continuous TF Goal Volume (mL) 1400 mL    Water flush (mL)  50 mL    Water  Flush Frequency Per hour            Evaluation of Received Nutrient/Fluid Intake       04/19/17 1338    Evaluation of Received Nutrient/Fluid Intake    Number of Days Evaluated 1 day    Calorie Intake Evaluation    Enteral Calories (kcal) 2409    Total Calories (kcal) 2409    % Kcal Needs 134    Protein Intake Evaluation    Enteral Protein (gm) 151    Total Protein (gm) 151    % Protein Needs 103    Fluid Intake Evaluation    Enteral  Fluid (mL) 1237    Free Water Flush Fluid (mL) 1471    Total Fluid Intake (mL) 2708    EN Evaluation    Number of Days EN Intake Evaluated 1 day    EN Average Volume Delivered (mL/day) 1606 mL/day              Problem/Interventions:        Problem 1       04/19/17 1339    Nutrition Diagnoses Problem 1    Problem 1 Needs Alternate Route    Etiology (related to) --   clinical condition/encephalopathy/dysphagia    Signs/Symptoms (evidenced by) NPO                    Intervention Goal       04/19/17 1339    Intervention Goal    General Nutrition support treatment    TF/PN Adjust TF/PN            Nutrition Intervention       04/19/17 1339    Nutrition Intervention    RD/Tech Action Follow Tx progress;Care plan reviewd;Recommend/ordered    Recommended/Ordered EN            Nutrition Prescription       04/19/17 1339    Nutrition Prescription EN    Enteral Prescription Enteral begin/change;Enteral to supply    Enteral Route ND    Product Impact Peptide 1.5 tom    TF Delivery Method Continuous    Continuous TF Goal Rate (mL/hr) 65 mL/hr    Continuous TF Goal Volume (mL) 1300 mL    Water flush (mL)  50 mL    Water Flush Frequency Per hour    New EN Prescription Ordered? Yes    EN to Supply    Kcal/Day 1950 Kcal/Day    Protein (gm/day) 122 gm/day    TF Free H2O (mL) 1001 mL    Total Free H2O (mL/day) 2001 mL/day            Education/Evaluation       04/19/17 1340    Monitor/Evaluation    Monitor Per protocol;Pertinent labs;TF delivery/tolerance;Symptoms          Electronically signed by:  Celeste  BUD Sterling, MS RD/LD [facility]  04/19/17 1:40 PM

## 2017-04-19 NOTE — PROGRESS NOTES
"[facility] Progress Note    Admission Date: 4/4/2017    Tomas Salvador  1955  [id number]    Date: 4/19/2017    Meds:    IV Anti-Infectives     None          CC:  Delisa's gangrene    SUBJECTIVE:  4/4/17:Patient is a 61 y.o. 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 He has a history of underlying diabetes mellitus and severe systolic congestive heart failure. He presented to [facility] on 3/30 with scrotal cellulitis/gangrene. He underwent debridement by urology at [facility] on 3/31 and was treated with Merrem/clindamycin/vancomycin. He developed left facial droop and left sided paresis, prompting a transfer to [facility] for evaluation and therapy of acute stroke. His head CT angiogram was degraded by motion artifact but per Dr. Morrison there was suggestion of right inferior temporal lobe\" opacification\" consistent with a possible stroke. His left facial droop and left-sided paresis has improved today. He is encephalopathic and is unable to provide any reliable history. His wife thinks that he may have had some fevers prior to his admission on 3/30.    4/5/17: afebrile. No hx per patient secondary to encephalopathy.  Afebrile, restless per nsg staff.  Oliguric UOP.  No n/v/d.  No rashes.  4/6/17: More alert and less restless today.  Still non-responsive to questions and does not follow simple commands.  Still with left-sided weakness.  Afebrile.  Still with oliguric UOP.  No n/v/d, no rashes, per nsg staff notes.   4/7/17:  Alert today.  No fever,chills,sweats.  Wife at bedside with ?s   4/10/17:  Wants to go home.  Wound care per PT.  No n/v/d.   4/11/17:  Now on Dopamine and Bautista, bipap.  Decompensated last pm.    4/12/17:  Somnolent; still on pressors   4/13/17 Still on pressors/Precedex; Now on bipap.  Had pulled corpak out night before.    4/14/17: Per RN staff notes: afebrile, on/off precedex for agitation, on dopamine, on flagyl and cefazolin, on " "and off bipap.  Can not keep clothes on him.  Attempted to do Is and Os but too difficult without his foreskin getting pulled into meatus, so therefore, just placed Cueto cath.  Not anchored, because he keeps trying to pull it out.  Would is getting daily misted by PT and packed.  He is more alert today, but still not oriented.  4/15/17: He is improved today and off of vasopressor therapy.  He has been receiving some diuretic therapy for pulmonary edema.  He is less confused.  He remains afebrile.  17: He is continued to receive diuretic therapy.  He remains off of vasopressor therapy.  He has remained afebrile  17:  Remains restrained.  Opens eyes to voice.  Nonproductive cough.   17:  Opens eyes.  Does not follow commands.  His Cueto catheter is still in place and he is undergoing diuresis for his CHF/pulmonary edema.  17:  More alert today.  \"wants Scottie\".  PT just changed dressing.  He remains off of vasopressor therapy.  He is continued to receive diuresis.  The nursing staff reports that he has some penile edema, making it difficult to in and out catheterize him.      PE:   Vital Signs  Temp (24hrs), Av.5 °F (36.9 °C), Min:98.2 °F (36.8 °C), Max:98.7 °F (37.1 °C)    Temp  Min: 98.2 °F (36.8 °C)  Max: 98.7 °F (37.1 °C)  BP  Min: 79/55  Max: 123/48  Pulse  Min: 81  Max: 130  Resp  Min: 16  Max: 21  SpO2  Min: 90 %  Max: 100 %    GENERAL:   He is in no acute distress, more alert.   HEENT: Normocephalic, atraumatic. PERRL. EOMI. No conjunctival injection. No icterus. Oropharynx clear without evidence of thrush or exudate.   HEART: RRR; No murmur, rubs, gallops.   LUNGS: scattered rhonchi anteriorly   ABDOMEN: Soft, nontender, nondistended. Positive bowel sounds. No rebound or guarding. NO mass or HSM.  EXT: No cyanosis, clubbing or edema. No cord.  : The lower, posterior midline scrotal wounds with resolution of slough and some increased granulation.  There is no purulent drainage or " erythema.  A Cueto catheter is in place  MSK: FROM without joint effusions noted arms/legs.   SKIN: Warm and dry without cutaneous eruptions on Inspection/palpation.   NEURO: more alert   Right PICC without redness      Laboratory Data      Results from last 7 days  Lab Units 04/19/17  0357 04/18/17  0358 04/17/17  0034   WBC 10*3/mm3 8.17 11.60* 9.34   HEMOGLOBIN g/dL 10.1* 10.0* 9.8*   HEMATOCRIT % 32.6* 32.7* 32.4*   PLATELETS 10*3/mm3 291 352 306       Results from last 7 days  Lab Units 04/19/17  0357   SODIUM mmol/L 143   POTASSIUM mmol/L 3.8   CHLORIDE mmol/L 105   TOTAL CO2 mmol/L 36.0*   BUN mg/dL 53*   CREATININE mg/dL 0.60   GLUCOSE mg/dL 175*   CALCIUM mg/dL 8.9       Results from last 7 days  Lab Units 04/18/17  0358   ALK PHOS U/L 102*   BILIRUBIN mg/dL 0.2*   ALT (SGPT) U/L 6*   AST (SGOT) U/L 30       Results from last 7 days  Lab Units 04/18/17  0358   SED RATE mm/hr 92*       Results from last 7 days  Lab Units 04/17/17  1155   CRP mg/dL 2.82*       4/17:  UA small amount of LE; UC no growth           Estimated Creatinine Clearance: 153.1 mL/min (by C-G formula based on Cr of 0.6).    Microbiology:  Blood Culture   Date Value Ref Range Status   03/30/2017 No growth at 5 days  Final   03/30/2017 No growth at 5 days  Final           Urine Culture   Date Value Ref Range Status   04/04/2017 No growth  Preliminary   03/31/2017 No growth  Final   03/30/2017 >100,000 CFU/mL Escherichia coli (A)  Final      scrotal cx x 2 cxs (3/31) E. coli  Cefazolin sens.    Urine Culture   Date Value Ref Range Status   04/11/2017 No growth at 2 days  Final   04/07/2017 >100,000 CFU/mL Candida glabrata (A)  Final         Radiology:  Imaging Results (last 24 hours)     Procedure Component Value Units Date/Time    XR Chest 1 View [[id number]] Collected:  04/19/17 1111     Updated:  04/19/17 1111    Narrative:       EXAMINATION: XR CHEST 1 VIEW-04/19/2017:      INDICATION: Followup congestive heart failure;  R13.10-Dysphagia,  unspecified; Z74.09-Other reduced mobility; Z74.09-Other reduced  mobility; R41.841-Cognitive communication deficit.      COMPARISON: NONE.     FINDINGS:   1.  There is cardiomegaly with diffuse vascular congestive edema.  2.  There is ill defined airspace opacity in both mid and lower lung  zones with consolidation at the bases, worse on the left than right, and  there are bilateral basilar effusions, worse on the left than right.           Impression:       No interval change or improvement is noted in the bilateral  edema, vascular congestion, pulmonary opacities and pleural effusions as  well as the cardiomegaly when compared to previous studies of 2 days  ago.     D:  04/19/2017  E:  04/19/2017                     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  I will plan to discontinue his antibiotic therapy.  2. Acute right hemispheric cerebral vascular accident.  3. Acute toxic metabolic encephalopathy-secondary to sepsis and cerebral vascular accident  4. 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 Severe systolic congestive heart failure  6. Escherichia coli urinary tract infection-improved  7. Sepsis-secondary to Delisa's gangrene, now improved  8. Leukocytosis/neutrophilia-secondary to scrotal gangrene/cellulitis, improved   9.  Pulmonary edema-improving  10. Candiduria--this resolved with removal of his Cueto catheter.  The treatment for his previous candiduria was removal of the Cueto catheter.  Now has recurrent candiduria after his Cueto catheter was placed back in.  We should either start him on amphotericin B bladder irrigations or remove his Cueto catheter.  11. Cardiogenic shock-with pulmonary edema, now significantly improved.         PLAN/RECOMMENDATIONS:   1.  Continue scrotal wound care  2.  Remove the Cueto catheter when feasible  3.  Amphotericin B bladder irrigations unless the Cueto catheter can be  removed  4.  Continue off of antibiotic therapy  5.  Transfer to the long-term acute care hospital at [place]-I discussed this with case management    I discussed amphotericin B bladder irrigations with clinical pharmacy today.    Dr. Leon has obtained the history, performed the physical exam and formulated the above treatment plan.     Kike Leon MD  4/19/2017  11:42 AM

## 2017-04-19 NOTE — PROGRESS NOTES
Acute Care - Speech Language Pathology   Swallow Initial Evaluation [facility]   Clinical Swallow Evaluation       Patient Name: Tomas Salvador  : 1955  MRN: [id number]  Today's Date: 2017  Onset of Illness/Injury or Date of Surgery Date: 17     Referring Physician: MARKOS      Admit Date: 2017    Visit Dx:     ICD-10-CM ICD-9-CM   1. Dysphagia, unspecified type R13.10 787.20   2. Impaired functional mobility, balance, gait, and endurance Z74.09 V49.89   3. Impaired mobility and ADLs Z74.09 799.89   4. Cognitive communication deficit R41.841 799.52     Patient Active Problem List   Diagnosis   • Coronary disease   • Ischemic cardiomyopathy with EF 10% on Echo 17   • Peripheral vascular disease   • ICD (implantable cardioverter-defibrillator) in place, implantation .   • Dyslipidemia, on atorvastain.    • S/P Sepsis due to  source 3/30/17   • CVA 17 with rapid improvement   • Diabetes Mellitus HgbA1c 10.4   • Chronic hypoxic respiratory failure on nocturnal o2   • CAD s/p CABG , C . Grafts patent except SVG to diagonal   • Urinary retention requiring colbert   • Hyperlipidemia   • Essential hypertension   • Hx of PVD (peripheral vascular disease)   • E. coli UTI    • Epididymo-orchitis   • Encephalopathy acute   • Fourniers gangrene with E-Coli Cellulitis. Debrided in [place] 3/30/17   • Yeast UTI   • Atrial fibrillation but now converted. Still on Eliquis     Past Medical History:   Diagnosis Date   • CAD (coronary artery disease)    • Chronic back pain    • Chronic respiratory failure with hypoxia     Night time only at 2 liters/min   • Diabetes mellitus, type 2    • Essential hypertension    • Hyperlipidemia    • Ischemic cardiomyopathy    • NSTEMI (non-ST elevated myocardial infarction)    • Obesity    • PVD (peripheral vascular disease)    • Systolic CHF     EF <20%  3/8/17   • Urinary retention      Past Surgical History:   Procedure Laterality Date   • CARDIAC  CATHETERIZATION  05/2015    Severe multivessel coronary artery disease involving a totally occluded LAD, 99% proximal left circumflex artery; totally occluded saphenous vein graft to the diagonal; patent LIMA to the LAD, and a patent saphenous vein graft to the first obtuse marginal.      • CARDIAC DEFIBRILLATOR PLACEMENT  2015   • CORONARY ARTERY BYPASS GRAFT  2005    3 vessel   • CYSTOSCOPY N/A 3/31/2017    Procedure: CYSTOSCOPY and scrotal exploration with debridement of necrotizing fasciitis;  Surgeon: Mele Johnson MD;  Location: [facility] OR;  Service:    • LEG SURGERY Right     unknown, pos vascular bypass   • SCROTAL EXPLORATION N/A 3/31/2017    Procedure: SCROTAL EXPLORATION;  Surgeon: Mele Johnson MD;  Location: [facility] OR;  Service:           SWALLOW EVALUATION (last 72 hours)      Swallow Evaluation       04/19/17 1600                Rehab Evaluation    Document Type evaluation  -LS        Subjective Information no complaints;agree to therapy  -LS        General Information    Patient Profile Review yes  -LS        Onset of Illness/Injury 04/03/17  -LS        Subjective Patient Observations alert, cooperative, requesting water  -LS        Pertinent History Of Current Problem Sepsit 2' necrotic scrotal tissue, CVA symptoms adm under pathway to ICU, full work-up pending  -LS        Current Diet Limitations NPO  -LS        Precautions/Limitations, Vision WFL   for this eval  -LS        Precautions/Limitations, Hearing WFL   for this eval  -LS        Prior Level of Function- Communication other (comment)   unknown  -LS        Prior Level of Function- Swallowing other (comment)   unknown  -LS        Plans/Goals Discussed With patient;patient and family  -LS        Barriers to Rehab medically complex  -LS        Clinical Impression    Patient's Goals For Discharge --   requesting water  -LS        Family Goals For Discharge family did not state  -LS        SLP Swallowing Diagnosis --   suspect  severe pharyngeal dysphagia  -        Rehab Potential/Prognosis, Swallowing fair, will monitor progress closely  -        Criteria for Skilled Therapeutic Interventions Met skilled criteria for dysphagia intervention met  -LS        FCM, Swallowing 1-->Level 1  -LS        Therapy Frequency 5 times/wk  -        SLP Diet Recommendation NPO: unsafe for food/liquid intake  -        Recommended Diagnostics reassess via clinical swallow (non-instrumental exam)  -        SLP Rec. for Method of Medication Administration meds via alternate route  -        Oral Motor Structure and Function    Oral Motor Anatomy and Physiology patient demonstrates anatomy and physiology that is WNL  -        Dentition Assessment poor oral hygiene;missing teeth  -        Secretion Management wet vocal quality  -LS        Mucosal Quality dry  -LS        Volitional Swallow mild to moderate difficulties initiating volitional swallow  -LS        Volitional Cough weak volitional cough  -        General Feeding/Swallowing Observations    Current Feeding Method NPO  -        Observations of Self Feeding Skills self feeding was very difficult  -        Observations of Posture During Feeding upright at 90 degrees  -        Clinical Swallow Exam    Mode of Presentation fed by clinician;straw;spoon  -        Oral Phase Results --   DNT test pureed and solid  -LS        Pharyngeal Phase Results sign/symptoms of pharyngeal impairment  -        Summary of Clinical Exam Suspect severe pharyngeal dysphagia. Pt came of Bi-PAP for eval.  No overt s/s of asp w/ ice chips. Wet vocal qulality and delayed cough w/ thins. DNT other consistensty 2' significant wet vocal quality and weak cough. Weak gag present on suction. REC: re-eval 4/20. NPO. with meds via alt route.  -          User Key  (r) = Recorded By, (t) = Taken By, (c) = Cosigned By    Initials Name Effective Dates     Chacha Pelletier MS [facility]-SLP 06/22/15 -          EDUCATION  The patient has been educated in the following areas:   Dysphagia (Swallowing Impairment) Oral Care/Hydration NPO rationale.    SLP Recommendation and Plan  SLP Swallowing Diagnosis:  (suspect severe pharyngeal dysphagia)  SLP Diet Recommendation: NPO: unsafe for food/liquid intake  Recommended Feeding/Eating Techniques: small sips/bites, no straws  SLP Rec. for Method of Medication Administration: meds via alternate route     Recommended Diagnostics: reassess via clinical swallow (non-instrumental exam)  Criteria for Skilled Therapeutic Interventions Met: skilled criteria for dysphagia intervention met     Rehab Potential/Prognosis, Swallowing: fair, will monitor progress closely  Therapy Frequency: 5 times/wk             Plan of Care Review  Plan Of Care Reviewed With: patient  Progress: progress toward functional goals as expected  Outcome Summary/Follow up Plan: Clinical swallow eval: Suspect severe pharyngeal dysphagia. Pt came of Bi-PAP for eval.  No overt s/s of asp w/ ice chips. Wet vocal qulality and delayed cough w/ thins. DNT other consistensty 2' significant wet vocal quality and weak cough. Weak gag present on suction. REC: re-eval 4/20. NPO. with meds via alt route.          IP SLP Goals       04/07/17 1438 04/06/17 1012       Safely Consume Diet    Safely Consume Diet- SLP, Date Established  04/06/17  -LS     Safely Consume Diet- SLP, Time to Achieve  by discharge  -LS     Safely Consume Diet- SLP, Outcome  goal ongoing  -LS     Cognitive Linguistic- Optimal Participation in Care    Cognitive Linguistic Optimal Participation in Care- SLP, Date Established 04/07/17  -SM      Cognitive Linguistic Optimal Participation in Care- SLP, Time to Achieve by discharge  -SM      Cognitive Linguistic Optimal Participation in Care- SLP, Outcome goal ongoing  -SM        User Key  (r) = Recorded By, (t) = Taken By, (c) = Cosigned By    Initials Name Provider Type    JENNIFER Solomon, MS CCC-SLP  Speech and Language Pathologist    ANDREWS Pelletier MS CCC-SLP Speech and Language Pathologist               Time Calculation:         Time Calculation- SLP       04/19/17 1639          Time Calculation- SLP    SLP Start Time 1600  -      SLP Received On 04/19/17  -        User Key  (r) = Recorded By, (t) = Taken By, (c) = Cosigned By    Initials Name Provider Type    ANDREWS Pelletier MS CCC-SLP Speech and Language Pathologist          Therapy Charges for Today     Code Description Service Date Service Provider Modifiers Qty    [id number] HC ST EVAL ORAL PHARYNG SWALLOW 3 4/19/2017 Chacha Pelletier MS CCC-SLP GN 1               Chacha Pelletier MS CCC-SLP  4/19/2017

## 2017-04-19 NOTE — PROGRESS NOTES
"Intensivist Note     4/19/2017  Hospital Day: 15      Mr. Tomas Salvador, 61 y.o. male is followed for:  Principal Problem:    CVA 4/4/17 with rapid improvement  Active Problems:    S/P Sepsis due to  source 3/30/17    Epididymo-orchitis    Fourniers gangrene with E-Coli Cellulitis. Debrided in [place] 3/30/17    Urinary retention requiring colbert    E. coli UTI     Yeast UTI    Ischemic cardiomyopathy with EF 10% on Echo 4/4/17    ICD (implantable cardioverter-defibrillator) in place, implantation 2015.    CAD s/p CABG 2005, LHC 2015. Grafts patent except SVG to diagonal    Encephalopathy acute    Diabetes Mellitus HgbA1c 10.4    Chronic hypoxic respiratory failure on nocturnal o2    Hx of PVD (peripheral vascular disease)       SUBJECTIVE     Subjective    The patient's relevant past medical, surgical and social history were reviewed and updated in Epic as appropriate.    OBJECTIVE     /90  Pulse 87  Temp 98.7 °F (37.1 °C) (Oral)   Resp 20  Ht 69\" (175.3 cm)  Wt 233 lb 4 oz (106 kg)  SpO2 100%  BMI 34.44 kg/m2  Oxygen Concentration (%): 30  Flow (L/min): 2    Flowsheet Rows         First Filed Value    Admission Height  69\" (175.3 cm) Documented at 04/05/2017 0919    Admission Weight  237 lb (108 kg) Documented at 04/05/2017 0919        Intake & Output (last day)       04/18 0701 - 04/19 0700 04/19 0701 - 04/20 0700    I.V. (mL/kg) 155.7 (1.5)     Other 1471     NG/GT 1606     IV Piggyback 100     Total Intake(mL/kg) 3332.7 (31.7)     Urine (mL/kg/hr) 1675 (0.7) 653 (0.7)    Total Output 1675 653    Net +1657.7 -653                Objective    Exam:  General Exam:  Elderly, chronically ill  white male who appears much older than stated age  HEENT: Pupils equal and reactive. Nose and throat clear. Nasoduodenal tube in place  Neck:                          Supple, no JVD, thyromegaly, or adenopathy  Lungs: Bilateral rhonchi with diminished breath sounds left base.  Cardiovascular: Regular rate and " rhythm without murmurs or gallops. Sinus rhythm 109 on monitor  Abdomen: Soft nontender without organomegaly or masses. Tolerating impact peptide enteral feeds at 70 mL/hr with 50 mL/hr water   and rectal: Cueto catheter in place. 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  Neurologic:                 Symmetric strength. No focal deficits.    Chest X-Ray 4/19/17: Marked cardiomegaly. PPM ICD with generator over the left anterior chest. Bilateral congestive heart failure    INFUSIONS         Results from last 7 days  Lab Units 04/19/17  0357 04/18/17  0358 04/17/17  0034   WBC 10*3/mm3 8.17 11.60* 9.34   HEMOGLOBIN g/dL 10.1* 10.0* 9.8*   HEMATOCRIT % 32.6* 32.7* 32.4*   PLATELETS 10*3/mm3 291 352 306       Results from last 7 days  Lab Units 04/19/17  0357 04/18/17  0358   SODIUM mmol/L 143 145   POTASSIUM mmol/L 3.8 4.1   CHLORIDE mmol/L 105 105   TOTAL CO2 mmol/L 36.0* 38.0*   BUN mg/dL 53* 51*   CREATININE mg/dL 0.60 0.70   GLUCOSE mg/dL 175* 171*   CALCIUM mg/dL 8.9 9.0       Results from last 7 days  Lab Units 04/19/17  0357 04/18/17  0358 04/17/17  0033   MAGNESIUM mg/dL 2.0 2.1 2.0   PHOSPHORUS mg/dL 2.6 2.4 2.0*       Lab Results   Component Value Date    SEDRATE 92 (H) 04/18/2017     Lab Results   Component Value Date    .0 (H) 04/19/2017     Lab Results   Component Value Date    CKTOTAL 54 04/04/2017    CKMB 1.22 03/31/2017    CKMBINDEX 3.8 (H) 03/31/2017    TROPONINI <0.006 04/04/2017     Lab Results   Component Value Date    TSH 0.656 04/05/2017     Lab Results   Component Value Date    LACTATE 0.8 04/02/2017     No results found for: CORTISOL      Results from last 7 days  Lab Units 04/18/17  0447   PH, ARTERIAL pH units 7.438   PCO2, ARTERIAL mm Hg 51.7*   PO2 ART mm Hg 96.7   HCO3 ART mmol/L 35.0*   FIO2 % 28         Non-Invasive Ventilation Settings PRN high WOB    Start     Ordered    04/17/17 1018  . BIPAP; Titrate for SPO2: Greater  Than or Equal To, 90%  At Bedtime & As Needed-RT     Question Answer Comment   . BIPAP    Titrate for SPO2 Greater Than or Equal To    Titrate for SPO2 90%        04/17/17 1017     I reviewed the patient's results, images and medication.    Assessment/Plan   ASSESSMENT      Principal Problem:    CVA 4/4/17 with rapid improvement  Active Problems:    S/P Sepsis due to  source 3/30/17    Epididymo-orchitis    Fourniers gangrene with E-Coli Cellulitis. Debrided in [place] 3/30/17    Urinary retention requiring colbert    E. coli UTI     Yeast UTI    Ischemic cardiomyopathy with EF 10% on Echo 4/4/17    ICD (implantable cardioverter-defibrillator) in place, implantation 2015.    CAD s/p CABG 2005, LHC 2015. Grafts patent except SVG to diagonal    Encephalopathy acute    Diabetes Mellitus HgbA1c 10.4    Chronic hypoxic respiratory failure on nocturnal o2    Hx of PVD (peripheral vascular disease)      DISCUSSION: Remains encephalopathic although improved on Seroquel. I think mobilization and physical therapy will help. Still cannot take in food orally so of asked Dr. Fall to see him regarding a PEG feeding tube for long-term care. I still am hopeful that his encephalopathy will improve over time. Chest x-ray still looks like volume overload but BNP has at least dropped from the 3000 range into the 700s. Regarding his Colbert, I too would like to have it removed but he has had 2 episodes of significant urinary retention and placement of I and O catheters every 6 hours is extremely difficult because of his anatomy. Although the area of his perineum that was debrided is much improved, I would hate to have him lay in urine for a prolonged period of time. He has previously had problems with urinary tract infections. He had an Escherichia coli urinary tract infection 2/24/17 in [place] prior to placement of any Colbert catheter, apparently another documented Escherichia coli UTI was noted 3/30/17 in [place] (but had a chronic Colbert  in at that time). When he was transferred here 4/4/17 after debridement for his Delisa's gangrene, he still had a Cueto catheter in, and was documented to have candiduria with Candida glabrata on admission. Not surprisingly this was still present 4/7/17 and Cueto was removed. By 4/10/17 he had retention of over a liter of urine in his bladder and in and out catheterization was continued. Apparently the last urine culture 4/11/17 showed no growth of Candida. He was however requiring dopamine and Bautista-Synephrine, was somnolent, remained in chronic digestive heart failure with the need for strict intake and output, had significant scrotal and penile edema with difficult in and out urinary catheterization, and there were concerns about contaminating his wound. Because of this, Cueto was re-anchored 4/12/17. Not surprisingly urine culture 4/17/17 shows recurrence of Candida glabrata. It comes down to the need for strict I and O in a patient with difficult urologic anatomy and the need to keep the perineum clean and dry, versus our wish to have a Cueto out to avoid candiduria.    PLAN     1. Dr. Leon has begun amphotericin B bladder irrigation. I agree. Hopefully if mentation and cardiac status improves we can remove the Cueto  2. Albumin and Lasix to see if we can remove some third space fluid  3. He needs a PEG feeding tube for long-term enteral feeds because of his slow recovery and my wish to discontinue his ND tube. We will have to hold Eliquis per surgery  4. It is clear that despite all of our efforts, with an ischemic cardiomyopathy and an EF of 10% he will not do well    Plan of care and goals reviewed with mulitdisciplinary team at daily rounds.    I discussed the patient's findings and my recommendations with family and nursing staff    Time spent Critical care 35 min (It does not include procedure time).    Michael Gaona MD  Intensive Care Medicine  04/19/17 4:01 PM

## 2017-04-19 NOTE — PROGRESS NOTES
Continued Stay Note  [facility]     Patient Name: Tomas Salvador  MRN: [id number]  Today's Date: 4/19/2017    Admit Date: 4/4/2017          Discharge Plan       04/19/17 1055    Case Management/Social Work Plan    Plan L/TACH    Patient/Family In Agreement With Plan yes    Additional Comments Mr. Salvador's wife would like him to transfer to [facility] L/TACH when he is medically ready.  It would be easier for her to work and family could visit him there.  Dr. Cross states that would be ok to go there.  Referral made to  [facility] L/TACH.                Discharge Codes     None        Expected Discharge Date and Time     Expected Discharge Date Expected Discharge Time    Apr 20, 2017             Bernadette Pina RN

## 2017-04-19 NOTE — SIGNIFICANT NOTE
04/19/17 1115   SLP Deferred Reason   SLP Deferred Reason Routine  (Pt on bipap this am - RN requested check back in pm )

## 2017-04-19 NOTE — PLAN OF CARE
Problem: Patient Care Overview (Adult)  Goal: Plan of Care Review  Outcome: Ongoing (interventions implemented as appropriate)    04/19/17 0835   Coping/Psychosocial Response Interventions   Plan Of Care Reviewed With patient   Patient Care Overview   Progress progress toward functional goals as expected   Outcome Evaluation   Outcome Summary/Follow up Plan Tunnels appear to be clear of slough, with minimal slough remaining in central wound bed. Hydrofera blue appears to be managing exudate well and providing adequately moist wound bed. Left extra supplies in case dressing is soiled. PT will continue to hold pulse lavage.         Problem: Inpatient Physical Therapy  Goal: Wound Care Goal 1 LTG- PT  Outcome: Ongoing (interventions implemented as appropriate)    04/06/17 1025 04/19/17 0835   Wound Care PT LTG   Wound Care PT LTG 1, Date Established 04/06/17 --    Wound Care PT LTG 1, Time to Achieve 1 wk --    Wound Care PT LTG 1, Location Scrotum --    Wound Care PT LTG 1, No S&S of Infection yes --    Wound Care PT LTG 1, Decrease Wound Size 10% --    Wound Care PT LTG 1, Decrease Necrotic Tissue 50% --    Wound Care PT LTG 1, Decrease Exudate minimum --    Wound Care PT LTG 1, No New Skin Break Down yes --    Wound Care PT LTG 1, Education dressing changes;wound care --    Wound Care PT LTG 1, Education Understanding verbalize understanding --    Wound Care PT LTG 1, Outcome --  goal ongoing

## 2017-04-19 NOTE — PLAN OF CARE
Problem: Patient Care Overview (Adult)  Goal: Plan of Care Review  Outcome: Ongoing (interventions implemented as appropriate)    04/19/17 0650   Coping/Psychosocial Response Interventions   Plan Of Care Reviewed With patient   Patient Care Overview   Progress progress toward functional goals as expected         Problem: Stroke (Ischemic) (Adult)  Goal: Signs and Symptoms of Listed Potential Problems Will be Absent or Manageable (Stroke)  Outcome: Ongoing (interventions implemented as appropriate)    Problem: Fall Risk (Adult)  Goal: Identify Related Risk Factors and Signs and Symptoms  Outcome: Ongoing (interventions implemented as appropriate)    04/19/17 0650   Fall Risk   Fall Risk: Related Risk Factors age-related changes;confusion/agitation;depression/anxiety;fatigue/slow reaction;gait/mobility problems;neuro disease/injury;polypharmacy;sensory deficits   Fall Risk: Signs and Symptoms presence of risk factors       Goal: Absence of Falls  Outcome: Ongoing (interventions implemented as appropriate)    Problem: Skin Integrity Impairment, Risk/Actual (Adult)  Goal: Identify Related Risk Factors and Signs and Symptoms  Outcome: Ongoing (interventions implemented as appropriate)  Goal: Skin Integrity/Wound Healing  Outcome: Ongoing (interventions implemented as appropriate)    04/19/17 0650   Skin Integrity Impairment, Risk/Actual (Adult)   Skin Integrity/Wound Healing making progress toward outcome         Problem: Infection, Risk/Actual (Adult)  Goal: Identify Related Risk Factors and Signs and Symptoms  Outcome: Ongoing (interventions implemented as appropriate)  Goal: Infection Prevention/Resolution  Outcome: Ongoing (interventions implemented as appropriate)    Problem: SAFETY - NON-VIOLENT RESTRAINT  Goal: Remains free of injury from restraints (Non-Violent Restraint)  Outcome: Ongoing (interventions implemented as appropriate)  Goal: Free from restraint(s) (Non-Violent Restraint)  Outcome: Ongoing  (interventions implemented as appropriate)    Problem: NPPV/CPAP (Adult)  Goal: Signs and Symptoms of Listed Potential Problems Will be Absent or Manageable (NPPV/CPAP)  Outcome: Ongoing (interventions implemented as appropriate)    04/19/17 0650   NPPV/CPAP   Problems Assessed (NPPV/CPAP) all   Problems Present (NPPV/CPAP) none

## 2017-04-19 NOTE — CONSULTS
Tomas Salvador  [id number]  1955       Patient Care Team:  Jose Navarro MD as PCP - General (Family Medicine)      General Surgery Consult Note     Date of Consultation: 04/19/17    Consulting Physician - Dr. Gaona    Reason for Consult - Feeding difficulty    Subjective     I have been asked to see  Tomas Salvador , a 61 y.o. male in consultation for Feeding difficulty. He was admitted 4/4/17 for acute L sided frontal CVA. He was not a candidate for intervention. Since that time, he has had improvement in his mental status, but unfortunately remains with dysphagia, unable to tolerate PO safely. We have been asked to see him for long term respiratory access. He is unable to participate in his history due to confusion, so all history is obtained from the hospital chart.      Allergy: No Known Allergies    Medications:    albumin human 25 g Intravenous Once   conventional amphotericin B (FUNGIZONE) irrigation 42 mL/hr Irrigation Q24H   apixaban 5 mg Oral Q12H   aspirin 324 mg Oral Daily   atorvastatin 80 mg Nasogastric Nightly   carvedilol 12.5 mg Oral Q12H   docusate sodium 100 mg Nasogastric BID   fentaNYL 1 patch Transdermal Q72H   furosemide 40 mg Intravenous Daily   furosemide 40 mg Intravenous Once   heparin flush (porcine) 500 Units Intracatheter Q12H   insulin detemir 25 Units Subcutaneous Q12H   insulin lispro 0-24 Units Subcutaneous Q6H   ipratropium-albuterol 3 mL Nebulization 4x Daily - RT   pantoprazole 40 mg Intravenous Q AM   QUEtiapine 25 mg Oral Q12H   saccharomyces boulardii 250 mg Oral BID        No current facility-administered medications on file prior to encounter.      Current Outpatient Prescriptions on File Prior to Encounter   Medication Sig   • aspirin 81 MG EC tablet Take 81 mg by mouth Daily. On hold for surgery   • nitroglycerin (NITROLINGUAL) 0.4 MG/SPRAY spray Place 1 spray under the tongue every 5 (five) minutes as needed for chest pain.   • nystatin (MYCOSTATIN) 001851  "UNIT/GM ointment Apply  topically 2 (Two) Times a Day As Needed (yeast on skin).   • omeprazole (PriLOSEC) 20 MG capsule Take 20 mg by mouth Daily As Needed (acid reflux).   • rOPINIRole (REQUIP) 0.25 MG tablet Take 0.25 mg by mouth Every Night.       PMHx:   Patient Active Problem List   Diagnosis   • Coronary disease   • Ischemic cardiomyopathy with EF 10% on Echo 4/4/17   • Peripheral vascular disease   • ICD (implantable cardioverter-defibrillator) in place, implantation 2015.   • Dyslipidemia, on atorvastain.    • S/P Sepsis due to  source 3/30/17   • CVA 4/4/17 with rapid improvement   • Diabetes Mellitus HgbA1c 10.4   • Chronic hypoxic respiratory failure on nocturnal o2   • CAD s/p CABG 2005, C 2015. Grafts patent except SVG to diagonal   • Urinary retention requiring colbert   • Hyperlipidemia   • Essential hypertension   • Hx of PVD (peripheral vascular disease)   • E. coli UTI    • Epididymo-orchitis   • Encephalopathy acute   • Fourniers gangrene with E-Coli Cellulitis. Debrided in [place] 3/30/17   • Yeast UTI       Past Surgical History:  1) CABG x 3 v  2) AICD/ Defibrillator  3) Leg surgery (bypass, data deficient)  4) Scrotal exploration    Family History: Noncontributory     Social History:   Tobacco use: None , quit 2002, 100 pack year history    EtOH use : None    Illicit drug use: None      Review of Systems   Review of systems could not be obtained due to   patient confusion.       Objective     Physical Exam:      Vital Signs  /90  Pulse 87  Temp 98.7 °F (37.1 °C) (Oral)   Resp 20  Ht 69\" (175.3 cm)  Wt 233 lb 4 oz (106 kg)  SpO2 100%  BMI 34.44 kg/m2    Intake/Output Summary (Last 24 hours) at 04/19/17 1628  Last data filed at 04/19/17 1239   Gross per 24 hour   Intake           1908.6 ml   Output             1353 ml   Net            555.6 ml         Physical Exam:    Head: Normocephalic, atraumatic.   Eyes: Pupils equal, round, react to light and accomodation.   Mouth: Oral " mucosa without lesions,   Neck: No masses, lymphadenopathy or carotid bruits bilaterally   CV: Rhythm  and rate regular , no  murmurs, rubs or gallops  Lungs: Rhonchi to auscultation bilaterally   Abdomen: Bowel sounds positive , soft, obese  Groin : No obvious hernias bilaterally   Extremities:  No cyanosis, clubbing or edema bilaterally   Lymphatics: No abnormal lymphadenopathy appreciated      Results Review: I have personally reviewed all of the lab and imaging results available at this time.       Assessment/Plan     Assessment and Plan:    Problem List Items Addressed This Visit     None      Visit Diagnoses     Dysphagia, unspecified type    -  Primary - Due to CVA. Will plan for EGD/PEG Friday 4/21/17 to allow for reversal of Eliquis. D/C Eliquis now.                I discussed the patients findings and my recommendations with the patient and/or family, as well as the primary team     Satya Fall MD  04/19/17  4:28 PM

## 2017-04-20 NOTE — PROGRESS NOTES
"Tomas Salvador  1955  [id number]    Surgery Progress Note    Date of visit: 4/20/2017    Subjective   Subjective: Stable overnight, remains on BiPAP         Objective     Objective    /71  Pulse 84  Temp 98 °F (36.7 °C) (Axillary)   Resp 17  Ht 69\" (175.3 cm)  Wt 233 lb 4 oz (106 kg)  SpO2 100%  BMI 34.44 kg/m2    Intake/Output Summary (Last 24 hours) at 04/20/17 0716  Last data filed at 04/20/17 0600   Gross per 24 hour   Intake             2842 ml   Output             2878 ml   Net              -36 ml       CV:  Rhythm  regular and rate regular   L:  Rhonchi to auscultation bilaterally   Abd:  Bowel sounds positive , soft, obese  Ext:  No cyanosis, clubbing, edema    Labs that are back at this time have been reviewed.        Assessment/Plan     Assessment/ Plan:    Problem List Items Addressed This Visit     None      Visit Diagnoses     Dysphagia, unspecified type    -  Primary - Plan PEG tomorrow. Eliquis held.            Satya Fall MD  4/20/2017  7:16 AM      "

## 2017-04-20 NOTE — PROGRESS NOTES
Intensivist Note     4/20/2017  Hospital Day: 16      Mr. Tomas Salvador, 61 y.o. male is followed for:  Principal Problem:    CVA 4/4/17 with rapid improvement  Active Problems:    S/P Sepsis due to  source 3/30/17    Epididymo-orchitis    Fourniers gangrene with E-Coli Cellulitis. Debrided in [place] 3/30/17    Urinary retention requiring colbert    E. coli UTI     Yeast UTI    Ischemic cardiomyopathy with EF 10% on Echo 4/4/17    ICD in place, implantation 2015.    CAD s/p CABG 2005, [facility] 2015. Grafts patent except SVG to diagonal    Encephalopathy acute    Atrial fibrillation but now converted. Still on Eliquis    Diabetes Mellitus HgbA1c 10.4    Chronic hypoxic respiratory failure on nocturnal o2    Hx of PVD (peripheral vascular disease)       SUBJECTIVE     Subjective    61-year-old white male with multiple medical problems as noted above. Severe ischemic cardiomyopathy with EF 10%, previous CABG in 2005, requirement for ICD for primary prophylaxis, diabetes mellitus, chronic home nocturnal oxygen. He is a known poorly controlled diabetic (hemoglobin A1c 10.4) and presented to [place] with sepsis due to a urologic source 3/30/17. Turned out to have an Escherichia coli urinary tract infection as well as Delisa's gangrene with Escherichia coli cellulitis and underwent debridement 3/30/17. Subsequent to his surgery in [place], he developed neurologic symptoms suggestive of a stroke and was transferred to [facility] 4/4/17. Workup just showed an old left frontal stroke and he apparently fully recovered (questionable TIA). While here he developed atrial fibrillation with rapid ventricular response for which he received amiodarone and converted to sinus rhythm. He was initially placed on a heparin drip but is now anticoagulated on Eliquis. He subsequently developed some hypotension and bradycardia requiring dopamine and phenylephrine. Coreg, amiodarone, and Entresto were all stopped. Hypotension persisted so Precedex  "which had been used for agitation was also stopped and he subsequently improved. He then had some problems with pulmonary edema but responded to diuretics with improvement. He continues however to have persistent bilateral lower lobe infiltrates although no fever or significant leukocytosis.      At the present time he continues on cefazolin and Flagyl for his Escherichia coli scrotal infection and Escherichia coli UTI. His Cueto catheter was removed but unfortunately he had urinary retention of greater than a liter requiring in and out catheterization. This had to be stopped because of his scrotal and penile edema as catheterization was difficult, and Cueto catheter remains anchored in place. 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 Swallowing remains a problem and since he could not safely swallow was on TPN initially. It took quite a while to get a Corpak in place but it is finally in, and bridled in position. He remains on enteral feeds and off TPN.     Mental status has not improved. He remains encephalopathic requiring restraints although no focal defects on motor exam. Ativan helps but this makes him too lethargic. Because of his mental status, his cough is poor and he continues to require intermittent NT suctioning. At times he clears his secretions poorly, develops tachypnea and tachycardia, but responds to being placed on a BiPAP mask.    Because of the need for chronic enteral nutrition in this situation Dr. Fall has been consulted and plans on placing a PEG feeding tube tomorrow after he is off Eliquis for 3 days.    The patient's relevant past medical, surgical and social history were reviewed and updated in Epic as appropriate.    OBJECTIVE     /84 (BP Location: Left arm, Patient Position: Lying)  Pulse 103  Temp 98 °F (36.7 °C) (Axillary)   Resp 18  Ht 69\" (175.3 cm)  Wt 233 lb 4 oz (106 kg)  SpO2 94%  BMI 34.44 kg/m2  Oxygen " "Concentration (%): 30  Flow (L/min): 2    Flowsheet Rows         First Filed Value    Admission Height  69\" (175.3 cm) Documented at 04/05/2017 0919    Admission Weight  237 lb (108 kg) Documented at 04/05/2017 0919        Intake & Output (last day)       04/19 0701 - 04/20 0700 04/20 0701 - 04/21 0700    I.V. (mL/kg)      Other 797 100    NG/GT 1329 91    IV Piggyback 716     Total Intake(mL/kg) 2842 (26.9) 191 (1.8)    Urine (mL/kg/hr) 2878 (1.1)     Stool 0 (0)     Total Output 2878      Net -36 +191          Unmeasured Stool Occurrence 5 x           Objective    Exam:  General Exam:  Elderly chronically ill-appearing white male appearing much older than stated age  HEENT: Pupils equal and reactive. Nasoduodenal tube in place  Neck:                          Supple, no JVD, thyromegaly, or adenopathy  Lungs: Clear to auscultation and percussion anteriorly and posteriorly.  Cardiovascular: Regular rate and rhythm without murmurs or gallops.  Abdomen: Soft nontender without organomegaly or masses.   and rectal: Cueto catheter in place.Wound from debridement of perineum remains wide open, but has a clean base.  Extremities: Chronic 1+ lower extremity edema. Right PICC line in place.  Neurologic:                 Not always cooperative but can follow some commands. No asymmetry in motor strength. Somewhat dysarthric and difficult to understand    Chest X-Ray 4/20/17: No change or improvement in cardiomegaly, chronic congestive heart failure with increased congestive changes in the lower half of both lung zones, or his left lower lobe atelectasis/effusion. ICD permanent pacemaker in place.    INFUSIONS    Pharmacy Consult          Results from last 7 days  Lab Units 04/20/17  0429 04/19/17  0357 04/18/17  0358   WBC 10*3/mm3 8.97 8.17 11.60*   HEMOGLOBIN g/dL 10.3* 10.1* 10.0*   HEMATOCRIT % 34.1* 32.6* 32.7*   PLATELETS 10*3/mm3 329 291 352       Results from last 7 days  Lab Units 04/20/17  0743 04/19/17  0357 "   SODIUM mmol/L 145 143   POTASSIUM mmol/L 3.8 3.8   CHLORIDE mmol/L 105 105   TOTAL CO2 mmol/L 37.0* 36.0*   BUN mg/dL 54* 53*   CREATININE mg/dL 0.60 0.60   GLUCOSE mg/dL 179* 175*   CALCIUM mg/dL 9.3 8.9       Results from last 7 days  Lab Units 04/20/17  0743 04/19/17  0357 04/18/17  0358 04/17/17  0033   MAGNESIUM mg/dL  --  2.0 2.1 2.0   PHOSPHORUS mg/dL 2.7 2.6 2.4 2.0*       Lab Results   Component Value Date    SEDRATE 95 (H) 04/20/2017     Lab Results   Component Value Date    .0 (H) 04/19/2017     Lab Results   Component Value Date    CKTOTAL 54 04/04/2017    CKMB 1.22 03/31/2017    CKMBINDEX 3.8 (H) 03/31/2017    TROPONINI <0.006 04/04/2017     Lab Results   Component Value Date    TSH 0.656 04/05/2017     Lab Results   Component Value Date    LACTATE 0.8 04/02/2017     No results found for: CORTISOL      Results from last 7 days  Lab Units 04/20/17  0451 04/18/17  0447   PH, ARTERIAL pH units 7.497* 7.438   PCO2, ARTERIAL mm Hg 44.5 51.7*   PO2 ART mm Hg 102.0 96.7   HCO3 ART mmol/L 34.4* 35.0*   FIO2 % 30 28         Non-Invasive Ventilation Settings     Start     Ordered    04/17/17 1018  . BIPAP; Titrate for SPO2: Greater Than or Equal To, 90%  At Bedtime & As Needed-RT     Question Answer Comment   . BIPAP    Titrate for SPO2 Greater Than or Equal To    Titrate for SPO2 90%        04/17/17 1017     I reviewed the patient's results, images and medication.    Assessment/Plan   ASSESSMENT      Principal Problem:    CVA 4/4/17 with rapid improvement  Active Problems:    S/P Sepsis due to  source 3/30/17    Epididymo-orchitis    Fourniers gangrene with E-Coli Cellulitis. Debrided in [place] 3/30/17    Urinary retention requiring colbert    E. coli UTI     Yeast UTI    Ischemic cardiomyopathy with EF 10% on Echo 4/4/17    ICD in place, implantation 2015.    CAD s/p CABG 2005, [facility] 2015. Grafts patent except SVG to diagonal    Encephalopathy acute    Atrial fibrillation but now converted. Still on  Eliquis    Diabetes Mellitus HgbA1c 10.4    Chronic hypoxic respiratory failure on nocturnal o2    Hx of PVD (peripheral vascular disease)      DISCUSSION: Just does not appear to be making any progress from a mental standpoint. Remains confused and encephalopathic, has a poor cough requiring NT suctioning, and this gets him into a difficult situation from a pulmonary standpoint. I am hoping that after a PEG feeding tube was placed and we remove his NG tube he will note some improvement. Mobilization however is essential    PLAN     1. Continue to hold Eliquis and place PEG tomorrow per Dr. Fall  2. Must be up in chair  3. Physical therapy to work with him  4. Continue to diurese as much as his renal function will tolerate. This is difficult with an EF of only 10%.  5. Long-term prognosis continues to be poor    Plan of care and goals reviewed with mulitdisciplinary team at daily rounds.    I discussed the patient's findings and my recommendations with nursing staff    Time spent Critical care 25 min (It does not include procedure time).    Michael Gaona MD  Intensive Care Medicine  04/20/17 11:13 AM

## 2017-04-20 NOTE — NURSING NOTE
RN request for Dolphin bed due to pt being bed ridden and high risk for further skin breakdown.  Dolphin mattress with bed frame ordered from [facility]. - Thanks

## 2017-04-20 NOTE — PLAN OF CARE
Problem: Patient Care Overview (Adult)  Goal: Plan of Care Review  Outcome: Ongoing (interventions implemented as appropriate)    04/20/17 1118   Coping/Psychosocial Response Interventions   Plan Of Care Reviewed With patient   Outcome Evaluation   Outcome Summary/Follow up Plan PT mobility re-evaluation completed. Pt cont to demonstrate generalized weakness, decreased following of commands, and decreased indep re: functional mobiilty, warranting further skilled PT to promote PLOF. Goals modified to reflect current functional status. Recommend SNF placement upon d/c.          Problem: Stroke (Ischemic) (Adult)  Goal: Signs and Symptoms of Listed Potential Problems Will be Absent or Manageable (Stroke)  Outcome: Ongoing (interventions implemented as appropriate)    04/20/17 1118   Stroke (Ischemic)   Problems Assessed (Stroke (Ischemic)/TIA) motor/sensory impairment   Problems Present (Stroke (Ischemic)/TIA) motor/sensory impairment         Problem: Inpatient Physical Therapy  Goal: Bed Mobility Goal LTG- PT  Outcome: Revised    04/05/17 0909 04/20/17 1118   Bed Mobility PT LTG   Bed Mobility PT LTG, Date Established 04/05/17 --    Bed Mobility PT LTG, Time to Achieve 2 wks --    Bed Mobility PT LTG, Activity Type supine to sit/sit to supine --    Bed Mobility PT LTG, [place] Level --  minimum assist (75% patient effort)   Bed Mobility PT LTG, Date Goal Reviewed --  04/20/17   Bed Mobility PT LTG, Outcome --  goal revised       Goal: Transfer Training Goal 1 LTG- PT  Outcome: Revised    04/05/17 0909 04/20/17 1118   Transfer Training PT LTG   Transfer Training PT LTG, Date Established 04/05/17 --    Transfer Training PT LTG, Time to Achieve 2 wks --    Transfer Training PT LTG, Activity Type sit to stand/stand to sit --    Transfer Training PT LTG, [place] Level --  minimum assist (75% patient effort)   Transfer Training PT LTG, Assist Device walker, rolling --    Transfer Training PT LTG, Date Goal  Reviewed --  04/20/17   Transfer Training PT LTG, Outcome --  goal revised       Goal: Gait Training Goal LTG- PT  Outcome: Revised    04/05/17 0909 04/20/17 1115   Gait Training PT LTG   Gait Training Goal PT LTG, Date Established 04/05/17 --    Gait Training Goal PT LTG, Time to Achieve 2 wks --    Gait Training Goal PT LTG, [place] Level --  minimum assist (75% patient effort);2 person assist required   Gait Training Goal PT LTG, Assist Device walker, rolling --    Gait Training Goal PT LTG, Distance to Achieve --  100   Gait Training Goal PT LTG, Date Goal Reviewed --  04/20/17   Gait Training Goal PT LTG, Outcome --  goal revised

## 2017-04-20 NOTE — PROGRESS NOTES
Acute Care - Physical Therapy Re-Evaluation  [facility]     Patient Name: Tomas Salvador  : 1955  MRN: [id number]  Today's Date: 2017   Onset of Illness/Injury or Date of Surgery Date: 17  Date of Referral to PT: 17  Referring Physician: MD Jimenez      Admit Date: 2017     Visit Dx:    ICD-10-CM ICD-9-CM   1. Dysphagia, unspecified type R13.10 787.20   2. Impaired functional mobility, balance, gait, and endurance Z74.09 V49.89   3. Impaired mobility and ADLs Z74.09 799.89   4. Cognitive communication deficit R41.841 799.52     Patient Active Problem List   Diagnosis   • Coronary disease   • Ischemic cardiomyopathy with EF 10% on Echo 17   • Peripheral vascular disease   • ICD in place, implantation .   • Dyslipidemia, on atorvastain.    • S/P Sepsis due to  source 3/30/17   • CVA 17 with rapid improvement   • Diabetes Mellitus HgbA1c 10.4   • Chronic hypoxic respiratory failure on nocturnal o2   • CAD s/p CABG , C . Grafts patent except SVG to diagonal   • Urinary retention requiring colbert   • Hyperlipidemia   • Essential hypertension   • Hx of PVD (peripheral vascular disease)   • E. coli UTI    • Epididymo-orchitis   • Encephalopathy acute   • [place] gangrene with E-Coli Cellulitis. Debrided in [place] 3/30/17   • Yeast UTI   • Atrial fibrillation but now converted. Still on Eliquis     Past Medical History:   Diagnosis Date   • CAD (coronary artery disease)    • Chronic back pain    • Chronic respiratory failure with hypoxia     Night time only at 2 liters/min   • Diabetes mellitus, type 2    • Essential hypertension    • Hyperlipidemia    • Ischemic cardiomyopathy    • NSTEMI (non-ST elevated myocardial infarction)    • Obesity    • PVD (peripheral vascular disease)    • Systolic CHF     EF <20%  3/8/17   • Urinary retention      Past Surgical History:   Procedure Laterality Date   • CARDIAC CATHETERIZATION  2015    Severe multivessel coronary  artery disease involving a totally occluded LAD, 99% proximal left circumflex artery; totally occluded saphenous vein graft to the diagonal; patent LIMA to the LAD, and a patent saphenous vein graft to the first obtuse marginal.      • CARDIAC DEFIBRILLATOR PLACEMENT  2015   • CORONARY ARTERY BYPASS GRAFT  2005    3 vessel   • CYSTOSCOPY N/A 3/31/2017    Procedure: CYSTOSCOPY and scrotal exploration with debridement of necrotizing fasciitis;  Surgeon: Mele Johnson MD;  Location: [facility] OR;  Service:    • LEG SURGERY Right     unknown, pos vascular bypass   • SCROTAL EXPLORATION N/A 3/31/2017    Procedure: SCROTAL EXPLORATION;  Surgeon: Mele Johnson MD;  Location: [facility] OR;  Service:           PT ASSESSMENT (last 72 hours)      PT Evaluation       04/20/17 1030 04/20/17 1029    Rehab Evaluation    Document Type re-evaluation   PT mobiity  - re-evaluation  -JR    Subjective Information agree to therapy;no complaints  -LS no complaints;agree to therapy  -JR    Patient Effort, Rehab Treatment adequate  -LS adequate  -JR    Symptoms Noted During/After Treatment  none  -JR    Symptoms Noted Comment Pt with decreased following of commands and agitation throughout.   -     General Information    Patient Profile Review yes  -LS yes  -JR    Onset of Illness/Injury or Date of Surgery Date 04/04/17  -LS 04/04/17  -JR    Referring Physician MD Jimenez  - Dr. Gaona  -    Pertinent History Of Current Problem Please see IE for original admit; PT mobility was discharged on 4/11/17 due to declining  medical status. Pt now with improved resp status; remains encephalopathic and on bladder irrigation. RESUME PT orders received.   - Pt initially admitted with aphasia, L sided facial droop and L sided paralysis. Pt d/c'd from OT 4/11 due to decline in medical status. Pt now with resume orders. Pt remains encephalopathic with bladder irrigation.  -JR    Precautions/Limitations fall precautions;oxygen  therapy device and L/min;NPO;other (see comments)   dec skin integrity to scrotum  -LS fall precautions;oxygen therapy device and L/min   NG tube, decreased skin integrity to scrotal region  -JR    Prior Level of Function --   please see IE  -LS --   Please refer to initial eval.  -JR    Equipment Currently Used at Home --   please see IE  -LS --   Please refer to initial eval.  -JR    Plans/Goals Discussed With patient;agreed upon  -LS patient;agreed upon  -JR    Risks Reviewed patient:;LOB;dizziness;increased discomfort  -LS patient:;increased discomfort  -JR    Benefits Reviewed patient:;improve function;increase independence;increase strength;increase balance;increase knowledge  -LS patient:;improve function;increase independence  -JR    Barriers to Rehab medically complex  -LS medically complex;cognitive status  -JR    Living Environment    Lives With --   please see IE  -LS     Living Environment Comment  Please refer to initial eval for prior living environment information  -JR    Clinical Impression    Date of Referral to PT 04/19/17  -LS     PT Diagnosis impaired functional mobility, balance, gait  -LS     Patient/Family Goals Statement return to OF  -LS     Criteria for Skilled Therapeutic Interventions Met yes;treatment indicated  -LS     Rehab Potential good, to achieve stated therapy goals  -LS     Vital Signs    Pre Systolic BP Rehab 114  -  -JR    Pre Treatment Diastolic BP 79  -LS 79  -JR    Post Systolic BP Rehab 114  -  -JR    Post Treatment Diastolic BP 83  -LS 83  -JR    Pretreatment Heart Rate (beats/min) 89  -LS 89  -JR    Posttreatment Heart Rate (beats/min) 96  -LS 96  -JR    Pre SpO2 (%) 100  -  -JR    O2 Delivery Pre Treatment supplemental O2   2L  -LS supplemental O2   Bipap  -JR    Post SpO2 (%) 100  -  -JR    O2 Delivery Post Treatment supplemental O2   2L  -LS supplemental O2   3L  -JR    Pre Patient Position Supine  -LS Supine  -JR    Intra Patient Position  Standing  -LS Standing  -JR    Post Patient Position Sitting  -LS Sitting  -JR    Pain Assessment    Pain Assessment 0-10  -LS 0-10  -JR    Jay-Kent FACES Pain Rating 0  -LS 0  -JR    Pain Score 0  -LS 0  -JR    Post Pain Score 0  -LS 0  -JR    Cognitive Assessment/Intervention    Current Cognitive/Communication Assessment impaired  -LS impaired  -JR    Orientation Status oriented to;person  -LS oriented to;person;disoriented to;place;time  -JR    Follows Commands/Answers Questions able to follow single-step instructions;25% of the time;needs cueing;needs increased time;needs repetition  -LS 25% of the time;able to follow single-step instructions;needs cueing;needs increased time;needs repetition  -JR    Personal Safety severe impairment;impulsive;decreased awareness, need for assist;decreased awareness, need for safety;decreased insight to deficits  -LS severe impairment;decreased awareness, need for assist;decreased awareness, need for safety;decreased insight to deficits  -JR    Personal Safety Interventions fall prevention program maintained;gait belt;nonskid shoes/slippers when out of bed  -LS     ROM (Range of Motion)    General ROM no range of motion deficits identified   BLEs  -LS no range of motion deficits identified  -JR    MMT (Manual Muscle Testing)    General MMT Assessment lower extremity strength deficits identified  -LS     General MMT Assessment Detail  Pt not following commands for MMT this date B UE strength functionally 4/5  -JR    Lower Extremity    Lower Ext Manual Muscle Testing Detail grossly 3+/5; pt with difficulty in following formal MMT commands  -LS     Bed Mobility, Assessment/Treatment    Bed Mobility, Assistive Device head of bed elevated;draw sheet  -LS head of bed elevated;draw sheet  -JR    Bed Mob, Supine to Sit, [place] maximum assist (25% patient effort);2 person assist required;verbal cues required  -LS maximum assist (25% patient effort);2 person assist required  -JR     Bed Mobility, Safety Issues cognitive deficits limit understanding  -LS cognitive deficits limit understanding;decreased use of arms for pushing/pulling;decreased use of legs for bridging/pushing;impaired trunk control for bed mobility  -JR    Bed Mobility, Impairments strength decreased;coordination impaired  -LS strength decreased;impaired balance;coordination impaired;motor control impaired;postural control impaired  -JR    Bed Mobility, Comment  Verbal cues for sequencing.  -JR    Transfer Assessment/Treatment    Transfers, Bed-Chair [place] maximum assist (25% patient effort);2 person assist required;verbal cues required  -LS maximum assist (25% patient effort);2 person assist required  -JR    Transfers, Sit-Stand [place] maximum assist (25% patient effort);2 person assist required;verbal cues required  -LS maximum assist (25% patient effort);2 person assist required  -JR    Transfers, Stand-Sit [place] maximum assist (25% patient effort);2 person assist required;verbal cues required  -LS     Transfer, Safety Issues impulsivity;sequencing ability decreased;weight-shifting ability decreased  -LS balance decreased during turns;sequencing ability decreased;step length decreased;weight-shifting ability decreased  -JR    Transfer, Impairments  strength decreased;impaired balance;motor control impaired;coordination impaired;postural control impaired  -JR    Transfer, Comment Max vc's and gesturing to perform STS from EOB and pivot to chair. PT/OT on either side of pt.   -LS Pt requiring max verbal cues to follow commands for transfer to chair.  -JR    Gait Assessment/Treatment    Gait, [place] Level not appropriate to assess   decreased following of commands during transfers  -LS     Motor Skills/Interventions    Additional Documentation Balance Skills Training (Group)  -LS Balance Skills Training (Group)  -JR    Balance Skills Training    Sitting-Level of Assistance Contact guard  -LS Contact  guard  -JR    Sitting-Balance Support Feet supported  -LS Feet supported  -JR    Gait Balance-Level of Assistance Maximum assistance;x2   dynamic balance during t/f to chair  -LS Maximum assistance;x2  -JR    Gait Balance Support Right upper extremity supported;Left upper extremity supported  -LS Right upper extremity supported;Left upper extremity supported  -JR    Therapy Exercises    Bilateral Lower Extremities AAROM:;5 reps;sitting;ankle pumps/circles;LAQ  -LS     Bilateral Upper Extremity  AAROM:;PROM:;10 reps;elbow flexion/extension;pronation/supination;shoulder extension/flexion  -JR    Sensory Assessment/Intervention    Light Touch RLE;LLE  -LS     LLE Light Touch WNL  -LS     RLE Light Touch WNL  -LS     Positioning and Restraints    Pre-Treatment Position in bed  -LS in bed  -JR    Post Treatment Position chair  -LS chair  -JR    In Chair notified nsg;reclined;call light within reach;encouraged to call for assist;exit alarm on;with nsg;waffle cushion;on mechanical lift sling;legs elevated;heels elevated;LUE elevated;RUE elevated  -LS notified nsg;reclined;call light within reach;encouraged to call for assist;exit alarm on;with family/caregiver;RUE elevated;LUE elevated;waffle cushion;on mechanical lift sling;RLE elevated;LLE elevated  -JR    Restraints released:;reapplied:;notified nsg:;soft limb  -LS released:;reapplied:;notified nsg:;soft limb  -JR      04/20/17 0955 04/20/17 0800    Rehab Evaluation    Document Type therapy note (daily note)  -     Subjective Information no complaints;agree to therapy  -     Pain Assessment    Pain Assessment Jay-Baker FACES  -MF     Jay-Baker FACES Pain Rating 0  -     Pain Intervention(s) Repositioned  -     Cognitive Assessment/Intervention    Current Cognitive/Communication Assessment impaired  -MF     Muscle Tone Assessment    Muscle Tone Assessment  Bilateral Upper Extremities;Bilateral Lower Extremities  -TP    Bilateral Upper Extremities Muscle Tone  Assessment  mildly decreased tone  -TP    Bilateral Lower Extremities Muscle Tone Assessment  moderately decreased tone  -TP    Positioning and Restraints    Pre-Treatment Position in bed  -MF     Post Treatment Position bed  -MF     In Bed supine;call light within reach  -MF       04/20/17 0600 04/20/17 0400    Muscle Tone Assessment    Muscle Tone Assessment Bilateral Upper Extremities;Bilateral Lower Extremities  -SC Bilateral Upper Extremities;Bilateral Lower Extremities  -SC    Bilateral Upper Extremities Muscle Tone Assessment mildly decreased tone  -SC mildly decreased tone  -SC    Bilateral Lower Extremities Muscle Tone Assessment moderately decreased tone  -SC moderately decreased tone  -SC      04/20/17 0200 04/20/17 0000    Muscle Tone Assessment    Muscle Tone Assessment Bilateral Upper Extremities;Bilateral Lower Extremities  -SC Bilateral Upper Extremities;Bilateral Lower Extremities  -SC    Bilateral Upper Extremities Muscle Tone Assessment mildly decreased tone  -SC mildly decreased tone  -SC    Bilateral Lower Extremities Muscle Tone Assessment moderately decreased tone  -SC moderately decreased tone  -SC      04/19/17 2200 04/19/17 2000    Muscle Tone Assessment    Muscle Tone Assessment Bilateral Upper Extremities;Bilateral Lower Extremities  -SC Bilateral Upper Extremities;Bilateral Lower Extremities  -SC    Bilateral Upper Extremities Muscle Tone Assessment mildly decreased tone  -SC mildly decreased tone  -SC    Bilateral Lower Extremities Muscle Tone Assessment moderately decreased tone  -SC moderately decreased tone  -SC      04/19/17 1800 04/19/17 1600    Rehab Evaluation    Document Type  evaluation  -LSA    Subjective Information  no complaints;agree to therapy  -LSA    Muscle Tone Assessment    Muscle Tone Assessment Bilateral Upper Extremities  -BG Bilateral Upper Extremities  -BG      04/19/17 1400 04/19/17 1200    Muscle Tone Assessment    Muscle Tone Assessment Bilateral Upper  Extremities  -BG Bilateral Upper Extremities  -BG      04/19/17 1000 04/19/17 0835    Rehab Evaluation    Document Type  therapy note (daily note)  -    Subjective Information  agree to therapy   pt kept trying to convince PT to remove restraint mittens  -    Pain Assessment    Pain Assessment  Jay-Kent FACES  -    Jay-[place]ent FACES Pain Rating  0  -    Muscle Tone Assessment    Muscle Tone Assessment Bilateral Upper Extremities  -BG     Positioning and Restraints    Pre-Treatment Position  in bed  -    Post Treatment Position  bed  -    In Bed  supine;call light within reach;encouraged to call for assist;notified nsg  -      04/19/17 0800 04/18/17 1000    Rehab Evaluation    Document Type  therapy note (daily note)  -    Subjective Information  agree to therapy;no complaints;decreased LOC   RN gave ok for tx.  -    Pain Assessment    Pain Assessment  Jay-Kent FACES  -    Jay-Baker FACES Pain Rating  0  -    Pain Intervention(s)  Repositioned  -    Muscle Tone Assessment    Muscle Tone Assessment Bilateral Upper Extremities  -BG     Positioning and Restraints    Pre-Treatment Position  in bed  -    Post Treatment Position  bed  -    In Bed  supine;call light within reach  -      04/17/17 1400       Rehab Evaluation    Document Type therapy note (daily note)  -     Subjective Information agree to therapy;no complaints;decreased LOC   Pt confused, but stated he was fine when asked about pain.  -     Pain Assessment    Pain Assessment Jay-Baker FACES  -     Jay-Baker FACES Pain Rating 0  -     Cognitive Assessment/Intervention    Current Cognitive/Communication Assessment impaired  -     Orientation Status oriented to;person  -     Follows Commands/Answers Questions 50% of the time;able to follow single-step instructions;needs cueing  -     Positioning and Restraints    Pre-Treatment Position in bed  -     Post Treatment Position bed  -     In Bed supine;call light  within reach;with nsg  -MF       User Key  (r) = Recorded By, (t) = Taken By, (c) = Cosigned By    Initials Name Provider Type    MF Pee Roldan, PT Physical Therapist    JR Judy Leach, OT Occupational Therapist    YOSVANY Pelletier, MS CCC-SLP Speech and Language Pathologist    LS Lily Juárez, PT Physical Therapist    TP Sam Concepcion, RN Registered Nurse    DUNCAN Mcgarry, PT Physical Therapist    SC Mady Valle, RN Registered Nurse    BG Shellie Cedillo, RN Registered Nurse          Physical Therapy Education     Title: PT OT SLP Therapies (Active)     Topic: Physical Therapy (Active)     Point: Mobility training (Active)    Learning Progress Summary    Learner Readiness Method Response Comment Documented by Status   Patient Acceptance E,D NR   04/20/17 1114 Active    Acceptance E VU  AP 04/18/17 0557 Done    Acceptance E NR   04/09/17 1551 Active    Acceptance E NR  EDOUARD 04/07/17 1534 Active    Acceptance E,D NR   04/06/17 1523 Active    Acceptance E,D NR   04/05/17 0908 Active   Family Acceptance E VU  AP 04/18/17 0557 Done   Significant Other Acceptance E,D NR  LS 04/06/17 1523 Active    Acceptance E,D NR  LS 04/05/17 0908 Active               Point: Home exercise program (Active)    Learning Progress Summary    Learner Readiness Method Response Comment Documented by Status   Patient Acceptance E,D NR  LS 04/20/17 1114 Active    Acceptance E VU  AP 04/18/17 0557 Done    Acceptance E NR  EDOUARD 04/07/17 1534 Active    Acceptance E,D NR   04/06/17 1523 Active   Family Acceptance E VU  AP 04/18/17 0557 Done   Significant Other Acceptance E,D NR  LS 04/06/17 1523 Active               Point: Body mechanics (Active)    Learning Progress Summary    Learner Readiness Method Response Comment Documented by Status   Patient Acceptance E,D NR   04/20/17 1114 Active    Acceptance E VU  AP 04/18/17 0557 Done    Acceptance E NR   04/09/17 1551 Active    Acceptance E NR  EDOUARD 04/07/17  1534 Active    Acceptance E,D NR  LS 04/06/17 1523 Active    Acceptance E,D NR  LS 04/05/17 0908 Active   Family Acceptance E VU  AP 04/18/17 0557 Done   Significant Other Acceptance E,D NR  LS 04/06/17 1523 Active    Acceptance E,D NR  LS 04/05/17 0908 Active               Point: Precautions (Active)    Learning Progress Summary    Learner Readiness Method Response Comment Documented by Status   Patient Acceptance E,D NR  LS 04/20/17 1114 Active    Acceptance E VU  AP 04/18/17 0557 Done    Acceptance E NR   04/09/17 1551 Active    Acceptance E NR  EDOUARD 04/07/17 1534 Active    Acceptance E,D NR  LS 04/06/17 1523 Active    Acceptance E,D NR  LS 04/05/17 0908 Active   Family Acceptance E VU  AP 04/18/17 0557 Done   Significant Other Acceptance E,D NR  LS 04/06/17 1523 Active    Acceptance E,D NR  LS 04/05/17 0908 Active                      User Key     Initials Effective Dates Name Provider Type Discipline     06/19/15 -  Anita Prado, PT Physical Therapist PT     06/19/15 -  Lily Juárez, PT Physical Therapist PT     06/16/16 -  Shahla Sherman RN Registered Nurse Nurse     09/06/16 -  Mikey Moore, PT Physical Therapist PT                PT Recommendation and Plan  Anticipated Discharge Disposition: skilled nursing facility  PT Frequency: daily  Plan of Care Review  Plan Of Care Reviewed With: patient  Progress: progress toward functional goals as expected  Outcome Summary/Follow up Plan: PT mobility re-evaluation completed. Pt cont to demonstrate generalized weakness, decreased following of commands, and decreased indep re: functional mobiilty, warranting further skilled PT to promote PLOF. Goals modified to reflect current functional status. Recommend SNF placement  upon d/c.           IP PT Goals       04/20/17 1118 04/20/17 1115 04/19/17 0835    Bed Mobility PT LTG    Bed Mobility PT LTG, [place] Level minimum assist (75% patient effort)  -LS      Bed Mobility PT LTG, Date Goal Reviewed  04/20/17  -LS      Bed Mobility PT LTG, Outcome goal revised  -LS      Transfer Training PT LTG    Transfer Training PT LTG, [place] Level minimum assist (75% patient effort)  -LS      Transfer Training PT  LTG, Date Goal Reviewed 04/20/17  -LS      Transfer Training PT LTG, Outcome goal revised  -LS      Gait Training PT LTG    Gait Training Goal PT LTG, [place] Level  minimum assist (75% patient effort);2 person assist required  -LS     Gait Training Goal PT LTG, Distance to Achieve  100  -LS     Gait Training Goal PT LTG, Date Goal Reviewed  04/20/17  -LS     Gait Training Goal PT LTG, Outcome  goal revised  -LS     Wound Care PT LTG    Wound Care PT LTG 1, Outcome   goal ongoing  -MC      04/16/17 1450 04/15/17 1455 04/14/17 1120    Wound Care PT LTG    Wound Care PT LTG 1, Outcome goal ongoing  -MC goal ongoing  -MC goal ongoing  -MC      04/11/17 1015 04/09/17 1551 04/08/17 1010    Bed Mobility PT LTG    Bed Mobility PT LTG, Outcome goal not met  -LS goal ongoing  -DR     Bed Mobility PT LTG, Reason Goal Not Met medical status inhibits participation  -LS      Transfer Training PT LTG    Transfer Training PT LTG, Outcome goal not met  -LS goal ongoing  -DR     Transfer Training PT LTG, Reason Goal Not Met medical status inhibits participation  -LS      Gait Training PT LTG    Gait Training Goal PT LTG, Outcome goal not met  -LS goal ongoing  -DR     Gait Training Goal PT LTG, Reason Goal Not Met medical status inhibits participation  -LS      Wound Care PT LTG    Wound Care PT LTG 1, Outcome   goal ongoing  -      User Key  (r) = Recorded By, (t) = Taken By, (c) = Cosigned By    Initials Name Provider Type    VALERI Roldan, PT Physical Therapist    ANDREWS Juárez, PT Physical Therapist    DUNCAN Mcgarry, PT Physical Therapist    DR Mikey Moore, PT Physical Therapist                Outcome Measures       04/20/17 1030 04/20/17 1029       How much help from another person do you  currently need...    Turning from your back to your side while in flat bed without using bedrails? 2  -LS      Moving from lying on back to sitting on the side of a flat bed without bedrails? 2  -LS      Moving to and from a bed to a chair (including a wheelchair)? 2  -LS      Standing up from a chair using your arms (e.g., wheelchair, bedside chair)? 2  -LS      Climbing 3-5 steps with a railing? 1  -LS      To walk in hospital room? 1  -LS      AM-PAC 6 Clicks Score 10  -LS      How much help from another is currently needed...    Putting on and taking off regular lower body clothing?  1  -JR     Bathing (including washing, rinsing, and drying)  2  -JR     Toileting (which includes using toilet bed pan or urinal)  1  -JR     Putting on and taking off regular upper body clothing  2  -JR     Taking care of personal grooming (such as brushing teeth)  2  -JR     Eating meals  1  -JR     Score  9  -JR     Modified [place] Scale    Modified [place] Scale 4 - Moderately severe disability.  Unable to walk without assistance, and unable to attend to own bodily needs without assistance.  -LS 4 - Moderately severe disability.  Unable to walk without assistance, and unable to attend to own bodily needs without assistance.  -JR     Functional Assessment    Outcome Measure Options AM-PAC 6 Clicks Basic Mobility (PT)  - AM-PAC 6 Clicks Daily Activity (OT);Modified [place]  -JR       User Key  (r) = Recorded By, (t) = Taken By, (c) = Cosigned By    Initials Name Provider Type    JR Judy Leach, OT Occupational Therapist    ANDREWS Juárez, PT Physical Therapist           Time Calculation:         PT Charges       04/20/17 1115 04/20/17 0955       Time Calculation    Start Time 1030  - 0955  -     PT Received On 04/20/17  -      PT Goal Re-Cert Due Date 04/30/17  -LS 04/30/17  -     Time Calculation- PT    Total Timed Code Minutes- PT 16 minute(s)  -LS 20 minute(s)  -       User Key  (r) = Recorded By, (t) = Taken  By, (c) = Cosigned By    Initials Name Provider Type     Pee Roldan, PT Physical Therapist    LS Lily Juárez, PT Physical Therapist          Therapy Charges for Today     Code Description Service Date Service Provider Modifiers Qty    98946980496 HC PT RE-EVAL ESTABLISHED PLAN 2 4/20/2017 Lily Juárez, PT GP 1    98902940283 HC PT THER PROC EA 15 MIN 4/20/2017 Lily Juárez, PT GP 1          PT G-Codes  Outcome Measure Options: AM-PAC 6 Clicks Basic Mobility (PT)      Lily Juárez, PT  4/20/2017

## 2017-04-20 NOTE — PLAN OF CARE
Problem: Patient Care Overview (Adult)  Goal: Plan of Care Review  Outcome: Ongoing (interventions implemented as appropriate)    04/20/17 0955   Coping/Psychosocial Response Interventions   Plan Of Care Reviewed With patient   Outcome Evaluation   Outcome Summary/Follow up Plan wound noted to have increased exudate today with increased periwound excoriation possibly from retained moisture. PT replaced interdry Ag to groin / perineal area to help better manage exudate and improve skin integrity. PT may pulse lavage wound tomorrow if wound cont to have increased exudate.          Problem: Inpatient Physical Therapy  Goal: Wound Care Goal 1 LTG- PT  Outcome: Ongoing (interventions implemented as appropriate)    04/06/17 1025 04/19/17 0835   Wound Care PT LTG   Wound Care PT LTG 1, Date Established 04/06/17 --    Wound Care PT LTG 1, Time to Achieve 1 wk --    Wound Care PT LTG 1, Location Scrotum --    Wound Care PT LTG 1, No S&S of Infection yes --    Wound Care PT LTG 1, Decrease Wound Size 10% --    Wound Care PT LTG 1, Decrease Necrotic Tissue 50% --    Wound Care PT LTG 1, Decrease Exudate minimum --    Wound Care PT LTG 1, No New Skin Break Down yes --    Wound Care PT LTG 1, Education dressing changes;wound care --    Wound Care PT LTG 1, Education Understanding verbalize understanding --    Wound Care PT LTG 1, Outcome --  goal ongoing

## 2017-04-20 NOTE — PROGRESS NOTES
Acute Care - Wound/Debridement Treatment Note  [facility]     Patient Name: Tomas Salvador  : 1955  MRN: [id number]  Today's Date: 2017  Onset of Illness/Injury or Date of Surgery Date: 17   Date of Referral to PT: 17   Referring Physician: MD Jimenez       Admit Date: 2017    Visit Dx:    ICD-10-CM ICD-9-CM   1. Dysphagia, unspecified type R13.10 787.20   2. Impaired functional mobility, balance, gait, and endurance Z74.09 V49.89   3. Impaired mobility and ADLs Z74.09 799.89   4. Cognitive communication deficit R41.841 799.52       Patient Active Problem List   Diagnosis   • Coronary disease   • Ischemic cardiomyopathy with EF 10% on Echo 17   • Peripheral vascular disease   • ICD in place, implantation .   • Dyslipidemia, on atorvastain.    • S/P Sepsis due to  source 3/30/17   • CVA 17 with rapid improvement   • Diabetes Mellitus HgbA1c 10.4   • Chronic hypoxic respiratory failure on nocturnal o2   • CAD s/p CABG , C . Grafts patent except SVG to diagonal   • Urinary retention requiring colbert   • Hyperlipidemia   • Essential hypertension   • Hx of PVD (peripheral vascular disease)   • E. 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 Still on Eliquis               LDA Wound       17 0955          Incision 17 1056 other (see comments) scrotum    Incision - Properties Group Placement Date: 17  -JW Placement Time: 1056JW Side: other (see comments)  -JW Location: scrotum  -JW    Incision WDL ex  -MF      Dressing Appearance moist drainage  -MF      Appearance drainage;redness  -MF      Drainage Characteristics/Odor serosanguineous  -MF      Drainage Amount moderate  -MF      Wound Cleaning irrigated with;sterile normal saline  -MF      Wound Interventions debrided  -MF      Dressing  foam;low-adherent  -MF      Packing other (see comments)   hydrofera blue, dry  -MF      Wound 04/20/17 0850 medial nose abrasion    Wound - Properties Group Date first assessed: 04/20/17  -AS Time first assessed: 0850  -AS Orientation: medial  -AS Location: nose  -AS, bridge   Type: abrasion  -AS      User Key  (r) = Recorded By, (t) = Taken By, (c) = Cosigned By    Initials Name Provider Type     Pee Roldan, PT Physical Therapist    AS Jayson Louis, RN Registered Nurse    JAY JAY Silveira, RN Registered Nurse            WOUND DEBRIDEMENT  Debridement Site 1  Location- Site 1: perineal wound  Selective Debridement- Site 1: Wound Surface <20cmsq (~5 cm2 total area)  Instruments- Site 1: tweezers  Excised Tissue Description- Site 1: minimum, slough  Bleeding- Site 1: none                  Adult Rehabilitation Note       04/20/17 0955 04/19/17 0835 04/18/17 1000    Rehab Assessment/Intervention    Discipline physical therapist  - physical therapist  - physical therapist  -    Document Type therapy note (daily note)  - therapy note (daily note)  - therapy note (daily note)  -    Subjective Information no complaints;agree to therapy  - agree to therapy   pt kept trying to convince PT to remove restraint mittens  - agree to therapy;no complaints;decreased LOC   RN gave ok for tx.  -MF    Recorded by [] Pee Roldan, PT [] Rekha Mcgarry, PT [] Pee Roldan, PT    Pain Assessment    Pain Assessment Jay-Baker FACES  - Jay-Baker FACES  - Jay-Baker FACES  -    Jay-Baker FACES Pain Rating 0  - 0  - 0  -    Pain Intervention(s) Repositioned  -MF  Repositioned  -MF    Recorded by [] Pee Roldan, PT [] Rekha Mcgarry, PT [] Pee Roldan, PT    Cognitive Assessment/Intervention    Current Cognitive/Communication Assessment impaired  -      Recorded by [] Pee Roldan, PT      Positioning and Restraints    Pre-Treatment Position in bed  -MF in  bed  - in bed  -    Post Treatment Position bed  - bed  - bed  -    In Bed supine;call light within reach  - supine;call light within reach;encouraged to call for assist;notified nsg  - supine;call light within reach  -    Recorded by [] Pee Roldan, PT [] Rekha Mcgarry, PT [] Pee Roldan, PT      04/17/17 1400          Rehab Assessment/Intervention    Discipline physical therapist  -      Document Type therapy note (daily note)  -      Subjective Information agree to therapy;no complaints;decreased LOC   Pt confused, but stated he was fine when asked about pain.  -      Recorded by [] Pee Roldan, PT      Pain Assessment    Pain Assessment Jay-Baker FACES  -      Jay-Baker FACES Pain Rating 0  -      Recorded by [] Pee Roldan PT      Cognitive Assessment/Intervention    Current Cognitive/Communication Assessment impaired  -      Orientation Status oriented to;person  -      Follows Commands/Answers Questions 50% of the time;able to follow single-step instructions;needs cueing  -      Recorded by [] Pee Roldan PT      Positioning and Restraints    Pre-Treatment Position in bed  -      Post Treatment Position bed  -      In Bed supine;call light within reach;with nsg  -      Recorded by [] Pee Roldan, PT        User Key  (r) = Recorded By, (t) = Taken By, (c) = Cosigned By    Initials Name Effective Dates     Pee Roldan, PT 06/19/15 -      Rekha Mcgarry, PT 03/14/16 -                 IP PT Goals       04/20/17 1118 04/20/17 1115 04/19/17 0835    Transfer Training PT LTG    Transfer Training PT LTG, Painter Level (P)  minimum assist (75% patient effort)  -LS      Transfer Training PT  LTG, Date Goal Reviewed (P)  04/20/17  -LS      Transfer Training PT LTG, Outcome (P)  goal revised  -LS      Gait Training PT LTG    Gait Training Goal PT LTG, Painter Level  (P)  minimum assist (75% patient  effort);2 person assist required  -LS     Gait Training Goal PT LTG, Distance to Achieve  (P)  100  -LS     Gait Training Goal PT LTG, Date Goal Reviewed  (P)  04/20/17  -LS     Gait Training Goal PT LTG, Outcome  (P)  goal revised  -LS     Wound Care PT LTG    Wound Care PT LTG 1, Outcome   goal ongoing  -MC      04/16/17 1450 04/15/17 1455 04/14/17 1120    Wound Care PT LTG    Wound Care PT LTG 1, Outcome goal ongoing  -MC goal ongoing  -MC goal ongoing  -MC      04/11/17 1015 04/09/17 1551 04/08/17 1010    Bed Mobility PT LTG    Bed Mobility PT LTG, Outcome goal not met  -LS goal ongoing  -DR     Bed Mobility PT LTG, Reason Goal Not Met medical status inhibits participation  -LS      Transfer Training PT LTG    Transfer Training PT LTG, Outcome goal not met  -LS goal ongoing  -DR     Transfer Training PT LTG, Reason Goal Not Met medical status inhibits participation  -LS      Gait Training PT LTG    Gait Training Goal PT LTG, Outcome goal not met  -LS goal ongoing  -DR     Gait Training Goal PT LTG, Reason Goal Not Met medical status inhibits participation  -LS      Wound Care PT LTG    Wound Care PT LTG 1, Outcome   goal ongoing  -MF      User Key  (r) = Recorded By, (t) = Taken By, (c) = Cosigned By    Initials Name Provider Type    VALERI Roldan, PT Physical Therapist    ANDREWS Juárez, PT Physical Therapist    DUNCAN Mcgarry, PT Physical Therapist    DR Mikey Moore, PT Physical Therapist          Physical Therapy Education     Title: PT OT SLP Therapies (Active)     Topic: Physical Therapy (Active)     Point: Mobility training (Active)    Learning Progress Summary    Learner Readiness Method Response Comment Documented by Status   Patient Acceptance E,D NR  LS 04/20/17 1114 Active    Acceptance E VU  AP 04/18/17 0557 Done    Acceptance E NR   04/09/17 1551 Active    Acceptance E NR  EDOUARD 04/07/17 1534 Active    Acceptance E,D NR  LS 04/06/17 1523 Active    Acceptance E,D NR  LS 04/05/17 0908  Active   Family Acceptance E VU  AP 04/18/17 0557 Done   Significant Other Acceptance E,D NR   04/06/17 1523 Active    Acceptance E,D NR   04/05/17 0908 Active               Point: Home exercise program (Active)    Learning Progress Summary    Learner Readiness Method Response Comment Documented by Status   Patient Acceptance E,D NR   04/20/17 1114 Active    Acceptance E VU  AP 04/18/17 0557 Done    Acceptance E NR  EDOUARD 04/07/17 1534 Active    Acceptance E,D NR   04/06/17 1523 Active   Family Acceptance E VU  AP 04/18/17 0557 Done   Significant Other Acceptance E,D NR   04/06/17 1523 Active               Point: Body mechanics (Active)    Learning Progress Summary    Learner Readiness Method Response Comment Documented by Status   Patient Acceptance E,D NR   04/20/17 1114 Active    Acceptance E VU  AP 04/18/17 0557 Done    Acceptance E NR   04/09/17 1551 Active    Acceptance E NR  EDOUARD 04/07/17 1534 Active    Acceptance E,D NR   04/06/17 1523 Active    Acceptance E,D NR   04/05/17 0908 Active   Family Acceptance E VU  AP 04/18/17 0557 Done   Significant Other Acceptance E,D NR   04/06/17 1523 Active    Acceptance E,D NR   04/05/17 0908 Active               Point: Precautions (Active)    Learning Progress Summary    Learner Readiness Method Response Comment Documented by Status   Patient Acceptance E,D NR   04/20/17 1114 Active    Acceptance E VU  AP 04/18/17 0557 Done    Acceptance E NR   04/09/17 1551 Active    Acceptance E NR  EDOUARD 04/07/17 1534 Active    Acceptance E,D NR   04/06/17 1523 Active    Acceptance E,D NR   04/05/17 0908 Active   Family Acceptance E VU  AP 04/18/17 0557 Done   Significant Other Acceptance E,D NR   04/06/17 1523 Active    Acceptance E,D NR   04/05/17 0908 Active                      User Key     Initials Effective Dates Name Provider Type Discipline    EDOUARD 06/19/15 -  Anita Prado, PT Physical Therapist PT     06/19/15 -  Lily Juárez, PT Physical  Therapist PT    AP 06/16/16 -  Shahla Sherman, RN Registered Nurse Nurse     09/06/16 -  Mikey Moore, JENNIFER Physical Therapist PT                   PT ASSESSMENT (last 72 hours)      PT Evaluation       04/20/17 1030 04/20/17 1029    Rehab Evaluation    Document Type re-evaluation   PT mobiity  - re-evaluation  -JR    Subjective Information agree to therapy;no complaints  -LS no complaints;agree to therapy  -JR    Patient Effort, Rehab Treatment adequate  -LS adequate  -JR    Symptoms Noted During/After Treatment  none  -JR    Symptoms Noted Comment Pt with decreased following of commands and agitation throughout.   -LS     General Information    Patient Profile Review yes  -LS yes  -JR    Onset of Illness/Injury or Date of Surgery Date 04/04/17  -LS 04/04/17  -JR    Referring Physician MD Jimenez  - Dr. Gaona  -    Pertinent History Of Current Problem Please see IE for original admit; PT mobility was discharged on 4/11/17 due to declining  medical status. Pt now with improved resp status; remains encephalopathic and on bladder irrigation. RESUME PT orders received.   - Pt initially admitted with aphasia, L sided facial droop and L sided paralysis. Pt d/c'd from OT 4/11 due to decline in medical status. Pt now with resume orders. Pt remains encephalopathic with bladder irrigation.  -JR    Precautions/Limitations fall precautions;oxygen therapy device and L/min;NPO;other (see comments)   dec skin integrity to scrotum  - fall precautions;oxygen therapy device and L/min   NG tube, decreased skin integrity to scrotal region  -JR    Prior Level of Function --   please see IE  -LS --   Please refer to initial eval.  -JR    Equipment Currently Used at Home --   please see IE  -LS --   Please refer to initial eval.  -JR    Plans/Goals Discussed With patient;agreed upon  -LS patient;agreed upon  -JR    Risks Reviewed patient:;LOB;dizziness;increased discomfort  -LS patient:;increased discomfort  -JR     Benefits Reviewed patient:;improve function;increase independence;increase strength;increase balance;increase knowledge  -LS patient:;improve function;increase independence  -JR    Barriers to Rehab medically complex  -LS medically complex;cognitive status  -JR    Living Environment    Lives With --   please see IE  -LS     Living Environment Comment  Please refer to initial eval for prior living environment information  -JR    Clinical Impression    Date of Referral to PT 04/19/17  -LS     PT Diagnosis impaired functional mobility, balance, gait  -LS     Patient/Family Goals Statement return to [facility]  -     Criteria for Skilled Therapeutic Interventions Met yes;treatment indicated  -LS     Rehab Potential good, to achieve stated therapy goals  -LS     Vital Signs    Pre Systolic BP Rehab 114  -  -JR    Pre Treatment Diastolic BP 79  -LS 79  -JR    Post Systolic BP Rehab 114  -  -JR    Post Treatment Diastolic BP 83  -LS 83  -JR    Pretreatment Heart Rate (beats/min) 89  -LS 89  -JR    Posttreatment Heart Rate (beats/min) 96  -LS 96  -JR    Pre SpO2 (%) 100  -  -JR    O2 Delivery Pre Treatment supplemental O2   2L  -LS supplemental O2   Bipap  -JR    Post SpO2 (%) 100  -  -JR    O2 Delivery Post Treatment supplemental O2   2L  -LS supplemental O2   3L  -JR    Pre Patient Position Supine  -LS Supine  -JR    Intra Patient Position Standing  -LS Standing  -JR    Post Patient Position Sitting  -LS Sitting  -JR    Pain Assessment    Pain Assessment 0-10  -LS 0-10  -JR    Jay-Kent FACES Pain Rating 0  -LS 0  -JR    Pain Score 0  -LS 0  -JR    Post Pain Score 0  -LS 0  -JR    Cognitive Assessment/Intervention    Current Cognitive/Communication Assessment impaired  -LS impaired  -JR    Orientation Status oriented to;person  -LS oriented to;person;disoriented to;place;time  -JR    Follows Commands/Answers Questions able to follow single-step instructions;25% of the time;needs cueing;needs increased  time;needs repetition  -LS 25% of the time;able to follow single-step instructions;needs cueing;needs increased time;needs repetition  -JR    Personal Safety severe impairment;impulsive;decreased awareness, need for assist;decreased awareness, need for safety;decreased insight to deficits  -LS severe impairment;decreased awareness, need for assist;decreased awareness, need for safety;decreased insight to deficits  -JR    Personal Safety Interventions fall prevention program maintained;gait belt;nonskid shoes/slippers when out of bed  -LS     ROM (Range of Motion)    General ROM no range of motion deficits identified   BLEs  -LS no range of motion deficits identified  -JR    MMT (Manual Muscle Testing)    General MMT Assessment lower extremity strength deficits identified  -LS     General MMT Assessment Detail  Pt not following commands for MMT this date B UE strength functionally 4/5  -JR    Lower Extremity    Lower Ext Manual Muscle Testing Detail grossly 3+/5; pt with difficulty in following formal MMT commands  -LS     Bed Mobility, Assessment/Treatment    Bed Mobility, Assistive Device head of bed elevated;draw sheet  -LS head of bed elevated;draw sheet  -JR    Bed Mob, Supine to Sit, [place] maximum assist (25% patient effort);2 person assist required;verbal cues required  -LS maximum assist (25% patient effort);2 person assist required  -JR    Bed Mobility, Safety Issues cognitive deficits limit understanding  -LS cognitive deficits limit understanding;decreased use of arms for pushing/pulling;decreased use of legs for bridging/pushing;impaired trunk control for bed mobility  -JR    Bed Mobility, Impairments strength decreased;coordination impaired  -LS strength decreased;impaired balance;coordination impaired;motor control impaired;postural control impaired  -JR    Bed Mobility, Comment  Verbal cues for sequencing.  -JR    Transfer Assessment/Treatment    Transfers, Bed-Chair [place] maximum assist  (25% patient effort);2 person assist required;verbal cues required  -LS maximum assist (25% patient effort);2 person assist required  -JR    Transfers, Sit-Stand [place] maximum assist (25% patient effort);2 person assist required;verbal cues required  -LS maximum assist (25% patient effort);2 person assist required  -JR    Transfers, Stand-Sit [place] maximum assist (25% patient effort);2 person assist required;verbal cues required  -LS     Transfer, Safety Issues impulsivity;sequencing ability decreased;weight-shifting ability decreased  -LS balance decreased during turns;sequencing ability decreased;step length decreased;weight-shifting ability decreased  -JR    Transfer, Impairments  strength decreased;impaired balance;motor control impaired;coordination impaired;postural control impaired  -JR    Transfer, Comment Max vc's and gesturing to perform STS from EOB and pivot to chair. PT/OT on either side of pt.   -LS Pt requiring max verbal cues to follow commands for transfer to chair.  -JR    Gait Assessment/Treatment    Gait, [place] Level not appropriate to assess   decreased following of commands during transfers  -LS     Motor Skills/Interventions    Additional Documentation Balance Skills Training (Group)  -LS Balance Skills Training (Group)  -JR    Balance Skills Training    Sitting-Level of Assistance Contact guard  -LS Contact guard  -JR    Sitting-Balance Support Feet supported  -LS Feet supported  -JR    Gait Balance-Level of Assistance Maximum assistance;x2   dynamic balance during t/f to chair  -LS Maximum assistance;x2  -JR    Gait Balance Support Right upper extremity supported;Left upper extremity supported  -LS Right upper extremity supported;Left upper extremity supported  -JR    Therapy Exercises    Bilateral Lower Extremities AAROM:;5 reps;sitting;ankle pumps/circles;LAQ  -LS     Bilateral Upper Extremity  AAROM:;PROM:;10 reps;elbow flexion/extension;pronation/supination;shoulder  extension/flexion  -JR    Sensory Assessment/Intervention    Light Touch RLE;LLE  -LS     LLE Light Touch WNL  -LS     RLE Light Touch WNL  -LS     Positioning and Restraints    Pre-Treatment Position in bed  -LS in bed  -JR    Post Treatment Position chair  -LS chair  -JR    In Chair notified nsg;reclined;call light within reach;encouraged to call for assist;exit alarm on;with nsg;waffle cushion;on mechanical lift sling;legs elevated;heels elevated;LUE elevated;RUE elevated  -LS notified nsg;reclined;call light within reach;encouraged to call for assist;exit alarm on;with family/caregiver;RUE elevated;LUE elevated;waffle cushion;on mechanical lift sling;RLE elevated;LLE elevated  -JR    Restraints released:;reapplied:;notified nsg:;soft limb  -LS released:;reapplied:;notified nsg:;soft limb  -JR      04/20/17 0955 04/20/17 0800    Rehab Evaluation    Document Type therapy note (daily note)  -     Subjective Information no complaints;agree to therapy  -     Pain Assessment    Pain Assessment Jay-Baker FACES  -     Jay-Baker FACES Pain Rating 0  -     Pain Intervention(s) Repositioned  -     Cognitive Assessment/Intervention    Current Cognitive/Communication Assessment impaired  -     Muscle Tone Assessment    Muscle Tone Assessment  Bilateral Upper Extremities;Bilateral Lower Extremities  -TP    Bilateral Upper Extremities Muscle Tone Assessment  mildly decreased tone  -TP    Bilateral Lower Extremities Muscle Tone Assessment  moderately decreased tone  -TP    Positioning and Restraints    Pre-Treatment Position in bed  -     Post Treatment Position bed  -     In Bed supine;call light within reach  -       04/20/17 0600 04/20/17 0400    Muscle Tone Assessment    Muscle Tone Assessment Bilateral Upper Extremities;Bilateral Lower Extremities  -SC Bilateral Upper Extremities;Bilateral Lower Extremities  -SC    Bilateral Upper Extremities Muscle Tone Assessment mildly decreased tone  -SC mildly  decreased tone  -SC    Bilateral Lower Extremities Muscle Tone Assessment moderately decreased tone  -SC moderately decreased tone  -SC      04/20/17 0200 04/20/17 0000    Muscle Tone Assessment    Muscle Tone Assessment Bilateral Upper Extremities;Bilateral Lower Extremities  -SC Bilateral Upper Extremities;Bilateral Lower Extremities  -SC    Bilateral Upper Extremities Muscle Tone Assessment mildly decreased tone  -SC mildly decreased tone  -SC    Bilateral Lower Extremities Muscle Tone Assessment moderately decreased tone  -SC moderately decreased tone  -SC      04/19/17 2200 04/19/17 2000    Muscle Tone Assessment    Muscle Tone Assessment Bilateral Upper Extremities;Bilateral Lower Extremities  -SC Bilateral Upper Extremities;Bilateral Lower Extremities  -SC    Bilateral Upper Extremities Muscle Tone Assessment mildly decreased tone  -SC mildly decreased tone  -SC    Bilateral Lower Extremities Muscle Tone Assessment moderately decreased tone  -SC moderately decreased tone  -SC      04/19/17 1800 04/19/17 1600    Rehab Evaluation    Document Type  evaluation  -LSA    Subjective Information  no complaints;agree to therapy  -LSA    Muscle Tone Assessment    Muscle Tone Assessment Bilateral Upper Extremities  -BG Bilateral Upper Extremities  -BG      04/19/17 1400 04/19/17 1200    Muscle Tone Assessment    Muscle Tone Assessment Bilateral Upper Extremities  -BG Bilateral Upper Extremities  -BG      04/19/17 1000 04/19/17 0835    Rehab Evaluation    Document Type  therapy note (daily note)  -    Subjective Information  agree to therapy   pt kept trying to convince PT to remove restraint mittens  -    Pain Assessment    Pain Assessment  Jay-Kent FACES  -    Jay-Kent FACES Pain Rating  0  -    Muscle Tone Assessment    Muscle Tone Assessment Bilateral Upper Extremities  -BG     Positioning and Restraints    Pre-Treatment Position  in bed  -MC    Post Treatment Position  bed  -MC    In Bed  supine;call  light within reach;encouraged to call for assist;notified nsg  -      04/19/17 0800 04/18/17 1000    Rehab Evaluation    Document Type  therapy note (daily note)  -    Subjective Information  agree to therapy;no complaints;decreased LOC   RN gave ok for tx.  -    Pain Assessment    Pain Assessment  Jay-Kent FACES  -    Jay-Baker FACES Pain Rating  0  -    Pain Intervention(s)  Repositioned  -    Muscle Tone Assessment    Muscle Tone Assessment Bilateral Upper Extremities  -     Positioning and Restraints    Pre-Treatment Position  in bed  -MF    Post Treatment Position  bed  -MF    In Bed  supine;call light within reach  -      04/17/17 1400       Rehab Evaluation    Document Type therapy note (daily note)  -     Subjective Information agree to therapy;no complaints;decreased LOC   Pt confused, but stated he was fine when asked about pain.  -     Pain Assessment    Pain Assessment Jay-Baker FACES  -     Jay-Baker FACES Pain Rating 0  -     Cognitive Assessment/Intervention    Current Cognitive/Communication Assessment impaired  -     Orientation Status oriented to;person  -     Follows Commands/Answers Questions 50% of the time;able to follow single-step instructions;needs cueing  -     Positioning and Restraints    Pre-Treatment Position in bed  -MF     Post Treatment Position bed  -MF     In Bed supine;call light within reach;with [facility]  -       User Key  (r) = Recorded By, (t) = Taken By, (c) = Cosigned By    Initials Name Provider Type     Pee Roldan, PT Physical Therapist    JR Judy Leach, OT Occupational Therapist    YOSVANY Pelletier, MS CCC-SLP Speech and Language Pathologist    LS Lily Juárez, PT Physical Therapist    TP Sam Concepcion, RN Registered Nurse    DUNCAN Mcgarry, PT Physical Therapist    SC Mady Valle, RN Registered Nurse    BG Shellie Cedillo, RN Registered Nurse            PT Recommendation and Plan  Anticipated Discharge  Disposition: skilled nursing facility  PT Frequency: daily    Plan Of Care Reviewed With: patient       Outcome Summary/Follow up Plan: wound noted to have increased exudate today with increased periwound excoriation possibly from retained moisture.  PT replaced interdry Ag to groin / perineal area to help better manage exudate and improve skin integrity. PT may pulse lavage wound tomorrow if wound cont to have increased exudate.           Outcome Measures       04/20/17 1030 04/20/17 1029       How much help from another person do you currently need...    Turning from your back to your side while in flat bed without using bedrails? 2  -LS      Moving from lying on back to sitting on the side of a flat bed without bedrails? 2  -LS      Moving to and from a bed to a chair (including a wheelchair)? 2  -LS      Standing up from a chair using your arms (e.g., wheelchair, bedside chair)? 2  -LS      Climbing 3-5 steps with a railing? 1  -LS      To walk in hospital room? 1  -LS      AM-PAC 6 Clicks Score 10  -LS      How much help from another is currently needed...    Putting on and taking off regular lower body clothing?  1  -JR     Bathing (including washing, rinsing, and drying)  2  -JR     Toileting (which includes using toilet bed pan or urinal)  1  -JR     Putting on and taking off regular upper body clothing  2  -JR     Taking care of personal grooming (such as brushing teeth)  2  -JR     Eating meals  1  -JR     Score  9  -JR     Modified [place] Scale    Modified [place] Scale 4 - Moderately severe disability.  Unable to walk without assistance, and unable to attend to own bodily needs without assistance.  -LS 4 - Moderately severe disability.  Unable to walk without assistance, and unable to attend to own bodily needs without assistance.  -JR     Functional Assessment    Outcome Measure Options AM-PAC 6 Clicks Basic Mobility (PT)  -LS AM-PAC 6 Clicks Daily Activity (OT);Modified [place]  -JR       User Key  (r) =  Recorded By, (t) = Taken By, (c) = Cosigned By    Initials Name Provider Type    JR Judy Leach, OT Occupational Therapist    LS Lily Juárez, PT Physical Therapist              Time Calculation        PT Charges       04/20/17 1115 04/20/17 0955       Time Calculation    Start Time 1030  -LS 0955  -MF     PT Received On 04/20/17  -LS      PT Goal Re-Cert Due Date 04/30/17  -LS 04/30/17  -MF     Time Calculation- PT    Total Timed Code Minutes- PT 16 minute(s)  -LS 20 minute(s)  -MF       User Key  (r) = Recorded By, (t) = Taken By, (c) = Cosigned By    Initials Name Provider Type     Pee Roldan, PT Physical Therapist    LS Lily Juárez, PT Physical Therapist             Therapy Charges for Today     Code Description Service Date Service Provider Modifiers Qty    [id number] HC ROSEANNA DEBRIDE OPEN WOUND UP TO 20CM 4/20/2017 Pee Roldan, PT GP 1            PT G-Codes  Outcome Measure Options: AM-PAC 6 Clicks Basic Mobility (PT)        Pee Roldan, PT  4/20/2017

## 2017-04-20 NOTE — PROGRESS NOTES
Acute Care - Occupational Therapy Re-Evaluation  [facility]     Patient Name: Tomas Salvador  : 1955  MRN: [id number]  Today's Date: 2017  Onset of Illness/Injury or Date of Surgery Date: 17  Date of Referral to OT: 17  Referring Physician: MD Jimenez    Admit Date: 2017       ICD-10-CM ICD-9-CM   1. Dysphagia, unspecified type R13.10 787.20   2. Impaired functional mobility, balance, gait, and endurance Z74.09 V49.89   3. Impaired mobility and ADLs Z74.09 799.89   4. Cognitive communication deficit R41.841 799.52     Patient Active Problem List   Diagnosis   • Coronary disease   • Ischemic cardiomyopathy with EF 10% on Echo 17   • Peripheral vascular disease   • ICD in place, implantation .   • Dyslipidemia, on atorvastain.    • S/P Sepsis due to  source 3/30/17   • CVA 17 with rapid improvement   • Diabetes Mellitus HgbA1c 10.4   • Chronic hypoxic respiratory failure on nocturnal o2   • CAD s/p CABG , C . Grafts patent except SVG to diagonal   • Urinary retention requiring colbert   • Hyperlipidemia   • Essential hypertension   • Hx of PVD (peripheral vascular disease)   • E. 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 Still on Eliquis     Past Medical History:   Diagnosis Date   • CAD (coronary artery disease)    • Chronic back pain    • Chronic respiratory failure with hypoxia     Night time only at 2 liters/min   • Diabetes mellitus, type 2    • Essential hypertension    • Hyperlipidemia    • Ischemic cardiomyopathy    • NSTEMI (non-ST elevated myocardial infarction)    • Obesity    • PVD (peripheral vascular disease)    • Systolic CHF     EF <20%  3/8/17   • Urinary retention      Past Surgical History:   Procedure Laterality Date   • CARDIAC CATHETERIZATION  2015    Severe multivessel coronary artery  disease involving a totally occluded LAD, 99% proximal left circumflex artery; totally occluded saphenous vein graft to the diagonal; patent LIMA to the LAD, and a patent saphenous vein graft to the first obtuse marginal.      • CARDIAC DEFIBRILLATOR PLACEMENT  2015   • CORONARY ARTERY BYPASS GRAFT  2005    3 vessel   • CYSTOSCOPY N/A 3/31/2017    Procedure: CYSTOSCOPY and scrotal exploration with debridement of necrotizing fasciitis;  Surgeon: Mele Johnson MD;  Location: [facility] OR;  Service:    • LEG SURGERY Right     unknown, pos vascular bypass   • SCROTAL EXPLORATION N/A 3/31/2017    Procedure: SCROTAL EXPLORATION;  Surgeon: Mele Johnson MD;  Location: [facility] OR;  Service:           OT ASSESSMENT FLOWSHEET (last 72 hours)      OT Evaluation       04/20/17 1030 04/20/17 1029 04/20/17 0955 04/20/17 0800 04/20/17 0600    Rehab Evaluation    Document Type re-evaluation   PT mobiity  -LS re-evaluation  -JR therapy note (daily note)  -      Subjective Information agree to therapy;no complaints  -LS no complaints;agree to therapy  -JR no complaints;agree to therapy  -MF      Patient Effort, Rehab Treatment adequate  -LS adequate  -JR       Symptoms Noted During/After Treatment  none  -JR       Symptoms Noted Comment Pt with decreased following of commands and agitation throughout.   -        General Information    Patient Profile Review yes  -LS yes  -JR       Onset of Illness/Injury or Date of Surgery Date 04/04/17  -LS 04/04/17  -JR       Referring Physician MD Jimenez  - Dr. Gaona  -       Pertinent History Of Current Problem Please see IE for original admit; PT mobility was discharged on 4/11/17 due to declining  medical status. Pt now with improved resp status; remains encephalopathic and on bladder irrigation. RESUME PT orders received.   - Pt initially admitted with aphasia, L sided facial droop and L sided paralysis. Pt d/c'd from OT 4/11 due to decline in medical status. Pt  now with resume orders. Pt remains encephalopathic with bladder irrigation.  -JR       Precautions/Limitations fall precautions;oxygen therapy device and L/min;NPO;other (see comments)   dec skin integrity to scrotum  -LS fall precautions;oxygen therapy device and L/min   NG tube, decreased skin integrity to scrotal region  -JR       Prior Level of Function --   please see IE  -LS --   Please refer to initial eval.  -JR       Equipment Currently Used at Home --   please see IE  -LS --   Please refer to initial eval.  -JR       Plans/Goals Discussed With patient;agreed upon  -LS patient;agreed upon  -JR       Risks Reviewed patient:;LOB;dizziness;increased discomfort  -LS patient:;increased discomfort  -JR       Benefits Reviewed patient:;improve function;increase independence;increase strength;increase balance;increase knowledge  -LS patient:;improve function;increase independence  -JR       Barriers to Rehab medically complex  -LS medically complex;cognitive status  -JR       Living Environment    Lives With --   please see IE  -LS        Living Environment Comment  Please refer to initial eval for prior living environment information  -JR       Clinical Impression    Date of Referral to OT  04/19/17  -JR       OT Diagnosis  Decreased independence wth ADL's and mobility  -JR       Patient/Family Goals Statement  Pt unable to state goal this date  -JR       Criteria for Skilled Therapeutic Interventions Met  yes;treatment indicated  -JR       Rehab Potential  good, to achieve stated therapy goals  -JR       Therapy Frequency  daily  -JR       Anticipated Equipment Needs At Discharge  --   tba further  -JR       Anticipated Discharge Disposition  skilled nursing facility  -JR       Vital Signs    Pre Systolic BP Rehab 114  -  -JR       Pre Treatment Diastolic BP 79  -LS 79  -JR       Post Systolic BP Rehab 114  -  -JR       Post Treatment Diastolic BP 83  -LS 83  -JR       Pretreatment Heart Rate  (beats/min) 89  -LS 89  -JR       Posttreatment Heart Rate (beats/min) 96  -LS 96  -JR       Pre SpO2 (%) 100  -  -JR       O2 Delivery Pre Treatment supplemental O2   2L  -LS supplemental O2   Bipap  -JR       Post SpO2 (%) 100  -  -JR       O2 Delivery Post Treatment supplemental O2   2L  -LS supplemental O2   3L  -JR       Pre Patient Position Supine  -LS Supine  -JR       Intra Patient Position Standing  -LS Standing  -JR       Post Patient Position Sitting  -LS Sitting  -JR       Pain Assessment    Pain Assessment 0-10  -LS 0-10  -JR Jay-Kent FACES  -MF      Jay-Baker FACES Pain Rating 0  -LS 0  -JR 0  -MF      Pain Score 0  -LS 0  -JR       Post Pain Score 0  -LS 0  -JR       Pain Intervention(s)   Repositioned  -MF      Cognitive Assessment/Intervention    Current Cognitive/Communication Assessment impaired  -LS impaired  -JR impaired  -MF      Orientation Status oriented to;person  -LS oriented to;person;disoriented to;place;time  -JR       Follows Commands/Answers Questions able to follow single-step instructions;25% of the time;needs cueing;needs increased time;needs repetition  -LS 25% of the time;able to follow single-step instructions;needs cueing;needs increased time;needs repetition  -JR       Personal Safety severe impairment;impulsive;decreased awareness, need for assist;decreased awareness, need for safety;decreased insight to deficits  -LS severe impairment;decreased awareness, need for assist;decreased awareness, need for safety;decreased insight to deficits  -JR       Personal Safety Interventions fall prevention program maintained;gait belt;nonskid shoes/slippers when out of bed  -LS        ROM (Range of Motion)    General ROM no range of motion deficits identified   BLEs  -LS no range of motion deficits identified  -JR       MMT (Manual Muscle Testing)    General MMT Assessment lower extremity strength deficits identified  -LS        General MMT Assessment Detail  Pt not  following commands for MMT this date B UE strength functionally 4/5  -JR       Muscle Tone Assessment    Muscle Tone Assessment    Bilateral Upper Extremities;Bilateral Lower Extremities  -TP Bilateral Upper Extremities;Bilateral Lower Extremities  -SC    Bilateral Upper Extremities Muscle Tone Assessment    mildly decreased tone  -TP mildly decreased tone  -SC    Bilateral Lower Extremities Muscle Tone Assessment    moderately decreased tone  -TP moderately decreased tone  -SC    Bed Mobility, Assessment/Treatment    Bed Mobility, Assistive Device head of bed elevated;draw sheet  -LS head of bed elevated;draw sheet  -JR       Bed Mob, Supine to Sit, [place] maximum assist (25% patient effort);2 person assist required;verbal cues required  -LS maximum assist (25% patient effort);2 person assist required  -JR       Bed Mobility, Safety Issues cognitive deficits limit understanding  -LS cognitive deficits limit understanding;decreased use of arms for pushing/pulling;decreased use of legs for bridging/pushing;impaired trunk control for bed mobility  -JR       Bed Mobility, Impairments strength decreased;coordination impaired  -LS strength decreased;impaired balance;coordination impaired;motor control impaired;postural control impaired  -JR       Bed Mobility, Comment  Verbal cues for sequencing.  -JR       Transfer Assessment/Treatment    Transfers, Bed-Chair [place] maximum assist (25% patient effort);2 person assist required;verbal cues required  -LS maximum assist (25% patient effort);2 person assist required  -JR       Transfers, Sit-Stand [place] maximum assist (25% patient effort);2 person assist required;verbal cues required  -LS maximum assist (25% patient effort);2 person assist required  -JR       Transfers, Stand-Sit [place] maximum assist (25% patient effort);2 person assist required;verbal cues required  -LS        Transfer, Safety Issues impulsivity;sequencing ability  decreased;weight-shifting ability decreased  -LS balance decreased during turns;sequencing ability decreased;step length decreased;weight-shifting ability decreased  -JR       Transfer, Impairments  strength decreased;impaired balance;motor control impaired;coordination impaired;postural control impaired  -JR       Transfer, Comment Max vc's and gesturing to perform STS from EOB and pivot to chair. PT/OT on either side of pt.   -LS Pt requiring max verbal cues to follow commands for transfer to chair.  -JR       Lower Body Dressing Assessment/Training    LB Dressing Assess/Train, Clothing Type  donning:;slipper socks  -JR       LB Dressing Assess/Train, Position  supine  -JR       LB Dressing Assess/Train, [place]  dependent (less than 25% patient effort)  -JR       LB Dressing Assess/Train, Impairments  strength decreased;impaired balance;coordination impaired;motor control impaired;postural control impaired  -       Motor Skills/Interventions    Additional Documentation Balance Skills Training (Group)  -LS Balance Skills Training (Group)  -       Balance Skills Training    Sitting-Level of Assistance Contact guard  - Contact guard  -       Sitting-Balance Support Feet supported  -LS Feet supported  -JR       Gait Balance-Level of Assistance Maximum assistance;x2   dynamic balance during t/f to chair  - Maximum assistance;x2  -JR       Gait Balance Support Right upper extremity supported;Left upper extremity supported  -LS Right upper extremity supported;Left upper extremity supported  -       Therapy Exercises    Bilateral Lower Extremities AAROM:;5 reps;sitting;ankle pumps/circles;LAQ  -LS        Bilateral Upper Extremity  AAROM:;PROM:;10 reps;elbow flexion/extension;pronation/supination;shoulder extension/flexion  -       Sensory Assessment/Intervention    Light Touch RLE;LLE  -LS        LLE Light Touch WNL  -LS        RLE Light Touch WNL  -LS        General Therapy Interventions    Planned  Therapy Interventions  ADL retraining;balance training;bed mobility training;strengthening;transfer training  -JR       Positioning and Restraints    Pre-Treatment Position in bed  -LS in bed  -JR in bed  -MF      Post Treatment Position chair  -LS chair  -JR bed  -MF      In Bed   supine;call light within reach  -MF      In Chair notified nsg;reclined;call light within reach;encouraged to call for assist;exit alarm on;with nsg;waffle cushion;on mechanical lift sling;legs elevated;heels elevated;LUE elevated;RUE elevated  -LS notified nsg;reclined;call light within reach;encouraged to call for assist;exit alarm on;with family/caregiver;RUE elevated;LUE elevated;waffle cushion;on mechanical lift sling;RLE elevated;LLE elevated  -JR       Restraints released:;reapplied:;notified nsg:;soft limb  -LS released:;reapplied:;notified nsg:;soft limb  -JR       Lower Extremity    Lower Ext Manual Muscle Testing Detail grossly 3+/5; pt with difficulty in following formal MMT commands  -LS          04/20/17 0400 04/20/17 0200 04/20/17 0000 04/19/17 2200 04/19/17 2000    Muscle Tone Assessment    Muscle Tone Assessment Bilateral Upper Extremities;Bilateral Lower Extremities  -SC Bilateral Upper Extremities;Bilateral Lower Extremities  -SC Bilateral Upper Extremities;Bilateral Lower Extremities  -SC Bilateral Upper Extremities;Bilateral Lower Extremities  -SC Bilateral Upper Extremities;Bilateral Lower Extremities  -SC    Bilateral Upper Extremities Muscle Tone Assessment mildly decreased tone  -SC mildly decreased tone  -SC mildly decreased tone  -SC mildly decreased tone  -SC mildly decreased tone  -SC    Bilateral Lower Extremities Muscle Tone Assessment moderately decreased tone  -SC moderately decreased tone  -SC moderately decreased tone  -SC moderately decreased tone  -SC moderately decreased tone  -SC      04/19/17 1800 04/19/17 1600 04/19/17 1400 04/19/17 1200 04/19/17 1000    Rehab Evaluation    Document Type   evaluation  -LSA       Subjective Information  no complaints;agree to therapy  -LSA       Muscle Tone Assessment    Muscle Tone Assessment Bilateral Upper Extremities  -BG Bilateral Upper Extremities  -BG Bilateral Upper Extremities  -BG Bilateral Upper Extremities  -BG Bilateral Upper Extremities  -BG      04/19/17 0835 04/19/17 0800 04/18/17 1000 04/17/17 1400       Rehab Evaluation    Document Type therapy note (daily note)  -  therapy note (daily note)  - therapy note (daily note)  -     Subjective Information agree to therapy   pt kept trying to convince PT to remove restraint mittens  -  agree to therapy;no complaints;decreased LOC   RN gave ok for tx.  - agree to therapy;no complaints;decreased LOC   Pt confused, but stated he was fine when asked about pain.  -     Pain Assessment    Pain Assessment Jay-Kent FACES  -  Jay-Kent FACES  - Jay-Baker FACES  -     Jay-Baker FACES Pain Rating 0  -  0  - 0  -     Pain Intervention(s)   Repositioned  -      Cognitive Assessment/Intervention    Current Cognitive/Communication Assessment    impaired  -     Orientation Status    oriented to;person  -     Follows Commands/Answers Questions    50% of the time;able to follow single-step instructions;needs cueing  -     Muscle Tone Assessment    Muscle Tone Assessment  Bilateral Upper Extremities  -BG       Positioning and Restraints    Pre-Treatment Position in bed  -  in bed  - in bed  -     Post Treatment Position bed  -  bed  - bed  -     In Bed supine;call light within reach;encouraged to call for assist;notified [facility]  supine;call light within reach  - supine;call light within reach;with [facility]  -       User Key  (r) = Recorded By, (t) = Taken By, (c) = Cosigned By    Initials Name Effective Dates     Pee Roldan, PT 06/19/15 -     JR Judy Leach, OT 06/22/15 -     YOSVANY Pelletier, MS CCC-SLP 06/22/15 -     LS Lily Juárez, PT 06/19/15 -     TP  Sam Concepcion, RN 02/22/17 -     DUNCAN Mcgarry, PT 03/14/16 -     SC Mady Valle, [place] 06/16/16 -     BG Shellie Cedillo, [place] 06/16/16 -            Occupational Therapy Education     Title: PT OT SLP Therapies (Active)     Topic: Occupational Therapy (Active)     Point: ADL training (Active)    Description: Instruct learner(s) on proper safety adaptation and remediation techniques during self care or transfers.   Instruct in proper use of assistive devices.    Learning Progress Summary    Learner Readiness Method Response Comment Documented by Status   Patient Acceptance E NR Educated on safe bed mobility and transfer techniques as well as UE HEP.  04/20/17 1112 Active    Acceptance E VU  AP 04/18/17 0557 Done    Acceptance E NR Educated pt on safe bed mobility and transfer techniques.  04/05/17 0903 Active   Family Acceptance E VU  AP 04/18/17 0557 Done               Point: Home exercise program (Active)    Description: Instruct learner(s) on appropriate technique for monitoring, assisting and/or progressing therapeutic exercises/activities.    Learning Progress Summary    Learner Readiness Method Response Comment Documented by Status   Patient Acceptance E NR Educated on safe bed mobility and transfer techniques as well as UE HEP.  04/20/17 1112 Active    Acceptance E VU  AP 04/18/17 0557 Done    Acceptance E,D VU,NR Body mechanics with bed mobility/fxl transfers; BUE HEP; reinforced need for call for assist with OOB activities. TA 04/09/17 1445 Done    Acceptance E NR Educated pt and family regarding UE HEP  04/06/17 1521 Active   Family Acceptance E VU  AP 04/18/17 0557 Done    Acceptance E NR Educated pt and family regarding UE HEP  04/06/17 1521 Active   Significant Other Acceptance E,D VU,NR Body mechanics with bed mobility/fxl transfers; BUE HEP; reinforced need for call for assist with OOB activities. TA 04/09/17 1445 Done               Point: Precautions (Resolved)     Description: Instruct learner(s) on prescribed precautions during self-care and functional transfers.    Learning Progress Summary    Learner Readiness Method Response Comment Documented by Status   Patient Acceptance E VU   04/11/17 0216 Done    Acceptance E,D VU,NR Body mechanics with bed mobility/fxl transfers; BUE HEP; reinforced need for call for assist with OOB activities. TA 04/09/17 1445 Done   Significant Other Acceptance E,D VU,NR Body mechanics with bed mobility/fxl transfers; BUE HEP; reinforced need for call for assist with OOB activities. TA 04/09/17 1445 Done               Point: Body mechanics (Resolved)    Description: Instruct learner(s) on proper positioning and spine alignment during self-care, functional mobility activities and/or exercises.    Learning Progress Summary    Learner Readiness Method Response Comment Documented by Status   Patient Acceptance E VU   04/11/17 0216 Done    Acceptance E,D VU,NR Body mechanics with bed mobility/fxl transfers; BUE HEP; reinforced need for call for assist with OOB activities. TA 04/09/17 1445 Done   Significant Other Acceptance E,D VU,NR Body mechanics with bed mobility/fxl transfers; BUE HEP; reinforced need for call for assist with OOB activities. TA 04/09/17 1445 Done                      User Key     Initials Effective Dates Name Provider Type Discipline     06/22/15 -  Judy Leach OT Occupational Therapist OT     03/14/16 -  David Jenkins OT Occupational Therapist OT     06/16/16 -  Alissa Urbano, RN Registered Nurse Nurse    AP 06/16/16 -  Shahla Sherman, RN Registered Nurse Nurse                  OT Recommendation and Plan  Anticipated Equipment Needs At Discharge:  (tba further)  Anticipated Discharge Disposition: skilled nursing facility  Planned Therapy Interventions: ADL retraining, balance training, bed mobility training, strengthening, transfer training  Therapy Frequency: daily  Plan of Care Review  Plan Of  Care Reviewed With: patient  Progress: progress toward functional goals as expected  Outcome Summary/Follow up Plan: Re-eval completed. Pt presents with decreased independence with ADL's and mobility, decreased strength and cognition. Pt would benefit from continued skilled OT services to assist in returning pt to his PLOF. Recommend SNF at d/c.          OT Goals       04/20/17 1113 04/11/17 1343 04/09/17 1446    Bed Mobility OT LTG    Bed Mobility OT LTG, Date Established 04/20/17  -JR      Bed Mobility OT LTG, Time to Achieve 1 wk  -JR      Bed Mobility OT LTG, Activity Type all bed mobility  -JR      Bed Mobility OT LTG, [place] Level moderate assist (50% patient effort);2 person assist required  -JR      Bed Mobility OT LTG, Outcome  goal not met  -JR goal ongoing   Max A x 2 sup>sit; Dep x 2 sit>supine this date  -TA    Bed Mobility OT LTG, Reason Goal Not Met  medical status inhibits participation  -JR     Transfer Training OT LTG    Transfer Training OT LTG, Date Established 04/20/17  -JR      Transfer Training OT LTG, Time to Achieve 1 wk  -JR      Transfer Training OT LTG, Activity Type all transfers  -JR      Transfer Training OT LTG, [place] Level moderate assist (50% patient effort);2 person assist required  -JR      Transfer Training OT LTG, Outcome  goal not met  -JR goal ongoing   Progressing  -TA    Transfer Training OT LTG, Reason Goal Not Met  medical status inhibits participation  -JR     Orientation OT LTG    Orientation OT LTG, Date Established 04/20/17  -JR      Orientation OT LTG, Time to Achieve 1 wk  -JR      Orientation OT LTG, Activity Type person;place;50% treatment session  -JR      Orientation OT LTG, Outcome  goal not met  -JR goal ongoing  -TA    Orientation OT LTG, Reason Goal Not Met  medical status inhibits participation  -JR     Follow Directions OT LTG    Follow Directions OT LTG, Date Established 04/20/17  -JR      Follow Directions OT LTG, Time to Achieve 1 wk  -JR       Follow Directions OT LTG, Activity Type 1-Step;50% treatment session  -JR      Follow Directions OT LTG, [place] Level with verbal cues  -JR      Follow Directions OT LTG, Outcome  goal not met  -JR goal ongoing   50% this date; continue for consistency  -TA    Follow Directions OT LTG, Reason Goal Not Met  medical status inhibits participation  -JR       User Key  (r) = Recorded By, (t) = Taken By, (c) = Cosigned By    Initials Name Provider Type    JR Judy Leach, OT Occupational Therapist    CHU Jenkins, OT Occupational Therapist                Outcome Measures       04/20/17 1030 04/20/17 1029       How much help from another person do you currently need...    Turning from your back to your side while in flat bed without using bedrails? 2  -LS      Moving from lying on back to sitting on the side of a flat bed without bedrails? 2  -LS      Moving to and from a bed to a chair (including a wheelchair)? 2  -LS      Standing up from a chair using your arms (e.g., wheelchair, bedside chair)? 2  -LS      Climbing 3-5 steps with a railing? 1  -LS      To walk in hospital room? 1  -LS      AM-PAC 6 Clicks Score 10  -LS      How much help from another is currently needed...    Putting on and taking off regular lower body clothing?  1  -JR     Bathing (including washing, rinsing, and drying)  2  -JR     Toileting (which includes using toilet bed pan or urinal)  1  -JR     Putting on and taking off regular upper body clothing  2  -JR     Taking care of personal grooming (such as brushing teeth)  2  -JR     Eating meals  1  -JR     Score  9  -JR     Modified Bowman Scale    Modified Bowman Scale 4 - Moderately severe disability.  Unable to walk without assistance, and unable to attend to own bodily needs without assistance.  -LS 4 - Moderately severe disability.  Unable to walk without assistance, and unable to attend to own bodily needs without assistance.  -JR     Functional Assessment    Outcome  Measure Options AM-PAC 6 Clicks Basic Mobility (PT)  - AM-PAC 6 Clicks Daily Activity (OT);Modified Linh  -       User Key  (r) = Recorded By, (t) = Taken By, (c) = Cosigned By    Initials Name Provider Type    JR Judy Leach, OT Occupational Therapist    ANDREWS Juárez, PT Physical Therapist          Time Calculation:   OT Start Time: 1029    Therapy Charges for Today     Code Description Service Date Service Provider Modifiers Qty    [id number]  OT RE-EVAL 2 4/20/2017 Judy Leach, OT GO 1    [id number]  OT THERAPEUTIC ACT EA 15 MIN 4/20/2017 Judy Leach, OT GO 1               Judy Leach, OT  4/20/2017

## 2017-04-20 NOTE — PLAN OF CARE
Problem: Patient Care Overview (Adult)  Goal: Plan of Care Review  Outcome: Ongoing (interventions implemented as appropriate)    04/20/17 1113   Coping/Psychosocial Response Interventions   Plan Of Care Reviewed With patient   Outcome Evaluation   Outcome Summary/Follow up Plan Re-eval completed. Pt presents with decreased independence with ADL's and mobility, decreased strength and cognition. Pt would benefit from continued skilled OT services to assist in returning pt to his PLOF. Recommend SNF at d/c.         Problem: Stroke (Ischemic) (Adult)  Goal: Signs and Symptoms of Listed Potential Problems Will be Absent or Manageable (Stroke)  Outcome: Ongoing (interventions implemented as appropriate)    04/20/17 1113   Stroke (Ischemic)   Problems Assessed (Stroke (Ischemic)/TIA) cognitive impairment;communication impairment;motor/sensory impairment   Problems Present (Stroke (Ischemic)/TIA) cognitive impairment;communication impairment;motor/sensory impairment         Problem: Inpatient Occupational Therapy  Goal: Bed Mobility Goal LTG- OT  Outcome: Ongoing (interventions implemented as appropriate)    04/20/17 1113   Bed Mobility OT LTG   Bed Mobility OT LTG, Date Established 04/20/17   Bed Mobility OT LTG, Time to Achieve 1 wk   Bed Mobility OT LTG, Activity Type all bed mobility   Bed Mobility OT LTG, [place] Level moderate assist (50% patient effort);2 person assist required       Goal: Transfer Training Goal 1 LTG- OT  Outcome: Ongoing (interventions implemented as appropriate)    04/20/17 1113   Transfer Training OT LTG   Transfer Training OT LTG, Date Established 04/20/17   Transfer Training OT LTG, Time to Achieve 1 wk   Transfer Training OT LTG, Activity Type all transfers   Transfer Training OT LTG, [place] Level moderate assist (50% patient effort);2 person assist required       Goal: Orientation Goal LTG- OT  Outcome: Ongoing (interventions implemented as appropriate)    04/20/17 1113    Orientation OT LTG   Orientation OT LTG, Date Established 04/20/17   Orientation OT LTG, Time to Achieve 1 wk   Orientation OT LTG, Activity Type person;place;50% treatment session       Goal: Follow Directions Goal LTG- OT  Outcome: Ongoing (interventions implemented as appropriate)    04/20/17 1113   Follow Directions OT LTG   Follow Directions OT LTG, Date Established 04/20/17   Follow Directions OT LTG, Time to Achieve 1 wk   Follow Directions OT LTG, Activity Type 1-Step;50% treatment session   Follow Directions OT LTG, [place] Level with verbal cues

## 2017-04-20 NOTE — PLAN OF CARE
Problem: Patient Care Overview (Adult)  Goal: Plan of Care Review  Outcome: Ongoing (interventions implemented as appropriate)    04/20/17 1007   Coping/Psychosocial Response Interventions   Plan Of Care Reviewed With patient   Patient Care Overview   Progress no change   Outcome Evaluation   Outcome Summary/Follow up Plan Patient presents with bridge of nose abrasion from BiPAP mask. Area is red and blanchable. Ordered Venelex ointment. Placed silicone border dressing. Protecta-gel in place with BiPAP mask. Heels intact. PT wound care following for scrotal wound. Bottom red but blanching. Silicon foam dressing in place. All skin and pressure interventions in place per RN. Will continue to follow at this time. Please contact WOCN if needs arise. Thanks

## 2017-04-20 NOTE — PROGRESS NOTES
"[facility] Progress Note    Admission Date: 4/4/2017    Tomas Salvador  1955  [id number]    Date: 4/20/2017    Meds:    IV Anti-Infectives     Ordered     Dose/Rate Route Frequency Start Stop    04/20/17 0718  ceFAZolin in dextrose (ANCEF) IVPB solution 2 g     Ordering Provider:  Satya Fall MD    2 g  over 30 Minutes Intravenous Once 04/21/17 0800      04/19/17 1145  amphotericin B (FUNGIZONE) 50 mg in sterile water (preservative free) 1,000 mL irrigation     Ordering Provider:  Kike Leon MD    42 mL/hr  42 mL/hr  Irrigation Every 24 Hours 04/19/17 1300            CC:  Delisa's gangrene    SUBJECTIVE:  4/4/17:Patient is a 61 y.o. 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 He has a history of underlying diabetes mellitus and severe systolic congestive heart failure. He presented to [facility] on 3/30 with scrotal cellulitis/gangrene. He underwent debridement by urology at [facility] on 3/31 and was treated with Merrem/clindamycin/vancomycin. He developed left facial droop and left sided paresis, prompting a transfer to [facility] for evaluation and therapy of acute stroke. His head CT angiogram was degraded by motion artifact but per Dr. Morrison there was suggestion of right inferior temporal lobe\" opacification\" consistent with a possible stroke. His left facial droop and left-sided paresis has improved today. He is encephalopathic and is unable to provide any reliable history. His wife thinks that he may have had some fevers prior to his admission on 3/30.    4/5/17: afebrile. No hx per patient secondary to encephalopathy.  Afebrile, restless per nsg staff.  Oliguric UOP.  No n/v/d.  No rashes.  4/6/17: More alert and less restless today.  Still non-responsive to questions and does not follow simple commands.  Still with left-sided weakness.  Afebrile.  Still with oliguric UOP.  No n/v/d, no rashes, per nsg staff notes.   4/7/17:  Alert " "today.  No fever,chills,sweats.  Wife at bedside with ?s   4/10/17:  Wants to go home.  Wound care per PT.  No n/v/d.   17:  Now on Dopamine and Bautista, bipap.  Decompensated last pm.    17:  Somnolent; still on pressors   17 Still on pressors/Precedex; Now on bipap.  Had pulled corpak out night before.    17: Per RN staff notes: afebrile, on/off precedex for agitation, on dopamine, on flagyl and cefazolin, on and off bipap.  Can not keep clothes on him.  Attempted to do Is and Os but too difficult without his foreskin getting pulled into meatus, so therefore, just placed Cueto cath.  Not anchored, because he keeps trying to pull it out.  Would is getting daily misted by PT and packed.  He is more alert today, but still not oriented.  4/15/17: He is improved today and off of vasopressor therapy.  He has been receiving some diuretic therapy for pulmonary edema.  He is less confused.  He remains afebrile.  17: He is continued to receive diuretic therapy.  He remains off of vasopressor therapy.  He has remained afebrile  17:  Remains restrained.  Opens eyes to voice.  Nonproductive cough.   17:  Opens eyes.  Does not follow commands.  His Cueto catheter is still in place and he is undergoing diuresis for his CHF/pulmonary edema.  17:  More alert today.  \"wants Scottie\".  PT just changed dressing.  He remains off of vasopressor therapy.  He is continued to receive diuresis.  The nursing staff reports that he has some penile edema, making it difficult to in and out catheterize him.  17:  On Bipap.  Opens eyes to voice.       PE:   Vital Signs  Temp (24hrs), Av.8 °F (36.6 °C), Min:97.6 °F (36.4 °C), Max:98 °F (36.7 °C)    Temp  Min: 97.6 °F (36.4 °C)  Max: 98 °F (36.7 °C)  BP  Min: 88/56  Max: 125/90  Pulse  Min: 84  Max: 110  Resp  Min: 16  Max: 22  SpO2  Min: 91 %  Max: 100 %    GENERAL:   He is in no acute distress, more alert.   HEENT: Normocephalic, atraumatic. PERRL. EOMI. " No conjunctival injection. No icterus. Oropharynx clear without evidence of thrush or exudate.   HEART: RRR; No murmur, rubs, gallops.   LUNGS:  Diminished, few rhonchi  ABDOMEN: Soft, nontender, nondistended. Positive bowel sounds. No rebound or guarding. NO mass or HSM.  EXT: No cyanosis, clubbing or edema. No cord.  : The lower, posterior midline scrotal wounds with resolution of slough and some increased granulation.  There is no purulent drainage or erythema.  A Cueto catheter is in place  MSK: FROM without joint effusions noted arms/legs.   SKIN: Warm and dry without cutaneous eruptions on Inspection/palpation.   NEURO: more alert   Right PICC without redness      Laboratory Data      Results from last 7 days  Lab Units 04/20/17  0429 04/19/17  0357 04/18/17  0358   WBC 10*3/mm3 8.97 8.17 11.60*   HEMOGLOBIN g/dL 10.3* 10.1* 10.0*   HEMATOCRIT % 34.1* 32.6* 32.7*   PLATELETS 10*3/mm3 329 291 352       Results from last 7 days  Lab Units 04/20/17  0743   SODIUM mmol/L 145   POTASSIUM mmol/L 3.8   CHLORIDE mmol/L 105   TOTAL CO2 mmol/L 37.0*   BUN mg/dL 54*   CREATININE mg/dL 0.60   GLUCOSE mg/dL 179*   CALCIUM mg/dL 9.3       Results from last 7 days  Lab Units 04/18/17  0358   ALK PHOS U/L 102*   BILIRUBIN mg/dL 0.2*   ALT (SGPT) U/L 6*   AST (SGOT) U/L 30       Results from last 7 days  Lab Units 04/20/17  0429   SED RATE mm/hr 95*       Results from last 7 days  Lab Units 04/17/17  1155   CRP mg/dL 2.82*       4/17:  UA small amount of LE; UC no growth           Estimated Creatinine Clearance: 153.1 mL/min (by C-G formula based on Cr of 0.6).    Microbiology:  Blood Culture   Date Value Ref Range Status   03/30/2017 No growth at 5 days  Final   03/30/2017 No growth at 5 days  Final           Urine Culture   Date Value Ref Range Status   04/04/2017 No growth  Preliminary   03/31/2017 No growth  Final   03/30/2017 >100,000 CFU/mL Escherichia coli (A)  Final      scrotal cx x 2 cxs (3/31) E. coli  Cefazolin  sens.    Urine Culture   Date Value Ref Range Status   04/11/2017 No growth at 2 days  Final   04/07/2017 >100,000 CFU/mL Candida glabrata (A)  Final         Radiology:  Imaging Results (last 24 hours)     Procedure Component Value Units Date/Time    XR Chest 1 View [[id number]] Collected:  04/20/17 0954     Updated:  04/20/17 1008    Narrative:       EXAMINATION: XR CHEST 1 VW- 04/20/2017     INDICATION: Congestive heart failure; R13.10-Dysphagia, unspecified;  Z74.09-Other reduced mobility; R41.841-Cognitive communication deficit      COMPARISON: 04/19/2017 portable chest     FINDINGS: Feeding tube is seen below the left hemidiaphragm. The heart  is enlarged. The vasculature is cephalized and there is persistent  perihilar disease, consistent with mild to moderate edema. Opacity left  base is consistent with pleural effusion and atelectasis unchanged.  There is minimal right effusion. No pneumothorax or other new chest  disease is seen.           Impression:       Stable changes of congestive heart failure, including mild  to moderate perihilar edema and left basilar effusion/atelectasis. No  new chest disease is seen.     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 .  2. Acute right hemispheric cerebral vascular accident.  3. Acute toxic metabolic encephalopathy-secondary to sepsis and cerebral vascular accident  4. 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 Severe systolic congestive heart failure  6. Escherichia coli urinary tract infection-improved  7. Sepsis-secondary to Delisa's gangrene, now improved  8. Leukocytosis/neutrophilia-secondary to scrotal gangrene/cellulitis, improved   9.  Pulmonary edema-improving  10. Candiduria--this resolved with removal of his Cueto catheter.  The treatment for his previous candiduria was removal of the Cueto catheter.  Now has recurrent candiduria after his  Cueto catheter was placed back in.    11. Cardiogenic shock-with pulmonary edema, now significantly improved.         PLAN/RECOMMENDATIONS:   1.  Continue scrotal wound care  2.  Remove the Cueto catheter when feasible  3.  Amphotericin B bladder irrigations unless the Cueto catheter can be removed  4.  Continue off of antibiotic therapy  5.  Transfer to the long-term acute care [facility] when bed available   6. PEG tomorrow         Dr. Leon has obtained the history, performed the physical exam and formulated the above treatment plan.     Kike Leon MD  4/20/2017  5:48 PM

## 2017-04-20 NOTE — PROGRESS NOTES
Adult Nutrition  Assessment/PES    Patient Name:  Tomas Salvador  YOB: 1955  MRN: [id number]  Admit Date:  4/4/2017    Assessment Date:  4/20/2017     Comments: RD follow-up for pt on nutrition support. Noted plans for EGD/PEG placement tomorrow. Pt continues to tolerate tube feeding with free water at 50 ml/hr. Will continue to follow.           Reason for Assessment       04/20/17 1013    Reason for Assessment    Reason For Assessment/Visit multidisciplinary rounds;follow up protocol;TF/PN;dysphagia    Time Spent (min) 30    Diagnosis --   per notes this adm    Skin Other (comment)   per WOCN (4/20)- Pt presents with bridge of nose abrasion from BiPAP mask. Area is red and blanchable. Bottom red but blanching.    Other diagnosis plan for EGD/PEG placement tomorrow (4/21)   Principal Problem:    CVA 4/4/17 with rapid improvement  Active Problems:    Ischemic cardiomyopathy with EF 10% on Echo 4/4/17    ICD (implantable cardioverter-defibrillator) in place, implantation 2015.    S/P Sepsis due to  source 3/30/17    Diabetes Mellitus HgbA1c 10.4    Chronic hypoxic respiratory failure on nocturnal o2    CAD s/p CABG 2005, C 2015. Grafts patent except SVG to diagonal    Urinary retention requiring colbert    Hx of PVD (peripheral vascular disease)    E. coli UTI     Epididymo-orchitis    Encephalopathy acute    Fourniers gangrene with E-Coli Cellulitis. Debrided in [place] 3/30/17    Yeast UTI    Atrial fibrillation but now converted. Still on Eliquis                 Labs/Tests/Procedures/Meds       04/20/17 1018    Labs/Tests/Procedures/Meds    Labs/Tests Review Reviewed;BUN;Na+    Procedure Review SLP   per SLP (4/19)- REC: re-eval 4/20. NPO. with meds via alt route.    Medication Review Reviewed, pertinent     Results from last 7 days  Lab Units 04/20/17  0743  04/18/17  0358   SODIUM mmol/L 145  < > 145   POTASSIUM mmol/L 3.8  < > 4.1   CHLORIDE mmol/L 105  < > 105   TOTAL CO2 mmol/L 37.0*  < >  38.0*   BUN mg/dL 54*  < > 51*   CREATININE mg/dL 0.60  < > 0.70   CALCIUM mg/dL 9.3  < > 9.0   BILIRUBIN mg/dL  --   --  0.2*   ALK PHOS U/L  --   --  102*   ALT (SGPT) U/L  --   --  6*   AST (SGOT) U/L  --   --  30   GLUCOSE mg/dL 179*  < > 171*   < > = values in this interval not displayed.             Nutrition Prescription Ordered       04/20/17 1019    Nutrition Prescription PO    Current PO Diet NPO    Nutrition Prescription EN    Enteral Route ND    Product Impact Peptide 1.5 tom    TF Delivery Method Continuous    Continuous TF Goal Rate (mL/hr) 65 mL/hr    Continuous TF Current Rate (mL/hr) 65 mL/hr    Continuous TF Goal Volume (mL) 1300 mL    Water flush (mL)  50 mL    Water Flush Frequency Per hour            Evaluation of Received Nutrient/Fluid Intake       04/20/17 1019    Evaluation of Received Nutrient/Fluid Intake    Number of Days Evaluated 1 day    Calorie Intake Evaluation    Enteral Calories (kcal) 1994    Total Calories (kcal) 1994    % Kcal Needs 111    Protein Intake Evaluation    Enteral Protein (gm) 124    Total Protein (gm) 124    % Protein Needs 85    Fluid Intake Evaluation    Enteral  Fluid (mL) 1023    Free Water Flush Fluid (mL) 797    Total Fluid Intake (mL) 1820    EN Evaluation    Number of Days EN Intake Evaluated 1 day    EN Average Volume Delivered (mL/day) 1329 mL/day              Problem/Interventions:        Problem 1       04/20/17 1020    Nutrition Diagnoses Problem 1    Problem 1 Needs Alternate Route    Etiology (related to) --   clinical condition/encephalopathy/dysphagia    Signs/Symptoms (evidenced by) NPO                    Intervention Goal       04/20/17 1020    Intervention Goal    General Nutrition support treatment    TF/PN Maintain TF/PN            Nutrition Intervention       04/20/17 1020    Nutrition Intervention    RD/Tech Action Care plan reviewd;Follow Tx progress            Nutrition Prescription       04/20/17 1021    Nutrition Prescription EN     Enteral Prescription Continue same protocol            Education/Evaluation       04/20/17 1021    Monitor/Evaluation    Monitor Per protocol;Symptoms;TF delivery/tolerance;Pertinent labs          Electronically signed by:  Celeste Sterling MS RD/LD [facility]  04/20/17 10:22 AM

## 2017-04-21 NOTE — PROGRESS NOTES
Continued Stay Note  [facility]     Patient Name: Tomas Salvador  MRN: [id number]  Today's Date: 4/21/2017    Admit Date: 4/4/2017          Discharge Plan       04/21/17 1352    Case Management/Social Work Plan    Plan LTACH vs. SNF    Additional Comments Spoke with Lisa, admissions RN, at [facility] to provide updates on patient. Per MD rounds, plan if for patient to transfer to the floor before discharge and to re-evaluate whether LTAC or a SNF would be more appropriate. CM will continue to follow.               Discharge Codes     None        Expected Discharge Date and Time     Expected Discharge Date Expected Discharge Time    Apr 20, 2017             Rekha Zhang

## 2017-04-21 NOTE — PLAN OF CARE
Problem: Patient Care Overview (Adult)  Goal: Plan of Care Review  Outcome: Ongoing (interventions implemented as appropriate)    04/21/17 0805   Patient Care Overview   Progress improving   Outcome Evaluation   Outcome Summary/Follow up Plan pt noted to cont to have moderate exudate today, but wound bed noted to have increased granulation. PT pulse lavaged wound bed today to help decrease bioburden and clean wound, but will cont with significantly decreased freq of pulse lavage due to increasing granulation.          Problem: Inpatient Physical Therapy  Goal: Wound Care Goal 1 LTG- PT  Outcome: Ongoing (interventions implemented as appropriate)    04/06/17 1025 04/19/17 0835   Wound Care PT LTG   Wound Care PT LTG 1, Date Established 04/06/17 --    Wound Care PT LTG 1, Time to Achieve 1 wk --    Wound Care PT LTG 1, Location Scrotum --    Wound Care PT LTG 1, No S&S of Infection yes --    Wound Care PT LTG 1, Decrease Wound Size 10% --    Wound Care PT LTG 1, Decrease Necrotic Tissue 50% --    Wound Care PT LTG 1, Decrease Exudate minimum --    Wound Care PT LTG 1, No New Skin Break Down yes --    Wound Care PT LTG 1, Education dressing changes;wound care --    Wound Care PT LTG 1, Education Understanding verbalize understanding --    Wound Care PT LTG 1, Outcome --  goal ongoing

## 2017-04-21 NOTE — PROGRESS NOTES
"[facility] Progress Note    Admission Date: 4/4/2017    Tomas Salvador  1955  [id number]    Date: 4/21/2017    Meds:    IV Anti-Infectives     Ordered     Dose/Rate Route Frequency Start Stop    04/20/17 0718  ceFAZolin in dextrose (ANCEF) IVPB solution 2 g     Ordering Provider:  Satya Fall MD    2 g  over 30 Minutes Intravenous Once 04/21/17 0800 04/21/17 0815    04/19/17 1145  amphotericin B (FUNGIZONE) 50 mg in sterile water (preservative free) 1,000 mL irrigation     Ordering Provider:  Kike Leon MD    42 mL/hr  42 mL/hr  Irrigation Every 24 Hours 04/19/17 1300            CC:  Delisa's gangrene    SUBJECTIVE:  4/4/17:Patient is a 61 y.o. 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 He has a history of underlying diabetes mellitus and severe systolic congestive heart failure. He presented to [facility] on 3/30 with scrotal cellulitis/gangrene. He underwent debridement by urology at [facility] on 3/31 and was treated with Merrem/clindamycin/vancomycin. He developed left facial droop and left sided paresis, prompting a transfer to [facility] for evaluation and therapy of acute stroke. His head CT angiogram was degraded by motion artifact but per Dr. Morrison there was suggestion of right inferior temporal lobe\" opacification\" consistent with a possible stroke. His left facial droop and left-sided paresis has improved today. He is encephalopathic and is unable to provide any reliable history. His wife thinks that he may have had some fevers prior to his admission on 3/30.    4/5/17: afebrile. No hx per patient secondary to encephalopathy.  Afebrile, restless per nsg staff.  Oliguric UOP.  No n/v/d.  No rashes.  4/6/17: More alert and less restless today.  Still non-responsive to questions and does not follow simple commands.  Still with left-sided weakness.  Afebrile.  Still with oliguric UOP.  No n/v/d, no rashes, per nsg staff notes. " "  17:  Alert today.  No fever,chills,sweats.  Wife at bedside with ?s   4/10/17:  Wants to go home.  Wound care per PT.  No n/v/d.   17:  Now on Dopamine and Bautista, bipap.  Decompensated last pm.    17:  Somnolent; still on pressors   17 Still on pressors/Precedex; Now on bipap.  Had pulled corpak out night before.    17: Per RN staff notes: afebrile, on/off precedex for agitation, on dopamine, on flagyl and cefazolin, on and off bipap.  Can not keep clothes on him.  Attempted to do Is and Os but too difficult without his foreskin getting pulled into meatus, so therefore, just placed Cueto cath.  Not anchored, because he keeps trying to pull it out.  Would is getting daily misted by PT and packed.  He is more alert today, but still not oriented.  4/15/17: He is improved today and off of vasopressor therapy.  He has been receiving some diuretic therapy for pulmonary edema.  He is less confused.  He remains afebrile.  17: He is continued to receive diuretic therapy.  He remains off of vasopressor therapy.  He has remained afebrile  17:  Remains restrained.  Opens eyes to voice.  Nonproductive cough.   17:  Opens eyes.  Does not follow commands.  His Cueto catheter is still in place and he is undergoing diuresis for his CHF/pulmonary edema.  17:  More alert today.  \"wants Scottie\".  PT just changed dressing.  He remains off of vasopressor therapy.  He is continued to receive diuresis.  The nursing staff reports that he has some penile edema, making it difficult to in and out catheterize him.  17:  On Bipap.  Opens eyes to voice.   17:  He has been less responsive today.  He is not currently following commands.  He still has hypoxia.      PE:   Vital Signs  Temp (24hrs), Av.6 °F (36.4 °C), Min:97.3 °F (36.3 °C), Max:97.9 °F (36.6 °C)    Temp  Min: 97.3 °F (36.3 °C)  Max: 97.9 °F (36.6 °C)  BP  Min: 92/58  Max: 131/96  Pulse  Min: 78  Max: 105  Resp  Min: 16  Max: " 22  SpO2  Min: 80 %  Max: 100 %    GENERAL:   He is in no acute distress, sedated    HEENT: Normocephalic, atraumatic. PERRL. EOMI. No conjunctival injection. No icterus. Oropharynx clear without evidence of thrush or exudate.   HEART: RRR; No murmur, rubs, gallops.   LUNGS:  Diminished, few rhonchi  ABDOMEN: Soft, nontender, nondistended. Positive bowel sounds. No rebound or guarding. NO mass or HSM.  EXT: No cyanosis, clubbing or edema. No cord.  : The lower, posterior midline scrotal wounds with resolution of slough and some increased granulation.  There is no purulent drainage or erythema.  A Cueto catheter is in place  MSK: FROM without joint effusions noted arms/legs.   SKIN: Warm and dry without cutaneous eruptions on Inspection/palpation.     Right PICC without redness      Laboratory Data      Results from last 7 days  Lab Units 04/21/17  0611 04/20/17  0429 04/19/17  0357   WBC 10*3/mm3 10.91* 8.97 8.17   HEMOGLOBIN g/dL 10.6* 10.3* 10.1*   HEMATOCRIT % 36.0* 34.1* 32.6*   PLATELETS 10*3/mm3 346 329 291       Results from last 7 days  Lab Units 04/21/17  0611   SODIUM mmol/L 143   POTASSIUM mmol/L 3.9   CHLORIDE mmol/L 104   TOTAL CO2 mmol/L 32.0*   BUN mg/dL 45*   CREATININE mg/dL 0.50*   GLUCOSE mg/dL 123*   CALCIUM mg/dL 9.4       Results from last 7 days  Lab Units 04/18/17  0358   ALK PHOS U/L 102*   BILIRUBIN mg/dL 0.2*   ALT (SGPT) U/L 6*   AST (SGOT) U/L 30       Results from last 7 days  Lab Units 04/20/17  0429   SED RATE mm/hr 95*       Results from last 7 days  Lab Units 04/17/17  1155   CRP mg/dL 2.82*       4/17:  UA small amount of LE; UC   Candida Glabrata           Estimated Creatinine Clearance: 195 mL/min (by C-G formula based on Cr of 0.5).    Microbiology:  Blood Culture   Date Value Ref Range Status   03/30/2017 No growth at 5 days  Final   03/30/2017 No growth at 5 days  Final           Urine Culture   Date Value Ref Range Status   04/04/2017 No growth  Preliminary   03/31/2017 No  growth  Final   03/30/2017 >100,000 CFU/mL Escherichia coli (A)  Final      scrotal cx x 2 cxs (3/31) E. coli  Cefazolin sens.    Urine Culture   Date Value Ref Range Status   04/11/2017 No growth at 2 days  Final   04/07/2017 >100,000 CFU/mL Candida glabrata (A)  Final         Radiology:  Imaging Results (last 24 hours)     Procedure Component Value Units Date/Time    XR Chest 1 View [[id number]] Collected:  04/20/17 0954     Updated:  04/20/17 2313    Narrative:       EXAMINATION: XR CHEST 1 VW- 04/20/2017     INDICATION: Congestive heart failure; R13.10-Dysphagia, unspecified;  Z74.09-Other reduced mobility; R41.841-Cognitive communication deficit      COMPARISON: 04/19/2017 portable chest     FINDINGS: Feeding tube is seen below the left hemidiaphragm. The heart  is enlarged. The vasculature is cephalized and there is persistent  perihilar disease, consistent with mild to moderate edema. Opacity left  base is consistent with pleural effusion and atelectasis unchanged.  There is minimal right effusion. No pneumothorax or other new chest  disease is seen.           Impression:       Stable changes of congestive heart failure, including mild  to moderate perihilar edema and left basilar effusion/atelectasis. No  new chest disease is seen.     D:  04/20/2017  E:  04/20/2017     This report was finalized on 4/20/2017 11:11 PM by DR. Morgan Macedo MD.               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 .  2. Acute right hemispheric cerebral vascular accident.  3. Acute toxic metabolic encephalopathy-secondary to sepsis and cerebral vascular accident  4. 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 Severe systolic congestive heart failure  6. Escherichia coli urinary tract infection-improved  7. Sepsis-secondary to Delisa's gangrene, now improved  8. Leukocytosis/neutrophilia-secondary to scrotal gangrene/cellulitis, improved   9.  Pulmonary  edema-improving  10. Candiduria--this resolved with removal of his Cueto catheter.  The treatment for his previous candiduria was removal of the Cueto catheter.  Now has recurrent candiduria after his Cueto catheter was placed back in.    11. Cardiogenic shock-with pulmonary edema, now significantly improved.         PLAN/RECOMMENDATIONS:   1.  Continue scrotal wound care  2.  Remove the Cueto catheter when feasible  3.  Amphotericin B bladder irrigations unless the Cueto catheter can be removed  4.  Continue off of antibiotic therapy  5.  Transfer to the long-term acute [facility] when bed available           Dr. Leon has obtained the history, performed the physical exam and formulated the above treatment plan.     Kike Leon MD  4/21/2017  4:53 PM

## 2017-04-21 NOTE — SIGNIFICANT NOTE
04/21/17 1018   SLP Deferred Reason   SLP Deferred Reason Patient unavailable for treatment  (Pt sedated s/p PEG placement thus not appropriate for therapy. Rn requested follow up Monday.)

## 2017-04-21 NOTE — PROGRESS NOTES
Adult Nutrition  Assessment/PES    Patient Name:  Tomas Salvador  YOB: 1955  MRN: [id number]  Admit Date:  4/4/2017    Assessment Date:  4/21/2017     Comments: RD follow-up for pt on nutrition support, s/p PEG placement this AM. Plan to restart TF at 1700 today- Impact Peptide 1.5 start rate of 25 ml/hr then increase by 10 ml/hr q 6 hrs to goal rate of 65 ml/hr with free water at 50 ml/hr. Per MD- maintain free water at 50 ml/hr today (once TF re-started), and possibly decrease free water tomorrow if BUN continues to decrease/stabilize. Will continue to follow.           Reason for Assessment       04/21/17 1204    Reason for Assessment    Reason For Assessment/Visit multidisciplinary rounds;follow up protocol;TF/PN    Time Spent (min) 30    Diagnosis --   per notes this adm    Neurological Encephalopathy    Other diagnosis s/p PEG placement this AM (4/21)   Principal Problem:    CVA 4/4/17 with rapid improvement  Active Problems:    Ischemic cardiomyopathy with EF 10% on Echo 4/4/17    ICD in place, implantation 2015.    S/P Sepsis due to  source 3/30/17    Diabetes Mellitus HgbA1c 10.4    Chronic hypoxic respiratory failure on nocturnal o2    CAD s/p CABG 2005, C 2015. Grafts patent except SVG to diagonal    Urinary retention requiring colbert    Hx of PVD (peripheral vascular disease)    E. coli UTI     Epididymo-orchitis    Encephalopathy acute    Fourniers gangrene with E-Coli Cellulitis. Debrided in [place] 3/30/17    Yeast UTI    Atrial fibrillation but now converted. Still on Eliquis             Nutrition/Diet History       04/21/17 1204    Nutrition/Diet History    Reported/Observed By RN;MD    Other TF on hold until 1700 today per surgeon's post-op PEG protocol. per MD- keep free water at 50 ml/hr today (once TF restarted). ? decrease free water tomorrow (4/22) if BUN continues to decrease/stabilize            Anthropometrics       04/21/17 1210    Anthropometrics    RD Documented  "Current Weight  119 kg (262 lb)   noted wt this AM; pt has edema; ? accuracy of this wt            Labs/Tests/Procedures/Meds       04/21/17 1208    Labs/Tests/Procedures/Meds    Labs/Tests Review Reviewed;Na+;BUN    Medication Review Reviewed, pertinent     Results from last 7 days  Lab Units 04/21/17  0611  04/18/17  0358   SODIUM mmol/L 143  < > 145   POTASSIUM mmol/L 3.9  < > 4.1   CHLORIDE mmol/L 104  < > 105   TOTAL CO2 mmol/L 32.0*  < > 38.0*   BUN mg/dL 45*  < > 51*   CREATININE mg/dL 0.50*  < > 0.70   CALCIUM mg/dL 9.4  < > 9.0   BILIRUBIN mg/dL  --   --  0.2*   ALK PHOS U/L  --   --  102*   ALT (SGPT) U/L  --   --  6*   AST (SGOT) U/L  --   --  30   GLUCOSE mg/dL 123*  < > 171*   < > = values in this interval not displayed.           Estimated/Assessed Needs       04/21/17 1211    Calculation Measurements    Weight Used For Calculations 98 kg (216 lb)   per wt on (4/14)    Height Used for Calculations 1.753 m (5' 9\")    Estimated/Assessed Energy Needs    kcal/kg 14   25 kcal/kg IBW= 1825 kcal/day    14 Kcal/Kg (kcal) 1371.68    Age 61    RMR (Finney-St. Jeor Equation) 1770.14    Estimated/Assessed Protein Needs    Weight Used for Protein Calculation 72.6 kg (160 lb)   IBW    Protein (gm/kg) 2.0    2.0 Gm Protein (gm) 145.15            Nutrition Prescription Ordered       04/21/17 1213    Nutrition Prescription PO    Current PO Diet NPO    Nutrition Prescription EN    Enteral Route PEG   TF on hold post-PEG placement until 1700     Product Impact Peptide 1.5 tom   TF order still in EPIC    TF Delivery Method Continuous    Continuous TF Goal Rate (mL/hr) 65 mL/hr    Continuous TF Current Rate (mL/hr) 0 mL/hr    Continuous TF Goal Volume (mL) 1300 mL    Water flush (mL)  50 mL    Water Flush Frequency Per hour            Evaluation of Received Nutrient/Fluid Intake       04/21/17 1218    Evaluation of Received Nutrient/Fluid Intake    Number of Days Evaluated 1 day    Calorie Intake Evaluation    Enteral " Calories (kcal) 1775    Total Calories (kcal) 1775    % Kcal Needs 99    Protein Intake Evaluation    Enteral Protein (gm) 111   = 1.1 g/kg actual wt    Total Protein (gm) 111    % Protein Needs 76    Fluid Intake Evaluation    Enteral  Fluid (mL) 911    Free Water Flush Fluid (mL) 401    Total Fluid Intake (mL) 1312    EN Evaluation    Number of Days EN Intake Evaluated 1 day    EN Average Volume Delivered (mL/day) 1183 mL/day    TF Changes Held              Problem/Interventions:        Problem 1       04/21/17 1219    Nutrition Diagnoses Problem 1    Problem 1 Needs Alternate Route    Etiology (related to) --   clinical condition/encephalopathy/dysphagia    Signs/Symptoms (evidenced by) NPO                    Intervention Goal       04/21/17 1219    Intervention Goal    General Nutrition support treatment    TF/PN Inititiate TF/PN   restart previous TF order per surgeon's post-PEG protocol            Nutrition Intervention       04/21/17 1220    Nutrition Intervention    RD/Tech Action Care plan reviewd;Follow Tx progress    Recommended/Ordered Diet;EN            Nutrition Prescription       04/21/17 1220    Nutrition Prescription EN    Enteral Prescription --   will make npo in EPIC and re-order TF to start at 1700 today per surgeon's post-PEG protocol- start rate of 25 ml/hr and increase by 10 ml/hr q 6 hrs to goal rate of 65 ml/hr, free water at 50 ml/hr            Education/Evaluation       04/21/17 1221    Monitor/Evaluation    Monitor Per protocol;Pertinent labs;TF delivery/tolerance;Symptoms          Electronically signed by:  Celeste Sterling MS RD/LD CNSC  04/21/17 12:23 PM

## 2017-04-21 NOTE — PROGRESS NOTES
"Tomas Salvador  1955  [id number]    Surgery Post - Operative Note    Date of visit: 4/21/2017    Subjective   Subjective: Stable after PEG       Objective     Objective:    /71  Pulse 78  Temp 97.7 °F (36.5 °C) (Axillary)   Resp 22  Ht 69\" (175.3 cm)  Wt 262 lb (119 kg)  SpO2 99%  BMI 38.69 kg/m2    CV:  Rate  regular and rhythm  regular  L:  Rhonchi to auscultation bilaterally   ABD:  Soft, appropriately tender. PEG clean, dry and intact   EXT:  No cyanosis, clubbing or edema         Assessment/Plan     Assessment/ Plan: Doing well after EGD/PEG. Continue Pulmonary toilet    Problem List Items Addressed This Visit     None      Visit Diagnoses     Dysphagia, unspecified type    -  Primary    Relevant Orders    Case request            Satya Fall MD  4/21/2017  4:23 PM    "

## 2017-04-21 NOTE — OP NOTE
"Operative Report    Tomas Salvador  [id number]  4/4/2017 - 4/21/2017      Pre Operative Diagnosis:     Feeding difficulty (R63.3)    Post Operative Diagnosis:    Same      Procedure:     EGD with PEG placement (37604)      Surgeon: Satya Fall MD      Anesthesia: Sedation with 4 mg Versed and 100 mcg Fentanyl.     I was present, directed and supervised the entire sedation and monitoring of this patient for a grand total of 10 minutes.      Specimens: None      Findings:     20 Icelandic PEG tube placed at the 5 cm level      Indications:      The patient is a 61 y.o. year old male with a history of feeding difficulty who we have been asked to see for long term feeding access. The risks and benefits of EGD with PEG placement were discussed at length with the patient and the patient's family and they agreed to proceed.      Operative Procedure:     After obtaining informed consent, the patient was taken to the endoscopy suite. They were placed in modified Collins position, and after appropriate sedation as detailed above, a bite block was placed into the patient's mouth. The endoscope was advanced into the mouth and into the esophagus without difficulty. It was advanced into the stomach, and into the duodenum, which was normal. The previous nasoenteral tube was removed . The scope was then returned to the stomach, and the stomach was maximally insufflated. An appropriate site for PEG placement was then determined by palpation, transillumination, and the \"safe track\" needle technique. This area was prepped and draped in standard sterile fashion, and after infiltrating the skin with local anesthetic, a 7mm skin incision was made in this location. A needle was advanced through this incision and into the stomach under endoscopic guidance. A wire was then placed through the needle, grasped with the endoscope, and brought out through the mouth. At this point, using the Ponsky Pull technique, a 20 Icelandic PEG tube was " brought through the abdominal wall in this location. The endoscope was again advanced through the mouth and esophagus and into the stomach, where the PEG bumper could be seen abutting the anterior gastric wall in standard fashion without bleeding. The endoscope was then used to desufflate the stomach, and removed from the patient mouth without difficulty. The PEG tube was secured at the 5 cm level, with the bumper at the 5.5 cm level. It was sutured to the skin, dressed in standard sterile fashion, and placed to gravity drainage. The patient tolerated the procedure well. They were then transported to the recovery room in stable condition. All sponge and needle counts were correct times two at the completion of the case. There were no immediate complications.       Satya Fall MD  4/21/2017  8:15 AM

## 2017-04-21 NOTE — PROGRESS NOTES
Acute Care - Wound/Debridement Treatment Note  [facility]     Patient Name: Tomas Salvador  : 1955  MRN: [id number]  Today's Date: 2017  Onset of Illness/Injury or Date of Surgery Date: 17   Date of Referral to PT: 17   Referring Physician: MD Jimenez       Admit Date: 2017    Visit Dx:    ICD-10-CM ICD-9-CM   1. Dysphagia, unspecified type R13.10 787.20   2. Impaired functional mobility, balance, gait, and endurance Z74.09 V49.89   3. Impaired mobility and ADLs Z74.09 799.89   4. Cognitive communication deficit R41.841 799.52       Patient Active Problem List   Diagnosis   • Coronary disease   • Ischemic cardiomyopathy with EF 10% on Echo 17   • Peripheral vascular disease   • ICD in place, implantation .   • Dyslipidemia, on atorvastain.    • S/P Sepsis due to  source 3/30/17   • CVA 17 with rapid improvement   • Diabetes Mellitus HgbA1c 10.4   • Chronic hypoxic respiratory failure on nocturnal o2   • CAD s/p CABG , C . Grafts patent except SVG to diagonal   • Urinary retention requiring colbert   • Hyperlipidemia   • Essential hypertension   • Hx of PVD (peripheral vascular disease)   • E. 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 Still on Eliquis               LDA Wound       17 0800          Incision 17 1056 other (see comments) scrotum    Incision - Properties Group Placement Date: 17  -JW Placement Time:   -JW Side: other (see comments)  -JW Location: scrotum  -JW    Incision WDL ex  -MF      Dressing Appearance moist drainage  -MF      Appearance drainage;redness   moderate granulation   -MF      Drainage Characteristics/Odor serosanguineous  -MF      Drainage Amount moderate  -MF      Wound Cleaning irrigated with;sterile normal saline  -MF      Wound Interventions pulsatile high  pressure lavage;debrided   250ml nsaline with 120mmHg suction  -      Dressing foam;low-adherent  -      Packing other (see comments)   hydrofera blue   -MF      Pressure Ulcer 04/20/17 2000 coccyx Stage I    Pressure Ulcer - Properties Group Date first assessed: 04/20/17  -JE Time first assessed: 2000  -JE Present On Admission (Pressure Ulcer): no  -JE Location: coccyx  -JE Stage: Stage I  -JE    Wound 04/20/17 0850 medial nose abrasion    Wound - Properties Group Date first assessed: 04/20/17  -AS Time first assessed: 0850  -AS Orientation: medial  -AS Location: nose  -AS, bridge   Type: abrasion  -AS      User Key  (r) = Recorded By, (t) = Taken By, (c) = Cosigned By    Initials Name Provider Type     Pee Roldan, PT Physical Therapist    FELY Urbano, RN Registered Nurse    AS Jayson Louis RN Registered Nurse    JAY JAY Silveira RN Registered Nurse            WOUND DEBRIDEMENT  Debridement Site 1  Location- Site 1: perineal wound  Selective Debridement- Site 1: Wound Surface <20cmsq (~5cm@)  Instruments- Site 1: tweezers  Excised Tissue Description- Site 1: minimum, slough, other (comment) (biofilm)  Bleeding- Site 1: none                  Adult Rehabilitation Note       04/21/17 0800 04/20/17 0955 04/19/17 0835    Rehab Assessment/Intervention    Discipline physical therapist  - physical therapist  - physical therapist  -    Document Type therapy note (daily note)  - therapy note (daily note)  - therapy note (daily note)  -MC    Subjective Information decreased LOC   pt sedated  - no complaints;agree to therapy  - agree to therapy   pt kept trying to convince PT to remove restraint mittens  -    Recorded by [] Pee Roldan, PT [MF] Pee Roldan, PT [] Rekha Mcgarry, PT    Pain Assessment    Pain Assessment Jay-Baker FACES  - Jay-Baker FACES  - Jay-Baker FACES  -    Jay-Kent FACES Pain Rating 0  - 0  - 0  -    Pain Intervention(s)   Repositioned  -MF     Recorded by [MF] Pee Roldan, PT [] Pee Roldan, PT [] Rekha Mcgarry, PT    Cognitive Assessment/Intervention    Current Cognitive/Communication Assessment  impaired  -MF     Recorded by  [MF] Pee Roldan PT     Positioning and Restraints    Pre-Treatment Position in bed  -MF in bed  -MF in bed  -MC    Post Treatment Position bed  -MF bed  -MF bed  -MC    In Bed supine;call light within reach;notified nsg  -MF supine;call light within reach  -MF supine;call light within reach;encouraged to call for assist;notified nsg  -MC    Recorded by [MF] Pee Roldan, PT [] Pee Roldan, PT [] Rekha Mcgarry, PT      User Key  (r) = Recorded By, (t) = Taken By, (c) = Cosigned By    Initials Name Effective Dates     Pee Roldan, PT 06/19/15 -      Rekha Mcgarry, PT 03/14/16 -                 IP PT Goals       04/20/17 1118 04/20/17 1115 04/19/17 0835    Bed Mobility PT LTG    Bed Mobility PT LTG, [place] Level minimum assist (75% patient effort)  -LS      Bed Mobility PT LTG, Date Goal Reviewed 04/20/17  -LS      Bed Mobility PT LTG, Outcome goal revised  -LS      Transfer Training PT LTG    Transfer Training PT LTG, [place] Level minimum assist (75% patient effort)  -LS      Transfer Training PT  LTG, Date Goal Reviewed 04/20/17  -LS      Transfer Training PT LTG, Outcome goal revised  -LS      Gait Training PT LTG    Gait Training Goal PT LTG, [place] Level  minimum assist (75% patient effort);2 person assist required  -LS     Gait Training Goal PT LTG, Distance to Achieve  100  -LS     Gait Training Goal PT LTG, Date Goal Reviewed  04/20/17  -LS     Gait Training Goal PT LTG, Outcome  goal revised  -LS     Wound Care PT LTG    Wound Care PT LTG 1, Outcome   goal ongoing  -MC      04/16/17 1450 04/15/17 1455 04/14/17 1120    Wound Care PT LTG    Wound Care PT LTG 1, Outcome goal ongoing  -MC goal ongoing  - goal ongoing  -       04/11/17 1015 04/09/17 1551 04/08/17 1010    Bed Mobility PT LTG    Bed Mobility PT LTG, Outcome goal not met  -LS goal ongoing  -DR     Bed Mobility PT LTG, Reason Goal Not Met medical status inhibits participation  -LS      Transfer Training PT LTG    Transfer Training PT LTG, Outcome goal not met  -LS goal ongoing  -DR     Transfer Training PT LTG, Reason Goal Not Met medical status inhibits participation  -LS      Gait Training PT LTG    Gait Training Goal PT LTG, Outcome goal not met  -LS goal ongoing  -DR     Gait Training Goal PT LTG, Reason Goal Not Met medical status inhibits participation  -LS      Wound Care PT LTG    Wound Care PT LTG 1, Outcome   goal ongoing  -MF      User Key  (r) = Recorded By, (t) = Taken By, (c) = Cosigned By    Initials Name Provider Type    VALERI Roldan, PT Physical Therapist    ANDREWS Juárez, PT Physical Therapist    DUNCAN Mcgarry, PT Physical Therapist    DR Mikey Moore, PT Physical Therapist          Physical Therapy Education     Title: PT OT SLP Therapies (Active)     Topic: Physical Therapy (Active)     Point: Mobility training (Active)    Learning Progress Summary    Learner Readiness Method Response Comment Documented by Status   Patient Acceptance E,D NR  LS 04/20/17 1114 Active    Acceptance E VU  AP 04/18/17 0557 Done    Acceptance E NR  DR 04/09/17 1551 Active    Acceptance E NR  EDOUARD 04/07/17 1534 Active    Acceptance E,D NR  LS 04/06/17 1523 Active    Acceptance E,D NR  LS 04/05/17 0908 Active   Family Acceptance E VU  AP 04/18/17 0557 Done   Significant Other Acceptance E,D NR  LS 04/06/17 1523 Active    Acceptance E,D NR  LS 04/05/17 0908 Active               Point: Home exercise program (Active)    Learning Progress Summary    Learner Readiness Method Response Comment Documented by Status   Patient Acceptance E,D NR  LS 04/20/17 1114 Active    Acceptance E VU  AP 04/18/17 0557 Done    Acceptance E NR  EDOUARD 04/07/17 1534 Active    Acceptance E,D  NR   04/06/17 1523 Active   Family Acceptance E VU  AP 04/18/17 0557 Done   Significant Other Acceptance E,D NR   04/06/17 1523 Active               Point: Body mechanics (Active)    Learning Progress Summary    Learner Readiness Method Response Comment Documented by Status   Patient Acceptance E,D NR   04/20/17 1114 Active    Acceptance E VU  AP 04/18/17 0557 Done    Acceptance E NR   04/09/17 1551 Active    Acceptance E NR  EDOUARD 04/07/17 1534 Active    Acceptance E,D NR   04/06/17 1523 Active    Acceptance E,D NR   04/05/17 0908 Active   Family Acceptance E VU  AP 04/18/17 0557 Done   Significant Other Acceptance E,D NR   04/06/17 1523 Active    Acceptance E,D NR   04/05/17 0908 Active               Point: Precautions (Active)    Learning Progress Summary    Learner Readiness Method Response Comment Documented by Status   Patient Acceptance E,D NR   04/20/17 1114 Active    Acceptance E VU  AP 04/18/17 0557 Done    Acceptance E NR   04/09/17 1551 Active    Acceptance E NR   04/07/17 1534 Active    Acceptance E,D NR   04/06/17 1523 Active    Acceptance E,D NR   04/05/17 0908 Active   Family Acceptance E VU  AP 04/18/17 0557 Done   Significant Other Acceptance E,D NR   04/06/17 1523 Active    Acceptance E,D NR   04/05/17 0908 Active                      User Key     Initials Effective Dates Name Provider Type Discipline     06/19/15 -  Anita Prado, PT Physical Therapist PT     06/19/15 -  Lily Juárez, PT Physical Therapist PT    AP 06/16/16 -  Shahla Sherman RN Registered Nurse Nurse     09/06/16 -  Mikey Moore, PT Physical Therapist PT                   PT ASSESSMENT (last 72 hours)      PT Evaluation       04/21/17 0800 04/21/17 0600    Rehab Evaluation    Document Type therapy note (daily note)  -MF     Subjective Information decreased LOC   pt sedated  -MF     Pain Assessment    Pain Assessment Jay-Baker FACES  -MF     Jay-Baker FACES Pain Rating 0  -MF      Muscle Tone Assessment    Muscle Tone Assessment  Bilateral Upper Extremities;Bilateral Lower Extremities  -JE    Bilateral Upper Extremities Muscle Tone Assessment  mildly decreased tone  -JE    Bilateral Lower Extremities Muscle Tone Assessment  moderately decreased tone  -JE    Positioning and Restraints    Pre-Treatment Position in bed  -     Post Treatment Position bed  -     In Bed supine;call light within reach;notified nsg  -       04/21/17 0400 04/21/17 0200    Muscle Tone Assessment    Muscle Tone Assessment Bilateral Upper Extremities;Bilateral Lower Extremities  -JE Bilateral Upper Extremities;Bilateral Lower Extremities  -JE    Bilateral Upper Extremities Muscle Tone Assessment mildly decreased tone  -JE mildly decreased tone  -JE    Bilateral Lower Extremities Muscle Tone Assessment moderately decreased tone  -JE moderately decreased tone  -JE      04/21/17 0000 04/20/17 2200    Muscle Tone Assessment    Muscle Tone Assessment Bilateral Upper Extremities;Bilateral Lower Extremities  -JE Bilateral Upper Extremities;Bilateral Lower Extremities  -JE    Bilateral Upper Extremities Muscle Tone Assessment mildly decreased tone  -JE mildly decreased tone  -JE    Bilateral Lower Extremities Muscle Tone Assessment moderately decreased tone  -JE moderately decreased tone  -JE      04/20/17 2000 04/20/17 1800    Muscle Tone Assessment    Muscle Tone Assessment Bilateral Upper Extremities;Bilateral Lower Extremities  -JE Bilateral Upper Extremities;Bilateral Lower Extremities  -TP    Bilateral Upper Extremities Muscle Tone Assessment mildly decreased tone  -JE mildly decreased tone  -TP    Bilateral Lower Extremities Muscle Tone Assessment moderately decreased tone  -JE moderately decreased tone  -TP      04/20/17 1600 04/20/17 1400    Muscle Tone Assessment    Muscle Tone Assessment Bilateral Upper Extremities;Bilateral Lower Extremities  -TP Bilateral Upper Extremities;Bilateral Lower Extremities  -TP     Bilateral Upper Extremities Muscle Tone Assessment mildly decreased tone  -TP mildly decreased tone  -TP    Bilateral Lower Extremities Muscle Tone Assessment moderately decreased tone  -TP moderately decreased tone  -TP      04/20/17 1200 04/20/17 1030    Rehab Evaluation    Document Type  re-evaluation   PT mobiity  -LS    Subjective Information  agree to therapy;no complaints  -LS    Patient Effort, Rehab Treatment  adequate  -LS    Symptoms Noted Comment  Pt with decreased following of commands and agitation throughout.   -LS    General Information    Patient Profile Review  yes  -LS    Onset of Illness/Injury or Date of Surgery Date  04/04/17  -LS    Referring Physician  MD Jimenez  -LS    Pertinent History Of Current Problem  Please see IE for original admit; PT mobility was discharged on 4/11/17 due to declining  medical status. Pt now with improved resp status; remains encephalopathic and on bladder irrigation. RESUME PT orders received.   -LS    Precautions/Limitations  fall precautions;oxygen therapy device and L/min;NPO;other (see comments)   dec skin integrity to scrotum  -LS    Prior Level of Function  --   please see IE  -LS    Equipment Currently Used at Home  --   please see IE  -LS    Plans/Goals Discussed With  patient;agreed upon  -LS    Risks Reviewed  patient:;LOB;dizziness;increased discomfort  -LS    Benefits Reviewed  patient:;improve function;increase independence;increase strength;increase balance;increase knowledge  -LS    Barriers to Rehab  medically complex  -LS    Living Environment    Lives With  --   please see IE  -LS    Clinical Impression    Date of Referral to PT  04/19/17  -LS    PT Diagnosis  impaired functional mobility, balance, gait  -LS    Patient/Family Goals Statement  return to PLOF  -LS    Criteria for Skilled Therapeutic Interventions Met  yes;treatment indicated  -LS    Rehab Potential  good, to achieve stated therapy goals  -LS    Vital Signs    Pre Systolic BP  Rehab  114  -LS    Pre Treatment Diastolic BP  79  -LS    Post Systolic BP Rehab  114  -LS    Post Treatment Diastolic BP  83  -LS    Pretreatment Heart Rate (beats/min)  89  -LS    Posttreatment Heart Rate (beats/min)  96  -LS    Pre SpO2 (%)  100  -LS    O2 Delivery Pre Treatment  supplemental O2   2L  -LS    Post SpO2 (%)  100  -LS    O2 Delivery Post Treatment  supplemental O2   2L  -LS    Pre Patient Position  Supine  -LS    Intra Patient Position  Standing  -LS    Post Patient Position  Sitting  -LS    Pain Assessment    Pain Assessment  0-10  -LS    Jay-Kent FACES Pain Rating  0  -LS    Pain Score  0  -LS    Post Pain Score  0  -LS    Cognitive Assessment/Intervention    Current Cognitive/Communication Assessment  impaired  -LS    Orientation Status  oriented to;person  -LS    Follows Commands/Answers Questions  able to follow single-step instructions;25% of the time;needs cueing;needs increased time;needs repetition  -LS    Personal Safety  severe impairment;impulsive;decreased awareness, need for assist;decreased awareness, need for safety;decreased insight to deficits  -LS    Personal Safety Interventions  fall prevention program maintained;gait belt;nonskid shoes/slippers when out of bed  -LS    ROM (Range of Motion)    General ROM  no range of motion deficits identified   BLEs  -LS    MMT (Manual Muscle Testing)    General MMT Assessment  lower extremity strength deficits identified  -LS    Lower Extremity    Lower Ext Manual Muscle Testing Detail  grossly 3+/5; pt with difficulty in following formal MMT commands  -LS    Muscle Tone Assessment    Muscle Tone Assessment Bilateral Upper Extremities;Bilateral Lower Extremities  -TP     Bilateral Upper Extremities Muscle Tone Assessment mildly decreased tone  -TP     Bilateral Lower Extremities Muscle Tone Assessment moderately decreased tone  -TP     Bed Mobility, Assessment/Treatment    Bed Mobility, Assistive Device  head of bed elevated;draw sheet  -LS     Bed Mob, Supine to Sit, [place]  maximum assist (25% patient effort);2 person assist required;verbal cues required  -LS    Bed Mobility, Safety Issues  cognitive deficits limit understanding  -LS    Bed Mobility, Impairments  strength decreased;coordination impaired  -LS    Transfer Assessment/Treatment    Transfers, Bed-Chair [place]  maximum assist (25% patient effort);2 person assist required;verbal cues required  -LS    Transfers, Sit-Stand [place]  maximum assist (25% patient effort);2 person assist required;verbal cues required  -LS    Transfers, Stand-Sit [place]  maximum assist (25% patient effort);2 person assist required;verbal cues required  -LS    Transfer, Safety Issues  impulsivity;sequencing ability decreased;weight-shifting ability decreased  -LS    Transfer, Comment  Max vc's and gesturing to perform STS from EOB and pivot to chair. PT/OT on either side of pt.   -LS    Gait Assessment/Treatment    Gait, [place] Level  not appropriate to assess   decreased following of commands during transfers  -LS    Motor Skills/Interventions    Additional Documentation  Balance Skills Training (Group)  -LS    Balance Skills Training    Sitting-Level of Assistance  Contact guard  -LS    Sitting-Balance Support  Feet supported  -LS    Gait Balance-Level of Assistance  Maximum assistance;x2   dynamic balance during t/f to chair  -LS    Gait Balance Support  Right upper extremity supported;Left upper extremity supported  -LS    Therapy Exercises    Bilateral Lower Extremities  AAROM:;5 reps;sitting;ankle pumps/circles;LAQ  -LS    Sensory Assessment/Intervention    Light Touch  RLE;LLE  -LS    LLE Light Touch  WNL  -LS    RLE Light Touch  WNL  -LS    Positioning and Restraints    Pre-Treatment Position  in bed  -LS    Post Treatment Position  chair  -LS    In Chair  notified nsg;reclined;call light within reach;encouraged to call for assist;exit alarm on;with nsg;waffle cushion;on  mechanical lift sling;legs elevated;heels elevated;LUE elevated;RUE elevated  -LS    Restraints  released:;reapplied:;notified nsg:;soft limb  -LS      04/20/17 1029 04/20/17 1000    Rehab Evaluation    Document Type re-evaluation  -JR     Subjective Information no complaints;agree to therapy  -JR     Patient Effort, Rehab Treatment adequate  -JR     Symptoms Noted During/After Treatment none  -JR     General Information    Patient Profile Review yes  -JR     Onset of Illness/Injury or Date of Surgery Date 04/04/17  -JR     Referring Physician Dr. Ganoa  -JR     Pertinent History Of Current Problem Pt initially admitted with aphasia, L sided facial droop and L sided paralysis. Pt d/c'd from OT 4/11 due to decline in medical status. Pt now with resume orders. Pt remains encephalopathic with bladder irrigation.  -JR     Precautions/Limitations fall precautions;oxygen therapy device and L/min   NG tube, decreased skin integrity to scrotal region  -JR     Prior Level of Function --   Please refer to initial eval.  -JR     Equipment Currently Used at Home --   Please refer to initial eval.  -JR     Plans/Goals Discussed With patient;agreed upon  -JR     Risks Reviewed patient:;increased discomfort  -JR     Benefits Reviewed patient:;improve function;increase independence  -JR     Barriers to Rehab medically complex;cognitive status  -JR     Living Environment    Living Environment Comment Please refer to initial eval for prior living environment information  -JR     Vital Signs    Pre Systolic BP Rehab 114  -JR     Pre Treatment Diastolic BP 79  -JR     Post Systolic BP Rehab 114  -JR     Post Treatment Diastolic BP 83  -JR     Pretreatment Heart Rate (beats/min) 89  -JR     Posttreatment Heart Rate (beats/min) 96  -JR     Pre SpO2 (%) 100  -JR     O2 Delivery Pre Treatment supplemental O2   Bipap  -JR     Post SpO2 (%) 100  -JR     O2 Delivery Post Treatment supplemental O2   3L  -JR     Pre Patient Position Supine   -JR     Intra Patient Position Standing  -JR     Post Patient Position Sitting  -JR     Pain Assessment    Pain Assessment 0-10  -JR     Jay-Kent FACES Pain Rating 0  -JR     Pain Score 0  -JR     Post Pain Score 0  -JR     Cognitive Assessment/Intervention    Current Cognitive/Communication Assessment impaired  -JR     Orientation Status oriented to;person;disoriented to;place;time  -JR     Follows Commands/Answers Questions 25% of the time;able to follow single-step instructions;needs cueing;needs increased time;needs repetition  -JR     Personal Safety severe impairment;decreased awareness, need for assist;decreased awareness, need for safety;decreased insight to deficits  -JR     ROM (Range of Motion)    General ROM no range of motion deficits identified  -     MMT (Manual Muscle Testing)    General MMT Assessment Detail Pt not following commands for MMT this date B UE strength functionally 4/5  -     Muscle Tone Assessment    Muscle Tone Assessment  Bilateral Upper Extremities;Bilateral Lower Extremities  -TP    Bilateral Upper Extremities Muscle Tone Assessment  mildly decreased tone  -TP    Bilateral Lower Extremities Muscle Tone Assessment  moderately decreased tone  -TP    Bed Mobility, Assessment/Treatment    Bed Mobility, Assistive Device head of bed elevated;draw sheet  -     Bed Mob, Supine to Sit, [place] maximum assist (25% patient effort);2 person assist required  -     Bed Mobility, Safety Issues cognitive deficits limit understanding;decreased use of arms for pushing/pulling;decreased use of legs for bridging/pushing;impaired trunk control for bed mobility  -     Bed Mobility, Impairments strength decreased;impaired balance;coordination impaired;motor control impaired;postural control impaired  -     Bed Mobility, Comment Verbal cues for sequencing.  -     Transfer Assessment/Treatment    Transfers, Bed-Chair [place] maximum assist (25% patient effort);2 person assist  required  -JR     Transfers, Sit-Stand [place] maximum assist (25% patient effort);2 person assist required  -JR     Transfer, Safety Issues balance decreased during turns;sequencing ability decreased;step length decreased;weight-shifting ability decreased  -JR     Transfer, Impairments strength decreased;impaired balance;motor control impaired;coordination impaired;postural control impaired  -JR     Transfer, Comment Pt requiring max verbal cues to follow commands for transfer to chair.  -JR     Motor Skills/Interventions    Additional Documentation Balance Skills Training (Group)  -JR     Balance Skills Training    Sitting-Level of Assistance Contact guard  -JR     Sitting-Balance Support Feet supported  -JR     Gait Balance-Level of Assistance Maximum assistance;x2  -JR     Gait Balance Support Right upper extremity supported;Left upper extremity supported  -JR     Therapy Exercises    Bilateral Upper Extremity AAROM:;PROM:;10 reps;elbow flexion/extension;pronation/supination;shoulder extension/flexion  -JR     Positioning and Restraints    Pre-Treatment Position in bed  -JR     Post Treatment Position chair  -JR     In Chair notified nsg;reclined;call light within reach;encouraged to call for assist;exit alarm on;with family/caregiver;RUE elevated;LUE elevated;waffle cushion;on mechanical lift sling;RLE elevated;LLE elevated  -JR     Restraints released:;reapplied:;notified nsg:;soft limb  -JR       04/20/17 0955 04/20/17 0800    Rehab Evaluation    Document Type therapy note (daily note)  -     Subjective Information no complaints;agree to therapy  -     Pain Assessment    Pain Assessment Jay-Baker FACES  -     Jay-Baker FACES Pain Rating 0  -     Pain Intervention(s) Repositioned  -     Cognitive Assessment/Intervention    Current Cognitive/Communication Assessment impaired  -     Muscle Tone Assessment    Muscle Tone Assessment  Bilateral Upper Extremities;Bilateral Lower Extremities  -TP     Bilateral Upper Extremities Muscle Tone Assessment  mildly decreased tone  -TP    Bilateral Lower Extremities Muscle Tone Assessment  moderately decreased tone  -TP    Positioning and Restraints    Pre-Treatment Position in bed  -MF     Post Treatment Position bed  -MF     In Bed supine;call light within reach  -MF       04/20/17 0600 04/20/17 0400    Muscle Tone Assessment    Muscle Tone Assessment Bilateral Upper Extremities;Bilateral Lower Extremities  -SC Bilateral Upper Extremities;Bilateral Lower Extremities  -SC    Bilateral Upper Extremities Muscle Tone Assessment mildly decreased tone  -SC mildly decreased tone  -SC    Bilateral Lower Extremities Muscle Tone Assessment moderately decreased tone  -SC moderately decreased tone  -SC      04/20/17 0200 04/20/17 0120    Muscle Tone Assessment    Muscle Tone Assessment Bilateral Upper Extremities;Bilateral Lower Extremities  -SC --  -TP    Bilateral Upper Extremities Muscle Tone Assessment mildly decreased tone  -SC --  -TP    Bilateral Lower Extremities Muscle Tone Assessment moderately decreased tone  -SC --  -TP      04/20/17 0000 04/19/17 2200    Muscle Tone Assessment    Muscle Tone Assessment Bilateral Upper Extremities;Bilateral Lower Extremities  -SC Bilateral Upper Extremities;Bilateral Lower Extremities  -SC    Bilateral Upper Extremities Muscle Tone Assessment mildly decreased tone  -SC mildly decreased tone  -SC    Bilateral Lower Extremities Muscle Tone Assessment moderately decreased tone  -SC moderately decreased tone  -SC      04/19/17 2000 04/19/17 1800    Muscle Tone Assessment    Muscle Tone Assessment Bilateral Upper Extremities;Bilateral Lower Extremities  -SC Bilateral Upper Extremities  -BG    Bilateral Upper Extremities Muscle Tone Assessment mildly decreased tone  -SC     Bilateral Lower Extremities Muscle Tone Assessment moderately decreased tone  -SC       04/19/17 1600 04/19/17 1400    Rehab Evaluation    Document Type evaluation   -LSA     Subjective Information no complaints;agree to therapy  -LSA     Muscle Tone Assessment    Muscle Tone Assessment Bilateral Upper Extremities  -BG Bilateral Upper Extremities  -BG      04/19/17 1200 04/19/17 1000    Muscle Tone Assessment    Muscle Tone Assessment Bilateral Upper Extremities  -BG Bilateral Upper Extremities  -BG      04/19/17 0835 04/19/17 0800    Rehab Evaluation    Document Type therapy note (daily note)  -     Subjective Information agree to therapy   pt kept trying to convince PT to remove restraint mittens  -     Pain Assessment    Pain Assessment Jay-Kent FACES  -     Jay-Kent FACES Pain Rating 0  -     Muscle Tone Assessment    Muscle Tone Assessment  Bilateral Upper Extremities  -BG    Positioning and Restraints    Pre-Treatment Position in bed  -MC     Post Treatment Position bed  -     In Bed supine;call light within reach;encouraged to call for assist;[place] [facility] [place]       User Key  (r) = Recorded By, (t) = Taken By, (c) = Cosigned By    Initials Name Provider Type    MF Pee Roldan, PT Physical Therapist    JR Judy Leach, OT Occupational Therapist    LSA Chacha Pelletier, MS CCC-SLP Speech and Language Pathologist    LS Lily Juárez, PT Physical Therapist    TP Sam Concepcion, RN Registered Nurse    DUNCAN Mcgarry, PT Physical Therapist    SC Mady Valle, RN Registered Nurse    FELY Urbano, RN Registered Nurse    BG Shellie Cedillo, [place] Registered Nurse            PT Recommendation and Plan  Anticipated Discharge Disposition: skilled nursing facility  PT Frequency: daily    Plan Of Care Reviewed With: patient   Progress: improving   Outcome Summary/Follow up Plan: pt noted to cont to have moderate exudate today, but wound bed noted to have increased granulation.  PT pulse lavaged wound bed today to help decrease bioburden and clean wound, but will cont with significantly decreased freq of pulse lavage due to increasing  granulation.            Outcome Measures       04/20/17 1030 04/20/17 1029       How much help from another person do you currently need...    Turning from your back to your side while in flat bed without using bedrails? 2  -LS      Moving from lying on back to sitting on the side of a flat bed without bedrails? 2  -LS      Moving to and from a bed to a chair (including a wheelchair)? 2  -LS      Standing up from a chair using your arms (e.g., wheelchair, bedside chair)? 2  -LS      Climbing 3-5 steps with a railing? 1  -LS      To walk in hospital room? 1  -LS      AM-PAC 6 Clicks Score 10  -LS      How much help from another is currently needed...    Putting on and taking off regular lower body clothing?  1  -JR     Bathing (including washing, rinsing, and drying)  2  -JR     Toileting (which includes using toilet bed pan or urinal)  1  -JR     Putting on and taking off regular upper body clothing  2  -JR     Taking care of personal grooming (such as brushing teeth)  2  -JR     Eating meals  1  -JR     Score  9  -JR     Modified Linh Scale    Modified Linh Scale 4 - Moderately severe disability.  Unable to walk without assistance, and unable to attend to own bodily needs without assistance.  -LS 4 - Moderately severe disability.  Unable to walk without assistance, and unable to attend to own bodily needs without assistance.  -JR     Functional Assessment    Outcome Measure Options AM-PAC 6 Clicks Basic Mobility (PT)  -LS AM-PAC 6 Clicks Daily Activity (OT);Modified [place]  -JR       User Key  (r) = Recorded By, (t) = Taken By, (c) = Cosigned By    Initials Name Provider Type    JR Judy Leach, OT Occupational Therapist    ANDREWS Juárez, PT Physical Therapist              Time Calculation        PT Charges       04/21/17 0800          Time Calculation    Start Time 0800  -      PT Goal Re-Cert Due Date 04/30/17  -      Time Calculation- PT    Total Timed Code Minutes- PT 25 minute(s)  -         User Key  (r) = Recorded By, (t) = Taken By, (c) = Cosigned By    Initials Name Provider Type     Pee Roldan, PT Physical Therapist             Therapy Charges for Today     Code Description Service Date Service Provider Modifiers Qty    02300024688 HC ROSEANNA DEBRIDE OPEN WOUND UP TO 20CM 4/20/2017 Pee Roldan, PT GP 1    [id number] HC ROSEANNA DEBRIDE OPEN WOUND UP TO 20CM 4/21/2017 Pee Roldan, PT GP 1            PT G-Codes  Outcome Measure Options: AM-PAC 6 Clicks Basic Mobility (PT)        Pee Roldan, PT  4/21/2017

## 2017-04-21 NOTE — BRIEF OP NOTE
ESOPHAGOGASTRODUODENOSCOPY WITH PERCUTANEOUS ENDOSCOPIC GASTROSTOMY TUBE INSERTION AT BEDSIDE  Procedure Note    Tomas Salvador  4/4/2017 - 4/21/2017    Pre-op Diagnosis:   Feeding difficulty    Post-op Diagnosis:     Same    Procedure/CPT® Codes:      Procedure(s):  ESOPHAGOGASTRODUODENOSCOPY WITH PERCUTANEOUS ENDOSCOPIC GASTROSTOMY TUBE INSERTION AT BEDSIDE    Surgeon(s):  Satya Fall MD    Anesthesia: Sedation    Staff:   * No surgical staff found *    Estimated Blood Loss: * No values recorded between 4/21/2017 12:00 AM and 4/21/2017  8:14 AM *  Urine Voided: * No values recorded between 4/21/2017 12:00 AM and 4/21/2017  8:14 AM *    Specimens:                * No specimens in log *      Drains:   Naso/Oral/Gastric Tube 04/13/17 1430 nasoenteric feeding tube right nostril (Active)   Advancement advanced by fluoroscopic guidance 4/14/2017  8:00 AM   Placement Check Methods aspirate characteristics assessed 4/20/2017  8:00 AM   Tolerance no adverse signs/symptoms 4/21/2017  6:00 AM   Securement nasal septal tie 4/21/2017  6:00 AM   Clamp Status/Tolerance unclamped;no emesis;no nausea;no abdominal discomfort;no abdominal distention 4/21/2017  6:00 AM   Insertion Site Appearance no redness;no skin breakdown;no drainage 4/21/2017  6:00 AM   Flush/Irrigation flushed with;sterile water 4/20/2017  8:00 PM   Flush/ Irrigation Intake (mL) 201 4/20/2017 10:00 PM   Tubing Change 04/20/17 4/20/2017  2:00 AM   Tube Feeding Product Impact Peptide 1.5 tom 4/20/2017  8:00 PM   Interventions Prior to Feeding (Enterostomy Tube) patency checked 4/20/2017  8:00 PM   Feeding Type continuous;by pump 4/21/2017  6:00 AM   Feeding Action feeding held 4/21/2017  6:00 AM   Tube Feeding Rate (mL) 65 mL 4/21/2017 12:00 AM   Tube Feeding Residual (mL) 10 mL 4/19/2017  8:00 PM   Tube Feeding Residual Returned (mL) 10 mL 4/19/2017  8:00 PM   Tube Feeding Intake (mL) 122 4/21/2017 12:00 AM   Intake (mL) 100 4/21/2017 12:00 AM        Urethral Catheter 04/11/17 1313 100% silicone 10 10 (Active)   Daily Indications Acute retention 4/21/2017  6:00 AM   Securement secured to upper leg with adhesive device 4/21/2017  6:00 AM   Catheter care done Yes 4/21/2017  4:00 AM   Tolerance no signs/symptoms of discomfort 4/21/2017  6:00 AM   Intake (mL) 300 4/16/2017 12:00 PM   Urine Output (mL) 450 4/21/2017  6:00 AM       [REMOVED] Drain/Device Site 03/31/17 1055 lower scrotum [place] (Removed)   Removed 04/08/17 1135   Insertion Site drainage 4/8/2017 10:00 AM   Drainage Characteristics/Odor serosanguineous;yellow 4/8/2017 10:00 AM   Drainage Amount small 4/6/2017  6:00 AM   General output (mL) 0 4/8/2017  2:00 PM       [REMOVED] Naso/Oral/Gastric Tube 04/05/17 1200 [place] sump right nostril (Removed)   Removed 04/06/17 2000   Tolerance no adverse signs/symptoms 4/6/2017  8:00 PM   Securement anchored to nostril center with adhesive device 4/6/2017  8:00 PM   Clamp Status/Tolerance clamped 4/6/2017  8:00 PM   Insertion Site Appearance no redness;no warmth;no tenderness;no skin breakdown;no drainage 4/6/2017  8:00 PM   Flush/Irrigation flushed with;sterile water 4/6/2017  8:00 AM   Flush/ Irrigation Intake (mL) 60 4/5/2017 10:00 PM       [REMOVED] Naso/Oral/Gastric Tube 04/10/17 1040 small diameter;nasoenteric feeding tube left nostril (Removed)   Removed 04/11/17 2130   Advancement other (see comments) 4/10/2017 10:40 AM   Tube Advanced (cm) 100 4/10/2017 10:40 AM   Placement Check Methods other (see comments) 4/11/2017  8:00 PM   Tolerance no adverse signs/symptoms 4/11/2017  8:00 PM   Securement anchored to nostril center with adhesive device 4/11/2017  8:00 PM   Clamp Status/Tolerance clamped;no abdominal discomfort;no abdominal distention;no emesis;no nausea;no restlessness 4/11/2017  8:00 PM       [REMOVED] Naso/Oral/Gastric Tube 04/11/17 2200 small diameter;nasoenteric feeding tube left nostril (Removed)   Removed 04/12/17 0700   Advancement advanced  manually;advanced by patient repositioning 4/12/2017  2:00 AM   Placement Check Methods x-ray confirmation* 4/11/2017 10:00 PM   Tolerance no adverse signs/symptoms 4/12/2017  6:00 AM   Securement anchored to nostril center with adhesive device 4/12/2017  6:00 AM   Clamp Status/Tolerance clamped;no restlessness;no residual;no emesis;no abdominal distention;no abdominal discomfort 4/12/2017  6:00 AM       [REMOVED] Naso/Oral/Gastric Tube 04/12/17 1700 nasoenteric feeding tube right nostril (Removed)   Removed 04/13/17 1430   Tolerance no adverse signs/symptoms 4/13/2017  8:00 PM   Securement anchored to nostril center with adhesive device 4/13/2017  8:00 PM   Clamp Status/Tolerance clamped;no nausea;no emesis 4/13/2017  8:00 PM   Intake (mL) 180 4/12/2017 10:00 PM       [REMOVED] Urethral Catheter 02/24/17 2330 100% silicone 16 10 (Removed)   Removed 03/30/17 1840       [REMOVED] Urethral Catheter 03/30/17 1901 100% silicone 16 10 10 (Removed)   Removed 04/08/17 0600   Daily Indications Monitoring of strict I &O 4/7/2017  8:00 AM   Securement secured to upper leg with adhesive device 4/8/2017  4:00 AM   Foreskin uncircumcised: foreskin replaced over glans 4/6/2017 10:00 PM   Catheter care done Yes 4/7/2017 10:00 PM   Tolerance no signs/symptoms of discomfort 4/7/2017  8:00 PM   Intake (mL) 300 4/8/2017  4:00 AM   Urine Output (mL) 100 4/7/2017  6:00 PM       Findings: 20 Fr PEG at 5 cm level    Complications: None      Satya Fall MD     Date: 4/21/2017  Time: 8:14 AM

## 2017-04-21 NOTE — PLAN OF CARE
Problem: Patient Care Overview (Adult)  Goal: Plan of Care Review  Outcome: Ongoing (interventions implemented as appropriate)    Problem: Stroke (Ischemic) (Adult)  Goal: Signs and Symptoms of Listed Potential Problems Will be Absent or Manageable (Stroke)  Outcome: Ongoing (interventions implemented as appropriate)    Problem: Fall Risk (Adult)  Goal: Identify Related Risk Factors and Signs and Symptoms  Outcome: Ongoing (interventions implemented as appropriate)    04/21/17 0642   Fall Risk   Fall Risk: Related Risk Factors age-related changes;confusion/agitation;culprit medication(s);depression/anxiety;gait/mobility problems;neuro disease/injury;polypharmacy;sensory deficits   Fall Risk: Signs and Symptoms presence of risk factors       Goal: Absence of Falls  Outcome: Ongoing (interventions implemented as appropriate)    Problem: Skin Integrity Impairment, Risk/Actual (Adult)  Goal: Identify Related Risk Factors and Signs and Symptoms  Outcome: Ongoing (interventions implemented as appropriate)    04/21/17 0642   Skin Integrity Impairment, Risk/Actual   Skin Integrity Impairment, Risk/Actual: Related Risk Factors age extremes;cognitive impairment   Signs and Symptoms (Skin Integrity Impairment) edema;inflammation       Goal: Skin Integrity/Wound Healing  Outcome: Ongoing (interventions implemented as appropriate)    Problem: Infection, Risk/Actual (Adult)  Goal: Identify Related Risk Factors and Signs and Symptoms  Outcome: Ongoing (interventions implemented as appropriate)  Goal: Infection Prevention/Resolution  Outcome: Ongoing (interventions implemented as appropriate)    Problem: SAFETY - NON-VIOLENT RESTRAINT  Goal: Remains free of injury from restraints (Non-Violent Restraint)  Outcome: Ongoing (interventions implemented as appropriate)  Goal: Free from restraint(s) (Non-Violent Restraint)  Outcome: Ongoing (interventions implemented as appropriate)    Problem: NPPV/CPAP (Adult)  Goal: Signs and Symptoms  of Listed Potential Problems Will be Absent or Manageable (NPPV/CPAP)  Outcome: Ongoing (interventions implemented as appropriate)    04/21/17 0642   NPPV/CPAP   Problems Assessed (NPPV/CPAP) all   Problems Present (NPPV/CPAP) none

## 2017-04-22 NOTE — PROGRESS NOTES
Acute Care - Occupational Therapy Treatment Note  [facility]     Patient Name: Tomas Salvador  : 1955  MRN: [id number]  Today's Date: 2017  Onset of Illness/Injury or Date of Surgery Date: 17  Date of Referral to OT: 17  Referring Physician: MD Jimenez      Admit Date: 2017    Visit Dx:     ICD-10-CM ICD-9-CM   1. Dysphagia, unspecified type R13.10 787.20   2. Impaired functional mobility, balance, gait, and endurance Z74.09 V49.89   3. Impaired mobility and ADLs Z74.09 799.89   4. Cognitive communication deficit R41.841 799.52     Patient Active Problem List   Diagnosis   • Coronary disease   • Ischemic cardiomyopathy with EF 10% on Echo 17   • Peripheral vascular disease   • ICD in place, implantation .   • Dyslipidemia, on atorvastain.    • S/P Sepsis due to  source 3/30/17   • CVA 17 with rapid improvement   • Diabetes Mellitus HgbA1c 10.4   • Chronic hypoxic respiratory failure on nocturnal o2   • CAD s/p CABG , [facility] . Grafts patent except SVG to diagonal   • Urinary retention requiring colbert   • Hyperlipidemia   • Essential hypertension   • Hx of PVD (peripheral vascular disease)   • Recent E. coli UTI now off therapy   • Epididymo-orchitis   • Encephalopathy acute   • [place] gangrene with E-Coli Cellulitis. Debrided in [place] 3/30/17   • Candida glabrata UTI. Indwelling Colbert catheter   • Atrial fibrillation but now converted. Still on Eliquis             Adult Rehabilitation Note       17 1120 17 0800 17 0955    Rehab Assessment/Intervention    Discipline occupational therapist  -JR physical therapist  -MF physical therapist  -MF    Document Type therapy note (daily note)  -JR therapy note (daily note)  -MF therapy note (daily note)  -MF    Subjective Information no complaints;agree to therapy  -JR decreased LOC   pt sedated  -MF no complaints;agree to therapy  -MF    Patient Effort, Rehab Treatment fair  -JR      Symptoms Noted  During/After Treatment none  -JR      Symptoms Noted Comment RN agreed to exercises in bed only this date  -JR      Precautions/Limitations fall precautions;oxygen therapy device and L/min   decreased skin integrity to scrotum, PEG  -JR      Recorded by [JR] Judy Leach, OT [] Pee Roldan, PT [] Pee Roldan, PT    Vital Signs    Pre Systolic BP Rehab 90  -JR      Pre Treatment Diastolic BP 57  -JR      Post Systolic BP Rehab 79  -JR      Post Treatment Diastolic BP 55  -JR      Pretreatment Heart Rate (beats/min) 76  -JR      Posttreatment Heart Rate (beats/min) 75  -JR      Pre SpO2 (%) 100  -JR      O2 Delivery Pre Treatment supplemental O2   5L  -JR      Post SpO2 (%) 100  -JR      O2 Delivery Post Treatment supplemental O2  -JR      Pre Patient Position Supine  -JR      Intra Patient Position Supine  -JR      Post Patient Position Supine  -JR      Recorded by [JR] Judy Leach, OT      Pain Assessment    Pain Assessment Jay-Kent FACES  -JR Jay-Kent FACES  -MF Jay-Baker FACES  -MF    Jay-Baker FACES Pain Rating 0  -JR 0  -MF 0  -MF    Pain Intervention(s)   Repositioned  -MF    Recorded by [JR] Judy Leach, OT [MF] Pee Roldan, PT [] Pee Roldan, PT    Cognitive Assessment/Intervention    Current Cognitive/Communication Assessment impaired  -JR  impaired  -MF    Orientation Status oriented to;place  -JR      Follows Commands/Answers Questions 25% of the time;able to follow single-step instructions;needs cueing;needs increased time;needs repetition  -JR      Personal Safety severe impairment;decreased awareness, need for assist;decreased awareness, need for safety;decreased insight to deficits  -JR      Recorded by [JR] Judy Leach, OT  [] Pee Roldan, PT    Therapy Exercises    Bilateral Upper Extremity PROM:;AAROM:;10 reps;elbow flexion/extension;pronation/supination;shoulder abduction/adduction;shoulder extension/flexion  -JR      Recorded by [JR]  Judy Leach, OT      Positioning and Restraints    Pre-Treatment Position in bed  -JR in bed  -MF in bed  -MF    Post Treatment Position bed  -JR bed  -MF bed  -MF    In Bed notified nsg;supine;call light within reach;encouraged to call for assist;exit alarm on;RUE elevated;LUE elevated  -JR supine;call light within reach;notified nsg  -MF supine;call light within reach  -MF    Restraints released:;reapplied:;notified nsg:;soft limb;mitten  -JR      Recorded by [JR] Judy Leach, OT [MF] Pee Roldan, PT [MF] Pee Roldan, PT      User Key  (r) = Recorded By, (t) = Taken By, (c) = Cosigned By    Initials Name Effective Dates     Pee Roldan, PT 06/19/15 -     JR Judy Leach, OT 06/22/15 -                 OT Goals       04/22/17 1138 04/20/17 1113 04/11/17 1343    Bed Mobility OT LTG    Bed Mobility OT LTG, Date Established  04/20/17  -JR     Bed Mobility OT LTG, Time to Achieve  1 wk  -JR     Bed Mobility OT LTG, Activity Type  all bed mobility  -JR     Bed Mobility OT LTG, [place] Level  moderate assist (50% patient effort);2 person assist required  -JR     Bed Mobility OT LTG, Outcome goal ongoing  -JR  goal not met  -JR    Bed Mobility OT LTG, Reason Goal Not Met   medical status inhibits participation  -JR    Transfer Training OT LTG    Transfer Training OT LTG, Date Established  04/20/17  -JR     Transfer Training OT LTG, Time to Achieve  1 wk  -JR     Transfer Training OT LTG, Activity Type  all transfers  -JR     Transfer Training OT LTG, [place] Level  moderate assist (50% patient effort);2 person assist required  -JR     Transfer Training OT LTG, Outcome goal ongoing  -JR  goal not met  -JR    Transfer Training OT LTG, Reason Goal Not Met   medical status inhibits participation  -JR    Orientation OT LTG    Orientation OT LTG, Date Established  04/20/17  -JR     Orientation OT LTG, Time to Achieve  1 wk  -JR     Orientation OT LTG, Activity Type  person;place;50%  treatment session  -JR     Orientation OT LTG, Outcome goal ongoing  -JR  goal not met  -JR    Orientation OT LTG, Reason Goal Not Met   medical status inhibits participation  -JR    Follow Directions OT LTG    Follow Directions OT LTG, Date Established  04/20/17  -JR     Follow Directions OT LTG, Time to Achieve  1 wk  -JR     Follow Directions OT LTG, Activity Type  1-Step;50% treatment session  -JR     Follow Directions OT LTG, [place] Level  with verbal cues  -JR     Follow Directions OT LTG, Outcome goal ongoing  -JR  goal not met  -JR    Follow Directions OT LTG, Reason Goal Not Met   medical status inhibits participation  -JR      04/09/17 1446          Bed Mobility OT LTG    Bed Mobility OT LTG, Outcome goal ongoing   Max A x 2 sup>sit; Dep x 2 sit>supine this date  -TA      Transfer Training OT LTG    Transfer Training OT LTG, Outcome goal ongoing   Progressing  -TA      Orientation OT LTG    Orientation OT LTG, Outcome goal ongoing  -TA      Follow Directions OT LTG    Follow Directions OT LTG, Outcome goal ongoing   50% this date; continue for consistency  -TA        User Key  (r) = Recorded By, (t) = Taken By, (c) = Cosigned By    Initials Name Provider Type    JR Judy Leach, OT Occupational Therapist    CHU Jenkins, OT Occupational Therapist          Occupational Therapy Education     Title: PT OT SLP Therapies (Active)     Topic: Occupational Therapy (Active)     Point: ADL training (Active)    Description: Instruct learner(s) on proper safety adaptation and remediation techniques during self care or transfers.   Instruct in proper use of assistive devices.    Learning Progress Summary    Learner Readiness Method Response Comment Documented by Status   Patient Acceptance E NR Educated on safe bed mobility and transfer techniques as well as UE HEP. JR 04/20/17 1112 Active    Acceptance E VU  AP 04/18/17 0557 Done    Acceptance E NR Educated pt on safe bed mobility and transfer  techniques.  04/05/17 0903 Active   Family Acceptance E VU  AP 04/18/17 0557 Done               Point: Home exercise program (Active)    Description: Instruct learner(s) on appropriate technique for monitoring, assisting and/or progressing therapeutic exercises/activities.    Learning Progress Summary    Learner Readiness Method Response Comment Documented by Status   Patient Acceptance E NR Educated on UE HEP JR 04/22/17 1137 Active    Acceptance E NR Educated on safe bed mobility and transfer techniques as well as UE HEP.  04/20/17 1112 Active    Acceptance E VU  AP 04/18/17 0557 Done    Acceptance E,D VU,NR Body mechanics with bed mobility/fxl transfers; BUE HEP; reinforced need for call for assist with OOB activities. TA 04/09/17 1445 Done    Acceptance E NR Educated pt and family regarding UE HEP  04/06/17 1521 Active   Family Acceptance E VU  AP 04/18/17 0557 Done    Acceptance E NR Educated pt and family regarding UE HEP  04/06/17 1521 Active   Significant Other Acceptance E,D VU,NR Body mechanics with bed mobility/fxl transfers; BUE HEP; reinforced need for call for assist with OOB activities. TA 04/09/17 1445 Done               Point: Precautions (Resolved)    Description: Instruct learner(s) on prescribed precautions during self-care and functional transfers.    Learning Progress Summary    Learner Readiness Method Response Comment Documented by Status   Patient Acceptance E VU   04/11/17 0216 Done    Acceptance E,D VU,NR Body mechanics with bed mobility/fxl transfers; BUE HEP; reinforced need for call for assist with OOB activities. TA 04/09/17 1445 Done   Significant Other Acceptance E,D VU,NR Body mechanics with bed mobility/fxl transfers; BUE HEP; reinforced need for call for assist with OOB activities. TA 04/09/17 1445 Done               Point: Body mechanics (Resolved)    Description: Instruct learner(s) on proper positioning and spine alignment during self-care, functional mobility  activities and/or exercises.    Learning Progress Summary    Learner Readiness Method Response Comment Documented by Status   Patient Acceptance E CL[place]   04/11/17 0216 Done    Acceptance E,D VU,NR Body mechanics with bed mobility/fxl transfers; BUE HEP; reinforced need for call for assist with OOB activities.  04/09/17 1445 Done   Significant Other Acceptance E,D VU,NR Body mechanics with bed mobility/fxl transfers; BUE HEP; reinforced need for call for assist with OOB activities. TA 04/09/17 1445 Done                      User Key     Initials Effective Dates Name Provider Type Discipline     06/22/15 -  Judy Leach, OT Occupational Therapist OT    TA 03/14/16 -  David Jenkins, OT Occupational Therapist OT     06/16/16 -  Alissa Urbano, RN Registered Nurse Nurse    AP 06/16/16 -  Shahla Sherman, RN Registered Nurse Nurse                  OT Recommendation and Plan  Anticipated Equipment Needs At Discharge:  (tba further)  Anticipated Discharge Disposition: skilled nursing facility  Planned Therapy Interventions: ADL retraining, balance training, bed mobility training, strengthening, transfer training  Therapy Frequency: daily  Plan of Care Review  Plan Of Care Reviewed With: patient  Progress: unable to show any progress toward functional goals  Outcome Summary/Follow up Plan: Limited treatment session due to decreased medical status. Recommend continued skilled OT services to assist in returning pt to his PLOF. Recommend SNF at d/c.        Outcome Measures       04/22/17 1120 04/20/17 1030 04/20/17 1029    How much help from another person do you currently need...    Turning from your back to your side while in flat bed without using bedrails?  2  -LS     Moving from lying on back to sitting on the side of a flat bed without bedrails?  2  -LS     Moving to and from a bed to a chair (including a wheelchair)?  2  -LS     Standing up from a chair using your arms (e.g., wheelchair, bedside  chair)?  2  -LS     Climbing 3-5 steps with a railing?  1  -LS     To walk in hospital room?  1  -LS     AM-PAC 6 Clicks Score  10  -LS     How much help from another is currently needed...    Putting on and taking off regular lower body clothing? 1  -JR  1  -JR    Bathing (including washing, rinsing, and drying) 1  -JR  2  -JR    Toileting (which includes using toilet bed pan or urinal) 1  -JR  1  -JR    Putting on and taking off regular upper body clothing 1  -JR  2  -JR    Taking care of personal grooming (such as brushing teeth) 1  -JR  2  -JR    Eating meals 1  -JR  1  -JR    Score 6  -JR  9  -JR    Modified Sibley Scale    Modified Sibley Scale 4 - Moderately severe disability.  Unable to walk without assistance, and unable to attend to own bodily needs without assistance.  -JR 4 - Moderately severe disability.  Unable to walk without assistance, and unable to attend to own bodily needs without assistance.  -LS 4 - Moderately severe disability.  Unable to walk without assistance, and unable to attend to own bodily needs without assistance.  -JR    Functional Assessment    Outcome Measure Options AM-PAC 6 Clicks Daily Activity (OT);Modified Sibley  -JR AM-PAC 6 Clicks Basic Mobility (PT)  -LS AM-PAC 6 Clicks Daily Activity (OT);Modified Sibley  -JR      User Key  (r) = Recorded By, (t) = Taken By, (c) = Cosigned By    Initials Name Provider Type    JR Judy Leach, OT Occupational Therapist    ANDREWS Juárez, PT Physical Therapist           Time Calculation:         Time Calculation- OT       04/22/17 1140          Time Calculation- OT    OT Start Time 1120  -      Total Timed Code Minutes- OT 9 minute(s)  -      OT Received On 04/22/17  -        User Key  (r) = Recorded By, (t) = Taken By, (c) = Cosigned By    Initials Name Provider Type    JR Judy Leach, OT Occupational Therapist           Therapy Charges for Today     Code Description Service Date Service Provider Modifiers Qty     [id number]  OT THERAPEUTIC ACT EA 15 MIN 4/22/2017 Judy MCCORMICK Hany, OT GO 1               Judy COTO Hany, OT  4/22/2017

## 2017-04-22 NOTE — PLAN OF CARE
Problem: Patient Care Overview (Adult)  Goal: Plan of Care Review  Outcome: Ongoing (interventions implemented as appropriate)    04/22/17 1138   Coping/Psychosocial Response Interventions   Plan Of Care Reviewed With patient   Patient Care Overview   Progress unable to show any progress toward functional goals   Outcome Evaluation   Outcome Summary/Follow up Plan Limited treatment session due to decreased medical status. Recommend continued skilled OT services to assist in returning pt to his PLOF. Recommend SNF at d/c.         Problem: Stroke (Ischemic) (Adult)  Goal: Signs and Symptoms of Listed Potential Problems Will be Absent or Manageable (Stroke)  Outcome: Ongoing (interventions implemented as appropriate)    04/22/17 1138   Stroke (Ischemic)   Problems Assessed (Stroke (Ischemic)/TIA) motor/sensory impairment;cognitive impairment;communication impairment   Problems Present (Stroke (Ischemic)/TIA) cognitive impairment;communication impairment;motor/sensory impairment         Problem: Inpatient Occupational Therapy  Goal: Bed Mobility Goal LTG- OT  Outcome: Ongoing (interventions implemented as appropriate)    04/11/17 1343 04/20/17 1113 04/22/17 1138   Bed Mobility OT LTG   Bed Mobility OT LTG, Date Established --  04/20/17 --    Bed Mobility OT LTG, Time to Achieve --  1 wk --    Bed Mobility OT LTG, Activity Type --  all bed mobility --    Bed Mobility OT LTG, [place] Level --  moderate assist (50% patient effort);2 person assist required --    Bed Mobility OT LTG, Outcome --  --  goal ongoing   Bed Mobility OT LTG, Reason Goal Not Met medical status inhibits participation --  --        Goal: Transfer Training Goal 1 LTG- OT  Outcome: Ongoing (interventions implemented as appropriate)    04/11/17 1343 04/20/17 1113 04/22/17 1138   Transfer Training OT LTG   Transfer Training OT LTG, Date Established --  04/20/17 --    Transfer Training OT LTG, Time to Achieve --  1 wk --    Transfer Training OT LTG,  Activity Type --  all transfers --    Transfer Training OT LTG, [place] Level --  moderate assist (50% patient effort);2 person assist required --    Transfer Training OT LTG, Outcome --  --  goal ongoing   Transfer Training OT LTG, Reason Goal Not Met medical status inhibits participation --  --        Goal: Orientation Goal LTG- OT  Outcome: Ongoing (interventions implemented as appropriate)    04/11/17 1343 04/20/17 1113 04/22/17 1138   Orientation OT LTG   Orientation OT LTG, Date Established --  04/20/17 --    Orientation OT LTG, Time to Achieve --  1 wk --    Orientation OT LTG, Activity Type --  person;place;50% treatment session --    Orientation OT LTG, Outcome --  --  goal ongoing   Orientation OT LTG, Reason Goal Not Met medical status inhibits participation --  --        Goal: Follow Directions Goal LTG- OT  Outcome: Ongoing (interventions implemented as appropriate)    04/11/17 1343 04/20/17 1113 04/22/17 1138   Follow Directions OT LTG   Follow Directions OT LTG, Date Established --  04/20/17 --    Follow Directions OT LTG, Time to Achieve --  1 wk --    Follow Directions OT LTG, Activity Type --  1-Step;50% treatment session --    Follow Directions OT LTG, [place] Level --  with verbal cues --    Follow Directions OT LTG, Outcome --  --  goal ongoing   Follow Directions OT LTG, Reason Goal Not Met medical status inhibits participation --  --

## 2017-04-22 NOTE — PROGRESS NOTES
Acute Care - Physical Therapy Treatment Note  [facility]     Patient Name: Tomas Salvador  : 1955  MRN: [id number]  Today's Date: 2017  Onset of Illness/Injury or Date of Surgery Date: 17  Date of Referral to PT: 17  Referring Physician: MD Jimenez    Admit Date: 2017    Visit Dx:    ICD-10-CM ICD-9-CM   1. Dysphagia, unspecified type R13.10 787.20   2. Impaired functional mobility, balance, gait, and endurance Z74.09 V49.89   3. Impaired mobility and ADLs Z74.09 799.89   4. Cognitive communication deficit R41.841 799.52     Patient Active Problem List   Diagnosis   • Coronary disease   • Ischemic cardiomyopathy with EF 10% on Echo 17   • Peripheral vascular disease   • ICD in place, implantation .   • Dyslipidemia, on atorvastain.    • S/P Sepsis due to  source 3/30/17   • CVA 17 with rapid improvement   • Diabetes Mellitus HgbA1c 10.4   • Chronic hypoxic respiratory failure on nocturnal o2   • CAD s/p CABG , [facility] . Grafts patent except SVG to diagonal   • Urinary retention requiring colbert   • Hyperlipidemia   • Essential hypertension   • Hx of PVD (peripheral vascular disease)   • Recent E. coli UTI now off therapy   • Epididymo-orchitis   • Encephalopathy acute   • [place] gangrene with E-Coli Cellulitis. Debrided in [place] 3/30/17   • Candida glabrata UTI. Indwelling Colbert catheter   • Atrial fibrillation but now converted. Still on Eliquis               Adult Rehabilitation Note       17 1140 17 1120 17 0800    Rehab Assessment/Intervention    Discipline physical therapist  -SJ occupational therapist  -JR physical therapist  -MF    Document Type therapy note (daily note)  -SJ therapy note (daily note)  -JR therapy note (daily note)  -MF    Subjective Information no complaints;agree to therapy  -SJ no complaints;agree to therapy  -JR decreased LOC   pt sedated  -MF    Patient Effort, Rehab Treatment fair  -SJ fair  -JR     Symptoms Noted  During/After Treatment none  -SJ none  -JR     Symptoms Noted Comment  RN agreed to exercises in bed only this date  -JR     Precautions/Limitations fall precautions;swallowing precautions;oxygen therapy device and L/min  -SJ fall precautions;oxygen therapy device and L/min   decreased skin integrity to scrotum, PEG  -JR     Specific Treatment Considerations RN requested bed level-therex only today   Pt with low BP, decr resp status this a.m.  -SJ      Recorded by [SJ] Cinthia Baptiste PT [JR] Judy Leach, OT [MF] Pee Roldan, PT    Vital Signs    Pre Systolic BP Rehab 79  -SJ 90  -JR     Pre Treatment Diastolic BP 55  -SJ 57  -JR     Post Systolic BP Rehab  79  -JR     Post Treatment Diastolic BP  55  -JR     Pretreatment Heart Rate (beats/min) 78  -SJ 76  -JR     Posttreatment Heart Rate (beats/min) 77  -SJ 75  -JR     Pre SpO2 (%) 100  -  -JR     O2 Delivery Pre Treatment supplemental O2   5L  -SJ supplemental O2   5L  -JR     Post SpO2 (%) 100  -  -JR     O2 Delivery Post Treatment supplemental O2   5L  -SJ supplemental O2  -JR     Pre Patient Position Supine  -SJ Supine  -JR     Intra Patient Position Supine  -SJ Supine  -JR     Post Patient Position Supine  -SJ Supine  -JR     Recorded by [SJ] Cinthia Baptiste PT [JR] Judy Leach, OT     Pain Assessment    Pain Assessment No/denies pain  -SJ Jay-Kent FACES  -JR Jay-Kent FACES  -MF    Jay-Baker FACES Pain Rating 0  -SJ 0  -JR 0  -MF    Pain Score 0  -SJ      Post Pain Score 0  -SJ      Recorded by [SJ] Cinthia Baptiste PT [JR] Judy Leach, OT [MF] Pee Roldan, PT    Cognitive Assessment/Intervention    Current Cognitive/Communication Assessment impaired  -SJ impaired  -JR     Orientation Status disoriented x 4  -SJ oriented to;place  -JR     Follows Commands/Answers Questions 25% of the time;able to follow single-step instructions;needs cueing;needs increased time;needs repetition;speech unintelligible  -SJ 25% of the  time;able to follow single-step instructions;needs cueing;needs increased time;needs repetition  -     Personal Safety severe impairment;unaware of cognitive deficits;unaware of consequences of deficits;unaware of functional deficits  - severe impairment;decreased awareness, need for assist;decreased awareness, need for safety;decreased insight to deficits  -     Recorded by [SJ] Cinthia Baptiste PT [JR] Judy Leach, OT     Therapy Exercises    Bilateral Lower Extremities AAROM:;20 reps;supine;ankle pumps/circles;calf stretch;hip abduction/adduction;hip ER;hip IR;heel slides  -      Bilateral Upper Extremity  PROM:;AAROM:;10 reps;elbow flexion/extension;pronation/supination;shoulder abduction/adduction;shoulder extension/flexion  -     Recorded by [SJ] Cinthia Baptiste PT [JR] Judy Leach, OT     Positioning and Restraints    Pre-Treatment Position in bed  - in bed  - in bed  -    Post Treatment Position bed  - bed  - bed  -    In Bed fowlers;call light within reach;encouraged to call for assist;heels elevated  - notified nsg;supine;call light within reach;encouraged to call for assist;exit alarm on;RUE elevated;LUE elevated  - supine;call light within reach;notified nsg  -    Restraints  released:;reapplied:;notified nsg:;soft limb;mitten  -     Recorded by [SJ] Cinthia Baptiste, PT [JR] Judy Leach, OT [] Pee Roldan, PT      04/20/17 0955          Rehab Assessment/Intervention    Discipline physical therapist  -      Document Type therapy note (daily note)  -      Subjective Information no complaints;agree to therapy  -      Recorded by [MF] Pee Roldan, PT      Pain Assessment    Pain Assessment Jay-Baker FACES  -      Jay-Baker FACES Pain Rating 0  -      Pain Intervention(s) Repositioned  -      Recorded by [MF] Pee Roldan, PT      Cognitive Assessment/Intervention    Current Cognitive/Communication Assessment impaired  -       Recorded by [MF] Pee Roldan PT      Positioning and Restraints    Pre-Treatment Position in bed  -MF      Post Treatment Position bed  -MF      In Bed supine;call light within reach  -MF      Recorded by [MF] Pee Roldan PT        User Key  (r) = Recorded By, (t) = Taken By, (c) = Cosigned By    Initials Name Effective Dates     Pee Roldan, PT 06/19/15 -     SJ Cinthia Baptiste, PT 06/19/15 -     JR Judy Leach, OT 06/22/15 -                 IP PT Goals       04/22/17 1153 04/20/17 1118 04/20/17 1115    Bed Mobility PT LTG    Bed Mobility PT LTG, [place] Level  minimum assist (75% patient effort)  -LS     Bed Mobility PT LTG, Date Goal Reviewed  04/20/17  -LS     Bed Mobility PT LTG, Outcome goal ongoing  -SJ goal revised  -LS     Transfer Training PT LTG    Transfer Training PT LTG, [place] Level  minimum assist (75% patient effort)  -LS     Transfer Training PT  LTG, Date Goal Reviewed  04/20/17  -LS     Transfer Training PT LTG, Outcome goal ongoing  -SJ goal revised  -LS     Gait Training PT LTG    Gait Training Goal PT LTG, [place] Level   minimum assist (75% patient effort);2 person assist required  -LS    Gait Training Goal PT LTG, Distance to Achieve   100  -LS    Gait Training Goal PT LTG, Date Goal Reviewed   04/20/17  -LS    Gait Training Goal PT LTG, Outcome goal ongoing  -SJ  goal revised  -LS      04/19/17 0835 04/16/17 1450 04/15/17 1455    Wound Care PT LTG    Wound Care PT LTG 1, Outcome goal ongoing  -MC goal ongoing  -MC goal ongoing  -MC      04/14/17 1120 04/11/17 1015 04/09/17 1551    Bed Mobility PT LTG    Bed Mobility PT LTG, Outcome  goal not met  -LS goal ongoing  -DR    Bed Mobility PT LTG, Reason Goal Not Met  medical status inhibits participation  -LS     Transfer Training PT LTG    Transfer Training PT LTG, Outcome  goal not met  -LS goal ongoing  -DR    Transfer Training PT LTG, Reason Goal Not Met  medical status inhibits participation  -LS      Gait Training PT LTG    Gait Training Goal PT LTG, Outcome  goal not met  -LS goal ongoing  -DR    Gait Training Goal PT LTG, Reason Goal Not Met  medical status inhibits participation  -LS     Wound Care PT LTG    Wound Care PT LTG 1, Outcome goal ongoing  -        User Key  (r) = Recorded By, (t) = Taken By, (c) = Cosigned By    Initials Name Provider Type    SJ Cinthia Baptiste, PT Physical Therapist    LS Lily Juárez, PT Physical Therapist    DUNCAN Mcgarry, PT Physical Therapist    DR Mikey Moore, PT Physical Therapist          Physical Therapy Education     Title: PT OT SLP Therapies (Active)     Topic: Physical Therapy (Active)     Point: Mobility training (Active)    Learning Progress Summary    Learner Readiness Method Response Comment Documented by Status   Patient Acceptance E NR,NL   04/22/17 1155 Active    Acceptance E,D NR   04/20/17 1114 Active    Acceptance E VU  AP 04/18/17 0557 Done    Acceptance E NR   04/09/17 1551 Active    Acceptance E NR  EDOUARD 04/07/17 1534 Active    Acceptance E,D NR   04/06/17 1523 Active    Acceptance E,D NR   04/05/17 0908 Active   Family Acceptance E VU  AP 04/18/17 0557 Done   Significant Other Acceptance E,D NR   04/06/17 1523 Active    Acceptance E,D NR   04/05/17 0908 Active               Point: Home exercise program (Active)    Learning Progress Summary    Learner Readiness Method Response Comment Documented by Status   Patient Acceptance E NR,NL   04/22/17 1155 Active    Acceptance E,D NR   04/20/17 1114 Active    Acceptance E VU  AP 04/18/17 0557 Done    Acceptance E NR  EDOUARD 04/07/17 1534 Active    Acceptance E,D NR   04/06/17 1523 Active   Family Acceptance E VU  AP 04/18/17 0557 Done   Significant Other Acceptance E,D NR   04/06/17 1523 Active               Point: Body mechanics (Active)    Learning Progress Summary    Learner Readiness Method Response Comment Documented by Status   Patient Acceptance E NR,NL   04/22/17 1155  Active    Acceptance E,D NR   04/20/17 1114 Active    Acceptance E VU  AP 04/18/17 0557 Done    Acceptance E NR   04/09/17 1551 Active    Acceptance E NR  EDOUARD 04/07/17 1534 Active    Acceptance E,D NR   04/06/17 1523 Active    Acceptance E,D NR   04/05/17 0908 Active   Family Acceptance E VU  AP 04/18/17 0557 Done   Significant Other Acceptance E,D NR   04/06/17 1523 Active    Acceptance E,D NR   04/05/17 0908 Active               Point: Precautions (Active)    Learning Progress Summary    Learner Readiness Method Response Comment Documented by Status   Patient Acceptance E NR,NL   04/22/17 1155 Active    Acceptance E,D NR   04/20/17 1114 Active    Acceptance E VU  AP 04/18/17 0557 Done    Acceptance E NR   04/09/17 1551 Active    Acceptance E NR  EDOUARD 04/07/17 1534 Active    Acceptance E,D NR   04/06/17 1523 Active    Acceptance E,D NR   04/05/17 0908 Active   Family Acceptance E VU  AP 04/18/17 0557 Done   Significant Other Acceptance E,D NR   04/06/17 1523 Active    Acceptance E,D NR   04/05/17 0908 Active                      User Key     Initials Effective Dates Name Provider Type Discipline     06/19/15 -  Anita Prado, PT Physical Therapist PT     06/19/15 -  Cinthia Baptiste, PT Physical Therapist PT     06/19/15 -  Lily Juárez, PT Physical Therapist PT     06/16/16 -  Shahla Sherman RN Registered Nurse Nurse     09/06/16 -  Mikey Moore, PT Physical Therapist PT                    PT Recommendation and Plan  Anticipated Discharge Disposition: skilled nursing facility  PT Frequency: daily  Plan of Care Review  Plan Of Care Reviewed With: patient  Progress: progress toward functional goals is gradual  Outcome Summary/Follow up Plan: Limited treatment due to medical status. Pt with fair participation with therex, able to follow approx 75% commands for supine therex. Continue POC, progressing as tolerated.          Outcome Measures       04/22/17 1140 04/22/17 1120  04/20/17 1030    How much help from another person do you currently need...    Turning from your back to your side while in flat bed without using bedrails? 2  -SJ  2  -LS    Moving from lying on back to sitting on the side of a flat bed without bedrails? 2  -SJ  2  -LS    Moving to and from a bed to a chair (including a wheelchair)? 2  -SJ  2  -LS    Standing up from a chair using your arms (e.g., wheelchair, bedside chair)? 2  -SJ  2  -LS    Climbing 3-5 steps with a railing? 1  -SJ  1  -LS    To walk in hospital room? 1  -SJ  1  -LS    AM-PAC 6 Clicks Score 10  -SJ  10  -LS    How much help from another is currently needed...    Putting on and taking off regular lower body clothing?  1  -JR     Bathing (including washing, rinsing, and drying)  1  -JR     Toileting (which includes using toilet bed pan or urinal)  1  -JR     Putting on and taking off regular upper body clothing  1  -JR     Taking care of personal grooming (such as brushing teeth)  1  -JR     Eating meals  1  -JR     Score  6  -JR     Modified Cana Scale    Modified [place]a Scale 4 - Moderately severe disability.  Unable to walk without assistance, and unable to attend to own bodily needs without assistance.  -SJ 4 - Moderately severe disability.  Unable to walk without assistance, and unable to attend to own bodily needs without assistance.  -JR 4 - Moderately severe disability.  Unable to walk without assistance, and unable to attend to own bodily needs without assistance.  -LS    Functional Assessment    Outcome Measure Options AM-PAC 6 Clicks Basic Mobility (PT)  -SJ AM-PAC 6 Clicks Daily Activity (OT);Modified Linh  -JR AM-PAC 6 Clicks Basic Mobility (PT)  -LS      04/20/17 1029          How much help from another is currently needed...    Putting on and taking off regular lower body clothing? 1  -JR      Bathing (including washing, rinsing, and drying) 2  -JR      Toileting (which includes using toilet bed pan or urinal) 1  -JR      Putting  on and taking off regular upper body clothing 2  -JR      Taking care of personal grooming (such as brushing teeth) 2  -JR      Eating meals 1  -JR      Score 9  -JR      Modified Linh Scale    Modified [place] Scale 4 - Moderately severe disability.  Unable to walk without assistance, and unable to attend to own bodily needs without assistance.  -JR      Functional Assessment    Outcome Measure Options AM-PAC 6 Clicks Daily Activity (OT);Modified [place]  -JR        User Key  (r) = Recorded By, (t) = Taken By, (c) = Cosigned By    Initials Name Provider Type    GERMAN Baptiste, PT Physical Therapist    JR Judy Leach, OT Occupational Therapist    ANDREWS Juárez, PT Physical Therapist           Time Calculation:         PT Charges       04/22/17 1155          Time Calculation    Start Time 1140  -      PT Received On 04/22/17  -      PT Goal Re-Cert Due Date 04/30/17  -      Time Calculation- PT    Total Timed Code Minutes- PT 10 minute(s)  -        User Key  (r) = Recorded By, (t) = Taken By, (c) = Cosigned By    Initials Name Provider Type     Cinthia Baptiste PT Physical Therapist          Therapy Charges for Today     Code Description Service Date Service Provider Modifiers Qty    91963757822 HC PT THER PROC EA 15 MIN 4/22/2017 Cinthia Baptiste, PT GP 1          PT G-Codes  Outcome Measure Options: AM-PAC 6 Clicks Basic Mobility (PT)    Cinthia Baptiste PT  4/22/2017

## 2017-04-22 NOTE — PROGRESS NOTES
Adult Nutrition  Assessment/PES    Patient Name:  Tomas Salvador  YOB: 1955  MRN: [id number]  Admit Date:  4/4/2017    Assessment Date:  4/22/2017  EN support - will monitor for free water adjustment as indicated once IVF dc'd      Reason for Assessment       04/22/17 1037    Reason for Assessment    Reason For Assessment/Visit follow up protocol;TF/PN    Identified At Risk By Screening Criteria dysphagia or difficulty swallowing;tube feeding or parenteral nutrition    Time Spent (min) 20    Diagnosis --   per notes of this adm    Other diagnosis --   s/p PEG placement 4/21              Nutrition/Diet History       04/22/17 1038    Nutrition/Diet History    Reported/Observed By RN    Other [place] Elias reports free water dc'd and not to resume until pt at goal rate of feeding; currently at 45 mL/hr - pt has IVF if NS@ 50 ml/hr              Labs/Tests/Procedures/Meds       04/22/17 1039    Labs/Tests/Procedures/Meds    Labs/Tests Review Reviewed;BUN;Na+;Creat            Physical Findings       04/22/17 1040    Physical Appearance    Tubes peg tube                    Problem/Interventions:                  Intervention Goal       04/22/17 1041    Intervention Goal    General Nutrition support treatment    TF/PN Adjust TF/PN            Nutrition Intervention       04/22/17 1040    Nutrition Intervention    RD/Tech Action Follow Tx progress;Care plan reviewd   will review EN water needs and EN support once at goal rate and IVF dc'd    Recommended/Ordered EN              Education/Evaluation       04/22/17 1041    Monitor/Evaluation    Monitor Per protocol;I&O;Pertinent labs;TF delivery/tolerance        Comments:      Electronically signed by:  Melanie Bah RD  04/22/17 10:42 AM

## 2017-04-22 NOTE — PROGRESS NOTES
Acute Care - Wound/Debridement Treatment Note  [facility]     Patient Name: Tomas Salvador  : 1955  MRN: [id number]  Today's Date: 2017  Onset of Illness/Injury or Date of Surgery Date: 17   Date of Referral to PT: 17   Referring Physician: MD Jimenez       Admit Date: 2017    Visit Dx:    ICD-10-CM ICD-9-CM   1. Dysphagia, unspecified type R13.10 787.20   2. Impaired functional mobility, balance, gait, and endurance Z74.09 V49.89   3. Impaired mobility and ADLs Z74.09 799.89   4. Cognitive communication deficit R41.841 799.52       Patient Active Problem List   Diagnosis   • Coronary disease   • Ischemic cardiomyopathy with EF 10% on Echo 17   • Peripheral vascular disease   • ICD in place, implantation .   • Dyslipidemia, on atorvastain.    • S/P Sepsis due to  source 3/30/17   • CVA 17 with rapid improvement   • Diabetes Mellitus HgbA1c 10.4   • Chronic hypoxic respiratory failure on nocturnal o2   • CAD s/p CABG , C . Grafts patent except SVG to diagonal   • Urinary retention requiring colbert   • Hyperlipidemia   • Essential hypertension   • Hx of PVD (peripheral vascular disease)   • Recent E. 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 Indwelling Coblert catheter   • Atrial fibrillation but now converted. Still on Eliquis               LDA Wound       17 1425          Incision 17 1056 other (see comments) scrotum    Incision - Properties Group Placement Date: 17  -JW Placement Time:   -JW Side: other (see comments)  -JW Location: scrotum  -JW    Incision WDL ex  -MW      Dressing Appearance dressing loose;moist drainage  -MW      Appearance open areas;pink;redness;swelling  -MW      Drainage Characteristics/Odor serosanguineous;serous;yellow  -MW      Drainage Amount moderate  -MW      Wound Cleaning  "irrigated with;sterile normal saline;cleansed with;other (see comments)   Theraworx foam  -MW      Dressing low-adherent;foam;other (see comments)   6\" optifoam; interdry to groin folds   -MW      Packing other (see comments)   saline moistened HFB (2 val Rt & Lt), & inferior area   -MW      Pressure Ulcer 04/20/17 2000 coccyx Stage I    Pressure Ulcer - Properties Group Date first assessed: 04/20/17  -JE Time first assessed: 2000  -JE Present On Admission (Pressure Ulcer): no  -JE Location: coccyx  -JE Stage: Stage I  -JE    Wound 04/20/17 0850 medial nose abrasion    Wound - Properties Group Date first assessed: 04/20/17  -AS Time first assessed: 0850  -AS Orientation: medial  -AS Location: nose  -AS, bridge   Type: abrasion  -AS      User Key  (r) = Recorded By, (t) = Taken By, (c) = Cosigned By    Initials Name Provider Type    CHRISTOPHER Ballesteros, PT Physical Therapist    FELY Urbano, RN Registered Nurse    AS Jayson Louis RN Registered Nurse    JW Michael Silveira RN Registered Nurse            WOUND DEBRIDEMENT                     Adult Rehabilitation Note       04/22/17 1425 04/22/17 1140 04/22/17 1120    Rehab Assessment/Intervention    Discipline physical therapist  -MW physical therapist  -SJ occupational therapist  -JR    Document Type therapy note (daily note)   wound care   -MW therapy note (daily note)  - therapy note (daily note)  -JR    Subjective Information no complaints;agree to therapy   asking to \"get out of here\"  - no complaints;agree to therapy  - no complaints;agree to therapy  -JR    Patient Effort, Rehab Treatment  fair  -SJ fair  -JR    Symptoms Noted During/After Treatment  none  -SJ none  -JR    Symptoms Noted Comment   RN agreed to exercises in bed only this date  -JR    Precautions/Limitations  fall precautions;swallowing precautions;oxygen therapy device and L/min  - fall precautions;oxygen therapy device and L/min   decreased skin integrity to scrotum, PEG  " -JR    Specific Treatment Considerations  RN requested bed level-therex only today   Pt with low BP, decr resp status this a.m.  -SJ     Recorded by [MW] Connie Ballesteros, PT [SJ] Cinthia Baptiste PT [JR] Judy Leach, OT    Vital Signs    Pre Systolic BP Rehab  79  -SJ 90  -JR    Pre Treatment Diastolic BP  55  -SJ 57  -JR    Post Systolic BP Rehab   79  -JR    Post Treatment Diastolic BP   55  -JR    Pretreatment Heart Rate (beats/min)  78  -SJ 76  -JR    Posttreatment Heart Rate (beats/min)  77  -SJ 75  -JR    Pre SpO2 (%)  100  -  -JR    O2 Delivery Pre Treatment  supplemental O2   5L  -SJ supplemental O2   5L  -JR    Post SpO2 (%)  100  -  -JR    O2 Delivery Post Treatment  supplemental O2   5L  -SJ supplemental O2  -JR    Pre Patient Position  Supine  -SJ Supine  -JR    Intra Patient Position  Supine  -SJ Supine  -JR    Post Patient Position  Supine  -SJ Supine  -JR    Recorded by  [SJ] Cinthia Baptiste, PT [JR] Judy Leach, OT    Pain Assessment    Pain Assessment Jay-Kent FACES  -MW No/denies pain  -SJ Jay-Kent FACES  -JR    Jay-Kent FACES Pain Rating  0  -SJ 0  -JR    Pain Score 0  -MW 0  -SJ     Post Pain Score 2  -MW 0  -SJ     Pain Type Acute pain  -MW      Pain Location Scrotum  -MW      Pain Intervention(s) Repositioned   moist dressing placed   -MW      Response to Interventions tolerated   -MW      Recorded by [MW] Connie Ballesteros, PT [SJ] Cinthia Baptiste, PT [JR] Judy Leach, OT    Cognitive Assessment/Intervention    Current Cognitive/Communication Assessment impaired  -MW impaired  -SJ impaired  -JR    Orientation Status  disoriented x 4  -SJ oriented to;place  -JR    Follows Commands/Answers Questions  25% of the time;able to follow single-step instructions;needs cueing;needs increased time;needs repetition;speech unintelligible  -SJ 25% of the time;able to follow single-step instructions;needs cueing;needs increased time;needs repetition  -JR    Personal Safety   severe impairment;unaware of cognitive deficits;unaware of consequences of deficits;unaware of functional deficits  - severe impairment;decreased awareness, need for assist;decreased awareness, need for safety;decreased insight to deficits  -JR    Recorded by [MW] Connie Ballesteros, PT [SJ] Cinthia Baptiste, PT [JR] Judy Leach, OT    Therapy Exercises    Bilateral Lower Extremities  AAROM:;20 reps;supine;ankle pumps/circles;calf stretch;hip abduction/adduction;hip ER;hip IR;heel slides  -     Bilateral Upper Extremity   PROM:;AAROM:;10 reps;elbow flexion/extension;pronation/supination;shoulder abduction/adduction;shoulder extension/flexion  -JR    Recorded by  [SJ] Cinthia Baptiste, PT [JR] Judy Leach, OT    Positioning and Restraints    Pre-Treatment Position in bed  - in bed  - in bed  -JR    Post Treatment Position bed  - bed  - bed  -    In Bed notified nsg;fowlers;call light within reach   mits in place;   - fowlers;call light within reach;encouraged to call for assist;heels elevated  -SJ notified nsg;supine;call light within reach;encouraged to call for assist;exit alarm on;RUE elevated;LUE elevated  -    Restraints   released:;reapplied:;notified nsg:;soft limb;mitten  -JR    Recorded by [MW] Connie Ballesteros, PT [SJ] Cinthia Baptiste PT [JR] Judy Leach, OT      04/21/17 0800 04/20/17 0955       Rehab Assessment/Intervention    Discipline physical therapist  - physical therapist  -     Document Type therapy note (daily note)  - therapy note (daily note)  -     Subjective Information decreased LOC   pt sedated  -MF no complaints;agree to therapy  -MF     Recorded by [MF] Pee Roldan, PT [MF] Pee Roldan, PT     Pain Assessment    Pain Assessment Jay-Baker FACES  - Jay-Baker FACES  -MF     Jay-Baker FACES Pain Rating 0  -MF 0  -MF     Pain Intervention(s)  Repositioned  -MF     Recorded by [MF] Pee Roldan, PT [MF] Pee Roldan, PT      Cognitive Assessment/Intervention    Current Cognitive/Communication Assessment  impaired  -MF     Recorded by  [MF] Pee Roldan, PT     Positioning and Restraints    Pre-Treatment Position in bed  -MF in bed  -MF     Post Treatment Position bed  -MF bed  -MF     In Bed supine;call light within reach;notified nsg  -MF supine;call light within reach  -MF     Recorded by [MF] ePe Roldan, PT [MF] Pee Roldan, PT       User Key  (r) = Recorded By, (t) = Taken By, (c) = Cosigned By    Initials Name Effective Dates     Pee Roldan, PT 06/19/15 -     SJ Cinthia Baptiste, PT 06/19/15 -     JR Judy Leach, OT 06/22/15 -     MW Connie Ballesteros, PT 05/18/15 -                 IP PT Goals       04/22/17 1721 04/22/17 1153 04/20/17 1118    Bed Mobility PT LTG    Bed Mobility PT LTG, [place] Level   minimum assist (75% patient effort)  -LS    Bed Mobility PT LTG, Date Goal Reviewed   04/20/17  -LS    Bed Mobility PT LTG, Outcome  goal ongoing  -SJ goal revised  -LS    Transfer Training PT LTG    Transfer Training PT LTG, [place] Level   minimum assist (75% patient effort)  -LS    Transfer Training PT  LTG, Date Goal Reviewed   04/20/17  -LS    Transfer Training PT LTG, Outcome  goal ongoing  -SJ goal revised  -LS    Gait Training PT LTG    Gait Training Goal PT LTG, Outcome  goal ongoing  -SJ     Wound Care PT LTG    Wound Care PT LTG 1, Outcome goal ongoing  -MW        04/20/17 1115 04/19/17 0835 04/16/17 1450    Gait Training PT LTG    Gait Training Goal PT LTG, [place] Level minimum assist (75% patient effort);2 person assist required  -LS      Gait Training Goal PT LTG, Distance to Achieve 100  -LS      Gait Training Goal PT LTG, Date Goal Reviewed 04/20/17  -LS      Gait Training Goal PT LTG, Outcome goal revised  -LS      Wound Care PT LTG    Wound Care PT LTG 1, Outcome  goal ongoing  -MC goal ongoing  -MC      04/15/17 1455 04/14/17 1120 04/11/17 1015    Bed Mobility PT LTG     Bed Mobility PT LTG, Outcome   goal not met  -LS    Bed Mobility PT LTG, Reason Goal Not Met   medical status inhibits participation  -LS    Transfer Training PT LTG    Transfer Training PT LTG, Outcome   goal not met  -LS    Transfer Training PT LTG, Reason Goal Not Met   medical status inhibits participation  -LS    Gait Training PT LTG    Gait Training Goal PT LTG, Outcome   goal not met  -LS    Gait Training Goal PT LTG, Reason Goal Not Met   medical status inhibits participation  -LS    Wound Care PT LTG    Wound Care PT LTG 1, Outcome goal ongoing  -MC goal ongoing  -MC       04/09/17 1551          Bed Mobility PT LTG    Bed Mobility PT LTG, Outcome goal ongoing  -DR      Transfer Training PT LTG    Transfer Training PT LTG, Outcome goal ongoing  -DR      Gait Training PT LTG    Gait Training Goal PT LTG, Outcome goal ongoing  -DR        User Key  (r) = Recorded By, (t) = Taken By, (c) = Cosigned By    Initials Name Provider Type    GERMAN Baptiste, PT Physical Therapist    ANDREWS Juárez, PT Physical Therapist    CHRISTOPHER Ballesteros, PT Physical Therapist    DUNCAN Mcgarry, PT Physical Therapist    DR Mikey Moore, PT Physical Therapist          Physical Therapy Education     Title: PT OT SLP Therapies (Active)     Topic: Physical Therapy (Active)     Point: Mobility training (Active)    Learning Progress Summary    Learner Readiness Method Response Comment Documented by Status   Patient Acceptance E NR,NL  SJ 04/22/17 1155 Active    Acceptance E,D NR   04/20/17 1114 Active    Acceptance E VU  AP 04/18/17 0557 Done    Acceptance E NR   04/09/17 1551 Active    Acceptance E NR  EDOUARD 04/07/17 1534 Active    Acceptance E,D NR  LS 04/06/17 1523 Active    Acceptance E,D NR  LS 04/05/17 0908 Active   Family Acceptance E VU  AP 04/18/17 0557 Done   Significant Other Acceptance E,D NR  LS 04/06/17 1523 Active    Acceptance E,D NR  LS 04/05/17 0908 Active               Point: Home exercise program  (Active)    Learning Progress Summary    Learner Readiness Method Response Comment Documented by Status   Patient Acceptance E NR,NL   04/22/17 1155 Active    Acceptance E,D NR   04/20/17 1114 Active    Acceptance E VU  AP 04/18/17 0557 Done    Acceptance E NR  EDOUARD 04/07/17 1534 Active    Acceptance E,D NR   04/06/17 1523 Active   Family Acceptance E VU  AP 04/18/17 0557 Done   Significant Other Acceptance E,D NR   04/06/17 1523 Active               Point: Body mechanics (Active)    Learning Progress Summary    Learner Readiness Method Response Comment Documented by Status   Patient Acceptance E NR,NL   04/22/17 1155 Active    Acceptance E,D NR   04/20/17 1114 Active    Acceptance E VU  AP 04/18/17 0557 Done    Acceptance E NR   04/09/17 1551 Active    Acceptance E NR   04/07/17 1534 Active    Acceptance E,D NR   04/06/17 1523 Active    Acceptance E,D NR   04/05/17 0908 Active   Family Acceptance E VU  AP 04/18/17 0557 Done   Significant Other Acceptance E,D NR   04/06/17 1523 Active    Acceptance E,D NR   04/05/17 0908 Active               Point: Precautions (Active)    Learning Progress Summary    Learner Readiness Method Response Comment Documented by Status   Patient Acceptance E NR,NL   04/22/17 1155 Active    Acceptance E,D NR   04/20/17 1114 Active    Acceptance E VU  AP 04/18/17 0557 Done    Acceptance E NR   04/09/17 1551 Active    Acceptance E NR  EDOUARD 04/07/17 1534 Active    Acceptance E,D NR   04/06/17 1523 Active    Acceptance E,D NR   04/05/17 0908 Active   Family Acceptance E VU  AP 04/18/17 0557 Done   Significant Other Acceptance E,D NR   04/06/17 1523 Active    Acceptance E,D NR   04/05/17 0908 Active                      User Key     Initials Effective Dates Name Provider Type Discipline    EDOUARD 06/19/15 -  Anita Prado, PT Physical Therapist PT    SJ 06/19/15 -  Cinthia Baptiste, PT Physical Therapist PT    LS 06/19/15 -  Lily Juárez, PT Physical Therapist  "PT    AP 06/16/16 -  Shahla Sherman, RN Registered Nurse Nurse     09/06/16 -  Mikey Moore PT Physical Therapist PT                   PT ASSESSMENT (last 72 hours)      PT Evaluation       04/22/17 1425 04/22/17 1140    Rehab Evaluation    Document Type therapy note (daily note)   wound care   -MW therapy note (daily note)  -SJ    Subjective Information no complaints;agree to therapy   asking to \"get out of here\"  -MW no complaints;agree to therapy  -SJ    Patient Effort, Rehab Treatment  fair  -SJ    Symptoms Noted During/After Treatment  none  -SJ    General Information    Precautions/Limitations  fall precautions;swallowing precautions;oxygen therapy device and L/min  -SJ    Vital Signs    Pre Systolic BP Rehab  79  -SJ    Pre Treatment Diastolic BP  55  -SJ    Pretreatment Heart Rate (beats/min)  78  -SJ    Posttreatment Heart Rate (beats/min)  77  -SJ    Pre SpO2 (%)  100  -SJ    O2 Delivery Pre Treatment  supplemental O2   5L  -SJ    Post SpO2 (%)  100  -SJ    O2 Delivery Post Treatment  supplemental O2   5L  -SJ    Pre Patient Position  Supine  -SJ    Intra Patient Position  Supine  -SJ    Post Patient Position  Supine  -SJ    Pain Assessment    Pain Assessment Jay-Kent FACES  -MW No/denies pain  -SJ    Jay-Kent FACES Pain Rating  0  -SJ    Pain Score 0  -MW 0  -SJ    Post Pain Score 2  -MW 0  -SJ    Pain Type Acute pain  -MW     Pain Location Scrotum  -MW     Pain Intervention(s) Repositioned   moist dressing placed   -MW     Response to Interventions tolerated   -MW     Cognitive Assessment/Intervention    Current Cognitive/Communication Assessment impaired  -MW impaired  -SJ    Orientation Status  disoriented x 4  -SJ    Follows Commands/Answers Questions  25% of the time;able to follow single-step instructions;needs cueing;needs increased time;needs repetition;speech unintelligible  -SJ    Personal Safety  severe impairment;unaware of cognitive deficits;unaware of consequences of " deficits;unaware of functional deficits  -    Therapy Exercises    Bilateral Lower Extremities  AAROM:;20 reps;supine;ankle pumps/circles;calf stretch;hip abduction/adduction;hip ER;hip IR;heel slides  -    Positioning and Restraints    Pre-Treatment Position in bed  - in bed  -    Post Treatment Position bed  - bed  -SJ    In Bed notified nsg;fowlers;call light within reach   mits in place;   - fowlers;call light within reach;encouraged to call for assist;heels elevated  -SJ      04/22/17 1120 04/21/17 0800    Rehab Evaluation    Document Type therapy note (daily note)  -JR therapy note (daily note)  -    Subjective Information no complaints;agree to therapy  -JR decreased LOC   pt sedated  -MF    Patient Effort, Rehab Treatment fair  -JR     Symptoms Noted During/After Treatment none  -JR     Symptoms Noted Comment RN agreed to exercises in bed only this date  -JR     General Information    Precautions/Limitations fall precautions;oxygen therapy device and L/min   decreased skin integrity to scrotum, PEG  -JR     Vital Signs    Pre Systolic BP Rehab 90  -JR     Pre Treatment Diastolic BP 57  -JR     Post Systolic BP Rehab 79  -JR     Post Treatment Diastolic BP 55  -JR     Pretreatment Heart Rate (beats/min) 76  -JR     Posttreatment Heart Rate (beats/min) 75  -JR     Pre SpO2 (%) 100  -JR     O2 Delivery Pre Treatment supplemental O2   5L  -JR     Post SpO2 (%) 100  -JR     O2 Delivery Post Treatment supplemental O2  -JR     Pre Patient Position Supine  -JR     Intra Patient Position Supine  -JR     Post Patient Position Supine  -JR     Pain Assessment    Pain Assessment Jay-Kent FACES  -JR Jay-Kent FACES  -MF    Jay-Baker FACES Pain Rating 0  -JR 0  -MF    Cognitive Assessment/Intervention    Current Cognitive/Communication Assessment impaired  -JR     Orientation Status oriented to;place  -JR     Follows Commands/Answers Questions 25% of the time;able to follow single-step instructions;needs  cueing;needs increased time;needs repetition  -JR     Personal Safety severe impairment;decreased awareness, need for assist;decreased awareness, need for safety;decreased insight to deficits  -     Therapy Exercises    Bilateral Upper Extremity PROM:;AAROM:;10 reps;elbow flexion/extension;pronation/supination;shoulder abduction/adduction;shoulder extension/flexion  -     Positioning and Restraints    Pre-Treatment Position in bed  - in bed  -    Post Treatment Position bed  - bed  -    In Bed notified nsg;supine;call light within reach;encouraged to call for assist;exit alarm on;RUE elevated;LUE elevated  -JR supine;call light within reach;notified nsg  -    Restraints released:;reapplied:;notified nsg:;soft limb;mitten  -JR       04/21/17 0600 04/21/17 0400    Muscle Tone Assessment    Muscle Tone Assessment Bilateral Upper Extremities;Bilateral Lower Extremities  -JE Bilateral Upper Extremities;Bilateral Lower Extremities  -JE    Bilateral Upper Extremities Muscle Tone Assessment mildly decreased tone  -JE mildly decreased tone  -JE    Bilateral Lower Extremities Muscle Tone Assessment moderately decreased tone  -JE moderately decreased tone  -JE      04/21/17 0200 04/21/17 0000    Muscle Tone Assessment    Muscle Tone Assessment Bilateral Upper Extremities;Bilateral Lower Extremities  -JE Bilateral Upper Extremities;Bilateral Lower Extremities  -JE    Bilateral Upper Extremities Muscle Tone Assessment mildly decreased tone  -JE mildly decreased tone  -JE    Bilateral Lower Extremities Muscle Tone Assessment moderately decreased tone  -JE moderately decreased tone  -JE      04/20/17 2200 04/20/17 2000    Muscle Tone Assessment    Muscle Tone Assessment Bilateral Upper Extremities;Bilateral Lower Extremities  -JE Bilateral Upper Extremities;Bilateral Lower Extremities  -JE    Bilateral Upper Extremities Muscle Tone Assessment mildly decreased tone  -JE mildly decreased tone  -JE    Bilateral Lower  Extremities Muscle Tone Assessment moderately decreased tone  -JE moderately decreased tone  -JE      04/20/17 1800 04/20/17 1600    Muscle Tone Assessment    Muscle Tone Assessment Bilateral Upper Extremities;Bilateral Lower Extremities  -TP Bilateral Upper Extremities;Bilateral Lower Extremities  -TP    Bilateral Upper Extremities Muscle Tone Assessment mildly decreased tone  -TP mildly decreased tone  -TP    Bilateral Lower Extremities Muscle Tone Assessment moderately decreased tone  -TP moderately decreased tone  -TP      04/20/17 1400 04/20/17 1200    Muscle Tone Assessment    Muscle Tone Assessment Bilateral Upper Extremities;Bilateral Lower Extremities  -TP Bilateral Upper Extremities;Bilateral Lower Extremities  -TP    Bilateral Upper Extremities Muscle Tone Assessment mildly decreased tone  -TP mildly decreased tone  -TP    Bilateral Lower Extremities Muscle Tone Assessment moderately decreased tone  -TP moderately decreased tone  -TP      04/20/17 1030 04/20/17 1029    Rehab Evaluation    Document Type re-evaluation   PT mobiity  -LS re-evaluation  -JR    Subjective Information agree to therapy;no complaints  -LS no complaints;agree to therapy  -JR    Patient Effort, Rehab Treatment adequate  -LS adequate  -JR    Symptoms Noted During/After Treatment  none  -JR    Symptoms Noted Comment Pt with decreased following of commands and agitation throughout.   -LS     General Information    Patient Profile Review yes  -LS yes  -JR    Onset of Illness/Injury or Date of Surgery Date 04/04/17  -LS 04/04/17  -JR    Referring Physician MD Jimenez  -LS Dr. Gaona  -JR    Pertinent History Of Current Problem Please see IE for original admit; PT mobility was discharged on 4/11/17 due to declining  medical status. Pt now with improved resp status; remains encephalopathic and on bladder irrigation. RESUME PT orders received.   - Pt initially admitted with aphasia, L sided facial droop and L sided paralysis. Pt d/c'd  from OT 4/11 due to decline in medical status. Pt now with resume orders. Pt remains encephalopathic with bladder irrigation.  -JR    Precautions/Limitations fall precautions;oxygen therapy device and L/min;NPO;other (see comments)   dec skin integrity to scrotum  -LS fall precautions;oxygen therapy device and L/min   NG tube, decreased skin integrity to scrotal region  -JR    Prior Level of Function --   please see IE  -LS --   Please refer to initial eval.  -JR    Equipment Currently Used at Home --   please see IE  -LS --   Please refer to initial eval.  -JR    Plans/Goals Discussed With patient;agreed upon  -LS patient;agreed upon  -JR    Risks Reviewed patient:;LOB;dizziness;increased discomfort  -LS patient:;increased discomfort  -JR    Benefits Reviewed patient:;improve function;increase independence;increase strength;increase balance;increase knowledge  -LS patient:;improve function;increase independence  -JR    Barriers to Rehab medically complex  -LS medically complex;cognitive status  -JR    Living Environment    Lives With --   please see IE  -LS     Living Environment Comment  Please refer to initial eval for prior living environment information  -JR    Clinical Impression    Date of Referral to PT 04/19/17  -LS     PT Diagnosis impaired functional mobility, balance, gait  -LS     Patient/Family Goals Statement return to OF  -LS     Criteria for Skilled Therapeutic Interventions Met yes;treatment indicated  -LS     Rehab Potential good, to achieve stated therapy goals  -LS     Vital Signs    Pre Systolic BP Rehab 114  -  -JR    Pre Treatment Diastolic BP 79  -LS 79  -JR    Post Systolic BP Rehab 114  -  -JR    Post Treatment Diastolic BP 83  -LS 83  -JR    Pretreatment Heart Rate (beats/min) 89  -LS 89  -JR    Posttreatment Heart Rate (beats/min) 96  -LS 96  -JR    Pre SpO2 (%) 100  -  -JR    O2 Delivery Pre Treatment supplemental O2   2L  -LS supplemental O2   Bipap  -JR    Post  SpO2 (%) 100  -  -JR    O2 Delivery Post Treatment supplemental O2   2L  -LS supplemental O2   3L  -JR    Pre Patient Position Supine  -LS Supine  -JR    Intra Patient Position Standing  -LS Standing  -JR    Post Patient Position Sitting  -LS Sitting  -JR    Pain Assessment    Pain Assessment 0-10  -LS 0-10  -JR    Jay-Kent FACES Pain Rating 0  -LS 0  -JR    Pain Score 0  -LS 0  -JR    Post Pain Score 0  -LS 0  -JR    Cognitive Assessment/Intervention    Current Cognitive/Communication Assessment impaired  -LS impaired  -JR    Orientation Status oriented to;person  -LS oriented to;person;disoriented to;place;time  -JR    Follows Commands/Answers Questions able to follow single-step instructions;25% of the time;needs cueing;needs increased time;needs repetition  -LS 25% of the time;able to follow single-step instructions;needs cueing;needs increased time;needs repetition  -JR    Personal Safety severe impairment;impulsive;decreased awareness, need for assist;decreased awareness, need for safety;decreased insight to deficits  -LS severe impairment;decreased awareness, need for assist;decreased awareness, need for safety;decreased insight to deficits  -JR    Personal Safety Interventions fall prevention program maintained;gait belt;nonskid shoes/slippers when out of bed  -LS     ROM (Range of Motion)    General ROM no range of motion deficits identified   BLEs  -LS no range of motion deficits identified  -JR    MMT (Manual Muscle Testing)    General MMT Assessment lower extremity strength deficits identified  -LS     General MMT Assessment Detail  Pt not following commands for MMT this date B UE strength functionally 4/5  -JR    Lower Extremity    Lower Ext Manual Muscle Testing Detail grossly 3+/5; pt with difficulty in following formal MMT commands  -LS     Bed Mobility, Assessment/Treatment    Bed Mobility, Assistive Device head of bed elevated;draw sheet  -LS head of bed elevated;draw sheet  -JR    Bed Mob,  Supine to Sit, [place] maximum assist (25% patient effort);2 person assist required;verbal cues required  -LS maximum assist (25% patient effort);2 person assist required  -JR    Bed Mobility, Safety Issues cognitive deficits limit understanding  -LS cognitive deficits limit understanding;decreased use of arms for pushing/pulling;decreased use of legs for bridging/pushing;impaired trunk control for bed mobility  -JR    Bed Mobility, Impairments strength decreased;coordination impaired  -LS strength decreased;impaired balance;coordination impaired;motor control impaired;postural control impaired  -JR    Bed Mobility, Comment  Verbal cues for sequencing.  -JR    Transfer Assessment/Treatment    Transfers, Bed-Chair [place] maximum assist (25% patient effort);2 person assist required;verbal cues required  -LS maximum assist (25% patient effort);2 person assist required  -JR    Transfers, Sit-Stand [place] maximum assist (25% patient effort);2 person assist required;verbal cues required  -LS maximum assist (25% patient effort);2 person assist required  -JR    Transfers, Stand-Sit [place] maximum assist (25% patient effort);2 person assist required;verbal cues required  -LS     Transfer, Safety Issues impulsivity;sequencing ability decreased;weight-shifting ability decreased  -LS balance decreased during turns;sequencing ability decreased;step length decreased;weight-shifting ability decreased  -JR    Transfer, Impairments  strength decreased;impaired balance;motor control impaired;coordination impaired;postural control impaired  -    Transfer, Comment Max vc's and gesturing to perform STS from EOB and pivot to chair. PT/OT on either side of pt.   -LS Pt requiring max verbal cues to follow commands for transfer to chair.  -JR    Gait Assessment/Treatment    Gait, [place] Level not appropriate to assess   decreased following of commands during transfers  -LS     Motor Skills/Interventions     Additional Documentation Balance Skills Training (Group)  -LS Balance Skills Training (Group)  -JR    Balance Skills Training    Sitting-Level of Assistance Contact guard  -LS Contact guard  -JR    Sitting-Balance Support Feet supported  -LS Feet supported  -JR    Gait Balance-Level of Assistance Maximum assistance;x2   dynamic balance during t/f to chair  -LS Maximum assistance;x2  -JR    Gait Balance Support Right upper extremity supported;Left upper extremity supported  -LS Right upper extremity supported;Left upper extremity supported  -JR    Therapy Exercises    Bilateral Lower Extremities AAROM:;5 reps;sitting;ankle pumps/circles;LAQ  -LS     Bilateral Upper Extremity  AAROM:;PROM:;10 reps;elbow flexion/extension;pronation/supination;shoulder extension/flexion  -JR    Sensory Assessment/Intervention    Light Touch RLE;LLE  -LS     LLE Light Touch WNL  -LS     RLE Light Touch WNL  -LS     Positioning and Restraints    Pre-Treatment Position in bed  -LS in bed  -JR    Post Treatment Position chair  -LS chair  -JR    In Chair notified nsg;reclined;call light within reach;encouraged to call for assist;exit alarm on;with nsg;waffle cushion;on mechanical lift sling;legs elevated;heels elevated;LUE elevated;RUE elevated  -LS notified nsg;reclined;call light within reach;encouraged to call for assist;exit alarm on;with family/caregiver;RUE elevated;LUE elevated;waffle cushion;on mechanical lift sling;RLE elevated;LLE elevated  -JR    Restraints released:;reapplied:;notified nsg:;soft limb  -LS released:;reapplied:;notified nsg:;soft limb  -JR      04/20/17 1000 04/20/17 0955    Rehab Evaluation    Document Type  therapy note (daily note)  -    Subjective Information  no complaints;agree to therapy  -    Pain Assessment    Pain Assessment  Jay-Kent FACES  -    Jay-Baker FACES Pain Rating  0  -    Pain Intervention(s)  Repositioned  -    Cognitive Assessment/Intervention    Current Cognitive/Communication  Assessment  impaired  -MF    Muscle Tone Assessment    Muscle Tone Assessment Bilateral Upper Extremities;Bilateral Lower Extremities  -TP     Bilateral Upper Extremities Muscle Tone Assessment mildly decreased tone  -TP     Bilateral Lower Extremities Muscle Tone Assessment moderately decreased tone  -TP     Positioning and Restraints    Pre-Treatment Position  in bed  -    Post Treatment Position  bed  -    In Bed  supine;call light within reach  -      04/20/17 0800 04/20/17 0600    Muscle Tone Assessment    Muscle Tone Assessment Bilateral Upper Extremities;Bilateral Lower Extremities  -TP Bilateral Upper Extremities;Bilateral Lower Extremities  -SC    Bilateral Upper Extremities Muscle Tone Assessment mildly decreased tone  -TP mildly decreased tone  -SC    Bilateral Lower Extremities Muscle Tone Assessment moderately decreased tone  -TP moderately decreased tone  -SC      04/20/17 0400 04/20/17 0200    Muscle Tone Assessment    Muscle Tone Assessment Bilateral Upper Extremities;Bilateral Lower Extremities  -SC Bilateral Upper Extremities;Bilateral Lower Extremities  -SC    Bilateral Upper Extremities Muscle Tone Assessment mildly decreased tone  -SC mildly decreased tone  -SC    Bilateral Lower Extremities Muscle Tone Assessment moderately decreased tone  -SC moderately decreased tone  -SC      04/20/17 0120 04/20/17 0000    Muscle Tone Assessment    Muscle Tone Assessment --  -TP Bilateral Upper Extremities;Bilateral Lower Extremities  -SC    Bilateral Upper Extremities Muscle Tone Assessment --  -TP mildly decreased tone  -SC    Bilateral Lower Extremities Muscle Tone Assessment --  -TP moderately decreased tone  -SC      04/19/17 2200 04/19/17 2000    Muscle Tone Assessment    Muscle Tone Assessment Bilateral Upper Extremities;Bilateral Lower Extremities  -SC Bilateral Upper Extremities;Bilateral Lower Extremities  -SC    Bilateral Upper Extremities Muscle Tone Assessment mildly decreased tone  -SC  mildly decreased tone  -SC    Bilateral Lower Extremities Muscle Tone Assessment moderately decreased tone  -SC moderately decreased tone  -SC      04/19/17 1800       Muscle Tone Assessment    Muscle Tone Assessment Bilateral Upper Extremities  -BG       User Key  (r) = Recorded By, (t) = Taken By, (c) = Cosigned By    Initials Name Provider Type    VALERI Roldan, PT Physical Therapist    SJ Cinthia Baptiste, PT Physical Therapist    JR Judy Leach, OT Occupational Therapist    LS Lily Juárez, PT Physical Therapist    MW Connie Ballesteros, PT Physical Therapist    TP Sam Concepcion, RN Registered Nurse    SC Mady Valle, RN Registered Nurse    FELY Urbano, RN Registered Nurse    BG Shellie Cedillo, RN Registered Nurse            PT Recommendation and Plan  Anticipated Discharge Disposition: skilled nursing facility  PT Frequency: daily    Plan Of Care Reviewed With: patient   Progress: improving   Outcome Summary/Follow up Plan: Scrotal wound with majority of pink-red tissue base and only scant slough visible in inferior portion of wound. Lateral tunnels are difficult to visualize but sweeping with swabs returned only serous drainage. Pt cont to benefit from skilled PT for wound care/ dressing management with pulselavage/ debridement prn.           Outcome Measures       04/22/17 1140 04/22/17 1120 04/20/17 1030    How much help from another person do you currently need...    Turning from your back to your side while in flat bed without using bedrails? 2  -SJ  2  -LS    Moving from lying on back to sitting on the side of a flat bed without bedrails? 2  -SJ  2  -LS    Moving to and from a bed to a chair (including a wheelchair)? 2  -SJ  2  -LS    Standing up from a chair using your arms (e.g., wheelchair, bedside chair)? 2  -SJ  2  -LS    Climbing 3-5 steps with a railing? 1  -SJ  1  -LS    To walk in hospital room? 1  -SJ  1  -LS    AM-PAC 6 Clicks Score 10  -SJ  10  -LS    How  much help from another is currently needed...    Putting on and taking off regular lower body clothing?  1  -JR     Bathing (including washing, rinsing, and drying)  1  -JR     Toileting (which includes using toilet bed pan or urinal)  1  -JR     Putting on and taking off regular upper body clothing  1  -JR     Taking care of personal grooming (such as brushing teeth)  1  -JR     Eating meals  1  -JR     Score  6  -JR     Modified Linh Scale    Modified [place] Scale 4 - Moderately severe disability.  Unable to walk without assistance, and unable to attend to own bodily needs without assistance.  -SJ 4 - Moderately severe disability.  Unable to walk without assistance, and unable to attend to own bodily needs without assistance.  -JR 4 - Moderately severe disability.  Unable to walk without assistance, and unable to attend to own bodily needs without assistance.  -    Functional Assessment    Outcome Measure Options AM-PAC 6 Clicks Basic Mobility (PT)  - AM-PAC 6 Clicks Daily Activity (OT);Modified Linh  -JR AM-PAC 6 Clicks Basic Mobility (PT)  -      04/20/17 1029          How much help from another is currently needed...    Putting on and taking off regular lower body clothing? 1  -JR      Bathing (including washing, rinsing, and drying) 2  -JR      Toileting (which includes using toilet bed pan or urinal) 1  -JR      Putting on and taking off regular upper body clothing 2  -JR      Taking care of personal grooming (such as brushing teeth) 2  -JR      Eating meals 1  -JR      Score 9  -JR      Modified [place] Scale    Modified [place] Scale 4 - Moderately severe disability.  Unable to walk without assistance, and unable to attend to own bodily needs without assistance.  -      Functional Assessment    Outcome Measure Options AM-PAC 6 Clicks Daily Activity (OT);Modified [place]  -JR        User Key  (r) = Recorded By, (t) = Taken By, (c) = Cosigned By    Initials Name Provider Type    GERMAN Baptiste  PT Physical Therapist    JR Judy Leach, OT Occupational Therapist    LS Lily Juárez, PT Physical Therapist              Time Calculation        PT Charges       04/22/17 1723 04/22/17 1155       Time Calculation    Start Time 1425  - 1140  -     PT Received On  04/22/17  -     PT Goal Re-Cert Due Date 04/30/17  -MW 04/30/17  -     Time Calculation- PT    Total Timed Code Minutes- PT  10 minute(s)  -       User Key  (r) = Recorded By, (t) = Taken By, (c) = Cosigned By    Initials Name Provider Type    GERMAN Baptiste, PT Physical Therapist    CHRISTOPHER Ballesteros, PT Physical Therapist             Therapy Charges for Today     Code Description Service Date Service Provider Modifiers Qty    84123168083 HC NONSELECTIVE DEBRIDEMENT 4/22/2017 Connie Ballesteros, PT GP 1            PT G-Codes  Outcome Measure Options: AM-PAC 6 Clicks Basic Mobility (PT)        Connie Ballesteros, PT  4/22/2017

## 2017-04-22 NOTE — PLAN OF CARE
Problem: Patient Care Overview (Adult)  Goal: Plan of Care Review  Outcome: Ongoing (interventions implemented as appropriate)    04/22/17 1721   Coping/Psychosocial Response Interventions   Plan Of Care Reviewed With patient   Patient Care Overview   Progress improving   Outcome Evaluation   Outcome Summary/Follow up Plan Scrotal wound with majority of pink-red tissue base and only scant slough visible in inferior portion of wound. Lateral tunnels are difficult to visualize but sweeping with swabs returned only serous drainage. Pt cont to benefit from skilled PT for wound care/ dressing management with pulselavage/ debridement prn.          Problem: Inpatient Physical Therapy  Goal: Wound Care Goal 1 LTG- PT  Outcome: Ongoing (interventions implemented as appropriate)    04/06/17 1025 04/22/17 1721   Wound Care PT LTG   Wound Care PT LTG 1, Date Established 04/06/17 --    Wound Care PT LTG 1, Time to Achieve 1 wk --    Wound Care PT LTG 1, Location Scrotum --    Wound Care PT LTG 1, No S&S of Infection yes --    Wound Care PT LTG 1, Decrease Wound Size 10% --    Wound Care PT LTG 1, Decrease Necrotic Tissue 50% --    Wound Care PT LTG 1, Decrease Exudate minimum --    Wound Care PT LTG 1, No New Skin Break Down yes --    Wound Care PT LTG 1, Education dressing changes;wound care --    Wound Care PT LTG 1, Education Understanding verbalize understanding --    Wound Care PT LTG 1, Outcome --  goal ongoing

## 2017-04-22 NOTE — PROGRESS NOTES
"Tomas Salvador  1955  [id number]    Surgery Progress Note    Date of visit: 4/22/2017    Subjective   Subjective: Stable after PEG.         Objective     Objective    BP 90/59  Pulse 83  Temp 98.1 °F (36.7 °C) (Axillary)   Resp 24  Ht 69\" (175.3 cm)  Wt 262 lb (119 kg)  SpO2 98%  BMI 38.69 kg/m2    Intake/Output Summary (Last 24 hours) at 04/22/17 0922  Last data filed at 04/22/17 0800   Gross per 24 hour   Intake          3232.69 ml   Output             2225 ml   Net          1007.69 ml       CV:  Rhythm  regular and rate regular   L:  Rhonchi to auscultation bilaterally   Abd:  Bowel sounds positive , soft, nontender. PEG c/d/i  Ext:  No cyanosis, clubbing, edema    Labs that are back at this time have been reviewed.        Assessment/Plan     Assessment/ Plan:    Problem List Items Addressed This Visit     None      Visit Diagnoses     Dysphagia, unspecified type    -  Primary- Doing well after PEG. Will sign off. Please call with questions.    Relevant Orders    Case request                Satya Fall MD  4/22/2017  9:22 AM      "

## 2017-04-22 NOTE — PLAN OF CARE
Problem: Patient Care Overview (Adult)  Goal: Plan of Care Review  Outcome: Ongoing (interventions implemented as appropriate)    04/22/17 1153   Coping/Psychosocial Response Interventions   Plan Of Care Reviewed With patient   Patient Care Overview   Progress progress toward functional goals is gradual   Outcome Evaluation   Outcome Summary/Follow up Plan Limited treatment due to medical status. Pt with fair participation with therex, able to follow approx 75% commands for supine therex. Continue POC, progressing as tolerated.         Problem: Stroke (Ischemic) (Adult)  Goal: Signs and Symptoms of Listed Potential Problems Will be Absent or Manageable (Stroke)  Outcome: Ongoing (interventions implemented as appropriate)    04/22/17 1153   Stroke (Ischemic)   Problems Assessed (Stroke (Ischemic)/TIA) muscle tone abnormal;cognitive impairment;motor/sensory impairment   Problems Present (Stroke (Ischemic)/TIA) muscle tone abnormal;cognitive impairment;motor/sensory impairment         Problem: Inpatient Physical Therapy  Goal: Bed Mobility Goal LTG- PT  Outcome: Ongoing (interventions implemented as appropriate)    04/05/17 0909 04/20/17 1118 04/22/17 1153   Bed Mobility PT LTG   Bed Mobility PT LTG, Date Established 04/05/17 --  --    Bed Mobility PT LTG, Time to Achieve 2 wks --  --    Bed Mobility PT LTG, Activity Type supine to sit/sit to supine --  --    Bed Mobility PT LTG, [place] Level --  minimum assist (75% patient effort) --    Bed Mobility PT LTG, Date Goal Reviewed --  04/20/17 --    Bed Mobility PT LTG, Outcome --  --  goal ongoing       Goal: Transfer Training Goal 1 LTG- PT  Outcome: Ongoing (interventions implemented as appropriate)    04/05/17 0909 04/20/17 1118 04/22/17 1153   Transfer Training PT LTG   Transfer Training PT LTG, Date Established 04/05/17 --  --    Transfer Training PT LTG, Time to Achieve 2 wks --  --    Transfer Training PT LTG, Activity Type sit to stand/stand to sit --  --     Transfer Training PT LTG, [place] Level --  minimum assist (75% patient effort) --    Transfer Training PT LTG, Assist Device walker, rolling --  --    Transfer Training PT LTG, Date Goal Reviewed --  04/20/17 --    Transfer Training PT LTG, Outcome --  --  goal ongoing       Goal: Gait Training Goal LTG- PT  Outcome: Ongoing (interventions implemented as appropriate)    04/05/17 0909 04/20/17 1115 04/22/17 1153   Gait Training PT LTG   Gait Training Goal PT LTG, Date Established 04/05/17 --  --    Gait Training Goal PT LTG, Time to Achieve 2 wks --  --    Gait Training Goal PT LTG, [place] Level --  minimum assist (75% patient effort);2 person assist required --    Gait Training Goal PT LTG, Assist Device walker, rolling --  --    Gait Training Goal PT LTG, Distance to Achieve --  100 --    Gait Training Goal PT LTG, Date Goal Reviewed --  04/20/17 --    Gait Training Goal PT LTG, Outcome --  --  goal ongoing

## 2017-04-22 NOTE — PROGRESS NOTES
Intensive Care Follow-up      LOS: 18 days     Mr. Tomas Salvador, 61 y.o. male is followed for: CVA (cerebral vascular accident)     Subjective - Interval History     Not doing well through the night  Agitated and combative at times  Requiring frequent nasotracheal suctioning  Mitts on to protect staff and IVs  Poor by mouth intake and low blood sugars reported  Currently requiring 5 L/m via nasal cannula    The patient's relevant past medical, surgical and social history were reviewed and updated in Epic as appropriate.     Objective     Infusions:    Pharmacy Consult      Medications:    acetylcysteine 4 mL Nebulization Q4H - RT   conventional amphotericin B (FUNGIZONE) irrigation 42 mL/hr Irrigation Q24H   apixaban 5 mg Oral Q12H   aspirin 324 mg Oral Daily   atorvastatin 80 mg Nasogastric Nightly   carvedilol 12.5 mg Oral Q12H   castor oil-balsam peru  Topical Q12H   docusate sodium 100 mg Nasogastric BID   fentaNYL 1 patch Transdermal Q72H   furosemide 60 mg Intravenous Q12H   heparin flush (porcine) 500 Units Intracatheter Q12H   insulin detemir 25 Units Subcutaneous Q12H   insulin lispro 0-24 Units Subcutaneous Q6H   ipratropium-albuterol 3 mL Nebulization 4x Daily - RT   pantoprazole 40 mg Intravenous Q AM   QUEtiapine 25 mg Oral Q12H   saccharomyces boulardii 250 mg Oral BID     Intake/Output       04/21/17 0700 - 04/22/17 0659 04/22/17 0700 - 04/23/17 0659    Intake (ml) 2557.3 927.4    Output (ml) 2250 550    Net (ml) 307.3 377.4        Vital Sign Min/Max for last 24 hours  Temp  Min: 97.7 °F (36.5 °C)  Max: 98.1 °F (36.7 °C)   BP  Min: 83/54  Max: 129/94   Pulse  Min: 70  Max: 104   Resp  Min: 18  Max: 24   SpO2  Min: 82 %  Max: 100 %   Flow (L/min)  Min: 4  Max: 5        Physical Exam:   GENERAL: Agitated, tachypneic, audible rhonchi   HEENT: No adenopathy or thyromegaly   LUNGS: Bilateral rhonchi.  Unable to clear with cough   HEART: Irregular tachycardia.  Distant heart sounds   ABDOMEN: Soft.   Nontender.  Distended   EXTREMITIES: Bilateral lower extremity edema   NEURO/PSYCH: Confused.  Moves all fours.  Agitated      Results from last 7 days  Lab Units 04/22/17  0417 04/21/17  0611 04/20/17  0429   WBC 10*3/mm3 11.10* 10.91* 8.97   HEMOGLOBIN g/dL 10.8* 10.6* 10.3*   PLATELETS 10*3/mm3 305 346 329       Results from last 7 days  Lab Units 04/22/17  0417 04/21/17  0611 04/20/17  0743 04/19/17  0357 04/18/17  0358  04/17/17  0033   SODIUM mmol/L 146 143 145 143 145  < > 145   POTASSIUM mmol/L 3.9 3.9 3.8 3.8 4.1  < > 3.6   TOTAL CO2 mmol/L 35.0* 32.0* 37.0* 36.0* 38.0*  < > 35.0*   BUN mg/dL 45* 45* 54* 53* 51*  < > 40*   CREATININE mg/dL 0.40* 0.50* 0.60 0.60 0.70  < > 0.60   MAGNESIUM mg/dL  --   --   --  2.0 2.1  --  2.0   PHOSPHORUS mg/dL 4.2  --  2.7 2.6 2.4  --  2.0*   GLUCOSE mg/dL 51* 123* 179* 175* 171*  < > 149*   < > = values in this interval not displayed.  Estimated Creatinine Clearance: 243.8 mL/min (by C-G formula based on Cr of 0.4).            Results from last 7 days  Lab Units 04/20/17  0451   PH, ARTERIAL pH units 7.497*   PCO2, ARTERIAL mm Hg 44.5   PO2 ART mm Hg 102.0     Lab Results   Component Value Date    LACTATE 0.8 04/02/2017          Images: Cardiac nightly with bilateral infiltrates/effusions, left greater than right    I reviewed the patient's results and images.     Impression      Hospital Problem List     * (Principal)CVA 4/4/17 with rapid improvement    Epididymo-orchitis    Ischemic cardiomyopathy with EF 10% on Echo 4/4/17    Overview Signed 6/28/2016  5:09 PM by Mady singh May 2015 echocardiogram:  Ejection fraction 25%.  b. 07/23/2015, 2D echocardiogram:  Severe LV dilation. Ejection fraction 20% to 25%.  c. 10/05/2015, 2D echocardiogram:  Severe LV dilation. EF 20% to 25%.           ICD in place, implantation 2015.    Overview Signed 7/1/2016 11:55 AM by PAULINA Anderson     ICD  A)BIOTRONIK ICD FOR ICM PRIMARY PREVENTION 2016         S/P Sepsis due  to  source 3/30/17    Diabetes Mellitus HgbA1c 10.4    Overview Signed 4/4/2017  2:43 PM by TRESA Baca     3/30/17 Hgb A1c 10.4         Chronic hypoxic respiratory failure on nocturnal o2    Overview Addendum 4/17/2017 10:26 AM by Michael Gaona MD     Nocturnal o2  Remote tobacco quit 2002         CAD s/p CABG 2005, [facility] 2015. Grafts patent except SVG to diagonal    Overview Signed 4/4/2017  4:44 PM by Ella Pope MD     A.  CABG 2005  B. [facility] 2015 occl LAD, RCA, patient LIMA to LAD         Urinary retention requiring colbert    Hx of PVD (peripheral vascular disease)    Recent E. coli UTI now off therapy    Encephalopathy acute    [place] gangrene with E-Coli Cellulitis. Debrided in [place] 3/30/17    Overview Signed 4/5/2017  2:51 PM by Ella Pope MD     Surgical debridement [place]  Culture E.coli         Candida glabrata UTI. Indwelling Colbert catheter    Atrial fibrillation but now converted. Still on Eliquis               Plan        Continue current medical therapy  Hold long-acting insulin  Increase Lasix dose and attempt to decrease volume overload    Family conference to discuss goals of care and consider transition more to a palliative approach.     Plan of care and goals reviewed with mulitdisciplinary team at daily rounds   I discussed the patient's findings and my recommendations with nursing staff       SG Patel MD  Pulmonary and Critical Care Medicine  04/22/17 1:36 PM     Please note that portions of this note were completed with a voice recognition program. Efforts were made to edit the dictations, but occasionally words are mistranscribed.

## 2017-04-22 NOTE — PROGRESS NOTES
"[facility] Progress Note    Admission Date: 4/4/2017    Tomas Salvador  1955  [id number]    Date: 4/22/2017    Meds:    IV Anti-Infectives     Ordered     Dose/Rate Route Frequency Start Stop    04/20/17 0718  ceFAZolin in dextrose (ANCEF) IVPB solution 2 g     Ordering Provider:  Satya Fall MD    2 g  over 30 Minutes Intravenous Once 04/21/17 0800 04/21/17 0815    04/19/17 1145  amphotericin B (FUNGIZONE) 50 mg in sterile water (preservative free) 1,000 mL irrigation     Ordering Provider:  Kike Leon MD    42 mL/hr  42 mL/hr  Irrigation Every 24 Hours 04/19/17 1300            CC:  Delisa's gangrene    SUBJECTIVE:  4/4/17:Patient is a 61 y.o. 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 He has a history of underlying diabetes mellitus and severe systolic congestive heart failure. He presented to [facility] on 3/30 with scrotal cellulitis/gangrene. He underwent debridement by urology at [facility] on 3/31 and was treated with Merrem/clindamycin/vancomycin. He developed left facial droop and left sided paresis, prompting a transfer to [facility] for evaluation and therapy of acute stroke. His head CT angiogram was degraded by motion artifact but per Dr. Morrison there was suggestion of right inferior temporal lobe\" opacification\" consistent with a possible stroke. His left facial droop and left-sided paresis has improved today. He is encephalopathic and is unable to provide any reliable history. His wife thinks that he may have had some fevers prior to his admission on 3/30.    4/5/17: afebrile. No hx per patient secondary to encephalopathy.  Afebrile, restless per nsg staff.  Oliguric UOP.  No n/v/d.  No rashes.  4/6/17: More alert and less restless today.  Still non-responsive to questions and does not follow simple commands.  Still with left-sided weakness.  Afebrile.  Still with oliguric UOP.  No n/v/d, no rashes, per nsg staff notes. " "  17:  Alert today.  No fever,chills,sweats.  Wife at bedside with ?s   4/10/17:  Wants to go home.  Wound care per PT.  No n/v/d.   17:  Now on Dopamine and Bautista, bipap.  Decompensated last pm.    17:  Somnolent; still on pressors   17 Still on pressors/Precedex; Now on bipap.  Had pulled corpak out night before.    17: Per RN staff notes: afebrile, on/off precedex for agitation, on dopamine, on flagyl and cefazolin, on and off bipap.  Can not keep clothes on him.  Attempted to do Is and Os but too difficult without his foreskin getting pulled into meatus, so therefore, just placed Cueto cath.  Not anchored, because he keeps trying to pull it out.  Would is getting daily misted by PT and packed.  He is more alert today, but still not oriented.  4/15/17: He is improved today and off of vasopressor therapy.  He has been receiving some diuretic therapy for pulmonary edema.  He is less confused.  He remains afebrile.  17: He is continued to receive diuretic therapy.  He remains off of vasopressor therapy.  He has remained afebrile  17:  Remains restrained.  Opens eyes to voice.  Nonproductive cough.   17:  Opens eyes.  Does not follow commands.  His Cueto catheter is still in place and he is undergoing diuresis for his CHF/pulmonary edema.  17:  More alert today.  \"wants Scottie\".  PT just changed dressing.  He remains off of vasopressor therapy.  He is continued to receive diuresis.  The nursing staff reports that he has some penile edema, making it difficult to in and out catheterize him.  17:  On Bipap.  Opens eyes to voice.   17:  He has been less responsive today.  He is not currently following commands.  He still has hypoxia.  17: He has been more responsive today.  He is still severely confused and has required mitts on his hands.  He is still requiring 5 L of supplemental oxygen.  He has remained afebrile.      PE:   Vital Signs  Temp (24hrs), Av.1 °F " (36.7 °C), Min:97.7 °F (36.5 °C), Max:98.7 °F (37.1 °C)    Temp  Min: 97.7 °F (36.5 °C)  Max: 98.7 °F (37.1 °C)  BP  Min: 83/64  Max: 129/94  Pulse  Min: 70  Max: 104  Resp  Min: 18  Max: 24  SpO2  Min: 83 %  Max: 100 %    GENERAL:   He is is in no acute distress.  He is alert but confused.  HEENT: Normocephalic, atraumatic. PERRL. EOMI. No conjunctival injection. No icterus. Oropharynx clear without evidence of thrush or exudate.   HEART: RRR; No murmur, rubs, gallops.   LUNGS:  Diminished, few rhonchi  ABDOMEN: Soft, nontender, nondistended. Positive bowel sounds. No rebound or guarding. NO mass or HSM.  EXT: No cyanosis, clubbing or edema. No cord.  : The lower, posterior midline scrotal wounds reveals some continued healing with no residual cellulitis and no purulent drainage.  A Cueto catheter is in place  MSK: FROM without joint effusions noted arms/legs.   SKIN: Warm and dry without cutaneous eruptions on Inspection/palpation.   Neurologic: He is more alert but remains confused and is asking inappropriate questions.  He moves all 4 extremities.    Right PICC without redness      Laboratory Data      Results from last 7 days  Lab Units 04/22/17  0417 04/21/17  0611 04/20/17  0429   WBC 10*3/mm3 11.10* 10.91* 8.97   HEMOGLOBIN g/dL 10.8* 10.6* 10.3*   HEMATOCRIT % 35.9* 36.0* 34.1*   PLATELETS 10*3/mm3 305 346 329       Results from last 7 days  Lab Units 04/22/17  0417   SODIUM mmol/L 146   POTASSIUM mmol/L 3.9   CHLORIDE mmol/L 104   TOTAL CO2 mmol/L 35.0*   BUN mg/dL 45*   CREATININE mg/dL 0.40*   GLUCOSE mg/dL 51*   CALCIUM mg/dL 9.3       Results from last 7 days  Lab Units 04/18/17  0358   ALK PHOS U/L 102*   BILIRUBIN mg/dL 0.2*   ALT (SGPT) U/L 6*   AST (SGOT) U/L 30       Results from last 7 days  Lab Units 04/20/17  0429   SED RATE mm/hr 95*       Results from last 7 days  Lab Units 04/17/17  1155   CRP mg/dL 2.82*       4/17:  UA small amount of LE; UC   Candida Glabrata           Estimated  Creatinine Clearance: 243.8 mL/min (by C-G formula based on Cr of 0.4).    Microbiology:  Blood Culture   Date Value Ref Range Status   03/30/2017 No growth at 5 days  Final   03/30/2017 No growth at 5 days  Final           Urine Culture   Date Value Ref Range Status   04/04/2017 No growth  Preliminary   03/31/2017 No growth  Final   03/30/2017 >100,000 CFU/mL Escherichia coli (A)  Final      scrotal cx x 2 cxs (3/31) E. coli  Cefazolin sens.    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  2. Acute right hemispheric cerebral vascular accident.  3.  Metabolic encephalopathy  4. 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 Severe systolic congestive heart failure-she has an ejection fraction of 10% with severe persistent symptomatic congestive heart failure.  6. Escherichia coli urinary tract infection-improved  7. Sepsis-secondary to Delisa's gangrene, now improved  8. Leukocytosis/neutrophilia-secondary to scrotal gangrene/cellulitis, improved   9.  Pulmonary edema  10. Candiduria--this resolved with removal of his Cueto catheter.  The treatment for his previous candiduria was removal of the Cueto catheter.  Now has recurrent candiduria after his Cueto catheter was placed back in.  He is currently on amphotericin B bladder irrigations  11. Cardiogenic shock-with pulmonary edema, now significantly improved.         PLAN/RECOMMENDATIONS:   1.  Continue scrotal wound care  2.  Remove the Cueto catheter when feasible  3.  Amphotericin B bladder irrigations   4.  Continue off of antibacterial antibiotic therapy      His prognosis  remains extremely poor.  I concur with Dr. Patel's recommendation that a transition to palliative care/comfort measures would be appropriate.          Kike Leon MD  4/22/2017  3:23 PM

## 2017-04-23 NOTE — PROGRESS NOTES
Intensive Care Follow-up      LOS: 19 days     Mr. Tomas Salvador, 61 y.o. male is followed for: CVA (cerebral vascular accident)     Subjective - Interval History     More somnolent and in less distress  Continues to have an ineffective cough.  Percussion vest not helping a whole lot    The patient's relevant past medical, surgical and social history were reviewed and updated in Epic as appropriate.     Objective     Infusions:     Medications:    acetylcysteine 4 mL Nebulization Q4H - RT   conventional amphotericin B (FUNGIZONE) irrigation 42 mL/hr Irrigation Q24H   apixaban 5 mg Oral Q12H   aspirin 324 mg Oral Daily   atorvastatin 80 mg Nasogastric Nightly   carvedilol 12.5 mg Oral Q12H   castor oil-balsam peru  Topical Q12H   docusate sodium 100 mg Nasogastric BID   furosemide 60 mg Intravenous Q12H   heparin flush (porcine) 500 Units Intracatheter Q12H   insulin lispro 0-24 Units Subcutaneous Q6H   ipratropium-albuterol 3 mL Nebulization 4x Daily - RT   pantoprazole 40 mg Intravenous Q AM   QUEtiapine 25 mg Oral Q12H   saccharomyces boulardii 250 mg Oral BID     Intake/Output       04/22/17 0700 - 04/23/17 0659 04/23/17 0700 - 04/24/17 0659    Intake (ml) 3873.8 521.6    Output (ml) 2725 755    Net (ml) 1148.8 -233.4        Vital Sign Min/Max for last 24 hours  Temp  Min: 97.6 °F (36.4 °C)  Max: 98.4 °F (36.9 °C)   BP  Min: 92/60  Max: 122/55   Pulse  Min: 76  Max: 97   Resp  Min: 15  Max: 24   SpO2  Min: 83 %  Max: 100 %   Flow (L/min)  Min: 3  Max: 5        Physical Exam:   GENERAL: Somnolent, no distress   HEENT: No adenopathy or thyromegaly   LUNGS: Bilateral rhonchi and tachypnea   HEART: Irregular rate and rhythm with distant heart sounds   ABDOMEN: Soft.  Nontender.   EXTREMITIES: Pitting upper and lower extremity edema, no cyanosis   NEURO/PSYCH: Somnolent.  Difficult to arouse.  Agitated 1 more awake      Results from last 7 days  Lab Units 04/23/17  0526 04/22/17  0417 04/21/17  0611   WBC 10*3/mm3  7.81 11.10* 10.91*   HEMOGLOBIN g/dL 9.7* 10.8* 10.6*   PLATELETS 10*3/mm3 269 305 346       Results from last 7 days  Lab Units 04/23/17  0526 04/22/17  0417 04/21/17  0611 04/20/17  0743 04/19/17  0357 04/18/17  0358  04/17/17  0033   SODIUM mmol/L 146 146 143 145 143 145  < > 145   POTASSIUM mmol/L 3.7 3.9 3.9 3.8 3.8 4.1  < > 3.6   TOTAL CO2 mmol/L 36.0* 35.0* 32.0* 37.0* 36.0* 38.0*  < > 35.0*   BUN mg/dL 47* 45* 45* 54* 53* 51*  < > 40*   CREATININE mg/dL 0.50* 0.40* 0.50* 0.60 0.60 0.70  < > 0.60   MAGNESIUM mg/dL  --   --   --   --  2.0 2.1  --  2.0   PHOSPHORUS mg/dL  --  4.2  --  2.7 2.6 2.4  --  2.0*   GLUCOSE mg/dL 164* 51* 123* 179* 175* 171*  < > 149*   < > = values in this interval not displayed.  Estimated Creatinine Clearance: 195 mL/min (by C-G formula based on Cr of 0.5).            Results from last 7 days  Lab Units 04/20/17  0451   PH, ARTERIAL pH units 7.497*   PCO2, ARTERIAL mm Hg 44.5   PO2 ART mm Hg 102.0     Lab Results   Component Value Date    LACTATE 0.8 04/02/2017          Images: Persistent cardiomegaly and bilateral interstitial infiltrates and effusions    I reviewed the patient's results and images.     Impression      Hospital Problem List     * (Principal)CVA 4/4/17 with rapid improvement    Epididymo-orchitis    Ischemic cardiomyopathy with EF 10% on Echo 4/4/17    Overview Signed 6/28/2016  5:09 PM by Mady singh May 2015 echocardiogram:  Ejection fraction 25%.  b. 07/23/2015, 2D echocardiogram:  Severe LV dilation. Ejection fraction 20% to 25%.  c. 10/05/2015, 2D echocardiogram:  Severe LV dilation. EF 20% to 25%.           ICD in place, implantation 2015.    Overview Signed 7/1/2016 11:55 AM by PAULINA [place]     ICD  A)BIOTRONIK ICD FOR ICM PRIMARY PREVENTION 2016         S/P Sepsis due to  source 3/30/17    Diabetes Mellitus HgbA1c 10.4    Overview Signed 4/4/2017  2:43 PM by TRESA Baca     3/30/17 Hgb A1c 10.4         Chronic hypoxic  respiratory failure on nocturnal o2    Overview Addendum 4/17/2017 10:26 AM by Michael Gaona MD     Nocturnal o2  Remote tobacco quit 2002         CAD s/p CABG 2005, [facility] 2015. Grafts patent except SVG to diagonal    Overview Signed 4/4/2017  4:44 PM by Ella Pope MD     A.  CABG 2005  B. [facility] 2015 occl LAD, RCA, patient LIMA to LAD         Urinary retention requiring colbert    Hx of PVD (peripheral vascular disease)    Recent E. coli UTI now off therapy    Encephalopathy acute    Fourniers gangrene with E-Coli Cellulitis. Debrided in [place] 3/30/17    Overview Signed 4/5/2017  2:51 PM by Ella Pope MD     Surgical debridement Les  Culture E.coli         Candida glabrata UTI. Indwelling Colbert catheter    Atrial fibrillation but now converted. Still on Eliquis               Plan        Continues to do poorly  No improvement overnight    Discussed with the patient's wife.  She understands that he has little chance of recovery at this point.  We talked about a palliative care consultation.  He may be a candidate for the hospice care unit.       Plan of care and goals reviewed with mulitdisciplinary team at daily rounds   I discussed the patient's findings and my recommendations with family and nursing staff     Critical Care time spent in direct patient care: 35 minutes (excluding procedure time, if applicable) including high complexity decision making to assess, manipulate, and support vital organ system failure in this individual who has impairment of one or more vital organ systems such that there is a high probability of imminent or life threatening deterioration in the patient’s condition.    SG Patel MD  Pulmonary and Critical Care Medicine  04/23/17 12:43 PM     Please note that portions of this note were completed with a voice recognition program. Efforts were made to edit the dictations, but occasionally words are mistranscribed.

## 2017-04-23 NOTE — PLAN OF CARE
Problem: Patient Care Overview (Adult)  Goal: Plan of Care Review  Outcome: Ongoing (interventions implemented as appropriate)    04/23/17 1500   Coping/Psychosocial Response Interventions   Plan Of Care Reviewed With spouse;patient   Patient Care Overview   Progress no change   Outcome Evaluation   Outcome Summary/Follow up Plan New Palliative consult for goals of care. Wife has to work Monday but will be here Tuesday at 1400 for family conference, Dr. Cam notified

## 2017-04-23 NOTE — PLAN OF CARE
Problem: Fall Risk (Adult)  Goal: Identify Related Risk Factors and Signs and Symptoms  Outcome: Ongoing (interventions implemented as appropriate)    04/23/17 1831   Fall Risk   Fall Risk: Related Risk Factors confusion/agitation   Fall Risk: Signs and Symptoms presence of risk factors         Problem: Skin Integrity Impairment, Risk/Actual (Adult)  Goal: Identify Related Risk Factors and Signs and Symptoms  Outcome: Ongoing (interventions implemented as appropriate)    04/21/17 0642 04/23/17 1831   Skin Integrity Impairment, Risk/Actual   Skin Integrity Impairment, Risk/Actual: Related Risk Factors --  cognitive impairment;immobility;fluid/nutrition status;infection/disease process;moisture;tissue perfusion impaired;sensory impairment   Signs and Symptoms (Skin Integrity Impairment) edema;inflammation --

## 2017-04-24 NOTE — PLAN OF CARE
Problem: Patient Care Overview (Adult)  Goal: Plan of Care Review  Outcome: Ongoing (interventions implemented as appropriate)    04/24/17 0850   Coping/Psychosocial Response Interventions   Plan Of Care Reviewed With patient   Outcome Evaluation   Outcome Summary/Follow up Plan Depth of wound noted to be decreasing with good granulation base noted. No s/s of infection in wound and decreasing exudate noted. PT will cont with light debridement and decrease freq of drsg change to 3x/week to allow for more comfort and decreased inflammation of wound bed.          Problem: Inpatient Physical Therapy  Goal: Wound Care Goal 1 LTG- PT  Outcome: Ongoing (interventions implemented as appropriate)    04/06/17 1025 04/24/17 0850   Wound Care PT LTG   Wound Care PT LTG 1, Date Established 04/06/17 --    Wound Care PT LTG 1, Time to Achieve 1 wk --    Wound Care PT LTG 1, Location Scrotum --    Wound Care PT LTG 1, No S&S of Infection yes --    Wound Care PT LTG 1, Decrease Wound Size 10% --    Wound Care PT LTG 1, Decrease Necrotic Tissue 50% --    Wound Care PT LTG 1, Decrease Exudate minimum --    Wound Care PT LTG 1, No New Skin Break Down yes --    Wound Care PT LTG 1, Education dressing changes;wound care --    Wound Care PT LTG 1, Education Understanding verbalize understanding --    Wound Care PT LTG 1, Outcome --  goal partially met

## 2017-04-24 NOTE — PROGRESS NOTES
INTENSIVIST / PULMONARY FOLLOW UP NOTE     Hospital:  LOS: 20 days   Mr. Tomas Salvador, 61 y.o. male is followed for:   Principal Problem:    CVA 4/4/17 with rapid improvement  Active Problems:    Ischemic cardiomyopathy with EF 10% on Echo 4/4/17    ICD in place, implantation 2015.    S/P Sepsis due to  source 3/30/17    Diabetes Mellitus HgbA1c 10.4    Chronic hypoxic respiratory failure on nocturnal o2    CAD s/p CABG 2005, [facility] 2015. Grafts patent except SVG to diagonal    Urinary retention requiring colbert    Hx of PVD (peripheral vascular disease)    Recent E. 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 Debrided in [place] 3/30/17    Candida glabrata UTI. Indwelling Colbert catheter    Atrial fibrillation but now converted. Still on Eliquis          SUBJECTIVE   Patient resting on nasal cannula, not very responsive, hemodynamically stable,  Requiring frequent suctioning    The patient's relevant past medical, surgical, family, and social history were reviewed    Allergies and medications were reviewed    ROS:  Per subjective, all other systems were reviewed and were negative        OBJECTIVE     Vitals:    04/24/17 1200   BP: 108/72   Pulse: 77   Resp: 18   Temp: 96.8 °F (36 °C)   SpO2: 100%     General Appearance:  somnolent, in no acute distress  Eyes:  No scleral icterus or pallor, PERRLA  Ears, Nose, Mouth, Throat:  Atraumatic, oropharynx clear  Neck:  Trachea midline, thyroid normal  Respiratory:  rhonchi to auscultation bilaterally, normal effort  Cardiovascular:  Regular rate and rhythm, no murmurs, no peripheral edema  Gastrointestinal:  Soft, non-tender, non-distended, no hepatosplenomegaly  Skin:  Normal temperature, no rash  Psychiatric:  Somnolent, no agitation  Neuro:  No new focal neurologic deficits observed    Relevant imaging studies and labs from 04/24/17 were reviewed and interpreted by me    Assessment/Plan    IMPRESSION / PLAN     Inpatient Problem List:  61 y.o.male:  Principal Problem:    CVA 4/4/17 with rapid improvement  Active Problems:    Ischemic cardiomyopathy with EF 10% on Echo 4/4/17    ICD in place, implantation 2015.    S/P Sepsis due to  source 3/30/17    Diabetes Mellitus HgbA1c 10.4    Chronic hypoxic respiratory failure on nocturnal o2    CAD s/p CABG 2005, C 2015. Grafts patent except SVG to diagonal    Urinary retention requiring colbert    Hx of PVD (peripheral vascular disease)    Recent E. 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 Debrided in [place] 3/30/17    Candida glabrata UTI. Indwelling Colbert catheter    Atrial fibrillation but now converted. Still on Eliquis       Impression:  61 y.o.male with relevant PMH of CAD, ICM EF 10%, Prior CABG, PVD, HTN, HLD, T2DM, COPD, Home O2 admitted originally to [facility] 3/30 w/ a scrotal lesion that turned out to be Delisa's gangrene which was drained by Urology, transferred to [facility] 4/4 for Left Frontal CVA    Plan:  -Encephalopathy / Stroke vs TIA - probably from afib, now on Elliquis    -ICM EF 10% / Afib - change asa to 81, won't be able to tolerate betablock with EF that low.  D/C coreg, start low dose hydralazine/nitrate, titrate up as tolerated.  Start Digoxin.  Diuresis w/ Diuril & Diamox today - probably reaching maximal diuresis as evidenced by his sodium and BUN.      -Acute on Chronic Respiratory Failure - requiring NT suctioning every couple hours, continue nebs, mucomyst, etc.  Bipap prn.  He is a Do not Intubate.    -Delisa's Gangrene / Candiduria / UTI - Abx per ID    -T2DM (A1c 10.4) - Levemir 20, correction scale    -Tube Feeds    -Elliquis/PPI    -Agree poor prognosis, Palliative consulted.  Very high risk for impending cardiac or respiratory event.    Plan of care and goals reviewed with mulitdisciplinary team at daily rounds    Critical care time: 30 minutes        Artemio Patel MD  Intensive Care Medicine  04/24/17 12:45 PM

## 2017-04-24 NOTE — PLAN OF CARE
Problem: Patient Care Overview (Adult)  Goal: Plan of Care Review  Outcome: Ongoing (interventions implemented as appropriate)  Goal: Adult Individualization and Mutuality  Outcome: Ongoing (interventions implemented as appropriate)  Goal: Discharge Needs Assessment  Outcome: Ongoing (interventions implemented as appropriate)    Problem: Stroke (Ischemic) (Adult)  Goal: Signs and Symptoms of Listed Potential Problems Will be Absent or Manageable (Stroke)  Outcome: Ongoing (interventions implemented as appropriate)    Problem: Fall Risk (Adult)  Goal: Identify Related Risk Factors and Signs and Symptoms  Outcome: Ongoing (interventions implemented as appropriate)  Goal: Absence of Falls  Outcome: Ongoing (interventions implemented as appropriate)    Problem: Skin Integrity Impairment, Risk/Actual (Adult)  Goal: Identify Related Risk Factors and Signs and Symptoms  Outcome: Ongoing (interventions implemented as appropriate)  Goal: Skin Integrity/Wound Healing  Outcome: Ongoing (interventions implemented as appropriate)    Problem: Infection, Risk/Actual (Adult)  Goal: Identify Related Risk Factors and Signs and Symptoms  Outcome: Ongoing (interventions implemented as appropriate)  Goal: Infection Prevention/Resolution  Outcome: Ongoing (interventions implemented as appropriate)    Problem: SAFETY - NON-VIOLENT RESTRAINT  Goal: Remains free of injury from restraints (Non-Violent Restraint)  Outcome: Ongoing (interventions implemented as appropriate)  Goal: Free from restraint(s) (Non-Violent Restraint)  Outcome: Ongoing (interventions implemented as appropriate)    Problem: NPPV/CPAP (Adult)  Goal: Signs and Symptoms of Listed Potential Problems Will be Absent or Manageable (NPPV/CPAP)  Outcome: Ongoing (interventions implemented as appropriate)    Problem: Pressure Ulcer Risk (South Scale) (Adult,Obstetrics,Pediatric)  Goal: Identify Related Risk Factors and Signs and Symptoms  Outcome: Ongoing (interventions  implemented as appropriate)  Goal: Skin Integrity  Outcome: Ongoing (interventions implemented as appropriate)

## 2017-04-24 NOTE — PROGRESS NOTES
Adult Nutrition  Assessment/PES    Patient Name:  Tomas Salvador  YOB: 1955  MRN: [id number]  Admit Date:  4/4/2017    Assessment Date:  4/24/2017     Comments: RD follow-up for pt on nutrition support, pt continues to tolerate Impact Peptide 1.5 at 65 ml/hr with free water at 50 ml/hr. Will continue to follow.           Reason for Assessment       04/24/17 1113    Reason for Assessment    Reason For Assessment/Visit multidisciplinary rounds;follow up protocol;TF/PN    Time Spent (min) 30    Diagnosis --   per notes this adm    Other diagnosis per PT wound (4/24)- Depth of wound noted to be decreasing with good granulation base noted. No s/s of infection in wound and decreasing exudate noted. PT will cont with light debridement and decrease freq of drsg change to 3x/week   Principal Problem:    CVA 4/4/17 with rapid improvement  Active Problems:    Ischemic cardiomyopathy with EF 10% on Echo 4/4/17    ICD in place, implantation 2015.    S/P Sepsis due to  source 3/30/17    Diabetes Mellitus HgbA1c 10.4    Chronic hypoxic respiratory failure on nocturnal o2    CAD s/p CABG 2005, [facility] 2015. Grafts patent except SVG to diagonal    Urinary retention requiring colbert    Hx of PVD (peripheral vascular disease)    Recent E. 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 Debrided in [place] 3/30/17    Candida glabrata UTI. Indwelling Colbert catheter    Atrial fibrillation but now converted. Still on Eliquis             Nutrition/Diet History       04/24/17 1115    Nutrition/Diet History    Reported/Observed By RN    Other plans for family conference with palliative tomorrow (4/25)              Labs/Tests/Procedures/Meds       04/24/17 1115    Labs/Tests/Procedures/Meds    Labs/Tests Review Reviewed;Na+;Glucose   adjusting insulin    Medication Review Reviewed, pertinent     Results from last 7 days  Lab Units 04/23/17  5351  04/18/17  3421  "  SODIUM mmol/L 146  < > 145   POTASSIUM mmol/L 3.7  < > 4.1   CHLORIDE mmol/L 106  < > 105   TOTAL CO2 mmol/L 36.0*  < > 38.0*   BUN mg/dL 47*  < > 51*   CREATININE mg/dL 0.50*  < > 0.70   CALCIUM mg/dL 9.1  < > 9.0   BILIRUBIN mg/dL  --   --  0.2*   ALK PHOS U/L  --   --  102*   ALT (SGPT) U/L  --   --  6*   AST (SGOT) U/L  --   --  30   GLUCOSE mg/dL 164*  < > 171*   < > = values in this interval not displayed.           Estimated/Assessed Needs       04/24/17 1116    Calculation Measurements    Weight Used For Calculations 98 kg (216 lb)   per wt on (4/14)    Height Used for Calculations 1.753 m (5' 9\")    Estimated/Assessed Energy Needs    kcal/kg 14    14 Kcal/Kg (kcal) 1371.68    Age 61    RMR ([place]-St. Jeor Equation) 1770.14    Estimated/Assessed Protein Needs    Weight Used for Protein Calculation 72.6 kg (160 lb)   IBW    Protein (gm/kg) 2.0    2.0 Gm Protein (gm) 145.15            Nutrition Prescription Ordered       04/24/17 1118    Nutrition Prescription PO    Current PO Diet NPO    Nutrition Prescription EN    Enteral Route PEG    TF Delivery Method Continuous    Continuous TF Goal Rate (mL/hr) 65 mL/hr    Continuous TF Current Rate (mL/hr) 65 mL/hr    Continuous TF Goal Volume (mL) 1300 mL    Water flush (mL)  50 mL    Water Flush Frequency Per hour            Evaluation of Received Nutrient/Fluid Intake       04/24/17 1119    Evaluation of Received Nutrient/Fluid Intake    Number of Days Evaluated 2 days    Calorie Intake Evaluation    Enteral Calories (kcal) 1950    Total Calories (kcal) 1950    % Kcal Needs 108    Protein Intake Evaluation    Enteral Protein (gm) 122   =1.2 g/kg actual wt    Total Protein (gm) 122    % Protein Needs 84    EN Evaluation    Number of Days EN Intake Evaluated 2 days    EN Average Volume Delivered (mL/day) 1300 mL/day              Problem/Interventions:        Problem 1       04/24/17 1120    Nutrition Diagnoses Problem 1    Problem 1 Needs Alternate Route    " Etiology (related to) --   clinical condition/encephalopathy/dysphagia    Signs/Symptoms (evidenced by) NPO                    Intervention Goal       04/24/17 1120    Intervention Goal    General Nutrition support treatment    TF/PN Maintain TF/PN            Nutrition Intervention       04/24/17 1120    Nutrition Intervention    RD/Tech Action Follow Tx progress;Care plan reviewd            Nutrition Prescription       04/24/17 1120    Nutrition Prescription EN    Enteral Prescription Continue same protocol            Education/Evaluation       04/24/17 1120    Monitor/Evaluation    Monitor Per protocol;TF delivery/tolerance;Pertinent labs;Symptoms;Skin status          Electronically signed by:  Celeste Sterling MS RD/LD [facility]  04/24/17 11:24 AM

## 2017-04-24 NOTE — PROGRESS NOTES
"[facility] Progress Note    Admission Date: 4/4/2017    Tomas Salvador  1955  [id number]    Date: 4/24/2017    Meds:    IV Anti-Infectives     Ordered     Dose/Rate Route Frequency Start Stop    04/20/17 0718  ceFAZolin in dextrose (ANCEF) IVPB solution 2 g     Ordering Provider:  Satya Fall MD    2 g  over 30 Minutes Intravenous Once 04/21/17 0800 04/21/17 0815    04/19/17 1145  amphotericin B (FUNGIZONE) 50 mg in sterile water (preservative free) 1,000 mL irrigation     Ordering Provider:  Kike Leon MD    42 mL/hr  42 mL/hr  Irrigation Every 24 Hours 04/19/17 1300            CC:  Delisa's gangrene    SUBJECTIVE:  4/4/17:Patient is a 61 y.o. 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 He has a history of underlying diabetes mellitus and severe systolic congestive heart failure. He presented to [facility] on 3/30 with scrotal cellulitis/gangrene. He underwent debridement by urology at [facility] on 3/31 and was treated with Merrem/clindamycin/vancomycin. He developed left facial droop and left sided paresis, prompting a transfer to [facility] for evaluation and therapy of acute stroke. His head CT angiogram was degraded by motion artifact but per Dr. Morrison there was suggestion of right inferior temporal lobe\" opacification\" consistent with a possible stroke. His left facial droop and left-sided paresis has improved today. He is encephalopathic and is unable to provide any reliable history. His wife thinks that he may have had some fevers prior to his admission on 3/30.    4/5/17: afebrile. No hx per patient secondary to encephalopathy.  Afebrile, restless per nsg staff.  Oliguric UOP.  No n/v/d.  No rashes.  4/6/17: More alert and less restless today.  Still non-responsive to questions and does not follow simple commands.  Still with left-sided weakness.  Afebrile.  Still with oliguric UOP.  No n/v/d, no rashes, per nsg staff notes. " "  4/7/17:  Alert today.  No fever,chills,sweats.  Wife at bedside with ?s   4/10/17:  Wants to go home.  Wound care per PT.  No n/v/d.   4/11/17:  Now on Dopamine and Bautista, bipap.  Decompensated last pm.    4/12/17:  Somnolent; still on pressors   4/13/17 Still on pressors/Precedex; Now on bipap.  Had pulled corpak out night before.    4/14/17: Per RN staff notes: afebrile, on/off precedex for agitation, on dopamine, on flagyl and cefazolin, on and off bipap.  Can not keep clothes on him.  Attempted to do Is and Os but too difficult without his foreskin getting pulled into meatus, so therefore, just placed Cueto cath.  Not anchored, because he keeps trying to pull it out.  Would is getting daily misted by PT and packed.  He is more alert today, but still not oriented.  4/15/17: He is improved today and off of vasopressor therapy.  He has been receiving some diuretic therapy for pulmonary edema.  He is less confused.  He remains afebrile.  4/16/17: He is continued to receive diuretic therapy.  He remains off of vasopressor therapy.  He has remained afebrile  4/17/17:  Remains restrained.  Opens eyes to voice.  Nonproductive cough.   4/18/17:  Opens eyes.  Does not follow commands.  His Cueto catheter is still in place and he is undergoing diuresis for his CHF/pulmonary edema.  4/19/17:  More alert today.  \"wants Scottie\".  PT just changed dressing.  He remains off of vasopressor therapy.  He is continued to receive diuresis.  The nursing staff reports that he has some penile edema, making it difficult to in and out catheterize him.  4/20/17:  On Bipap.  Opens eyes to voice.   4/21/17:  He has been less responsive today.  He is not currently following commands.  He still has hypoxia.  4/22/17: He has been more responsive today.  He is still severely confused and has required mitts on his hands.  He is still requiring 5 L of supplemental oxygen.  He has remained afebrile.  4/24/17:  Just taken off bipap.  Apparently not a " candidate for [place] rehab.  Remains confused.      PE:   Vital Signs  Temp (24hrs), Av.9 °F (36.6 °C), Min:97.6 °F (36.4 °C), Max:98.5 °F (36.9 °C)    Temp  Min: 97.6 °F (36.4 °C)  Max: 98.5 °F (36.9 °C)  BP  Min: 91/62  Max: 127/77  Pulse  Min: 72  Max: 101  Resp  Min: 18  Max: 25  SpO2  Min: 63 %  Max: 100 %    GENERAL:   He is is in no acute distress.  He is alert but confused.  HEENT: Normocephalic, atraumatic. PERRL. EOMI. No conjunctival injection. No icterus. Oropharynx clear without evidence of thrush or exudate.   HEART: RRR; No murmur, rubs, gallops.   LUNGS:  Diminished, few rhonchi anteriorly   ABDOMEN: Soft, nontender, nondistended. Positive bowel sounds. No rebound or guarding. NO mass or HSM. Peg   EXT: No cyanosis, clubbing or edema. No cord.  : The lower, posterior midline scrotal wounds reveals some continued healing with no residual cellulitis and no purulent drainage.  Dressing intact   A Cueto catheter is in place  MSK: FROM without joint effusions noted arms/legs.   SKIN: Warm and dry without cutaneous eruptions on Inspection/palpation.   Neurologic: He is more alert but remains confused .  He moves all 4 extremities.  Right PICC without redness      Laboratory Data      Results from last 7 days  Lab Units 17  0526 17  0417 17  0611   WBC 10*3/mm3 7.81 11.10* 10.91*   HEMOGLOBIN g/dL 9.7* 10.8* 10.6*   HEMATOCRIT % 32.0* 35.9* 36.0*   PLATELETS 10*3/mm3 269 305 346       Results from last 7 days  Lab Units 17  0526   SODIUM mmol/L 146   POTASSIUM mmol/L 3.7   CHLORIDE mmol/L 106   TOTAL CO2 mmol/L 36.0*   BUN mg/dL 47*   CREATININE mg/dL 0.50*   GLUCOSE mg/dL 164*   CALCIUM mg/dL 9.1       Results from last 7 days  Lab Units 17  0358   ALK PHOS U/L 102*   BILIRUBIN mg/dL 0.2*   ALT (SGPT) U/L 6*   AST (SGOT) U/L 30       Results from last 7 days  Lab Units 17  0429   SED RATE mm/hr 95*       Results from last 7 days  Lab Units 17  1155   CRP  mg/dL 2.82*       4/17:  UA small amount of LE; UC   Candida Glabrata           Estimated Creatinine Clearance: 195 mL/min (by C-G formula based on Cr of 0.5).    Microbiology:  Blood Culture   Date Value Ref Range Status   03/30/2017 No growth at 5 days  Final   03/30/2017 No growth at 5 days  Final           Urine Culture   Date Value Ref Range Status   04/04/2017 No growth  Preliminary   03/31/2017 No growth  Final   03/30/2017 >100,000 CFU/mL Escherichia coli (A)  Final      scrotal cx x 2 cxs (3/31) E. coli  Cefazolin sens.    Urine Culture   Date Value Ref Range Status   04/11/2017 No growth at 2 days  Final   04/07/2017 >100,000 CFU/mL Candida glabrata (A)  Final         Radiology:  Imaging Results (last 24 hours)     Procedure Component Value Units Date/Time    XR Chest 1 View [[id number]] Collected:  04/23/17 1411     Updated:  04/23/17 1422    Narrative:          EXAMINATION: XR CHEST 1 VW - 04/23/2017     INDICATION: R13.10-Dysphagia, unspecified; Z74.09-Other reduced  mobility; R41.841-Cognitive communication deficit.      COMPARISON: 4/22/2017.     FINDINGS: Cardiac silhouette is normal. There are patchy bilateral  airspace opacities relatively sparing the apical regions. There has been  no improvement when compared to previous examination of 4/22/2017.           Impression:       Widespread bilateral airspace disease, unchanged since  4/22/2017.      DICTATED:     04/23/2017  EDITED:         04/23/2017     This report was finalized on 4/23/2017 2:20 PM by Dr. David Murillo MD.          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  2. Acute right hemispheric cerebral vascular accident.  3.  Metabolic encephalopathy  4. 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 Severe systolic congestive heart failure-he has an ejection fraction of 10% with severe persistent symptomatic congestive heart  failure.  6. Escherichia coli urinary tract infection-improved  7. Sepsis-secondary to Delisa's gangrene, now improved  8. Leukocytosis/neutrophilia-secondary to scrotal gangrene/cellulitis, improved   9.  Pulmonary edema  10. Candiduria--this resolved with removal of his Cueto catheter.  The treatment for his previous candiduria was removal of the Cueto catheter.  Now has recurrent candiduria after his Cueto catheter was placed back in.  He is currently on amphotericin B bladder irrigations         PLAN/RECOMMENDATIONS:   1.  Continue scrotal wound care  2.  Remove the Cueto catheter when feasible  3.  Amphotericin B bladder irrigations   4.  Continue off of antibacterial antibiotic therapy  5.  Family meeting scheduled for Tuesday     Although his Delisa's gangrene is better, he has persistent severe congestive heart failure and pulmonary edema.  He is unlikely to recover and hospice would be appropriate.    Dr. Leon has obtained the history, performed the physical exam and formulated the above treatment plan.       Kike Leon MD  4/24/2017  7:57 AM

## 2017-04-24 NOTE — SIGNIFICANT NOTE
Palliative Team Meeting Attendance  13:00 DO JESS Hurley, [facility]              EVENS Musa M.Div., [facility], [facility]              JELANI Olivares RN, PORTIA Fernandez

## 2017-04-24 NOTE — CONSULTS
Jose Navarro MD  Consulting physician: Artemio Patel  Reason for referral : goals of care discussion  No chief complaint on file.    HPI: 60yo male with history of DM, CAD, chronic respiratory failure with hypoxia presented to [facility] [place] on 3/30/17 from urologist office with a scrotal lesion and eventual diagnosis of Delisa's gangrene. On 3/31 urologist, Dr Johnson performed cystoscopy and scrotal exploration with debridement of necrotizing fascitis. On 4/4, patient was transferred to [facility] with aphasia and left side facial drooping and weakness. CTA showed old left frontal infarct and CT head w/o contrast showed no acute findings, old left frontal infarct. ECHO on 4/4/17 showed LVEF 10%. Patient has been managed for sepsis secondary to  source, currently on amphotericin bladder irrigation, wound care for debridement area of scrotum, acute on chronic respiratory failure with hypoxia and managed with intermittent BiPAP oxygen supplement.    Palliative medicine was consulted to further assist with goals of care discussion.   Palliative nurse discussed with wife on 4/23 and meeting arranged for 4/24 on 2:00pm for further goals of care discussion.     Symptoms:   No family at bedside.   Patient minimally answers yes, no questions. No pain, dyspnea, nausea.   + anxiety, unable to provide any other info.   Past Medical History:   Diagnosis Date   • CAD (coronary artery disease)    • Chronic back pain    • Chronic respiratory failure with hypoxia     Night time only at 2 liters/min   • Diabetes mellitus, type 2    • Essential hypertension    • Hyperlipidemia    • Ischemic cardiomyopathy    • NSTEMI (non-ST elevated myocardial infarction) 2015   • Obesity    • PVD (peripheral vascular disease)    • Systolic CHF     EF <20%  3/8/17   • Urinary retention      Past Surgical History:   Procedure Laterality Date   • CARDIAC CATHETERIZATION  05/2015    Severe multivessel coronary artery disease involving a totally occluded LAD,  99% proximal left circumflex artery; totally occluded saphenous vein graft to the diagonal; patent LIMA to the LAD, and a patent saphenous vein graft to the first obtuse marginal.      • CARDIAC DEFIBRILLATOR PLACEMENT  2015   • CORONARY ARTERY BYPASS GRAFT  2005    3 vessel   • CYSTOSCOPY N/A 3/31/2017    Procedure: CYSTOSCOPY and scrotal exploration with debridement of necrotizing fasciitis;  Surgeon: Mele Johnson MD;  Location:  [facility];  Service:    • ENDOSCOPY W/ PEG TUBE PLACEMENT N/A 4/21/2017    Procedure: ESOPHAGOGASTRODUODENOSCOPY WITH PERCUTANEOUS ENDOSCOPIC GASTROSTOMY TUBE INSERTION AT BEDSIDE;  Surgeon: Satya Fall MD;  Location:  [facility];  Service:    • LEG SURGERY Right     unknown, pos vascular bypass   • SCROTAL EXPLORATION N/A 3/31/2017    Procedure: SCROTAL EXPLORATION;  Surgeon: Mele Johnson MD;  Location:  [facility];  Service:      Current Facility-Administered Medications   Medication Dose Route Frequency Provider Last Rate Last Dose   • acetaZOLAMIDE (DIAMOX) injection  500 mg Intravenous Q12H Artemio Patel MD   500 mg at 04/24/17 1423   • amphotericin B (FUNGIZONE) 50 mg in sterile water (preservative free) 1,000 mL irrigation  42 mL/hr Irrigation Q24H Kike Leon MD 42 mL/hr at 04/24/17 1424 42 mL/hr at 04/24/17 1424   • apixaban (ELIQUIS) tablet 5 mg  5 mg Oral Q12H Satya Fall MD   5 mg at 04/24/17 0831   • [START ON 4/25/2017] aspirin chewable tablet 81 mg  81 mg Oral Daily Artemio Patel MD       • atorvastatin (LIPITOR) tablet 80 mg  80 mg Nasogastric Nightly Michael Gaona MD   80 mg at 04/23/17 2007   • bisacodyl (DULCOLAX) suppository 10 mg  10 mg Rectal Daily PRN TRESA Spicer   10 mg at 04/19/17 1243   • castor oil-balsam peru (VENELEX) ointment   Topical Q12H Ella Pope MD       • chlorothiazide (DIURIL) injection 500 mg  500 mg Intravenous Q12H Artemio Patel MD       •  dextrose (D50W) solution 25 g  25 g Intravenous Q15 Min PRN TRESA Baca   25 g at 04/22/17 0612   • dextrose (GLUTOSE) oral gel 15 g  15 g Oral Q15 Min PRN TRESA Baca       • digoxin (LANOXIN) tablet 250 mcg  250 mcg Oral Daily Artemio Patel MD   250 mcg at 04/24/17 1427   • glucagon (GLUCAGEN) injection 1 mg  1 mg Subcutaneous Q15 Min PRN TRESA Baca       • heparin flush (porcine) 100 UNIT/ML injection 500 Units  500 Units Intracatheter Q12H Ella Pope MD   500 Units at 04/24/17 0831   • hydrALAZINE (APRESOLINE) tablet 25 mg  25 mg Oral Q8H Artemio Patel MD   25 mg at 04/24/17 1427   • HYDROcodone-acetaminophen (NORCO) 7.5-325 MG per tablet 1 tablet  1 tablet Oral Q4H PRN Ella Pope MD   1 tablet at 04/17/17 1540   • insulin detemir (LEVEMIR) injection 20 Units  20 Units Subcutaneous QAM Artemio Patel MD   20 Units at 04/24/17 1120   • insulin regular (humuLIN R,novoLIN R) injection 0-14 Units  0-14 Units Subcutaneous Q6H Artemio Patel MD   5 Units at 04/24/17 1127   • ipratropium-albuterol (DUO-NEB) nebulizer solution 3 mL  3 mL Nebulization Q4H PRN Ella Pope MD   3 mL at 04/07/17 2254   • ipratropium-albuterol (DUO-NEB) nebulizer solution 3 mL  3 mL Nebulization 4x Daily - RT Ella Pope MD   3 mL at 04/24/17 1246   • isosorbide dinitrate (ISORDIL) tablet 10 mg  10 mg Oral Q8H Artemio Patel MD   10 mg at 04/24/17 1423   • [START ON 4/25/2017] lansoprazole (FIRST) oral suspension 30 mg  30 mg Oral QAM Artemio Patel MD       • LORazepam (ATIVAN) injection 0.5 mg  0.5 mg Intravenous Q8H PRN Michael Gaona MD   0.5 mg at 04/21/17 0418   • Magnesium Sulfate 2 gram Bolus, followed by 8 gram infusion (total Mg dose 10 grams)- Mg less than or equal to 1mg/dL  2 g Intravenous PRN Ella Pope MD        Or   • Magnesium Sulfate 6 gram  Infusion (2 gm x 3) -Mg 1.1 -1.5 mg/dL  2 g Intravenous PRN Ella Pope MD        Or   • magnesium sulfate 4 gram infusion- Mg 1.6-1.9 mg/dL  4 g Intravenous PRN Ella Pope MD 25 mL/hr at 04/13/17 0742 4 g at 04/13/17 0742   • potassium chloride (KLOR-CON) packet 40 mEq  40 mEq Oral Q12H Artemio Patel MD   40 mEq at 04/24/17 1423   • potassium chloride 20 mEq in 50 mL IVPB  20 mEq Intravenous Q1H PRN Michael Patel MD 50 mL/hr at 04/08/17 1023 20 mEq at 04/08/17 1023   • QUEtiapine (SEROquel) tablet 25 mg  25 mg Oral Q12H Michael Gaona MD   25 mg at 04/24/17 0831   • QUEtiapine (SEROquel) tablet 25 mg  25 mg Oral Q8H PRN Michael Gaona MD   25 mg at 04/20/17 1118   • saccharomyces boulardii (FLORASTOR) capsule 250 mg  250 mg Oral BID Ella Pope MD   250 mg at 04/24/17 0831   • sennosides (SENOKOT) 8.8 MG/5ML syrup 10 mL  10 mL Oral BID Artemio Patel MD       • sodium chloride 0.9 % flush 1-10 mL  1-10 mL Intravenous PRN Emeka Morrison MD            bisacodyl  •  dextrose  •  dextrose  •  glucagon (human recombinant)  •  HYDROcodone-acetaminophen  •  ipratropium-albuterol  •  LORazepam  •  magnesium sulfate **OR** magnesium sulfate **OR** magnesium sulfate  •  [DISCONTINUED] potassium chloride **OR** [DISCONTINUED] potassium chloride **OR** potassium chloride  •  QUEtiapine  •  sodium chloride  No Known Allergies  Family History   Problem Relation Age of Onset   • Diabetes Mother    • Hypertension Mother    • Coronary artery disease Mother      MI in her 40's   • Cancer Father      Throat   • Hypertension Father    • Hypertension Sister    • Hypertension Brother    • Hypertension Maternal Grandmother    • Hypertension Maternal Grandfather      Social History     Social History   • Marital status:      Spouse name: N/A   • Number of children: N/A   • Years of education: N/A     Occupational History   • Not on file.     Social History Main  "Topics   • Smoking status: Former Smoker     Packs/day: 1.50     Years: 43.00     Quit date: 2002   • Smokeless tobacco: Current User   • Alcohol use No   • Drug use: No   • Sexual activity: Defer     Other Topics Concern   • Not on file     Social History Narrative   Code Status: NO CODE STATUS discussion  Advance Directives:  None on medical record   Review of Systems - +/- per HPI and symptom review.      PPS: 20%  /72  Pulse 80  Temp 96.8 °F (36 °C) (Axillary)   Resp 16  Ht 69\" (175.3 cm)  Wt 262 lb (119 kg)  SpO2 98%  BMI 38.69 kg/m2  262 lb (119 kg) Body mass index is 38.69 kg/(m^2).  Intake & Output (last day)       04/23 0701 - 04/24 0700 04/24 0701 - 04/25 0700    I.V. (mL/kg)      Other 1182 360    NG/GT 1324 404    Irrigation 948 437    Total Intake(mL/kg) 3454 (29.1) 1201 (10.1)    Urine (mL/kg/hr) 2730 (1) 825 (0.9)    Total Output 2730 825    Net +724 +376                Physical Exam:    General Appearance:    No acute distress, ill appearing   Head:    Normocephalic, without obvious abnormality, atraumatic   Eyes:            Conjunctivae and sclerae normal, no icterus,  Right pupil reactive, left cataract                   Lungs:     Clear to auscultation,respirations regular, even and                  nonlabored    Heart:    Regular rhythm and normal rate, normal S1        Abdomen:     Normal bowel sounds, no masses, soft, non-distended, no       guarding, non tender, PEG tube       Extremities:  Upper extremities restrained with mittens, no redness, no cyanosis, x4 trace edema, no bilateral feet sores or wounds             Pulses:   Distal pulses palpable and equal bilaterally   Skin:   Wm, dry       Neurologic:   Unable to follow commands, lethargic, no myoclonus         Results from last 7 days  Lab Units 04/23/17  0526   WBC 10*3/mm3 7.81   HEMOGLOBIN g/dL 9.7*   HEMATOCRIT % 32.0*   PLATELETS 10*3/mm3 269       Results from last 7 days  Lab Units 04/24/17  1107   SODIUM mmol/L 148* "   POTASSIUM mmol/L 3.9   CHLORIDE mmol/L 104   TOTAL CO2 mmol/L 39.0*   BUN mg/dL 52*   CREATININE mg/dL 0.60   CALCIUM mg/dL 8.8   BILIRUBIN mg/dL 0.4   ALK PHOS U/L 135*   ALT (SGPT) U/L 12   AST (SGOT) U/L 28   GLUCOSE mg/dL 234*       Results from last 7 days  Lab Units 04/24/17  1107   SODIUM mmol/L 148*   POTASSIUM mmol/L 3.9   CHLORIDE mmol/L 104   TOTAL CO2 mmol/L 39.0*   BUN mg/dL 52*   CREATININE mg/dL 0.60   GLUCOSE mg/dL 234*   CALCIUM mg/dL 8.8     Imaging Results (last 72 hours)     Procedure Component Value Units Date/Time    XR Chest 1 View [[id number]] Collected:  04/22/17 1539     Updated:  04/22/17 1551    Narrative:          EXAMINATION: XR CHEST 1 VW- - 04/22/2017     INDICATION: R13.10-Dysphagia, unspecified; Z74.09-Other reduced  mobility;  R41.841-Cognitive communication deficit.     COMPARISON: 4/20/2017.     FINDINGS: The heart is large. There are bilateral perihilar and basilar  airspace changes, most striking in the left lower lobe. An implantable  cardio defibrillator is well positioned. There has been no change           Impression:       There has been no change since 4/20/2017.     DICTATED:     04/22/2017  EDITED:         04/22/2017     This report was finalized on 4/22/2017 3:49 PM by Dr. David Murillo MD.       XR Chest 1 View [[id number]] Collected:  04/23/17 1411     Updated:  04/23/17 1422    Narrative:          EXAMINATION: XR CHEST 1 VW - 04/23/2017     INDICATION: R13.10-Dysphagia, unspecified; Z74.09-Other reduced  mobility; R41.841-Cognitive communication deficit.      COMPARISON: 4/22/2017.     FINDINGS: Cardiac silhouette is normal. There are patchy bilateral  airspace opacities relatively sparing the apical regions. There has been  no improvement when compared to previous examination of 4/22/2017.           Impression:       Widespread bilateral airspace disease, unchanged since  4/22/2017.      DICTATED:     04/23/2017  EDITED:         04/23/2017     This report was  finalized on 4/23/2017 2:20 PM by Dr. David Murillo MD.           Impression: 61 y.o. male with Delisa's gangrene, systolic heart failure with LVEF 10%, ICM with ICD, left frontal infarct CVA, acute on chronic respiratory failure with hypoxia  Plan:   Dyspnea - intermittent use of BiPAP for oxygen supplement and relief of increase work of breathing.     Tiredness, lethargy - seriously ill    Goals of care discussion - family meeting planned for tomorrow at 2:00pm, arranged on Sunday by palliative care nurse.        Claudine Barron, APRN  [phone]  04/24/17  3:06 PM      Time: >40 minutes spent reviewing medical and medication records, assessing and examining patient, discussing with patient and nursing staff, following up about planned family meeting and documentation of care.

## 2017-04-24 NOTE — PLAN OF CARE
Problem: Patient Care Overview (Adult)  Goal: Plan of Care Review  Outcome: Ongoing (interventions implemented as appropriate)    04/24/17 0900   Patient Care Overview   Progress no change   Outcome Evaluation   Outcome Summary/Follow up Plan meet with wife to establish goc when she arrives. continue supportive care.

## 2017-04-25 NOTE — PROGRESS NOTES
Acute Care - Physical Therapy Treatment Note  [facility]     Patient Name: Tomas Salvador  : 1955  MRN: [id number]  Today's Date: 2017  Onset of Illness/Injury or Date of Surgery Date: 17  Date of Referral to PT: 17  Referring Physician: MD Jimenez    Admit Date: 2017    Visit Dx:    ICD-10-CM ICD-9-CM   1. Dysphagia, unspecified type R13.10 787.20   2. Impaired functional mobility, balance, gait, and endurance Z74.09 V49.89   3. Impaired mobility and ADLs Z74.09 799.89   4. Cognitive communication deficit R41.841 799.52     Patient Active Problem List   Diagnosis   • Coronary disease   • Ischemic cardiomyopathy with EF 10% on Echo 17   • Peripheral vascular disease   • ICD in place, implantation .   • Dyslipidemia, on atorvastain.    • S/P Sepsis due to  source 3/30/17   • CVA 17 with rapid improvement   • Diabetes Mellitus HgbA1c 10.4   • Chronic hypoxic respiratory failure on nocturnal o2   • CAD s/p CABG , [facility] . Grafts patent except SVG to diagonal   • Urinary retention requiring colbert   • Hyperlipidemia   • Essential hypertension   • Hx of PVD (peripheral vascular disease)   • Recent E. coli UTI now off therapy   • Epididymo-orchitis   • Encephalopathy acute   • [place] gangrene with E-Coli Cellulitis. Debrided in [place] 3/30/17   • Candida glabrata UTI. Indwelling Colbert catheter   • Atrial fibrillation but now converted. Still on Eliquis               Adult Rehabilitation Note       17 1410 17 1030 17 0915    Rehab Assessment/Intervention    Discipline physical therapist  -LS speech language pathologist  -SG physical therapist  -MF    Document Type therapy note (daily note)  -LS therapy note (daily note)  -SG therapy note (daily note)  -MF    Subjective Information agree to therapy  -LS no complaints;agree to therapy  -SG agree to therapy;no complaints  -MF    Patient Effort, Rehab Treatment adequate  -LS fair  -SG     Symptoms Noted  During/After Treatment  none  -SG     Precautions/Limitations fall precautions;oxygen therapy device and L/min  -LS      Precautions/Limitations, Vision  WFL  -SG     Precautions/Limitations, Hearing  WFL  -SG     Specific Treatment Considerations Pt currently requiring bi-pap due to decreased resp status this PM.   -LS      Recorded by [LS] Lily Juárez, PT [SG] Lyndsey Sultana, MS CCC-SLP [MF] Pee Roldan, PT    Vital Signs    Pre Systolic BP Rehab 113  -LS      Pre Treatment Diastolic BP 77  -LS      Post Systolic BP Rehab 115  -LS      Post Treatment Diastolic BP 75  -LS      Pretreatment Heart Rate (beats/min) 86  -LS      Posttreatment Heart Rate (beats/min) 84  -LS      Pre SpO2 (%) 90  -LS      O2 Delivery Pre Treatment supplemental O2   bi-pap mask  -LS      Post SpO2 (%) 100  -LS      O2 Delivery Post Treatment supplemental O2  -LS      Pre Patient Position Supine  -LS      Intra Patient Position Sitting  -LS      Post Patient Position Supine  -LS      Recorded by [LS] Lily Juárez, PT      Pain Assessment    Pain Assessment Jay-Kent FACES  -LS Jay-Kent FACES  -SG Jay-Baker FACES  -MF    Jay-Baker FACES Pain Rating 0  -LS 0  -SG 0  -MF    Pain Score 0  -LS 0  -SG     Post Pain Score 0  -LS 0  -SG     Recorded by [LS] Lily Juárez, PT [SG] Lyndsey Sultana, MS CCC-SLP [MF] Pee Roldan, PT    Cognitive Assessment/Intervention    Current Cognitive/Communication Assessment impaired  -LS      Orientation Status oriented to;person   responds to name  -LS      Follows Commands/Answers Questions able to follow single-step instructions;needs cueing;needs increased time;needs repetition   less than 10%  -LS      Personal Safety severe impairment;decreased awareness, need for assist;decreased awareness, need for safety;decreased insight to deficits  -LS      Personal Safety Interventions fall prevention program maintained;nonskid shoes/slippers when out of bed;supervised  activity  -LS      Recorded by [LS] Lily Juárez, PT      Improve ability to follow directions    Improve ability to follow directions:  one-step directions without objects;100%;without cues  -SG     Status: Improve ability to follow directions  Progress slower than expected  -SG     Ability to Follow Directions Progress  40%;with consistent cues;continue to address  -SG     Recorded by  [SG] Lyndsey Sultana MS CCC-SLP     Improve ability to comprehend questions    Improve ability to comprehend questions:  simple yes/no questions;simple general questions;100%;without cues  -SG     Status: Improve ability to comprehend questions  Progress slower than expected  -SG     Ability to Comprehend Questions Progress  50%;with consistent cues;continue to address  -SG     Recorded by  [SG] Lyndsey Sultana MS CCC-SLP     Improve ability to construct phrase and sentence level responses    Improve ability to construct phrase and sentence level responses by:  answering question with phrase;100%;without cues  -SG     Status: Improve ability to construct phrase and sentence level responses  Progress slower than expected  -SG     Constructing Phrase and Sentence Level Responses Progress  50%;continue to address;with consistent cues  -SG     Recorded by  [SG] Lyndsey Sultana MS CCC-SLP     Improve attention    Improve attention by:  attending to task;complete selective attention task;80%;with consistent cues  -SG     Status: Improve attention  Progressing as expected  -SG     Attention Progress  40%;with consistent cues;continue to address  -SG     Recorded by  [SG] Lyndsey Sultana MS CCC-SLP     Improve orientation    Improve orientation through:  demonstrating orientation to day;demonstrating orientation to month;demonstrating orientation to year;demonstrating orientation to place;demonstrating orientation to disease/impairment;100%;with inconsistent cues  -SG     Status: Improve  "orientation through  Progressing as expected  -SG     Orientation Progress  40%;with consistent cues;continue to address  -SG     Recorded by  [SG] Lyndsey Sultana MS CCC-SLP     Improve functional problem solving    Improve functional problem solving through:  determine solutions to simple ADL/safety problems;answer \"what if\" questions;complete basic reasoning task;complete organization/home management task;70%;with consistent cues  -SG     Status: Improve functional problem solving  Progress slower than expected  -SG     Functional Problem Solving Progress  30%;with consistent cues;continue to address  -SG     Recorded by  [SG] Lyndsey uSltana MS CCC-SLP     Improve timing of pharyngeal response    To improve timing of pharyngeal response, patient will:  Swallow in timely way using three second prep;Swallow in timely way using super-supraglottic swallow;80%;with consistent cues  -SG     Status: Improve timing of pharyngeal response  Progress slower than expected  -SG     Timing of Pharyngeal Response Progress  10%;with consistent cues;continue to adress  -SG     Recorded by  [SG] Lyndsey Sultana MS CCC-SLP     Improve laryngeal closure    To improve laryngeal closure, patient will:  Complete supraglottic swallow;80%;with consistent cues  -SG     Status: Improve laryngeal closure  Progress slower than expected  -SG     Laryngeal Closure Progress  0%;with consistent cues;continue to adress  -SG     Recorded by  [SG] Lyndsey Sultana MS CCC-SLP     Improve closure at entrance to airway    To improve closure at entrance to airway, patient will:  Complete super-supraglottic swallow;80%;with consistent cues  -SG     Status: Improve closure at entrance to airway  Progress slower than expected  -SG     Closure at Entrance to Airway Progress  0%;with consistent cues;continue to adress  -SG     Recorded by  [SG] Lyndsey Sultana MS CCC-SLP     Improve " hyolaryngeal excursion    To improve hyolaryngeal excursion, patient will:  Complete chin tuck against resistance (comment number of repetitions);90%;with consistent cues  -SG     Status: Improve hyolaryngeal excursion  Progress slower than expected  -SG     Hyolaryngeal Excursion Progress  continue to adress  -SG     Recorded by  [SG] Lyndsey Sultana MS CCC-SLP     Improve tongue base & pharyngeal wall squeeze    To improve tongue base & pharyngeal wall squeeze, patient will:  Complete effortful swallow;80%;with consistent cues  -SG     Status: Improve tongue base & pharyngeal wall squeeze  Progress slower than expected  -SG     Tongue Base/Pharyngeal Wall Squeeze Progress  40%;with consistent cues;continue to adress  -SG     Recorded by  [SG] Lyndsey Sultana MS CCC-SLP     Bed Mobility, Assessment/Treatment    Bed Mobility, Assistive Device head of bed elevated;draw sheet  -LS      Bed Mobility, Scoot/Bridge, [place] dependent (less than 25% patient effort);2 person assist required;verbal cues required  -LS      Bed Mob, Supine to Sit, [place] maximum assist (25% patient effort);2 person assist required;verbal cues required  -LS      Bed Mob, Sit to Supine, [place] maximum assist (25% patient effort);2 person assist required;verbal cues required  -LS      Bed Mobility, Safety Issues cognitive deficits limit understanding;decreased use of arms for pushing/pulling;decreased use of legs for bridging/pushing  -LS      Recorded by [LS] Lily Juárez, PT      Transfer Assessment/Treatment    Transfers, Bed-Chair [place] not appropriate to assess   Rn requested back to bed for safety.   -LS      Recorded by [LS] Lily Juárez, PT      Gait Assessment/Treatment    Gait, [place] Level not appropriate to assess  -LS      Recorded by [LS] Lily Juárez, PT      Motor Skills/Interventions    Additional Documentation Balance Skills Training (Group)  -LS      Recorded by [LS]  Lily Juárez, PT      Balance Skills Training    Sitting-Level of Assistance Moderate assistance   progressed to min A  -LS      Sitting-Balance Support Feet supported  -LS      Sitting-Balance Activities Trunk control activities  -      Sitting # of Minutes 12  -LS      Recorded by [LS] Lily Juárez, PT      Therapy Exercises    Bilateral Lower Extremities PROM:;10 reps;supine;ankle pumps/circles;hip abduction/adduction;heel slides  -LS      Bilateral Upper Extremity AAROM:;10 reps;elbow flexion/extension;shoulder abduction/adduction;shoulder extension/flexion  -LS      Recorded by [LS] Lily Juárez, PT      Positioning and Restraints    Pre-Treatment Position in bed  -LS  in bed  -    Post Treatment Position bed  -LS  bed  -MF    In Bed notified nsg;supine;call light within reach;encouraged to call for assist;with family/caregiver;with other staff;RUE elevated;LUE elevated   palliative APRN present  -LS  supine;notified nsg  -    Restraints released:;reapplied:;notified nsg:;mitten  -LS      Recorded by [LS] Lily Juárez, PT  [MF] Pee Roldan, PT      04/24/17 0850          Rehab Assessment/Intervention    Discipline physical therapist  -      Document Type therapy note (daily note)  -      Subjective Information agree to therapy;complains of;weakness;fatigue  -      Recorded by [MF] Pee Roldan, PT      Pain Assessment    Pain Assessment Jay-Baker FACES  -      Jay-Baker FACES Pain Rating 0  -MF      Pain Intervention(s) Repositioned  -MF      Recorded by [MF] Pee Roldan, PT      Cognitive Assessment/Intervention    Current Cognitive/Communication Assessment impaired  -      Orientation Status disoriented x 4  -MF      Recorded by [MF] Pee Roldan, PT      Positioning and Restraints    Pre-Treatment Position in bed  -MF      Post Treatment Position bed  -MF      In Bed supine;call light within reach;notified nsg  -      Recorded by [MF] Pee Roldan, PT         User Key  (r) = Recorded By, (t) = Taken By, (c) = Cosigned By    Initials Name Effective Dates     Pee Roldan, PT 06/19/15 -     SG Lyndsey Puckettstu Farmer-Jose David, MS [facility]-SLP 06/22/15 -     LS Lily ROBIN Juárez, PT 06/19/15 -                 IP PT Goals       04/25/17 1442 04/24/17 0850 04/22/17 1721    Bed Mobility PT LTG    Bed Mobility PT LTG, Outcome goal ongoing  -LS      Transfer Training PT LTG    Transfer Training PT LTG, Outcome goal ongoing  -LS      Gait Training PT LTG    Gait Training Goal PT LTG, Outcome goal ongoing  -LS      Wound Care PT LTG    Wound Care PT LTG 1, Outcome  goal partially met  - goal ongoing  -MW      04/22/17 1153 04/20/17 1118 04/20/17 1115    Bed Mobility PT LTG    Bed Mobility PT LTG, [place] Level  minimum assist (75% patient effort)  -LS     Bed Mobility PT LTG, Date Goal Reviewed  04/20/17  -LS     Bed Mobility PT LTG, Outcome goal ongoing  -SJ goal revised  -LS     Transfer Training PT LTG    Transfer Training PT LTG, [place] Level  minimum assist (75% patient effort)  -LS     Transfer Training PT  LTG, Date Goal Reviewed  04/20/17  -LS     Transfer Training PT LTG, Outcome goal ongoing  -SJ goal revised  -LS     Gait Training PT LTG    Gait Training Goal PT LTG, [place] Level   minimum assist (75% patient effort);2 person assist required  -LS    Gait Training Goal PT LTG, Distance to Achieve   100  -LS    Gait Training Goal PT LTG, Date Goal Reviewed   04/20/17  -LS    Gait Training Goal PT LTG, Outcome goal ongoing  -SJ  goal revised  -LS      04/19/17 0835 04/16/17 1450 04/15/17 1455    Wound Care PT LTG    Wound Care PT LTG 1, Outcome goal ongoing  -MC goal ongoing  -MC goal ongoing  -MC      04/14/17 1120          Wound Care PT LTG    Wound Care PT LTG 1, Outcome goal ongoing  -MC        User Key  (r) = Recorded By, (t) = Taken By, (c) = Cosigned By    Initials Name Provider Type     Pee Roldan, PT Physical Therapist    GERMAN Baptiste,  PT Physical Therapist    ANDREWS Juárez, PT Physical Therapist    CHRISTOPHER Ballesteros, PT Physical Therapist    MC Rekha Mcgarry, PT Physical Therapist          Physical Therapy Education     Title: PT OT SLP Therapies (Active)     Topic: Physical Therapy (Active)     Point: Mobility training (Active)    Learning Progress Summary    Learner Readiness Method Response Comment Documented by Status   Patient Acceptance E,D NR   04/25/17 1442 Active    Acceptance E NR  HA 04/23/17 1932 Active    Acceptance E NR,NL   04/22/17 1155 Active    Acceptance E,D NR   04/20/17 1114 Active    Acceptance E VU  AP 04/18/17 0557 Done    Acceptance E NR   04/09/17 1551 Active    Acceptance E NR  EDOUARD 04/07/17 1534 Active    Acceptance E,D NR   04/06/17 1523 Active    Acceptance E,D NR   04/05/17 0908 Active   Family Acceptance E VU  AP 04/18/17 0557 Done   Significant Other Acceptance E,D NR   04/25/17 1442 Active    Acceptance E NR  HA 04/23/17 1932 Active    Acceptance E,D NR   04/06/17 1523 Active    Acceptance E,D NR   04/05/17 0908 Active               Point: Home exercise program (Active)    Learning Progress Summary    Learner Readiness Method Response Comment Documented by Status   Patient Acceptance E,D NR   04/25/17 1442 Active    Acceptance E NR  HA 04/23/17 1932 Active    Acceptance E NR,NL   04/22/17 1155 Active    Acceptance E,D NR   04/20/17 1114 Active    Acceptance E VU  AP 04/18/17 0557 Done    Acceptance E NR  EDOUARD 04/07/17 1534 Active    Acceptance E,D NR   04/06/17 1523 Active   Family Acceptance E VU  AP 04/18/17 0557 Done   Significant Other Acceptance E,D NR   04/25/17 1442 Active    Acceptance E NR  HA 04/23/17 1932 Active    Acceptance E,D NR   04/06/17 1523 Active               Point: Body mechanics (Active)    Learning Progress Summary    Learner Readiness Method Response Comment Documented by Status   Patient Acceptance E,D NR   04/25/17 1442 Active    Acceptance E NR  HA  04/23/17 1932 Active    Acceptance E NR,NL   04/22/17 1155 Active    Acceptance E,D NR   04/20/17 1114 Active    Acceptance E VU  AP 04/18/17 0557 Done    Acceptance E NR   04/09/17 1551 Active    Acceptance E NR  EDOUARD 04/07/17 1534 Active    Acceptance E,D NR   04/06/17 1523 Active    Acceptance E,D NR   04/05/17 0908 Active   Family Acceptance E VU  AP 04/18/17 0557 Done   Significant Other Acceptance E,D NR   04/25/17 1442 Active    Acceptance E NR  HA 04/23/17 1932 Active    Acceptance E,D NR   04/06/17 1523 Active    Acceptance E,D NR   04/05/17 0908 Active               Point: Precautions (Active)    Learning Progress Summary    Learner Readiness Method Response Comment Documented by Status   Patient Acceptance E,D NR   04/25/17 1442 Active    Acceptance E NR  HA 04/23/17 1932 Active    Acceptance E NR,NL   04/22/17 1155 Active    Acceptance E,D NR   04/20/17 1114 Active    Acceptance E VU  AP 04/18/17 0557 Done    Acceptance E NR   04/09/17 1551 Active    Acceptance E NR   04/07/17 1534 Active    Acceptance E,D NR   04/06/17 1523 Active    Acceptance E,D NR   04/05/17 0908 Active   Family Acceptance E VU  AP 04/18/17 0557 Done   Significant Other Acceptance E,D NR   04/25/17 1442 Active    Acceptance E NR  HA 04/23/17 1932 Active    Acceptance E,D NR   04/06/17 1523 Active    Acceptance E,D NR   04/05/17 0908 Active                      User Key     Initials Effective Dates Name Provider Type Discipline     06/19/15 -  Anita Prado, PT Physical Therapist PT     06/19/15 -  Cinthia Baptiste, PT Physical Therapist PT     06/19/15 -  Lily Juárez, PT Physical Therapist PT    HA 06/16/16 -  Valdemar Levy, RN Registered Nurse Nurse    AP 06/16/16 -  Shahla Sherman RN Registered Nurse Nurse     09/06/16 -  Mikey Moore, PT Physical Therapist PT                    PT Recommendation and Plan  Anticipated Discharge Disposition: skilled nursing facility  PT  Frequency: daily  Plan of Care Review  Plan Of Care Reviewed With: patient, spouse  Progress: progress towards functional goals is fair  Outcome Summary/Follow up Plan: Pt demonstrated ability to maintain stable O2 sats on bi-pap during EOB sitting for 12 min. Ther ex and further mobility was limited by pt's decreased following of commands for active participation in session. Provided edu to pt's wife re: proper technique in assisting pt with ROM exercises in bed. Will cont PT POC as clinically warranted with close monitoring of appropriateness of further therapy pending goals of care decisions. Will plan to decrease frequency to 3x/wk next session if active participation does not improve.           Outcome Measures       04/25/17 1410          How much help from another person do you currently need...    Turning from your back to your side while in flat bed without using bedrails? 2  -LS      Moving from lying on back to sitting on the side of a flat bed without bedrails? 2  -LS      Moving to and from a bed to a chair (including a wheelchair)? 1  -LS      Standing up from a chair using your arms (e.g., wheelchair, bedside chair)? 1  -LS      Climbing 3-5 steps with a railing? 1  -LS      To walk in hospital room? 1  -LS      AM-PAC 6 Clicks Score 8  -LS      Functional Assessment    Outcome Measure Options AM-PAC 6 Clicks Basic Mobility (PT)  -        User Key  (r) = Recorded By, (t) = Taken By, (c) = Cosigned By    Initials Name Provider Type     Lily Juárez, PT Physical Therapist           Time Calculation:         PT Charges       04/25/17 1447 04/25/17 0915       Time Calculation    Start Time 1410  - 0915  -     PT Received On 04/25/17  -      PT Goal Re-Cert Due Date 04/30/17  -LS 04/30/17  -     Time Calculation- PT    Total Timed Code Minutes- PT 23 minute(s)  -LS 10 minute(s)  -       User Key  (r) = Recorded By, (t) = Taken By, (c) = Cosigned By    Initials Name Provider Type      Pee Roldan, PT Physical Therapist    LS Lily Juárez, PT Physical Therapist          Therapy Charges for Today     Code Description Service Date Service Provider Modifiers Qty    34192038365 HC PT THER PROC EA 15 MIN 4/25/2017 Lily Juárez, PT GP 2    82842719849 HC PT THER SUPP EA 15 MIN 4/25/2017 Lily Juárez, PT GP 2          PT G-Codes  Outcome Measure Options: AM-PAC 6 Clicks Basic Mobility (PT)    Lily Juárez, PT  4/25/2017

## 2017-04-25 NOTE — PLAN OF CARE
Problem: Patient Care Overview (Adult)  Goal: Plan of Care Review  Outcome: Ongoing (interventions implemented as appropriate)    04/25/17 0915   Coping/Psychosocial Response Interventions   Plan Of Care Reviewed With patient   Outcome Evaluation   Outcome Summary/Follow up Plan Dressing intact with no significant increase in exudate noted. PT will cont with dressing change MWF at this point and allow for decreased inflammation with decreased dressing change freq.          Problem: Inpatient Physical Therapy  Goal: Wound Care Goal 1 LTG- PT  Outcome: Ongoing (interventions implemented as appropriate)    04/06/17 1025 04/24/17 0850   Wound Care PT LTG   Wound Care PT LTG 1, Date Established 04/06/17 --    Wound Care PT LTG 1, Time to Achieve 1 wk --    Wound Care PT LTG 1, Location Scrotum --    Wound Care PT LTG 1, No S&S of Infection yes --    Wound Care PT LTG 1, Decrease Wound Size 10% --    Wound Care PT LTG 1, Decrease Necrotic Tissue 50% --    Wound Care PT LTG 1, Decrease Exudate minimum --    Wound Care PT LTG 1, No New Skin Break Down yes --    Wound Care PT LTG 1, Education dressing changes;wound care --    Wound Care PT LTG 1, Education Understanding verbalize understanding --    Wound Care PT LTG 1, Outcome --  goal partially met

## 2017-04-25 NOTE — PROGRESS NOTES
Acute Care - Speech Language Pathology Treatment Note  [facility]     Patient Name: Tomas Salvador  : 1955  MRN: [id number]  Today's Date: 2017  Referring Physician: MARKOS      Admit Date: 2017    Visit Dx:      ICD-10-CM ICD-9-CM   1. Dysphagia, unspecified type R13.10 787.20   2. Impaired functional mobility, balance, gait, and endurance Z74.09 V49.89   3. Impaired mobility and ADLs Z74.09 799.89   4. Cognitive communication deficit R41.841 799.52     Patient Active Problem List   Diagnosis   • Coronary disease   • Ischemic cardiomyopathy with EF 10% on Echo 17   • Peripheral vascular disease   • ICD in place, implantation .   • Dyslipidemia, on atorvastain.    • S/P Sepsis due to  source 3/30/17   • CVA 17 with rapid improvement   • Diabetes Mellitus HgbA1c 10.4   • Chronic hypoxic respiratory failure on nocturnal o2   • CAD s/p CABG , C . Grafts patent except SVG to diagonal   • Urinary retention requiring colbert   • Hyperlipidemia   • Essential hypertension   • Hx of PVD (peripheral vascular disease)   • Recent E. 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 Indwelling Colbert catheter   • Atrial fibrillation but now converted. Still on Eliquis              Adult Rehabilitation Note       17 1410 17 1030 17 0915    Rehab Assessment/Intervention    Discipline physical therapist  -LS speech language pathologist  -SG physical therapist  -MF    Document Type therapy note (daily note)  -LS therapy note (daily note)  -SG therapy note (daily note)  -MF    Subjective Information agree to therapy  -LS no complaints;agree to therapy  -SG agree to therapy;no complaints  -MF    Patient Effort, Rehab Treatment adequate  -LS fair  -SG     Symptoms Noted During/After Treatment  none  -SG     Precautions/Limitations fall precautions;oxygen  therapy device and L/min  -LS      Precautions/Limitations, Vision  WFL  -SG     Precautions/Limitations, Hearing  WFL  -SG     Specific Treatment Considerations Pt currently requiring bi-pap due to decreased resp status this PM.   -LS      Recorded by [LS] Lily Juárez, PT [SG] Lyndsey Sultana, MS CCC-SLP [MF] Pee Roldan, PT    Vital Signs    Pre Systolic BP Rehab 113  -LS      Pre Treatment Diastolic BP 77  -LS      Post Systolic BP Rehab 115  -LS      Post Treatment Diastolic BP 75  -LS      Pretreatment Heart Rate (beats/min) 86  -LS      Posttreatment Heart Rate (beats/min) 84  -LS      Pre SpO2 (%) 90  -LS      O2 Delivery Pre Treatment supplemental O2   bi-pap mask  -LS      Post SpO2 (%) 100  -LS      O2 Delivery Post Treatment supplemental O2  -LS      Pre Patient Position Supine  -LS      Intra Patient Position Sitting  -LS      Post Patient Position Supine  -LS      Recorded by [LS] Lily Juárez, PT      Pain Assessment    Pain Assessment Jay-Kent FACES  -LS Jay-Kent FACES  -SG Jay-Baker FACES  -MF    Jay-Baker FACES Pain Rating 0  -LS 0  -SG 0  -MF    Pain Score 0  -LS 0  -SG     Post Pain Score 0  -LS 0  -SG     Recorded by [LS] Lily Juárez, PT [SG] Lyndsey Sultana, MS CCC-SLP [MF] Pee Roldan, PT    Cognitive Assessment/Intervention    Current Cognitive/Communication Assessment impaired  -LS      Orientation Status oriented to;person   responds to name  -LS      Follows Commands/Answers Questions able to follow single-step instructions;needs cueing;needs increased time;needs repetition   less than 10%  -LS      Personal Safety severe impairment;decreased awareness, need for assist;decreased awareness, need for safety;decreased insight to deficits  -LS      Personal Safety Interventions fall prevention program maintained;nonskid shoes/slippers when out of bed;supervised activity  -LS      Recorded by [LS] Lily Juárez, PT      Improve ability to follow  directions    Improve ability to follow directions:  one-step directions without objects;100%;without cues  -SG     Status: Improve ability to follow directions  Progress slower than expected  -SG     Ability to Follow Directions Progress  40%;with consistent cues;continue to address  -SG     Recorded by  [SG] Lyndsey Sultana MS CCC-SLP     Improve ability to comprehend questions    Improve ability to comprehend questions:  simple yes/no questions;simple general questions;100%;without cues  -SG     Status: Improve ability to comprehend questions  Progress slower than expected  -SG     Ability to Comprehend Questions Progress  50%;with consistent cues;continue to address  -SG     Recorded by  [SG] Lyndsey Sultana MS CCC-SLP     Improve ability to construct phrase and sentence level responses    Improve ability to construct phrase and sentence level responses by:  answering question with phrase;100%;without cues  -SG     Status: Improve ability to construct phrase and sentence level responses  Progress slower than expected  -SG     Constructing Phrase and Sentence Level Responses Progress  50%;continue to address;with consistent cues  -SG     Recorded by  [SG] Lyndsey Sultana MS CCC-SLP     Improve attention    Improve attention by:  attending to task;complete selective attention task;80%;with consistent cues  -SG     Status: Improve attention  Progressing as expected  -SG     Attention Progress  40%;with consistent cues;continue to address  -SG     Recorded by  [SG] Lyndsey Sultana MS CCC-SLP     Improve orientation    Improve orientation through:  demonstrating orientation to day;demonstrating orientation to month;demonstrating orientation to year;demonstrating orientation to place;demonstrating orientation to disease/impairment;100%;with inconsistent cues  -SG     Status: Improve orientation through  Progressing as expected  -SG     Orientation Progress  40%;with  "consistent cues;continue to address  -SG     Recorded by  [SG] Lyndsey Sultana MS CCC-SLP     Improve functional problem solving    Improve functional problem solving through:  determine solutions to simple ADL/safety problems;answer \"what if\" questions;complete basic reasoning task;complete organization/home management task;70%;with consistent cues  -SG     Status: Improve functional problem solving  Progress slower than expected  -SG     Functional Problem Solving Progress  30%;with consistent cues;continue to address  -SG     Recorded by  [SG] Lyndsey Sultana MS CCC-SLP     Improve timing of pharyngeal response    To improve timing of pharyngeal response, patient will:  Swallow in timely way using three second prep;Swallow in timely way using super-supraglottic swallow;80%;with consistent cues  -SG     Status: Improve timing of pharyngeal response  Progress slower than expected  -SG     Timing of Pharyngeal Response Progress  10%;with consistent cues;continue to adress  -SG     Recorded by  [SG] Lyndsey Sultana MS CCC-SLP     Improve laryngeal closure    To improve laryngeal closure, patient will:  Complete supraglottic swallow;80%;with consistent cues  -SG     Status: Improve laryngeal closure  Progress slower than expected  -SG     Laryngeal Closure Progress  0%;with consistent cues;continue to adress  -SG     Recorded by  [SG] Lyndsey Sultana MS CCC-SLP     Improve closure at entrance to airway    To improve closure at entrance to airway, patient will:  Complete super-supraglottic swallow;80%;with consistent cues  -SG     Status: Improve closure at entrance to airway  Progress slower than expected  -SG     Closure at Entrance to Airway Progress  0%;with consistent cues;continue to adress  -SG     Recorded by  [SG] Lyndsey Sultana MS CCC-SLP     Improve hyolaryngeal excursion    To improve hyolaryngeal excursion, patient will:  Complete chin tuck " against resistance (comment number of repetitions);90%;with consistent cues  -SG     Status: Improve hyolaryngeal excursion  Progress slower than expected  -SG     Hyolaryngeal Excursion Progress  continue to adress  -SG     Recorded by  [SG] Lyndsey Sultana MS CCC-SLP     Improve tongue base & pharyngeal wall squeeze    To improve tongue base & pharyngeal wall squeeze, patient will:  Complete effortful swallow;80%;with consistent cues  -SG     Status: Improve tongue base & pharyngeal wall squeeze  Progress slower than expected  -SG     Tongue Base/Pharyngeal Wall Squeeze Progress  40%;with consistent cues;continue to adress  -SG     Recorded by  [SG] Lyndsey Sultana MS CCC-SLP     Bed Mobility, Assessment/Treatment    Bed Mobility, Assistive Device head of bed elevated;draw sheet  -LS      Bed Mobility, Scoot/Bridge, [place] dependent (less than 25% patient effort);2 person assist required;verbal cues required  -LS      Bed Mob, Supine to Sit, [place] maximum assist (25% patient effort);2 person assist required;verbal cues required  -LS      Bed Mob, Sit to Supine, [place] maximum assist (25% patient effort);2 person assist required;verbal cues required  -LS      Bed Mobility, Safety Issues cognitive deficits limit understanding;decreased use of arms for pushing/pulling;decreased use of legs for bridging/pushing  -LS      Recorded by [LS] Lily Juárez, PT      Transfer Assessment/Treatment    Transfers, Bed-Chair [place] not appropriate to assess   Rn requested back to bed for safety.   -LS      Recorded by [LS] Lily Juárez, PT      Gait Assessment/Treatment    Gait, [place] Level not appropriate to assess  -LS      Recorded by [LS] Lily Juárez, PT      Motor Skills/Interventions    Additional Documentation Balance Skills Training (Group)  -LS      Recorded by [LS] Lily Juárez, PT      Balance Skills Training    Sitting-Level of Assistance Moderate  assistance   progressed to min A  -LS      Sitting-Balance Support Feet supported  -LS      Sitting-Balance Activities Trunk control activities  -LS      Sitting # of Minutes 12  -LS      Recorded by [LS] Lily Juárez, PT      Therapy Exercises    Bilateral Lower Extremities PROM:;10 reps;supine;ankle pumps/circles;hip abduction/adduction;heel slides  -LS      Bilateral Upper Extremity AAROM:;10 reps;elbow flexion/extension;shoulder abduction/adduction;shoulder extension/flexion  -LS      Recorded by [LS] Lily Juárez, PT      Positioning and Restraints    Pre-Treatment Position in bed  -LS  in bed  -    Post Treatment Position bed  -LS  bed  -MF    In Bed notified nsg;supine;call light within reach;encouraged to call for assist;with family/caregiver;with other staff;RUE elevated;LUE elevated   palliative APRN present  -LS  supine;notified nsg  -    Restraints released:;reapplied:;notified nsg:;mitten  -LS      Recorded by [LS] Lily Juárez, PT  [] Pee Roldan, PT      04/24/17 0850          Rehab Assessment/Intervention    Discipline physical therapist  -      Document Type therapy note (daily note)  -      Subjective Information agree to therapy;complains of;weakness;fatigue  -      Recorded by [MF] Pee Roldan, PT      Pain Assessment    Pain Assessment Jay-Baker FACES  -      Jay-Baker FACES Pain Rating 0  -      Pain Intervention(s) Repositioned  -      Recorded by [MF] Pee Roldan PT      Cognitive Assessment/Intervention    Current Cognitive/Communication Assessment impaired  -      Orientation Status disoriented x 4  -      Recorded by [MF] Pee Roldan PT      Positioning and Restraints    Pre-Treatment Position in bed  -      Post Treatment Position bed  -MF      In Bed supine;call light within reach;notified nsg  -      Recorded by [MF] Pee Roldan, PT        User Key  (r) = Recorded By, (t) = Taken By, (c) = Cosigned By    Initials Name  Effective Dates     Pee MCCORMICK Jamar, PT 06/19/15 -     SG Lyndsey Sultana, MS CCC-SLP 06/22/15 -     LS Lily Juárez, PT 06/19/15 -               IP SLP Goals       04/25/17 1315          Safely Consume Diet    Safely Consume Diet- SLP, Date Goal Reviewed 04/18/17  -SG      Safely Consume Diet- SLP, Outcome goal ongoing  -SG      Cognitive Linguistic- Optimal Participation in Care    Cognitive Linguistic Optimal Participation in Care- SLP, Time to Achieve by discharge  -SG      Cognitive Linguistic Optimal Participation in Care- SLP, Date Goal Reviewed 04/25/17  -SG      Cognitive Linguistic Optimal Participation in Care- SLP, Outcome goal ongoing  -SG        User Key  (r) = Recorded By, (t) = Taken By, (c) = Cosigned By    Initials Name Provider Type    EFFIE Sultana, MS CCC-SLP Speech and Language Pathologist          EDUCATION  The patient has been educated in the following areas:   Cognitive Impairment Communication Impairment.    SLP Recommendation and Plan              Anticipated Discharge Disposition: other (see comments) (unknown at this time)                Plan of Care Review  Plan Of Care Reviewed With: patient  Progress: no change  Outcome Summary/Follow up Plan: Pt seen for S/L and dys tx. Pt alert, but difficulty following commands and participating in tx. No swallow initiation w/ ice chip trials, no gag w/ oral care. Con't to follow for tx and POC decisions.           Time Calculation:         Time Calculation- SLP       04/25/17 1452          Time Calculation- SLP    SLP Start Time 1030  -SG      SLP Received On 04/25/17  -SG        User Key  (r) = Recorded By, (t) = Taken By, (c) = Cosigned By    Initials Name Provider Type    EFFIE Sultana, MS CCC-SLP Speech and Language Pathologist          Therapy Charges for Today     Code Description Service Date Service Provider Modifiers Qty    [id number] HC ST TREATMENT SWALLOW 2 4/25/2017 Lyndsey Garza  Kayy, MS CCC-SLP GN 1    [id number]  ST TREATMENT SPEECH 1 2017 Lyndsey Sultana, MS CCC-SLP GN 1               Lyndsey Sultana, MS CCC-SLP  2017 and Acute Care - Speech Language Pathology   Swallow Treatment Note [facility]     Patient Name: Tomas Salvador  : 1955  MRN: [id number]  Today's Date: 2017  Onset of Illness/Injury or Date of Surgery Date: 17     Referring Physician: CVA      Admit Date: 2017    Visit Dx:      ICD-10-CM ICD-9-CM   1. Dysphagia, unspecified type R13.10 787.20   2. Impaired functional mobility, balance, gait, and endurance Z74.09 V49.89   3. Impaired mobility and ADLs Z74.09 799.89   4. Cognitive communication deficit R41.841 799.52     Patient Active Problem List   Diagnosis   • Coronary disease   • Ischemic cardiomyopathy with EF 10% on Echo 17   • Peripheral vascular disease   • ICD in place, implantation .   • Dyslipidemia, on atorvastain.    • S/P Sepsis due to  source 3/30/17   • CVA 17 with rapid improvement   • Diabetes Mellitus HgbA1c 10.4   • Chronic hypoxic respiratory failure on nocturnal o2   • CAD s/p CABG , [facility] . Grafts patent except SVG to diagonal   • Urinary retention requiring colbert   • Hyperlipidemia   • Essential hypertension   • Hx of PVD (peripheral vascular disease)   • Recent E. 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 Indwelling Colbert catheter   • Atrial fibrillation but now converted. Still on Eliquis             Adult Rehabilitation Note       17 1410 17 1030 17 0915    Rehab Assessment/Intervention    Discipline physical therapist  -LS speech language pathologist  -SG physical therapist  -MF    Document Type therapy note (daily note)  -LS therapy note (daily note)  -SG therapy note (daily note)  -MF    Subjective  Information agree to therapy  -LS no complaints;agree to therapy  -SG agree to therapy;no complaints  -    Patient Effort, Rehab Treatment adequate  -LS fair  -SG     Symptoms Noted During/After Treatment  none  -SG     Precautions/Limitations fall precautions;oxygen therapy device and L/min  -LS      Precautions/Limitations, Vision  WFL  -SG     Precautions/Limitations, Hearing  WFL  -SG     Specific Treatment Considerations Pt currently requiring bi-pap due to decreased resp status this PM.   -LS      Recorded by [LS] Lily Juárez, PT [SG] Lyndsey Sultana, MS CCC-SLP [] Pee Roldan, PT    Vital Signs    Pre Systolic BP Rehab 113  -LS      Pre Treatment Diastolic BP 77  -LS      Post Systolic BP Rehab 115  -LS      Post Treatment Diastolic BP 75  -LS      Pretreatment Heart Rate (beats/min) 86  -LS      Posttreatment Heart Rate (beats/min) 84  -LS      Pre SpO2 (%) 90  -LS      O2 Delivery Pre Treatment supplemental O2   bi-pap mask  -LS      Post SpO2 (%) 100  -LS      O2 Delivery Post Treatment supplemental O2  -LS      Pre Patient Position Supine  -LS      Intra Patient Position Sitting  -LS      Post Patient Position Supine  -LS      Recorded by [LS] Lily Juárez, PT      Pain Assessment    Pain Assessment Jay-Kent FACES  -LS Jay-Kent FACES  -SG Jay-Baker FACES  -MF    Jay-Baker FACES Pain Rating 0  -LS 0  -SG 0  -MF    Pain Score 0  -LS 0  -SG     Post Pain Score 0  -LS 0  -SG     Recorded by [LS] Lily Juárez, PT [SG] Lyndsey Sultana, MS CCC-SLP [MF] Pee Roldan, PT    Cognitive Assessment/Intervention    Current Cognitive/Communication Assessment impaired  -LS      Orientation Status oriented to;person   responds to name  -LS      Follows Commands/Answers Questions able to follow single-step instructions;needs cueing;needs increased time;needs repetition   less than 10%  -LS      Personal Safety severe impairment;decreased awareness, need for assist;decreased  awareness, need for safety;decreased insight to deficits  -LS      Personal Safety Interventions fall prevention program maintained;nonskid shoes/slippers when out of bed;supervised activity  -LS      Recorded by [LS] Lily Juárez, PT      Improve ability to follow directions    Improve ability to follow directions:  one-step directions without objects;100%;without cues  -SG     Status: Improve ability to follow directions  Progress slower than expected  -SG     Ability to Follow Directions Progress  40%;with consistent cues;continue to address  -SG     Recorded by  [SG] Lyndsey Sultana MS CCC-SLP     Improve ability to comprehend questions    Improve ability to comprehend questions:  simple yes/no questions;simple general questions;100%;without cues  -SG     Status: Improve ability to comprehend questions  Progress slower than expected  -SG     Ability to Comprehend Questions Progress  50%;with consistent cues;continue to address  -SG     Recorded by  [SG] Lyndsey Sultana MS CCC-SLP     Improve ability to construct phrase and sentence level responses    Improve ability to construct phrase and sentence level responses by:  answering question with phrase;100%;without cues  -SG     Status: Improve ability to construct phrase and sentence level responses  Progress slower than expected  -SG     Constructing Phrase and Sentence Level Responses Progress  50%;continue to address;with consistent cues  -SG     Recorded by  [SG] Lyndsey Sultana MS CCC-SLP     Improve attention    Improve attention by:  attending to task;complete selective attention task;80%;with consistent cues  -SG     Status: Improve attention  Progressing as expected  -SG     Attention Progress  40%;with consistent cues;continue to address  -SG     Recorded by  [SG] Lyndsey Sultana MS CCC-SLP     Improve orientation    Improve orientation through:  demonstrating orientation to day;demonstrating  "orientation to month;demonstrating orientation to year;demonstrating orientation to place;demonstrating orientation to disease/impairment;100%;with inconsistent cues  -SG     Status: Improve orientation through  Progressing as expected  -SG     Orientation Progress  40%;with consistent cues;continue to address  -SG     Recorded by  [SG] Lyndsey Sultana MS CCC-SLP     Improve functional problem solving    Improve functional problem solving through:  determine solutions to simple ADL/safety problems;answer \"what if\" questions;complete basic reasoning task;complete organization/home management task;70%;with consistent cues  -SG     Status: Improve functional problem solving  Progress slower than expected  -SG     Functional Problem Solving Progress  30%;with consistent cues;continue to address  -SG     Recorded by  [SG] Lyndsey Sultana MS CCC-SLP     Improve timing of pharyngeal response    To improve timing of pharyngeal response, patient will:  Swallow in timely way using three second prep;Swallow in timely way using super-supraglottic swallow;80%;with consistent cues  -SG     Status: Improve timing of pharyngeal response  Progress slower than expected  -SG     Timing of Pharyngeal Response Progress  10%;with consistent cues;continue to adress  -SG     Recorded by  [SG] Lyndsey Sultana MS CCC-SLP     Improve laryngeal closure    To improve laryngeal closure, patient will:  Complete supraglottic swallow;80%;with consistent cues  -SG     Status: Improve laryngeal closure  Progress slower than expected  -SG     Laryngeal Closure Progress  0%;with consistent cues;continue to adress  -SG     Recorded by  [SG] Lyndsey Sultana MS CCC-SLP     Improve closure at entrance to airway    To improve closure at entrance to airway, patient will:  Complete super-supraglottic swallow;80%;with consistent cues  -SG     Status: Improve closure at entrance to airway  Progress slower than " expected  -SG     Closure at Entrance to Airway Progress  0%;with consistent cues;continue to adress  -SG     Recorded by  [SG] Lyndsey Sultana MS CCC-SLP     Improve hyolaryngeal excursion    To improve hyolaryngeal excursion, patient will:  Complete chin tuck against resistance (comment number of repetitions);90%;with consistent cues  -SG     Status: Improve hyolaryngeal excursion  Progress slower than expected  -SG     Hyolaryngeal Excursion Progress  continue to adress  -SG     Recorded by  [SG] Lyndsey Sultana MS CCC-SLP     Improve tongue base & pharyngeal wall squeeze    To improve tongue base & pharyngeal wall squeeze, patient will:  Complete effortful swallow;80%;with consistent cues  -SG     Status: Improve tongue base & pharyngeal wall squeeze  Progress slower than expected  -SG     Tongue Base/Pharyngeal Wall Squeeze Progress  40%;with consistent cues;continue to adress  -SG     Recorded by  [SG] Lyndsey Sultana MS CCC-SLP     Bed Mobility, Assessment/Treatment    Bed Mobility, Assistive Device head of bed elevated;draw sheet  -LS      Bed Mobility, Scoot/Bridge, [place] dependent (less than 25% patient effort);2 person assist required;verbal cues required  -LS      Bed Mob, Supine to Sit, [place] maximum assist (25% patient effort);2 person assist required;verbal cues required  -LS      Bed Mob, Sit to Supine, [place] maximum assist (25% patient effort);2 person assist required;verbal cues required  -LS      Bed Mobility, Safety Issues cognitive deficits limit understanding;decreased use of arms for pushing/pulling;decreased use of legs for bridging/pushing  -LS      Recorded by [LS] Lily Juárez, PT      Transfer Assessment/Treatment    Transfers, Bed-Chair [place] not appropriate to assess   Rn requested back to bed for safety.   -LS      Recorded by [LS] Lily Juárez, PT      Gait Assessment/Treatment    Gait, [place] Level not  appropriate to assess  -LS      Recorded by [LS] Lily Juárez, PT      Motor Skills/Interventions    Additional Documentation Balance Skills Training (Group)  -LS      Recorded by [LS] Lily Juárez, PT      Balance Skills Training    Sitting-Level of Assistance Moderate assistance   progressed to min A  -LS      Sitting-Balance Support Feet supported  -LS      Sitting-Balance Activities Trunk control activities  -      Sitting # of Minutes 12  -LS      Recorded by [LS] Lily Juárez, PT      Therapy Exercises    Bilateral Lower Extremities PROM:;10 reps;supine;ankle pumps/circles;hip abduction/adduction;heel slides  -LS      Bilateral Upper Extremity AAROM:;10 reps;elbow flexion/extension;shoulder abduction/adduction;shoulder extension/flexion  -LS      Recorded by [LS] Lily Juárez, PT      Positioning and Restraints    Pre-Treatment Position in bed  -  in bed  -    Post Treatment Position bed  -  bed  -    In Bed notified nsg;supine;call light within reach;encouraged to call for assist;with family/caregiver;with other staff;RUE elevated;LUE elevated   palliative APRN present  -  supine;notified nsg  -    Restraints released:;reapplied:;notified nsg:;mitten  -LS      Recorded by [LS] Lily Juárez, PT  [MF] Pee Roldan, PT      04/24/17 0850          Rehab Assessment/Intervention    Discipline physical therapist  -      Document Type therapy note (daily note)  -      Subjective Information agree to therapy;complains of;weakness;fatigue  -      Recorded by [MF] Pee Roldan, PT      Pain Assessment    Pain Assessment Jay-Baker FACES  -      Jay-Baker FACES Pain Rating 0  -      Pain Intervention(s) Repositioned  -MF      Recorded by [MF] Pee Roldan, PT      Cognitive Assessment/Intervention    Current Cognitive/Communication Assessment impaired  -      Orientation Status disoriented x 4  -MF      Recorded by [MF] Pee Roldan, PT      Positioning and Restraints     Pre-Treatment Position in bed  -MF      Post Treatment Position bed  -MF      In Bed supine;call light within reach;notified nsg  -      Recorded by [MF] Pee Roldan, PT        User Key  (r) = Recorded By, (t) = Taken By, (c) = Cosigned By    Initials Name Effective Dates    MF Pee Roldan, PT 06/19/15 -     SG Lyndsey Garza Kayy, MS CCC-SLP 06/22/15 -     LS Lily Juárez, PT 06/19/15 -                   IP SLP Goals       04/25/17 1315          Safely Consume Diet    Safely Consume Diet- SLP, Date Goal Reviewed 04/18/17  -SG      Safely Consume Diet- SLP, Outcome goal ongoing  -SG      Cognitive Linguistic- Optimal Participation in Care    Cognitive Linguistic Optimal Participation in Care- SLP, Time to Achieve by discharge  -SG      Cognitive Linguistic Optimal Participation in Care- SLP, Date Goal Reviewed 04/25/17  -SG      Cognitive Linguistic Optimal Participation in Care- SLP, Outcome goal ongoing  -SG        User Key  (r) = Recorded By, (t) = Taken By, (c) = Cosigned By    Initials Name Provider Type    SG Lyndsey Garza Kayy, MS CCC-SLP Speech and Language Pathologist          EDUCATION  The patient has been educated in the following areas:   Dysphagia (Swallowing Impairment) Oral Care/Hydration NPO rationale.    SLP Recommendation and Plan  SLP Swallowing Diagnosis: pharyngeal dysfunction (r/o decline in pharyngeal function when pt more alert)  SLP Diet Recommendation: NPO: unsafe for food/liquid intake (until re-eval tomorrow when pt more alert)     SLP Rec. for Method of Medication Administration: meds via alternate route     Recommended Diagnostics: reassess via clinical swallow (non-instrumental exam)  Criteria for Skilled Therapeutic Interventions Met: skilled criteria for dysphagia intervention met  Anticipated Discharge Disposition: other (see comments) (unknown at this time)  Rehab Potential/Prognosis, Swallowing: good, to achieve stated therapy goals  Therapy  Frequency: 5 times/wk             Plan of Care Review  Plan Of Care Reviewed With: patient  Progress: no change  Outcome Summary/Follow up Plan: Pt seen for S/L and dys tx. Pt alert, but difficulty following commands and participating in tx. No swallow initiation w/ ice chip trials, no gag w/ oral care. Con't to follow for tx and POC decisions.            Time Calculation:         Time Calculation- SLP       04/25/17 1452          Time Calculation- SLP    SLP Start Time 1030  -SG      SLP Received On 04/25/17  -        User Key  (r) = Recorded By, (t) = Taken By, (c) = Cosigned By    Initials Name Provider Type     Lyndsey Sultana MS CCC-SLP Speech and Language Pathologist          Therapy Charges for Today     Code Description Service Date Service Provider Modifiers Qty    03901107522 HC ST TREATMENT SWALLOW 2 4/25/2017 Lyndsey Sultana MS CCC-SLP GN 1    [id number] HC ST TREATMENT SPEECH 1 4/25/2017 MS DAVID Chandler GN 1                 MS DAVID Chandler  4/25/2017

## 2017-04-25 NOTE — PLAN OF CARE
Problem: Fall Risk (Adult)  Goal: Identify Related Risk Factors and Signs and Symptoms  Outcome: Ongoing (interventions implemented as appropriate)  Goal: Absence of Falls  Outcome: Ongoing (interventions implemented as appropriate)    Problem: Skin Integrity Impairment, Risk/Actual (Adult)  Goal: Identify Related Risk Factors and Signs and Symptoms  Outcome: Ongoing (interventions implemented as appropriate)  Goal: Skin Integrity/Wound Healing  Outcome: Ongoing (interventions implemented as appropriate)    Problem: SAFETY - NON-VIOLENT RESTRAINT  Goal: Remains free of injury from restraints (Non-Violent Restraint)  Outcome: Ongoing (interventions implemented as appropriate)  Goal: Free from restraint(s) (Non-Violent Restraint)  Outcome: Ongoing (interventions implemented as appropriate)    Problem: Pressure Ulcer Risk (South Scale) (Adult,Obstetrics,Pediatric)  Goal: Identify Related Risk Factors and Signs and Symptoms  Outcome: Ongoing (interventions implemented as appropriate)  Goal: Skin Integrity  Outcome: Ongoing (interventions implemented as appropriate)

## 2017-04-25 NOTE — PROGRESS NOTES
Adult Nutrition  Assessment/PES    Patient Name:  Tomas Salvador  YOB: 1955  MRN: [id number]  Admit Date:  4/4/2017    Assessment Date:  4/25/2017     Comments: RD follow-up for pt on nutrition support. Will decrease tube feeding goal rate to 60 ml/hr to better meet pt's kcal and pro needs and maintain free water at 50 ml/hr. Will continue to follow.           Reason for Assessment       04/25/17 1116    Reason for Assessment    Reason For Assessment/Visit multidisciplinary rounds;follow up protocol;TF/PN    Time Spent (min) 30    Diagnosis --   per notes this adm    Other diagnosis plan for family conference at 1400 today to discuss POC/GOC   Principal Problem:    CVA 4/4/17 with rapid improvement  Active Problems:    Ischemic cardiomyopathy with EF 10% on Echo 4/4/17    ICD in place, implantation 2015.    S/P Sepsis due to  source 3/30/17    Diabetes Mellitus HgbA1c 10.4    Chronic hypoxic respiratory failure on nocturnal o2    CAD s/p CABG 2005, LHC 2015. Grafts patent except SVG to diagonal    Urinary retention requiring colbert    Hx of PVD (peripheral vascular disease)    Recent E. 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 Debrided in [place] 3/30/17    Candida glabrata UTI. Indwelling Colbert catheter    Atrial fibrillation but now converted. Still on Eliquis                 Labs/Tests/Procedures/Meds       04/25/17 1120    Labs/Tests/Procedures/Meds    Labs/Tests Review Reviewed    Medication Review Reviewed, pertinent     Results from last 7 days  Lab Units 04/25/17  0353   SODIUM mmol/L 144   POTASSIUM mmol/L 3.5   CHLORIDE mmol/L 104   TOTAL CO2 mmol/L 35.0*   BUN mg/dL 48*   CREATININE mg/dL 0.60   CALCIUM mg/dL 8.9   BILIRUBIN mg/dL 0.4   ALK PHOS U/L 134*   ALT (SGPT) U/L 13   AST (SGOT) U/L 25   GLUCOSE mg/dL 231*                Nutrition Prescription Ordered       04/25/17 1121    Nutrition Prescription PO     Current PO Diet NPO    Nutrition Prescription EN    Enteral Route PEG    Product Impact Peptide 1.5 tom    TF Delivery Method Continuous    Continuous TF Goal Rate (mL/hr) 65 mL/hr    Continuous TF Current Rate (mL/hr) 65 mL/hr    Continuous TF Goal Volume (mL) 1300 mL    Water flush (mL)  50 mL    Water Flush Frequency Per hour            Evaluation of Received Nutrient/Fluid Intake       04/25/17 1121    Evaluation of Received Nutrient/Fluid Intake    Number of Days Evaluated 1 day    Calorie Intake Evaluation    Enteral Calories (kcal) 2309    Total Calories (kcal) 2309    % Kcal Needs 128    Protein Intake Evaluation    Enteral Protein (gm) 144    Total Protein (gm) 144    % Protein Needs 99    Fluid Intake Evaluation    Enteral  Fluid (mL) 1185    Free Water Flush Fluid (mL) 1422    Total Fluid Intake (mL) 2607    EN Evaluation    Number of Days EN Intake Evaluated 1 day    EN Average Volume Delivered (mL/day) 1539 mL/day              Problem/Interventions:        Problem 1       04/25/17 1122    Nutrition Diagnoses Problem 1    Problem 1 Needs Alternate Route    Etiology (related to) --   clinical condition/encephalopathy/dysphagia    Signs/Symptoms (evidenced by) NPO                    Intervention Goal       04/25/17 1122    Intervention Goal    General Nutrition support treatment    TF/PN Adjust TF/PN            Nutrition Intervention       04/25/17 1122    Nutrition Intervention    RD/Tech Action Care plan reviewd;Follow Tx progress;Recommend/ordered    Recommended/Ordered EN            Nutrition Prescription       04/25/17 1122    Nutrition Prescription EN    Enteral Prescription Enteral begin/change;Enteral to supply    Enteral Route PEG    Product Impact Peptide 1.5 tom    TF Delivery Method Continuous    Continuous TF Goal Rate (mL/hr) 60 mL/hr    Continuous TF Goal Volume (mL) 1200 mL    Water flush (mL)  50 mL    Water Flush Frequency Per hour    New EN Prescription Ordered? Yes    EN to Supply     Kcal/Day 1800 Kcal/Day    Protein (gm/day) 112 gm/day    TF Free H2O (mL) 924 mL    Total Free H2O (mL/day) 1924 mL/day            Education/Evaluation       04/25/17 1127    Monitor/Evaluation    Monitor Per protocol;Pertinent labs;TF delivery/tolerance;Skin status;Symptoms          Electronically signed by:  Celetse Sterling MS RD/LD [facility]  04/25/17 11:27 AM

## 2017-04-25 NOTE — SIGNIFICANT NOTE
Palliative Team Meeting Attendance  13:00  Lily Iyer, RN CHDO JESS [place], [facility]              EVENS Musa M.Div., [facility], [facility]              JELANI Olivares RN, MARCELINO Siu, PORTIA Ansari APRN

## 2017-04-25 NOTE — PAYOR COMM NOTE
"Tomas Gtz (61 y.o. Male)     Date of Birth Social Security Number Address Home Phone MRN    1955  [address]   [place] [address] [phone] [id number]    [employer]eterian Marital Status          None        Admission Date Admission Type Admitting Provider Attending Provider Department, Room/Bed    4/4/17 Elective Artemio Patel MD Thompson, Patton Weddington, MD [facility] 2B ICU, N226/1    Discharge Date Discharge Disposition Discharge Destination                      Attending Provider: Artemio Patel MD     Allergies:  No Known Allergies    Isolation:  None   Infection:  None   Code Status:  Conditional    Ht:  69\" (175.3 cm)   Wt:  262 lb (119 kg)    Admission Cmt:  None   Principal Problem:  CVA 4/4/17 with rapid improvement [I63.9]                 Active Insurance as of 4/4/2017     Primary Coverage     Payor Plan Insurance Group Employer/Plan Group    [employer]HEM BLUE CROSS ANTHEM [employer] BLUE SHIELD PPO [id number]     Payor Plan Address Payor Plan Phone Number Effective From Effective To    [address] [phone] 11/1/2015     [place] [address]       Subscriber Name Subscriber Birth Date Member ID       SANJUANA GTZ 1955 [id number]                 Emergency Contacts      (Rel.) Home Phone Work Phone Mobile Phone    Sanjuana Gtz [phone] -- --    Mirta Adam (Son) -- [phone] --    Prabhu Peter (Son) [phone] --            Vital Signs (last 24 hours)       04/24 0700  -  04/25 0659 04/25 0700  -  04/25 0912   Most Recent    Temp (°F) 96.8 -  98.1       97.6 (36.4)    Heart Rate 66 -  102    90 -  95     94    Resp 16 -  22    20 -  22     20    BP 96/63 -  146/80    135/86 -  146/80     135/86    SpO2 (%) (!)78 -  100    (!)78 -  95     95          Hospital Medications (active)       Dose Frequency Start End    acetaZOLAMIDE (DIAMOX) injection 500 mg Every 12 Hours Scheduled 4/24/2017 " 4/24/2017    Sig - Route: Infuse 500 mg into a venous catheter Every 12 (Twelve) Hours. - Intravenous    amphotericin B (FUNGIZONE) 50 mg in sterile water (preservative free) 1,000 mL irrigation 42 mL/hr Every 24 Hours 4/19/2017     Sig - Route: Irrigate with 42 mL/hr as directed Daily. - Irrigation    apixaban (ELIQUIS) tablet 5 mg 5 mg Every 12 Hours Scheduled 4/22/2017     Sig - Route: Take 1 tablet by mouth Every 12 (Twelve) Hours. - Oral    Cosign for Ordering: Accepted by Satya Fall MD on 4/23/2017  8:52 AM    aspirin chewable tablet 81 mg 81 mg Daily 4/25/2017     Sig - Route: Chew 1 tablet Daily. - Oral    atorvastatin (LIPITOR) tablet 80 mg 80 mg Nightly 4/18/2017     Sig - Route: 2 tablets by Nasogastric route Every Night. - Nasogastric    bisacodyl (DULCOLAX) suppository 10 mg 10 mg Daily PRN 4/12/2017     Sig - Route: Insert 1 suppository into the rectum Daily As Needed for Constipation. - Rectal    castor oil-balsam peru (VENELEX) ointment  Every 12 Hours Scheduled 4/20/2017     Sig - Route: Apply  topically Every 12 (Twelve) Hours. - Topical    Cosign for Ordering: Required by Ella Pope MD    chlorothiazide (DIURIL) injection 500 mg 500 mg Every 12 Hours Scheduled 4/24/2017 4/24/2017    Sig - Route: Infuse 500 mg into a venous catheter Every 12 (Twelve) Hours. - Intravenous    dextrose (D50W) solution 25 g 25 g Every 15 Minutes PRN 4/4/2017     Sig - Route: Infuse 50 mL into a venous catheter Every 15 (Fifteen) Minutes As Needed for Low Blood Sugar (Blood Sugar Less Than 70, Patient Has IV Access - Unresponsive, NPO or Unable To Safely Swallow). - Intravenous    dextrose (GLUTOSE) oral gel 15 g 15 g Every 15 Minutes PRN 4/4/2017     Sig - Route: Take 15 g by mouth Every 15 (Fifteen) Minutes As Needed for Low Blood Sugar (Blood Sugar Less Than 70, Patient Alert, Is Not NPO & Can Safely Swallow). - Oral    digoxin (LANOXIN) tablet 250 mcg 250 mcg Daily Digoxin 4/24/2017     Sig -  Route: Take 1 tablet by mouth Daily. - Oral    glucagon (GLUCAGEN) injection 1 mg 1 mg Every 15 Minutes PRN 4/4/2017     Sig - Route: Inject 1 mg under the skin Every 15 (Fifteen) Minutes As Needed (Blood Glucose Less Than 70 - Patient Without IV Access - Unresponsive, NPO or Unable To Safely Swallow). - Subcutaneous    heparin flush (porcine) 100 UNIT/ML injection 500 Units 500 Units Every 12 Hours Scheduled 4/5/2017     Sig - Route: 5 mL by Intracatheter route Every 12 (Twelve) Hours. - Intracatheter    Cosign for Ordering: Accepted by Ella Pope MD on 4/5/2017  2:40 PM    hydrALAZINE (APRESOLINE) tablet 25 mg 25 mg Every 8 Hours Scheduled 4/24/2017     Sig - Route: Take 1 tablet by mouth Every 8 (Eight) Hours. - Oral    HYDROcodone-acetaminophen (NORCO) 7.5-325 MG per tablet 1 tablet 1 tablet Every 4 Hours PRN 4/6/2017 4/26/2017    Sig - Route: Take 1 tablet by mouth Every 4 (Four) Hours As Needed for Moderate Pain (4-6). - Oral    insulin detemir (LEVEMIR) injection 20 Units 20 Units Every Morning 4/24/2017     Sig - Route: Inject 20 Units under the skin Every Morning. - Subcutaneous    insulin regular (humuLIN R,novoLIN R) injection 0-14 Units 0-14 Units Every 6 Hours Scheduled 4/24/2017     Sig - Route: Inject 0-14 Units under the skin Every 6 (Six) Hours. - Subcutaneous    ipratropium-albuterol (DUO-NEB) nebulizer solution 3 mL 3 mL Every 4 Hours PRN 4/7/2017     Sig - Route: Take 3 mL by nebulization Every 4 (Four) Hours As Needed for Shortness of Air. - Nebulization    ipratropium-albuterol (DUO-NEB) nebulizer solution 3 mL 3 mL 4 Times Daily - RT 4/10/2017     Sig - Route: Take 3 mL by nebulization 4 (Four) Times a Day. - Nebulization    isosorbide dinitrate (ISORDIL) tablet 10 mg 10 mg Every 8 Hours Scheduled 4/24/2017     Sig - Route: Take 0.5 tablets by mouth Every 8 (Eight) Hours. - Oral    lansoprazole (FIRST) oral suspension 30 mg 30 mg Every Morning 4/25/2017     Sig - Route: Take 10 mL  "by mouth Every Morning. - Oral    LORazepam (ATIVAN) injection 0.5 mg 0.5 mg Every 8 Hours PRN 4/18/2017 4/28/2017    Sig - Route: Infuse 0.25 mL into a venous catheter Every 8 (Eight) Hours As Needed for Anxiety. - Intravenous    Magnesium Sulfate 2 gram Bolus, followed by 8 gram infusion (total Mg dose 10 grams)- Mg less than or equal to 1mg/dL 2 g As Needed 4/7/2017     Sig - Route: Infuse 50 mL into a venous catheter As Needed (Mg less than or equal to 1mg/dL). - Intravenous    Linked Group 1:  \"Or\" Linked Group Details        magnesium sulfate 4 gram infusion- Mg 1.6-1.9 mg/dL 4 g As Needed 4/7/2017     Sig - Route: Infuse 100 mL into a venous catheter As Needed (Mg 1.6-1.9 mg/dL). - Intravenous    Linked Group 1:  \"Or\" Linked Group Details        Magnesium Sulfate 6 gram Infusion (2 gm x 3) -Mg 1.1 -1.5 mg/dL 2 g As Needed 4/7/2017     Sig - Route: Infuse 50 mL into a venous catheter As Needed (Mg 1.1 -1.5 mg/dL). - Intravenous    Linked Group 1:  \"Or\" Linked Group Details        potassium chloride (KLOR-CON) packet 40 mEq 40 mEq Every 12 Hours Scheduled 4/24/2017 4/24/2017    Sig - Route: Take 40 mEq by mouth Every 12 (Twelve) Hours. - Oral    potassium chloride (KLOR-CON) packet 40 mEq 40 mEq As Needed 4/25/2017     Sig - Route: Take 40 mEq by mouth As Needed (potassium replacement, see admin instructions). - Oral    potassium chloride (MICRO-K) CR capsule 40 mEq 40 mEq As Needed 4/25/2017     Sig - Route: Take 4 capsules by mouth As Needed (potassium replacement.  see admin instructions). - Oral    potassium chloride 20 mEq in 50 mL IVPB 20 mEq Every 1 Hour PRN 4/8/2017     Sig - Route: Infuse 50 mL into a venous catheter Every 1 (One) Hour As Needed (potassium protocol CENTRAL IV - see admin instructions). - Intravenous    Cosign for Ordering: Accepted by Ella Pope MD on 4/9/2017 11:56 AM    Linked Group 2:  \"Or\" Linked Group Details        QUEtiapine (SEROquel) tablet 25 mg 25 mg Every 12 " Hours Scheduled 4/18/2017     Sig - Route: Take 1 tablet by mouth Every 12 (Twelve) Hours. - Oral    QUEtiapine (SEROquel) tablet 25 mg 25 mg Every 8 Hours PRN 4/18/2017     Sig - Route: Take 1 tablet by mouth Every 8 (Eight) Hours As Needed (agitation). - Oral    saccharomyces boulardii (FLORASTOR) capsule 250 mg 250 mg 2 Times Daily 4/7/2017     Sig - Route: Take 1 capsule by mouth 2 (Two) Times a Day. - Oral    sennosides (SENOKOT) 8.8 MG/5ML syrup 10 mL 10 mL 2 Times Daily 4/24/2017     Sig - Route: Take 10 mL by mouth 2 (Two) Times a Day. - Oral    sodium chloride 0.9 % flush 1-10 mL 1-10 mL As Needed 4/4/2017     Sig - Route: Infuse 1-10 mL into a venous catheter As Needed for Line Care. - Intravenous    acetylcysteine (MUCOMYST) 20 % solution 4 mL (Discontinued) 4 mL Every 4 Hours - RT 4/21/2017 4/24/2017    Sig - Route: Take 4 mL by nebulization Every 4 (Four) Hours. - Nebulization    aspirin chewable tablet 324 mg (Discontinued) 324 mg Daily 4/17/2017 4/24/2017    Sig - Route: Chew 4 tablets Daily. - Oral    carvedilol (COREG) tablet 12.5 mg (Discontinued) 12.5 mg Every 12 Hours Scheduled 4/16/2017 4/24/2017    Sig - Route: Take 1 tablet by mouth Every 12 (Twelve) Hours. - Oral    docusate sodium (COLACE) capsule 100 mg (Discontinued) 100 mg 2 Times Daily 4/19/2017 4/24/2017    Sig - Route: 1 capsule by Nasogastric route 2 (Two) Times a Day. - Nasogastric    furosemide (LASIX) injection 60 mg (Discontinued) 60 mg Every 12 Hours 4/22/2017 4/24/2017    Sig - Route: Infuse 6 mL into a venous catheter Every 12 (Twelve) Hours. - Intravenous    insulin lispro (humaLOG) injection 0-24 Units (Discontinued) 0-24 Units Every 6 Hours Scheduled 4/17/2017 4/24/2017    Sig - Route: Inject 0-24 Units under the skin Every 6 (Six) Hours. - Subcutaneous    Reason for Discontinue: Alternate therapy    pantoprazole (PROTONIX) injection 40 mg (Discontinued) 40 mg Every Early Morning 4/11/2017 4/24/2017    Sig - Route: Infuse 10  mL into a venous catheter Every Morning. - Intravenous          Lab Results (last 24 hours)     Procedure Component Value Units Date/Time    POC Glucose Fingerstick [[id number]]  (Abnormal) Collected:  04/24/17 1110    Specimen:  Blood Updated:  04/24/17 1113     Glucose 233 (H) mg/dL     Narrative:       Meter: [id number] : [id number] Dru Macile    Calcium, Ionized [[id number]]  (Normal) Collected:  04/24/17 1107    Specimen:  Blood Updated:  04/24/17 1136     Ionized Calcium 1.15 mmol/L     C-reactive Protein [[id number]]  (Abnormal) Collected:  04/24/17 1107    Specimen:  Blood Updated:  04/24/17 1139     C-Reactive Protein 7.38 (H) mg/dL     Comprehensive Metabolic Panel [[id number]]  (Abnormal) Collected:  04/24/17 1107    Specimen:  Blood Updated:  04/24/17 1141     Glucose 234 (H) mg/dL      BUN 52 (H) mg/dL      Creatinine 0.60 mg/dL      Sodium 148 (H) mmol/L      Potassium 3.9 mmol/L      Chloride 104 mmol/L      CO2 39.0 (H) mmol/L      Calcium 8.8 mg/dL      Total Protein 6.9 g/dL      Albumin 3.20 g/dL      ALT (SGPT) 12 U/L      AST (SGOT) 28 U/L      Alkaline Phosphatase 135 (H) U/L      Total Bilirubin 0.4 mg/dL      eGFR Non African Amer 137 mL/min/1.73      Globulin 3.7 gm/dL      A/G Ratio 0.9 (L) g/dL      BUN/Creatinine Ratio 86.7 (H)     Anion Gap 5.0 mmol/L     Narrative:       National Kidney Foundation Guidelines    Stage                           Description                             GFR                      1                               Normal or High                          90+  2                               Mild decrease                            60-89  3                               Moderate decrease                   30-59  4                               Severe decrease                       15-29  5                               Kidney failure                             <15    Prealbumin [[id number]]  (Abnormal) Collected:  04/24/17 1107    Specimen:  Blood Updated:   04/24/17 1141     Prealbumin 6.9 (L) mg/dL     POC Glucose Fingerstick [[id number]]  (Abnormal) Collected:  04/24/17 1744    Specimen:  Blood Updated:  04/24/17 1750     Glucose 250 (H) mg/dL     Narrative:       Meter: [id number] : [id number] Dru Maciel    POC Glucose Fingerstick [[id number]]  (Abnormal) Collected:  04/24/17 2308    Specimen:  Blood Updated:  04/24/17 2311     Glucose 237 (H) mg/dL     Narrative:       Meter: [id number] : [id number] Omega Chen    POC Glucose Fingerstick [[id number]]  (Abnormal) Collected:  04/25/17 0512    Specimen:  Blood Updated:  04/25/17 0514     Glucose 265 (H) mg/dL     Narrative:       Meter: [id number] : [id number] Omega Chen    CBC & Differential [[id number]] Collected:  04/25/17 0353    Specimen:  Blood Updated:  04/25/17 0536    Narrative:       The following orders were created for panel order CBC & Differential.  Procedure                               Abnormality         Status                     ---------                               -----------         ------                     Scan Slide[[id number]]                    Normal              Final result               CBC Auto Differential[[id number]]         Abnormal            Final result                 Please view results for these tests on the individual orders.    CBC Auto Differential [[id number]]  (Abnormal) Collected:  04/25/17 0353    Specimen:  Blood Updated:  04/25/17 0536     WBC 10.09 10*3/mm3      RBC 3.37 (L) 10*6/mm3      Hemoglobin 10.1 (L) g/dL      Hematocrit 34.1 (L) %      .2 (H) fL      MCH 30.0 pg      MCHC 29.6 (L) g/dL      RDW 19.5 (H) %      RDW-SD 69.8 (H) fl      MPV 12.3 (H) fL      Platelets 288 10*3/mm3      Neutrophil % 79.8 (H) %      Lymphocyte % 10.1 (L) %      Monocyte % 6.2 %      Eosinophil % 3.4 (H) %      Basophil % 0.2 %      Immature Grans % 0.3 %      Neutrophils, Absolute 8.05 10*3/mm3      Lymphocytes, Absolute 1.02 10*3/mm3      Monocytes,  Absolute 0.63 10*3/mm3      Eosinophils, Absolute 0.34 (H) 10*3/mm3      Basophils, Absolute 0.02 10*3/mm3      Immature Grans, Absolute 0.03 10*3/mm3     Scan Slide [[id number]]  (Normal) Collected:  04/25/17 0353    Specimen:  Blood Updated:  04/25/17 0536     RBC Morphology Normal     WBC Morphology Normal     Platelet Morphology Normal    Comprehensive Metabolic Panel [[id number]]  (Abnormal) Collected:  04/25/17 0353    Specimen:  Blood Updated:  04/25/17 0538     Glucose 231 (H) mg/dL      BUN 48 (H) mg/dL      Creatinine 0.60 mg/dL      Sodium 144 mmol/L      Potassium 3.5 mmol/L      Chloride 104 mmol/L      CO2 35.0 (H) mmol/L      Calcium 8.9 mg/dL      Total Protein 6.6 g/dL      Albumin 3.10 (L) g/dL      ALT (SGPT) 13 U/L      AST (SGOT) 25 U/L      Alkaline Phosphatase 134 (H) U/L      Total Bilirubin 0.4 mg/dL      eGFR Non African Amer 137 mL/min/1.73      Globulin 3.5 gm/dL      A/G Ratio 0.9 (L) g/dL      BUN/Creatinine Ratio 80.0 (H)     Anion Gap 5.0 mmol/L     Narrative:       National Kidney Foundation Guidelines    Stage                           Description                             GFR                      1                               Normal or High                          90+  2                               Mild decrease                            60-89  3                               Moderate decrease                   30-59  4                               Severe decrease                       15-29  5                               Kidney failure                             <15    Magnesium [[id number]]  (Normal) Collected:  04/25/17 0353    Specimen:  Blood Updated:  04/25/17 0538     Magnesium 2.1 mg/dL     Phosphorus [[id number]]  (Normal) Collected:  04/25/17 0353    Specimen:  Blood Updated:  04/25/17 0538     Phosphorus 2.7 mg/dL     Procalcitonin [[id number]] Collected:  04/25/17 0353    Specimen:  Blood Updated:  04/25/17 0645     Procalcitonin 0.14 ng/mL     Narrative:       As a  Marker for Sepsis (Non-Neonates):   1. <0.5 ng/mL represents a low risk of severe sepsis and/or septic shock.  2. >2 ng/mL represents a high risk of severe sepsis and/or septic shock.    As a Marker for Lower Respiratory Tract Infections that require antibiotic therapy:    PCT on Admission     Antibiotic Therapy       6-12 Hrs later  > 0.5                Strongly Recommended             >0.25 - <0.5         Recommended  0.1 - 0.25           Discouraged              Remeasure/reassess PCT  <0.1                 Strongly Discouraged     Remeasure/reassess PCT                     PCT values of < 0.5 ng/mL do not exclude an infection, because localized infections (without systemic signs) may be associated with such low concentrations, or a systemic infection in its initial stages (< 6 hours). Furthermore, increased PCT can occur without infection. PCT concentrations between 0.5 and 2.0 ng/mL should be interpreted taking into account the patient's history. It is recommended to retest PCT within 6-24 hours if any concentrations < 2 ng/mL are obtained.    BNP [[id number]]  (Abnormal) Collected:  04/25/17 0353    Specimen:  Blood Updated:  04/25/17 0737     BNP 1288.0 (H) pg/mL         Imaging Results (last 24 hours)     ** No results found for the last 24 hours. **           Physician Progress Notes (last 24 hours) (Notes from 4/24/2017  9:12 AM through 4/25/2017  9:12 AM)      Artemio Patel MD at 4/24/2017 12:45 PM  Version 1 of 1         INTENSIVIST / PULMONARY FOLLOW UP NOTE     Hospital:  LOS: 20 days   Mr. Tomas Salvador, 61 y.o. male is followed for:   Principal Problem:    CVA 4/4/17 with rapid improvement  Active Problems:    Ischemic cardiomyopathy with EF 10% on Echo 4/4/17    ICD in place, implantation 2015.    S/P Sepsis due to  source 3/30/17    Diabetes Mellitus HgbA1c 10.4    Chronic hypoxic respiratory failure on nocturnal o2    CAD s/p CABG 2005, [facility] 2015. Grafts patent except SVG to  diagonal    Urinary retention requiring colbert    Hx of PVD (peripheral vascular disease)    Recent E. 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 Indwelling Colbert catheter    Atrial fibrillation but now converted. Still on Eliquis       Subjective  SUBJECTIVE   Patient resting on nasal cannula, not very responsive, hemodynamically stable,  Requiring frequent suctioning    The patient's relevant past medical, surgical, family, and social history were reviewed    Allergies and medications were reviewed    ROS:  Per subjective, all other systems were reviewed and were negative     Objective  OBJECTIVE     Vitals:    04/24/17 1200   BP: 108/72   Pulse: 77   Resp: 18   Temp: 96.8 °F (36 °C)   SpO2: 100%     General Appearance:  somnolent, in no acute distress  Eyes:  No scleral icterus or pallor, PERRLA  Ears, Nose, Mouth, Throat:  Atraumatic, oropharynx clear  Neck:  Trachea midline, thyroid normal  Respiratory:  rhonchi to auscultation bilaterally, normal effort  Cardiovascular:  Regular rate and rhythm, no murmurs, no peripheral edema  Gastrointestinal:  Soft, non-tender, non-distended, no hepatosplenomegaly  Skin:  Normal temperature, no rash  Psychiatric:  Somnolent, no agitation  Neuro:  No new focal neurologic deficits observed    Relevant imaging studies and labs from 04/24/17 were reviewed and interpreted by me    Assessment/Plan   IMPRESSION / PLAN     Inpatient Problem List:  61 y.o.male:  Principal Problem:    CVA 4/4/17 with rapid improvement  Active Problems:    Ischemic cardiomyopathy with EF 10% on Echo 4/4/17    ICD in place, implantation 2015.    S/P Sepsis due to  source 3/30/17    Diabetes Mellitus HgbA1c 10.4    Chronic hypoxic respiratory failure on nocturnal o2    CAD s/p CABG 2005, [facility] 2015. Grafts patent except SVG to diagonal    Urinary retention requiring colbert    Hx of  PVD (peripheral vascular disease)    Recent E. 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 Indwelling Cueto catheter    Atrial fibrillation but now converted. 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  D/C coreg, start low dose hydralazine/nitrate, titrate up as tolerated.  Start Digoxin.  Diuresis w/ Diuril & Diamox today - probably reaching maximal diuresis as evidenced by his sodium and BUN.      -Acute on Chronic Respiratory Failure - requiring NT suctioning every couple hours, continue nebs, mucomyst, etc.  Bipap prn.  He is a Do not Intubate.    -Delisa's Gangrene / Candiduria / UTI - Abx per ID    -T2DM (A1c 10.4) - Levemir 20, correction scale    -Tube Feeds    -Elliquis/PPI    -Agree poor prognosis, Palliative consulted.  Very high risk for impending cardiac or respiratory event.    Plan of care and goals reviewed with mulitdisciplinary team at daily rounds    Critical care time: 30 minutes       Artemio Patel MD  Intensive Care Medicine  04/24/17 12:45 PM          Electronically signed by Artemio Patel MD at 4/24/2017  1:04 PM      TRESA Aj at 4/25/2017  7:17 AM  Version 1 of 1         [facility] Progress Note    Admission Date: 4/4/2017    Tomas Salvador  1955  [id number]    Date: 4/25/2017    Meds:    IV Anti-Infectives     Ordered     Dose/Rate Route Frequency Start Stop    04/20/17 0718  ceFAZolin in dextrose (ANCEF)  "IVPB solution 2 g     Ordering Provider:  Satya Fall MD    2 g  over 30 Minutes Intravenous Once 04/21/17 0800 04/21/17 0815    04/19/17 1145  amphotericin B (FUNGIZONE) 50 mg in sterile water (preservative free) 1,000 mL irrigation     Ordering Provider:  Kike Leon MD    42 mL/hr  42 mL/hr  Irrigation Every 24 Hours 04/19/17 1300            CC:  Delisa's gangrene    SUBJECTIVE:  4/4/17:Patient is a 61 y.o. 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 He has a history of underlying diabetes mellitus and severe systolic congestive heart failure. He presented to [facility] on 3/30 with scrotal cellulitis/gangrene. He underwent debridement by urology at [facility] on 3/31 and was treated with Merrem/clindamycin/vancomycin. He developed left facial droop and left sided paresis, prompting a transfer to [facility] for evaluation and therapy of acute stroke. His head CT angiogram was degraded by motion artifact but per Dr. Morrison there was suggestion of right inferior temporal lobe\" opacification\" consistent with a possible stroke. His left facial droop and left-sided paresis has improved today. He is encephalopathic and is unable to provide any reliable history. His wife thinks that he may have had some fevers prior to his admission on 3/30.    4/5/17: afebrile. No hx per patient secondary to encephalopathy.  Afebrile, restless per nsg staff.  Oliguric UOP.  No n/v/d.  No rashes.  4/6/17: More alert and less restless today.  Still non-responsive to questions and does not follow simple commands.  Still with left-sided weakness.  Afebrile.  Still with oliguric UOP.  No n/v/d, no rashes, per nsg staff notes.   4/7/17:  Alert today.  No fever,chills,sweats.  Wife at bedside with ?s   4/10/17:  Wants to go home.  Wound care per PT.  No n/v/d.   4/11/17:  Now on Dopamine and Bautista, bipap.  Decompensated last pm.    4/12/17:  Somnolent; still on pressors " "  17 Still on pressors/Precedex; Now on bipap.  Had pulled corpak out night before.    17: Per RN staff notes: afebrile, on/off precedex for agitation, on dopamine, on flagyl and cefazolin, on and off bipap.  Can not keep clothes on him.  Attempted to do Is and Os but too difficult without his foreskin getting pulled into meatus, so therefore, just placed Cueto cath.  Not anchored, because he keeps trying to pull it out.  Would is getting daily misted by PT and packed.  He is more alert today, but still not oriented.  4/15/17: He is improved today and off of vasopressor therapy.  He has been receiving some diuretic therapy for pulmonary edema.  He is less confused.  He remains afebrile.  17: He is continued to receive diuretic therapy.  He remains off of vasopressor therapy.  He has remained afebrile  17:  Remains restrained.  Opens eyes to voice.  Nonproductive cough.   17:  Opens eyes.  Does not follow commands.  His Cueto catheter is still in place and he is undergoing diuresis for his CHF/pulmonary edema.  17:  More alert today.  \"wants Scottie\".  PT just changed dressing.  He remains off of vasopressor therapy.  He is continued to receive diuresis.  The nursing staff reports that he has some penile edema, making it difficult to in and out catheterize him.  17:  On Bipap.  Opens eyes to voice.   17:  He has been less responsive today.  He is not currently following commands.  He still has hypoxia.  17: He has been more responsive today.  He is still severely confused and has required mitts on his hands.  He is still requiring 5 L of supplemental oxygen.  He has remained afebrile.  17:  Just taken off bipap.  Apparently not a candidate for Les rehab.  Remains confused.  17: wants his mitts off.  Says he's short of breath.        PE:   Vital Signs  Temp (24hrs), Av.6 °F (36.4 °C), Min:96.8 °F (36 °C), Max:98.1 °F (36.7 °C)    Temp  Min: 96.8 °F (36 °C)  " Max: 98.1 °F (36.7 °C)  BP  Min: 96/63  Max: 146/80  Pulse  Min: 66  Max: 100  Resp  Min: 16  Max: 22  SpO2  Min: 78 %  Max: 100 %    GENERAL:   He is is in no acute distress.  He is alert but confused.  HEENT: Normocephalic, atraumatic. PERRL. EOMI. No conjunctival injection. No icterus. Oropharynx clear without evidence of thrush or exudate.   HEART: RRR; No murmur, rubs, gallops.   LUNGS:   Expiratory wheezes, rhonchi ABDOMEN: Soft, nontender, nondistended. Positive bowel sounds. No rebound or guarding. NO mass or HSM. Peg   EXT: No cyanosis, clubbing or edema. No cord.  : The lower, posterior midline scrotal wounds reveals some continued healing with no residual cellulitis and no purulent drainage.  Dressing intact   A Cueto catheter is in place  MSK: FROM without joint effusions noted arms/legs.   SKIN: Warm and dry without cutaneous eruptions on Inspection/palpation.   Neurologic: He is more alert but remains confused .  He moves all 4 extremities.  Right PICC without redness      Laboratory Data      Results from last 7 days  Lab Units 04/25/17  0353 04/23/17  0526 04/22/17  0417   WBC 10*3/mm3 10.09 7.81 11.10*   HEMOGLOBIN g/dL 10.1* 9.7* 10.8*   HEMATOCRIT % 34.1* 32.0* 35.9*   PLATELETS 10*3/mm3 288 269 305       Results from last 7 days  Lab Units 04/25/17  0353   SODIUM mmol/L 144   POTASSIUM mmol/L 3.5   CHLORIDE mmol/L 104   TOTAL CO2 mmol/L 35.0*   BUN mg/dL 48*   CREATININE mg/dL 0.60   GLUCOSE mg/dL 231*   CALCIUM mg/dL 8.9       Results from last 7 days  Lab Units 04/25/17  0353   ALK PHOS U/L 134*   BILIRUBIN mg/dL 0.4   ALT (SGPT) U/L 13   AST (SGOT) U/L 25       Results from last 7 days  Lab Units 04/20/17  0429   SED RATE mm/hr 95*       Results from last 7 days  Lab Units 04/24/17  1107   CRP mg/dL 7.38*       4/17:  UA small amount of LE; UC   Candida Glabrata           Estimated Creatinine Clearance: 162.5 mL/min (by C-G formula based on Cr of 0.6).    Microbiology:  Blood Culture   Date  Value Ref Range Status   03/30/2017 No growth at 5 days  Final   03/30/2017 No growth at 5 days  Final           Urine Culture   Date Value Ref Range Status   04/04/2017 No growth  Preliminary   03/31/2017 No growth  Final   03/30/2017 >100,000 CFU/mL Escherichia coli (A)  Final      scrotal cx x 2 cxs (3/31) E. coli  Cefazolin sens.    Urine Culture   Date Value Ref Range Status   04/11/2017 No growth at 2 days  Final   04/07/2017 >100,000 CFU/mL Candida glabrata (A)  Final         Radiology:  Imaging Results (last 24 hours)     ** No results found for the last 24 hours. 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  2. Acute right hemispheric cerebral vascular accident.  3.  Metabolic encephalopathy  4. 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 Severe systolic congestive heart failure-she has an ejection fraction of 10% with severe persistent symptomatic congestive heart failure.  6. Escherichia coli urinary tract infection-improved  7. Sepsis-secondary to Delisa's gangrene, now improved  8. Leukocytosis/neutrophilia-secondary to scrotal gangrene/cellulitis, improved   9.  Pulmonary edema  10. Candiduria--this resolved with removal of his Cueto catheter.  The treatment for his previous candiduria was removal of the Cueto catheter.  Now has recurrent candiduria after his Cueto catheter was placed back in.  He is currently on amphotericin B bladder irrigations         PLAN/RECOMMENDATIONS:   1.  Continue scrotal wound care  2.  Remove the Cueto catheter when feasible  3.  Amphotericin B bladder irrigations   4.  Continue off of antibacterial antibiotic therapy  5.  Family meeting scheduled for today at 1400    Although his Delisa's gangrene is better, he has persistent severe congestive heart failure and pulmonary edema.  He is unlikely to recover and hospice would be  "appropriate.    Dr. Leon has obtained the history, performed the physical exam and formulated the above treatment plan.       TRESA Aj  4/25/2017  7:17 AM           Electronically signed by TRESA Aj at 4/25/2017  7:24 AM           Consult Notes (last 24 hours) (Notes from 4/24/2017  9:12 AM through 4/25/2017  9:12 AM)      TRESA Lemos at 4/24/2017  3:06 PM  Version 1 of 1     Consult Orders:    1. Inpatient Consult to Palliative Care MD [[id number]] ordered by Michael Patel MD at 04/23/17 1243                Jose Navarro MD  Consulting physician: Artemio Patel  Reason for referral : goals of care discussion  No chief complaint on file.    HPI: 60yo male with history of DM, CAD, chronic respiratory failure with hypoxia presented to [facility] on 3/30/17 from urologist office with a scrotal lesion and eventual diagnosis of Delisa's gangrene. On 3/31 urologist, Dr Johnson performed cystoscopy and scrotal exploration with debridement of necrotizing fascitis. On 4/4, patient was transferred to [facility] with aphasia and left side facial drooping and weakness. CTA showed right inferior temporal lobe \"opacification\" c/w possible stroke. ECHO on 4/4/17 showed LVEF 10%. Patient has been managed for sepsis secondary to  source, currently on amphotericin bladder irrigation, wound care for debridement area of scrotum, acute on chronic respiratory failure with hypoxia and managed with intermittent BiPAP oxygen supplement.    Palliative medicine was consulted to further assist with goals of care discussion.   Palliative nurse discussed with wife on 4/23 and meeting arranged for 4/24 on 2:00pm for further goals of care discussion.     Symptoms:   No family at bedside.   Patient minimally answers yes, no questions. No pain, dyspnea, nausea.   + anxiety, unable to provide any other info.   Past Medical History:   Diagnosis Date   • CAD (coronary artery disease)    • Chronic back pain "    • Chronic respiratory failure with hypoxia     Night time only at 2 liters/min   • Diabetes mellitus, type 2    • Essential hypertension    • Hyperlipidemia    • Ischemic cardiomyopathy    • NSTEMI (non-ST elevated myocardial infarction) 2015   • Obesity    • PVD (peripheral vascular disease)    • Systolic CHF     EF <20%  3/8/17   • Urinary retention      Past Surgical History:   Procedure Laterality Date   • CARDIAC CATHETERIZATION  05/2015    Severe multivessel coronary artery disease involving a totally occluded LAD, 99% proximal left circumflex artery; totally occluded saphenous vein graft to the diagonal; patent LIMA to the LAD, and a patent saphenous vein graft to the first obtuse marginal.      • CARDIAC DEFIBRILLATOR PLACEMENT  2015   • CORONARY ARTERY BYPASS GRAFT  2005    3 vessel   • CYSTOSCOPY N/A 3/31/2017    Procedure: CYSTOSCOPY and scrotal exploration with debridement of necrotizing fasciitis;  Surgeon: Mele Johnson MD;  Location: [facility] OR;  Service:    • ENDOSCOPY W/ PEG TUBE PLACEMENT N/A 4/21/2017    Procedure: ESOPHAGOGASTRODUODENOSCOPY WITH PERCUTANEOUS ENDOSCOPIC GASTROSTOMY TUBE INSERTION AT BEDSIDE;  Surgeon: Satya Fall MD;  Location: [facility] ENDOSCOPY;  Service:    • LEG SURGERY Right     unknown, pos vascular bypass   • SCROTAL EXPLORATION N/A 3/31/2017    Procedure: SCROTAL EXPLORATION;  Surgeon: Mele Johnson MD;  Location: [facility] OR;  Service:      Current Facility-Administered Medications   Medication Dose Route Frequency Provider Last Rate Last Dose   • acetaZOLAMIDE (DIAMOX) injection  500 mg Intravenous Q12H Artemio Patel MD   500 mg at 04/24/17 1423   • amphotericin B (FUNGIZONE) 50 mg in sterile water (preservative free) 1,000 mL irrigation  42 mL/hr Irrigation Q24H Kike Leon MD 42 mL/hr at 04/24/17 1424 42 mL/hr at 04/24/17 1424   • apixaban (ELIQUIS) tablet 5 mg  5 mg Oral Q12H Satya Fall MD   5 mg at 04/24/17 0831   •  [START ON 4/25/2017] aspirin chewable tablet 81 mg  81 mg Oral Daily Artemio Patel MD       • atorvastatin (LIPITOR) tablet 80 mg  80 mg Nasogastric Nightly Michael Gaona MD   80 mg at 04/23/17 2007   • bisacodyl (DULCOLAX) suppository 10 mg  10 mg Rectal Daily PRN TRESA Spicer   10 mg at 04/19/17 1243   • castor oil-balsam peru (VENELEX) ointment   Topical Q12H Ella Pope MD       • chlorothiazide (DIURIL) injection 500 mg  500 mg Intravenous Q12H Artemio Patel MD       • dextrose (D50W) solution 25 g  25 g Intravenous Q15 Min PRN TRESA Baca   25 g at 04/22/17 0612   • dextrose (GLUTOSE) oral gel 15 g  15 g Oral Q15 Min PRN TRESA Baca       • digoxin (LANOXIN) tablet 250 mcg  250 mcg Oral Daily Artemio Patel MD   250 mcg at 04/24/17 1427   • glucagon (GLUCAGEN) injection 1 mg  1 mg Subcutaneous Q15 Min PRN TRESA Baca       • heparin flush (porcine) 100 UNIT/ML injection 500 Units  500 Units Intracatheter Q12H Ella Pope MD   500 Units at 04/24/17 0831   • hydrALAZINE (APRESOLINE) tablet 25 mg  25 mg Oral Q8H Artemio Patel MD   25 mg at 04/24/17 1427   • HYDROcodone-acetaminophen (NORCO) 7.5-325 MG per tablet 1 tablet  1 tablet Oral Q4H PRN Ella Pope MD   1 tablet at 04/17/17 1540   • insulin detemir (LEVEMIR) injection 20 Units  20 Units Subcutaneous QAM Artemio Patel MD   20 Units at 04/24/17 1120   • insulin regular (humuLIN R,novoLIN R) injection 0-14 Units  0-14 Units Subcutaneous Q6H Artemio Patel MD   5 Units at 04/24/17 1127   • ipratropium-albuterol (DUO-NEB) nebulizer solution 3 mL  3 mL Nebulization Q4H PRN Ella Pope MD   3 mL at 04/07/17 1634   • ipratropium-albuterol (DUO-NEB) nebulizer solution 3 mL  3 mL Nebulization 4x Daily - RT Ella Pope MD   3 mL at 04/24/17 1246   • isosorbide dinitrate (ISORDIL)  tablet 10 mg  10 mg Oral Q8H Artemio Patel MD   10 mg at 04/24/17 1423   • [START ON 4/25/2017] lansoprazole (FIRST) oral suspension 30 mg  30 mg Oral QAM Artemio Patel MD       • LORazepam (ATIVAN) injection 0.5 mg  0.5 mg Intravenous Q8H PRN Michael Gaona MD   0.5 mg at 04/21/17 0418   • Magnesium Sulfate 2 gram Bolus, followed by 8 gram infusion (total Mg dose 10 grams)- Mg less than or equal to 1mg/dL  2 g Intravenous PRN Ella Pope MD        Or   • Magnesium Sulfate 6 gram Infusion (2 gm x 3) -Mg 1.1 -1.5 mg/dL  2 g Intravenous PRN Ella Pope MD        Or   • magnesium sulfate 4 gram infusion- Mg 1.6-1.9 mg/dL  4 g Intravenous PRN Ella Pope MD 25 mL/hr at 04/13/17 0742 4 g at 04/13/17 0742   • potassium chloride (KLOR-CON) packet 40 mEq  40 mEq Oral Q12H Artemio Patel MD   40 mEq at 04/24/17 1423   • potassium chloride 20 mEq in 50 mL IVPB  20 mEq Intravenous Q1H PRN Michael Patel MD 50 mL/hr at 04/08/17 1023 20 mEq at 04/08/17 1023   • QUEtiapine (SEROquel) tablet 25 mg  25 mg Oral Q12H Michael Gaona MD   25 mg at 04/24/17 0831   • QUEtiapine (SEROquel) tablet 25 mg  25 mg Oral Q8H PRN Michael Gaona MD   25 mg at 04/20/17 1118   • saccharomyces boulardii (FLORASTOR) capsule 250 mg  250 mg Oral BID Ella Pope MD   250 mg at 04/24/17 0831   • sennosides (SENOKOT) 8.8 MG/5ML syrup 10 mL  10 mL Oral BID Artemio Patel MD       • sodium chloride 0.9 % flush 1-10 mL  1-10 mL Intravenous PRN Emeka Morrison MD            bisacodyl  •  dextrose  •  dextrose  •  glucagon (human recombinant)  •  HYDROcodone-acetaminophen  •  ipratropium-albuterol  •  LORazepam  •  magnesium sulfate **OR** magnesium sulfate **OR** magnesium sulfate  •  [DISCONTINUED] potassium chloride **OR** [DISCONTINUED] potassium chloride **OR** potassium chloride  •  QUEtiapine  •  sodium chloride  No Known Allergies  Family History  "  Problem Relation Age of Onset   • Diabetes Mother    • Hypertension Mother    • Coronary artery disease Mother      MI in her 40's   • Cancer Father      Throat   • Hypertension Father    • Hypertension Sister    • Hypertension Brother    • Hypertension Maternal Grandmother    • Hypertension Maternal Grandfather      Social History     Social History   • Marital status:      Spouse name: N/A   • Number of children: N/A   • Years of education: N/A     Occupational History   • Not on file.     Social History Main Topics   • Smoking status: Former Smoker     Packs/day: 1.50     Years: 43.00     Quit date: 2002   • Smokeless tobacco: Current User   • Alcohol use No   • Drug use: No   • Sexual activity: Defer     Other Topics Concern   • Not on file     Social History Narrative   Code Status: NO CODE STATUS discussion  Advance Directives:  None on medical record   Review of Systems - +/- per HPI and symptom review.      PPS: 20%  /72  Pulse 80  Temp 96.8 °F (36 °C) (Axillary)   Resp 16  Ht 69\" (175.3 cm)  Wt 262 lb (119 kg)  SpO2 98%  BMI 38.69 kg/m2  262 lb (119 kg) Body mass index is 38.69 kg/(m^2).  Intake & Output (last day)       04/23 0701 - 04/24 0700 04/24 0701 - 04/25 0700    I.V. (mL/kg)      Other 1182 360    NG/GT 1324 404    Irrigation 948 437    Total Intake(mL/kg) 3454 (29.1) 1201 (10.1)    Urine (mL/kg/hr) 2730 (1) 825 (0.9)    Total Output 2730 825    Net +724 +376                Physical Exam:    General Appearance:    No acute distress, ill appearing   Head:    Normocephalic, without obvious abnormality, atraumatic   Eyes:            Conjunctivae and sclerae normal, no icterus,  Right pupil reactive, left cataract                   Lungs:     Clear to auscultation,respirations regular, even and                  nonlabored    Heart:    Regular rhythm and normal rate, normal S1        Abdomen:     Normal bowel sounds, no masses, soft, non-distended, no       guarding, non tender, " PEG tube       Extremities:  Upper extremities restrained with mittens, no redness, no cyanosis, x4 trace edema, no bilateral feet sores or wounds             Pulses:   Distal pulses palpable and equal bilaterally   Skin:   Wm, dry       Neurologic:   Unable to follow commands, lethargic, no myoclonus         Results from last 7 days  Lab Units 04/23/17  0526   WBC 10*3/mm3 7.81   HEMOGLOBIN g/dL 9.7*   HEMATOCRIT % 32.0*   PLATELETS 10*3/mm3 269       Results from last 7 days  Lab Units 04/24/17  1107   SODIUM mmol/L 148*   POTASSIUM mmol/L 3.9   CHLORIDE mmol/L 104   TOTAL CO2 mmol/L 39.0*   BUN mg/dL 52*   CREATININE mg/dL 0.60   CALCIUM mg/dL 8.8   BILIRUBIN mg/dL 0.4   ALK PHOS U/L 135*   ALT (SGPT) U/L 12   AST (SGOT) U/L 28   GLUCOSE mg/dL 234*       Results from last 7 days  Lab Units 04/24/17  1107   SODIUM mmol/L 148*   POTASSIUM mmol/L 3.9   CHLORIDE mmol/L 104   TOTAL CO2 mmol/L 39.0*   BUN mg/dL 52*   CREATININE mg/dL 0.60   GLUCOSE mg/dL 234*   CALCIUM mg/dL 8.8     Imaging Results (last 72 hours)     Procedure Component Value Units Date/Time    XR Chest 1 View [[id number]] Collected:  04/22/17 1539     Updated:  04/22/17 1551    Narrative:          EXAMINATION: XR CHEST 1 VW- - 04/22/2017     INDICATION: R13.10-Dysphagia, unspecified; Z74.09-Other reduced  mobility;  R41.841-Cognitive communication deficit.     COMPARISON: 4/20/2017.     FINDINGS: The heart is large. There are bilateral perihilar and basilar  airspace changes, most striking in the left lower lobe. An implantable  cardio defibrillator is well positioned. There has been no change           Impression:       There has been no change since 4/20/2017.     DICTATED:     04/22/2017  EDITED:         04/22/2017     This report was finalized on 4/22/2017 3:49 PM by Dr. David Murillo MD.       XR Chest 1 View [[id number]] Collected:  04/23/17 1411     Updated:  04/23/17 1422    Narrative:          EXAMINATION: XR CHEST 1 VW - 04/23/2017      INDICATION: R13.10-Dysphagia, unspecified; Z74.09-Other reduced  mobility; R41.841-Cognitive communication deficit.      COMPARISON: 4/22/2017.     FINDINGS: Cardiac silhouette is normal. There are patchy bilateral  airspace opacities relatively sparing the apical regions. There has been  no improvement when compared to previous examination of 4/22/2017.           Impression:       Widespread bilateral airspace disease, unchanged since  4/22/2017.      DICTATED:     04/23/2017  EDITED:         04/23/2017     This report was finalized on 4/23/2017 2:20 PM by Dr. David Murillo MD.           Impression: 61 y.o. male with Delisa's gangrene, systolic heart failure with LVEF 10%, ICM with ICD, right temporal lobe infarct CVA, acute  Plan:   Dyspnea - intermittent use of BiPAP for oxygen supplement and relief of increase work of breathing.     Tiredness, lethargy - seriously ill    Goals of care discussion - family meeting planned for tomorrow at 2:00pm, arranged on Sunday by palliative care nurse.        TRESA Khanna  [phone]  04/24/17  3:06 PM      Time: >40 minutes spent reviewing medical and medication records, assessing and examining patient, discussing with patient and nursing staff, following up about planned family meeting and documentation of care.          Electronically signed by TRESA Lemos at 4/24/2017  5:45 PM

## 2017-04-25 NOTE — PROGRESS NOTES
Acute Care - Wound/Debridement Treatment Note  [facility]     Patient Name: Tomas Salvador  : 1955  MRN: [id number]  Today's Date: 2017  Onset of Illness/Injury or Date of Surgery Date: 17   Date of Referral to PT: 17   Referring Physician: MD Jimenez       Admit Date: 2017    Visit Dx:    ICD-10-CM ICD-9-CM   1. Dysphagia, unspecified type R13.10 787.20   2. Impaired functional mobility, balance, gait, and endurance Z74.09 V49.89   3. Impaired mobility and ADLs Z74.09 799.89   4. Cognitive communication deficit R41.841 799.52       Patient Active Problem List   Diagnosis   • Coronary disease   • Ischemic cardiomyopathy with EF 10% on Echo 17   • Peripheral vascular disease   • ICD in place, implantation .   • Dyslipidemia, on atorvastain.    • S/P Sepsis due to  source 3/30/17   • CVA 17 with rapid improvement   • Diabetes Mellitus HgbA1c 10.4   • Chronic hypoxic respiratory failure on nocturnal o2   • CAD s/p CABG , [facility] . Grafts patent except SVG to diagonal   • Urinary retention requiring colbert   • Hyperlipidemia   • Essential hypertension   • Hx of PVD (peripheral vascular disease)   • Recent E. 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 Indwelling Colbert catheter   • Atrial fibrillation but now converted. Still on Eliquis               LDA Wound       17 0915          Incision 17 1056 other (see comments) scrotum    Incision - Properties Group Placement Date: 17  -JW Placement Time: 105  -JW Side: other (see comments)  -JW Location: scrotum  -JW    Incision WDL ex  -MF      Dressing Appearance moist drainage;intact  -MF      Pressure Ulcer 17 coccyx Stage I    Pressure Ulcer - Properties Group Date first assessed: 17  -JE Time first assessed:   - Present On Admission (Pressure Ulcer): no   -JE Location: coccyx  -JE Stage: Stage I  -JE    Wound 04/20/17 0850 medial nose abrasion    Wound - Properties Group Date first assessed: 04/20/17  -AS Time first assessed: 0850  -AS Orientation: medial  -AS Location: nose  -AS, bridge   Type: abrasion  -AS      User Key  (r) = Recorded By, (t) = Taken By, (c) = Cosigned By    Initials Name Provider Type     Pee Roldan, PT Physical Therapist    FELY Urbano, RN Registered Nurse    AS Jayson Louis, RN Registered Nurse    JAY JAY Silveira, RN Registered Nurse            WOUND DEBRIDEMENT  Debridement Site 1  Location- Site 1: perineal wound  Selective Debridement- Site 1: Wound Surface <20cmsq (~5cm2)  Instruments- Site 1: tweezers  Excised Tissue Description- Site 1: minimum, slough  Bleeding- Site 1: none                  Adult Rehabilitation Note       04/25/17 1030 04/25/17 0915 04/24/17 0850    Rehab Assessment/Intervention    Discipline speech language pathologist  -SG physical therapist  -MF physical therapist  -    Document Type therapy note (daily note)  -SG therapy note (daily note)  - therapy note (daily note)  -    Subjective Information no complaints;agree to therapy  -SG agree to therapy;no complaints  - agree to therapy;complains of;weakness;fatigue  -    Patient Effort, Rehab Treatment fair  -      Symptoms Noted During/After Treatment none  -SG      Precautions/Limitations, Vision WFL  -SG      Precautions/Limitations, Hearing WFL  -SG      Recorded by [SG] Lyndsey Sultana MS CCC-SLP [MF] Pee Roldan, PT [MF] Pee Roldan, PT    Pain Assessment    Pain Assessment Jay-Kent FACES  -SG Jay-Baker FACES  - Jay-Baker FACES  -    Jay-Baker FACES Pain Rating 0  -SG 0  -MF 0  -MF    Pain Score 0  -SG      Post Pain Score 0  -SG      Pain Intervention(s)   Repositioned  -    Recorded by [SG] Lyndsey Sultana MS CCC-SLP [MF] Pee Roldan, PT [MF] Pee Roldan, PT     Cognitive Assessment/Intervention    Current Cognitive/Communication Assessment   impaired  -MF    Orientation Status   disoriented x 4  -MF    Recorded by   [MF] Pee Roldan, PT    Improve ability to follow directions    Improve ability to follow directions: one-step directions without objects;100%;without cues  -SG      Status: Improve ability to follow directions Progress slower than expected  -SG      Ability to Follow Directions Progress 40%;with consistent cues;continue to address  -SG      Recorded by [SG] Lyndsey Sultana MS CCC-SLP      Improve ability to comprehend questions    Improve ability to comprehend questions: simple yes/no questions;simple general questions;100%;without cues  -SG      Status: Improve ability to comprehend questions Progress slower than expected  -SG      Ability to Comprehend Questions Progress 50%;with consistent cues;continue to address  -SG      Recorded by [SG] Lyndsey Sultana MS CCC-SLP      Improve ability to construct phrase and sentence level responses    Improve ability to construct phrase and sentence level responses by: answering question with phrase;100%;without cues  -SG      Status: Improve ability to construct phrase and sentence level responses Progress slower than expected  -SG      Constructing Phrase and Sentence Level Responses Progress 50%;continue to address;with consistent cues  -SG      Recorded by [SG] Lyndsey Sultana MS CCC-SLP      Improve attention    Improve attention by: attending to task;complete selective attention task;80%;with consistent cues  -SG      Status: Improve attention Progressing as expected  -SG      Attention Progress 40%;with consistent cues;continue to address  -SG      Recorded by [SG] Lyndsey Sultana MS CCC-SLP      Improve orientation    Improve orientation through: demonstrating orientation to day;demonstrating orientation to month;demonstrating orientation to  "year;demonstrating orientation to place;demonstrating orientation to disease/impairment;100%;with inconsistent cues  -SG      Status: Improve orientation through Progressing as expected  -SG      Orientation Progress 40%;with consistent cues;continue to address  -SG      Recorded by [SG] Lyndsey Sultana MS CCC-SLP      Improve functional problem solving    Improve functional problem solving through: determine solutions to simple ADL/safety problems;answer \"what if\" questions;complete basic reasoning task;complete organization/home management task;70%;with consistent cues  -SG      Status: Improve functional problem solving Progress slower than expected  -SG      Functional Problem Solving Progress 30%;with consistent cues;continue to address  -SG      Recorded by [SG] Lyndsey Sultana MS CCC-SLP      Improve timing of pharyngeal response    To improve timing of pharyngeal response, patient will: Swallow in timely way using three second prep;Swallow in timely way using super-supraglottic swallow;80%;with consistent cues  -SG      Status: Improve timing of pharyngeal response Progress slower than expected  -SG      Timing of Pharyngeal Response Progress 10%;with consistent cues;continue to adress  -SG      Recorded by [SG] Lyndsey Sultana MS CCC-SLP      Improve laryngeal closure    To improve laryngeal closure, patient will: Complete supraglottic swallow;80%;with consistent cues  -SG      Status: Improve laryngeal closure Progress slower than expected  -SG      Laryngeal Closure Progress 0%;with consistent cues;continue to adress  -SG      Recorded by [SG] Lyndsey Sultana MS CCC-SLP      Improve closure at entrance to airway    To improve closure at entrance to airway, patient will: Complete super-supraglottic swallow;80%;with consistent cues  -SG      Status: Improve closure at entrance to airway Progress slower than expected  -SG      Closure at Entrance to Airway " "Progress 0%;with consistent cues;continue to adress  -SG      Recorded by [SG] Lyndsey Sultana MS CCC-SLP      Improve hyolaryngeal excursion    To improve hyolaryngeal excursion, patient will: Complete chin tuck against resistance (comment number of repetitions);90%;with consistent cues  -SG      Status: Improve hyolaryngeal excursion Progress slower than expected  -SG      Hyolaryngeal Excursion Progress continue to adress  -SG      Recorded by [SG] Lyndsey Sultana MS CCC-SLP      Improve tongue base & pharyngeal wall squeeze    To improve tongue base & pharyngeal wall squeeze, patient will: Complete effortful swallow;80%;with consistent cues  -SG      Status: Improve tongue base & pharyngeal wall squeeze Progress slower than expected  -SG      Tongue Base/Pharyngeal Wall Squeeze Progress 40%;with consistent cues;continue to adress  -SG      Recorded by [SG] Lyndsey Sultana MS CCC-SLP      Positioning and Restraints    Pre-Treatment Position  in bed  - in bed  -    Post Treatment Position  bed  - bed  -    In Bed  supine;notified nsg  -MF supine;call light within reach;notified nsg  -MF    Recorded by  [MF] Pee Roldan, PT [MF] Pee Roldan, PT      04/22/17 9484          Rehab Assessment/Intervention    Discipline physical therapist  -      Document Type therapy note (daily note)   wound care   -MW      Subjective Information no complaints;agree to therapy   asking to \"get out of here\"  -MW      Recorded by [MW] Connie Ballesteros, PT      Pain Assessment    Pain Assessment Jay-Kent FACES  -MW      Pain Score 0  -MW      Post Pain Score 2  -MW      Pain Type Acute pain  -MW      Pain Location Scrotum  -MW      Pain Intervention(s) Repositioned   moist dressing placed   -MW      Response to Interventions tolerated   -MW      Recorded by [MW] Connie Ballesteros, PT      Cognitive Assessment/Intervention    Current Cognitive/Communication Assessment impaired  " -MW      Recorded by [MW] Connie Ballesteros, JENNIFER      Positioning and Restraints    Pre-Treatment Position in bed  -MW      Post Treatment Position bed  -MW      In Bed notified nsg;fowlers;call light within reach   mits in place;   -MW      Recorded by [MW] Connie Ballesteros, PT        User Key  (r) = Recorded By, (t) = Taken By, (c) = Cosigned By    Initials Name Effective Dates     Pee Roldan, PT 06/19/15 -     SG Lyndsey Farmer-Jose David, MS CCC-SLP 06/22/15 -     MW Connie Ballesteros, PT 05/18/15 -                 IP PT Goals       04/24/17 0850 04/22/17 1721 04/22/17 1153    Bed Mobility PT LTG    Bed Mobility PT LTG, Outcome   goal ongoing  -SJ    Transfer Training PT LTG    Transfer Training PT LTG, Outcome   goal ongoing  -SJ    Gait Training PT LTG    Gait Training Goal PT LTG, Outcome   goal ongoing  -SJ    Wound Care PT LTG    Wound Care PT LTG 1, Outcome goal partially met  - goal ongoing  -MW       04/20/17 1118 04/20/17 1115 04/19/17 0835    Bed Mobility PT LTG    Bed Mobility PT LTG, [place] Level minimum assist (75% patient effort)  -LS      Bed Mobility PT LTG, Date Goal Reviewed 04/20/17  -LS      Bed Mobility PT LTG, Outcome goal revised  -LS      Transfer Training PT LTG    Transfer Training PT LTG, [place] Level minimum assist (75% patient effort)  -LS      Transfer Training PT  LTG, Date Goal Reviewed 04/20/17  -LS      Transfer Training PT LTG, Outcome goal revised  -LS      Gait Training PT LTG    Gait Training Goal PT LTG, [place] Level  minimum assist (75% patient effort);2 person assist required  -LS     Gait Training Goal PT LTG, Distance to Achieve  100  -LS     Gait Training Goal PT LTG, Date Goal Reviewed  04/20/17  -LS     Gait Training Goal PT LTG, Outcome  goal revised  -LS     Wound Care PT LTG    Wound Care PT LTG 1, Outcome   goal ongoing  -MC      04/16/17 1450 04/15/17 1455 04/14/17 1120    Wound Care PT LTG    Wound Care PT LTG 1, Outcome goal ongoing   -MC goal ongoing  - goal ongoing  -MC      User Key  (r) = Recorded By, (t) = Taken By, (c) = Cosigned By    Initials Name Provider Type    MF Pee Roldan, PT Physical Therapist    SJ Cinthia Baptiste, PT Physical Therapist    LS Lily Juárez, PT Physical Therapist    MW Connie Ballesteros, PT Physical Therapist    MC Rekha Mcgarry, PT Physical Therapist          Physical Therapy Education     Title: PT OT SLP Therapies (Active)     Topic: Physical Therapy (Active)     Point: Mobility training (Active)    Learning Progress Summary    Learner Readiness Method Response Comment Documented by Status   Patient Acceptance E NR  HA 04/23/17 1932 Active    Acceptance E NR,NL   04/22/17 1155 Active    Acceptance E,D NR   04/20/17 1114 Active    Acceptance E VU  AP 04/18/17 0557 Done    Acceptance E NR  DR 04/09/17 1551 Active    Acceptance E NR  EDOUARD 04/07/17 1534 Active    Acceptance E,D NR   04/06/17 1523 Active    Acceptance E,D NR   04/05/17 0908 Active   Family Acceptance E VU  AP 04/18/17 0557 Done   Significant Other Acceptance E NR  HA 04/23/17 1932 Active    Acceptance E,D NR   04/06/17 1523 Active    Acceptance E,D NR   04/05/17 0908 Active               Point: Home exercise program (Active)    Learning Progress Summary    Learner Readiness Method Response Comment Documented by Status   Patient Acceptance E NR  HA 04/23/17 1932 Active    Acceptance E NR,NL   04/22/17 1155 Active    Acceptance E,D NR   04/20/17 1114 Active    Acceptance E VU  AP 04/18/17 0557 Done    Acceptance E NR  EDOUARD 04/07/17 1534 Active    Acceptance E,D NR   04/06/17 1523 Active   Family Acceptance E VU  AP 04/18/17 0557 Done   Significant Other Acceptance E NR  HA 04/23/17 1932 Active    Acceptance E,D NR   04/06/17 1523 Active               Point: Body mechanics (Active)    Learning Progress Summary    Learner Readiness Method Response Comment Documented by Status   Patient Acceptance E NR  HA 04/23/17 1932 Active     Acceptance E NR,NL   04/22/17 1155 Active    Acceptance E,D NR   04/20/17 1114 Active    Acceptance E VU  AP 04/18/17 0557 Done    Acceptance E NR   04/09/17 1551 Active    Acceptance E NR   04/07/17 1534 Active    Acceptance E,D NR   04/06/17 1523 Active    Acceptance E,D NR   04/05/17 0908 Active   Family Acceptance E VU  AP 04/18/17 0557 Done   Significant Other Acceptance E NR  HA 04/23/17 1932 Active    Acceptance E,D NR   04/06/17 1523 Active    Acceptance E,D NR   04/05/17 0908 Active               Point: Precautions (Active)    Learning Progress Summary    Learner Readiness Method Response Comment Documented by Status   Patient Acceptance E NR  HA 04/23/17 1932 Active    Acceptance E NR,NL   04/22/17 1155 Active    Acceptance E,D NR   04/20/17 1114 Active    Acceptance E VU  AP 04/18/17 0557 Done    Acceptance E NR   04/09/17 1551 Active    Acceptance E NR   04/07/17 1534 Active    Acceptance E,D NR   04/06/17 1523 Active    Acceptance E,D NR   04/05/17 0908 Active   Family Acceptance E VU  AP 04/18/17 0557 Done   Significant Other Acceptance E NR  HA 04/23/17 1932 Active    Acceptance E,D NR   04/06/17 1523 Active    Acceptance E,D NR   04/05/17 0908 Active                      User Key     Initials Effective Dates Name Provider Type Discipline     06/19/15 -  Anita Prado, PT Physical Therapist PT     06/19/15 -  Cinthia Baptiste, PT Physical Therapist PT     06/19/15 -  Lily Juárez, PT Physical Therapist PT    HA 06/16/16 -  Valdemar Levy, RN Registered Nurse Nurse    MILVIA 06/16/16 -  Shahla Sherman, RN Registered Nurse Nurse     09/06/16 -  Mikey Moore, PT Physical Therapist PT                   PT ASSESSMENT (last 72 hours)      PT Evaluation       04/25/17 1030 04/25/17 0915    Rehab Evaluation    Document Type therapy note (daily note)  -SG therapy note (daily note)  -MF    Subjective Information no complaints;agree to therapy  -SG agree to  "therapy;no complaints  -    Patient Effort, Rehab Treatment fair  -SG     Symptoms Noted During/After Treatment none  -SG     Pain Assessment    Pain Assessment Jay-Kent FACES  -SG Jay-Baker FACES  -MF    Jay-Baker FACES Pain Rating 0  -SG 0  -MF    Pain Score 0  -SG     Post Pain Score 0  -SG     Positioning and Restraints    Pre-Treatment Position  in bed  -    Post Treatment Position  bed  -    In Bed  supine;notified [facility]      04/25/17 0600 04/25/17 0400    Sensory Assessment/Intervention    Light Touch --   NAVDEEP  -CW --   NAVDEEP  -CW      04/25/17 0200 04/25/17 0000    Sensory Assessment/Intervention    Light Touch --   NAVDEEP  -CW --   NAVDEEP  -CW      04/24/17 2200 04/24/17 2000    Sensory Assessment/Intervention    Light Touch --   NAVDEEP  -CW --   NAVDEEP  -CW      04/24/17 1540 04/24/17 0850    Rehab Evaluation    Document Type  therapy note (daily note)  -    Subjective Information  agree to therapy;complains of;weakness;fatigue  -    Pain Assessment    Pain Assessment  Jay-Kent FACES  -    Jay-Baker FACES Pain Rating  0  -MF    Pain Score 0  -ST     Pain Intervention(s)  Repositioned  -    Cognitive Assessment/Intervention    Current Cognitive/Communication Assessment  impaired  -    Orientation Status  disoriented x 4  -    Positioning and Restraints    Pre-Treatment Position  in bed  -    Post Treatment Position  bed  -    In Bed  supine;call light within reach;notified [facility]      04/24/17 0000 04/23/17 1500    Pain Assessment    Pain Score  0  -LD    Sensory Assessment/Intervention    Light Touch --   NAVDEEP  -CW       04/22/17 1425       Rehab Evaluation    Document Type therapy note (daily note)   wound care   -     Subjective Information no complaints;agree to therapy   asking to \"get out of here\"  -MW     Pain Assessment    Pain Assessment Jay-Kent FACES  -     Pain Score 0  -MW     Post Pain Score 2  -MW     Pain Type Acute pain  -MW     Pain Location Scrotum  -MW     Pain " Intervention(s) Repositioned   moist dressing placed   -MW     Response to Interventions tolerated   -MW     Cognitive Assessment/Intervention    Current Cognitive/Communication Assessment impaired  -MW     Positioning and Restraints    Pre-Treatment Position in bed  -MW     Post Treatment Position bed  -MW     In Bed notified nsg;fowlers;call light within reach   mits in place;   -MW       User Key  (r) = Recorded By, (t) = Taken By, (c) = Cosigned By    Initials Name Provider Type    VALERI Roldan, PT Physical Therapist    EFFIE Sultana, MS CCC-SLP Speech and Language Pathologist    MW Connie Ballesteros, PT Physical Therapist    ST Erica Olivares RN Registered Nurse    JENSEN Iyer, RN Registered Nurse    CW Sanaz Cheek, RN Registered Nurse            PT Recommendation and Plan  Anticipated Discharge Disposition: skilled nursing facility  PT Frequency: daily    Plan Of Care Reviewed With: patient   Progress: improving   Outcome Summary/Follow up Plan: Dressing intact with no significant increase in exudate noted. PT will cont with dressing change MWF at this point and allow for decreased inflammation with decreased dressing change freq.             Time Calculation        PT Charges       04/25/17 0915          Time Calculation    Start Time 0915  -      PT Goal Re-Cert Due Date 04/30/17  -      Time Calculation- PT    Total Timed Code Minutes- PT 10 minute(s)  -        User Key  (r) = Recorded By, (t) = Taken By, (c) = Cosigned By    Initials Name Provider Type     Pee Roldan, PT Physical Therapist             Therapy Charges for Today     Code Description Service Date Service Provider Modifiers Qty    39575965054 HC ROSEANNA DEBRIDE OPEN WOUND UP TO 20CM 4/24/2017 Pee Roldan, PT GP 1    [id number] HC PT THER PROC EA 15 MIN 4/25/2017 Pee Roldan, PT GP 1            PT G-Codes  Outcome Measure Options: AM-PAC 6 Clicks Basic Mobility (PT)        Pee  NNAMDI Roldan, PT  4/25/2017

## 2017-04-25 NOTE — PROGRESS NOTES
Continued Stay Note  [facility]     Patient Name: Tomas Salvador  MRN: [id number]  Today's Date: 4/25/2017    Admit Date: 4/4/2017          Discharge Plan       04/25/17 0957    Case Management/Social Work Plan    Plan LTACH vs SNF    Additional Comments Lisa in admissions at [facility] they can not accept pt. I will follow up with LTACH.              Discharge Codes     None        Expected Discharge Date and Time     Expected Discharge Date Expected Discharge Time    Apr 20, 2017         I faxed pt information to Mikayla at [facility] fax# [phone] phone# [phone] ext [phone].    Ella Cancino RN

## 2017-04-25 NOTE — PLAN OF CARE
Problem: Patient Care Overview (Adult)  Goal: Plan of Care Review  Outcome: Ongoing (interventions implemented as appropriate)    04/25/17 1200   Patient Care Overview   Progress declining   Outcome Evaluation   Outcome Summary/Follow up Plan breathing much more labored and tachypneic, congested. wife coming to discuss goals with palliative aprn

## 2017-04-25 NOTE — PROGRESS NOTES
INTENSIVIST / PULMONARY FOLLOW UP NOTE     Hospital:  LOS: 21 days   Mr. Tomas Salvador, 61 y.o. male is followed for:   Principal Problem:    CVA 4/4/17 with rapid improvement  Active Problems:    Ischemic cardiomyopathy with EF 10% on Echo 4/4/17    ICD in place, implantation 2015.    S/P Sepsis due to  source 3/30/17    Diabetes Mellitus HgbA1c 10.4    Chronic hypoxic respiratory failure on nocturnal o2    CAD s/p CABG 2005, LHC 2015. Grafts patent except SVG to diagonal    Urinary retention requiring colbert    Hx of PVD (peripheral vascular disease)    Recent E. 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 Debrided in [place] 3/30/17    Candida glabrata UTI. Indwelling Colbert catheter    Atrial fibrillation but now converted. Still on Eliquis          SUBJECTIVE   Patient resting on nasal cannula, not very responsive, hemodynamically stable,  Requiring frequent suctioning    The patient's relevant past medical, surgical, family, and social history were reviewed    Allergies and medications were reviewed    ROS:  Per subjective, all other systems were reviewed and were negative        OBJECTIVE     Vitals:    04/25/17 0800   BP: 135/86   Pulse: 94   Resp: 20   Temp:    SpO2:      General Appearance:  somnolent, in no acute distress  Eyes:  No scleral icterus or pallor, PERRLA  Ears, Nose, Mouth, Throat:  Atraumatic, oropharynx clear  Neck:  Trachea midline, thyroid normal  Respiratory:  rhonchi to auscultation bilaterally, normal effort  Cardiovascular:  Regular rate and rhythm, no murmurs, no peripheral edema  Gastrointestinal:  Soft, non-tender, non-distended, no hepatosplenomegaly  Skin:  Normal temperature, no rash  Psychiatric:  Somnolent, no agitation  Neuro:  No new focal neurologic deficits observed    Relevant imaging studies and labs from 04/25/17 were reviewed and interpreted by me    Assessment/Plan   IMPRESSION / PLAN      Inpatient Problem List:  61 y.o.male:  Principal Problem:    CVA 4/4/17 with rapid improvement  Active Problems:    Ischemic cardiomyopathy with EF 10% on Echo 4/4/17    ICD in place, implantation 2015.    S/P Sepsis due to  source 3/30/17    Diabetes Mellitus HgbA1c 10.4    Chronic hypoxic respiratory failure on nocturnal o2    CAD s/p CABG 2005, C 2015. Grafts patent except SVG to diagonal    Urinary retention requiring colbert    Hx of PVD (peripheral vascular disease)    Recent E. 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 Debrided in [place] 3/30/17    Candida glabrata UTI. Indwelling Colbert catheter    Atrial fibrillation but now converted. 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  D/C'd coreg, started low dose hydralazine/nitrate, titrating up as tolerated.  Started Digoxin.  Diuresis w/ Diuril & Diamox again today.    -Acute on Chronic Respiratory Failure - requiring NT suctioning every couple hours, continue nebs, mucomyst, etc.  Bipap prn.  He is a Do not Intubate.    -Delisa's Gangrene / Candiduria / UTI - Abx per ID    -T2DM (A1c 10.4) - Levemir 30, correction scale    -Tube Feeds    -Elliquis/PPI    -Agree poor prognosis, Palliative consulted - Family meeting today.  Very high risk for impending cardiac or respiratory event.    Plan of care and goals reviewed with mulitdisciplinary team at daily rounds    Critical care time: 30 minutes       Artemio Patel MD  Intensive Care  Medicine  04/25/17 10:10 AM

## 2017-04-25 NOTE — PROGRESS NOTES
"[facility] Progress Note    Admission Date: 4/4/2017    Tomas Salvador  1955  [id number]    Date: 4/25/2017    Meds:    IV Anti-Infectives     Ordered     Dose/Rate Route Frequency Start Stop    04/20/17 0718  ceFAZolin in dextrose (ANCEF) IVPB solution 2 g     Ordering Provider:  Satya Fall MD    2 g  over 30 Minutes Intravenous Once 04/21/17 0800 04/21/17 0815    04/19/17 1145  amphotericin B (FUNGIZONE) 50 mg in sterile water (preservative free) 1,000 mL irrigation     Ordering Provider:  Kike Leon MD    42 mL/hr  42 mL/hr  Irrigation Every 24 Hours 04/19/17 1300            CC:  Delisa's gangrene    SUBJECTIVE:  4/4/17:Patient is a 61 y.o. 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 He has a history of underlying diabetes mellitus and severe systolic congestive heart failure. He presented to [facility] on 3/30 with scrotal cellulitis/gangrene. He underwent debridement by urology at [facility] on 3/31 and was treated with Merrem/clindamycin/vancomycin. He developed left facial droop and left sided paresis, prompting a transfer to [facility] for evaluation and therapy of acute stroke. His head CT angiogram was degraded by motion artifact but per Dr. Morrison there was suggestion of right inferior temporal lobe\" opacification\" consistent with a possible stroke. His left facial droop and left-sided paresis has improved today. He is encephalopathic and is unable to provide any reliable history. His wife thinks that he may have had some fevers prior to his admission on 3/30.    4/5/17: afebrile. No hx per patient secondary to encephalopathy.  Afebrile, restless per nsg staff.  Oliguric UOP.  No n/v/d.  No rashes.  4/6/17: More alert and less restless today.  Still non-responsive to questions and does not follow simple commands.  Still with left-sided weakness.  Afebrile.  Still with oliguric UOP.  No n/v/d, no rashes, per nsg staff notes. " "  4/7/17:  Alert today.  No fever,chills,sweats.  Wife at bedside with ?s   4/10/17:  Wants to go home.  Wound care per PT.  No n/v/d.   4/11/17:  Now on Dopamine and Bautista, bipap.  Decompensated last pm.    4/12/17:  Somnolent; still on pressors   4/13/17 Still on pressors/Precedex; Now on bipap.  Had pulled corpak out night before.    4/14/17: Per RN staff notes: afebrile, on/off precedex for agitation, on dopamine, on flagyl and cefazolin, on and off bipap.  Can not keep clothes on him.  Attempted to do Is and Os but too difficult without his foreskin getting pulled into meatus, so therefore, just placed Cueto cath.  Not anchored, because he keeps trying to pull it out.  Would is getting daily misted by PT and packed.  He is more alert today, but still not oriented.  4/15/17: He is improved today and off of vasopressor therapy.  He has been receiving some diuretic therapy for pulmonary edema.  He is less confused.  He remains afebrile.  4/16/17: He is continued to receive diuretic therapy.  He remains off of vasopressor therapy.  He has remained afebrile  4/17/17:  Remains restrained.  Opens eyes to voice.  Nonproductive cough.   4/18/17:  Opens eyes.  Does not follow commands.  His Cueto catheter is still in place and he is undergoing diuresis for his CHF/pulmonary edema.  4/19/17:  More alert today.  \"wants Scottie\".  PT just changed dressing.  He remains off of vasopressor therapy.  He is continued to receive diuresis.  The nursing staff reports that he has some penile edema, making it difficult to in and out catheterize him.  4/20/17:  On Bipap.  Opens eyes to voice.   4/21/17:  He has been less responsive today.  He is not currently following commands.  He still has hypoxia.  4/22/17: He has been more responsive today.  He is still severely confused and has required mitts on his hands.  He is still requiring 5 L of supplemental oxygen.  He has remained afebrile.  4/24/17:  Just taken off bipap.  Apparently not a " candidate for [place] rehab.  Remains confused.  17: wants his mitts off.  Says he's short of breath.        PE:   Vital Signs  Temp (24hrs), Av.8 °F (36.6 °C), Min:97.6 °F (36.4 °C), Max:98.1 °F (36.7 °C)    Temp  Min: 97.6 °F (36.4 °C)  Max: 98.1 °F (36.7 °C)  BP  Min: 88/58  Max: 146/80  Pulse  Min: 61  Max: 95  Resp  Min: 18  Max: 24  SpO2  Min: 57 %  Max: 100 %    GENERAL:   He is is in no acute distress.  He is alert but confused.  HEENT: Normocephalic, atraumatic. PERRL. EOMI. No conjunctival injection. No icterus. Oropharynx clear without evidence of thrush or exudate.   HEART: RRR; No murmur, rubs, gallops.   LUNGS:   Expiratory wheezes, rhonchi ABDOMEN: Soft, nontender, nondistended. Positive bowel sounds. No rebound or guarding. NO mass or HSM. Peg   EXT: No cyanosis, clubbing or edema. No cord.  : The lower, posterior midline scrotal wounds reveals some continued healing with no residual cellulitis and no purulent drainage.  Dressing intact   A Cueto catheter is in place  MSK: FROM without joint effusions noted arms/legs.   SKIN: Warm and dry without cutaneous eruptions on Inspection/palpation.   Neurologic: He is more alert but remains confused .  He moves all 4 extremities.  Right PICC without redness      Laboratory Data      Results from last 7 days  Lab Units 17  0353 17  0526 17  0417   WBC 10*3/mm3 10.09 7.81 11.10*   HEMOGLOBIN g/dL 10.1* 9.7* 10.8*   HEMATOCRIT % 34.1* 32.0* 35.9*   PLATELETS 10*3/mm3 288 269 305       Results from last 7 days  Lab Units 17  0353   SODIUM mmol/L 144   POTASSIUM mmol/L 3.5   CHLORIDE mmol/L 104   TOTAL CO2 mmol/L 35.0*   BUN mg/dL 48*   CREATININE mg/dL 0.60   GLUCOSE mg/dL 231*   CALCIUM mg/dL 8.9       Results from last 7 days  Lab Units 17  0353   ALK PHOS U/L 134*   BILIRUBIN mg/dL 0.4   ALT (SGPT) U/L 13   AST (SGOT) U/L 25       Results from last 7 days  Lab Units 17  0429   SED RATE mm/hr 95*       Results from  last 7 days  Lab Units 04/24/17  1107   CRP mg/dL 7.38*       4/17:  UA small amount of LE; UC   Candida Glabrata           Estimated Creatinine Clearance: 162.5 mL/min (by C-G formula based on Cr of 0.6).    Microbiology:  Blood Culture   Date Value Ref Range Status   03/30/2017 No growth at 5 days  Final   03/30/2017 No growth at 5 days  Final           Urine Culture   Date Value Ref Range Status   04/04/2017 No growth  Preliminary   03/31/2017 No growth  Final   03/30/2017 >100,000 CFU/mL Escherichia coli (A)  Final      scrotal cx x 2 cxs (3/31) E. coli  Cefazolin sens.    Urine Culture   Date Value Ref Range Status   04/11/2017 No growth at 2 days  Final   04/07/2017 >100,000 CFU/mL Candida glabrata (A)  Final         Radiology:  Imaging Results (last 24 hours)     ** No results found for the last 24 hours. 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  2. Acute right hemispheric cerebral vascular accident.  3.  Metabolic encephalopathy  4. 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 Severe systolic congestive heart failure-he has an ejection fraction of 10% with severe persistent symptomatic congestive heart failure.  6. Escherichia coli urinary tract infection-improved  7. Sepsis-secondary to Delisa's gangrene, now improved  8. Leukocytosis/neutrophilia-secondary to scrotal gangrene/cellulitis, improved   9.  Pulmonary edema  10. Candiduria--this resolved with removal of his Cueto catheter.  The treatment for his previous candiduria was removal of the Cueto catheter.  Now has recurrent candiduria after his Cueto catheter was placed back in.  He is currently on amphotericin B bladder irrigations         PLAN/RECOMMENDATIONS:   1.  Continue scrotal wound care  2.  Remove the Cueto catheter when feasible  3.  Amphotericin B bladder irrigations   4.  Continue off of  antibacterial antibiotic therapy  5.  Family meeting scheduled for today at 1400    Although his Delisa's gangrene is better, he has persistent severe congestive heart failure and pulmonary edema.  He is unlikely to recover and hospice would be appropriate.  I discussed his situation with Dr. Patel today.  We agree that his prognosis is dismal.    Dr. Leon has obtained the history, performed the physical exam and formulated the above treatment plan.       Kike Leon MD  4/25/2017  5:19 PM

## 2017-04-25 NOTE — PLAN OF CARE
Problem: Patient Care Overview (Adult)  Goal: Plan of Care Review  Outcome: Ongoing (interventions implemented as appropriate)    04/25/17 1442   Coping/Psychosocial Response Interventions   Plan Of Care Reviewed With patient;spouse   Patient Care Overview   Progress progress towards functional goals is fair   Outcome Evaluation   Outcome Summary/Follow up Plan Pt demonstrated ability to maintain stable O2 sats on bi-pap during EOB sitting for 12 min. Ther ex and further mobility was limited by pt's decreased following of commands for active participation in session. Provided edu to pt's wife re: proper technique in assisting pt with ROM exercises in bed. Will cont PT POC as clinically warranted with close monitoring of appropriateness of further therapy pending goals of care decisions. Will plan to decrease frequency to 3x/wk next session if active participation does not improve.          Problem: Stroke (Ischemic) (Adult)  Goal: Signs and Symptoms of Listed Potential Problems Will be Absent or Manageable (Stroke)  Outcome: Ongoing (interventions implemented as appropriate)    04/25/17 1442   Stroke (Ischemic)   Problems Assessed (Stroke (Ischemic)/TIA) motor/sensory impairment   Problems Present (Stroke (Ischemic)/TIA) motor/sensory impairment         Problem: Inpatient Physical Therapy  Goal: Bed Mobility Goal LTG- PT  Outcome: Ongoing (interventions implemented as appropriate)    04/05/17 0909 04/20/17 1118 04/25/17 1442   Bed Mobility PT LTG   Bed Mobility PT LTG, Date Established 04/05/17 --  --    Bed Mobility PT LTG, Time to Achieve 2 wks --  --    Bed Mobility PT LTG, Activity Type supine to sit/sit to supine --  --    Bed Mobility PT LTG, [place] Level --  minimum assist (75% patient effort) --    Bed Mobility PT LTG, Date Goal Reviewed --  04/20/17 --    Bed Mobility PT LTG, Outcome --  --  goal ongoing       Goal: Transfer Training Goal 1 LTG- PT  Outcome: Ongoing (interventions implemented as  appropriate)    04/05/17 0909 04/20/17 1118 04/25/17 1442   Transfer Training PT LTG   Transfer Training PT LTG, Date Established 04/05/17 --  --    Transfer Training PT LTG, Time to Achieve 2 wks --  --    Transfer Training PT LTG, Activity Type sit to stand/stand to sit --  --    Transfer Training PT LTG, [place] Level --  minimum assist (75% patient effort) --    Transfer Training PT LTG, Assist Device walker, rolling --  --    Transfer Training PT LTG, Date Goal Reviewed --  04/20/17 --    Transfer Training PT LTG, Outcome --  --  goal ongoing       Goal: Gait Training Goal LTG- PT  Outcome: Ongoing (interventions implemented as appropriate)    04/05/17 0909 04/20/17 1115 04/25/17 1442   Gait Training PT LTG   Gait Training Goal PT LTG, Date Established 04/05/17 --  --    Gait Training Goal PT LTG, Time to Achieve 2 wks --  --    Gait Training Goal PT LTG, [place] Level --  minimum assist (75% patient effort);2 person assist required --    Gait Training Goal PT LTG, Assist Device walker, rolling --  --    Gait Training Goal PT LTG, Distance to Achieve --  100 --    Gait Training Goal PT LTG, Date Goal Reviewed --  04/20/17 --    Gait Training Goal PT LTG, Outcome --  --  goal ongoing

## 2017-04-25 NOTE — PROGRESS NOTES
"Palliative Care Progress Note    Date of Admission: 4/4/2017    Subjective:  Nurse reported patient became more dyspneic this morning required additional diuretic and BiPAP for oxygen supplement.   Patient was sitting on edge of bed with therapy, wearing BiPAP.   Limited ability to be aware and answer in yes, no questions.   Met with wife at bedside.     Reviewed scheduled and prn medications.     bisacodyl  •  dextrose  •  dextrose  •  glucagon (human recombinant)  •  HYDROcodone-acetaminophen  •  ipratropium-albuterol  •  LORazepam  •  magnesium sulfate **OR** magnesium sulfate **OR** magnesium sulfate  •  potassium chloride  •  potassium chloride  •  [DISCONTINUED] potassium chloride **OR** [DISCONTINUED] potassium chloride **OR** potassium chloride  •  QUEtiapine  •  sodium chloride    Objective:  PPS 30%   /84  Pulse 84  Temp 97.6 °F (36.4 °C) (Axillary)   Resp 24  Ht 69\" (175.3 cm)  Wt 262 lb (119 kg)  SpO2 99%  BMI 38.69 kg/m2   Intake & Output (last day)       04/24 0701 - 04/25 0700 04/25 0701 - 04/26 0700    Other 1422 460    NG/GT 1539 533    Irrigation 1156 589    Total Intake(mL/kg) 4117 (34.6) 1582 (13.3)    Urine (mL/kg/hr) 4250 (1.5) 2150 (1.8)    Other 270 (0.1)     Total Output 4520 2150    Net -403 -075              Lab Results (last 24 hours)     Procedure Component Value Units Date/Time    POC Glucose Fingerstick [[id number]]  (Abnormal) Collected:  04/24/17 1744    Specimen:  Blood Updated:  04/24/17 1750     Glucose 250 (H) mg/dL     Narrative:       Meter: [id number] : [id number] Dru Maciel    POC Glucose Fingerstick [[id number]]  (Abnormal) Collected:  04/24/17 2308    Specimen:  Blood Updated:  04/24/17 2311     Glucose 237 (H) mg/dL     Narrative:       Meter: [id number] : [id number] Omega Chen    POC Glucose Fingerstick [[id number]]  (Abnormal) Collected:  04/25/17 0512    Specimen:  Blood Updated:  04/25/17 0514     Glucose 265 (H) mg/dL     Narrative:       " Meter: [id number] : [id number] Omega Chen    CBC & Differential [[id number]] Collected:  04/25/17 0353    Specimen:  Blood Updated:  04/25/17 0536    Narrative:       The following orders were created for panel order CBC & Differential.  Procedure                               Abnormality         Status                     ---------                               -----------         ------                     Scan Slide[[id number]]                    Normal              Final result               CBC Auto Differential[[id number]]         Abnormal            Final result                 Please view results for these tests on the individual orders.    CBC Auto Differential [[id number]]  (Abnormal) Collected:  04/25/17 0353    Specimen:  Blood Updated:  04/25/17 0536     WBC 10.09 10*3/mm3      RBC 3.37 (L) 10*6/mm3      Hemoglobin 10.1 (L) g/dL      Hematocrit 34.1 (L) %      .2 (H) fL      MCH 30.0 pg      MCHC 29.6 (L) g/dL      RDW 19.5 (H) %      RDW-SD 69.8 (H) fl      MPV 12.3 (H) fL      Platelets 288 10*3/mm3      Neutrophil % 79.8 (H) %      Lymphocyte % 10.1 (L) %      Monocyte % 6.2 %      Eosinophil % 3.4 (H) %      Basophil % 0.2 %      Immature Grans % 0.3 %      Neutrophils, Absolute 8.05 10*3/mm3      Lymphocytes, Absolute 1.02 10*3/mm3      Monocytes, Absolute 0.63 10*3/mm3      Eosinophils, Absolute 0.34 (H) 10*3/mm3      Basophils, Absolute 0.02 10*3/mm3      Immature Grans, Absolute 0.03 10*3/mm3     Scan Slide [[id number]]  (Normal) Collected:  04/25/17 0353    Specimen:  Blood Updated:  04/25/17 0536     RBC Morphology Normal     WBC Morphology Normal     Platelet Morphology Normal    Comprehensive Metabolic Panel [[id number]]  (Abnormal) Collected:  04/25/17 0353    Specimen:  Blood Updated:  04/25/17 0538     Glucose 231 (H) mg/dL      BUN 48 (H) mg/dL      Creatinine 0.60 mg/dL      Sodium 144 mmol/L      Potassium 3.5 mmol/L      Chloride 104 mmol/L      CO2 35.0 (H) mmol/L      Calcium  8.9 mg/dL      Total Protein 6.6 g/dL      Albumin 3.10 (L) g/dL      ALT (SGPT) 13 U/L      AST (SGOT) 25 U/L      Alkaline Phosphatase 134 (H) U/L      Total Bilirubin 0.4 mg/dL      eGFR Non African Amer 137 mL/min/1.73      Globulin 3.5 gm/dL      A/G Ratio 0.9 (L) g/dL      BUN/Creatinine Ratio 80.0 (H)     Anion Gap 5.0 mmol/L     Narrative:       [employer] Guidelines    Stage                           Description                             GFR                      1                               Normal or High                          90+  2                               Mild decrease                            60-89  3                               Moderate decrease                   30-59  4                               Severe decrease                       15-29  5                               Kidney failure                             <15    Magnesium [[id number]]  (Normal) Collected:  04/25/17 0353    Specimen:  Blood Updated:  04/25/17 0538     Magnesium 2.1 mg/dL     Phosphorus [[id number]]  (Normal) Collected:  04/25/17 0353    Specimen:  Blood Updated:  04/25/17 0538     Phosphorus 2.7 mg/dL     Procalcitonin [[id number]] Collected:  04/25/17 0353    Specimen:  Blood Updated:  04/25/17 0645     Procalcitonin 0.14 ng/mL     Narrative:       As a Marker for Sepsis (Non-Neonates):   1. <0.5 ng/mL represents a low risk of severe sepsis and/or septic shock.  2. >2 ng/mL represents a high risk of severe sepsis and/or septic shock.    As a Marker for Lower Respiratory Tract Infections that require antibiotic therapy:    PCT on Admission     Antibiotic Therapy       6-12 Hrs later  > 0.5                Strongly Recommended             >0.25 - <0.5         Recommended  0.1 - 0.25           Discouraged              Remeasure/reassess PCT  <0.1                 Strongly Discouraged     Remeasure/reassess PCT                     PCT values of < 0.5 ng/mL do not exclude an infection, because  localized infections (without systemic signs) may be associated with such low concentrations, or a systemic infection in its initial stages (< 6 hours). Furthermore, increased PCT can occur without infection. PCT concentrations between 0.5 and 2.0 ng/mL should be interpreted taking into account the patient's history. It is recommended to retest PCT within 6-24 hours if any concentrations < 2 ng/mL are obtained.    BNP [[id number]]  (Abnormal) Collected:  04/25/17 0353    Specimen:  Blood Updated:  04/25/17 0737     BNP 1288.0 (H) pg/mL     POC Glucose Fingerstick [[id number]]  (Abnormal) Collected:  04/25/17 1157    Specimen:  Blood Updated:  04/25/17 1203     Glucose 255 (H) mg/dL     Narrative:       Meter: [id number] : [id number] Dru Maciel        Imaging Results (last 24 hours)     ** No results found for the last 24 hours. **          Physical Exam:  Gen: NAD, resting in bed with BiPAP  Skin: warm, dry   Eyes: CHRISTIAN, conjunctiva clear and moist   Resp/thorax: even effort, non labored, CTA, diminished in bases  CV: RRR   ABD: soft, bowel sounds +, nontender  : colbert to bedside drainage with amphotericin infusion  Ext: no edema, no redness   Neuro: lethargic, no myoclonus, unable to answer yes, no questions        Results from last 7 days  Lab Units 04/25/17  0353   WBC 10*3/mm3 10.09   HEMOGLOBIN g/dL 10.1*   HEMATOCRIT % 34.1*   PLATELETS 10*3/mm3 288       Results from last 7 days  Lab Units 04/25/17  0353   SODIUM mmol/L 144   POTASSIUM mmol/L 3.5   CHLORIDE mmol/L 104   TOTAL CO2 mmol/L 35.0*   BUN mg/dL 48*   CREATININE mg/dL 0.60   CALCIUM mg/dL 8.9   BILIRUBIN mg/dL 0.4   ALK PHOS U/L 134*   ALT (SGPT) U/L 13   AST (SGOT) U/L 25   GLUCOSE mg/dL 231*       Impression: 61 y.o. male with systolic heart failure LVEF 10%, ICM with ICD, acute on chronic respiratory failure with hypoxia, left frontal CVA     Plan:   Dyspnea - improved with diuresis, BiPAP    Goals of care discussion - met with wife,  Lisa for 60 minutes. Reviewed current clinical status and wife reviewed functional status changes prior to hospital admission at [facility]. Wife knows that patient has a poor prognosis and understands he is seriously ill. Discussed options with plan of care and making decisions for no escalation of interventions or further diagnostic workups. Also consideration to transition to inpatient hospice care or going home with hospice care.    Wife is working full time in [place] ([phone]) Monday-Friday and staying here in the pm and overnight.   Palliative team will continue to follow and provide support to wife with ongoing decisions. Will make hospice referral so wife may be informed more about home hospice care.    Claudine Barron, APRN  [phone]  04/25/17  5:17 PM

## 2017-04-25 NOTE — PLAN OF CARE
Problem: Patient Care Overview (Adult)  Goal: Plan of Care Review  Outcome: Ongoing (interventions implemented as appropriate)    04/25/17 1315   Coping/Psychosocial Response Interventions   Plan Of Care Reviewed With patient   Patient Care Overview   Progress no change   Outcome Evaluation   Outcome Summary/Follow up Plan Pt seen for S/L and dys tx. Pt alert, but difficulty following commands and participating in tx. No swallow initiation w/ ice chip trials, no gag w/ oral care. Con't to follow for tx and POC decisions.          Problem: Stroke (Ischemic) (Adult)  Goal: Signs and Symptoms of Listed Potential Problems Will be Absent or Manageable (Stroke)  Outcome: Ongoing (interventions implemented as appropriate)    04/25/17 1315   Stroke (Ischemic)   Problems Assessed (Stroke (Ischemic)/TIA) cognitive impairment;communication impairment;eating/swallowing impairment   Problems Present (Stroke (Ischemic)/TIA) cognitive impairment;communication impairment;eating/swallowing impairment         Problem: Inpatient SLP  Goal: Dysphagia- Patient will safely consume diet as per recommendation with no signs/symptoms of aspiration  Outcome: Ongoing (interventions implemented as appropriate)    04/06/17 1012 04/25/17 1315   Safely Consume Diet   Safely Consume Diet- SLP, Date Established 04/06/17 --    Safely Consume Diet- SLP, Date Goal Reviewed --  04/18/17   Safely Consume Diet- SLP, Outcome --  goal ongoing       Goal: Cognitive-linguistic-Patient will improve Cognitive-linguistic skills to allow optimal participation in care    04/07/17 1438 04/25/17 1315   Cognitive Linguistic- Optimal Participation in Care   Cognitive Linguistic Optimal Participation in Care- SLP, Date Established 04/07/17 --    Cognitive Linguistic Optimal Participation in Care- SLP, Time to Achieve --  by discharge   Cognitive Linguistic Optimal Participation in Care- SLP, Date Goal Reviewed --  04/25/17   Cognitive Linguistic Optimal Participation  in Care- SLP, Outcome --  goal ongoing

## 2017-04-26 NOTE — PROGRESS NOTES
"[facility] Progress Note    Admission Date: 4/4/2017    Tomas Salvador  1955  [id number]    Date: 4/26/2017    Meds:    IV Anti-Infectives     Ordered     Dose/Rate Route Frequency Start Stop    04/20/17 0718  ceFAZolin in dextrose (ANCEF) IVPB solution 2 g     Ordering Provider:  Satya Fall MD    2 g  over 30 Minutes Intravenous Once 04/21/17 0800 04/21/17 0815    04/19/17 1145  amphotericin B (FUNGIZONE) 50 mg in sterile water (preservative free) 1,000 mL irrigation     Ordering Provider:  Kike Leon MD    42 mL/hr  42 mL/hr  Irrigation Every 24 Hours 04/19/17 1300            CC:  Delisa's gangrene    SUBJECTIVE:  4/4/17:Patient is a 61 y.o. 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 He has a history of underlying diabetes mellitus and severe systolic congestive heart failure. He presented to [facility] [place] on 3/30 with scrotal cellulitis/gangrene. He underwent debridement by urology at [facility] [place] on 3/31 and was treated with Merrem/clindamycin/vancomycin. He developed left facial droop and left sided paresis, prompting a transfer to [facility] for evaluation and therapy of acute stroke. His head CT angiogram was degraded by motion artifact but per Dr. Morrison there was suggestion of right inferior temporal lobe\" opacification\" consistent with a possible stroke. His left facial droop and left-sided paresis has improved today. He is encephalopathic and is unable to provide any reliable history. His wife thinks that he may have had some fevers prior to his admission on 3/30.    4/5/17: afebrile. No hx per patient secondary to encephalopathy.  Afebrile, restless per nsg staff.  Oliguric UOP.  No n/v/d.  No rashes.  4/6/17: More alert and less restless today.  Still non-responsive to questions and does not follow simple commands.  Still with left-sided weakness.  Afebrile.  Still with oliguric UOP.  No n/v/d, no rashes, per nsg staff notes. " "  4/7/17:  Alert today.  No fever,chills,sweats.  Wife at bedside with ?s   4/10/17:  Wants to go home.  Wound care per PT.  No n/v/d.   4/11/17:  Now on Dopamine and Bautista, bipap.  Decompensated last pm.    4/12/17:  Somnolent; still on pressors   4/13/17 Still on pressors/Precedex; Now on bipap.  Had pulled corpak out night before.    4/14/17: Per RN staff notes: afebrile, on/off precedex for agitation, on dopamine, on flagyl and cefazolin, on and off bipap.  Can not keep clothes on him.  Attempted to do Is and Os but too difficult without his foreskin getting pulled into meatus, so therefore, just placed Cueto cath.  Not anchored, because he keeps trying to pull it out.  Would is getting daily misted by PT and packed.  He is more alert today, but still not oriented.  4/15/17: He is improved today and off of vasopressor therapy.  He has been receiving some diuretic therapy for pulmonary edema.  He is less confused.  He remains afebrile.  4/16/17: He is continued to receive diuretic therapy.  He remains off of vasopressor therapy.  He has remained afebrile  4/17/17:  Remains restrained.  Opens eyes to voice.  Nonproductive cough.   4/18/17:  Opens eyes.  Does not follow commands.  His Cueto catheter is still in place and he is undergoing diuresis for his CHF/pulmonary edema.  4/19/17:  More alert today.  \"wants Scottie\".  PT just changed dressing.  He remains off of vasopressor therapy.  He is continued to receive diuresis.  The nursing staff reports that he has some penile edema, making it difficult to in and out catheterize him.  4/20/17:  On Bipap.  Opens eyes to voice.   4/21/17:  He has been less responsive today.  He is not currently following commands.  He still has hypoxia.  4/22/17: He has been more responsive today.  He is still severely confused and has required mitts on his hands.  He is still requiring 5 L of supplemental oxygen.  He has remained afebrile.  4/24/17:  Just taken off bipap.  Apparently not a " candidate for [place] rehab.  Remains confused.  17: wants his mitts off.  Says he's short of breath.    17: On bipap.  Wife at bedside with questions about wound care.  Wants to be transferred closer to [place]       PE:   Vital Signs  Temp (24hrs), Av.3 °F (36.8 °C), Min:98.1 °F (36.7 °C), Max:98.5 °F (36.9 °C)    Temp  Min: 98.1 °F (36.7 °C)  Max: 98.5 °F (36.9 °C)  BP  Min: 88/58  Max: 127/117  Pulse  Min: 61  Max: 88  Resp  Min: 16  Max: 24  SpO2  Min: 57 %  Max: 100 %    GENERAL:   He is is in no acute distress.  He is alert but confused, on bipap  HEENT: Normocephalic, atraumatic. PERRL. EOMI. No conjunctival injection. No icterus. Oropharynx clear without evidence of thrush or exudate.   HEART: RRR; No murmur, rubs, gallops.   LUNGS: , rhonchi   ABDOMEN: Soft, nontender, nondistended. Positive bowel sounds. No rebound or guarding. NO mass or HSM. Peg   EXT: No cyanosis, clubbing or edema. No cord.  : The lower, posterior midline scrotal wounds reveals some continued healing with no residual cellulitis and no purulent drainage.  Dressing intact   A Cueto catheter is in place  MSK: FROM without joint effusions noted arms/legs.   SKIN: Warm and dry without cutaneous eruptions on Inspection/palpation.   Neurologic: He is more alert but remains confused .  He moves all 4 extremities.  Right PICC without redness      Laboratory Data      Results from last 7 days  Lab Units 17  0430 17  0353 17  0526   WBC 10*3/mm3 9.94 10.09 7.81   HEMOGLOBIN g/dL 11.0* 10.1* 9.7*   HEMATOCRIT % 36.5* 34.1* 32.0*   PLATELETS 10*3/mm3 301 288 269       Results from last 7 days  Lab Units 17  0430   SODIUM mmol/L 142   POTASSIUM mmol/L 3.8   CHLORIDE mmol/L 108   TOTAL CO2 mmol/L 28.0   BUN mg/dL 41*   CREATININE mg/dL 0.60   GLUCOSE mg/dL 186*   CALCIUM mg/dL 10.0       Results from last 7 days  Lab Units 17  0430   ALK PHOS U/L 137*   BILIRUBIN mg/dL 0.4   ALT (SGPT) U/L 16   AST (SGOT)  U/L 28       Results from last 7 days  Lab Units 04/20/17  0429   SED RATE mm/hr 95*       Results from last 7 days  Lab Units 04/24/17  1107   CRP mg/dL 7.38*       4/17:  UA small amount of LE; UC   Candida Glabrata     4/26:        Estimated Creatinine Clearance: 162.5 mL/min (by C-G formula based on Cr of 0.6).    Microbiology:  Blood Culture   Date Value Ref Range Status   03/30/2017 No growth at 5 days  Final   03/30/2017 No growth at 5 days  Final           Urine Culture   Date Value Ref Range Status   04/04/2017 No growth  Preliminary   03/31/2017 No growth  Final   03/30/2017 >100,000 CFU/mL Escherichia coli (A)  Final      scrotal cx x 2 cxs (3/31) E. coli  Cefazolin sens.    Urine Culture   Date Value Ref Range Status   04/11/2017 No growth at 2 days  Final   04/07/2017 >100,000 CFU/mL Candida glabrata (A)  Final         Radiology:  Imaging Results (last 24 hours)     ** No results found for the last 24 hours. 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  2. Acute right hemispheric cerebral vascular accident.  3.  Metabolic encephalopathy  4. 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 Severe systolic congestive heart failure-he has an ejection fraction of 10% with severe persistent symptomatic congestive heart failure.  6. Escherichia coli urinary tract infection-improved  7. Sepsis-secondary to Delisa's gangrene, now improved  8. Leukocytosis/neutrophilia-secondary to scrotal gangrene/cellulitis, improved   9.  Pulmonary edema  10. Candiduria--this resolved with removal of his Cueto catheter.  The treatment for his previous candiduria was removal of the Cueto catheter.  Now has recurrent candiduria after his Cueto catheter was placed back in.  He is currently on amphotericin B bladder irrigations         PLAN/RECOMMENDATIONS:   1.  Continue scrotal wound  care  2.  Discharge home with hospice    Although his Delisa's gangrene is better, he has persistent severe congestive heart failure and pulmonary edema.  He is unlikely to recover and hospice would be appropriate.    Dr. Leon has obtained the history, performed the physical exam and formulated the above treatment plan.       Kike Leon MD  4/26/2017  8:38 AM

## 2017-04-26 NOTE — PROGRESS NOTES
Acute Care - Occupational Therapy Treatment Note  [facility]     Patient Name: Tomas Salvador  : 1955  MRN: [id number]  Today's Date: 2017  Onset of Illness/Injury or Date of Surgery Date: 17  Date of Referral to OT: 17  Referring Physician: MD Jimenez      Admit Date: 2017    Visit Dx:     ICD-10-CM ICD-9-CM   1. Dysphagia, unspecified type R13.10 787.20   2. Impaired functional mobility, balance, gait, and endurance Z74.09 V49.89   3. Impaired mobility and ADLs Z74.09 799.89   4. Cognitive communication deficit R41.841 799.52     Patient Active Problem List   Diagnosis   • Coronary disease   • Ischemic cardiomyopathy with EF 10% on Echo 17   • Peripheral vascular disease   • ICD in place, implantation .   • Dyslipidemia, on atorvastain.    • S/P Sepsis due to  source 3/30/17   • CVA 17 with rapid improvement   • Diabetes Mellitus HgbA1c 10.4   • Chronic hypoxic respiratory failure on nocturnal o2   • CAD s/p CABG , [facility] . Grafts patent except SVG to diagonal   • Urinary retention requiring colbert   • Hyperlipidemia   • Essential hypertension   • Hx of PVD (peripheral vascular disease)   • Recent E. coli UTI now off therapy   • Epididymo-orchitis   • Encephalopathy acute   • [place] gangrene with E-Coli Cellulitis. Debrided in [facility] 3/30/17   • Candida glabrata UTI. Indwelling Colbert catheter   • Atrial fibrillation but now converted. Still on Eliquis             Adult Rehabilitation Note       17 1041 17 1040 17 0850    Rehab Assessment/Intervention    Discipline physical therapist  -LS occupational therapist  -JR physical therapist  -MC    Document Type therapy note (daily note)  -LS therapy note (daily note)  -JR therapy note (daily note)  -MC    Subjective Information agree to therapy;no complaints  -LS no complaints;agree to therapy  -JR no complaints;agree to therapy  -MC    Patient Effort, Rehab Treatment adequate  -LS adequate  -JR      Symptoms Noted During/After Treatment none  -LS none  -JR     Precautions/Limitations fall precautions;oxygen therapy device and L/min  -LS fall precautions;oxygen therapy device and L/min  -JR     Recorded by [LS] Lily Juárez, PT [JR] Judy Leach, OT [MC] Rekha Mcgarry, PT    Vital Signs    Pre Systolic BP Rehab 125  -  -JR     Pre Treatment Diastolic BP 82  -LS 82  -JR     Post Systolic BP Rehab 126  -  -JR     Post Treatment Diastolic BP 81  -LS 81  -JR     Pretreatment Heart Rate (beats/min) 97  -LS 93  -JR     Posttreatment Heart Rate (beats/min) 94  -LS 94  -JR     Pre SpO2 (%) 98  -LS 98  -JR     O2 Delivery Pre Treatment supplemental O2   2L  -LS supplemental O2   2L  -JR     Post SpO2 (%) 100  -  -JR     O2 Delivery Post Treatment supplemental O2  -LS supplemental O2  -JR     Pre Patient Position Supine  -LS Supine  -JR     Intra Patient Position Sitting  -LS Sitting  -JR     Post Patient Position Supine  -LS Supine  -JR     Recorded by [LS] Lily Juárez, PT [JR] Judy Leach, OT     Pain Assessment    Pain Assessment 0-10  -LS 0-10  -JR Jay-Kent FACES  -MC    Jay-Kent FACES Pain Rating 0  -LS 0  -JR     Pain Score 0  -LS 0  -JR 0  -MC    Post Pain Score 0  -LS 0  -JR 0  -MC    Recorded by [LS] Lily Juárez, PT [JR] Judy Leach, OT [MC] Rekha Mcgarry, PT    Cognitive Assessment/Intervention    Current Cognitive/Communication Assessment impaired  -LS impaired  -JR     Orientation Status oriented to;person;place  -LS oriented to;person;place  -JR     Follows Commands/Answers Questions able to follow single-step instructions;25% of the time  -LS 25% of the time;able to follow single-step instructions;needs cueing;needs repetition;needs increased time  -JR     Personal Safety severe impairment;decreased awareness, need for assist;decreased awareness, need for safety;decreased insight to deficits  -LS severe impairment;decreased awareness, need for  assist;decreased awareness, need for safety;decreased insight to deficits  -JR     Recorded by [LS] Lily Juárez, PT [JR] Judy Leach, OT     Bed Mobility, Assessment/Treatment    Bed Mobility, Assistive Device bed rails;head of bed elevated;draw sheet  -LS      Bed Mobility, Scoot/Bridge, [place] dependent (less than 25% patient effort);2 person assist required;verbal cues required  -LS dependent (less than 25% patient effort);2 person assist required  -JR     Bed Mob, Supine to Sit, [place] maximum assist (25% patient effort);2 person assist required;verbal cues required  -LS maximum assist (25% patient effort);2 person assist required  -JR     Bed Mob, Sit to Supine, [place] maximum assist (25% patient effort);2 person assist required;verbal cues required  -LS maximum assist (25% patient effort);2 person assist required  -JR     Bed Mobility, Safety Issues cognitive deficits limit understanding;decreased use of arms for pushing/pulling;decreased use of legs for bridging/pushing  - cognitive deficits limit understanding;decreased use of arms for pushing/pulling;decreased use of legs for bridging/pushing;impaired trunk control for bed mobility  -JR     Bed Mobility, Impairments  strength decreased;impaired balance;coordination impaired;motor control impaired;postural control impaired  -JR     Bed Mobility, Comment VC's for sequencing.   -LS Pt able to initiate moving LE's over to EOB for supine to sit, required max A x2 for sit to supine.  -JR     Recorded by [LS] Lily Juárez, PT [JR] Judy Leach, OT     Transfer Assessment/Treatment    Transfers, Sit-Stand [place] dependent (less than 25% patient effort);2 person assist required;verbal cues required  -LS dependent (less than 25% patient effort);2 person assist required  -JR     Transfer, Comment Attempted STS x2 from EOB; pt unable to clear hips.   -LS Attempted sit to stand, unable to clear pt's hips from EOB this date.  -JR      Recorded by [LS] Lily Juárez, PT [JR] Judy Leach, OT     Balance Skills Training    Sitting-Level of Assistance Moderate assistance  -LS Moderate assistance  -JR     Sitting-Balance Support Feet supported;Right upper extremity supported;Left upper extremity supported  -LS Feet supported;Right upper extremity supported;Left upper extremity supported  -JR     Sitting-Balance Activities Trunk control activities  -      Sitting # of Minutes 10  -LS      Recorded by [LS] Lily Juárez, PT [JR] Judy Leach, OT     Therapy Exercises    Bilateral Lower Extremities AROM:;10 reps;ankle pumps/circles;knee flexion;hip abduction/adduction  -LS      Bilateral Upper Extremity  AAROM:;PROM:;10 reps;elbow flexion/extension;shoulder abduction/adduction;shoulder extension/flexion  -JR     Recorded by [LS] Lily Juárez, PT [JR] Judy Leach, OT     Positioning and Restraints    Pre-Treatment Position in bed  - in bed  - in bed  -    Post Treatment Position bed  - bed  - bed  -    In Bed notified nsg;supine;call light within reach;encouraged to call for assist;with family/caregiver;with other staff;LUE elevated;RUE elevated  - notified nsg;supine;call light within reach;encouraged to call for assist;with family/caregiver  -JR supine;call light within reach;encouraged to call for assist;with nsg  -    Restraints released:;reapplied:;soft limb  -LS released:;reapplied:;notified nsg:;soft limb  -JR soft limb;mitten   did not release  -    Recorded by [LS] Lily Juárez, PT [JR] Judy Leach, OT [] Rekha Mcgarry, PT      04/25/17 1410 04/25/17 1030 04/25/17 0915    Rehab Assessment/Intervention    Discipline physical therapist  -LS speech language pathologist  -SG physical therapist  -MF    Document Type therapy note (daily note)  -LS therapy note (daily note)  -SG therapy note (daily note)  -MF    Subjective Information agree to therapy  -LS no complaints;agree to therapy  -SG agree to  therapy;no complaints  -    Patient Effort, Rehab Treatment adequate  -LS fair  -SG     Symptoms Noted During/After Treatment  none  -SG     Precautions/Limitations fall precautions;oxygen therapy device and L/min  -LS      Precautions/Limitations, Vision  WFL  -SG     Precautions/Limitations, Hearing  WFL  -SG     Specific Treatment Considerations Pt currently requiring bi-pap due to decreased resp status this PM.   -LS      Recorded by [LS] Lily Juárez, PT [SG] Lyndsey Sultana, MS CCC-SLP [] Pee Roldan, PT    Vital Signs    Pre Systolic BP Rehab 113  -LS      Pre Treatment Diastolic BP 77  -LS      Post Systolic BP Rehab 115  -LS      Post Treatment Diastolic BP 75  -LS      Pretreatment Heart Rate (beats/min) 86  -LS      Posttreatment Heart Rate (beats/min) 84  -LS      Pre SpO2 (%) 90  -LS      O2 Delivery Pre Treatment supplemental O2   bi-pap mask  -LS      Post SpO2 (%) 100  -LS      O2 Delivery Post Treatment supplemental O2  -LS      Pre Patient Position Supine  -LS      Intra Patient Position Sitting  -LS      Post Patient Position Supine  -LS      Recorded by [LS] Lily Juárez, PT      Pain Assessment    Pain Assessment Jay-Kent FACES  -LS Jay-Kent FACES  -SG Jay-Baker FACES  -MF    Jay-Baker FACES Pain Rating 0  -LS 0  -SG 0  -MF    Pain Score 0  -LS 0  -SG     Post Pain Score 0  -LS 0  -SG     Recorded by [LS] Lily Juárez, PT [SG] Lyndsey Sultana, MS CCC-SLP [MF] Pee Roldan, PT    Cognitive Assessment/Intervention    Current Cognitive/Communication Assessment impaired  -LS      Orientation Status oriented to;person   responds to name  -LS      Follows Commands/Answers Questions able to follow single-step instructions;needs cueing;needs increased time;needs repetition   less than 10%  -LS      Personal Safety severe impairment;decreased awareness, need for assist;decreased awareness, need for safety;decreased insight to deficits  -LS      Personal  Safety Interventions fall prevention program maintained;nonskid shoes/slippers when out of bed;supervised activity  -LS      Recorded by [LS] Lily Juárez, PT      Improve ability to follow directions    Improve ability to follow directions:  one-step directions without objects;100%;without cues  -SG     Status: Improve ability to follow directions  Progress slower than expected  -SG     Ability to Follow Directions Progress  40%;with consistent cues;continue to address  -SG     Recorded by  [SG] Lyndsey Sultana MS CCC-SLP     Improve ability to comprehend questions    Improve ability to comprehend questions:  simple yes/no questions;simple general questions;100%;without cues  -SG     Status: Improve ability to comprehend questions  Progress slower than expected  -SG     Ability to Comprehend Questions Progress  50%;with consistent cues;continue to address  -SG     Recorded by  [SG] Lyndsey Sultana MS CCC-SLP     Improve ability to construct phrase and sentence level responses    Improve ability to construct phrase and sentence level responses by:  answering question with phrase;100%;without cues  -SG     Status: Improve ability to construct phrase and sentence level responses  Progress slower than expected  -SG     Constructing Phrase and Sentence Level Responses Progress  50%;continue to address;with consistent cues  -SG     Recorded by  [SG] Lyndsey Sultana MS CCC-SLP     Improve attention    Improve attention by:  attending to task;complete selective attention task;80%;with consistent cues  -SG     Status: Improve attention  Progressing as expected  -SG     Attention Progress  40%;with consistent cues;continue to address  -SG     Recorded by  [SG] Lyndsey Sultana MS CCC-SLP     Improve orientation    Improve orientation through:  demonstrating orientation to day;demonstrating orientation to month;demonstrating orientation to year;demonstrating orientation to  "place;demonstrating orientation to disease/impairment;100%;with inconsistent cues  -SG     Status: Improve orientation through  Progressing as expected  -SG     Orientation Progress  40%;with consistent cues;continue to address  -SG     Recorded by  [SG] Lyndsey Sultana MS CCC-SLP     Improve functional problem solving    Improve functional problem solving through:  determine solutions to simple ADL/safety problems;answer \"what if\" questions;complete basic reasoning task;complete organization/home management task;70%;with consistent cues  -SG     Status: Improve functional problem solving  Progress slower than expected  -SG     Functional Problem Solving Progress  30%;with consistent cues;continue to address  -SG     Recorded by  [SG] Lyndsey Sultana MS CCC-SLP     Improve timing of pharyngeal response    To improve timing of pharyngeal response, patient will:  Swallow in timely way using three second prep;Swallow in timely way using super-supraglottic swallow;80%;with consistent cues  -SG     Status: Improve timing of pharyngeal response  Progress slower than expected  -SG     Timing of Pharyngeal Response Progress  10%;with consistent cues;continue to adress  -SG     Recorded by  [SG] Lyndsey Sultana MS CCC-SLP     Improve laryngeal closure    To improve laryngeal closure, patient will:  Complete supraglottic swallow;80%;with consistent cues  -SG     Status: Improve laryngeal closure  Progress slower than expected  -SG     Laryngeal Closure Progress  0%;with consistent cues;continue to adress  -SG     Recorded by  [SG] Lyndsey Sultana MS CCC-SLP     Improve closure at entrance to airway    To improve closure at entrance to airway, patient will:  Complete super-supraglottic swallow;80%;with consistent cues  -SG     Status: Improve closure at entrance to airway  Progress slower than expected  -SG     Closure at Entrance to Airway Progress  0%;with consistent " cues;continue to adress  -SG     Recorded by  [SG] Lyndsey Sultana MS CCC-SLP     Improve hyolaryngeal excursion    To improve hyolaryngeal excursion, patient will:  Complete chin tuck against resistance (comment number of repetitions);90%;with consistent cues  -SG     Status: Improve hyolaryngeal excursion  Progress slower than expected  -SG     Hyolaryngeal Excursion Progress  continue to adress  -SG     Recorded by  [SG] Lyndsey Sultana MS CCC-SLP     Improve tongue base & pharyngeal wall squeeze    To improve tongue base & pharyngeal wall squeeze, patient will:  Complete effortful swallow;80%;with consistent cues  -SG     Status: Improve tongue base & pharyngeal wall squeeze  Progress slower than expected  -SG     Tongue Base/Pharyngeal Wall Squeeze Progress  40%;with consistent cues;continue to adress  -SG     Recorded by  [SG] Lyndsey Sultana MS CCC-SLP     Bed Mobility, Assessment/Treatment    Bed Mobility, Assistive Device head of bed elevated;draw sheet  -LS      Bed Mobility, Scoot/Bridge, [place] dependent (less than 25% patient effort);2 person assist required;verbal cues required  -LS      Bed Mob, Supine to Sit, [place] maximum assist (25% patient effort);2 person assist required;verbal cues required  -LS      Bed Mob, Sit to Supine, [place] maximum assist (25% patient effort);2 person assist required;verbal cues required  -LS      Bed Mobility, Safety Issues cognitive deficits limit understanding;decreased use of arms for pushing/pulling;decreased use of legs for bridging/pushing  -LS      Recorded by [LS] Lily Juárez, PT      Transfer Assessment/Treatment    Transfers, Bed-Chair [place] not appropriate to assess   Rn requested back to bed for safety.   -LS      Recorded by [LS] Lily Juárez, PT      Gait Assessment/Treatment    Gait, [place] Level not appropriate to assess  -LS      Recorded by [LS] Lily Juárez, PT      Motor  Skills/Interventions    Additional Documentation Balance Skills Training (Group)  -      Recorded by [LS] Lily Juárez, PT      Balance Skills Training    Sitting-Level of Assistance Moderate assistance   progressed to min A  -LS      Sitting-Balance Support Feet supported  -LS      Sitting-Balance Activities Trunk control activities  -      Sitting # of Minutes 12  -LS      Recorded by [LS] Lily Juárez, PT      Therapy Exercises    Bilateral Lower Extremities PROM:;10 reps;supine;ankle pumps/circles;hip abduction/adduction;heel slides  -LS      Bilateral Upper Extremity AAROM:;10 reps;elbow flexion/extension;shoulder abduction/adduction;shoulder extension/flexion  -LS      Recorded by [LS] Lily Juárez, PT      Positioning and Restraints    Pre-Treatment Position in bed  -  in bed  -    Post Treatment Position bed  -  bed  -    In Bed notified nsg;supine;call light within reach;encouraged to call for assist;with family/caregiver;with other staff;RUE elevated;LUE elevated   palliative APRN present  -  supine;notified nsg  -    Restraints released:;reapplied:;notified nsg:;mitten  -LS      Recorded by [LS] Lily Juárez, PT  [MF] Pee Roldan, PT      04/24/17 0850          Rehab Assessment/Intervention    Discipline physical therapist  -      Document Type therapy note (daily note)  -      Subjective Information agree to therapy;complains of;weakness;fatigue  -      Recorded by [MF] Pee Roldan, PT      Pain Assessment    Pain Assessment Jay-Baker FACES  -      Jay-Baker FACES Pain Rating 0  -      Pain Intervention(s) Repositioned  -MF      Recorded by [MF] Pee Roldan, PT      Cognitive Assessment/Intervention    Current Cognitive/Communication Assessment impaired  -      Orientation Status disoriented x 4  -MF      Recorded by [MF] Pee Roldan, PT      Positioning and Restraints    Pre-Treatment Position in bed  -      Post Treatment Position bed  -       In Bed supine;call light within reach;notified nsg  -MF      Recorded by [MF] Pee Roldan, PT        User Key  (r) = Recorded By, (t) = Taken By, (c) = Cosigned By    Initials Name Effective Dates     Pee Roldan, PT 06/19/15 -     SG Lyndsey Popegarry-Jose David, MS CCC-SLP 06/22/15 -     JR Judy Leach, OT 06/22/15 -     LS Lily Juárez, PT 06/19/15 -      Rekha Mcgarry, PT 03/14/16 -                 OT Goals       04/26/17 1110 04/22/17 1138 04/20/17 1113    Bed Mobility OT LTG    Bed Mobility OT LTG, Date Established   04/20/17  -JR    Bed Mobility OT LTG, Time to Achieve   1 wk  -JR    Bed Mobility OT LTG, Activity Type   all bed mobility  -JR    Bed Mobility OT LTG, [place] Level   moderate assist (50% patient effort);2 person assist required  -JR    Bed Mobility OT LTG, Outcome goal ongoing  -JR goal ongoing  -JR     Transfer Training OT LTG    Transfer Training OT LTG, Date Established   04/20/17  -JR    Transfer Training OT LTG, Time to Achieve   1 wk  -JR    Transfer Training OT LTG, Activity Type   all transfers  -JR    Transfer Training OT LTG, [place] Level   moderate assist (50% patient effort);2 person assist required  -JR    Transfer Training OT LTG, Outcome goal ongoing  -JR goal ongoing  -JR     Orientation OT LTG    Orientation OT LTG, Date Established   04/20/17  -JR    Orientation OT LTG, Time to Achieve   1 wk  -JR    Orientation OT LTG, Activity Type   person;place;50% treatment session  -JR    Orientation OT LTG, Outcome goal partially met  -JR goal ongoing  -JR     Follow Directions OT LTG    Follow Directions OT LTG, Date Established   04/20/17  -JR    Follow Directions OT LTG, Time to Achieve   1 wk  -JR    Follow Directions OT LTG, Activity Type   1-Step;50% treatment session  -JR    Follow Directions OT LTG, [place] Level   with verbal cues  -JR    Follow Directions OT LTG, Outcome goal ongoing  -JR goal ongoing  -JR       User Key  (r) =  Recorded By, (t) = Taken By, (c) = Cosigned By    Initials Name Provider Type    JR Judy Leach OT Occupational Therapist          Occupational Therapy Education     Title: PT OT SLP Therapies (Active)     Topic: Occupational Therapy (Active)     Point: ADL training (Active)    Description: Instruct learner(s) on proper safety adaptation and remediation techniques during self care or transfers.   Instruct in proper use of assistive devices.    Learning Progress Summary    Learner Readiness Method Response Comment Documented by Status   Patient Acceptance E NR Educated pt and family regarding safe bed mobility techniques as well as UE HEP.  04/26/17 1110 Active    Acceptance E NR Educated on safe bed mobility and transfer techniques as well as UE HEP.  04/20/17 1112 Active    Acceptance E VU  AP 04/18/17 0557 Done    Acceptance E NR Educated pt on safe bed mobility and transfer techniques.  04/05/17 0903 Active   Family Acceptance E NR Educated pt and family regarding safe bed mobility techniques as well as UE HEP.  04/26/17 1110 Active    Acceptance E VU  AP 04/18/17 0557 Done               Point: Home exercise program (Active)    Description: Instruct learner(s) on appropriate technique for monitoring, assisting and/or progressing therapeutic exercises/activities.    Learning Progress Summary    Learner Readiness Method Response Comment Documented by Status   Patient Acceptance E NR Educated pt and family regarding safe bed mobility techniques as well as UE HEP.  04/26/17 1110 Active    Acceptance E NR Educated on UE HEP  04/22/17 1137 Active    Acceptance E NR Educated on safe bed mobility and transfer techniques as well as UE HEP.  04/20/17 1112 Active    Acceptance E VU  AP 04/18/17 0557 Done    Acceptance E,D VU,NR Body mechanics with bed mobility/fxl transfers; BUE HEP; reinforced need for call for assist with OOB activities. TA 04/09/17 1445 Done    Acceptance E NR Educated pt and family  regarding UE HEP  04/06/17 1521 Active   Family Acceptance E NR Educated pt and family regarding safe bed mobility techniques as well as UE HEP.  04/26/17 1110 Active    Acceptance E VU  AP 04/18/17 0557 Done    Acceptance E NR Educated pt and family regarding UE HEP  04/06/17 1521 Active   Significant Other Acceptance E,D VU,NR Body mechanics with bed mobility/fxl transfers; BUE HEP; reinforced need for call for assist with OOB activities. TA 04/09/17 1445 Done               Point: Precautions (Resolved)    Description: Instruct learner(s) on prescribed precautions during self-care and functional transfers.    Learning Progress Summary    Learner Readiness Method Response Comment Documented by Status   Patient Acceptance E VU   04/11/17 0216 Done    Acceptance E,D VU,NR Body mechanics with bed mobility/fxl transfers; BUE HEP; reinforced need for call for assist with OOB activities. TA 04/09/17 1445 Done   Significant Other Acceptance E,D VU,NR Body mechanics with bed mobility/fxl transfers; BUE HEP; reinforced need for call for assist with OOB activities. TA 04/09/17 1445 Done               Point: Body mechanics (Resolved)    Description: Instruct learner(s) on proper positioning and spine alignment during self-care, functional mobility activities and/or exercises.    Learning Progress Summary    Learner Readiness Method Response Comment Documented by Status   Patient Acceptance E VU   04/11/17 0216 Done    Acceptance E,D VU,NR Body mechanics with bed mobility/fxl transfers; BUE HEP; reinforced need for call for assist with OOB activities. TA 04/09/17 1445 Done   Significant Other Acceptance E,D VU,NR Body mechanics with bed mobility/fxl transfers; BUE HEP; reinforced need for call for assist with OOB activities.  04/09/17 1445 Done                      User Key     Initials Effective Dates Name Provider Type Discipline     06/22/15 -  Judy Leach, OT Occupational Therapist OT    TA 03/14/16 -   David Jenkins OT Occupational Therapist OT    FELY 06/16/16 -  Alissa Urbano RN Registered Nurse Nurse    MILVIA 06/16/16 -  Shahla Sherman, RN Registered Nurse Nurse                  OT Recommendation and Plan  Anticipated Equipment Needs At Discharge:  (tba further)  Anticipated Discharge Disposition: skilled nursing facility  Planned Therapy Interventions: ADL retraining, balance training, bed mobility training, strengthening, transfer training  Therapy Frequency: 3 times/wk  Plan of Care Review  Plan Of Care Reviewed With: patient  Progress: unable to show any progress toward functional goals  Outcome Summary/Follow up Plan: Pt improved with bed mobility and static sitting balance this date. Unable to clear hips from EOB to stand this date. Recommend continued skilled OT services to assist in returning pt to his PLOF.         Outcome Measures       04/26/17 1040 04/25/17 1410       How much help from another person do you currently need...    Turning from your back to your side while in flat bed without using bedrails?  2  -LS     Moving from lying on back to sitting on the side of a flat bed without bedrails?  2  -LS     Moving to and from a bed to a chair (including a wheelchair)?  1  -LS     Standing up from a chair using your arms (e.g., wheelchair, bedside chair)?  1  -LS     Climbing 3-5 steps with a railing?  1  -LS     To walk in hospital room?  1  -LS     AM-PAC 6 Clicks Score  8  -LS     How much help from another is currently needed...    Putting on and taking off regular lower body clothing? 1  -JR      Bathing (including washing, rinsing, and drying) 1  -JR      Toileting (which includes using toilet bed pan or urinal) 1  -JR      Putting on and taking off regular upper body clothing 1  -JR      Taking care of personal grooming (such as brushing teeth) 1  -JR      Eating meals 1  -JR      Score 6  -JR      Modified [place] Scale    Modified [place] Scale 4 - Moderately severe disability.   Unable to walk without assistance, and unable to attend to own bodily needs without assistance.  -      Functional Assessment    Outcome Measure Options AM-PAC 6 Clicks Daily Activity (OT);Modified [place]  -JR AM-PAC 6 Clicks Basic Mobility (PT)  -       User Key  (r) = Recorded By, (t) = Taken By, (c) = Cosigned By    Initials Name Provider Type    JR Judy Leach, OT Occupational Therapist    ANDREWS Juárez, PT Physical Therapist           Time Calculation:         Time Calculation- OT       04/26/17 1113          Time Calculation- OT    OT Start Time 1040  -      Total Timed Code Minutes- OT 10 minute(s)  -      OT Received On 04/26/17  -        User Key  (r) = Recorded By, (t) = Taken By, (c) = Cosigned By    Initials Name Provider Type    JR Judy Leach, OT Occupational Therapist           Therapy Charges for Today     Code Description Service Date Service Provider Modifiers Qty    [id number]  OT CARE PLAN EA 15 MIN 4/26/2017 Judy Leach, OT GO 1    [id number]  OT THERAPEUTIC ACT EA 15 MIN 4/26/2017 Judy Leach, OT GO 1               Judy Leach, OT  4/26/2017

## 2017-04-26 NOTE — PROGRESS NOTES
Acute Care - Physical Therapy Treatment Note  [facility]     Patient Name: Tomas Salvador  : 1955  MRN: [id number]  Today's Date: 2017  Onset of Illness/Injury or Date of Surgery Date: 17  Date of Referral to PT: 17  Referring Physician: MD Jimenez    Admit Date: 2017    Visit Dx:    ICD-10-CM ICD-9-CM   1. Dysphagia, unspecified type R13.10 787.20   2. Impaired functional mobility, balance, gait, and endurance Z74.09 V49.89   3. Impaired mobility and ADLs Z74.09 799.89   4. Cognitive communication deficit R41.841 799.52     Patient Active Problem List   Diagnosis   • Coronary disease   • Ischemic cardiomyopathy with EF 10% on Echo 17   • Peripheral vascular disease   • ICD in place, implantation .   • Dyslipidemia, on atorvastain.    • S/P Sepsis due to  source 3/30/17   • CVA 17 with rapid improvement   • Diabetes Mellitus HgbA1c 10.4   • Chronic hypoxic respiratory failure on nocturnal o2   • CAD s/p CABG , [facility] . Grafts patent except SVG to diagonal   • Urinary retention requiring colbert   • Hyperlipidemia   • Essential hypertension   • Hx of PVD (peripheral vascular disease)   • Recent E. coli UTI now off therapy   • Epididymo-orchitis   • Encephalopathy acute   • [place] gangrene with E-Coli Cellulitis. Debrided in [place] 3/30/17   • Candida glabrata UTI. Indwelling Colbert catheter   • Atrial fibrillation but now converted. Still on Eliquis               Adult Rehabilitation Note       17 1041 17 1040 17 0850    Rehab Assessment/Intervention    Discipline physical therapist  -LS occupational therapist  -JR physical therapist  -MC    Document Type therapy note (daily note)  -LS therapy note (daily note)  -JR therapy note (daily note)  -MC    Subjective Information agree to therapy;no complaints  -LS no complaints;agree to therapy  -JR no complaints;agree to therapy  -MC    Patient Effort, Rehab Treatment adequate  -LS adequate  -JR      Symptoms Noted During/After Treatment none  -LS none  -JR     Precautions/Limitations fall precautions;oxygen therapy device and L/min  -LS fall precautions;oxygen therapy device and L/min  -JR     Recorded by [LS] Lily Juárez, PT [JR] Judy Leach, OT [MC] Rekha Mcgarry, PT    Vital Signs    Pre Systolic BP Rehab 125  -  -JR     Pre Treatment Diastolic BP 82  -LS 82  -JR     Post Systolic BP Rehab 126  -  -JR     Post Treatment Diastolic BP 81  -LS 81  -JR     Pretreatment Heart Rate (beats/min) 97  -LS 93  -JR     Posttreatment Heart Rate (beats/min) 94  -LS 94  -JR     Pre SpO2 (%) 98  -LS 98  -JR     O2 Delivery Pre Treatment supplemental O2   2L  -LS supplemental O2   2L  -JR     Post SpO2 (%) 100  -  -JR     O2 Delivery Post Treatment supplemental O2  -LS supplemental O2  -JR     Pre Patient Position Supine  -LS Supine  -JR     Intra Patient Position Sitting  -LS Sitting  -JR     Post Patient Position Supine  -LS Supine  -JR     Recorded by [LS] Lliy Juárez, PT [JR] Judy Leach, OT     Pain Assessment    Pain Assessment 0-10  -LS 0-10  -JR Jay-Kent FACES  -MC    Jay-Kent FACES Pain Rating 0  -LS 0  -JR     Pain Score 0  -LS 0  -JR 0  -MC    Post Pain Score 0  -LS 0  -JR 0  -MC    Recorded by [LS] Lily Juárez, PT [JR] Judy Leach, OT [MC] Rekha Mcgarry, PT    Cognitive Assessment/Intervention    Current Cognitive/Communication Assessment impaired  -LS impaired  -JR     Orientation Status oriented to;person;place  -LS oriented to;person;place  -JR     Follows Commands/Answers Questions able to follow single-step instructions;25% of the time  -LS 25% of the time;able to follow single-step instructions;needs cueing;needs repetition;needs increased time  -JR     Personal Safety severe impairment;decreased awareness, need for assist;decreased awareness, need for safety;decreased insight to deficits  -LS severe impairment;decreased awareness, need for assist;decreased  awareness, need for safety;decreased insight to deficits  -JR     Recorded by [LS] Lily Juárez, PT [JR] Judy Leach, OT     Bed Mobility, Assessment/Treatment    Bed Mobility, Assistive Device bed rails;head of bed elevated;draw sheet  -LS      Bed Mobility, Scoot/Bridge, [place] dependent (less than 25% patient effort);2 person assist required;verbal cues required  -LS dependent (less than 25% patient effort);2 person assist required  -JR     Bed Mob, Supine to Sit, [place] maximum assist (25% patient effort);2 person assist required;verbal cues required  -LS maximum assist (25% patient effort);2 person assist required  -JR     Bed Mob, Sit to Supine, [place] maximum assist (25% patient effort);2 person assist required;verbal cues required  -LS maximum assist (25% patient effort);2 person assist required  -JR     Bed Mobility, Safety Issues cognitive deficits limit understanding;decreased use of arms for pushing/pulling;decreased use of legs for bridging/pushing  - cognitive deficits limit understanding;decreased use of arms for pushing/pulling;decreased use of legs for bridging/pushing;impaired trunk control for bed mobility  -JR     Bed Mobility, Impairments  strength decreased;impaired balance;coordination impaired;motor control impaired;postural control impaired  -JR     Bed Mobility, Comment VC's for sequencing.   -LS Pt able to initiate moving LE's over to EOB for supine to sit, required max A x2 for sit to supine.  -JR     Recorded by [LS] Lily Juárez, PT [JR] Judy Leach, OT     Transfer Assessment/Treatment    Transfers, Sit-Stand [place] dependent (less than 25% patient effort);2 person assist required;verbal cues required  -LS dependent (less than 25% patient effort);2 person assist required  -JR     Transfer, Comment Attempted STS x2 from EOB; pt unable to clear hips.   -LS Attempted sit to stand, unable to clear pt's hips from EOB this date.  -JR     Recorded by [LS]  Lily Juárez, PT [JR] Judy Leach, OT     Balance Skills Training    Sitting-Level of Assistance Moderate assistance  -LS Moderate assistance  -JR     Sitting-Balance Support Feet supported;Right upper extremity supported;Left upper extremity supported  -LS Feet supported;Right upper extremity supported;Left upper extremity supported  -JR     Sitting-Balance Activities Trunk control activities  -      Sitting # of Minutes 10  -LS      Recorded by [LS] Lily Juárez, PT [JR] Judy Leach, OT     Therapy Exercises    Bilateral Lower Extremities AROM:;10 reps;ankle pumps/circles;knee flexion;hip abduction/adduction  -LS      Bilateral Upper Extremity  AAROM:;PROM:;10 reps;elbow flexion/extension;shoulder abduction/adduction;shoulder extension/flexion  -JR     Recorded by [LS] Lily Juárez, PT [JR] Judy Leach, OT     Positioning and Restraints    Pre-Treatment Position in bed  -LS in bed  -JR in bed  -    Post Treatment Position bed  - bed  - bed  -    In Bed notified nsg;supine;call light within reach;encouraged to call for assist;with family/caregiver;with other staff;LUE elevated;RUE elevated  - notified nsg;supine;call light within reach;encouraged to call for assist;with family/caregiver  - supine;call light within reach;encouraged to call for assist;with nsg  -    Restraints released:;reapplied:;soft limb  - released:;reapplied:;notified nsg:;soft limb  -JR soft limb;mitten   did not release  -    Recorded by [LS] Lily Juárez, PT [JR] Judy Leach, OT [] Rekha Mcgarry, PT      04/25/17 1410 04/25/17 1030 04/25/17 0915    Rehab Assessment/Intervention    Discipline physical therapist  -LS speech language pathologist  -SG physical therapist  -MF    Document Type therapy note (daily note)  -LS therapy note (daily note)  -SG therapy note (daily note)  -MF    Subjective Information agree to therapy  -LS no complaints;agree to therapy  -SG agree to therapy;no complaints   -    Patient Effort, Rehab Treatment adequate  -LS fair  -SG     Symptoms Noted During/After Treatment  none  -SG     Precautions/Limitations fall precautions;oxygen therapy device and L/min  -LS      Precautions/Limitations, Vision  WFL  -SG     Precautions/Limitations, Hearing  WFL  -SG     Specific Treatment Considerations Pt currently requiring bi-pap due to decreased resp status this PM.   -LS      Recorded by [LS] Lily Juárez, PT [SG] Lyndsey Sultana, MS CCC-SLP [] Pee Roldan, PT    Vital Signs    Pre Systolic BP Rehab 113  -LS      Pre Treatment Diastolic BP 77  -LS      Post Systolic BP Rehab 115  -LS      Post Treatment Diastolic BP 75  -LS      Pretreatment Heart Rate (beats/min) 86  -LS      Posttreatment Heart Rate (beats/min) 84  -LS      Pre SpO2 (%) 90  -LS      O2 Delivery Pre Treatment supplemental O2   bi-pap mask  -LS      Post SpO2 (%) 100  -LS      O2 Delivery Post Treatment supplemental O2  -LS      Pre Patient Position Supine  -LS      Intra Patient Position Sitting  -LS      Post Patient Position Supine  -LS      Recorded by [LS] Lily Juárez, PT      Pain Assessment    Pain Assessment Jay-Kent FACES  -LS Jay-Kent FACES  -SG Jay-Baker FACES  -MF    Jay-Baker FACES Pain Rating 0  -LS 0  -SG 0  -MF    Pain Score 0  -LS 0  -SG     Post Pain Score 0  -LS 0  -SG     Recorded by [LS] Lily Juárez, PT [SG] Lyndsey Sultana, MS CCC-SLP [] Pee Roldan, PT    Cognitive Assessment/Intervention    Current Cognitive/Communication Assessment impaired  -LS      Orientation Status oriented to;person   responds to name  -LS      Follows Commands/Answers Questions able to follow single-step instructions;needs cueing;needs increased time;needs repetition   less than 10%  -LS      Personal Safety severe impairment;decreased awareness, need for assist;decreased awareness, need for safety;decreased insight to deficits  -LS      Personal Safety Interventions fall  prevention program maintained;nonskid shoes/slippers when out of bed;supervised activity  -LS      Recorded by [LS] Lily Juárez, PT      Improve ability to follow directions    Improve ability to follow directions:  one-step directions without objects;100%;without cues  -SG     Status: Improve ability to follow directions  Progress slower than expected  -SG     Ability to Follow Directions Progress  40%;with consistent cues;continue to address  -SG     Recorded by  [SG] Lyndsey Sultana MS CCC-SLP     Improve ability to comprehend questions    Improve ability to comprehend questions:  simple yes/no questions;simple general questions;100%;without cues  -SG     Status: Improve ability to comprehend questions  Progress slower than expected  -SG     Ability to Comprehend Questions Progress  50%;with consistent cues;continue to address  -SG     Recorded by  [SG] Lyndsey Sultana MS CCC-SLP     Improve ability to construct phrase and sentence level responses    Improve ability to construct phrase and sentence level responses by:  answering question with phrase;100%;without cues  -SG     Status: Improve ability to construct phrase and sentence level responses  Progress slower than expected  -SG     Constructing Phrase and Sentence Level Responses Progress  50%;continue to address;with consistent cues  -SG     Recorded by  [SG] Lyndsey Sultana MS CCC-SLP     Improve attention    Improve attention by:  attending to task;complete selective attention task;80%;with consistent cues  -SG     Status: Improve attention  Progressing as expected  -SG     Attention Progress  40%;with consistent cues;continue to address  -SG     Recorded by  [SG] Lyndsey Sultana MS CCC-SLP     Improve orientation    Improve orientation through:  demonstrating orientation to day;demonstrating orientation to month;demonstrating orientation to year;demonstrating orientation to place;demonstrating  "orientation to disease/impairment;100%;with inconsistent cues  -SG     Status: Improve orientation through  Progressing as expected  -SG     Orientation Progress  40%;with consistent cues;continue to address  -SG     Recorded by  [SG] Lyndsey Sultana MS CCC-SLP     Improve functional problem solving    Improve functional problem solving through:  determine solutions to simple ADL/safety problems;answer \"what if\" questions;complete basic reasoning task;complete organization/home management task;70%;with consistent cues  -SG     Status: Improve functional problem solving  Progress slower than expected  -SG     Functional Problem Solving Progress  30%;with consistent cues;continue to address  -SG     Recorded by  [SG] Lyndsey Sultana MS CCC-SLP     Improve timing of pharyngeal response    To improve timing of pharyngeal response, patient will:  Swallow in timely way using three second prep;Swallow in timely way using super-supraglottic swallow;80%;with consistent cues  -SG     Status: Improve timing of pharyngeal response  Progress slower than expected  -SG     Timing of Pharyngeal Response Progress  10%;with consistent cues;continue to adress  -SG     Recorded by  [SG] Lyndsey Sultana MS CCC-SLP     Improve laryngeal closure    To improve laryngeal closure, patient will:  Complete supraglottic swallow;80%;with consistent cues  -SG     Status: Improve laryngeal closure  Progress slower than expected  -SG     Laryngeal Closure Progress  0%;with consistent cues;continue to adress  -SG     Recorded by  [SG] Lyndsey Sultana MS CCC-SLP     Improve closure at entrance to airway    To improve closure at entrance to airway, patient will:  Complete super-supraglottic swallow;80%;with consistent cues  -SG     Status: Improve closure at entrance to airway  Progress slower than expected  -SG     Closure at Entrance to Airway Progress  0%;with consistent cues;continue to adress  " -SG     Recorded by  [SG] Lyndsey Sultana MS CCC-SLP     Improve hyolaryngeal excursion    To improve hyolaryngeal excursion, patient will:  Complete chin tuck against resistance (comment number of repetitions);90%;with consistent cues  -SG     Status: Improve hyolaryngeal excursion  Progress slower than expected  -SG     Hyolaryngeal Excursion Progress  continue to adress  -SG     Recorded by  [SG] Lyndsey Sultana MS CCC-SLP     Improve tongue base & pharyngeal wall squeeze    To improve tongue base & pharyngeal wall squeeze, patient will:  Complete effortful swallow;80%;with consistent cues  -SG     Status: Improve tongue base & pharyngeal wall squeeze  Progress slower than expected  -SG     Tongue Base/Pharyngeal Wall Squeeze Progress  40%;with consistent cues;continue to adress  -SG     Recorded by  [SG] Lyndsey Sultana MS CCC-SLP     Bed Mobility, Assessment/Treatment    Bed Mobility, Assistive Device head of bed elevated;draw sheet  -LS      Bed Mobility, Scoot/Bridge, [place] dependent (less than 25% patient effort);2 person assist required;verbal cues required  -LS      Bed Mob, Supine to Sit, [place] maximum assist (25% patient effort);2 person assist required;verbal cues required  -LS      Bed Mob, Sit to Supine, [place] maximum assist (25% patient effort);2 person assist required;verbal cues required  -LS      Bed Mobility, Safety Issues cognitive deficits limit understanding;decreased use of arms for pushing/pulling;decreased use of legs for bridging/pushing  -LS      Recorded by [LS] Lily Juárez, PT      Transfer Assessment/Treatment    Transfers, Bed-Chair [place] not appropriate to assess   Rn requested back to bed for safety.   -LS      Recorded by [LS] Lily Juárez, PT      Gait Assessment/Treatment    Gait, [place] Level not appropriate to assess  -LS      Recorded by [LS] Lily Juárez, PT      Motor Skills/Interventions     Additional Documentation Balance Skills Training (Group)  -      Recorded by [LS] Lily Juárez, PT      Balance Skills Training    Sitting-Level of Assistance Moderate assistance   progressed to min A  -LS      Sitting-Balance Support Feet supported  -LS      Sitting-Balance Activities Trunk control activities  -      Sitting # of Minutes 12  -LS      Recorded by [LS] Lily Juárez, PT      Therapy Exercises    Bilateral Lower Extremities PROM:;10 reps;supine;ankle pumps/circles;hip abduction/adduction;heel slides  -LS      Bilateral Upper Extremity AAROM:;10 reps;elbow flexion/extension;shoulder abduction/adduction;shoulder extension/flexion  -LS      Recorded by [LS] Lily Juárez, PT      Positioning and Restraints    Pre-Treatment Position in bed  -LS  in bed  -    Post Treatment Position bed  -  bed  -    In Bed notified nsg;supine;call light within reach;encouraged to call for assist;with family/caregiver;with other staff;RUE elevated;LUE elevated   palliative APRN present  -LS  supine;notified nsg  -    Restraints released:;reapplied:;notified nsg:;mitten  -LS      Recorded by [LS] Lily Juárez, PT  [MF] Pee Roldan, PT      04/24/17 0850          Rehab Assessment/Intervention    Discipline physical therapist  -      Document Type therapy note (daily note)  -      Subjective Information agree to therapy;complains of;weakness;fatigue  -      Recorded by [MF] Pee Roldan, PT      Pain Assessment    Pain Assessment Jay-Baker FACES  -      Jay-Baker FACES Pain Rating 0  -      Pain Intervention(s) Repositioned  -      Recorded by [MF] Pee Roldan PT      Cognitive Assessment/Intervention    Current Cognitive/Communication Assessment impaired  -      Orientation Status disoriented x 4  -MF      Recorded by [MF] Pee Roldan PT      Positioning and Restraints    Pre-Treatment Position in bed  -      Post Treatment Position bed  -MF      In Bed supine;call  light within reach;notified nsg  -MF      Recorded by [MF] Pee Roldan, PT        User Key  (r) = Recorded By, (t) = Taken By, (c) = Cosigned By    Initials Name Effective Dates     Pee Roldan, PT 06/19/15 -     SG Lyndsey Farmer-Jose David, MS CCC-SLP 06/22/15 -     JR Judy Leach, OT 06/22/15 -     LS Lily Juárez, PT 06/19/15 -      Rekha Mcgarry, PT 03/14/16 -                 IP PT Goals       04/26/17 1113 04/26/17 0850 04/25/17 1442    Bed Mobility PT LTG    Bed Mobility PT LTG, Outcome goal ongoing  -LS  goal ongoing  -LS    Transfer Training PT LTG    Transfer Training PT LTG, Outcome goal ongoing  -LS  goal ongoing  -LS    Gait Training PT LTG    Gait Training Goal PT LTG, Outcome goal ongoing  -LS  goal ongoing  -LS    Wound Care PT LTG    Wound Care PT LTG 1, Outcome  goal partially met  -MC       04/24/17 0850 04/22/17 1721 04/22/17 1153    Bed Mobility PT LTG    Bed Mobility PT LTG, Outcome   goal ongoing  -SJ    Transfer Training PT LTG    Transfer Training PT LTG, Outcome   goal ongoing  -SJ    Gait Training PT LTG    Gait Training Goal PT LTG, Outcome   goal ongoing  -SJ    Wound Care PT LTG    Wound Care PT LTG 1, Outcome goal partially met  -MF goal ongoing  -MW       04/20/17 1118 04/20/17 1115 04/19/17 0835    Bed Mobility PT LTG    Bed Mobility PT LTG, [place] Level minimum assist (75% patient effort)  -LS      Bed Mobility PT LTG, Date Goal Reviewed 04/20/17  -LS      Bed Mobility PT LTG, Outcome goal revised  -LS      Transfer Training PT LTG    Transfer Training PT LTG, [place] Level minimum assist (75% patient effort)  -LS      Transfer Training PT  LTG, Date Goal Reviewed 04/20/17  -LS      Transfer Training PT LTG, Outcome goal revised  -LS      Gait Training PT LTG    Gait Training Goal PT LTG, [place] Level  minimum assist (75% patient effort);2 person assist required  -LS     Gait Training Goal PT LTG, Distance to Achieve  100  -LS     Gait  Training Goal PT LTG, Date Goal Reviewed  04/20/17  -LS     Gait Training Goal PT LTG, Outcome  goal revised  -LS     Wound Care PT LTG    Wound Care PT LTG 1, Outcome   goal ongoing  -MC      04/16/17 1450 04/15/17 1455 04/14/17 1120    Wound Care PT LTG    Wound Care PT LTG 1, Outcome goal ongoing  -MC goal ongoing  -MC goal ongoing  -MC      User Key  (r) = Recorded By, (t) = Taken By, (c) = Cosigned By    Initials Name Provider Type    MF Pee Roldan, PT Physical Therapist    SJ Cinthia Baptiste, PT Physical Therapist    LS Lily Juárez, PT Physical Therapist    MW Connie Ballesteros, PT Physical Therapist    MC Rekha Mcgarry, PT Physical Therapist          Physical Therapy Education     Title: PT OT SLP Therapies (Active)     Topic: Physical Therapy (Active)     Point: Mobility training (Active)    Learning Progress Summary    Learner Readiness Method Response Comment Documented by Status   Patient Acceptance E,D NR  LS 04/26/17 1112 Active    Acceptance E,D NR   04/25/17 1442 Active    Acceptance E NR  HA 04/23/17 1932 Active    Acceptance E NR,NL  SJ 04/22/17 1155 Active    Acceptance E,D NR  LS 04/20/17 1114 Active    Acceptance E VU  AP 04/18/17 0557 Done    Acceptance E NR   04/09/17 1551 Active    Acceptance E NR  EDOUARD 04/07/17 1534 Active    Acceptance E,D NR  LS 04/06/17 1523 Active    Acceptance E,D NR  LS 04/05/17 0908 Active   Family Acceptance E VU  AP 04/18/17 0557 Done   Significant Other Acceptance E,D NR  LS 04/26/17 1112 Active    Acceptance E,D NR  LS 04/25/17 1442 Active    Acceptance E NR  HA 04/23/17 1932 Active    Acceptance E,D NR  LS 04/06/17 1523 Active    Acceptance E,D NR  LS 04/05/17 0908 Active               Point: Home exercise program (Active)    Learning Progress Summary    Learner Readiness Method Response Comment Documented by Status   Patient Acceptance E,D NR  LS 04/26/17 1112 Active    Acceptance E,D NR  LS 04/25/17 1442 Active    Acceptance E NR  HA 04/23/17 1932  Active    Acceptance E NR,NL   04/22/17 1155 Active    Acceptance E,D NR   04/20/17 1114 Active    Acceptance E VU  AP 04/18/17 0557 Done    Acceptance E NR  EDOUARD 04/07/17 1534 Active    Acceptance E,D NR  LS 04/06/17 1523 Active   Family Acceptance E VU  AP 04/18/17 0557 Done   Significant Other Acceptance E,D NR   04/26/17 1112 Active    Acceptance E,D NR  LS 04/25/17 1442 Active    Acceptance E NR  HA 04/23/17 1932 Active    Acceptance E,D NR  LS 04/06/17 1523 Active               Point: Body mechanics (Active)    Learning Progress Summary    Learner Readiness Method Response Comment Documented by Status   Patient Acceptance E,D NR   04/26/17 1112 Active    Acceptance E,D NR   04/25/17 1442 Active    Acceptance E NR  HA 04/23/17 1932 Active    Acceptance E NR,NL   04/22/17 1155 Active    Acceptance E,D NR   04/20/17 1114 Active    Acceptance E VU  AP 04/18/17 0557 Done    Acceptance E NR   04/09/17 1551 Active    Acceptance E NR  EDOUARD 04/07/17 1534 Active    Acceptance E,D NR   04/06/17 1523 Active    Acceptance E,D NR   04/05/17 0908 Active   Family Acceptance E VU  AP 04/18/17 0557 Done   Significant Other Acceptance E,D NR   04/26/17 1112 Active    Acceptance E,D NR   04/25/17 1442 Active    Acceptance E NR  HA 04/23/17 1932 Active    Acceptance E,D NR   04/06/17 1523 Active    Acceptance E,D NR   04/05/17 0908 Active               Point: Precautions (Active)    Learning Progress Summary    Learner Readiness Method Response Comment Documented by Status   Patient Acceptance E,D NR   04/26/17 1112 Active    Acceptance E,D NR   04/25/17 1442 Active    Acceptance E NR  HA 04/23/17 1932 Active    Acceptance E NR,NL   04/22/17 1155 Active    Acceptance E,D NR   04/20/17 1114 Active    Acceptance E VU  AP 04/18/17 0557 Done    Acceptance E NR   04/09/17 1551 Active    Acceptance E NR  EDOUARD 04/07/17 1534 Active    Acceptance E,D NR   04/06/17 1523 Active    Acceptance E,D NR  LS  04/05/17 0908 Active   Family Acceptance E VU  AP 04/18/17 0557 Done   Significant Other Acceptance E,D NR  LS 04/26/17 1112 Active    Acceptance E,D NR  LS 04/25/17 1442 Active    Acceptance E NR  HA 04/23/17 1932 Active    Acceptance E,D NR  LS 04/06/17 1523 Active    Acceptance E,D NR  LS 04/05/17 0908 Active                      User Key     Initials Effective Dates Name Provider Type Discipline    EDOUARD 06/19/15 -  Anita Prado, PT Physical Therapist PT    SJ 06/19/15 -  Cinthia Baptiste, PT Physical Therapist PT    LS 06/19/15 -  Lily Juárez, PT Physical Therapist PT    HA 06/16/16 -  Valdemar Levy, RN Registered Nurse Nurse    MILVIA 06/16/16 -  Shahla Sherman, RN Registered Nurse Nurse     09/06/16 -  Mikey Moore, PT Physical Therapist PT                    PT Recommendation and Plan  Anticipated Discharge Disposition: skilled nursing facility  PT Frequency: 3 times/wk  Plan of Care Review  Plan Of Care Reviewed With: patient, spouse  Progress: progress toward functional goals as expected  Outcome Summary/Follow up Plan: Pt with improved following of commands; able to maintain stable O2 sats with EOB sitting balance activity on 2L O2. COnt to demonstrate slight agitation with decreased following of commands. Will cont PT POC at 3x/wk with close monitoring of appropriateness of further therapy intervention. Wife verbalized understanding of benefit of ROm of extremities.           Outcome Measures       04/26/17 1041 04/26/17 1040 04/25/17 1410    How much help from another person do you currently need...    Turning from your back to your side while in flat bed without using bedrails? 2  -LS  2  -LS    Moving from lying on back to sitting on the side of a flat bed without bedrails? 2  -LS  2  -LS    Moving to and from a bed to a chair (including a wheelchair)? 1  -LS  1  -LS    Standing up from a chair using your arms (e.g., wheelchair, bedside chair)? 1  -LS  1  -LS    Climbing 3-5 steps with a  railing? 1  -LS  1  -LS    To walk in hospital room? 1  -LS  1  -LS    AM-PAC 6 Clicks Score 8  -LS  8  -LS    How much help from another is currently needed...    Putting on and taking off regular lower body clothing?  1  -JR     Bathing (including washing, rinsing, and drying)  1  -JR     Toileting (which includes using toilet bed pan or urinal)  1  -JR     Putting on and taking off regular upper body clothing  1  -JR     Taking care of personal grooming (such as brushing teeth)  1  -JR     Eating meals  1  -JR     Score  6  -JR     Modified Linh Scale    Modified Linh Scale  4 - Moderately severe disability.  Unable to walk without assistance, and unable to attend to own bodily needs without assistance.  -JR     Functional Assessment    Outcome Measure Options AM-PAC 6 Clicks Basic Mobility (PT)  -LS AM-PAC 6 Clicks Daily Activity (OT);Modified [place]  -JR AM-PAC 6 Clicks Basic Mobility (PT)  -      User Key  (r) = Recorded By, (t) = Taken By, (c) = Cosigned By    Initials Name Provider Type    JR Judy Leach, OT Occupational Therapist    ANDREWS Juárez, PT Physical Therapist           Time Calculation:         PT Charges       04/26/17 1116 04/26/17 0850       Time Calculation    Start Time 1041  - 0850  -     PT Received On 04/26/17  -      PT Goal Re-Cert Due Date 04/30/17  - 04/30/17  -     Time Calculation- PT    Total Timed Code Minutes- PT 16 minute(s)  -        User Key  (r) = Recorded By, (t) = Taken By, (c) = Cosigned By    Initials Name Provider Type    ANDREWS Juárez, PT Physical Therapist     Rekha Mcgarry, JENNIFER Physical Therapist          Therapy Charges for Today     Code Description Service Date Service Provider Modifiers Qty    04377114714 HC PT THER PROC EA 15 MIN 4/25/2017 Lily Juárez, PT GP 2    18420521966 HC PT THER SUPP EA 15 MIN 4/25/2017 Lily Juárez, PT GP 2    12580250271 HC PT THER PROC EA 15 MIN 4/26/2017 Lily Juárez, PT GP 1          PT  G-Codes  Outcome Measure Options: AM-PAC 6 Clicks Basic Mobility (PT)    Lily Juárez, PT  4/26/2017

## 2017-04-26 NOTE — PLAN OF CARE
Problem: Patient Care Overview (Adult)  Goal: Plan of Care Review  Outcome: Ongoing (interventions implemented as appropriate)    04/26/17 1113   Coping/Psychosocial Response Interventions   Plan Of Care Reviewed With patient;spouse   Patient Care Overview   Progress progress toward functional goals as expected   Outcome Evaluation   Outcome Summary/Follow up Plan Pt with improved following of commands; able to maintain stable O2 sats with EOB sitting balance activity on 2L O2. COnt to demonstrate slight agitation with decreased following of commands. Will cont PT POC at 3x/wk with close monitoring of appropriateness of further therapy intervention. Wife verbalized understanding of benefit of ROm of extremities.          Problem: Stroke (Ischemic) (Adult)  Goal: Signs and Symptoms of Listed Potential Problems Will be Absent or Manageable (Stroke)    04/26/17 1113   Stroke (Ischemic)   Problems Present (Stroke (Ischemic)/TIA) motor/sensory impairment         Problem: Inpatient Physical Therapy  Goal: Bed Mobility Goal LTG- PT  Outcome: Ongoing (interventions implemented as appropriate)    04/05/17 0909 04/20/17 1118 04/26/17 1113   Bed Mobility PT LTG   Bed Mobility PT LTG, Date Established 04/05/17 --  --    Bed Mobility PT LTG, Time to Achieve 2 wks --  --    Bed Mobility PT LTG, Activity Type supine to sit/sit to supine --  --    Bed Mobility PT LTG, [place] Level --  minimum assist (75% patient effort) --    Bed Mobility PT LTG, Date Goal Reviewed --  04/20/17 --    Bed Mobility PT LTG, Outcome --  --  goal ongoing       Goal: Transfer Training Goal 1 LTG- PT  Outcome: Ongoing (interventions implemented as appropriate)    04/05/17 0909 04/11/17 1015 04/20/17 1118   Transfer Training PT LTG   Transfer Training PT LTG, Date Established 04/05/17 --  --    Transfer Training PT LTG, Time to Achieve 2 wks --  --    Transfer Training PT LTG, Activity Type sit to stand/stand to sit --  --    Transfer Training PT  LTG, [place] Level --  --  minimum assist (75% patient effort)   Transfer Training PT LTG, Assist Device walker, rolling --  --    Transfer Training PT LTG, Outcome --  --  --    Transfer Training PT LTG, Reason Goal Not Met --  medical status inhibits participation --      04/26/17 1113   Transfer Training PT LTG   Transfer Training PT LTG, Date Established --    Transfer Training PT LTG, Time to Achieve --    Transfer Training PT LTG, Activity Type --    Transfer Training PT LTG, [place] Level --    Transfer Training PT LTG, Assist Device --    Transfer Training PT LTG, Outcome goal ongoing   Transfer Training PT LTG, Reason Goal Not Met --        Goal: Gait Training Goal LTG- PT  Outcome: Ongoing (interventions implemented as appropriate)    04/05/17 0909 04/11/17 1015 04/20/17 1115   Gait Training PT LTG   Gait Training Goal PT LTG, Date Established 04/05/17 --  --    Gait Training Goal PT LTG, Time to Achieve 2 wks --  --    Gait Training Goal PT LTG, [place] Level --  --  minimum assist (75% patient effort);2 person assist required   Gait Training Goal PT LTG, Assist Device walker, rolling --  --    Gait Training Goal PT LTG, Distance to Achieve --  --  100   Gait Training Goal PT LTG, Date Goal Reviewed --  --  04/20/17   Gait Training Goal PT LTG, Outcome --  --  --    Gait Training Goal PT LTG, Reason Goal Not Met --  medical status inhibits participation --      04/26/17 1113   Gait Training PT LTG   Gait Training Goal PT LTG, Date Established --    Gait Training Goal PT LTG, Time to Achieve --    Gait Training Goal PT LTG, [place] Level --    Gait Training Goal PT LTG, Assist Device --    Gait Training Goal PT LTG, Distance to Achieve --    Gait Training Goal PT LTG, Date Goal Reviewed --    Gait Training Goal PT LTG, Outcome goal ongoing   Gait Training Goal PT LTG, Reason Goal Not Met --

## 2017-04-26 NOTE — PROGRESS NOTES
Acute Care - Wound/Debridement Treatment Note  [facility]     Patient Name: Tomas Salvador  : 1955  MRN: [id number]  Today's Date: 2017  Onset of Illness/Injury or Date of Surgery Date: 17   Date of Referral to PT: 17   Referring Physician: MD Jimenez       Admit Date: 2017    Visit Dx:    ICD-10-CM ICD-9-CM   1. Dysphagia, unspecified type R13.10 787.20   2. Impaired functional mobility, balance, gait, and endurance Z74.09 V49.89   3. Impaired mobility and ADLs Z74.09 799.89   4. Cognitive communication deficit R41.841 799.52       Patient Active Problem List   Diagnosis   • Coronary disease   • Ischemic cardiomyopathy with EF 10% on Echo 17   • Peripheral vascular disease   • ICD in place, implantation .   • Dyslipidemia, on atorvastain.    • S/P Sepsis due to  source 3/30/17   • CVA 17 with rapid improvement   • Diabetes Mellitus HgbA1c 10.4   • Chronic hypoxic respiratory failure on nocturnal o2   • CAD s/p CABG , [facility] . Grafts patent except SVG to diagonal   • Urinary retention requiring colbert   • Hyperlipidemia   • Essential hypertension   • Hx of PVD (peripheral vascular disease)   • Recent E. 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 Indwelling Colbert catheter   • Atrial fibrillation but now converted. Still on Eliquis               LDA Wound       17 0850          Incision 17 1056 other (see comments) scrotum    Incision - Properties Group Placement Date: 17  -JW Placement Time:   -JW Side: other (see comments)  -JW Location: scrotum  -JW    Incision WDL WDL  -MC      Dressing Appearance intact;moist drainage  -      Appearance open areas;moist;pink;redness   mostly red/pink granulation, adherent yellow slough  -MC      Incision Length (cm) --   one small satellite nearly closed  -MC      Drainage  "Characteristics/Odor serosanguineous;no odor  -      Drainage Amount small  -      Wound Cleaning cleansed with;other (see comments)   theraworx  -      Wound Interventions debrided  -      Dressing Dressing applied;low-adherent;foam   6\" optifoam gentle border  -      Packing other (see comments)   saline-moistened hydrofera blue x4  -MC      Pressure Ulcer 04/20/17 2000 coccyx Stage I    Pressure Ulcer - Properties Group Date first assessed: 04/20/17  -JE Time first assessed: 2000  -JE Present On Admission (Pressure Ulcer): no  -JE Location: coccyx  -JE Stage: Stage I  -JE    Wound 04/20/17 0850 medial nose abrasion    Wound - Properties Group Date first assessed: 04/20/17  -AS Time first assessed: 0850  -AS Orientation: medial  -AS Location: nose  -AS, bridge   Type: abrasion  -AS      User Key  (r) = Recorded By, (t) = Taken By, (c) = Cosigned By    Initials Name Provider Type     Rekha Mcgarry, PT Physical Therapist    FELY Urbano, RN Registered Nurse    AS Jayson Louis RN Registered Nurse    JAY JAY Silveira RN Registered Nurse            WOUND DEBRIDEMENT  Debridement Site 1  Location- Site 1: perineal wound  Selective Debridement- Site 1: Wound Surface <20cmsq (about 4 cm2 debrided)  Instruments- Site 1: tweezers, scissors  Excised Tissue Description- Site 1: minimum, slough  Bleeding- Site 1: none                  Adult Rehabilitation Note       04/26/17 0850 04/25/17 1410 04/25/17 1030    Rehab Assessment/Intervention    Discipline physical therapist  -MC physical therapist  -LS speech language pathologist  -SG    Document Type therapy note (daily note)  -MC therapy note (daily note)  -LS therapy note (daily note)  -SG    Subjective Information no complaints;agree to therapy  - agree to therapy  -LS no complaints;agree to therapy  -SG    Patient Effort, Rehab Treatment  adequate  -LS fair  -SG    Symptoms Noted During/After Treatment   none  -SG    Precautions/Limitations  " fall precautions;oxygen therapy device and L/min  -LS     Precautions/Limitations, Vision   WFL  -SG    Precautions/Limitations, Hearing   WFL  -SG    Specific Treatment Considerations  Pt currently requiring bi-pap due to decreased resp status this PM.   -LS     Recorded by [MC] Rekha Mcgarry, PT [LS] Lily Juárez, PT [SG] Lyndsey Kayla Sultana, MS CCC-SLP    Vital Signs    Pre Systolic BP Rehab  113  -LS     Pre Treatment Diastolic BP  77  -LS     Post Systolic BP Rehab  115  -LS     Post Treatment Diastolic BP  75  -LS     Pretreatment Heart Rate (beats/min)  86  -LS     Posttreatment Heart Rate (beats/min)  84  -LS     Pre SpO2 (%)  90  -LS     O2 Delivery Pre Treatment  supplemental O2   bi-pap mask  -LS     Post SpO2 (%)  100  -LS     O2 Delivery Post Treatment  supplemental O2  -LS     Pre Patient Position  Supine  -LS     Intra Patient Position  Sitting  -LS     Post Patient Position  Supine  -LS     Recorded by  [LS] Lily Juárez, PT     Pain Assessment    Pain Assessment Jay-Kent FACES  -MC Jay-Kent FACES  -LS Jay-Kent FACES  -SG    Jay-Kent FACES Pain Rating  0  -LS 0  -SG    Pain Score 0  -MC 0  -LS 0  -SG    Post Pain Score 0  -MC 0  -LS 0  -SG    Recorded by [MC] Rekha Mcgarry, PT [LS] Lily Juárez, PT [SG] Lyndsey Sultana, MS CCC-SLP    Cognitive Assessment/Intervention    Current Cognitive/Communication Assessment  impaired  -LS     Orientation Status  oriented to;person   responds to name  -LS     Follows Commands/Answers Questions  able to follow single-step instructions;needs cueing;needs increased time;needs repetition   less than 10%  -LS     Personal Safety  severe impairment;decreased awareness, need for assist;decreased awareness, need for safety;decreased insight to deficits  -LS     Personal Safety Interventions  fall prevention program maintained;nonskid shoes/slippers when out of bed;supervised activity  -LS     Recorded by  [LS] Lily Juárez, PT      Improve ability to follow directions    Improve ability to follow directions:   one-step directions without objects;100%;without cues  -SG    Status: Improve ability to follow directions   Progress slower than expected  -SG    Ability to Follow Directions Progress   40%;with consistent cues;continue to address  -SG    Recorded by   [SG] Lyndsey Sultana MS CCC-SLP    Improve ability to comprehend questions    Improve ability to comprehend questions:   simple yes/no questions;simple general questions;100%;without cues  -SG    Status: Improve ability to comprehend questions   Progress slower than expected  -SG    Ability to Comprehend Questions Progress   50%;with consistent cues;continue to address  -SG    Recorded by   [SG] Lyndsey Sultana MS CCC-SLP    Improve ability to construct phrase and sentence level responses    Improve ability to construct phrase and sentence level responses by:   answering question with phrase;100%;without cues  -SG    Status: Improve ability to construct phrase and sentence level responses   Progress slower than expected  -SG    Constructing Phrase and Sentence Level Responses Progress   50%;continue to address;with consistent cues  -SG    Recorded by   [SG] Lyndsey Sultana MS CCC-SLP    Improve attention    Improve attention by:   attending to task;complete selective attention task;80%;with consistent cues  -SG    Status: Improve attention   Progressing as expected  -SG    Attention Progress   40%;with consistent cues;continue to address  -SG    Recorded by   [SG] Lyndsey Sultana MS CCC-SLP    Improve orientation    Improve orientation through:   demonstrating orientation to day;demonstrating orientation to month;demonstrating orientation to year;demonstrating orientation to place;demonstrating orientation to disease/impairment;100%;with inconsistent cues  -SG    Status: Improve orientation through   Progressing as expected  -SG     "Orientation Progress   40%;with consistent cues;continue to address  -SG    Recorded by   [SG] Lyndsey Sultana MS CCC-SLP    Improve functional problem solving    Improve functional problem solving through:   determine solutions to simple ADL/safety problems;answer \"what if\" questions;complete basic reasoning task;complete organization/home management task;70%;with consistent cues  -SG    Status: Improve functional problem solving   Progress slower than expected  -SG    Functional Problem Solving Progress   30%;with consistent cues;continue to address  -SG    Recorded by   [SG] Lyndsey Sultana MS CCC-SLP    Improve timing of pharyngeal response    To improve timing of pharyngeal response, patient will:   Swallow in timely way using three second prep;Swallow in timely way using super-supraglottic swallow;80%;with consistent cues  -SG    Status: Improve timing of pharyngeal response   Progress slower than expected  -SG    Timing of Pharyngeal Response Progress   10%;with consistent cues;continue to adress  -SG    Recorded by   [SG] Lyndsey Sultana MS CCC-SLP    Improve laryngeal closure    To improve laryngeal closure, patient will:   Complete supraglottic swallow;80%;with consistent cues  -SG    Status: Improve laryngeal closure   Progress slower than expected  -SG    Laryngeal Closure Progress   0%;with consistent cues;continue to adress  -SG    Recorded by   [SG] Lyndsey Sultana MS CCC-SLP    Improve closure at entrance to airway    To improve closure at entrance to airway, patient will:   Complete super-supraglottic swallow;80%;with consistent cues  -SG    Status: Improve closure at entrance to airway   Progress slower than expected  -SG    Closure at Entrance to Airway Progress   0%;with consistent cues;continue to adress  -SG    Recorded by   [SG] Lyndsey Sultana MS CCC-SLP    Improve hyolaryngeal excursion    To improve hyolaryngeal excursion, " patient will:   Complete chin tuck against resistance (comment number of repetitions);90%;with consistent cues  -SG    Status: Improve hyolaryngeal excursion   Progress slower than expected  -SG    Hyolaryngeal Excursion Progress   continue to adress  -SG    Recorded by   [SG] Lyndsey Sultana MS CCC-SLP    Improve tongue base & pharyngeal wall squeeze    To improve tongue base & pharyngeal wall squeeze, patient will:   Complete effortful swallow;80%;with consistent cues  -SG    Status: Improve tongue base & pharyngeal wall squeeze   Progress slower than expected  -SG    Tongue Base/Pharyngeal Wall Squeeze Progress   40%;with consistent cues;continue to adress  -SG    Recorded by   [SG] Lyndsey Sultana MS CCC-SLP    Bed Mobility, Assessment/Treatment    Bed Mobility, Assistive Device  head of bed elevated;draw sheet  -LS     Bed Mobility, Scoot/Bridge, [place]  dependent (less than 25% patient effort);2 person assist required;verbal cues required  -LS     Bed Mob, Supine to Sit, [place]  maximum assist (25% patient effort);2 person assist required;verbal cues required  -LS     Bed Mob, Sit to Supine, [place]  maximum assist (25% patient effort);2 person assist required;verbal cues required  -LS     Bed Mobility, Safety Issues  cognitive deficits limit understanding;decreased use of arms for pushing/pulling;decreased use of legs for bridging/pushing  -LS     Recorded by  [LS] Lily Juárez, PT     Transfer Assessment/Treatment    Transfers, Bed-Chair [place]  not appropriate to assess   Rn requested back to bed for safety.   -LS     Recorded by  [LS] Lily Juárez, PT     Gait Assessment/Treatment    Gait, [place] Level  not appropriate to assess  -LS     Recorded by  [LS] Lily Juárez, PT     Motor Skills/Interventions    Additional Documentation  Balance Skills Training (Group)  -LS     Recorded by  [LS] Lily Juárez, PT     Balance Skills Training     Sitting-Level of Assistance  Moderate assistance   progressed to min A  -LS     Sitting-Balance Support  Feet supported  -LS     Sitting-Balance Activities  Trunk control activities  -LS     Sitting # of Minutes  12  -LS     Recorded by  [LS] Lily Juárez, PT     Therapy Exercises    Bilateral Lower Extremities  PROM:;10 reps;supine;ankle pumps/circles;hip abduction/adduction;heel slides  -LS     Bilateral Upper Extremity  AAROM:;10 reps;elbow flexion/extension;shoulder abduction/adduction;shoulder extension/flexion  -LS     Recorded by  [LS] Lily Juárez, PT     Positioning and Restraints    Pre-Treatment Position in bed  - in bed  -     Post Treatment Position bed  - bed  -LS     In Bed supine;call light within reach;encouraged to call for assist;with nsg  - notified nsg;supine;call light within reach;encouraged to call for assist;with family/caregiver;with other staff;RUE elevated;LUE elevated   palliative APRN present  -     Restraints soft limb;mitten   did not release  - released:;reapplied:;notified nsg:;mitten  -LS     Recorded by [] Rekha Mcgarry, PT [LS] Lily Juárez, PT       04/25/17 0915 04/24/17 0850       Rehab Assessment/Intervention    Discipline physical therapist  - physical therapist  -     Document Type therapy note (daily note)  - therapy note (daily note)  -     Subjective Information agree to therapy;no complaints  - agree to therapy;complains of;weakness;fatigue  -     Recorded by [] Pee Roldan, PT [MF] Pee Roldan, PT     Pain Assessment    Pain Assessment Jay-Baker FACES  - Jay-Baker FACES  -     Jay-Baker FACES Pain Rating 0  - 0  -MF     Pain Intervention(s)  Repositioned  -MF     Recorded by [] Pee Roldan, PT [MF] Pee Roldan, PT     Cognitive Assessment/Intervention    Current Cognitive/Communication Assessment  impaired  -     Orientation Status  disoriented x 4  -MF     Recorded by  [MF] Pee Roldan, PT      Positioning and Restraints    Pre-Treatment Position in bed  -MF in bed  -MF     Post Treatment Position bed  -MF bed  -MF     In Bed supine;notified nsg  -MF supine;call light within reach;notified nsg  -MF     Recorded by [MF] Pee Roldan, PT [MF] Pee Roldan, PT       User Key  (r) = Recorded By, (t) = Taken By, (c) = Cosigned By    Initials Name Effective Dates     Pee Roldan, PT 06/19/15 -     SG Lyndsey Sultana, MS CCC-SLP 06/22/15 -     LS Lily Juárez, PT 06/19/15 -     MC Rekha Mcgarry, PT 03/14/16 -                 IP PT Goals       04/26/17 0850 04/25/17 1442 04/24/17 0850    Bed Mobility PT LTG    Bed Mobility PT LTG, Outcome  goal ongoing  -LS     Transfer Training PT LTG    Transfer Training PT LTG, Outcome  goal ongoing  -LS     Gait Training PT LTG    Gait Training Goal PT LTG, Outcome  goal ongoing  -LS     Wound Care PT LTG    Wound Care PT LTG 1, Outcome goal partially met  -  goal partially met  -MF      04/22/17 1721 04/22/17 1153 04/20/17 1118    Bed Mobility PT LTG    Bed Mobility PT LTG, [place] Level   minimum assist (75% patient effort)  -LS    Bed Mobility PT LTG, Date Goal Reviewed   04/20/17  -LS    Bed Mobility PT LTG, Outcome  goal ongoing  -SJ goal revised  -LS    Transfer Training PT LTG    Transfer Training PT LTG, [place] Level   minimum assist (75% patient effort)  -LS    Transfer Training PT  LTG, Date Goal Reviewed   04/20/17  -LS    Transfer Training PT LTG, Outcome  goal ongoing  -SJ goal revised  -LS    Gait Training PT LTG    Gait Training Goal PT LTG, Outcome  goal ongoing  -SJ     Wound Care PT LTG    Wound Care PT LTG 1, Outcome goal ongoing  -MW        04/20/17 1115 04/19/17 0835 04/16/17 1450    Gait Training PT LTG    Gait Training Goal PT LTG, [place] Level minimum assist (75% patient effort);2 person assist required  -LS      Gait Training Goal PT LTG, Distance to Achieve 100  -LS      Gait Training Goal PT  LTG, Date Goal Reviewed 04/20/17  -LS      Gait Training Goal PT LTG, Outcome goal revised  -LS      Wound Care PT LTG    Wound Care PT LTG 1, Outcome  goal ongoing  -MC goal ongoing  -MC      04/15/17 1455 04/14/17 1120       Wound Care PT LTG    Wound Care PT LTG 1, Outcome goal ongoing  -MC goal ongoing  -MC       User Key  (r) = Recorded By, (t) = Taken By, (c) = Cosigned By    Initials Name Provider Type    MF Pee Roldan, PT Physical Therapist    SJ Cinthia Baptiste, PT Physical Therapist    LS Lily Juárez, PT Physical Therapist    MW Connie Ballesteros, PT Physical Therapist    MC Rekha Mcgarry, PT Physical Therapist          Physical Therapy Education     Title: PT OT SLP Therapies (Active)     Topic: Physical Therapy (Active)     Point: Mobility training (Active)    Learning Progress Summary    Learner Readiness Method Response Comment Documented by Status   Patient Acceptance E,D NR   04/25/17 1442 Active    Acceptance E NR  HA 04/23/17 1932 Active    Acceptance E NR,NL   04/22/17 1155 Active    Acceptance E,D NR   04/20/17 1114 Active    Acceptance E VU  AP 04/18/17 0557 Done    Acceptance E NR   04/09/17 1551 Active    Acceptance E NR  EDOUARD 04/07/17 1534 Active    Acceptance E,D NR   04/06/17 1523 Active    Acceptance E,D NR   04/05/17 0908 Active   Family Acceptance E VU  AP 04/18/17 0557 Done   Significant Other Acceptance E,D NR   04/25/17 1442 Active    Acceptance E NR  HA 04/23/17 1932 Active    Acceptance E,D NR   04/06/17 1523 Active    Acceptance E,D NR   04/05/17 0908 Active               Point: Home exercise program (Active)    Learning Progress Summary    Learner Readiness Method Response Comment Documented by Status   Patient Acceptance E,D NR   04/25/17 1442 Active    Acceptance E NR  HA 04/23/17 1932 Active    Acceptance E NR,NL   04/22/17 1155 Active    Acceptance E,D NR   04/20/17 1114 Active    Acceptance E VU  AP 04/18/17 0557 Done    Acceptance E NR  EDOUARD  04/07/17 1534 Active    Acceptance E,D NR  LS 04/06/17 1523 Active   Family Acceptance E VU  AP 04/18/17 0557 Done   Significant Other Acceptance E,D NR  LS 04/25/17 1442 Active    Acceptance E NR  HA 04/23/17 1932 Active    Acceptance E,D NR  LS 04/06/17 1523 Active               Point: Body mechanics (Active)    Learning Progress Summary    Learner Readiness Method Response Comment Documented by Status   Patient Acceptance E,D NR  LS 04/25/17 1442 Active    Acceptance E NR  HA 04/23/17 1932 Active    Acceptance E NR,NL   04/22/17 1155 Active    Acceptance E,D NR   04/20/17 1114 Active    Acceptance E VU  AP 04/18/17 0557 Done    Acceptance E NR   04/09/17 1551 Active    Acceptance E NR  EDOUARD 04/07/17 1534 Active    Acceptance E,D NR   04/06/17 1523 Active    Acceptance E,D NR   04/05/17 0908 Active   Family Acceptance E VU  AP 04/18/17 0557 Done   Significant Other Acceptance E,D NR   04/25/17 1442 Active    Acceptance E NR  HA 04/23/17 1932 Active    Acceptance E,D NR   04/06/17 1523 Active    Acceptance E,D NR   04/05/17 0908 Active               Point: Precautions (Active)    Learning Progress Summary    Learner Readiness Method Response Comment Documented by Status   Patient Acceptance E,D NR   04/25/17 1442 Active    Acceptance E NR  HA 04/23/17 1932 Active    Acceptance E NR,NL   04/22/17 1155 Active    Acceptance E,D NR   04/20/17 1114 Active    Acceptance E VU  AP 04/18/17 0557 Done    Acceptance E NR   04/09/17 1551 Active    Acceptance E NR  EDOUARD 04/07/17 1534 Active    Acceptance E,D NR   04/06/17 1523 Active    Acceptance E,D NR   04/05/17 0908 Active   Family Acceptance E VU  AP 04/18/17 0557 Done   Significant Other Acceptance E,D NR  LS 04/25/17 1442 Active    Acceptance E NR  HA 04/23/17 1932 Active    Acceptance E,D NR   04/06/17 1523 Active    Acceptance E,D NR   04/05/17 0908 Active                      User Key     Initials Effective Dates Name Provider Type  Discipline    EDOUARD 06/19/15 -  Anita Prado, PT Physical Therapist PT    SJ 06/19/15 -  Cinthia Baptiste, PT Physical Therapist PT    LS 06/19/15 -  Lily Juárez, PT Physical Therapist PT    HA 06/16/16 -  Valdemar Levy, RN Registered Nurse Nurse    MILVIA 06/16/16 -  Shahla Sherman, RN Registered Nurse Nurse     09/06/16 -  Mikey Moore, PT Physical Therapist PT                   PT ASSESSMENT (last 72 hours)      PT Evaluation       04/26/17 0850 04/25/17 1700    Rehab Evaluation    Document Type therapy note (daily note)  -MC     Subjective Information no complaints;agree to therapy  -MC     Pain Assessment    Pain Assessment Jay-Kent FACES  -     Pain Score 0  -MC 0  -ST    Post Pain Score 0  -MC     Positioning and Restraints    Pre-Treatment Position in bed  -MC     Post Treatment Position bed  -MC     In Bed supine;call light within reach;encouraged to call for assist;with nsg  -MC     Restraints soft limb;mitten   did not release  -       04/25/17 1410 04/25/17 1200    Rehab Evaluation    Document Type therapy note (daily note)  -LS     Subjective Information agree to therapy  -LS     Patient Effort, Rehab Treatment adequate  -LS     General Information    Precautions/Limitations fall precautions;oxygen therapy device and L/min  -LS     Vital Signs    Pre Systolic BP Rehab 113  -LS     Pre Treatment Diastolic BP 77  -LS     Post Systolic BP Rehab 115  -LS     Post Treatment Diastolic BP 75  -LS     Pretreatment Heart Rate (beats/min) 86  -LS     Posttreatment Heart Rate (beats/min) 84  -LS     Pre SpO2 (%) 90  -LS     O2 Delivery Pre Treatment supplemental O2   bi-pap mask  -LS     Post SpO2 (%) 100  -LS     O2 Delivery Post Treatment supplemental O2  -LS     Pre Patient Position Supine  -LS     Intra Patient Position Sitting  -LS     Post Patient Position Supine  -LS     Pain Assessment    Pain Assessment Jay-Kent FACES  -LS     Jay-Kent FACES Pain Rating 0  -LS     Pain Score 0  -LS 2   -ST    Post Pain Score 0  -LS     Cognitive Assessment/Intervention    Current Cognitive/Communication Assessment impaired  -LS     Orientation Status oriented to;person   responds to name  -LS     Follows Commands/Answers Questions able to follow single-step instructions;needs cueing;needs increased time;needs repetition   less than 10%  -     Personal Safety severe impairment;decreased awareness, need for assist;decreased awareness, need for safety;decreased insight to deficits  -     Personal Safety Interventions fall prevention program maintained;nonskid shoes/slippers when out of bed;supervised activity  -LS     Bed Mobility, Assessment/Treatment    Bed Mobility, Assistive Device head of bed elevated;draw sheet  -     Bed Mobility, Scoot/Bridge, [place] dependent (less than 25% patient effort);2 person assist required;verbal cues required  -LS     Bed Mob, Supine to Sit, [place] maximum assist (25% patient effort);2 person assist required;verbal cues required  -LS     Bed Mob, Sit to Supine, [place] maximum assist (25% patient effort);2 person assist required;verbal cues required  -     Bed Mobility, Safety Issues cognitive deficits limit understanding;decreased use of arms for pushing/pulling;decreased use of legs for bridging/pushing  -     Transfer Assessment/Treatment    Transfers, Bed-Chair [place] not appropriate to assess   Rn requested back to bed for safety.   -LS     Gait Assessment/Treatment    Gait, [place] Level not appropriate to assess  -LS     Motor Skills/Interventions    Additional Documentation Balance Skills Training (Group)  -LS     Balance Skills Training    Sitting-Level of Assistance Moderate assistance   progressed to min A  -LS     Sitting-Balance Support Feet supported  -     Sitting-Balance Activities Trunk control activities  -     Sitting # of Minutes 12  -LS     Therapy Exercises    Bilateral Lower Extremities PROM:;10 reps;supine;ankle  pumps/circles;hip abduction/adduction;heel slides  -LS     Bilateral Upper Extremity AAROM:;10 reps;elbow flexion/extension;shoulder abduction/adduction;shoulder extension/flexion  -LS     Positioning and Restraints    Pre-Treatment Position in bed  -LS     Post Treatment Position bed  -LS     In Bed notified nsg;supine;call light within reach;encouraged to call for assist;with family/caregiver;with other staff;RUE elevated;LUE elevated   palliative APRN present  -     Restraints released:;reapplied:;notified nsg:;mitten  -LS       04/25/17 1030 04/25/17 0915    Rehab Evaluation    Document Type therapy note (daily note)  - therapy note (daily note)  -    Subjective Information no complaints;agree to therapy  -SG agree to therapy;no complaints  -    Patient Effort, Rehab Treatment fair  -SG     Symptoms Noted During/After Treatment none  -SG     Pain Assessment    Pain Assessment Jay-Kent FACES  -SG Jay-Baker FACES  -    Jay-Baker FACES Pain Rating 0  -SG 0  -MF    Pain Score 0  -SG     Post Pain Score 0  -SG     Positioning and Restraints    Pre-Treatment Position  in bed  -    Post Treatment Position  bed  -MF    In Bed  supine;notified nsg  -      04/25/17 0600 04/25/17 0400    Sensory Assessment/Intervention    Light Touch --   NAVDEEP  -CW --   NAVDEEP  -CW      04/25/17 0200 04/25/17 0000    Sensory Assessment/Intervention    Light Touch --   NAVDEEP  -CW --   NAVDEEP  -CW      04/24/17 2200 04/24/17 2000    Sensory Assessment/Intervention    Light Touch --   NAVDEEP  -CW --   NAVDEEP  -CW      04/24/17 1540 04/24/17 0850    Rehab Evaluation    Document Type  therapy note (daily note)  -    Subjective Information  agree to therapy;complains of;weakness;fatigue  -    Pain Assessment    Pain Assessment  Jay-Kent FACES  -    Jay-Baker FACES Pain Rating  0  -MF    Pain Score 0  -ST     Pain Intervention(s)  Repositioned  -    Cognitive Assessment/Intervention    Current Cognitive/Communication Assessment   impaired  -MF    Orientation Status  disoriented x 4  -MF    Positioning and Restraints    Pre-Treatment Position  in bed  -MF    Post Treatment Position  bed  -MF    In Bed  supine;call light within reach;notified nsg  -      04/24/17 0000 04/23/17 1500    Pain Assessment    Pain Score  0  -LD    Sensory Assessment/Intervention    Light Touch --   NAVDEEP  -CW       User Key  (r) = Recorded By, (t) = Taken By, (c) = Cosigned By    Initials Name Provider Type     Pee Roldan, PT Physical Therapist    EFFIE Sultana, MS CCC-SLP Speech and Language Pathologist    LS Lily Juárez, PT Physical Therapist    ST Erica Olivares, RN Registered Nurse    DUNCAN Mcgarry, PT Physical Therapist    LD Lily Iyer, RN Registered Nurse    ERIN Cheek, RN Registered Nurse            PT Recommendation and Plan  Anticipated Discharge Disposition: skilled nursing facility  PT Frequency: daily    Plan Of Care Reviewed With: patient   Progress: progress toward functional goals as expected   Outcome Summary/Follow up Plan: Scrotal wound continues to be covered in mostly red/pink granulation tissue, with some adherent yellow slough remaining. One of the previously open tunnels appears to be closing well. Pt not likely to need pulse lavage again, but we will keep it as a treatment option for now.           Outcome Measures       04/25/17 1410          How much help from another person do you currently need...    Turning from your back to your side while in flat bed without using bedrails? 2  -LS      Moving from lying on back to sitting on the side of a flat bed without bedrails? 2  -LS      Moving to and from a bed to a chair (including a wheelchair)? 1  -LS      Standing up from a chair using your arms (e.g., wheelchair, bedside chair)? 1  -LS      Climbing 3-5 steps with a railing? 1  -LS      To walk in hospital room? 1  -LS      AM-PAC 6 Clicks Score 8  -LS      Functional Assessment     Outcome Measure Options AM-PAC 6 Clicks Basic Mobility (PT)  -        User Key  (r) = Recorded By, (t) = Taken By, (c) = Cosigned By    Initials Name Provider Type     Lily Juárez, PT Physical Therapist              Time Calculation        PT Charges       04/26/17 0850          Time Calculation    Start Time 0850  -      PT Goal Re-Cert Due Date 04/30/17  -        User Key  (r) = Recorded By, (t) = Taken By, (c) = Cosigned By    Initials Name Provider Type     Rekha Mcgarry, PT Physical Therapist             Therapy Charges for Today     Code Description Service Date Service Provider Modifiers Qty    [id number] HC ROSEANNA DEBRIDE OPEN WOUND UP TO 20CM 4/26/2017 Rekha Mcgarry, PT GP 1            PT G-Codes  Outcome Measure Options: AM-PAC 6 Clicks Basic Mobility (PT)        Rekha Mcgarry, PT  4/26/2017

## 2017-04-26 NOTE — PLAN OF CARE
Problem: Patient Care Overview (Adult)  Goal: Plan of Care Review  Outcome: Ongoing (interventions implemented as appropriate)    04/26/17 0850   Coping/Psychosocial Response Interventions   Plan Of Care Reviewed With patient   Patient Care Overview   Progress progress toward functional goals as expected   Outcome Evaluation   Outcome Summary/Follow up Plan Scrotal wound continues to be covered in mostly red/pink granulation tissue, with some adherent yellow slough remaining. One of the previously open tunnels appears to be closing well. Pt not likely to need pulse lavage again, but we will keep it as a treatment option for now.          Problem: Inpatient Physical Therapy  Goal: Wound Care Goal 1 LTG- PT  Outcome: Ongoing (interventions implemented as appropriate)    04/06/17 1025 04/26/17 0850   Wound Care PT LTG   Wound Care PT LTG 1, Date Established 04/06/17 --    Wound Care PT LTG 1, Time to Achieve 1 wk --    Wound Care PT LTG 1, Location Scrotum --    Wound Care PT LTG 1, No S&S of Infection yes --    Wound Care PT LTG 1, Decrease Wound Size 10% --    Wound Care PT LTG 1, Decrease Necrotic Tissue 50% --    Wound Care PT LTG 1, Decrease Exudate minimum --    Wound Care PT LTG 1, No New Skin Break Down yes --    Wound Care PT LTG 1, Education dressing changes;wound care --    Wound Care PT LTG 1, Education Understanding verbalize understanding --    Wound Care PT LTG 1, Outcome --  goal partially met

## 2017-04-26 NOTE — PROGRESS NOTES
"Palliative Care Progress Note    Date of Admission: 4/4/2017    Subjective: patient sitting on edge of bed with therapy.   Lies back in bed with increase work of breathing.   Denies dyspnea, pain, nausea. + anxiety. Restlessness when unrestrained wrists, pulling at lines.   Reviewed scheduled and prn medications.     bisacodyl  •  dextrose  •  dextrose  •  glucagon (human recombinant)  •  ipratropium-albuterol  •  magnesium sulfate **OR** magnesium sulfate **OR** magnesium sulfate  •  potassium chloride  •  potassium chloride  •  [DISCONTINUED] potassium chloride **OR** [DISCONTINUED] potassium chloride **OR** potassium chloride  •  sodium chloride    Objective:  PPS 30%   /66  Pulse 82  Temp 97.6 °F (36.4 °C) (Axillary)   Resp 20  Ht 69\" (175.3 cm)  Wt 262 lb (119 kg)  SpO2 100%  BMI 38.69 kg/m2   Intake & Output (last day)       04/25 0701 - 04/26 0700 04/26 0701 - 04/27 0700    Other 1272 280    NG/GT 1267 248    Irrigation 1159     Total Intake(mL/kg) 3698 (31.1) 528 (4.4)    Urine (mL/kg/hr) 4725 (1.7) 750 (0.7)    Other      Total Output 4725 750    Net -1027 -222              Lab Results (last 24 hours)     Procedure Component Value Units Date/Time    POC Glucose Fingerstick [[id number]]  (Abnormal) Collected:  04/25/17 1754    Specimen:  Blood Updated:  04/25/17 1756     Glucose 237 (H) mg/dL     Narrative:       Meter: [id number] : [id number] Dru Maciel    POC Glucose Fingerstick [[id number]]  (Abnormal) Collected:  04/25/17 2312    Specimen:  Blood Updated:  04/25/17 2314     Glucose 249 (H) mg/dL     Narrative:       Meter: [id number] : [id number] Omega Chen    CBC & Differential [[id number]] Collected:  04/26/17 0430    Specimen:  Blood Updated:  04/26/17 0515    Narrative:       The following orders were created for panel order CBC & Differential.  Procedure                               Abnormality         Status                     ---------                               " -----------         ------                     CBC Auto Differential[[id number]]         Abnormal            Final result                 Please view results for these tests on the individual orders.    CBC Auto Differential [[id number]]  (Abnormal) Collected:  04/26/17 0430    Specimen:  Blood Updated:  04/26/17 0515     WBC 9.94 10*3/mm3      RBC 3.72 (L) 10*6/mm3      Hemoglobin 11.0 (L) g/dL      Hematocrit 36.5 (L) %      MCV 98.1 fL      MCH 29.6 pg      MCHC 30.1 (L) g/dL      RDW 19.0 (H) %      RDW-SD 68.1 (H) fl      MPV 12.3 (H) fL      Platelets 301 10*3/mm3      Neutrophil % 72.1 (H) %      Lymphocyte % 14.5 (L) %      Monocyte % 6.8 %      Eosinophil % 6.3 (H) %      Basophil % 0.2 %      Immature Grans % 0.1 %      Neutrophils, Absolute 7.16 10*3/mm3      Lymphocytes, Absolute 1.44 10*3/mm3      Monocytes, Absolute 0.68 10*3/mm3      Eosinophils, Absolute 0.63 (H) 10*3/mm3      Basophils, Absolute 0.02 10*3/mm3      Immature Grans, Absolute 0.01 10*3/mm3     POC Glucose Fingerstick [[id number]]  (Abnormal) Collected:  04/26/17 0525    Specimen:  Blood Updated:  04/26/17 0528     Glucose 189 (H) mg/dL     Narrative:       Meter: [id number] : [id number] Omega Chen    BNP [[id number]]  (Abnormal) Collected:  04/26/17 0514    Specimen:  Blood Updated:  04/26/17 0600     .0 (H) pg/mL     Comprehensive Metabolic Panel [[id number]]  (Abnormal) Collected:  04/26/17 0430    Specimen:  Blood Updated:  04/26/17 0601     Glucose 186 (H) mg/dL      BUN 41 (H) mg/dL      Creatinine 0.60 mg/dL      Sodium 142 mmol/L      Potassium 3.8 mmol/L      Chloride 108 mmol/L      CO2 28.0 mmol/L      Calcium 10.0 mg/dL      Total Protein 7.0 g/dL      Albumin 3.40 g/dL      ALT (SGPT) 16 U/L      AST (SGOT) 28 U/L      Alkaline Phosphatase 137 (H) U/L      Total Bilirubin 0.4 mg/dL      eGFR Non African Amer 137 mL/min/1.73      Globulin 3.6 gm/dL      A/G Ratio 0.9 (L) g/dL      BUN/Creatinine Ratio 68.3 (H)     Anion  Gap 6.0 mmol/L     Narrative:       [employer] Guidelines    Stage     Description        GFR  1         Normal or High     90+  2         Mild decrease      60-89  3         Moderate decrease  30-59  4         Severe decrease    15-29  5         Kidney failure     <15    Magnesium [[id number]]  (Normal) Collected:  04/26/17 0430    Specimen:  Blood Updated:  04/26/17 0601     Magnesium 2.0 mg/dL     Phosphorus [[id number]]  (Normal) Collected:  04/26/17 0430    Specimen:  Blood Updated:  04/26/17 0601     Phosphorus 3.4 mg/dL     POC Glucose Fingerstick [[id number]]  (Abnormal) Collected:  04/26/17 1204    Specimen:  Blood Updated:  04/26/17 1217     Glucose 212 (H) mg/dL     Narrative:       Meter: [id number] : [id number] Dru Maciel        Imaging Results (last 24 hours)     ** No results found for the last 24 hours. **          Physical Exam:  Gen: NAD, lying in bed  Skin: warm, dry   Eyes: CHRISTIAN, conjunctiva clear and moist   Resp/thorax: even effort, non labored, CTA   CV: RRR   ABD: soft, bowel sounds +, nontender, PEG tube with abd binder  : colbert to bedside drainage with amphotericin infusion  Ext: BLE 2+ edema, no redness   Neuro: awake, answers direct questions, no myoclonus         Results from last 7 days  Lab Units 04/26/17  0430   WBC 10*3/mm3 9.94   HEMOGLOBIN g/dL 11.0*   HEMATOCRIT % 36.5*   PLATELETS 10*3/mm3 301       Results from last 7 days  Lab Units 04/26/17  0430   SODIUM mmol/L 142   POTASSIUM mmol/L 3.8   CHLORIDE mmol/L 108   TOTAL CO2 mmol/L 28.0   BUN mg/dL 41*   CREATININE mg/dL 0.60   CALCIUM mg/dL 10.0   BILIRUBIN mg/dL 0.4   ALK PHOS U/L 137*   ALT (SGPT) U/L 16   AST (SGOT) U/L 28   GLUCOSE mg/dL 186*       Impression: 61 y.o. male with systolic heart failure LVEF 10%, ICM with ICD, acute on chronic respiratory failure with hypoxia, left frontal CVA.     Plan:   Anxiety, restlessness - prn haloperidol    Plan - met with wife around 30 minutes at bedside.    Tried to incorporate patient into conversation about current status. Patient agreed that he was aware that he may be at the end of his life and has been thinking about it.   Wife reported that the patient earlier had commented that he was tired and worn out.   Ready to go home.   Discussed options with recognition that patient may be at the end of his life and making choices with changes for comfort focused plan of care.  Reviewed changes with decrease volume per PEG tube, discontinuance of amphotericin irrigation.   Agreed to talk with hospice inpatient team.     Claudine Barron, APRN  [phone]  04/26/17  4:43 PM

## 2017-04-26 NOTE — PROGRESS NOTES
Adult Nutrition  Assessment/PES    Patient Name:  Tomas Salvador  YOB: 1955  MRN: [id number]  Admit Date:  4/4/2017    Assessment Date:  4/26/2017     Comments: RD follow-up for pt on nutrition support. Pt continues to tolerate tube feeding. Noted palliative helping in POC/GOC decision, hospice consult pending. Will continue to follow.           Reason for Assessment       04/26/17 1101    Reason for Assessment    Reason For Assessment/Visit multidisciplinary rounds;follow up protocol;TF/PN    Time Spent (min) 30    Diagnosis --   per notes this adm    Skin Other (comment)   per PT wound care (4/26)- Scrotal wound continues to be covered in mostly red/pink granulation tissue, with some adherent yellow slough remaining. One of the previously open tunnels appears to be closing well.    Other diagnosis per palliative (4/25)- Discussed options with plan of care and making decisions for no escalation of interventions or further diagnostic workups. Also consideration to transition to inpatient hospice care or going home with hospice care.     Principal Problem:    CVA 4/4/17 with rapid improvement  Active Problems:    Ischemic cardiomyopathy with EF 10% on Echo 4/4/17    ICD in place, implantation 2015.    S/P Sepsis due to  source 3/30/17    Diabetes Mellitus HgbA1c 10.4    Chronic hypoxic respiratory failure on nocturnal o2    CAD s/p CABG 2005, [facility] 2015. Grafts patent except SVG to diagonal    Urinary retention requiring colbert    Hx of PVD (peripheral vascular disease)    Recent E. 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 Indwelling Colbert catheter    Atrial fibrillation but now converted. Still on Eliquis                 Labs/Tests/Procedures/Meds       04/26/17 1103    Labs/Tests/Procedures/Meds    Labs/Tests Review Reviewed    Medication Review Reviewed, pertinent    adjusting insulin     Results from last 7 days  Lab Units 04/26/17  0430   SODIUM mmol/L 142   POTASSIUM mmol/L 3.8   CHLORIDE mmol/L 108   TOTAL CO2 mmol/L 28.0   BUN mg/dL 41*   CREATININE mg/dL 0.60   CALCIUM mg/dL 10.0   BILIRUBIN mg/dL 0.4   ALK PHOS U/L 137*   ALT (SGPT) U/L 16   AST (SGOT) U/L 28   GLUCOSE mg/dL 186*                Nutrition Prescription Ordered       04/26/17 1105    Nutrition Prescription PO    Current PO Diet NPO    Nutrition Prescription EN    Enteral Route PEG    Product Impact Peptide 1.5 tom    TF Delivery Method Continuous    Continuous TF Goal Rate (mL/hr) 60 mL/hr    Continuous TF Current Rate (mL/hr) 60 mL/hr    Continuous TF Goal Volume (mL) 1200 mL    Water flush (mL)  50 mL    Water Flush Frequency Per hour            Evaluation of Received Nutrient/Fluid Intake       04/26/17 1105    Evaluation of Received Nutrient/Fluid Intake    Number of Days Evaluated 1 day    Calorie Intake Evaluation    Enteral Calories (kcal) 1901    Total Calories (kcal) 1901    % Kcal Needs 106    Protein Intake Evaluation    Enteral Protein (gm) 119   =1.2 g/kg actual wt    Total Protein (gm) 119    % Protein Needs 82    Fluid Intake Evaluation    Enteral  Fluid (mL) 976    Free Water Flush Fluid (mL) 1272    Total Fluid Intake (mL) 2248    EN Evaluation    Number of Days EN Intake Evaluated 1 day    EN Average Volume Delivered (mL/day) 1267 mL/day              Problem/Interventions:        Problem 1       04/26/17 1108    Nutrition Diagnoses Problem 1    Problem 1 Needs Alternate Route    Etiology (related to) --   clinical condition/encephalopathy/dysphagia    Signs/Symptoms (evidenced by) NPO                    Intervention Goal       04/26/17 1108    Intervention Goal    General Nutrition support treatment;Palliative Care   palliative following, consult made to hospice pending    TF/PN Maintain TF/PN            Nutrition Intervention       04/26/17 1108    Nutrition Intervention    RD/Tech  Action Follow Tx progress;Care plan reviewd            Nutrition Prescription       04/26/17 1108    Nutrition Prescription EN    Enteral Prescription Continue same protocol            Education/Evaluation       04/26/17 1109    Monitor/Evaluation    Monitor Per protocol;Pertinent labs;TF delivery/tolerance;Skin status;Symptoms   POC/GOC          Electronically signed by:  Celeste Sterling MS RD/LD CNSC  04/26/17 11:09 AM

## 2017-04-26 NOTE — PLAN OF CARE
Problem: Patient Care Overview (Adult)  Goal: Plan of Care Review  Outcome: Ongoing (interventions implemented as appropriate)    04/26/17 1350   Coping/Psychosocial Response Interventions   Plan Of Care Reviewed With patient;spouse   Patient Care Overview   Progress improving   Outcome Evaluation   Outcome Summary/Follow up Plan Patient follow up for bridge of nose abrasion related to BiPap mask, reddened buttocks and low South. Patient's wife at bedside and crying. Bridge of nose assessed-all blanching. Due to patient condition and wife's demeanor, the decision was made not to continue with assessment. All skin integrity measures in place and implemented. WOCN will continue to follow.         Problem: Pressure Ulcer Risk (South Scale) (Adult,Obstetrics,Pediatric)  Goal: Identify Related Risk Factors and Signs and Symptoms  Outcome: Ongoing (interventions implemented as appropriate)    04/26/17 1350   Pressure Ulcer Risk (South Scale)   Related Risk Factors (Pressure Ulcer Risk (South Scale)) cognitive impairment;critical care admission;medical devices;mobility impaired;tissue perfusion altered       Goal: Skin Integrity  Outcome: Ongoing (interventions implemented as appropriate)    04/26/17 1350   Pressure Ulcer Risk (South Scale) (Adult,Obstetrics,Pediatric)   Skin Integrity making progress toward outcome

## 2017-04-26 NOTE — PROGRESS NOTES
INTENSIVIST / PULMONARY FOLLOW UP NOTE     Hospital:  LOS: 22 days   Mr. Tomas Salvador, 61 y.o. male is followed for:   Principal Problem:    CVA 4/4/17 with rapid improvement  Active Problems:    Ischemic cardiomyopathy with EF 10% on Echo 4/4/17    ICD in place, implantation 2015.    S/P Sepsis due to  source 3/30/17    Diabetes Mellitus HgbA1c 10.4    Chronic hypoxic respiratory failure on nocturnal o2    CAD s/p CABG 2005, [facility] 2015. Grafts patent except SVG to diagonal    Urinary retention requiring colbert    Hx of PVD (peripheral vascular disease)    Recent E. 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 Debrided in [place] 3/30/17    Candida glabrata UTI. Indwelling Colbert catheter    Atrial fibrillation but now converted. Still on Eliquis          SUBJECTIVE   Patient resting on nasal cannula, a little more responsive, hemodynamically stable, still requiring frequent suctioning    The patient's relevant past medical, surgical, family, and social history were reviewed    Allergies and medications were reviewed    ROS:  Per subjective, all other systems were reviewed and were negative        OBJECTIVE     Vitals:    04/26/17 1210   BP:    Pulse: 93   Resp:    Temp:    SpO2: 100%     General Appearance:  somnolent, in no acute distress  Eyes:  No scleral icterus or pallor, PERRLA  Ears, Nose, Mouth, Throat:  Atraumatic, oropharynx clear  Neck:  Trachea midline, thyroid normal  Respiratory:  rhonchi to auscultation bilaterally, normal effort  Cardiovascular:  Regular rate and rhythm, no murmurs, no peripheral edema  Gastrointestinal:  Soft, non-tender, non-distended, no hepatosplenomegaly  Skin:  Normal temperature, no rash  Psychiatric:  Somnolent, no agitation  Neuro:  No new focal neurologic deficits observed    Relevant imaging studies and labs from 04/26/17 were reviewed and interpreted by me    Assessment/Plan   IMPRESSION / PLAN      Inpatient Problem List:  61 y.o.male:  Principal Problem:    CVA 4/4/17 with rapid improvement  Active Problems:    Ischemic cardiomyopathy with EF 10% on Echo 4/4/17    ICD in place, implantation 2015.    S/P Sepsis due to  source 3/30/17    Diabetes Mellitus HgbA1c 10.4    Chronic hypoxic respiratory failure on nocturnal o2    CAD s/p CABG 2005, [facility] 2015. Grafts patent except SVG to diagonal    Urinary retention requiring colbert    Hx of PVD (peripheral vascular disease)    Recent E. 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 Debrided in [place] 3/30/17    Candida glabrata UTI. Indwelling Colbert catheter    Atrial fibrillation but now converted. Still on Eliquis       Impression:  61 y.o.male with relevant PMH of CAD, ICM EF 10%, Prior CABG, PVD, HTN, HLD, T2DM, COPD, Home O2 admitted originally to [facility] 3/30 w/ a scrotal lesion that turned out to be Delisa's gangrene which was drained by Urology, transferred to [facility] 4/4 for Left Frontal CVA    Plan:  -Encephalopathy / Stroke vs TIA - probably from afib, now on Elliquis.  Change seroquel to hs only, avoid day time sedatives.    -ICM EF 10% / Afib - changed asa to 81, won't be able to tolerate betablocker with EF that low.  D/C'd coreg, started low dose hydralazine/nitrate, titrating up as tolerated.  Started Digoxin.  Diuresis w/ bumex today.  Keep as dry as possible.    -Acute on Chronic Respiratory Failure - requiring NT suctioning every couple hours, continue nebs, etc.  Bipap prn.  He is a Do not Intubate.    -Delisa's Gangrene / Candiduria / UTI - Abx per ID    -T2DM (A1c 10.4) - Levemir 40, correction scale    -Tube Feeds    -Elliquis/PPI    -Poor Prognosis, Palliative Following. I had a long discussion with patients wife 4/26.  He would not want to be a nursing home patient.  I think his likelihood of achieving functional independence is extremely low.  With his EF of 10%,  Respiratory failure requiring q1-2 hour suctioning, skeletal muscle atrophy/weakness, and delirium I think inpatient hospice would be the most appropriate thing.  She seems agreeable to this but has not let me know her final decision.    -Keep in ICU for now mainly for frequent NT suctioning    Plan of care and goals reviewed with mulitdisciplinary team at daily rounds    Critical care time: 30 minutes       Artemio Patel MD  Intensive Care Medicine  04/26/17 12:17 PM

## 2017-04-26 NOTE — PROGRESS NOTES
"Continued Stay Note  [facility]     Patient Name: Tomas Salvador  MRN: [id number]  Today's Date: 4/26/2017    Admit Date: 4/4/2017          Discharge Plan       04/26/17 1447    Case Management/Social Work Plan    Plan Undetermined    Additional Comments Hospice consult received.  Chart reviewed.  Met with pt and wife.  Wife very tearful.  Lengthy conversation had regarding inpatient hospice services.  Wife needs time to make a decision.  She is very concerned about him \"starving to death\" if tube feeding is stopped.  Spoke with hospice APRN, Alissa Ansari who is going to visit with wife and continue discussion.  , Yvette aware that hospice is following.  Will continue to follow with hospice support and hospice services if elected.  If hospice RN/CM can be of further assist, call ext [phone].               Discharge Codes     None        Expected Discharge Date and Time     Expected Discharge Date Expected Discharge Time    Apr 20, 2017             Nohemy Mcneill RN    "

## 2017-04-26 NOTE — DISCHARGE PLACEMENT REQUEST
"Tomas Gtz (61 y.o. Male)     Date of Birth Social Security Number Address Home Phone MRN    1955  [address]   [place] [address] [phone] [id number]    [employer] Marital Status          Episcopal        Admission Date Admission Type Admitting Provider Attending Provider Department, Room/Bed    4/4/17 Elective Artemio Patel MD Thompson, Patton Weddington, MD [facility] 2B ICU, N226/1    Discharge Date Discharge Disposition Discharge Destination                      Attending Provider: Artemio Patel MD     Allergies:  No Known Allergies    Isolation:  None   Infection:  None   Code Status:  Conditional    Ht:  69\" (175.3 cm)   Wt:  262 lb (119 kg)    Admission Cmt:  None   Principal Problem:  CVA 4/4/17 with rapid improvement [I63.9]                 Active Insurance as of 4/4/2017     Primary Coverage     Payor Plan Insurance Group Employer/Plan Group    ANTHEM BLUE CROSS ANTHEM BLUE CROSS BLUE SHIELD PPO [id number]     Payor Plan Address Payor Plan Phone Number Effective From Effective To    [address] [phone] 11/1/2015     [place] [address]       Subscriber Name Subscriber Birth Date Member ID       SANJUANA GTZ 1955 [id number]                 Emergency Contacts      (Rel.) Home Phone Work Phone Mobile Phone    Sanjuana Gtz [phone] -- --    Mirta Adam (Son) -- [phone] --    Prabhu Peter (Son) [phone] --            Emergency Contact Information     Name Relation Home Work Mobile    Sanjuana Gtz  [phone]      Mirta Adam Son  [phone]     Prabhu Peter Son [phone]           Insurance Information                ANTHEM BLUE CROSS/[employer]HEM BLUE CROSS BLUE SHIELD PPO Phone: [phone]    Subscriber: Sanjuana Gtz Subscriber#: [id number]    Group#: [id number] Precert#:              History & Physical      Ella Pope MD at 4/4/2017 " " 2:45 PM                                                                   Critical Care History & Physical    Patient name: Tomas Salvador  CSN: [id number]  MRN: [id number]  : 1955  Today's date: 2017    Primary Care Physician: Jose Navarro MD    Chief complaint: Left sided facial droop, left sided paralysis and aphasia    HPI: Mr. Salvador is a 60 y/o WM who was transferred to [facility] from [facility] for a stroke.  He is confused and the history is from his chart. There is no family at bedside.     He was admitted to [facility] 3/30/17 from the Urologist's office with a scrotal lesion.  Apparently, he went to the ED one month PTA (17) with urinary retention.  He had a colbert placed and discharged home.  He also had an E. Coli UTI and treated with Ceftin x 10 days.  He saw his PCP who referred him to Urology.  He saw Dr. Johnson on 3/30/17.  His wife stated he had some scrotal swelling and firmness with black discoloration at the bottom of the scrotum.  Per note, it measured 3-4\" x 2\" with necrotic edges and whitish discoloration within the lesion. It was malodorous with mild weeping. The scrotum above the lesion was felt to have Epididymo-orchitis.  Reportedly, he has had a 100 lb weight loss in a few months. However wt 11/15 was 240lbs, and 3/30/17 was 237lbs.    In the ED, the Urologist debrided the necrotic scrotal tissue and drained an abscess.  The wound culture + E. Coli 3/31/17.  He had a + E. Coli urine 3/30/17. He was being treated with Merrem, Clindamycin and Vancomycin. Yesterday, he had an episode of CP, but EKG and cardiac enzymes were negative.  Today, he became aphasic with left sided facial droop and left sided paralysis.  His NIH score was 22. CT head revealed subacute left sided frontal infarct. He was not a tpa candidate because of recent surgery. .  He was transferred to [facility] for possible intervention.  On arrival to [facility], he was alert but confused and occasionally follows commands.  He " moves all extremities spontaneously. His CTA of the head revealed no large vessel occlusion.  He was deemed not a candidate for intervention.   He will be admitted into the ICU per Intensivist service.     PMH:   Past Medical History:   Diagnosis Date   • CAD (coronary artery disease)    • Chronic back pain    • Chronic respiratory failure with hypoxia     Night time only at 2 liters/min   • Diabetes mellitus, type 2    • Essential hypertension    • Hyperlipidemia    • Ischemic cardiomyopathy    • NSTEMI (non-ST elevated myocardial infarction) 2015   • Obesity    • PVD (peripheral vascular disease)    • Systolic CHF     EF <20%  3/8/17   • Urinary retention      PSH:   Past Surgical History:   Procedure Laterality Date   • CARDIAC CATHETERIZATION  05/2015    Severe multivessel coronary artery disease involving a totally occluded LAD, 99% proximal left circumflex artery; totally occluded saphenous vein graft to the diagonal; patent LIMA to the LAD, and a patent saphenous vein graft to the first obtuse marginal.      • CARDIAC DEFIBRILLATOR PLACEMENT  2015   • CORONARY ARTERY BYPASS GRAFT  2005    3 vessel   • CYSTOSCOPY N/A 3/31/2017    Procedure: CYSTOSCOPY and scrotal exploration with debridement of necrotizing fasciitis;  Surgeon: Mele Johnson MD;  Location: [facility];  Service:    • LEG SURGERY Right     unknown, pos vascular bypass   • SCROTAL EXPLORATION N/A 3/31/2017    Procedure: SCROTAL EXPLORATION;  Surgeon: Mele Johnson MD;  Location: [facility] OR;  Service:      PFH:   Family History   Problem Relation Age of Onset   • Diabetes Mother    • Hypertension Mother    • Coronary artery disease Mother      MI in her 40's   • Cancer Father      Throat   • Hypertension Father    • Hypertension Sister    • Hypertension Brother    • Hypertension Maternal Grandmother    • Hypertension Maternal Grandfather      SH:   Social History     Social History   • Marital status:      Spouse name: N/A    • Number of children: N/A   • Years of education: N/A     Social History Main Topics   • Smoking status: Former Smoker     Packs/day: 1.50     Years: 43.00     Quit date: 2002   • Smokeless tobacco: Current User   • Alcohol use No   • Drug use: No   • Sexual activity: Defer     Other Topics Concern   • None     Social History Narrative     Allergies:   No Known Allergies    Code Status:  Full Code    Home Medications:  Prescriptions Prior to Admission   Medication Sig Dispense Refill Last Dose   • aspirin 81 MG EC tablet Take 81 mg by mouth Daily. On hold for surgery   Unknown at Unknown time   • atorvastatin (LIPITOR) 80 MG tablet Take 1 tablet by mouth Daily. 30 tablet 3 3/30/2017 at 1000   • carvedilol (COREG) 6.25 MG tablet Take 1 tablet by mouth 2 (Two) Times a Day With Meals. 60 tablet 3 3/30/2017 at 1000   • clindamycin (CLEOCIN) 600 MG/50ML IVPB Infuse 50 mL into a venous catheter Every 8 (Eight) Hours. Indications: Infection of Blood or Tissues affecting the Whole Body  0    • clopidogrel (PLAVIX) 75 MG tablet Take 1 tablet by mouth Daily. 30 tablet 3 3/30/2017 at 1000   • gabapentin (NEURONTIN) 300 MG capsule Take 1 capsule by mouth 2 (two) times a day. 60 capsule 3 3/30/2017 at 1000   • Heparin Sodium, Porcine, (HEPARIN, PORCINE,) 5000 UNIT/ML injection Inject 1 mL under the skin Every 12 (Twelve) Hours.      • insulin aspart (novoLOG) 100 UNIT/ML injection Inject 0-9 Units under the skin 4 (Four) Times a Day Before Meals & at Bedtime.      • insulin detemir (LEVEMIR) 100 UNIT/ML injection Inject 10 Units under the skin Every Night.      • meropenem 2 g in sodium chloride 0.9 % 100 mL IVPB Infuse 2 g into a venous catheter Every 8 (Eight) Hours. Indications: Infection of Blood or Tissues affecting the Whole Body  0    • morphine 2 MG/ML injection Infuse 1 mL into a venous catheter Every 4 (Four) Hours As Needed for Severe Pain (7-10).  0    • nitroglycerin (NITROLINGUAL) 0.4 MG/SPRAY spray Place 1 spray  under the tongue every 5 (five) minutes as needed for chest pain.   Unknown at Unknown time   • nystatin (MYCOSTATIN) 105205 UNIT/GM ointment Apply  topically 2 (Two) Times a Day As Needed (yeast on skin).  0    • omeprazole (PriLOSEC) 20 MG capsule Take 20 mg by mouth Daily As Needed (acid reflux).   Unknown at Unknown time   • rOPINIRole (REQUIP) 0.25 MG tablet Take 0.25 mg by mouth Every Night.   3/29/2017 at Unknown time   • vancomycin 1,500 mg in sodium chloride 0.9 % 500 mL IVPB Infuse 1,500 mg into a venous catheter Every 12 (Twelve) Hours. Indications: Infection of Blood or Tissues affecting the Whole Body  0      Review of Systems  Negative systems except for what is noted in HPI     Vital Signs:  Blood pressure 142/87, pulse 108, temperature 98.6 °F (37 °C), temperature source Axillary, resp. rate 20, SpO2 100 %.    Physical Exam:  Constitutional: Old appearing 61-year-old gentleman, obese, no distress  HEENT:  Left facial droop. Pupils are equal and reactive to light. Oral pharynx without exudate.  Neck:   Trachea midline, no JVD, no adenopathy. Left subclavian pacemaker  CV: Distant heart sounds, regular, no gallop, PMI displaced laterally  Pulmonary/Chest:   symmetric chest excursion. Healed sternotomy scar. Upper left sternal rubbery mass.  Breath sounds are bilateral, equal, no rails  Abdominal:  bowel sounds are present, soft, nontender    ST or scrotal and perineal open incision with several surgical drains. Scrotal attacks are indurated, swollen, red and uncircumcised  male genitalia with Cueto catheter in place   Musculoskeletal: generally poor musculature.  Weak pulses left foot, no palpable pulses right foot  Neurological: Oriented to name and hospital but not date or city. Able to follow commands. No motor drift for me. Some dysarthria but speech is understandable. Answers simple questions.  Skin: warm and dry  Extremities:  No edema or clubbing Weak pulses left foot, no palpable pulses right  foot  Psychiatric: confused     Labs:    Results from last 7 days  Lab Units 04/04/17  0115   WBC 10*3/mm3 9.68   HEMOGLOBIN g/dL 10.6*   HEMATOCRIT % 35.4*   PLATELETS 10*3/mm3 380       Results from last 7 days  Lab Units 04/04/17  0115   SODIUM mmol/L 136   POTASSIUM mmol/L 4.0   CHLORIDE mmol/L 105   TOTAL CO2 mmol/L 24.8   BUN mg/dL 6*   CREATININE mg/dL 0.53   CALCIUM mg/dL 8.3   BILIRUBIN mg/dL 0.2   ALK PHOS U/L 69   ALT (SGPT) U/L 12   AST (SGOT) U/L 24   GLUCOSE mg/dL 130*     Magnesium   Date Value Ref Range Status   04/02/2017 1.8 1.7 - 2.6 mg/dL Final     Phosphorus   Date Value Ref Range Status   04/04/2017 1.6 (L) 2.7 - 4.5 mg/dL Final   04/02/2017 2.2 (L) 2.7 - 4.5 mg/dL Final     Interval:  (arrival to ICU)  1a. Level Of Consciousness: 0-->Alert: keenly responsive  1b. LOC Questions: 1-->Answers one question correctly  1c. LOC Commands: 0-->Performs both tasks correctly  2. Best Gaze: 1-->Partial gaze palsy: gaze is abnormal in one or both eyes, but forced deviation or total gaze paresis is not present  3. Visual: 1-->Partial hemianopia  4. Facial Palsy: 2-->Partial paralysis (total or near-total paralysis of lower face)  5a. Motor Arm, Left: 1-->Drift: limb holds 90 (or 45) degrees, but drifts down before full 10 seconds: does not hit bed or other support  5b. Motor Arm, Right: 2-->Some effort against gravity: limb cannot get to or maintain (if cued) 90 (or 45) degrees, drifts down to bed, but has some effort against gravity  6a. Motor Leg, Left: 2-->Some effort against gravity: leg falls to bed by 5 secs, but has some effort against gravity  6b. Motor Leg, Right: 2-->Some effort against gravity: leg falls to bed by 5 secs, but has some effort against gravity  7. Limb Ataxia: 0-->Absent  8. Sensory: 1-->Mild-to-moderate sensory loss: patient feels pinprick is less sharp or is dull on the affected side: or there is a loss of superficial pain with pinprick, but patient is aware of being touched  9.  Best Language: 1-->Mild-to-moderate aphasia: some obvious loss of fluency or facility of comprehension, without significant limitation on ideas expressed or form of expression. Reduction of speech and/or comprehension, however, makes conversation. . . (see row details)  10. Dysarthria: 1-->Mild-to-moderate dysarthria: patient slurs at least some words and, at worst, can be understood with some difficulty  11. Extinction and Inattention (formerly Neglect): 1-->Visual, tactile, auditory, spatial, or personal inattention or extinction to bilateral simultaneous stimulation in one of the sensory modalities    Total (NIH Stroke Scale): 16    Imaging:    ABG:     Results from last 7 days  Lab Units 04/04/17  1122   PH, ARTERIAL pH units 7.510*   PO2 ART mm Hg 109.6*   PCO2, ARTERIAL mm Hg 31.9*   HCO3 ART mmol/L 24.9     Assessment:  Hospital Problem List     * (Principal)CVA (cerebral vascular accident)    Delisa's gangrene    Overview Signed 4/4/2017  3:55 PM by TRESA Baca     + E. Coli 3/31/17         Diabetes mellitus, type 2    Overview Signed 4/4/2017  2:43 PM by TRESA Baca     3/30/17 Hgb A1c 10.4         Chronic respiratory failure with hypoxia    Overview Signed 4/4/2017  2:40 PM by TRESA Baca     Night time only at 2 liters/min         Urinary retention    Hyperlipidemia    Essential hypertension    E. coli UTI (urinary tract infection)    Epididymo-orchitis        61-year-old gentleman chronically ill with ischemic cardiomyopathy, AICD, diabetes mellitus, hypertension, peripheral artery disease who was hospitalized in [place] on March 30 with a scrotal infection requiring incision and drainage. He had a preceding urinary tract infection from urinary retention and had an indwelling Cueto. Operative cultures were positive for Escherichia coli.  He was covered broadly with Vanco, Merrem, Clinda.  He is now transferred to [facility] for acute neurologic  changes. CT scan and CT perfusion did not reveal any acute stroke. Clinically his symptoms have resolved except for some confusion. It does not appear clinically that he has had a significant ischemic stroke. He certainly has a significant paranasal and scrotal wound that will need aggressive surgical and antibiotics treatment. He has terrible cardiac function with an EF of less than 20%. Happily he has no overt signs of florid failure. He has a remote history of smoking and used just as needed inhalers and oxygen at night at baseline. We will have urology, cardiology, infectious disease way in. If he needs further surgical debridement I suspect I will have to transfer him to [facility]    Plan:   ICU admission  Will start Ancef (E. Coli was susceptible)  Ischemic CVA pathway   IVF only KVO  Consult Cardiology, ID and Urology  BG control  BP control  neurochecks  ASA, statin, plavix  WOC   PPI  florastor  Restart coreg  Consider entresto  CXR  ECHO  Carotid duplex  May require a PICC line    TRESA Noel, AGACNP-BC, FNP-BC  Pulmonary & Critical Care Medicine  04/04/17 4:47 PM    I reviewed the above note and [facility] records, interviewed the patient, did an independent physical examination, reviewed laboratory data, cultures, x-rays and formulated the assessment and plan.   Ella Pope MD      Time: 65min                                                   *Please note that portions of this note were completed with a voice recognition program. Efforts were made to edit the dictations, but occasionally words are mistranscribed.     Electronically signed by Ella Pope MD at 4/4/2017  5:37 PM

## 2017-04-26 NOTE — PLAN OF CARE
Problem: Patient Care Overview (Adult)  Goal: Plan of Care Review  Outcome: Ongoing (interventions implemented as appropriate)    04/26/17 1110   Coping/Psychosocial Response Interventions   Plan Of Care Reviewed With patient   Outcome Evaluation   Outcome Summary/Follow up Plan Pt improved with bed mobility and static sitting balance this date. Unable to clear hips from EOB to stand this date. Recommend continued skilled OT services to assist in returning pt to his PLOF.          Problem: Stroke (Ischemic) (Adult)  Goal: Signs and Symptoms of Listed Potential Problems Will be Absent or Manageable (Stroke)  Outcome: Ongoing (interventions implemented as appropriate)    04/26/17 1110   Stroke (Ischemic)   Problems Assessed (Stroke (Ischemic)/TIA) cognitive impairment;communication impairment;motor/sensory impairment   Problems Present (Stroke (Ischemic)/TIA) cognitive impairment;communication impairment;motor/sensory impairment         Problem: Inpatient Occupational Therapy  Goal: Bed Mobility Goal LTG- OT  Outcome: Ongoing (interventions implemented as appropriate)    04/11/17 1343 04/20/17 1113 04/26/17 1110   Bed Mobility OT LTG   Bed Mobility OT LTG, Date Established --  04/20/17 --    Bed Mobility OT LTG, Time to Achieve --  1 wk --    Bed Mobility OT LTG, Activity Type --  all bed mobility --    Bed Mobility OT LTG, [place] Level --  moderate assist (50% patient effort);2 person assist required --    Bed Mobility OT LTG, Outcome --  --  goal ongoing   Bed Mobility OT LTG, Reason Goal Not Met medical status inhibits participation --  --        Goal: Transfer Training Goal 1 LTG- OT  Outcome: Ongoing (interventions implemented as appropriate)    04/11/17 1343 04/20/17 1113 04/26/17 1110   Transfer Training OT LTG   Transfer Training OT LTG, Date Established --  04/20/17 --    Transfer Training OT LTG, Time to Achieve --  1 wk --    Transfer Training OT LTG, Activity Type --  all transfers --    Transfer  Training OT LTG, [place] Level --  moderate assist (50% patient effort);2 person assist required --    Transfer Training OT LTG, Outcome --  --  goal ongoing   Transfer Training OT LTG, Reason Goal Not Met medical status inhibits participation --  --        Goal: Orientation Goal LTG- OT  Outcome: Ongoing (interventions implemented as appropriate)    04/11/17 1343 04/20/17 1113 04/26/17 1110   Orientation OT LTG   Orientation OT LTG, Date Established --  04/20/17 --    Orientation OT LTG, Time to Achieve --  1 wk --    Orientation OT LTG, Activity Type --  person;place;50% treatment session --    Orientation OT LTG, Outcome --  --  goal partially met   Orientation OT LTG, Reason Goal Not Met medical status inhibits participation --  --        Goal: Follow Directions Goal LTG- OT  Outcome: Ongoing (interventions implemented as appropriate)    04/11/17 1343 04/20/17 1113 04/26/17 1110   Follow Directions OT LTG   Follow Directions OT LTG, Date Established --  04/20/17 --    Follow Directions OT LTG, Time to Achieve --  1 wk --    Follow Directions OT LTG, Activity Type --  1-Step;50% treatment session --    Follow Directions OT LTG, [place] Level --  with verbal cues --    Follow Directions OT LTG, Outcome --  --  goal ongoing   Follow Directions OT LTG, Reason Goal Not Met medical status inhibits participation --  --

## 2017-04-26 NOTE — PLAN OF CARE
Problem: Patient Care Overview (Adult)  Goal: Plan of Care Review  Outcome: Ongoing (interventions implemented as appropriate)    04/26/17 1205   Patient Care Overview   Progress declining   Outcome Evaluation   Outcome Summary/Follow up Plan pt continues with dyspnea, anxiety, restlessness. hospice consulted and eval for inpt hospice and comfort plan of care

## 2017-04-27 PROBLEM — I50.22 CHRONIC END-STAGE SYSTOLIC HEART FAILURE (HCC): Status: ACTIVE | Noted: 2017-01-01

## 2017-04-27 NOTE — DISCHARGE SUMMARY
"  Date of Discharge:  4/27/2017    Discharge Diagnosis:   Principal Problem:  CVA 4/4/17   Active Problems:  Ischemic cardiomyopathy with EF 10% on Echo 4/4/17  ICD in place, implantation 2015.  S/P Sepsis due to  source 3/30/17  Diabetes Mellitus HgbA1c 10.4  Chronic hypoxic respiratory failure on nocturnal o2  CAD s/p CABG 2005, C 2015. Grafts patent except SVG to diagonal  Urinary retention requiring colbert  Hx of PVD (peripheral vascular disease)  Recent E. coli UTI now off therapy  Epididymo-orchitis  Encephalopathy acute  Fourniers gangrene with E-Coli Cellulitis. Debrided in [place] 3/30/17  Candida glabrata UTI. Indwelling Colbert catheter  Atrial fibrillation but now converted. Still on Eliquis    Presenting Problem/History of Present Illness  Chronic end-stage systolic heart failure [I50.22]  Chronic end-stage systolic heart failure [I50.22]     Mr. Salvador is a 60 y/o WM who was transferred to [facility] from [facility] for a stroke. He was confused and the history was obtained from his chart. There were no family at bedside.      He was admitted to [facility] 3/30/17 from the Urologist's office with a scrotal lesion. Apparently, he went to the ED one month PTA (2/24/17) with urinary retention. He had a colbert placed and discharged home. He also had an E. Coli UTI and treated with Ceftin x 10 days. He saw his PCP who referred him to Urology. He saw Dr. Johnson on 3/30/17. His wife stated he had some scrotal swelling and firmness with black discoloration at the bottom of the scrotum. Per note, it measured 3-4\" x 2\" with necrotic edges and whitish discoloration within the lesion. It was malodorous with mild weeping. The scrotum above the lesion was felt to have Epididymo-orchitis. Reportedly, he has had a 100 lb weight loss in a few months. However wt 11/15 was 240lbs, and 3/30/17 was 237lbs.  In the ED, the Urologist debrided the necrotic scrotal tissue and drained an abscess. The wound culture + E. Coli 3/31/17. He had a + " E. Coli urine 3/30/17. He was being treated with Merrem, Clindamycin and Vancomycin. He had an episode of CP, but EKG and cardiac enzymes were negative. Today, he became aphasic with left sided facial droop and left sided paralysis. His NIH score was 22. CT head revealed subacute left sided frontal infarct. He was not a tpa candidate because of recent surgery. . He was transferred to [facility] for possible intervention. On arrival to [facility], he was alert but confused and occasionally follows commands. He moves all extremities spontaneously. His CTA of the head revealed no large vessel occlusion. He was deemed not a candidate for intervention. He will be admitted into the ICU per Intensivist service.       Hospital Course  Admitted to ICU as noted above.  He was seen by neuro intervention but unfortunately there was nothing found which would've been amenable to intervention or TPA.  Neurosurgery also noted significantly improved deficits upon arrival from [place].  He was seen by cardiology and an echo was performed showing a severe decrease in ejection fraction of 10%, left ventricular hypokinesis was also noted.  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  Infectious disease was consulted and placed on cefazolin, Flagyl with wound care and debridement to follow as well.  Urology evaluated the gangrene and recommended continued medical therapy rather than surgical debridement secondary to the patient's multiple medical comorbidities.  He remained in the ICU tenuous state.  His mental status did not improve past his initial admission and he remained encephalopathic and required restraints.  Ativan made him too lethargic and he had a poor cough requiring intermittent nasal tracheal suctioning.  He has had difficulty to clear secretions and develops tachypnea and tachycardia with this.  Due to his mental status/encephalopathy the patient has had severe dysphagia with an initial institution of  "TPN as a feeding tube could not be placed.  By April 21 a feeding tube had been placed successfully and he was started on enteral feeds to goal.  He developed atrial fibrillation with rapid ventricular response and received amiodarone which converted to sinus rhythm afterwards.  He was initially placed on heparin but was transitioned to Eliquis for anticoagulation.  Following this he developed hypotension and bradycardia and required dopamine and phenylephrine.  The patient was frequently agitated and required Precedex but due to his episodes of hypotension this was discontinued, and he subsequently improved.  He has had persistent bilateral lower lobe infiltrates as well as pulmonary edema which responds to diuretics.  His Cueto catheter was removed but unfortunately due to the scrotal edema he had worsening urinary retention requiring frequent I/O catheterization.  As this became increasingly difficult a Cueto catheter was replaced and anchored.  Amphotericin B bladder irrigation was then instituted.  By hospital day 21 the patient remained poorly responsive though hemodynamically stable, requiring frequent NT suctioning.  He was on nasal cannula oxygen therapy.  Palliative care was asked to consult to help with medical and of care.  His prognosis, given his encephalopathy, ICM EF of 10% and inability to use beta blocker, acute on chronic respiratory failure requiring NT suctioning every 2 hours, is quite poor.  On 4/27/17 the decision was made by the patient's wife, and children that he \"would not want to be in a nursing home\" and that they desire the patient to be made comfortable and transferred to the care of hospice services.    Procedures Performed  CT angiogram  Echo  Bilateral carotid duplex         Consults:   Consults     Date and Time Order Name Status Description    4/23/2017 1243 Inpatient Consult to Palliative Care MD Completed     4/19/2017 1249 Inpatient Consult to General Surgery Completed     " 4/4/2017 1526 Inpatient Consult to Cardiology Completed     4/4/2017 1526 Inpatient Consult to Infectious Diseases Completed     4/4/2017 1526 Inpatient Consult to Urology Completed     4/4/2017 1419 Inpatient consult to Neurology Completed     3/30/2017 2000 Inpatient Consult to Infectious Diseases Completed           Pertinent Test Results:    Tomas Salvador [id number] Male 1955 (61 y.o.)       Interpretation Summary      · Left ventricular function is severely decreased. Estimated EF appears to be in the range of 10%.  · The left ventricular cavity is severely dilated.  · Global left ventricular wall motion appears abnormal. There is severe diffuse hypokinesis.  · Left ventricular diastolic dysfunction.  · There is no evidence of a left ventricular thrombus present.  · Moderately reduced right ventricular systolic function noted.  · Left atrial cavity size is mildly dilated.  · Mild mitral valve regurgitation is present       Condition on Discharge:   As noted above.  Patient transferred to hospice services and made comfort measures only.    Vital Signs  Temp:  [97.1 °F (36.2 °C)-97.8 °F (36.6 °C)] 97.8 °F (36.6 °C)  Heart Rate:  [] 87  Resp:  [18-24] 22  BP: ()/() 116/78    Physical Exam:  General Appearance: Awake, in no acute distress  Eyes: No scleral icterus or pallor, PERRLA  Ears, Nose, Mouth, Throat: Atraumatic, oropharynx clear  Neck: Trachea midline, thyroid normal  Respiratory: rhonchi to auscultation bilaterally, normal effort  Cardiovascular: Regular rate and rhythm, no murmurs, no peripheral edema  Gastrointestinal: Soft, non-tender, non-distended, no hepatosplenomegaly  Skin: Normal temperature, no rash  Psychiatric: Alert, no agitation  Neuro: No new focal neurologic deficits observed    Discharge Disposition   To hospice.    Discharge Medications   Tomas Salvador   Home Medication Instructions ROEL:[id number]    Printed on:04/27/17 9835   Medication Information                       aspirin 81 MG EC tablet  Take 81 mg by mouth Daily. On hold for surgery             atorvastatin (LIPITOR) 80 MG tablet  Take 80 mg by mouth Daily.             bumetanide (BUMEX) 1 MG tablet  Take 1 mg by mouth Daily.             carvedilol (COREG) 6.25 MG tablet  Take 6.25 mg by mouth 2 (Two) Times a Day With Meals.             clopidogrel (PLAVIX) 75 MG tablet  Take 75 mg by mouth Daily.             hydrocortisone (ANUSOL-HC) 2.5 % rectal cream  Insert 1 application into the rectum 3 (Three) Times a Day.             insulin aspart (novoLOG) 100 UNIT/ML injection  Inject 5 Units under the skin 3 (Three) Times a Day Before Meals.             insulin glargine (LANTUS) 100 UNIT/ML injection  Inject 20 Units under the skin Daily.             isosorbide mononitrate (IMDUR) 60 MG 24 hr tablet  Take 60 mg by mouth Daily.             lidocaine (XYLOCAINE) 2 % jelly  Apply  topically 2 (Two) Times a Day. To affected areas             metFORMIN (GLUCOPHAGE) 500 MG tablet  Take 500 mg by mouth 2 (Two) Times a Day With Meals.             nitroglycerin (NITROLINGUAL) 0.4 MG/SPRAY spray  Place 1 spray under the tongue every 5 (five) minutes as needed for chest pain.             nystatin (MYCOSTATIN) 128022 UNIT/GM ointment  Apply  topically 2 (Two) Times a Day As Needed (yeast on skin).             omeprazole (PriLOSEC) 20 MG capsule  Take 20 mg by mouth Daily As Needed (acid reflux).             rOPINIRole (REQUIP) 0.25 MG tablet  Take 0.25 mg by mouth Every Night.             sacubitril-valsartan (ENTRESTO) 24-26 MG tablet  Take 1 tablet by mouth 2 (Two) Times a Day.             spironolactone (ALDACTONE) 25 MG tablet  Take 25 mg by mouth Daily.                 Discharge Diet:   Tube feeds decreased  Activity at Discharge:   Bedrest  Follow-up Appointments  Future Appointments  Date Time Provider Department Center   7/7/2017 1:15 PM Jayson Quezada MD [facility] None         Test Results Pending at  Discharge   none     RTESA Jauregui  Pulmonary and Critical Care Medicine  04/27/17  4:58 PM    Time: Discharge 30 min

## 2017-04-27 NOTE — PROGRESS NOTES
INTENSIVIST / PULMONARY FOLLOW UP NOTE     Hospital:  LOS: 23 days   Mr. Tomas Salvador, 61 y.o. male is followed for:   Principal Problem:    CVA 4/4/17   Active Problems:    Ischemic cardiomyopathy with EF 10% on Echo 4/4/17    ICD in place, implantation 2015.    S/P Sepsis due to  source 3/30/17    Diabetes Mellitus HgbA1c 10.4    Chronic hypoxic respiratory failure on nocturnal o2    CAD s/p CABG 2005, LHC 2015. Grafts patent except SVG to diagonal    Urinary retention requiring colbert    Hx of PVD (peripheral vascular disease)    Recent E. 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 Debrided in [place] 3/30/17    Candida glabrata UTI. Indwelling Colbert catheter    Atrial fibrillation but now converted. Still on Eliquis          SUBJECTIVE   Patient resting on nasal cannula,hemodynamically stable, still requiring frequent suctioning    The patient's relevant past medical, surgical, family, and social history were reviewed    Allergies and medications were reviewed    ROS:  Per subjective, all other systems were reviewed and were negative        OBJECTIVE     Vitals:    04/27/17 1209   BP:    Pulse: 92   Resp:    Temp:    SpO2:      General Appearance:  Awake, in no acute distress  Eyes:  No scleral icterus or pallor, PERRLA  Ears, Nose, Mouth, Throat:  Atraumatic, oropharynx clear  Neck:  Trachea midline, thyroid normal  Respiratory:  rhonchi to auscultation bilaterally, normal effort  Cardiovascular:  Regular rate and rhythm, no murmurs, no peripheral edema  Gastrointestinal:  Soft, non-tender, non-distended, no hepatosplenomegaly  Skin:  Normal temperature, no rash  Psychiatric:  Alert, no agitation  Neuro:  No new focal neurologic deficits observed    Relevant imaging studies and labs from 04/27/17 were reviewed and interpreted by me    Assessment/Plan   IMPRESSION / PLAN     Inpatient Problem List:  61 y.o.male:  Principal Problem:     CVA 4/4/17   Active Problems:    Ischemic cardiomyopathy with EF 10% on Echo 4/4/17    ICD in place, implantation 2015.    S/P Sepsis due to  source 3/30/17    Diabetes Mellitus HgbA1c 10.4    Chronic hypoxic respiratory failure on nocturnal o2    CAD s/p CABG 2005, C 2015. Grafts patent except SVG to diagonal    Urinary retention requiring colbert    Hx of PVD (peripheral vascular disease)    Recent E. 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 Indwelling Colbert catheter    Atrial fibrillation but now converted. Still on Eliquis       Impression:  61 y.o.male with relevant PMH of CAD, ICM EF 10%, Prior CABG, PVD, HTN, HLD, T2DM, COPD, Home O2 admitted originally to [facility] 3/30 w/ a scrotal lesion that turned out to be Delisa's gangrene which was drained by Urology, [facility]A    Plan:  -Encephalopathy / Stroke vs TIA - probably from afib, now on Elliquis.  Changed seroquel to hs only, avoid day time sedatives.    -ICM EF 10% / Afib - changed asa to 81, won't be able to tolerate betablocker with EF that low.  D/C'd coreg, started low dose hydralazine/nitrate, titrating up as tolerated.  Started Digoxin.  Diuresis w/ bumex today.  Keep as dry as possible.    -Acute on Chronic Respiratory Failure - requiring NT suctioning every couple hours, continue nebs, etc.  Bipap prn.  He is a Do not Intubate.    -Delisa's Gangrene / Candiduria / UTI - Abx per ID    -T2DM (A1c 10.4) - Levemir 40, correction scale    -Tube Feeds, d/c free water    -Elliquis/PPI    -Poor Prognosis, Palliative Following. I had a long discussion with patients wife 4/26.  He would not want to be a nursing home patient.  I think his likelihood of achieving functional independence is extremely low.  With his EF of 10%, Respiratory failure requiring q1-2 hour suctioning, skeletal muscle  atrophy/weakness, and delirium I think inpatient hospice would be the most appropriate thing.  Family still has not made a final decision.     -Keep in ICU for now mainly for frequent NT suctioning    Plan of care and goals reviewed with mulitdisciplinary team at daily rounds    Critical care time: 30 minutes       Artemio Patel MD  Intensive Care Medicine  04/27/17 12:32 PM

## 2017-04-27 NOTE — PROGRESS NOTES
"[facility] Progress Note    Admission Date: 4/4/2017    Tomas Salvador  1955  [id number]    Date: 4/27/2017    Meds:    IV Anti-Infectives     Ordered     Dose/Rate Route Frequency Start Stop    04/20/17 0718  ceFAZolin in dextrose (ANCEF) IVPB solution 2 g     Ordering Provider:  Satya Fall MD    2 g  over 30 Minutes Intravenous Once 04/21/17 0800 04/21/17 0815    04/19/17 1145  amphotericin B (FUNGIZONE) 50 mg in sterile water (preservative free) 1,000 mL irrigation     Ordering Provider:  Kike Leon MD    42 mL/hr  42 mL/hr  Irrigation Every 24 Hours 04/19/17 1300            CC:  Delisa's gangrene    SUBJECTIVE:  4/4/17:Patient is a 61 y.o. 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 He has a history of underlying diabetes mellitus and severe systolic congestive heart failure. He presented to [facility] on 3/30 with scrotal cellulitis/gangrene. He underwent debridement by urology at [facility] on 3/31 and was treated with Merrem/clindamycin/vancomycin. He developed left facial droop and left sided paresis, prompting a transfer to [facility] for evaluation and therapy of acute stroke. His head CT angiogram was degraded by motion artifact but per Dr. Morrison there was suggestion of right inferior temporal lobe\" opacification\" consistent with a possible stroke. His left facial droop and left-sided paresis has improved today. He is encephalopathic and is unable to provide any reliable history. His wife thinks that he may have had some fevers prior to his admission on 3/30.    4/5/17: afebrile. No hx per patient secondary to encephalopathy.  Afebrile, restless per nsg staff.  Oliguric UOP.  No n/v/d.  No rashes.  4/6/17: More alert and less restless today.  Still non-responsive to questions and does not follow simple commands.  Still with left-sided weakness.  Afebrile.  Still with oliguric UOP.  No n/v/d, no rashes, per nsg staff notes. " "  4/7/17:  Alert today.  No fever,chills,sweats.  Wife at bedside with ?s   4/10/17:  Wants to go home.  Wound care per PT.  No n/v/d.   4/11/17:  Now on Dopamine and Bautista, bipap.  Decompensated last pm.    4/12/17:  Somnolent; still on pressors   4/13/17 Still on pressors/Precedex; Now on bipap.  Had pulled corpak out night before.    4/14/17: Per RN staff notes: afebrile, on/off precedex for agitation, on dopamine, on flagyl and cefazolin, on and off bipap.  Can not keep clothes on him.  Attempted to do Is and Os but too difficult without his foreskin getting pulled into meatus, so therefore, just placed Cueto cath.  Not anchored, because he keeps trying to pull it out.  Would is getting daily misted by PT and packed.  He is more alert today, but still not oriented.  4/15/17: He is improved today and off of vasopressor therapy.  He has been receiving some diuretic therapy for pulmonary edema.  He is less confused.  He remains afebrile.  4/16/17: He is continued to receive diuretic therapy.  He remains off of vasopressor therapy.  He has remained afebrile  4/17/17:  Remains restrained.  Opens eyes to voice.  Nonproductive cough.   4/18/17:  Opens eyes.  Does not follow commands.  His Cueto catheter is still in place and he is undergoing diuresis for his CHF/pulmonary edema.  4/19/17:  More alert today.  \"wants Scottie\".  PT just changed dressing.  He remains off of vasopressor therapy.  He is continued to receive diuresis.  The nursing staff reports that he has some penile edema, making it difficult to in and out catheterize him.  4/20/17:  On Bipap.  Opens eyes to voice.   4/21/17:  He has been less responsive today.  He is not currently following commands.  He still has hypoxia.  4/22/17: He has been more responsive today.  He is still severely confused and has required mitts on his hands.  He is still requiring 5 L of supplemental oxygen.  He has remained afebrile.  4/24/17:  Just taken off bipap.  Apparently not a " candidate for [place] rehab.  Remains confused.  17: wants his mitts off.  Says he's short of breath.    17: On bipap.  Wife at bedside with questions about wound care.  Wants to be transferred closer to [place]   17:  Resting quietly, wife at bedside. Minimal secretions.  No fever.        PE:   Vital Signs  Temp (24hrs), Av.4 °F (36.3 °C), Min:97.1 °F (36.2 °C), Max:97.8 °F (36.6 °C)    Temp  Min: 97.1 °F (36.2 °C)  Max: 97.8 °F (36.6 °C)  BP  Min: 89/55  Max: 145/95  Pulse  Min: 72  Max: 102  Resp  Min: 18  Max: 26  SpO2  Min: 92 %  Max: 100 %    GENERAL:   He is is in no acute distress.  He is alert but confused, on bipap  HEENT: Normocephalic, atraumatic. PERRL. EOMI. No conjunctival injection. No icterus. Oropharynx clear without evidence of thrush or exudate.   HEART: RRR; No murmur, rubs, gallops.   LUNGS: , rhonchi   ABDOMEN: Soft, nontender, nondistended. Positive bowel sounds. No rebound or guarding. NO mass or HSM. Peg   EXT: No cyanosis, clubbing or edema. No cord.  : The lower, posterior midline scrotal wounds reveals some continued healing with no residual cellulitis and no purulent drainage.  Dressing intact   A Cueto catheter is in place  MSK: FROM without joint effusions noted arms/legs.   SKIN: Warm and dry without cutaneous eruptions on Inspection/palpation.   Neurologic: He is more alert but remains confused .  He moves all 4 extremities.  Right PICC without redness      Laboratory Data      Results from last 7 days  Lab Units 17  0443 17  0430 17  0353   WBC 10*3/mm3 10.18 9.94 10.09   HEMOGLOBIN g/dL 11.0* 11.0* 10.1*   HEMATOCRIT % 37.1* 36.5* 34.1*   PLATELETS 10*3/mm3 332 301 288       Results from last 7 days  Lab Units 17  0443   SODIUM mmol/L 139   POTASSIUM mmol/L 4.2   CHLORIDE mmol/L 106   TOTAL CO2 mmol/L 27.0   BUN mg/dL 36*   CREATININE mg/dL 0.50*   GLUCOSE mg/dL 192*   CALCIUM mg/dL 9.5       Results from last 7 days  Lab Units  04/27/17  0443   ALK PHOS U/L 124*   BILIRUBIN mg/dL 0.4   ALT (SGPT) U/L 18   AST (SGOT) U/L 33           Results from last 7 days  Lab Units 04/24/17  1107   CRP mg/dL 7.38*       4/17:  UA small amount of LE; UC   Candida Glabrata     4/26:        Estimated Creatinine Clearance: 195 mL/min (by C-G formula based on Cr of 0.5).    Microbiology:  Blood Culture   Date Value Ref Range Status   03/30/2017 No growth at 5 days  Final   03/30/2017 No growth at 5 days  Final           Urine Culture   Date Value Ref Range Status   04/04/2017 No growth  Preliminary   03/31/2017 No growth  Final   03/30/2017 >100,000 CFU/mL Escherichia coli (A)  Final      scrotal cx x 2 cxs (3/31) E. coli  Cefazolin sens.    Urine Culture   Date Value Ref Range Status   04/11/2017 No growth at 2 days  Final   04/07/2017 >100,000 CFU/mL Candida glabrata (A)  Final         Radiology:  Imaging Results (last 24 hours)     ** No results found for the last 24 hours. 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  2. Acute right hemispheric cerebral vascular accident.  3.  Metabolic encephalopathy  4. 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 Severe systolic congestive heart failure-he has an ejection fraction of 10% with severe persistent symptomatic congestive heart failure.  6. Escherichia coli urinary tract infection-improved  7. Sepsis-secondary to Delisa's gangrene, now improved  8. Leukocytosis/neutrophilia-secondary to scrotal gangrene/cellulitis, improved   9.  Pulmonary edema  10. Candiduria--this resolved with removal of his Cueto catheter.  The treatment for his previous candiduria was removal of the Cueto catheter.  Now has recurrent candiduria after his Cueto catheter was placed back in.  He is currently on amphotericin B bladder irrigations         PLAN/RECOMMENDATIONS:   1.  Discontinue  amphotericin B bladder irrigations  2.  Transfer to hospice      Dr. Leon has obtained the history, performed the physical exam and formulated the above treatment plan.     I discussed his complex situation with his wife today.  Further aggressive therapy appears futile.  I think hospice care is clearly appropriate in this situation.  I discussed his situation with case management and the RN staff.  His wife is agreeable to hospice care.  I will sign off.    i spent over 35 minutes on his care today.    Kike Leon MD  4/27/2017  2:59 PM

## 2017-04-27 NOTE — PROGRESS NOTES
Adult Nutrition  Assessment/PES    Patient Name:  Tomas Salvador  YOB: 1955  MRN: [id number]  Admit Date:  4/4/2017    Assessment Date:  4/27/2017  Comments:  Free water dc'd  per v.o. given during MDR. Hospice meeting planned for later today.      Reason for Assessment       04/27/17 1122    Reason for Assessment    Reason For Assessment/Visit follow up protocol;multidisciplinary rounds;TF/PN    Identified At Risk By Screening Criteria dysphagia or difficulty swallowing;tube feeding or parenteral nutrition    Time Spent (min) 30    Diagnosis --   per notes of this adm   Principal Problem:    CVA 4/4/17   Active Problems:    Ischemic cardiomyopathy with EF 10% on Echo 4/4/17    ICD in place, implantation 2015.    S/P Sepsis due to  source 3/30/17    Diabetes Mellitus HgbA1c 10.4    Chronic hypoxic respiratory failure on nocturnal o2    CAD s/p CABG 2005, [facility] 2015. Grafts patent except SVG to diagonal    Urinary retention requiring colbert    Hx of PVD (peripheral vascular disease)    Recent E. 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 Debrided in [place] 3/30/17    Candida glabrata UTI. Indwelling Colbert catheter    Atrial fibrillation but now converted. Still on Eliquis               Nutrition/Diet History       04/27/17 1124    Nutrition/Diet History    Reported/Observed By RN;MD    Other tolerating TF; awaiting on pt's 5 children to arrive for Hospice mtg - vo given to dc free water              Labs/Tests/Procedures/Meds       04/27/17 1125    Labs/Tests/Procedures/Meds    Labs/Tests Review Reviewed    Medication Review Reviewed, pertinent;Diuretic            Physical Findings       04/27/17 1126    Physical Appearance    Tubes peg tube              Nutrition Prescription Ordered       04/27/17 1126    Nutrition Prescription PO    Current PO Diet NPO    Nutrition Prescription EN    Enteral Route PEG    Product Impact  Peptide 1.5 tom    Continuous TF Goal Rate (mL/hr) 60 mL/hr    Continuous TF Current Rate (mL/hr) 60 mL/hr    Continuous TF Goal Volume (mL) 1200 mL    Continuous TF Current Volume (mL) 1440 mL    Water flush (mL)  50 mL    Water Flush Frequency Per hour            Evaluation of Received Nutrient/Fluid Intake       04/27/17 1127    Calculation Measurements    Weight Used For Calculations 119 kg (262 lb)    Evaluation of Received Nutrient/Fluid Intake    Number of Days Evaluated 1 day    Calorie Intake Evaluation    Enteral Calories (kcal) 1967    Total Calories (kcal) 1967    Total Calories (kcal/Kg) 16.55 kcal/kg    % Kcal Needs 109    Protein Intake Evaluation    Enteral Protein (gm) 123    Total Protein (gm) 123    % Protein Needs 84    Fluid Intake Evaluation    Enteral  Fluid (mL) 1009    Free Water Flush Fluid (mL) 1331    Total Fluid Intake (mL) 2340    Total Fluid Intake (mL/kg) 19.69 mL/kg    Fiber Intake Evaluation    Fiber 0    Recommended Daily Intake Evaluation    RDI Met    EN Evaluation    Number of Days EN Intake Evaluated 1 day    EN Average Volume Delivered (mL/day) 1311 mL/day    % Goal Volume  109 %              Problem/Interventions:          Problem 2       04/27/17 1133    Nutrition Diagnoses Problem 2    Problem 2 Excessive Nutrient Intake    Inadequate Intake Type Enteral    Macronutrient Fluid    Etiology (related to) Medical Diagnosis    Cardiac Cardiomyopathy   EF 10 %    Signs/Symptoms (evidenced by) Biochemical    Specific Labs Noted BNP                  Intervention Goal       04/27/17 1135    Intervention Goal    General Nutrition support treatment;Palliative Care   family conference today for [facility]            Nutrition Intervention       04/27/17 1136    Nutrition Intervention    RD/Tech Action Follow Tx progress;Care plan reviewd;Recommend/ordered    Recommended/Ordered EN            Nutrition Prescription       04/27/17 1136    Nutrition Prescription EN    Enteral Prescription  Enteral begin/change;Other (comment)   dc free water flushes vo given Dr. Patel    EN to Supply    TF Free H2O (mL) 924 mL   at goal volume of 1200 mL/D            Education/Evaluation       04/27/17 1142    Monitor/Evaluation    Monitor Per protocol;I&O;Pertinent labs;TF delivery/tolerance            Electronically signed by:  Melanie Bah RD  04/27/17 11:43 AM

## 2017-04-27 NOTE — PLAN OF CARE
Problem: Patient Care Overview (Adult)  Goal: Plan of Care Review  Outcome: Ongoing (interventions implemented as appropriate)    04/27/17 1115   Coping/Psychosocial Response Interventions   Plan Of Care Reviewed With patient;spouse   Patient Care Overview   Progress no change   Outcome Evaluation   Outcome Summary/Follow up Plan Family considering hospice services. Inpatient hospice team following as well.

## 2017-04-27 NOTE — PLAN OF CARE
Problem: Patient Care Overview (Adult)  Goal: Plan of Care Review  Outcome: Ongoing (interventions implemented as appropriate)  Pt confused;requiring frequent NT suction; motor agitation    Problem: Stroke (Ischemic) (Adult)  Goal: Signs and Symptoms of Listed Potential Problems Will be Absent or Manageable (Stroke)  Outcome: Ongoing (interventions implemented as appropriate)    Problem: Fall Risk (Adult)  Goal: Identify Related Risk Factors and Signs and Symptoms  Outcome: Ongoing (interventions implemented as appropriate)  Goal: Absence of Falls  Outcome: Ongoing (interventions implemented as appropriate)    Problem: Skin Integrity Impairment, Risk/Actual (Adult)  Goal: Identify Related Risk Factors and Signs and Symptoms  Outcome: Ongoing (interventions implemented as appropriate)  Goal: Skin Integrity/Wound Healing  Outcome: Ongoing (interventions implemented as appropriate)    Problem: Infection, Risk/Actual (Adult)  Goal: Identify Related Risk Factors and Signs and Symptoms  Outcome: Ongoing (interventions implemented as appropriate)  Goal: Infection Prevention/Resolution  Outcome: Ongoing (interventions implemented as appropriate)    Problem: SAFETY - NON-VIOLENT RESTRAINT  Goal: Remains free of injury from restraints (Non-Violent Restraint)  Outcome: Ongoing (interventions implemented as appropriate)  Goal: Free from restraint(s) (Non-Violent Restraint)  Outcome: Ongoing (interventions implemented as appropriate)    Problem: Pressure Ulcer Risk (South Scale) (Adult,Obstetrics,Pediatric)  Goal: Identify Related Risk Factors and Signs and Symptoms  Outcome: Ongoing (interventions implemented as appropriate)  Goal: Skin Integrity  Outcome: Ongoing (interventions implemented as appropriate)

## 2017-04-27 NOTE — SIGNIFICANT NOTE
Palliative Team Meeting Attendance  13:00  JUNIE Calvo RN, CHDO EVENS Mojica RN, CHCHARU Carmona, HAMMADW, [facility]-              JOSE Portillo, APRN

## 2017-04-28 NOTE — PAYOR COMM NOTE
"Tomas Gtz (61 y.o. Male)     Date of Birth Social Security Number Address Home Phone MRN    1955  [address]   [place] [address] [phone] [id number]    [employer] Marital Status          [place]        Admission Date Admission Type Admitting Provider Attending Provider Department, Room/Bed    4/4/17 Elective Artemio Patel MD  [facility] 2B ICU, N226/1    Discharge Date Discharge Disposition Discharge Destination        4/27/2017 Hospice/Medical Facility ([facility])             Attending Provider: (none)    Allergies:  No Known Allergies    Isolation:  None   Infection:  None   Code Status:  Prior    Ht:  69\" (175.3 cm)   Wt:  262 lb (119 kg)    Admission Cmt:  None   Principal Problem:  CVA 4/4/17  [I63.9]                 Active Insurance as of 4/4/2017     Primary Coverage     Payor Plan Insurance Group Employer/Plan Group    ANTHEM BLUE CROSS ANTHEM BLUE CROSS BLUE SHIELD PPO [id number]     Payor Plan Address Payor Plan Phone Number Effective From Effective To    [address] [phone] 11/1/2015     [place] [address]       Subscriber Name Subscriber Birth Date Member ID       SANJUANA GTZ 1955 [id number]                 Emergency Contacts      (Rel.) Home Phone Work Phone Mobile Phone    Sanjuana Gtz [phone] -- --    Mirta Adam (Son) -- [phone] --    Prabhu Peter (Son) [phone] --            Patient was discharged from acute inpatient hospital on 4/27. Now admitted as inpatient hospice within our facility.  "

## 2017-10-19 NOTE — PLAN OF CARE
Problem: Patient Care Overview (Adult)  Goal: Plan of Care Review  Outcome: Ongoing (interventions implemented as appropriate)    04/11/17 1015   Coping/Psychosocial Response Interventions   Plan Of Care Reviewed With patient   Outcome Evaluation   Outcome Summary/Follow up Plan PT treatment deferred today due to declining medical status. D/C mobility PT order per rounds.          Problem: Inpatient Physical Therapy  Goal: Bed Mobility Goal LTG- PT  Outcome: Unable to achieve outcome(s) by discharge Date Met:  04/11/17 04/05/17 0909 04/11/17 1015   Bed Mobility PT LTG   Bed Mobility PT LTG, Date Established 04/05/17 --    Bed Mobility PT LTG, Time to Achieve 2 wks --    Bed Mobility PT LTG, Activity Type supine to sit/sit to supine --    Bed Mobility PT LTG, Troup Level supervision required --    Bed Mobility PT LTG, Outcome --  goal not met   Bed Mobility PT LTG, Reason Goal Not Met --  medical status inhibits participation       Goal: Transfer Training Goal 1 LTG- PT  Outcome: Unable to achieve outcome(s) by discharge Date Met:  04/11/17 04/05/17 0909 04/11/17 1015   Transfer Training PT LTG   Transfer Training PT LTG, Date Established 04/05/17 --    Transfer Training PT LTG, Time to Achieve 2 wks --    Transfer Training PT LTG, Activity Type sit to stand/stand to sit --    Transfer Training PT LTG, Troup Level contact guard assist --    Transfer Training PT LTG, Assist Device walker, rolling --    Transfer Training PT LTG, Outcome --  goal not met   Transfer Training PT LTG, Reason Goal Not Met --  medical status inhibits participation       Goal: Gait Training Goal LTG- PT  Outcome: Unable to achieve outcome(s) by discharge Date Met:  04/11/17 04/05/17 0909 04/11/17 1015   Gait Training PT LTG   Gait Training Goal PT LTG, Date Established 04/05/17 --    Gait Training Goal PT LTG, Time to Achieve 2 wks --    Gait Training Goal PT LTG, Troup Level contact guard assist --    Gait  Spoke with Daphney from [facility] and informed LBB approval for patients HH. No further questions or concerns at this time. Training Goal PT LTG, Assist Device walker, rolling --    Gait Training Goal PT LTG, Distance to Achieve 200 --    Gait Training Goal PT LTG, Outcome --  goal not met   Gait Training Goal PT LTG, Reason Goal Not Met --  medical status inhibits participation

## 2018-01-26 NOTE — PROGRESS NOTES
Continued Stay Note  [facility]     Patient Name: Tomas Salvador  MRN: [id number]  Today's Date: 4/5/2017    Admit Date: 4/4/2017          Discharge Plan       04/05/17 1055    Case Management/Social Work Plan    Additional Comments Goal is to transfer to [facility].  Dr. Cross will follow him when he transfer there.              Discharge Codes     None            Bernadette Pina RN     [id number] Exp Problem Focused - Mod. Complex

## 2020-08-12 NOTE — CONSULTS
"[facility]   Consult Note    Referring Provider: Dr. Pope    Reason for Consultation: CVA and input regarding possible anticoagulation    Patient Care Team:  Jose Navarro MD as PCP - General (Family Medicine)     PROBLEM LIST:  1. Coronary disease:   a. Multivessel CABG, March 2005.  b. Non-STEMI, 05/06/2015. Left heart catheterization demonstrating severe multivessel disease, totally occluded LAD, 99% left circumflex, totally occluded SVG to diagonal, patent LIMA to LAD, patent SVG to first OM.  2. Ischemic cardiomyopathy/chronic systolic heart failure:  a. May 2015 echocardiogram: Ejection fraction 25%.  b. 07/23/2015, 2D echocardiogram: Severe LV dilation. Ejection fraction 20% to 25%.  c. 10/05/2015, 2D echocardiogram: Severe LV dilation. EF 20% to 25%.  3. Hypertension.  4. Peripheral vascular disease.  5. Diabetes.  6. Acute CVA 4/4/17  a.  Not a candidate for tpa secondary to recent surgery  b. CTA reviewed by Dr. Morrison and no possible areas for revascularization. Noted motion artifact. Noted slightly diminished \"opacification\" of the inferior right temporal lobe.   7. Chronic back pain  8. Dyslipidemia  9. Obesity  10. Chronic systolic congestive heart failure  11. Surgeries:  a. 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      No Known Allergies        Current Facility-Administered Medications:   •  [START ON 4/5/2017] aspirin tablet 325 mg, 325 mg, Oral, Daily **OR** [START ON 4/5/2017] aspirin suppository 300 mg, 300 mg, Rectal, Daily, Emeka Morrison MD  •  [START ON 4/5/2017] atorvastatin (LIPITOR) tablet 80 mg, 80 mg, Oral, Nightly, Emeka Morrison MD  •  ceFAZolin in dextrose (ANCEF) IVPB solution 2 g, 2 g, Intravenous, Q8H, Cinthia Weaver, APRN, 2 g at 04/04/17 1611  •  [START ON 4/5/2017] clopidogrel (PLAVIX) tablet 75 mg, 75 mg, Oral, Daily, Emeka Morrison MD  •  dextrose (D50W) solution 25 g, 25 g, Intravenous, Q15 Min PRN, " Cinthia Weaver, TRESA  •  dextrose (GLUTOSE) oral gel 15 g, 15 g, Oral, Q15 Min PRN, TRESA Baca  •  glucagon (GLUCAGEN) injection 1 mg, 1 mg, Subcutaneous, Q15 Min PRN, TRESA Baca  •  insulin lispro (humaLOG) injection 0-9 Units, 0-9 Units, Subcutaneous, Q6H, TRESA Baca  •  niCARdipine (CARDENE) 25 mg/250 mL 0.9% NS VTB (mbp), 5-15 mg/hr, Intravenous, Titrated, Emeka Morrison MD  •  sodium chloride 0.9 % flush 1-10 mL, 1-10 mL, Intravenous, PRN, Emeka Morrison MD      niCARdipine 5-15 mg/hr       Prescriptions Prior to Admission   Medication Sig Dispense Refill Last Dose   • aspirin 81 MG EC tablet Take 81 mg by mouth Daily. On hold for surgery   Unknown at Unknown time   • atorvastatin (LIPITOR) 80 MG tablet Take 1 tablet by mouth Daily. 30 tablet 3 3/30/2017 at 1000   • carvedilol (COREG) 6.25 MG tablet Take 1 tablet by mouth 2 (Two) Times a Day With Meals. 60 tablet 3 3/30/2017 at 1000   • clindamycin (CLEOCIN) 600 MG/50ML IVPB Infuse 50 mL into a venous catheter Every 8 (Eight) Hours. Indications: Infection of Blood or Tissues affecting the Whole Body  0    • clopidogrel (PLAVIX) 75 MG tablet Take 1 tablet by mouth Daily. 30 tablet 3 3/30/2017 at 1000   • gabapentin (NEURONTIN) 300 MG capsule Take 1 capsule by mouth 2 (two) times a day. 60 capsule 3 3/30/2017 at 1000   • Heparin Sodium, Porcine, (HEPARIN, PORCINE,) 5000 UNIT/ML injection Inject 1 mL under the skin Every 12 (Twelve) Hours.      • insulin aspart (novoLOG) 100 UNIT/ML injection Inject 0-9 Units under the skin 4 (Four) Times a Day Before Meals & at Bedtime.      • insulin detemir (LEVEMIR) 100 UNIT/ML injection Inject 10 Units under the skin Every Night.      • meropenem 2 g in sodium chloride 0.9 % 100 mL IVPB Infuse 2 g into a venous catheter Every 8 (Eight) Hours. Indications: Infection of Blood or Tissues affecting the Whole Body  0    • morphine 2 MG/ML injection Infuse 1 mL into a  venous catheter Every 4 (Four) Hours As Needed for Severe Pain (7-10).  0    • nitroglycerin (NITROLINGUAL) 0.4 MG/SPRAY spray Place 1 spray under the tongue every 5 (five) minutes as needed for chest pain.   Unknown at Unknown time   • nystatin (MYCOSTATIN) 778840 UNIT/GM ointment Apply  topically 2 (Two) Times a Day As Needed (yeast on skin).  0    • omeprazole (PriLOSEC) 20 MG capsule Take 20 mg by mouth Daily As Needed (acid reflux).   Unknown at Unknown time   • rOPINIRole (REQUIP) 0.25 MG tablet Take 0.25 mg by mouth Every Night.   3/29/2017 at Unknown time   • vancomycin 1,500 mg in sodium chloride 0.9 % 500 mL IVPB Infuse 1,500 mg into a venous catheter Every 12 (Twelve) Hours. Indications: Infection of Blood or Tissues affecting the Whole Body  0          Subjective .   History of present illness:    Patient was admitted to [facility] on 3/30/17 secondary to scrotal gangrene. He underwent debridement of this. One month prior he had been diagnosed with a UTI and had a colbert placed. It was felt that the gangrene was related to the colbert. After his surgery he had reportedly done well. Today he had sudden onset of aphasia and left sided facial droop with left sided paralysis. He was not a candidate for tpa secondary to recent surgery, so he was referred here for possible emergent catheter based intervention. Patient has been evaluated by Dr. Morrison and there were no areas noted to be amenable to intervention and thus this was deferred. Patient currently is still apahsic but moving all extremities. There is no family at bedside. After review patient was on plavix and aspirin at the time of the event.       Social History     Social History   • Marital status:      Spouse name: N/A   • Number of children: N/A   • Years of education: N/A     Occupational History   • Not on file.     Social History Main Topics   • Smoking status: Former Smoker     Packs/day: 1.50     Years: 43.00     Quit date: 2002   • Smokeless  tobacco: Current User   • Alcohol use No   • Drug use: No   • Sexual activity: Defer     Other Topics Concern   • Not on file     Social History Narrative     Family History   Problem Relation Age of Onset   • Diabetes Mother    • Hypertension Mother    • Coronary artery disease Mother      MI in her 40's   • Cancer Father      Throat   • Hypertension Father    • Hypertension Sister    • Hypertension Brother    • Hypertension Maternal Grandmother    • Hypertension Maternal Grandfather          Review of Systems:  ROS  ROS unobtainable secondary to mental status and aphasia.         Objective   Vitals:  Blood pressure 142/87, pulse 108, temperature 98.6 °F (37 °C), temperature source Axillary, resp. rate 20, SpO2 100 %.       Physical Exam   Constitutional: He appears well-developed and well-nourished.   HENT:   Head: Normocephalic and atraumatic.   Mouth/Throat: Oropharynx is clear and moist.   Eyes: Right eye exhibits no discharge. Left eye exhibits no discharge.   Neck: No JVD present. Carotid bruit is not present. No tracheal deviation present.   Cardiovascular: Regular rhythm and normal heart sounds.  Tachycardia present.    Pulmonary/Chest: Effort normal and breath sounds normal. No respiratory distress.   Abdominal: Soft. Bowel sounds are normal. There is no tenderness.   Musculoskeletal: He exhibits edema (trace ble edema). He exhibits no deformity.   Neurological:   Patient aphasic. Moves all extremities.   Skin: Skin is warm and dry.            Results Review:  I reviewed the patient's new clinical results.    Results from last 7 days  Lab Units 04/04/17  0115   WBC 10*3/mm3 9.68   HEMOGLOBIN g/dL 10.6*   HEMATOCRIT % 35.4*   PLATELETS 10*3/mm3 380       Results from last 7 days  Lab Units 04/04/17  0115   SODIUM mmol/L 136   POTASSIUM mmol/L 4.0   CHLORIDE mmol/L 105   TOTAL CO2 mmol/L 24.8   BUN mg/dL 6*   CREATININE mg/dL 0.53   CALCIUM mg/dL 8.3   BILIRUBIN mg/dL 0.2   ALK PHOS U/L 69   ALT (SGPT) U/L  12   AST (SGOT) U/L 24   GLUCOSE mg/dL 130*       Results from last 7 days  Lab Units 04/04/17  0115   SODIUM mmol/L 136   POTASSIUM mmol/L 4.0   CHLORIDE mmol/L 105   TOTAL CO2 mmol/L 24.8   BUN mg/dL 6*   CREATININE mg/dL 0.53   GLUCOSE mg/dL 130*   CALCIUM mg/dL 8.3           0  Lab Value Date/Time   TROPONINI <0.006 04/04/2017 1056   TROPONINI <0.006 04/03/2017 1851   TROPONINI 0.018 03/31/2017 0542   TROPONINI 0.024 03/30/2017 1515   TROPONINI 3.295 (C) 05/07/2015 2125   TROPONINI 3.801 (C) 05/07/2015 1549   TROPONINI 3.825 (C) 05/07/2015 1133                       Tele:  ST    EKG:       Assessment/Plan     1. Acute CVA, not a TPA candidate and area of possible ischemia not felt to be amenable to intervention.  No documented atrial fibrillation from the outside facility.  2. Ischemic cardiomyopathy. EF 20% by outside facility echo. Could possibly have a LV thrombus  3. CAD, stable  4. Dyslipidemia  5. Hypertension        Plan:    1. Will check an ECHO with definity to evaluate for possible LV thrombus. Will also obtain carotid duplex. Continue current regimen for now. Further recommendations to follow further testing.  Will also have ICD interrogated to rule out any atrial fibrillation.  Recommendations regarding anticoagulation forthcoming after the above.      TRESA Mojica obtained past medical, family history, social history, review of systems and functioned as a scribe for the remainder of the dictation for Dr. Martin.      TRESA Mojica  04/04/17  4:23 PM  I, Jarocho Martin MD, personally performed the services described in this documentation as scribed by the above individual in my presence, and it is both accurate and complete    Please note that portions of this note may have been completed with a voice recognition program. Efforts were made to edit the dictations, but occasionally words are mistranscribed.     5 Hour(s) 39 Minute(s)

## 2025-05-25 NOTE — PATIENT CARE CONFERENCE
Body mass index is 30.22 kg/m². Morbid obesity complicates all aspects of disease management from diagnostic modalities to treatment. Weight loss encouraged and health benefits explained to patient.      ICU Rounds: Continue OT/PT per treatment plan. Hospice consult.

## (undated) DEVICE — INTRO ACCSR BLNT TP

## (undated) DEVICE — ENCORE® LATEX MICRO SIZE 8, STERILE LATEX POWDER-FREE SURGICAL GLOVE: Brand: ENCORE

## (undated) DEVICE — PANTY MESH 4XL

## (undated) DEVICE — "MH-438 A/W VLVE F/140 EVIS-140": Brand: AIR/WATER VALVE

## (undated) DEVICE — PK BASIC 70

## (undated) DEVICE — DRN PENRS SAFET/CLIP 1/2X12IN LF

## (undated) DEVICE — BNDR ABD PREMIUM/UNIV 10IN 27TO48IN

## (undated) DEVICE — Device: Brand: ENDOGATOR

## (undated) DEVICE — DBD-DRAPE,LAP,CHOLE,W/TROUGHS,STERILE: Brand: MEDLINE

## (undated) DEVICE — LIMB HOLDERS: Brand: DEROYAL

## (undated) DEVICE — DRSNG TELFA PAD NONADH STR 1S 3X4IN

## (undated) DEVICE — SUT SILK 0 FSL 18IN 678G

## (undated) DEVICE — PERCUTANEOUS ENDOSCOPIC GASTROSTOMY KIT: Brand: ENDOVIVE SAFETY PEG KIT

## (undated) DEVICE — THE BITE BLOCK MAXI, LATEX FREE STRAP IS USED TO PROTECT THE ENDOSCOPE INSERTION TUBE FROM BEING BITTEN BY THE PATIENT.

## (undated) DEVICE — "MH-948 A/W CHANNEL CLEANING ADPTR -VIDEO": Brand: AW CHANNEL CLEANING ADAPTE

## (undated) DEVICE — SYR LUERLOK 50ML

## (undated) DEVICE — CONTN GRAD MEAS TRIANG 32OZ BLK

## (undated) DEVICE — ENDOGATOR HYBRID TUBING KIT FOR USE WITH ENDOGATOR IRRIGATION PUMP, OLYMPUS PUMP, GI4000 ESU, AND TORRENT IRRIGATION PUMP.: Brand: ENDOGATOR KIT

## (undated) DEVICE — DRAINBAG,ANTI-REFLUX TOWER,L/F,2000ML,LL: Brand: MEDLINE

## (undated) DEVICE — COR CYSTO: Brand: MEDLINE INDUSTRIES, INC.

## (undated) DEVICE — GAUZE,SPONGE,FLUFF,6"X6.75",STRL,10/TRAY: Brand: MEDLINE

## (undated) DEVICE — SUT GUT CHRM 3/0 SH 27IN G122H

## (undated) DEVICE — AMD ANTIMICROBIAL GAUZE SPONGES,12 PLY USP TYPE VII, 0.2% POLYHEXAMETHYLENE BIGUANIDE HCI (PHMB): Brand: CURITY

## (undated) DEVICE — TBG 02 CRUSH RESIST LF CLR 7FT

## (undated) DEVICE — "MH-443 SUCTION VALVE F/EVIS140 EVIS160": Brand: SUCTION VALVE